# Patient Record
Sex: MALE | Race: WHITE | NOT HISPANIC OR LATINO | Employment: FULL TIME | URBAN - METROPOLITAN AREA
[De-identification: names, ages, dates, MRNs, and addresses within clinical notes are randomized per-mention and may not be internally consistent; named-entity substitution may affect disease eponyms.]

---

## 2023-04-24 ENCOUNTER — DOCUMENTATION (OUTPATIENT)
Dept: PRIMARY CARE | Facility: CLINIC | Age: 33
End: 2023-04-24
Payer: COMMERCIAL

## 2023-04-24 NOTE — PROGRESS NOTES
1st attempt at outreach call unsuccessful. Message left for patient to call back. Will attempt to call patient back later today or tomorrow.     2nd attempt at outreach call unsuccessful. Tasked to PCP office to contact patient for follow up appt.

## 2023-05-04 ENCOUNTER — TELEPHONE (OUTPATIENT)
Dept: PRIMARY CARE | Facility: CLINIC | Age: 33
End: 2023-05-04
Payer: COMMERCIAL

## 2023-05-04 NOTE — TELEPHONE ENCOUNTER
----- Message from Kate Joshi RN sent at 4/24/2023  3:51 PM EDT -----  Regarding: FW: unable to reach patient    ----- Message -----  From: Rob Shelley RN  Sent: 4/24/2023   2:50 PM EDT  To:  [id number]  Subject: unable to reach patient                          Discharge facility: [facility]  Discharge diagnosis: DKA   Date of discharge: 23 Apr 23      Unsuccessful attempts x2 to reach patient for PCP Follow-up  Please have office staff reach out to patient and schedule an appointment within 7-13 days from discharge date.

## 2023-05-16 NOTE — TELEPHONE ENCOUNTER
Still can not reach patient by phone so I will mail him a attempt to call letter asking him to call the office.

## 2023-07-03 ENCOUNTER — PATIENT OUTREACH (OUTPATIENT)
Dept: PRIMARY CARE | Facility: CLINIC | Age: 33
End: 2023-07-03
Payer: COMMERCIAL

## 2023-07-03 NOTE — PROGRESS NOTES
Discharge Facility: [facility]  Discharge Diagnosis: DKA  Admission Date: 30 June 23  Discharge Date: 2 July 23    PCP Appointment Date: Tasked to PCP office for scheduling.   Specialist Appointment Date: Suggested pt follow up with Endocrinologist within 1 wk of discharge.   Hospital Encounter and Summary: Linked    See discharge assessment below for further details     No contact. Tasked to PCP office for scheduling. Will attempted call back in 2 wks.

## 2023-07-11 PROBLEM — R10.9 ABDOMINAL PAIN: Status: ACTIVE | Noted: 2023-04-21

## 2023-07-11 PROBLEM — E11.65 POORLY CONTROLLED DIABETES MELLITUS (MULTI): Status: ACTIVE | Noted: 2023-07-11

## 2023-07-11 PROBLEM — R53.83 FATIGUE: Status: ACTIVE | Noted: 2023-07-11

## 2023-07-11 PROBLEM — E10.65 TYPE 1 DIABETES MELLITUS WITH HYPERGLYCEMIA (MULTI): Status: ACTIVE | Noted: 2019-02-25

## 2023-07-11 PROBLEM — E10.29: Status: ACTIVE | Noted: 2023-07-11

## 2023-07-11 PROBLEM — F41.1 GENERALIZED ANXIETY DISORDER: Status: ACTIVE | Noted: 2023-07-11

## 2023-07-11 PROBLEM — E10.10 DKA, TYPE 1, NOT AT GOAL (MULTI): Status: ACTIVE | Noted: 2023-04-21

## 2023-07-11 PROBLEM — E11.10 DKA (DIABETIC KETOACIDOSIS) (MULTI): Status: ACTIVE | Noted: 2023-07-11

## 2023-07-11 PROBLEM — E53.8 VITAMIN B12 DEFICIENCY: Status: ACTIVE | Noted: 2023-07-11

## 2023-07-11 PROBLEM — U07.1 COVID-19 VIRUS INFECTION: Status: ACTIVE | Noted: 2023-07-11

## 2023-07-11 PROBLEM — E10.9 TYPE 1 DIABETES MELLITUS (MULTI): Status: ACTIVE | Noted: 2023-07-11

## 2023-07-11 PROBLEM — R80.9: Status: ACTIVE | Noted: 2023-07-11

## 2023-07-11 PROBLEM — F41.9 ANXIETY AND DEPRESSION: Status: ACTIVE | Noted: 2021-08-08

## 2023-07-11 PROBLEM — F32.A ANXIETY AND DEPRESSION: Status: ACTIVE | Noted: 2021-08-08

## 2023-07-11 PROBLEM — E55.9 VITAMIN D DEFICIENCY: Status: ACTIVE | Noted: 2023-07-11

## 2023-07-11 RX ORDER — LANCING DEVICE
EACH MISCELLANEOUS
Status: ON HOLD | COMMUNITY
Start: 2022-01-14 | End: 2023-10-09 | Stop reason: SDUPTHER

## 2023-07-11 RX ORDER — PEN NEEDLE, DIABETIC 30 GX3/16"
1 NEEDLE, DISPOSABLE MISCELLANEOUS
Status: ON HOLD | COMMUNITY
Start: 2022-02-17 | End: 2023-10-09 | Stop reason: SDUPTHER

## 2023-07-11 RX ORDER — SERTRALINE HYDROCHLORIDE 25 MG/1
1 TABLET, FILM COATED ORAL DAILY
Status: ON HOLD | COMMUNITY
Start: 2023-02-13 | End: 2023-10-09

## 2023-07-11 RX ORDER — ACETAMINOPHEN 325 MG/1
TABLET ORAL EVERY 4 HOURS PRN
Status: ON HOLD | COMMUNITY
Start: 2022-09-26 | End: 2023-12-08 | Stop reason: WASHOUT

## 2023-07-11 RX ORDER — BLOOD-GLUCOSE SENSOR
EACH MISCELLANEOUS
COMMUNITY
Start: 2023-06-17 | End: 2024-02-23 | Stop reason: WASHOUT

## 2023-07-11 RX ORDER — INSULIN LISPRO 100 [IU]/ML
8 INJECTION, SOLUTION INTRAVENOUS; SUBCUTANEOUS
Status: ON HOLD | COMMUNITY
Start: 2023-04-23 | End: 2023-10-09

## 2023-07-11 RX ORDER — BLOOD-GLUCOSE,RECEIVER,CONT
EACH MISCELLANEOUS
COMMUNITY
Start: 2022-11-11 | End: 2024-02-23 | Stop reason: ALTCHOICE

## 2023-07-11 RX ORDER — HUMAN INSULIN 100 [IU]/ML
INJECTION, SOLUTION SUBCUTANEOUS
Status: ON HOLD | COMMUNITY
Start: 2022-06-02 | End: 2023-10-09

## 2023-07-11 RX ORDER — INSULIN GLARGINE 100 [IU]/ML
INJECTION, SOLUTION SUBCUTANEOUS
Status: ON HOLD | COMMUNITY
Start: 2022-06-08 | End: 2023-10-10 | Stop reason: SDUPTHER

## 2023-07-11 RX ORDER — CALCIUM CITRATE/VITAMIN D3 200MG-6.25
TABLET ORAL
Status: ON HOLD | COMMUNITY
End: 2023-10-09 | Stop reason: SDUPTHER

## 2023-07-11 RX ORDER — PANTOPRAZOLE SODIUM 40 MG/1
40 TABLET, DELAYED RELEASE ORAL
Status: ON HOLD | COMMUNITY
Start: 2021-08-09 | End: 2023-10-09

## 2023-07-11 RX ORDER — PEN NEEDLE, DIABETIC 30 GX3/16"
NEEDLE, DISPOSABLE MISCELLANEOUS
COMMUNITY
Start: 2023-04-23

## 2023-07-11 RX ORDER — INSULIN LISPRO 100 [IU]/ML
INJECTION, SOLUTION INTRAVENOUS; SUBCUTANEOUS
Status: ON HOLD | COMMUNITY
Start: 2023-07-02 | End: 2023-10-10 | Stop reason: SDUPTHER

## 2023-07-11 RX ORDER — DEXTROSE 4 G
1 TABLET,CHEWABLE ORAL
Status: ON HOLD | COMMUNITY
Start: 2022-01-14 | End: 2023-10-09 | Stop reason: SDUPTHER

## 2023-07-11 RX ORDER — PEN NEEDLE, DIABETIC 32GX 5/32"
NEEDLE, DISPOSABLE MISCELLANEOUS
Status: ON HOLD | COMMUNITY
Start: 2023-04-23 | End: 2023-10-09 | Stop reason: SDUPTHER

## 2023-07-11 RX ORDER — BLOOD-GLUCOSE TRANSMITTER
EACH MISCELLANEOUS
COMMUNITY
Start: 2022-11-10 | End: 2024-02-23 | Stop reason: ALTCHOICE

## 2023-07-11 RX ORDER — ESCITALOPRAM OXALATE 10 MG/1
10 TABLET ORAL DAILY
Status: ON HOLD | COMMUNITY
Start: 2020-09-09 | End: 2023-10-09

## 2023-07-11 RX ORDER — INSULIN GLARGINE 100 [IU]/ML
INJECTION, SOLUTION SUBCUTANEOUS
Status: ON HOLD | COMMUNITY
Start: 2022-06-08 | End: 2023-10-09 | Stop reason: SDUPTHER

## 2023-07-12 ENCOUNTER — APPOINTMENT (OUTPATIENT)
Dept: PRIMARY CARE | Facility: CLINIC | Age: 33
End: 2023-07-12
Payer: COMMERCIAL

## 2023-07-14 ENCOUNTER — PATIENT OUTREACH (OUTPATIENT)
Dept: PRIMARY CARE | Facility: CLINIC | Age: 33
End: 2023-07-14
Payer: COMMERCIAL

## 2023-07-19 ENCOUNTER — PATIENT OUTREACH (OUTPATIENT)
Dept: PRIMARY CARE | Facility: CLINIC | Age: 33
End: 2023-07-19
Payer: COMMERCIAL

## 2023-07-19 NOTE — PROGRESS NOTES
Discharge Facility: [facility]  Discharge Diagnosis: DKA  Admission Date: 15 July 23  Discharge Date: 18 July 23    PCP Appointment Date: Tasked to PCP office for scheduling  Specialist Appointment Date: Suggested pt follow up with Endocrinology on discharge.   Hospital Encounter and Summary: Linked    No contact on discharge outreach attempt. Tasked to PCP office for scheduling. Will attempt outreach again in 2 wks.      1 Aug 23 - no contact on 2 wk outreach attempt. Amending this note due to not being allowed to create a new note due to pt being dismissed by practice. Pt disenrolled from TCM program per TCM policy at this time.

## 2023-07-20 PROBLEM — E87.5 HYPERKALEMIA: Status: ACTIVE | Noted: 2023-07-20

## 2023-10-08 ENCOUNTER — HOSPITAL ENCOUNTER (INPATIENT)
Facility: HOSPITAL | Age: 33
LOS: 2 days | Discharge: HOME | DRG: 639 | End: 2023-10-10
Attending: EMERGENCY MEDICINE | Admitting: INTERNAL MEDICINE
Payer: COMMERCIAL

## 2023-10-08 ENCOUNTER — APPOINTMENT (OUTPATIENT)
Dept: RADIOLOGY | Facility: HOSPITAL | Age: 33
DRG: 639 | End: 2023-10-08
Payer: COMMERCIAL

## 2023-10-08 DIAGNOSIS — E10.65 TYPE 1 DIABETES MELLITUS WITH HYPERGLYCEMIA (MULTI): ICD-10-CM

## 2023-10-08 DIAGNOSIS — E87.5 HYPERKALEMIA: ICD-10-CM

## 2023-10-08 DIAGNOSIS — E10.10 DIABETIC KETOACIDOSIS WITHOUT COMA ASSOCIATED WITH TYPE 1 DIABETES MELLITUS (MULTI): Primary | ICD-10-CM

## 2023-10-08 LAB
ALBUMIN SERPL BCP-MCNC: 4.6 G/DL (ref 3.4–5)
ALP SERPL-CCNC: 86 U/L (ref 33–120)
ALT SERPL W P-5'-P-CCNC: 20 U/L (ref 10–52)
ANION GAP BLDV CALCULATED.4IONS-SCNC: 14 MMOL/L (ref 10–25)
ANION GAP BLDV CALCULATED.4IONS-SCNC: 17 MMOL/L (ref 10–25)
ANION GAP BLDV CALCULATED.4IONS-SCNC: 27 MMOL/L (ref 10–25)
ANION GAP SERPL CALC-SCNC: 18 MMOL/L (ref 10–20)
ANION GAP SERPL CALC-SCNC: 31 MMOL/L (ref 10–20)
APPEARANCE UR: CLEAR
AST SERPL W P-5'-P-CCNC: 20 U/L (ref 9–39)
B-OH-BUTYR SERPL-SCNC: NORMAL MMOL/L
BASE EXCESS BLDV CALC-SCNC: -13.6 MMOL/L (ref -2–3)
BASE EXCESS BLDV CALC-SCNC: -23.2 MMOL/L (ref -2–3)
BASE EXCESS BLDV CALC-SCNC: -9.8 MMOL/L (ref -2–3)
BASOPHILS # BLD AUTO: 0.05 X10*3/UL (ref 0–0.1)
BASOPHILS NFR BLD AUTO: 0.7 %
BILIRUB DIRECT SERPL-MCNC: 0 MG/DL (ref 0–0.3)
BILIRUB SERPL-MCNC: 0.4 MG/DL (ref 0–1.2)
BILIRUB UR STRIP.AUTO-MCNC: NEGATIVE MG/DL
BODY TEMPERATURE: 37 DEGREES CELSIUS
BUN SERPL-MCNC: 17 MG/DL (ref 6–23)
BUN SERPL-MCNC: 19 MG/DL (ref 6–23)
CA-I BLDV-SCNC: 1.19 MMOL/L (ref 1.1–1.33)
CA-I BLDV-SCNC: 1.2 MMOL/L (ref 1.1–1.33)
CA-I BLDV-SCNC: 1.24 MMOL/L (ref 1.1–1.33)
CALCIUM SERPL-MCNC: 8.2 MG/DL (ref 8.6–10.3)
CALCIUM SERPL-MCNC: 8.8 MG/DL (ref 8.6–10.3)
CARDIAC TROPONIN I PNL SERPL HS: 4 NG/L (ref 0–20)
CHLORIDE BLDV-SCNC: 102 MMOL/L (ref 98–107)
CHLORIDE BLDV-SCNC: 110 MMOL/L (ref 98–107)
CHLORIDE BLDV-SCNC: 110 MMOL/L (ref 98–107)
CHLORIDE SERPL-SCNC: 101 MMOL/L (ref 98–107)
CHLORIDE SERPL-SCNC: 113 MMOL/L (ref 98–107)
CO2 SERPL-SCNC: 11 MMOL/L (ref 21–32)
CO2 SERPL-SCNC: 6 MMOL/L (ref 21–32)
COLOR UR: ABNORMAL
CREAT SERPL-MCNC: 0.98 MG/DL (ref 0.5–1.3)
CREAT SERPL-MCNC: 1.2 MG/DL (ref 0.5–1.3)
EOSINOPHIL # BLD AUTO: 0.01 X10*3/UL (ref 0–0.7)
EOSINOPHIL NFR BLD AUTO: 0.1 %
ERYTHROCYTE [DISTWIDTH] IN BLOOD BY AUTOMATED COUNT: 12.8 % (ref 11.5–14.5)
FLUAV RNA RESP QL NAA+PROBE: NOT DETECTED
FLUBV RNA RESP QL NAA+PROBE: NOT DETECTED
GFR SERPL CREATININE-BSD FRML MDRD: 81 ML/MIN/1.73M*2
GFR SERPL CREATININE-BSD FRML MDRD: >90 ML/MIN/1.73M*2
GLUCOSE BLD MANUAL STRIP-MCNC: 172 MG/DL (ref 74–99)
GLUCOSE BLD MANUAL STRIP-MCNC: 175 MG/DL (ref 74–99)
GLUCOSE BLD MANUAL STRIP-MCNC: 176 MG/DL (ref 74–99)
GLUCOSE BLD MANUAL STRIP-MCNC: 188 MG/DL (ref 74–99)
GLUCOSE BLD MANUAL STRIP-MCNC: 237 MG/DL (ref 74–99)
GLUCOSE BLDV-MCNC: 193 MG/DL (ref 74–99)
GLUCOSE BLDV-MCNC: 222 MG/DL (ref 74–99)
GLUCOSE BLDV-MCNC: 516 MG/DL (ref 74–99)
GLUCOSE SERPL-MCNC: 175 MG/DL (ref 74–99)
GLUCOSE SERPL-MCNC: 435 MG/DL (ref 74–99)
GLUCOSE UR STRIP.AUTO-MCNC: ABNORMAL MG/DL
HCO3 BLDV-SCNC: 13.3 MMOL/L (ref 22–26)
HCO3 BLDV-SCNC: 16.1 MMOL/L (ref 22–26)
HCO3 BLDV-SCNC: 6.1 MMOL/L (ref 22–26)
HCT VFR BLD AUTO: 53.6 % (ref 41–52)
HCT VFR BLD EST: 49 % (ref 41–52)
HCT VFR BLD EST: 52 % (ref 41–52)
HCT VFR BLD EST: 57 % (ref 41–52)
HGB BLD-MCNC: 18.2 G/DL (ref 13.5–17.5)
HGB BLDV-MCNC: 16.4 G/DL (ref 13.5–17.5)
HGB BLDV-MCNC: 17.2 G/DL (ref 13.5–17.5)
HGB BLDV-MCNC: 18.9 G/DL (ref 13.5–17.5)
IMM GRANULOCYTES # BLD AUTO: 0.07 X10*3/UL (ref 0–0.7)
IMM GRANULOCYTES NFR BLD AUTO: 0.9 % (ref 0–0.9)
INHALED O2 CONCENTRATION: 21 %
INHALED O2 CONCENTRATION: 21 %
INHALED O2 CONCENTRATION: 44 %
KETONES UR STRIP.AUTO-MCNC: ABNORMAL MG/DL
LACTATE BLDV-SCNC: 0.9 MMOL/L (ref 0.4–2)
LACTATE BLDV-SCNC: 1.7 MMOL/L (ref 0.4–2)
LACTATE BLDV-SCNC: 1.7 MMOL/L (ref 0.4–2)
LACTATE SERPL-SCNC: 1 MMOL/L (ref 0.4–2)
LEUKOCYTE ESTERASE UR QL STRIP.AUTO: NEGATIVE
LYMPHOCYTES # BLD AUTO: 1.07 X10*3/UL (ref 1.2–4.8)
LYMPHOCYTES NFR BLD AUTO: 14 %
MAGNESIUM SERPL-MCNC: 1.93 MG/DL (ref 1.6–2.4)
MAGNESIUM SERPL-MCNC: 2.35 MG/DL (ref 1.6–2.4)
MCH RBC QN AUTO: 32.3 PG (ref 26–34)
MCHC RBC AUTO-ENTMCNC: 34 G/DL (ref 32–36)
MCV RBC AUTO: 95 FL (ref 80–100)
MONOCYTES # BLD AUTO: 0.75 X10*3/UL (ref 0.1–1)
MONOCYTES NFR BLD AUTO: 9.8 %
MUCOUS THREADS #/AREA URNS AUTO: NORMAL /LPF
NEUTROPHILS # BLD AUTO: 5.72 X10*3/UL (ref 1.2–7.7)
NEUTROPHILS NFR BLD AUTO: 74.5 %
NITRITE UR QL STRIP.AUTO: NEGATIVE
NRBC BLD-RTO: 0 /100 WBCS (ref 0–0)
OSMOLALITY SERPL: 310 MOSM/KG (ref 280–300)
OXYHGB MFR BLDV: 72.7 % (ref 45–75)
OXYHGB MFR BLDV: 79.3 % (ref 45–75)
OXYHGB MFR BLDV: 93.9 % (ref 45–75)
PCO2 BLDV: 23 MM HG (ref 41–51)
PCO2 BLDV: 34 MM HG (ref 41–51)
PCO2 BLDV: 35 MM HG (ref 41–51)
PH BLDV: 7.03 PH (ref 7.33–7.43)
PH BLDV: 7.2 PH (ref 7.33–7.43)
PH BLDV: 7.27 PH (ref 7.33–7.43)
PH UR STRIP.AUTO: 5 [PH]
PHOSPHATE SERPL-MCNC: 2.5 MG/DL (ref 2.5–4.9)
PHOSPHATE SERPL-MCNC: 4.8 MG/DL (ref 2.5–4.9)
PLATELET # BLD AUTO: 160 X10*3/UL (ref 150–450)
PMV BLD AUTO: 11.3 FL (ref 7.5–11.5)
PO2 BLDV: 47 MM HG (ref 35–45)
PO2 BLDV: 48 MM HG (ref 35–45)
PO2 BLDV: 68 MM HG (ref 35–45)
POTASSIUM BLDV-SCNC: 3.7 MMOL/L (ref 3.5–5.3)
POTASSIUM BLDV-SCNC: 3.8 MMOL/L (ref 3.5–5.3)
POTASSIUM BLDV-SCNC: 6.5 MMOL/L (ref 3.5–5.3)
POTASSIUM SERPL-SCNC: 3.8 MMOL/L (ref 3.5–5.3)
POTASSIUM SERPL-SCNC: 6.2 MMOL/L (ref 3.5–5.3)
PROT SERPL-MCNC: 8.1 G/DL (ref 6.4–8.2)
PROT UR STRIP.AUTO-MCNC: ABNORMAL MG/DL
RBC # BLD AUTO: 5.64 X10*6/UL (ref 4.5–5.9)
RBC # UR STRIP.AUTO: NEGATIVE /UL
RBC #/AREA URNS AUTO: NORMAL /HPF
SAO2 % BLDV: 74 % (ref 45–75)
SAO2 % BLDV: 81 % (ref 45–75)
SAO2 % BLDV: 96 % (ref 45–75)
SARS-COV-2 RNA RESP QL NAA+PROBE: NOT DETECTED
SODIUM BLDV-SCNC: 129 MMOL/L (ref 136–145)
SODIUM BLDV-SCNC: 136 MMOL/L (ref 136–145)
SODIUM BLDV-SCNC: 136 MMOL/L (ref 136–145)
SODIUM SERPL-SCNC: 132 MMOL/L (ref 136–145)
SODIUM SERPL-SCNC: 138 MMOL/L (ref 136–145)
SP GR UR STRIP.AUTO: 1.03
UROBILINOGEN UR STRIP.AUTO-MCNC: <2 MG/DL
WBC # BLD AUTO: 7.7 X10*3/UL (ref 4.4–11.3)
WBC #/AREA URNS AUTO: NORMAL /HPF

## 2023-10-08 PROCEDURE — 87502 INFLUENZA DNA AMP PROBE: CPT | Performed by: EMERGENCY MEDICINE

## 2023-10-08 PROCEDURE — 71045 X-RAY EXAM CHEST 1 VIEW: CPT | Mod: FR

## 2023-10-08 PROCEDURE — 83930 ASSAY OF BLOOD OSMOLALITY: CPT | Mod: CMCLAB,PORLAB | Performed by: EMERGENCY MEDICINE

## 2023-10-08 PROCEDURE — 96361 HYDRATE IV INFUSION ADD-ON: CPT

## 2023-10-08 PROCEDURE — 96365 THER/PROPH/DIAG IV INF INIT: CPT

## 2023-10-08 PROCEDURE — 74177 CT ABD & PELVIS W/CONTRAST: CPT | Mod: FOREIGN READ | Performed by: RADIOLOGY

## 2023-10-08 PROCEDURE — 2500000004 HC RX 250 GENERAL PHARMACY W/ HCPCS (ALT 636 FOR OP/ED): Performed by: EMERGENCY MEDICINE

## 2023-10-08 PROCEDURE — 70450 CT HEAD/BRAIN W/O DYE: CPT

## 2023-10-08 PROCEDURE — 82947 ASSAY GLUCOSE BLOOD QUANT: CPT

## 2023-10-08 PROCEDURE — 85025 COMPLETE CBC W/AUTO DIFF WBC: CPT | Performed by: EMERGENCY MEDICINE

## 2023-10-08 PROCEDURE — 84295 ASSAY OF SERUM SODIUM: CPT | Performed by: PHYSICIAN ASSISTANT

## 2023-10-08 PROCEDURE — 2020000001 HC ICU ROOM DAILY

## 2023-10-08 PROCEDURE — 2550000001 HC RX 255 CONTRASTS: Performed by: EMERGENCY MEDICINE

## 2023-10-08 PROCEDURE — 82805 BLOOD GASES W/O2 SATURATION: CPT | Performed by: PHYSICIAN ASSISTANT

## 2023-10-08 PROCEDURE — 2580000001 HC RX 258 IV SOLUTIONS: Performed by: EMERGENCY MEDICINE

## 2023-10-08 PROCEDURE — 74177 CT ABD & PELVIS W/CONTRAST: CPT | Mod: FR

## 2023-10-08 PROCEDURE — 80048 BASIC METABOLIC PNL TOTAL CA: CPT | Performed by: EMERGENCY MEDICINE

## 2023-10-08 PROCEDURE — 82010 KETONE BODYS QUAN: CPT | Performed by: EMERGENCY MEDICINE

## 2023-10-08 PROCEDURE — 81001 URINALYSIS AUTO W/SCOPE: CPT | Performed by: EMERGENCY MEDICINE

## 2023-10-08 PROCEDURE — 83735 ASSAY OF MAGNESIUM: CPT | Performed by: EMERGENCY MEDICINE

## 2023-10-08 PROCEDURE — 2500000005 HC RX 250 GENERAL PHARMACY W/O HCPCS: Performed by: EMERGENCY MEDICINE

## 2023-10-08 PROCEDURE — 84100 ASSAY OF PHOSPHORUS: CPT | Performed by: PHYSICIAN ASSISTANT

## 2023-10-08 PROCEDURE — 70450 CT HEAD/BRAIN W/O DYE: CPT | Performed by: RADIOLOGY

## 2023-10-08 PROCEDURE — 36415 COLL VENOUS BLD VENIPUNCTURE: CPT | Performed by: EMERGENCY MEDICINE

## 2023-10-08 PROCEDURE — 82435 ASSAY OF BLOOD CHLORIDE: CPT | Performed by: PHYSICIAN ASSISTANT

## 2023-10-08 PROCEDURE — 82248 BILIRUBIN DIRECT: CPT | Performed by: EMERGENCY MEDICINE

## 2023-10-08 PROCEDURE — 99291 CRITICAL CARE FIRST HOUR: CPT | Mod: 25 | Performed by: EMERGENCY MEDICINE

## 2023-10-08 PROCEDURE — 82805 BLOOD GASES W/O2 SATURATION: CPT | Performed by: EMERGENCY MEDICINE

## 2023-10-08 PROCEDURE — 36415 COLL VENOUS BLD VENIPUNCTURE: CPT | Performed by: PHYSICIAN ASSISTANT

## 2023-10-08 PROCEDURE — 71045 X-RAY EXAM CHEST 1 VIEW: CPT | Mod: FOREIGN READ | Performed by: RADIOLOGY

## 2023-10-08 PROCEDURE — 99223 1ST HOSP IP/OBS HIGH 75: CPT | Performed by: PHYSICIAN ASSISTANT

## 2023-10-08 PROCEDURE — 85018 HEMOGLOBIN: CPT | Performed by: EMERGENCY MEDICINE

## 2023-10-08 PROCEDURE — 84100 ASSAY OF PHOSPHORUS: CPT | Performed by: EMERGENCY MEDICINE

## 2023-10-08 PROCEDURE — 96375 TX/PRO/DX INJ NEW DRUG ADDON: CPT

## 2023-10-08 PROCEDURE — 83605 ASSAY OF LACTIC ACID: CPT | Performed by: EMERGENCY MEDICINE

## 2023-10-08 PROCEDURE — 2500000004 HC RX 250 GENERAL PHARMACY W/ HCPCS (ALT 636 FOR OP/ED): Performed by: PHYSICIAN ASSISTANT

## 2023-10-08 PROCEDURE — 84484 ASSAY OF TROPONIN QUANT: CPT | Performed by: EMERGENCY MEDICINE

## 2023-10-08 PROCEDURE — 85018 HEMOGLOBIN: CPT | Performed by: PHYSICIAN ASSISTANT

## 2023-10-08 PROCEDURE — 83735 ASSAY OF MAGNESIUM: CPT | Performed by: PHYSICIAN ASSISTANT

## 2023-10-08 RX ORDER — SERTRALINE HYDROCHLORIDE 50 MG/1
25 TABLET, FILM COATED ORAL DAILY
Status: DISCONTINUED | OUTPATIENT
Start: 2023-10-08 | End: 2023-10-10 | Stop reason: HOSPADM

## 2023-10-08 RX ORDER — DEXTROSE 50 % IN WATER (D50W) INTRAVENOUS SYRINGE
50
Status: DISCONTINUED | OUTPATIENT
Start: 2023-10-08 | End: 2023-10-10 | Stop reason: HOSPADM

## 2023-10-08 RX ORDER — DEXTROSE MONOHYDRATE 100 MG/ML
150 INJECTION, SOLUTION INTRAVENOUS CONTINUOUS
Status: DISCONTINUED | OUTPATIENT
Start: 2023-10-08 | End: 2023-10-08

## 2023-10-08 RX ORDER — DEXTROSE MONOHYDRATE 100 MG/ML
150 INJECTION, SOLUTION INTRAVENOUS CONTINUOUS
Status: DISCONTINUED | OUTPATIENT
Start: 2023-10-08 | End: 2023-10-08 | Stop reason: SDUPTHER

## 2023-10-08 RX ORDER — PANTOPRAZOLE SODIUM 40 MG/1
40 TABLET, DELAYED RELEASE ORAL
Status: DISCONTINUED | OUTPATIENT
Start: 2023-10-09 | End: 2023-10-08 | Stop reason: SDUPTHER

## 2023-10-08 RX ORDER — PANTOPRAZOLE SODIUM 40 MG/1
40 TABLET, DELAYED RELEASE ORAL
Status: DISCONTINUED | OUTPATIENT
Start: 2023-10-09 | End: 2023-10-10 | Stop reason: HOSPADM

## 2023-10-08 RX ORDER — CALCIUM GLUCONATE 20 MG/ML
2 INJECTION, SOLUTION INTRAVENOUS ONCE
Status: COMPLETED | OUTPATIENT
Start: 2023-10-08 | End: 2023-10-08

## 2023-10-08 RX ORDER — ALBUTEROL SULFATE 5 MG/ML
10 SOLUTION RESPIRATORY (INHALATION) ONCE
Status: DISCONTINUED | OUTPATIENT
Start: 2023-10-08 | End: 2023-10-08

## 2023-10-08 RX ORDER — DEXTROSE MONOHYDRATE 100 MG/ML
150 INJECTION, SOLUTION INTRAVENOUS CONTINUOUS
Status: DISCONTINUED | OUTPATIENT
Start: 2023-10-08 | End: 2023-10-09

## 2023-10-08 RX ORDER — SODIUM CHLORIDE 9 MG/ML
150 INJECTION, SOLUTION INTRAVENOUS CONTINUOUS
Status: DISCONTINUED | OUTPATIENT
Start: 2023-10-08 | End: 2023-10-08 | Stop reason: ALTCHOICE

## 2023-10-08 RX ORDER — DEXTROSE 50 % IN WATER (D50W) INTRAVENOUS SYRINGE
50
Status: DISCONTINUED | OUTPATIENT
Start: 2023-10-08 | End: 2023-10-08 | Stop reason: SDUPTHER

## 2023-10-08 RX ORDER — SODIUM CHLORIDE 450 MG/100ML
250 INJECTION, SOLUTION INTRAVENOUS CONTINUOUS
Status: DISCONTINUED | OUTPATIENT
Start: 2023-10-08 | End: 2023-10-08 | Stop reason: ALTCHOICE

## 2023-10-08 RX ORDER — ONDANSETRON HYDROCHLORIDE 2 MG/ML
4 INJECTION, SOLUTION INTRAVENOUS ONCE
Status: COMPLETED | OUTPATIENT
Start: 2023-10-08 | End: 2023-10-08

## 2023-10-08 RX ORDER — SODIUM BICARBONATE 1 MEQ/ML
25 SYRINGE (ML) INTRAVENOUS ONCE
Status: DISCONTINUED | OUTPATIENT
Start: 2023-10-08 | End: 2023-10-08

## 2023-10-08 RX ORDER — SODIUM BICARBONATE 1 MEQ/ML
50 SYRINGE (ML) INTRAVENOUS ONCE
Status: COMPLETED | OUTPATIENT
Start: 2023-10-08 | End: 2023-10-08

## 2023-10-08 RX ORDER — DEXTROSE MONOHYDRATE AND SODIUM CHLORIDE 5; .45 G/100ML; G/100ML
150 INJECTION, SOLUTION INTRAVENOUS CONTINUOUS
Status: DISCONTINUED | OUTPATIENT
Start: 2023-10-08 | End: 2023-10-09

## 2023-10-08 RX ORDER — DEXTROSE MONOHYDRATE AND SODIUM CHLORIDE 5; .9 G/100ML; G/100ML
150 INJECTION, SOLUTION INTRAVENOUS CONTINUOUS
Status: DISCONTINUED | OUTPATIENT
Start: 2023-10-08 | End: 2023-10-08

## 2023-10-08 RX ORDER — DEXTROSE MONOHYDRATE AND SODIUM CHLORIDE 5; .45 G/100ML; G/100ML
150 INJECTION, SOLUTION INTRAVENOUS CONTINUOUS
Status: DISCONTINUED | OUTPATIENT
Start: 2023-10-08 | End: 2023-10-08

## 2023-10-08 RX ORDER — ESCITALOPRAM OXALATE 10 MG/1
10 TABLET ORAL DAILY
Status: DISCONTINUED | OUTPATIENT
Start: 2023-10-08 | End: 2023-10-10 | Stop reason: HOSPADM

## 2023-10-08 RX ADMIN — IOHEXOL 75 ML: 350 INJECTION, SOLUTION INTRAVENOUS at 17:12

## 2023-10-08 RX ADMIN — SODIUM CHLORIDE 1000 ML: 9 INJECTION, SOLUTION INTRAVENOUS at 16:36

## 2023-10-08 RX ADMIN — INSULIN HUMAN 11 UNITS/HR: 1 INJECTION, SOLUTION INTRAVENOUS at 16:37

## 2023-10-08 RX ADMIN — SODIUM CHLORIDE, POTASSIUM CHLORIDE, SODIUM LACTATE AND CALCIUM CHLORIDE 1000 ML: 600; 310; 30; 20 INJECTION, SOLUTION INTRAVENOUS at 15:22

## 2023-10-08 RX ADMIN — ONDANSETRON 4 MG: 2 INJECTION INTRAMUSCULAR; INTRAVENOUS at 15:23

## 2023-10-08 RX ADMIN — DEXTROSE AND SODIUM CHLORIDE 150 ML/HR: 5; 450 INJECTION, SOLUTION INTRAVENOUS at 19:31

## 2023-10-08 RX ADMIN — CALCIUM GLUCONATE 2 G: 20 INJECTION, SOLUTION INTRAVENOUS at 16:37

## 2023-10-08 RX ADMIN — SODIUM BICARBONATE 50 MEQ: 84 INJECTION INTRAVENOUS at 16:36

## 2023-10-08 SDOH — SOCIAL STABILITY: SOCIAL INSECURITY: DOES ANYONE TRY TO KEEP YOU FROM HAVING/CONTACTING OTHER FRIENDS OR DOING THINGS OUTSIDE YOUR HOME?: NO

## 2023-10-08 SDOH — SOCIAL STABILITY: SOCIAL INSECURITY: ARE THERE ANY APPARENT SIGNS OF INJURIES/BEHAVIORS THAT COULD BE RELATED TO ABUSE/NEGLECT?: NO

## 2023-10-08 SDOH — SOCIAL STABILITY: SOCIAL INSECURITY: ARE YOU OR HAVE YOU BEEN THREATENED OR ABUSED PHYSICALLY, EMOTIONALLY, OR SEXUALLY BY ANYONE?: NO

## 2023-10-08 SDOH — SOCIAL STABILITY: SOCIAL INSECURITY: HAVE YOU HAD THOUGHTS OF HARMING ANYONE ELSE?: NO

## 2023-10-08 SDOH — SOCIAL STABILITY: SOCIAL INSECURITY: HAS ANYONE EVER THREATENED TO HURT YOUR FAMILY OR YOUR PETS?: NO

## 2023-10-08 SDOH — SOCIAL STABILITY: SOCIAL INSECURITY: ABUSE: ADULT

## 2023-10-08 SDOH — SOCIAL STABILITY: SOCIAL INSECURITY: DO YOU FEEL UNSAFE GOING BACK TO THE PLACE WHERE YOU ARE LIVING?: NO

## 2023-10-08 SDOH — SOCIAL STABILITY: SOCIAL INSECURITY: WERE YOU ABLE TO COMPLETE ALL THE BEHAVIORAL HEALTH SCREENINGS?: YES

## 2023-10-08 SDOH — SOCIAL STABILITY: SOCIAL INSECURITY: DO YOU FEEL ANYONE HAS EXPLOITED OR TAKEN ADVANTAGE OF YOU FINANCIALLY OR OF YOUR PERSONAL PROPERTY?: NO

## 2023-10-08 ASSESSMENT — PAIN - FUNCTIONAL ASSESSMENT
PAIN_FUNCTIONAL_ASSESSMENT: 0-10
PAIN_FUNCTIONAL_ASSESSMENT: 0-10

## 2023-10-08 ASSESSMENT — COGNITIVE AND FUNCTIONAL STATUS - GENERAL
MOBILITY SCORE: 24
PATIENT BASELINE BEDBOUND: NO
DAILY ACTIVITIY SCORE: 24

## 2023-10-08 ASSESSMENT — LIFESTYLE VARIABLES
PRESCIPTION_ABUSE_PAST_12_MONTHS: NO
HOW OFTEN DO YOU HAVE A DRINK CONTAINING ALCOHOL: NEVER
AUDIT-C TOTAL SCORE: 0
HOW OFTEN DO YOU HAVE 6 OR MORE DRINKS ON ONE OCCASION: NEVER
HOW MANY STANDARD DRINKS CONTAINING ALCOHOL DO YOU HAVE ON A TYPICAL DAY: PATIENT DOES NOT DRINK
AUDIT-C TOTAL SCORE: 0
SKIP TO QUESTIONS 9-10: 1
SUBSTANCE_ABUSE_PAST_12_MONTHS: NO

## 2023-10-08 ASSESSMENT — ACTIVITIES OF DAILY LIVING (ADL)
PATIENT'S MEMORY ADEQUATE TO SAFELY COMPLETE DAILY ACTIVITIES?: YES
HEARING - LEFT EAR: FUNCTIONAL
TOILETING: INDEPENDENT
FEEDING YOURSELF: INDEPENDENT
JUDGMENT_ADEQUATE_SAFELY_COMPLETE_DAILY_ACTIVITIES: YES
GROOMING: INDEPENDENT
WALKS IN HOME: INDEPENDENT
DRESSING YOURSELF: INDEPENDENT
BATHING: INDEPENDENT
HEARING - RIGHT EAR: FUNCTIONAL
ADEQUATE_TO_COMPLETE_ADL: YES

## 2023-10-08 ASSESSMENT — PATIENT HEALTH QUESTIONNAIRE - PHQ9
1. LITTLE INTEREST OR PLEASURE IN DOING THINGS: NOT AT ALL
SUM OF ALL RESPONSES TO PHQ9 QUESTIONS 1 & 2: 0
2. FEELING DOWN, DEPRESSED OR HOPELESS: NOT AT ALL

## 2023-10-08 ASSESSMENT — PAIN SCALES - GENERAL: PAINLEVEL_OUTOF10: 0 - NO PAIN

## 2023-10-08 NOTE — H&P
History Of Present Illness  Ed Sanon is a 32 y.o. male with PMH of IDDM I with poor insulin compliance, anxiety, depression, who presents with concern for DKA.  Patient is well-known to our service for numerous prior admissions for the same in the past.  States that over the past several days has been noticing some fatigue, weakness, back pain.  States this is similar as well with DKA in the past.  Denies any shortness of breath, abdominal pain, nausea, vomiting.  States that he has been checking his sugars at home and has been consistently in the upper 300s or greater.  States he has been taking his long-acting insulin at home but has not been taking any of his short acting insulin.  States that he does not feel comfortable taking his short acting insulin.    ED course: Afebrile, , RR 22, /86, pulse ox 99% on room air.  No leukocytosis, Hgb 18.2.  Chemistries remarkable for glucose of 435, potassium 6.2, bicarb 6, AG 31.  pH 7.03, venous lactic normal.  UA without signs of infection.  CT head shows no acute intracranial process CT abdomen pelvis shows no acute intra-abdominal process.  CXR shows no acute cardiopulmonary process.  Patient was started on insulin gtt. and IVF.  Decision made for admission       ED Course as of 10/08/23 1936   Sun Oct 08, 2023   1546 Patient with AGMA, , potassium elevated at 6.2, concern for DKA, patient started on IVF, continuous insulin infusion [BR]   1809 ECG: Sinus rhythm, rate 105, QRS 90, QTc 512.  Minimally peaked T waves in anterior leads. [BR]   1810 Patient's VBG concerning for diabetic ketoacidosis with an anion gap metabolic acidosis with respiratory compensation, potassium elevated at 6.1 as well, patient received IV sodium bicarb, started on insulin infusion, received IV normal saline bolus and started on half-normal saline continuous infusion for fluid resuscitation (per DKA order set, recommended half-normal saline given corrected sodium  greater than 135) serum awesome's pending.  Urinalysis with no evidence of infection, CT head showing no intracranial abnormalities, troponin showing no evidence of myocardial ischemic injury and ECG showing no evidence of his precipitating ischemia, suspect precipitant was patient's medication noncompliance (not taking his short acting insulin at home).  Given DKA requiring insulin infusion and close monitoring, patient was admitted to the ICU for further evaluation and management. [BR]      ED Course User Index  [BR] Elvin Cuevas MD         Diagnoses as of 10/08/23 1936   Diabetic ketoacidosis without coma associated with type 1 diabetes mellitus (CMS/HCC)   Hyperkalemia        Past Medical History  He has a past medical history of Elevated blood-pressure reading, without diagnosis of hypertension (09/09/2020).    Surgical History  He has no past surgical history on file.     Social History  He has no history on file for tobacco use, alcohol use, and drug use.    Family History  No family history on file.     Allergies  Patient has no known allergies.    Review of Systems  12 point ROS reviewed, negative except for as noted above    Last Recorded Vitals  /77   Pulse (!) 120   Temp 37.1 °C (98.8 °F) (Temporal)   Resp 20   Wt 79.4 kg (175 lb)   SpO2 98%       Physical Exam  Constitutional: Well developed, well nourished, in no acute distress. Laying back in bed comfortably and talkative    Eyes: PERRL, EOMI    Head/Neck: Normocephalic, atraumatic. Neck supple, no thyromegaly, JVD. Trachea midline    Respiratory/Thorax: Normal respiratory effort. Lungs CTAB with no wheezing, rales, or rhonchi noted    Cardiovascular: Tachycardic, no murmurs, rubs, or gallops noted. Peripheral pulses 2+    Gastrointestinal: Bowel sounds normal. Abdomen soft, nontender, nondistended, with no palpable masses    Musculoskeletal: No gross deformities. No gross muscular weakness with no ROM limitation    Extremities: No lower  extremity edema noted bilaterally    Neurological: Alert and oriented x3, CN II - VII grossly intact    Psychological: Appropriate mood and affect    Skin: No rashes or lesions noted.         Relevant Results  Results for orders placed or performed during the hospital encounter of 10/08/23 (from the past 24 hour(s))   CBC and Auto Differential   Result Value Ref Range    WBC 7.7 4.4 - 11.3 x10*3/uL    nRBC 0.0 0.0 - 0.0 /100 WBCs    RBC 5.64 4.50 - 5.90 x10*6/uL    Hemoglobin 18.2 (H) 13.5 - 17.5 g/dL    Hematocrit 53.6 (H) 41.0 - 52.0 %    MCV 95 80 - 100 fL    MCH 32.3 26.0 - 34.0 pg    MCHC 34.0 32.0 - 36.0 g/dL    RDW 12.8 11.5 - 14.5 %    Platelets 160 150 - 450 x10*3/uL    MPV 11.3 7.5 - 11.5 fL    Neutrophils % 74.5 40.0 - 80.0 %    Immature Granulocytes %, Automated 0.9 0.0 - 0.9 %    Lymphocytes % 14.0 13.0 - 44.0 %    Monocytes % 9.8 2.0 - 10.0 %    Eosinophils % 0.1 0.0 - 6.0 %    Basophils % 0.7 0.0 - 2.0 %    Neutrophils Absolute 5.72 1.20 - 7.70 x10*3/uL    Immature Granulocytes Absolute, Automated 0.07 0.00 - 0.70 x10*3/uL    Lymphocytes Absolute 1.07 (L) 1.20 - 4.80 x10*3/uL    Monocytes Absolute 0.75 0.10 - 1.00 x10*3/uL    Eosinophils Absolute 0.01 0.00 - 0.70 x10*3/uL    Basophils Absolute 0.05 0.00 - 0.10 x10*3/uL   Basic metabolic panel   Result Value Ref Range    Glucose 435 (H) 74 - 99 mg/dL    Sodium 132 (L) 136 - 145 mmol/L    Potassium 6.2 (HH) 3.5 - 5.3 mmol/L    Chloride 101 98 - 107 mmol/L    Bicarbonate 6 (LL) 21 - 32 mmol/L    Anion Gap 31 (H) 10 - 20 mmol/L    Urea Nitrogen 19 6 - 23 mg/dL    Creatinine 1.20 0.50 - 1.30 mg/dL    eGFR 81 >60 mL/min/1.73m*2    Calcium 8.8 8.6 - 10.3 mg/dL   Phosphorus   Result Value Ref Range    Phosphorus 4.8 2.5 - 4.9 mg/dL   Magnesium   Result Value Ref Range    Magnesium 2.35 1.60 - 2.40 mg/dL   Hepatic function panel   Result Value Ref Range    Albumin 4.6 3.4 - 5.0 g/dL    Bilirubin, Total 0.4 0.0 - 1.2 mg/dL    Bilirubin, Direct 0.0 0.0 - 0.3  mg/dL    Alkaline Phosphatase 86 33 - 120 U/L    ALT 20 10 - 52 U/L    AST 20 9 - 39 U/L    Total Protein 8.1 6.4 - 8.2 g/dL   Lactate   Result Value Ref Range    Lactate 1.0 0.4 - 2.0 mmol/L   Troponin I, High Sensitivity   Result Value Ref Range    Troponin I, High Sensitivity 4 0 - 20 ng/L   Blood Gas Venous Full Panel   Result Value Ref Range    POCT pH, Venous 7.03 (LL) 7.33 - 7.43 pH    POCT pCO2, Venous 23 (L) 41 - 51 mm Hg    POCT pO2, Venous 48 (H) 35 - 45 mm Hg    POCT SO2, Venous 74 45 - 75 %    POCT Oxy Hemoglobin, Venous 72.7 45.0 - 75.0 %    POCT Hematocrit Calculated, Venous 57.0 (H) 41.0 - 52.0 %    POCT Sodium, Venous 129 (L) 136 - 145 mmol/L    POCT Potassium, Venous 6.5 (HH) 3.5 - 5.3 mmol/L    POCT Chloride, Venous 102 98 - 107 mmol/L    POCT Ionized Calicum, Venous 1.24 1.10 - 1.33 mmol/L    POCT Glucose, Venous 516 (HH) 74 - 99 mg/dL    POCT Lactate, Venous 1.7 0.4 - 2.0 mmol/L    POCT Base Excess, Venous -23.2 (L) -2.0 - 3.0 mmol/L    POCT HCO3 Calculated, Venous 6.1 (L) 22.0 - 26.0 mmol/L    POCT Hemoglobin, Venous 18.9 (H) 13.5 - 17.5 g/dL    POCT Anion Gap, Venous 27.0 (H) 10.0 - 25.0 mmol/L    Patient Temperature 37.0 degrees Celsius    FiO2 21 %   Beta Hydroxybutyrate   Result Value Ref Range    Beta-Hydroxybutyrate     Influenza A, and B PCR   Result Value Ref Range    Flu A Result Not Detected Not Detected    Flu B Result Not Detected Not Detected   SARS-CoV-2 RT PCR   Result Value Ref Range    Coronavirus 2019, PCR Not Detected Not Detected   Urinalysis with Reflex Microscopic   Result Value Ref Range    Color, Urine Straw Straw, Yellow    Appearance, Urine Clear Clear    Specific Gravity, Urine 1.026 1.005 - 1.035    pH, Urine 5.0 5.0, 5.5, 6.0, 6.5, 7.0, 7.5, 8.0    Protein, Urine 30 (1+) (N) NEGATIVE mg/dL    Glucose, Urine >=500 (3+) (A) NEGATIVE mg/dL    Blood, Urine NEGATIVE NEGATIVE    Ketones, Urine 80 (2+) (A) NEGATIVE mg/dL    Bilirubin, Urine NEGATIVE NEGATIVE     Urobilinogen, Urine <2.0 <2.0 mg/dL    Nitrite, Urine NEGATIVE NEGATIVE    Leukocyte Esterase, Urine NEGATIVE NEGATIVE   Urinalysis Microscopic Only   Result Value Ref Range    WBC, Urine 1-5 1-5, NONE /HPF    RBC, Urine NONE NONE, 1-2, 3-5 /HPF    Mucus, Urine 1+ Reference range not established. /LPF   POCT GLUCOSE   Result Value Ref Range    POCT Glucose 237 (H) 74 - 99 mg/dL      CT abdomen pelvis w IV contrast    Result Date: 10/8/2023  STUDY: CT Abdomen and Pelvis with IV Contrast; 10/8/2023 at 5:13 PM. INDICATION: Right lower quadrant pain, vomiting, hyperglycemia. COMPARISON: US abdomen 6/11/2021; CT AP 6/1/2021. ACCESSION NUMBER(S): [id number] ORDERING CLINICIAN: DM MCGOWAN TECHNIQUE: CT of the abdomen and pelvis was performed.  Contiguous axial images were obtained at 3 mm slice thickness through the abdomen and pelvis. Coronal and sagittal reconstructions at 3 mm slice thickness were performed.  Omnipaque 350 75 mL was administered intravenously.  FINDINGS: LOWER CHEST: No cardiomegaly.  No pericardial effusion.  Lung bases are clear.  ABDOMEN:  LIVER: Hepatic steatosis is present.  No suspicious hepatic lesion is demonstrated.  No biliary dilatation is demonstrated.  No hepatomegaly.  Smooth surface contour.  BILE DUCTS: No intrahepatic or extrahepatic biliary ductal dilatation.  GALLBLADDER: The gallbladder appears within normal limits.  STOMACH: No abnormalities identified.  PANCREAS: No masses or ductal dilatation.  SPLEEN: No splenomegaly or focal splenic lesion.  ADRENAL GLANDS: No thickening or nodules.  KIDNEYS AND URETERS: Kidneys are normal in size and location.  Simple appearing cyst RIGHT kidney is present measuring 1 cm maximally.  No renal or ureteral calculi.  PELVIS:  BLADDER: Urinary bladder is distended.  REPRODUCTIVE ORGANS: No abnormalities identified.  BOWEL: Moderate amount of stool throughout the colon is present. Constipation is not excluded.  There is no evidence of acute  appendicitis.   VESSELS: No abnormalities identified.  Abdominal aorta is normal in caliber.  PERITONEUM/RETROPERITONEUM/LYMPH NODES: Prominent phlebolith in the RIGHT hemipelvis noted.  No free fluid. No pneumoperitoneum. No lymphadenopathy.  ABDOMINAL WALL: No abnormalities identified. SOFT TISSUES: No abnormalities identified.  BONES: No acute fracture or aggressive osseous lesion.    1.  There is no evidence of acute appendicitis. 2.  Moderate amount of stool throughout the colon is present. Constipation is not excluded. 3.  Hepatic steatosis is present.  No suspicious hepatic lesion is demonstrated.  No biliary dilatation is demonstrated. Signed by Cuate Membreno II, MD    CT head wo IV contrast    Result Date: 10/8/2023  Interpreted By:  Rojelio Gonzales, STUDY: CT HEAD WO IV CONTRAST; 10/8/2023 5:11 pm   INDICATION: Headache, vomiting.   COMPARISON: None.   ACCESSION NUMBER(S): [id number]   ORDERING CLINICIAN: DM MCGOWAN   TECHNIQUE: Contiguous axial CT images were obtained through the head at 5 mm slice thickness without contrast administration.   FINDINGS: INTRACRANIAL: The ventricles, sulci and basal cisterns are within normal limits for size and configuration. The grey-white differentiation is intact. There is no mass effect or midline shift. There is no extraaxial fluid collection. There is no intracranial hemorrhage.  The calvarium is unremarkable.   EXTRACRANIAL: Visualized paranasal sinuses and mastoids are clear.       No evidence of acute cortical infarct or intracranial hemorrhage.   MACRO: None     Signed by: Rojelio Gonzales 10/8/2023 5:35 PM Dictation workstation:   [id number]    XR chest 1 view    Result Date: 10/8/2023  STUDY: Chest Radiograph; 10-8-2023 at 3:06 PM INDICATION: Shortness of breath. COMPARISON: 11-4-2022 XR CHEST ACCESSION NUMBER(S): [id number] ORDERING CLINICIAN: DM MCGOWAN TECHNIQUE:  Frontal chest was obtained at 15:05 hours. FINDINGS: CARDIOMEDIASTINAL SILHOUETTE:  Cardiomediastinal silhouette is normal in size and configuration.  LUNGS: Lungs are clear.  ABDOMEN: No remarkable upper abdominal findings.  BONES: No acute osseous changes.    No acute pulmonary abnormality. Signed by Ed Welch MD     Scheduled medications:  escitalopram, 10 mg, oral, Daily  [START ON 10/9/2023] pantoprazole, 40 mg, oral, Daily before breakfast  sertraline, 25 mg, oral, Daily      Continuous medications:  dextrose 10 % in water (D10W), 150 mL/hr  dextrose 5%-0.45 % sodium chloride, 150 mL/hr, Last Rate: 150 mL/hr (10/08/23 1931)  insulin regular, 0-50 Units/hr, Last Rate: 11 Units/hr (10/08/23 1637)  sodium chloride, 250 mL/hr  sodium chloride 0.9%, 150 mL/hr      PRN medications:  PRN medications: dextrose, insulin regular     Assessment and Plan  * Diabetic ketoacidosis without coma associated with type 1 diabetes mellitus (CMS/HCC)  Assessment & Plan  Continue on insulin gtt., titrate as needed.  IVF, frequent BMP/mag/Phos checks and replace as needed.  Endocrine consult, appreciate further recs.  Continue to monitor closely in ICU    Hyperkalemia  Assessment & Plan  Presumed 2/2 DKA.  Trend labs with treatment of DKA, monitor on telemetry    Anxiety and depression  Assessment & Plan  Continue homemedications       DVT ppx    Low risk, ambulate as able      Alvin Myers PA-C

## 2023-10-08 NOTE — ED PROVIDER NOTES
"HPI   Chief Complaint   Patient presents with    Hyperglycemia     States his \"meter reads high, but it usually reads high.\" Been feeling off for a day or two       The patient is a 32-year-old male past medical history of type 1 diabetes, chronic medication noncompliance with frequent presentations for diabetic ketoacidosis presenting with headache, nausea, cramping abdominal discomfort that he describes as similar to episodes of DKA he has had in the past.  States that he has not been taking his short acting insulin over the last 2-3 days as he does not like the way it makes him feel, states he has been taking his Lantus as prescribed.  Notes that his sugars been in the 300-400 range over the last 1-2 days.  Denies any cough, fevers, chills, chest pain.  Notes feeling nauseous currently.  Denies any numbness or weakness in any of his extremities.  Denies any other recent illness or injuries, denies any recent medication changes.      History provided by:  Patient and medical records   used: No                        No data recorded                Patient History   Past Medical History:   Diagnosis Date    Elevated blood-pressure reading, without diagnosis of hypertension 09/09/2020    Elevated blood pressure reading     No past surgical history on file.  No family history on file.  Social History     Tobacco Use    Smoking status: Not on file    Smokeless tobacco: Not on file   Substance Use Topics    Alcohol use: Not on file    Drug use: Not on file       Physical Exam   ED Triage Vitals [10/08/23 1423]   Temp Heart Rate Resp BP   36.8 °C (98.2 °F) (!) 115 22 132/86      SpO2 Temp src Heart Rate Source Patient Position   99 % -- -- --      BP Location FiO2 (%)     -- --       Physical Exam  Constitutional:       General: He is not in acute distress.     Appearance: He is ill-appearing and toxic-appearing. He is not diaphoretic.   HENT:      Head: Normocephalic and atraumatic.      " Mouth/Throat:      Mouth: Mucous membranes are dry. No oral lesions.      Pharynx: Oropharynx is clear. Uvula midline.   Eyes:      General: Lids are normal.      Extraocular Movements: Extraocular movements intact.      Conjunctiva/sclera: Conjunctivae normal.   Neck:      Trachea: Trachea and phonation normal.   Cardiovascular:      Rate and Rhythm: Regular rhythm. Tachycardia present. No extrasystoles are present.     Chest Wall: PMI is not displaced. No thrill.      Pulses:           Radial pulses are 2+ on the right side and 2+ on the left side.   Pulmonary:      Effort: Tachypnea present. No accessory muscle usage, prolonged expiration, respiratory distress or retractions.      Breath sounds: No decreased breath sounds, wheezing, rhonchi or rales.   Abdominal:      General: Abdomen is flat. There is no distension or abdominal bruit. There are no signs of injury.      Tenderness: There is generalized abdominal tenderness. There is no right CVA tenderness, left CVA tenderness, guarding or rebound.   Musculoskeletal:      Cervical back: Full passive range of motion without pain, normal range of motion and neck supple.   Skin:     General: Skin is warm.      Capillary Refill: Capillary refill takes less than 2 seconds.   Neurological:      Mental Status: He is easily aroused. He is lethargic.      GCS: GCS eye subscore is 3. GCS verbal subscore is 5. GCS motor subscore is 6.      Cranial Nerves: Cranial nerves 2-12 are intact. No cranial nerve deficit, dysarthria or facial asymmetry.      Sensory: Sensation is intact. No sensory deficit.      Motor: Motor function is intact. No weakness, tremor, abnormal muscle tone or pronator drift.      Coordination: Coordination is intact.      Deep Tendon Reflexes:      Reflex Scores:       Patellar reflexes are 2+ on the right side and 2+ on the left side.        ED Course & MDM       Medical Decision Making  Patient presenting with cramping abdominal discomfort, nausea, is  tachycardic and tachypneic, per EMR review/discussion with the patient this seems consistent with episodes of DKA he is experienced in the past, likely precipitated by medication noncompliance (patient has not been taking his short acting insulin).  On examination patient is somnolent but wakes easily to voice, is demonstrating adequate respiratory mechanics and is saturating well on room air and appears to be protecting his airway well.  Appears markedly dry on examination, patient did receive IV fluid resuscitation at time of my initial assessment.  Patient is afebrile without any obvious source of infection.  Does have moderate abdominal tenderness but no evidence of peritonitis on examination, will assess with CT of the abdomen and pelvis and will also obtain CT imaging of the head to assess for acute intracranial abnormality although overall greater suspicion for precipitating metabolic abnormality.  Will obtain ECG, troponin to assess for precipitating cardiac abnormality as well as VBG, beta hydroxybutyrate, CMP, urinalysis.  Disposition pending labs, imaging results, overall suspect patient will be admitted.        ED Course as of 10/08/23 2156   Sun Oct 08, 2023   1546 Patient with AGMA, , potassium elevated at 6.2, concern for DKA, patient started on IVF, continuous insulin infusion [BR]   1809 ECG: Sinus rhythm, rate 105, QRS 90, QTc 512.  Minimally peaked T waves in anterior leads. [BR]   1810 Patient's VBG concerning for diabetic ketoacidosis with an anion gap metabolic acidosis with respiratory compensation, potassium elevated at 6.1 as well, patient received IV sodium bicarb, started on insulin infusion, received IV normal saline bolus and started on half-normal saline continuous infusion for fluid resuscitation (per DKA order set, recommended half-normal saline given corrected sodium greater than 135) serum awesome's pending.  Urinalysis with no evidence of infection, CT head showing no  intracranial abnormalities, troponin showing no evidence of myocardial ischemic injury and ECG showing no evidence of his precipitating ischemia, suspect precipitant was patient's medication noncompliance (not taking his short acting insulin at home).  Given DKA requiring insulin infusion and close monitoring, patient was admitted to the ICU for further evaluation and management. [BR]      ED Course User Index  [BR] Elvin Cuevas MD         Diagnoses as of 10/08/23 2156   Diabetic ketoacidosis without coma associated with type 1 diabetes mellitus (CMS/[facility])   Hyperkalemia         Procedure  Critical Care    Performed by: Elvin Cuevas MD  Authorized by: Elvin Cuevas MD    Critical care provider statement:     Critical care time (minutes):  60    Critical care time was exclusive of:  Separately billable procedures and treating other patients    Critical care was necessary to treat or prevent imminent or life-threatening deterioration of the following conditions:  CNS failure or compromise, metabolic crisis and renal failure    Critical care was time spent personally by me on the following activities:  Blood draw for specimens, discussions with consultants, discussions with primary provider, evaluation of patient's response to treatment, examination of patient, obtaining history from patient or surrogate, ordering and performing treatments and interventions, ordering and review of laboratory studies, ordering and review of radiographic studies, re-evaluation of patient's condition, pulse oximetry and review of old charts    Care discussed with: admitting provider         Elvin Cuevas MD  10/08/23 2157

## 2023-10-08 NOTE — ASSESSMENT & PLAN NOTE
Continue on insulin gtt., titrate as needed.  IVF, frequent BMP/mag/Phos checks and replace as needed.  Endocrine consult, appreciate further recs.  Continue to monitor closely in ICU

## 2023-10-09 LAB
ANION GAP BLDV CALCULATED.4IONS-SCNC: 10 MMOL/L (ref 10–25)
ANION GAP SERPL CALC-SCNC: 10 MMOL/L (ref 10–20)
ANION GAP SERPL CALC-SCNC: 13 MMOL/L (ref 10–20)
BASE EXCESS BLDV CALC-SCNC: -8.2 MMOL/L (ref -2–3)
BODY TEMPERATURE: 37 DEGREES CELSIUS
BUN SERPL-MCNC: 18 MG/DL (ref 6–23)
BUN SERPL-MCNC: 19 MG/DL (ref 6–23)
CA-I BLDV-SCNC: 1.15 MMOL/L (ref 1.1–1.33)
CALCIUM SERPL-MCNC: 7.6 MG/DL (ref 8.6–10.3)
CALCIUM SERPL-MCNC: 7.8 MG/DL (ref 8.6–10.3)
CHLORIDE BLDV-SCNC: 109 MMOL/L (ref 98–107)
CHLORIDE SERPL-SCNC: 114 MMOL/L (ref 98–107)
CHLORIDE SERPL-SCNC: 114 MMOL/L (ref 98–107)
CO2 SERPL-SCNC: 16 MMOL/L (ref 21–32)
CO2 SERPL-SCNC: 17 MMOL/L (ref 21–32)
CREAT SERPL-MCNC: 0.81 MG/DL (ref 0.5–1.3)
CREAT SERPL-MCNC: 0.89 MG/DL (ref 0.5–1.3)
GFR SERPL CREATININE-BSD FRML MDRD: >90 ML/MIN/1.73M*2
GFR SERPL CREATININE-BSD FRML MDRD: >90 ML/MIN/1.73M*2
GLUCOSE BLD MANUAL STRIP-MCNC: 148 MG/DL (ref 74–99)
GLUCOSE BLD MANUAL STRIP-MCNC: 154 MG/DL (ref 74–99)
GLUCOSE BLD MANUAL STRIP-MCNC: 161 MG/DL (ref 74–99)
GLUCOSE BLD MANUAL STRIP-MCNC: 194 MG/DL (ref 74–99)
GLUCOSE BLD MANUAL STRIP-MCNC: 234 MG/DL (ref 74–99)
GLUCOSE BLD MANUAL STRIP-MCNC: 254 MG/DL (ref 74–99)
GLUCOSE BLD MANUAL STRIP-MCNC: 273 MG/DL (ref 74–99)
GLUCOSE BLD MANUAL STRIP-MCNC: 89 MG/DL (ref 74–99)
GLUCOSE BLDV-MCNC: 159 MG/DL (ref 74–99)
GLUCOSE SERPL-MCNC: 151 MG/DL (ref 74–99)
GLUCOSE SERPL-MCNC: 209 MG/DL (ref 74–99)
HCO3 BLDV-SCNC: 17.9 MMOL/L (ref 22–26)
HCT VFR BLD EST: 49 % (ref 41–52)
HGB BLDV-MCNC: 16.2 G/DL (ref 13.5–17.5)
INHALED O2 CONCENTRATION: 21 %
LACTATE BLDV-SCNC: 0.7 MMOL/L (ref 0.4–2)
MAGNESIUM SERPL-MCNC: 1.75 MG/DL (ref 1.6–2.4)
MAGNESIUM SERPL-MCNC: 1.79 MG/DL (ref 1.6–2.4)
OXYHGB MFR BLDV: 96.2 % (ref 45–75)
PCO2 BLDV: 38 MM HG (ref 41–51)
PH BLDV: 7.28 PH (ref 7.33–7.43)
PHOSPHATE SERPL-MCNC: 2.3 MG/DL (ref 2.5–4.9)
PHOSPHATE SERPL-MCNC: 2.9 MG/DL (ref 2.5–4.9)
PO2 BLDV: 91 MM HG (ref 35–45)
POTASSIUM BLDV-SCNC: 3.6 MMOL/L (ref 3.5–5.3)
POTASSIUM SERPL-SCNC: 3.5 MMOL/L (ref 3.5–5.3)
POTASSIUM SERPL-SCNC: 3.6 MMOL/L (ref 3.5–5.3)
SAO2 % BLDV: 98 % (ref 45–75)
SODIUM BLDV-SCNC: 133 MMOL/L (ref 136–145)
SODIUM SERPL-SCNC: 137 MMOL/L (ref 136–145)
SODIUM SERPL-SCNC: 139 MMOL/L (ref 136–145)

## 2023-10-09 PROCEDURE — 99221 1ST HOSP IP/OBS SF/LOW 40: CPT | Performed by: INTERNAL MEDICINE

## 2023-10-09 PROCEDURE — 2500000002 HC RX 250 W HCPCS SELF ADMINISTERED DRUGS (ALT 637 FOR MEDICARE OP, ALT 636 FOR OP/ED): Performed by: INTERNAL MEDICINE

## 2023-10-09 PROCEDURE — 84100 ASSAY OF PHOSPHORUS: CPT | Performed by: PHYSICIAN ASSISTANT

## 2023-10-09 PROCEDURE — 99222 1ST HOSP IP/OBS MODERATE 55: CPT | Performed by: INTERNAL MEDICINE

## 2023-10-09 PROCEDURE — 99232 SBSQ HOSP IP/OBS MODERATE 35: CPT | Performed by: HOSPITALIST

## 2023-10-09 PROCEDURE — 82947 ASSAY GLUCOSE BLOOD QUANT: CPT | Performed by: PHYSICIAN ASSISTANT

## 2023-10-09 PROCEDURE — 96372 THER/PROPH/DIAG INJ SC/IM: CPT | Performed by: INTERNAL MEDICINE

## 2023-10-09 PROCEDURE — 36415 COLL VENOUS BLD VENIPUNCTURE: CPT | Performed by: INTERNAL MEDICINE

## 2023-10-09 PROCEDURE — 1200000002 HC GENERAL ROOM WITH TELEMETRY DAILY

## 2023-10-09 PROCEDURE — 82805 BLOOD GASES W/O2 SATURATION: CPT | Performed by: INTERNAL MEDICINE

## 2023-10-09 PROCEDURE — 2500000004 HC RX 250 GENERAL PHARMACY W/ HCPCS (ALT 636 FOR OP/ED): Performed by: PHYSICIAN ASSISTANT

## 2023-10-09 PROCEDURE — 82947 ASSAY GLUCOSE BLOOD QUANT: CPT

## 2023-10-09 PROCEDURE — 83735 ASSAY OF MAGNESIUM: CPT | Performed by: PHYSICIAN ASSISTANT

## 2023-10-09 PROCEDURE — 84295 ASSAY OF SERUM SODIUM: CPT | Performed by: PHYSICIAN ASSISTANT

## 2023-10-09 RX ORDER — INSULIN GLARGINE 100 [IU]/ML
20 INJECTION, SOLUTION SUBCUTANEOUS NIGHTLY
Status: DISCONTINUED | OUTPATIENT
Start: 2023-10-09 | End: 2023-10-09

## 2023-10-09 RX ORDER — INSULIN GLARGINE 100 [IU]/ML
25 INJECTION, SOLUTION SUBCUTANEOUS NIGHTLY
Status: DISCONTINUED | OUTPATIENT
Start: 2023-10-09 | End: 2023-10-10

## 2023-10-09 RX ORDER — DEXTROSE 50 % IN WATER (D50W) INTRAVENOUS SYRINGE
25
Status: DISCONTINUED | OUTPATIENT
Start: 2023-10-09 | End: 2023-10-10 | Stop reason: HOSPADM

## 2023-10-09 RX ORDER — INSULIN LISPRO 100 [IU]/ML
0-15 INJECTION, SOLUTION INTRAVENOUS; SUBCUTANEOUS
Status: DISCONTINUED | OUTPATIENT
Start: 2023-10-09 | End: 2023-10-10 | Stop reason: HOSPADM

## 2023-10-09 RX ORDER — ONDANSETRON HYDROCHLORIDE 2 MG/ML
4 INJECTION, SOLUTION INTRAVENOUS EVERY 4 HOURS PRN
Status: DISCONTINUED | OUTPATIENT
Start: 2023-10-09 | End: 2023-10-10 | Stop reason: HOSPADM

## 2023-10-09 RX ORDER — ENOXAPARIN SODIUM 100 MG/ML
40 INJECTION SUBCUTANEOUS DAILY
Status: DISCONTINUED | OUTPATIENT
Start: 2023-10-09 | End: 2023-10-10 | Stop reason: HOSPADM

## 2023-10-09 RX ORDER — LANCETS
1 EACH MISCELLANEOUS
COMMUNITY
Start: 2023-08-17

## 2023-10-09 RX ORDER — INSULIN GLARGINE 100 [IU]/ML
10 INJECTION, SOLUTION SUBCUTANEOUS NIGHTLY
Status: DISCONTINUED | OUTPATIENT
Start: 2023-10-09 | End: 2023-10-09

## 2023-10-09 RX ORDER — ACETAMINOPHEN 325 MG/1
650 TABLET ORAL EVERY 6 HOURS PRN
Status: DISCONTINUED | OUTPATIENT
Start: 2023-10-09 | End: 2023-10-10 | Stop reason: HOSPADM

## 2023-10-09 RX ORDER — DEXTROSE MONOHYDRATE 100 MG/ML
0.3 INJECTION, SOLUTION INTRAVENOUS ONCE AS NEEDED
Status: DISCONTINUED | OUTPATIENT
Start: 2023-10-09 | End: 2023-10-10 | Stop reason: HOSPADM

## 2023-10-09 RX ORDER — INSULIN LISPRO 100 [IU]/ML
6 INJECTION, SOLUTION INTRAVENOUS; SUBCUTANEOUS
Status: DISCONTINUED | OUTPATIENT
Start: 2023-10-09 | End: 2023-10-10 | Stop reason: HOSPADM

## 2023-10-09 RX ADMIN — PANTOPRAZOLE SODIUM 40 MG: 40 TABLET, DELAYED RELEASE ORAL at 09:19

## 2023-10-09 RX ADMIN — INSULIN GLARGINE 25 UNITS: 100 INJECTION, SOLUTION SUBCUTANEOUS at 21:00

## 2023-10-09 RX ADMIN — INSULIN LISPRO 6 UNITS: 100 INJECTION, SOLUTION INTRAVENOUS; SUBCUTANEOUS at 17:26

## 2023-10-09 RX ADMIN — INSULIN LISPRO 9 UNITS: 100 INJECTION, SOLUTION INTRAVENOUS; SUBCUTANEOUS at 12:53

## 2023-10-09 RX ADMIN — DEXTROSE AND SODIUM CHLORIDE 150 ML/HR: 5; 450 INJECTION, SOLUTION INTRAVENOUS at 02:05

## 2023-10-09 RX ADMIN — ACETAMINOPHEN 650 MG: 325 TABLET ORAL at 09:40

## 2023-10-09 RX ADMIN — INSULIN LISPRO 6 UNITS: 100 INJECTION, SOLUTION INTRAVENOUS; SUBCUTANEOUS at 12:52

## 2023-10-09 RX ADMIN — INSULIN LISPRO 9 UNITS: 100 INJECTION, SOLUTION INTRAVENOUS; SUBCUTANEOUS at 17:29

## 2023-10-09 RX ADMIN — INSULIN GLARGINE 10 UNITS: 100 INJECTION, SOLUTION SUBCUTANEOUS at 05:31

## 2023-10-09 SDOH — SOCIAL STABILITY: SOCIAL INSECURITY: DOES ANYONE TRY TO KEEP YOU FROM HAVING/CONTACTING OTHER FRIENDS OR DOING THINGS OUTSIDE YOUR HOME?: NO

## 2023-10-09 SDOH — SOCIAL STABILITY: SOCIAL INSECURITY: DO YOU FEEL ANYONE HAS EXPLOITED OR TAKEN ADVANTAGE OF YOU FINANCIALLY OR OF YOUR PERSONAL PROPERTY?: NO

## 2023-10-09 SDOH — SOCIAL STABILITY: SOCIAL INSECURITY: DO YOU FEEL UNSAFE GOING BACK TO THE PLACE WHERE YOU ARE LIVING?: NO

## 2023-10-09 SDOH — SOCIAL STABILITY: SOCIAL INSECURITY: ABUSE: ADULT

## 2023-10-09 SDOH — SOCIAL STABILITY: SOCIAL INSECURITY: ARE YOU OR HAVE YOU BEEN THREATENED OR ABUSED PHYSICALLY, EMOTIONALLY, OR SEXUALLY BY ANYONE?: NO

## 2023-10-09 SDOH — SOCIAL STABILITY: SOCIAL INSECURITY: ARE THERE ANY APPARENT SIGNS OF INJURIES/BEHAVIORS THAT COULD BE RELATED TO ABUSE/NEGLECT?: NO

## 2023-10-09 SDOH — SOCIAL STABILITY: SOCIAL INSECURITY: HAS ANYONE EVER THREATENED TO HURT YOUR FAMILY OR YOUR PETS?: NO

## 2023-10-09 ASSESSMENT — COGNITIVE AND FUNCTIONAL STATUS - GENERAL
PATIENT BASELINE BEDBOUND: NO
DAILY ACTIVITIY SCORE: 24
MOBILITY SCORE: 24
DAILY ACTIVITIY SCORE: 24
MOBILITY SCORE: 24
MOBILITY SCORE: 24
DAILY ACTIVITIY SCORE: 24

## 2023-10-09 ASSESSMENT — PAIN - FUNCTIONAL ASSESSMENT
PAIN_FUNCTIONAL_ASSESSMENT: 0-10

## 2023-10-09 ASSESSMENT — PAIN SCALES - GENERAL
PAINLEVEL_OUTOF10: 0 - NO PAIN
PAINLEVEL_OUTOF10: 5 - MODERATE PAIN
PAINLEVEL_OUTOF10: 0 - NO PAIN

## 2023-10-09 ASSESSMENT — ENCOUNTER SYMPTOMS
POLYDIPSIA: 0
FATIGUE: 0
NAUSEA: 0
SHORTNESS OF BREATH: 0
POLYPHAGIA: 0
ABDOMINAL PAIN: 0
VOMITING: 0
HEADACHES: 1
UNEXPECTED WEIGHT CHANGE: 0
FACIAL ASYMMETRY: 0

## 2023-10-09 NOTE — CARE PLAN
"Patient well known to Mehreen. He has an \"At High Risk for Readmission Care Plan.\" Patient was discharged from his PCP's office in July 2023 due to \"no shows.\" . With his permission, I discussed the potential of an Internal Medicine physician located in [place] for a PCP. Patient agreeable. Appointment with Dr. Brumfield, November 20. Written information given to patient. I requested he make a post discharge appointment with endocrinology. He seems ambivalent RE: Insulin pump. He voices that the pump will lead to better glucose control. When asked if he has fear, he stated yes. As previously I offered psychological assistance to guide him through his fear. He is not interested. With ongoing permission to talk to his mother, I reached our to Silvia. She is willing to assist Ed in assuring he remembers appointments. Silvia states that she will assist Ed in obtaining the insulin training he needs.    "

## 2023-10-09 NOTE — CONSULTS
Reason For Consult  DKA    History Of Present Illness  Ed Sanon is a 32 y.o. male presenting after he started to experience fatigue, weakness, and back pain.  His glucose values are typically above 300mg/dl via his Dexcom CGM.  He admits to being adherent with basal insulin but not with prandial.   He was found to be hemodynamically stable.  Initial lab data revealed a glucose value 435mg/dL, bicarbonate of 6, anion gap of 31 and pH 7.03.  CT scans of the head, abdomen and pelvis were unrevealing.  CXR was unrevealing.  He was started on an insulin infusion and admitted to the ICU.  He was given 10 units of Lantus this morning and his insulin drip has been discontinued.       His home regimen consists of:  Lantus 35 units SQ daily   Prandial insulin - none     He saw Phoebe Villar NP at [facility].  He has been approved for the Med[employer]onic MiniMed pump but never scheduled insulin pump training.    Currently he feels like he is back to his baseline.  He is eating well.  Denies pain, f/c, n/v, cough, sputum production, sob, cp.       Past Medical History  He has a past medical history of Diabetes mellitus (CMS/[facility]) and Elevated blood-pressure reading, without diagnosis of hypertension (09/09/2020).    Surgical History  He has no past surgical history on file.     Social History  He reports that he has never smoked. He has never used smokeless tobacco. No history on file for alcohol use and drug use.    Family History  No family history on file.     Allergies  Patient has no known allergies.    Review of Systems  ROS review and mentioned in HPI, all other systems negative.     Physical Exam  General Appearance:  Alert, cooperative, no distress, appears stated age  Head:  Normocephalic, without obvious abnormality, atraumatic  Eyes:  PERRL, conjunctiva/corneas clear, EOM's intact  Nose:  Nares normal, septum midline, mucosa normal, no drainage or sinus tenderness  Mouth:  Lips, mucosa, and tongue normal  Neck:   "Supple, symmetrical, trachea midline, no adenopathy; no JVD  Lungs:    Clear to auscultation bilaterally, respirations unlabored, good air movement  Chest wall:  No tenderness or deformity, chest expands symmetrically  Heart:  Regular rate and rhythm, S1 and S2 normal, no murmur, rub or gallop  Abdomen:    Soft, non-tender, non-distended, bs active, no masses, no organomegaly  Extremities:  Extremities normal, atraumatic, no cyanosis or edema  Pulses:  2+ and symmetric all extremities  Skin:  Warm and dry  Lymph nodes:  Cervical, supraclavicular, and axillary nodes normal  Neurologic:  CNII-XII intact. Normal strength, sensation and reflexes      Throughout       Last Recorded Vitals  Blood pressure 123/80, pulse 103, temperature 36.6 °C (97.9 °F), temperature source Temporal, resp. rate 16, height 1.778 m (5' 10\"), weight 72.4 kg (159 lb 9.8 oz), SpO2 (!) 10 %.         Assessment/Plan     DKA from medication non-compliance  -currently being managed by endocrine  -gap has closed and he has been transitioned to subcut insulin  -ok to transfer to general medical floor    Kenroy Louis MD    "

## 2023-10-09 NOTE — PROGRESS NOTES
Ed SUMMERS [place] [id number]   Service: Internal Medicine / Hospitalist Date of service 10/9/2023        .                Subjective   10/9..............Patient seen and examined, lab data and diagnostics reviewed.  No reported: acute symptomatology at the time of evaluation including but not limited to :chest pain, dyspnea, constipation, abdominal pain, dysuria , nausea, vomiting ,headache, new focal weakness, diaphoresis,fever, chills, syncope,presyncope or palpitations.       Review of Systems:   Review of system otherwise negative if not aforementioned above in subjective.    Objective    Physical Exam   Physical Exam  Constitutional:       Appearance: Patient appeared in no acute cardiopulmonary distress.     Comments: Patient alert and oriented to person place time and situation.  HENT:      Head: Normocephalic and atraumatic.Trachea midline      Nose: Nose normal. No congestion or rhinorrhea.     Mouth: Mucous membranes Moist.   Eyes:      Extraocular Movements: Extraocular movements intact.      Pupils: Pupils are equal, round, and reactive to light.      Comments: No scleral icterus or conjunctival injection appreciated.   Cardiovascular:      Rate and Rhythm: Normal rate and regular rhythm. No clicks rubs or gallops, normal S1 and S2.No peripheral stigmata of endocarditis appreciated.     Pulmonary:      Effort: Pulmonary effort is normal.      Breath sounds: Lungs clear to auscultation globally  Abdominal:      General: Abdomen soft, nontender, active bowel sounds, no involuntary guarding or rebound tenderness appreciated.     Comments: None   Musculoskeletal:         General: No Swelling present.Patient appeared to have full active range of motion for upper and lower extremities, no acute apparent joint deformity appreciated on examination.        Lymphadenopathy:      No appreciable palpable lymphadenopathy  Skin:     General: Skin is warm.      Coloration:  No jaundice     Findings: No abnormal appearing  skin rashes or lesions that appeared acute noted on unclothed area of the skin..   Neurological:      General: No focal sensory or motor deficits appreciated, no meningeal signs or dysmetria noted.      Cranial Nerves: Cranial nerves II to XII appearing grossly intact.       Psychiatric:         The patient appears to be displaying normal mood and affect at the time of evaluation.    Labs:     Lab Results   Component Value Date    GLUCOSE 151 (H) 10/09/2023    CALCIUM 7.6 (L) 10/09/2023     10/09/2023    K 3.5 10/09/2023    CO2 17 (L) 10/09/2023     (H) 10/09/2023    BUN 19 10/09/2023    CREATININE 0.81 10/09/2023      Lab Results   Component Value Date    WBC 7.7 10/08/2023    HGB 18.2 (H) 10/08/2023    HCT 53.6 (H) 10/08/2023    MCV 95 10/08/2023     10/08/2023      [unfilled]   [unfilled]   No results found for the last 90 days.     X-rays/ Images    [unfilled]   Radiology Results (last 21 days)    Procedure Component Value Units Date/Time   CT abdomen pelvis w IV contrast [[id number]] Collected: 10/08/23 1722   Order Status: Completed Updated: 10/08/23 1757   Narrative:     STUDY:  CT Abdomen and Pelvis with IV Contrast; 10/8/2023 at 5:13 PM.  INDICATION:  Right lower quadrant pain, vomiting, hyperglycemia.  COMPARISON:  US abdomen 6/11/2021; CT AP 6/1/2021.  ACCESSION NUMBER(S):  [id number]  ORDERING CLINICIAN:  DM MCGOWAN  TECHNIQUE:  CT of the abdomen and pelvis was performed.  Contiguous axial images  were obtained at 3 mm slice thickness through the abdomen and pelvis.  Coronal and sagittal reconstructions at 3 mm slice thickness were  performed.  Omnipaque 350 75 mL was administered intravenously.    FINDINGS:  LOWER CHEST:  No cardiomegaly.  No pericardial effusion.  Lung bases are clear.     ABDOMEN:     LIVER:  Hepatic steatosis is present.  No suspicious hepatic lesion is  demonstrated.  No biliary dilatation is demonstrated.  No  hepatomegaly.  Smooth surface contour.      BILE DUCTS:  No intrahepatic or extrahepatic biliary ductal dilatation.     GALLBLADDER:  The gallbladder appears within normal limits.    STOMACH:  No abnormalities identified.     PANCREAS:  No masses or ductal dilatation.     SPLEEN:  No splenomegaly or focal splenic lesion.     ADRENAL GLANDS:  No thickening or nodules.     KIDNEYS AND URETERS:  Kidneys are normal in size and location.  Simple appearing cyst RIGHT  kidney is present measuring 1 cm maximally.  No renal or ureteral  calculi.     PELVIS:     BLADDER:  Urinary bladder is distended.     REPRODUCTIVE ORGANS:  No abnormalities identified.     BOWEL:  Moderate amount of stool throughout the colon is present.  Constipation is not excluded.  There is no evidence of acute  appendicitis.       VESSELS:  No abnormalities identified.  Abdominal aorta is normal in caliber.     PERITONEUM/RETROPERITONEUM/LYMPH NODES:  Prominent phlebolith in the RIGHT hemipelvis noted.  No free fluid.  No pneumoperitoneum.  No lymphadenopathy.     ABDOMINAL WALL:  No abnormalities identified.  SOFT TISSUES:  No abnormalities identified.     BONES:  No acute fracture or aggressive osseous lesion.   Impression:     1.  There is no evidence of acute appendicitis.  2.  Moderate amount of stool throughout the colon is present.  Constipation is not excluded.  3.  Hepatic steatosis is present.  No suspicious hepatic lesion is  demonstrated.  No biliary dilatation is demonstrated.  Signed by Cuate Membreno II, MD   CT head wo IV contrast [[id number]] Collected: 10/08/23 1736   Order Status: Completed Updated: 10/08/23 1736   Narrative:     Interpreted By:  Rojelio Gonzales,  STUDY:  CT HEAD WO IV CONTRAST; 10/8/2023 5:11 pm      INDICATION:  Headache, vomiting.      COMPARISON:  None.      ACCESSION NUMBER(S):  [id number]      ORDERING CLINICIAN:  DM MCGOWAN      TECHNIQUE:  Contiguous axial CT images were obtained through the head at 5 mm  slice thickness without contrast  administration.      FINDINGS:  INTRACRANIAL:  The ventricles, sulci and basal cisterns are within normal limits for  size and configuration. The grey-white differentiation is intact.  There is no mass effect or midline shift. There is no extraaxial  fluid collection. There is no intracranial hemorrhage.  The calvarium  is unremarkable.      EXTRACRANIAL:  Visualized paranasal sinuses and mastoids are clear.       Impression:     No evidence of acute cortical infarct or intracranial hemorrhage.      MACRO:  None          Signed by: Rojelio Gonzales 10/8/2023 5:35 PM  Dictation workstation:   [id number]   XR chest 1 view [[id number]] Collected: 10/08/23 1511   Order Status: Completed Updated: 10/08/23 1731   Narrative:     STUDY:  Chest Radiograph; 10-8-2023 at 3:06 PM  INDICATION:  Shortness of breath.  COMPARISON:  11-4-2022 XR CHEST  ACCESSION NUMBER(S):  [id number]  ORDERING CLINICIAN:  DM MCGOWAN  TECHNIQUE:  Frontal chest was obtained at 15:05 hours.  FINDINGS:  CARDIOMEDIASTINAL SILHOUETTE:  Cardiomediastinal silhouette is normal in size and configuration.     LUNGS:  Lungs are clear.     ABDOMEN:  No remarkable upper abdominal findings.     BONES:  No acute osseous changes.   Impression:     No acute pulmonary abnormality.  Signed by Ed Welch MD             Medical Problems       Problem List       * (Principal) Diabetic ketoacidosis without coma associated with type 1 diabetes mellitus (CMS/HCC)    Abdominal pain    COVID-19 virus infection    DKA (diabetic ketoacidosis) (CMS/HCC)    Fatigue    Anxiety and depression    Generalized anxiety disorder    Microalbuminuria due to type 1 diabetes mellitus (CMS/HCC)    Poorly controlled diabetes mellitus (CMS/HCC)    DKA, type 1, not at goal (CMS/HCC)    Type 1 diabetes mellitus (CMS/HCC)    Type 1 diabetes mellitus with hyperglycemia (CMS/HCC)    Vitamin B12 deficiency    Vitamin D deficiency    Hyperkalemia               Above medical problems may be  reflective of historical medical problems that will resolve and may not related to acute clinical condition/medical problems.    Clinical impression/plan:     Diabetic ketoacidosis without coma associated with type 1 diabetes mellitus (CMS/[facility])  Assessment & Plan  Continue on insulin gtt., titrate as needed.  IVF, frequent BMP/mag/Phos checks and replace as needed.  Endocrine consult, appreciate further recs.  Continue to monitor closely in ICU.    10/9........Corrective insulin sliding scale and basal insulin as per endocrinology.     Hyperkalemia  Assessment & Plan  Presumed 2/2 DKA.  Trend labs with treatment of DKA, monitor on telemetry  10/9..................Resolved, monitor for hypokalemia.   Anxiety and depression  Assessment & Plan  Continue homemedications        DVT ppx     Low risk, ambulate as able       The patient was informed of differential diagnosis , work up , plan of care and possible sequelae of clinical disposition.Patient in agreement with plan of care. Further recommendations forthcoming in accordance with patient's clinical disposition and response to care.    Discharge planning:Anticipate transfer from ICU today but likely discharge tomorrow compliance, adherence to diabetic management stress.Bicarbonate at 17 today.BMP in a.m.        Dictation performed with assistance of voice recognition device therefore transcription errors are possible.

## 2023-10-09 NOTE — CONSULTS
Inpatient consult to Endocrinology  Consult performed by: Lori Packer MD  Consult ordered by: Elvin Cuevas MD  Reason for consult: DKA      Reason For Consult  DKA     History Of Present Illness  Ed Sanon is a 32 y.o. male presenting with who presented with concerns for DKA.  He started to experience fatigue, weakness, and back pain.  His glucose values are typically above 300mg/dl via his Dexcom CGM.  He admits to being adherent with basal insulin but not with prandial.   He was found to be hemodynamically stable.  Initial lab data revealed a glucose value 435mg/dL, bicarbonate of 6, anion gap of 31 and pH 7.03.  CT scans of the head, abdomen and pelvis were unrevealing.  CXR was unrevealing.  He was started on an insulin infusion and admitted to the ICU.  He was given 10 units of Lantus this morning and his insulin drip has been discontinued.      His home regimen consists of:  Lantus 35 units SQ daily   Prandial insulin - none    He saw Phoebe Villar NP at [facility].  He has been approved for the Medtronic MiniMed pump but never scheduled insulin pump training.     Past Medical History  He has a past medical history of Diabetes mellitus (CMS/[facility]) and Elevated blood-pressure reading, without diagnosis of hypertension (09/09/2020)., recurrent admissions for DKA due to insulin non adherence, anxiety and depression.      Surgical History  He has no past surgical history on file.     Social History  He reports that he has never smoked. He has never used smokeless tobacco. No history on file for alcohol use and drug use.    Family History  No family history on file.     Allergies  Patient has no known allergies.    Review of Systems   Constitutional:  Negative for fatigue and unexpected weight change.   Eyes:  Negative for visual disturbance.   Respiratory:  Negative for shortness of breath.    Cardiovascular:  Negative for chest pain.   Gastrointestinal:  Negative for abdominal pain, nausea and  "vomiting.   Endocrine: Negative for cold intolerance, heat intolerance, polydipsia, polyphagia and polyuria.   Neurological:  Positive for headaches. Negative for facial asymmetry.        Physical Exam  Constitutional:       General: He is not in acute distress.     Appearance: Normal appearance. He is normal weight.   HENT:      Head: Normocephalic and atraumatic.      Nose: Nose normal.      Mouth/Throat:      Mouth: Mucous membranes are moist.   Eyes:      Extraocular Movements: Extraocular movements intact.   Cardiovascular:      Rate and Rhythm: Normal rate and regular rhythm.   Pulmonary:      Effort: Pulmonary effort is normal.      Breath sounds: Normal breath sounds.   Abdominal:      General: Bowel sounds are normal.      Palpations: Abdomen is soft.   Musculoskeletal:         General: Normal range of motion.   Skin:     General: Skin is warm.   Neurological:      Mental Status: He is alert and oriented to person, place, and time.   Psychiatric:         Mood and Affect: Mood normal.          Last Recorded Vitals  Blood pressure 117/77, pulse 99, temperature 36.4 °C (97.5 °F), temperature source Temporal, resp. rate 14, height 1.778 m (5' 10\"), weight 72.4 kg (159 lb 9.8 oz), SpO2 100 %.    Relevant Results  Scheduled medications  enoxaparin, 40 mg, subcutaneous, Daily  [Held by provider] escitalopram, 10 mg, oral, Daily  insulin glargine, 20 Units, subcutaneous, Nightly  insulin lispro, 0-15 Units, subcutaneous, TID with meals  insulin lispro, 6 Units, subcutaneous, TID with meals  pantoprazole, 40 mg, oral, Daily before breakfast  [Held by provider] sertraline, 25 mg, oral, Daily      Continuous medications     PRN medications  PRN medications: acetaminophen, dextrose 10 % in water (D10W), dextrose, dextrose, glucagon, insulin regular, ondansetron     Results for orders placed or performed during the hospital encounter of 10/08/23 (from the past 96 hour(s))   CBC and Auto Differential   Result Value Ref " Range    WBC 7.7 4.4 - 11.3 x10*3/uL    nRBC 0.0 0.0 - 0.0 /100 WBCs    RBC 5.64 4.50 - 5.90 x10*6/uL    Hemoglobin 18.2 (H) 13.5 - 17.5 g/dL    Hematocrit 53.6 (H) 41.0 - 52.0 %    MCV 95 80 - 100 fL    MCH 32.3 26.0 - 34.0 pg    MCHC 34.0 32.0 - 36.0 g/dL    RDW 12.8 11.5 - 14.5 %    Platelets 160 150 - 450 x10*3/uL    MPV 11.3 7.5 - 11.5 fL    Neutrophils % 74.5 40.0 - 80.0 %    Immature Granulocytes %, Automated 0.9 0.0 - 0.9 %    Lymphocytes % 14.0 13.0 - 44.0 %    Monocytes % 9.8 2.0 - 10.0 %    Eosinophils % 0.1 0.0 - 6.0 %    Basophils % 0.7 0.0 - 2.0 %    Neutrophils Absolute 5.72 1.20 - 7.70 x10*3/uL    Immature Granulocytes Absolute, Automated 0.07 0.00 - 0.70 x10*3/uL    Lymphocytes Absolute 1.07 (L) 1.20 - 4.80 x10*3/uL    Monocytes Absolute 0.75 0.10 - 1.00 x10*3/uL    Eosinophils Absolute 0.01 0.00 - 0.70 x10*3/uL    Basophils Absolute 0.05 0.00 - 0.10 x10*3/uL   Basic metabolic panel   Result Value Ref Range    Glucose 435 (H) 74 - 99 mg/dL    Sodium 132 (L) 136 - 145 mmol/L    Potassium 6.2 (HH) 3.5 - 5.3 mmol/L    Chloride 101 98 - 107 mmol/L    Bicarbonate 6 (LL) 21 - 32 mmol/L    Anion Gap 31 (H) 10 - 20 mmol/L    Urea Nitrogen 19 6 - 23 mg/dL    Creatinine 1.20 0.50 - 1.30 mg/dL    eGFR 81 >60 mL/min/1.73m*2    Calcium 8.8 8.6 - 10.3 mg/dL   Phosphorus   Result Value Ref Range    Phosphorus 4.8 2.5 - 4.9 mg/dL   Magnesium   Result Value Ref Range    Magnesium 2.35 1.60 - 2.40 mg/dL   Hepatic function panel   Result Value Ref Range    Albumin 4.6 3.4 - 5.0 g/dL    Bilirubin, Total 0.4 0.0 - 1.2 mg/dL    Bilirubin, Direct 0.0 0.0 - 0.3 mg/dL    Alkaline Phosphatase 86 33 - 120 U/L    ALT 20 10 - 52 U/L    AST 20 9 - 39 U/L    Total Protein 8.1 6.4 - 8.2 g/dL   Lactate   Result Value Ref Range    Lactate 1.0 0.4 - 2.0 mmol/L   Troponin I, High Sensitivity   Result Value Ref Range    Troponin I, High Sensitivity 4 0 - 20 ng/L   Blood Gas Venous Full Panel   Result Value Ref Range    POCT pH, Venous  7.03 (LL) 7.33 - 7.43 pH    POCT pCO2, Venous 23 (L) 41 - 51 mm Hg    POCT pO2, Venous 48 (H) 35 - 45 mm Hg    POCT SO2, Venous 74 45 - 75 %    POCT Oxy Hemoglobin, Venous 72.7 45.0 - 75.0 %    POCT Hematocrit Calculated, Venous 57.0 (H) 41.0 - 52.0 %    POCT Sodium, Venous 129 (L) 136 - 145 mmol/L    POCT Potassium, Venous 6.5 (HH) 3.5 - 5.3 mmol/L    POCT Chloride, Venous 102 98 - 107 mmol/L    POCT Ionized Calicum, Venous 1.24 1.10 - 1.33 mmol/L    POCT Glucose, Venous 516 (HH) 74 - 99 mg/dL    POCT Lactate, Venous 1.7 0.4 - 2.0 mmol/L    POCT Base Excess, Venous -23.2 (L) -2.0 - 3.0 mmol/L    POCT HCO3 Calculated, Venous 6.1 (L) 22.0 - 26.0 mmol/L    POCT Hemoglobin, Venous 18.9 (H) 13.5 - 17.5 g/dL    POCT Anion Gap, Venous 27.0 (H) 10.0 - 25.0 mmol/L    Patient Temperature 37.0 degrees Celsius    FiO2 21 %   Beta Hydroxybutyrate   Result Value Ref Range    Beta-Hydroxybutyrate     Influenza A, and B PCR   Result Value Ref Range    Flu A Result Not Detected Not Detected    Flu B Result Not Detected Not Detected   SARS-CoV-2 RT PCR   Result Value Ref Range    Coronavirus 2019, PCR Not Detected Not Detected   Urinalysis with Reflex Microscopic   Result Value Ref Range    Color, Urine Straw Straw, Yellow    Appearance, Urine Clear Clear    Specific Gravity, Urine 1.026 1.005 - 1.035    pH, Urine 5.0 5.0, 5.5, 6.0, 6.5, 7.0, 7.5, 8.0    Protein, Urine 30 (1+) (N) NEGATIVE mg/dL    Glucose, Urine >=500 (3+) (A) NEGATIVE mg/dL    Blood, Urine NEGATIVE NEGATIVE    Ketones, Urine 80 (2+) (A) NEGATIVE mg/dL    Bilirubin, Urine NEGATIVE NEGATIVE    Urobilinogen, Urine <2.0 <2.0 mg/dL    Nitrite, Urine NEGATIVE NEGATIVE    Leukocyte Esterase, Urine NEGATIVE NEGATIVE   Urinalysis Microscopic Only   Result Value Ref Range    WBC, Urine 1-5 1-5, NONE /HPF    RBC, Urine NONE NONE, 1-2, 3-5 /HPF    Mucus, Urine 1+ Reference range not established. /LPF   Osmolality   Result Value Ref Range    Osmolality, Serum 310 (H) 280 - 300  mOsm/kg   POCT GLUCOSE   Result Value Ref Range    POCT Glucose 237 (H) 74 - 99 mg/dL   POCT GLUCOSE   Result Value Ref Range    POCT Glucose 176 (H) 74 - 99 mg/dL   POCT GLUCOSE   Result Value Ref Range    POCT Glucose 188 (H) 74 - 99 mg/dL   Basic metabolic panel   Result Value Ref Range    Glucose 175 (H) 74 - 99 mg/dL    Sodium 138 136 - 145 mmol/L    Potassium 3.8 3.5 - 5.3 mmol/L    Chloride 113 (H) 98 - 107 mmol/L    Bicarbonate 11 (L) 21 - 32 mmol/L    Anion Gap 18 10 - 20 mmol/L    Urea Nitrogen 17 6 - 23 mg/dL    Creatinine 0.98 0.50 - 1.30 mg/dL    eGFR >90 >60 mL/min/1.73m*2    Calcium 8.2 (L) 8.6 - 10.3 mg/dL   Magnesium   Result Value Ref Range    Magnesium 1.93 1.60 - 2.40 mg/dL   Phosphorus   Result Value Ref Range    Phosphorus 2.5 2.5 - 4.9 mg/dL   BLOOD GAS VENOUS FULL PANEL   Result Value Ref Range    POCT pH, Venous 7.20 (LL) 7.33 - 7.43 pH    POCT pCO2, Venous 34 (L) 41 - 51 mm Hg    POCT pO2, Venous 47 (H) 35 - 45 mm Hg    POCT SO2, Venous 81 (H) 45 - 75 %    POCT Oxy Hemoglobin, Venous 79.3 (H) 45.0 - 75.0 %    POCT Hematocrit Calculated, Venous 52.0 41.0 - 52.0 %    POCT Sodium, Venous 136 136 - 145 mmol/L    POCT Potassium, Venous 3.8 3.5 - 5.3 mmol/L    POCT Chloride, Venous 110 (H) 98 - 107 mmol/L    POCT Ionized Calicum, Venous 1.19 1.10 - 1.33 mmol/L    POCT Glucose, Venous 193 (H) 74 - 99 mg/dL    POCT Lactate, Venous 1.7 0.4 - 2.0 mmol/L    POCT Base Excess, Venous -13.6 (L) -2.0 - 3.0 mmol/L    POCT HCO3 Calculated, Venous 13.3 (L) 22.0 - 26.0 mmol/L    POCT Hemoglobin, Venous 17.2 13.5 - 17.5 g/dL    POCT Anion Gap, Venous 17.0 10.0 - 25.0 mmol/L    Patient Temperature 37.0 degrees Celsius    FiO2 44 %   POCT GLUCOSE   Result Value Ref Range    POCT Glucose 172 (H) 74 - 99 mg/dL   POCT GLUCOSE   Result Value Ref Range    POCT Glucose 175 (H) 74 - 99 mg/dL   BLOOD GAS VENOUS FULL PANEL   Result Value Ref Range    POCT pH, Venous 7.27 (L) 7.33 - 7.43 pH    POCT pCO2, Venous 35 (L) 41  - 51 mm Hg    POCT pO2, Venous 68 (H) 35 - 45 mm Hg    POCT SO2, Venous 96 (H) 45 - 75 %    POCT Oxy Hemoglobin, Venous 93.9 (H) 45.0 - 75.0 %    POCT Hematocrit Calculated, Venous 49.0 41.0 - 52.0 %    POCT Sodium, Venous 136 136 - 145 mmol/L    POCT Potassium, Venous 3.7 3.5 - 5.3 mmol/L    POCT Chloride, Venous 110 (H) 98 - 107 mmol/L    POCT Ionized Calicum, Venous 1.20 1.10 - 1.33 mmol/L    POCT Glucose, Venous 222 (H) 74 - 99 mg/dL    POCT Lactate, Venous 0.9 0.4 - 2.0 mmol/L    POCT Base Excess, Venous -9.8 (L) -2.0 - 3.0 mmol/L    POCT HCO3 Calculated, Venous 16.1 (L) 22.0 - 26.0 mmol/L    POCT Hemoglobin, Venous 16.4 13.5 - 17.5 g/dL    POCT Anion Gap, Venous 14.0 10.0 - 25.0 mmol/L    Patient Temperature 37.0 degrees Celsius    FiO2 21 %   Basic metabolic panel   Result Value Ref Range    Glucose 209 (H) 74 - 99 mg/dL    Sodium 139 136 - 145 mmol/L    Potassium 3.6 3.5 - 5.3 mmol/L    Chloride 114 (H) 98 - 107 mmol/L    Bicarbonate 16 (L) 21 - 32 mmol/L    Anion Gap 13 10 - 20 mmol/L    Urea Nitrogen 18 6 - 23 mg/dL    Creatinine 0.89 0.50 - 1.30 mg/dL    eGFR >90 >60 mL/min/1.73m*2    Calcium 7.8 (L) 8.6 - 10.3 mg/dL   Magnesium   Result Value Ref Range    Magnesium 1.79 1.60 - 2.40 mg/dL   Phosphorus   Result Value Ref Range    Phosphorus 2.3 (L) 2.5 - 4.9 mg/dL   POCT GLUCOSE   Result Value Ref Range    POCT Glucose 161 (H) 74 - 99 mg/dL   POCT GLUCOSE   Result Value Ref Range    POCT Glucose 194 (H) 74 - 99 mg/dL   POCT GLUCOSE   Result Value Ref Range    POCT Glucose 154 (H) 74 - 99 mg/dL   Basic metabolic panel   Result Value Ref Range    Glucose 151 (H) 74 - 99 mg/dL    Sodium 137 136 - 145 mmol/L    Potassium 3.5 3.5 - 5.3 mmol/L    Chloride 114 (H) 98 - 107 mmol/L    Bicarbonate 17 (L) 21 - 32 mmol/L    Anion Gap 10 10 - 20 mmol/L    Urea Nitrogen 19 6 - 23 mg/dL    Creatinine 0.81 0.50 - 1.30 mg/dL    eGFR >90 >60 mL/min/1.73m*2    Calcium 7.6 (L) 8.6 - 10.3 mg/dL   Magnesium   Result Value Ref  Range    Magnesium 1.75 1.60 - 2.40 mg/dL   Phosphorus   Result Value Ref Range    Phosphorus 2.9 2.5 - 4.9 mg/dL   Blood Gas Venous Full Panel   Result Value Ref Range    POCT pH, Venous 7.28 (L) 7.33 - 7.43 pH    POCT pCO2, Venous 38 (L) 41 - 51 mm Hg    POCT pO2, Venous 91 (H) 35 - 45 mm Hg    POCT SO2, Venous 98 (H) 45 - 75 %    POCT Oxy Hemoglobin, Venous 96.2 (H) 45.0 - 75.0 %    POCT Hematocrit Calculated, Venous 49.0 41.0 - 52.0 %    POCT Sodium, Venous 133 (L) 136 - 145 mmol/L    POCT Potassium, Venous 3.6 3.5 - 5.3 mmol/L    POCT Chloride, Venous 109 (H) 98 - 107 mmol/L    POCT Ionized Calicum, Venous 1.15 1.10 - 1.33 mmol/L    POCT Glucose, Venous 159 (H) 74 - 99 mg/dL    POCT Lactate, Venous 0.7 0.4 - 2.0 mmol/L    POCT Base Excess, Venous -8.2 (L) -2.0 - 3.0 mmol/L    POCT HCO3 Calculated, Venous 17.9 (L) 22.0 - 26.0 mmol/L    POCT Hemoglobin, Venous 16.2 13.5 - 17.5 g/dL    POCT Anion Gap, Venous 10.0 10.0 - 25.0 mmol/L    Patient Temperature 37.0 degrees Celsius    FiO2 21 %   POCT GLUCOSE   Result Value Ref Range    POCT Glucose 148 (H) 74 - 99 mg/dL   POCT GLUCOSE   Result Value Ref Range    POCT Glucose 89 74 - 99 mg/dL       CT abdomen pelvis w IV contrast    Result Date: 10/8/2023  STUDY: CT Abdomen and Pelvis with IV Contrast; 10/8/2023 at 5:13 PM. INDICATION: Right lower quadrant pain, vomiting, hyperglycemia. COMPARISON: US abdomen 6/11/2021; CT AP 6/1/2021. ACCESSION NUMBER(S): [id number] ORDERING CLINICIAN: DM MCGOWAN TECHNIQUE: CT of the abdomen and pelvis was performed.  Contiguous axial images were obtained at 3 mm slice thickness through the abdomen and pelvis. Coronal and sagittal reconstructions at 3 mm slice thickness were performed.  Omnipaque 350 75 mL was administered intravenously.  FINDINGS: LOWER CHEST: No cardiomegaly.  No pericardial effusion.  Lung bases are clear.  ABDOMEN:  LIVER: Hepatic steatosis is present.  No suspicious hepatic lesion is demonstrated.  No biliary  dilatation is demonstrated.  No hepatomegaly.  Smooth surface contour.  BILE DUCTS: No intrahepatic or extrahepatic biliary ductal dilatation.  GALLBLADDER: The gallbladder appears within normal limits.  STOMACH: No abnormalities identified.  PANCREAS: No masses or ductal dilatation.  SPLEEN: No splenomegaly or focal splenic lesion.  ADRENAL GLANDS: No thickening or nodules.  KIDNEYS AND URETERS: Kidneys are normal in size and location.  Simple appearing cyst RIGHT kidney is present measuring 1 cm maximally.  No renal or ureteral calculi.  PELVIS:  BLADDER: Urinary bladder is distended.  REPRODUCTIVE ORGANS: No abnormalities identified.  BOWEL: Moderate amount of stool throughout the colon is present. Constipation is not excluded.  There is no evidence of acute appendicitis.   VESSELS: No abnormalities identified.  Abdominal aorta is normal in caliber.  PERITONEUM/RETROPERITONEUM/LYMPH NODES: Prominent phlebolith in the RIGHT hemipelvis noted.  No free fluid. No pneumoperitoneum. No lymphadenopathy.  ABDOMINAL WALL: No abnormalities identified. SOFT TISSUES: No abnormalities identified.  BONES: No acute fracture or aggressive osseous lesion.    1.  There is no evidence of acute appendicitis. 2.  Moderate amount of stool throughout the colon is present. Constipation is not excluded. 3.  Hepatic steatosis is present.  No suspicious hepatic lesion is demonstrated.  No biliary dilatation is demonstrated. Signed by Cuate Membreno II, MD    CT head wo IV contrast    Result Date: 10/8/2023  Interpreted By:  Rojelio Gonzales, STUDY: CT HEAD WO IV CONTRAST; 10/8/2023 5:11 pm   INDICATION: Headache, vomiting.   COMPARISON: None.   ACCESSION NUMBER(S): [id number]   ORDERING CLINICIAN: DM MCGOWAN   TECHNIQUE: Contiguous axial CT images were obtained through the head at 5 mm slice thickness without contrast administration.   FINDINGS: INTRACRANIAL: The ventricles, sulci and basal cisterns are within normal limits for size and  configuration. The grey-white differentiation is intact. There is no mass effect or midline shift. There is no extraaxial fluid collection. There is no intracranial hemorrhage.  The calvarium is unremarkable.   EXTRACRANIAL: Visualized paranasal sinuses and mastoids are clear.       No evidence of acute cortical infarct or intracranial hemorrhage.   MACRO: None     Signed by: Rojelio Gonzales 10/8/2023 5:35 PM Dictation workstation:   [id number]    XR chest 1 view    Result Date: 10/8/2023  STUDY: Chest Radiograph; 10-8-2023 at 3:06 PM INDICATION: Shortness of breath. COMPARISON: 11-4-2022 XR CHEST ACCESSION NUMBER(S): [id number] ORDERING CLINICIAN: DM MCGOWAN TECHNIQUE:  Frontal chest was obtained at 15:05 hours. FINDINGS: CARDIOMEDIASTINAL SILHOUETTE: Cardiomediastinal silhouette is normal in size and configuration.  LUNGS: Lungs are clear.  ABDOMEN: No remarkable upper abdominal findings.  BONES: No acute osseous changes.    No acute pulmonary abnormality. Signed by Ed Welch MD       Assessment/Plan   POORLY CONTROLLED TYPE I DM  Long history of non adherence with prandial insulin  Most recent A1C of 16.3% as of July 2023    Recommendations  For Lantus 25 units subcutaneous bedtime to make a total daily dose of 35 units today   To commence Humalog 6 units TID AC   Continue insulin correction scale TID AC   Diabetic diet as tolerated   Accu-Cheks ACHS    Hypoglycemic protocol   Patient may be transferred out of ICU   Will continue to follow     Thank you for the courtesy of this consult

## 2023-10-09 NOTE — PROGRESS NOTES
10/09/23 1000   Discharge Planning   Living Arrangements Family members  (Aunt and Uncle)   Support Systems Family members;Parent  (Aunt and Uncle)   Assistance Needed Independent   Type of Residence Private residence   Home or Post Acute Services None   Patient expects to be discharged to: Home     Patient plans to return home with no needs upon discharge.

## 2023-10-10 ENCOUNTER — PHARMACY VISIT (OUTPATIENT)
Dept: PHARMACY | Facility: CLINIC | Age: 33
End: 2023-10-10
Payer: COMMERCIAL

## 2023-10-10 VITALS
BODY MASS INDEX: 23.73 KG/M2 | HEIGHT: 70 IN | RESPIRATION RATE: 17 BRPM | OXYGEN SATURATION: 98 % | TEMPERATURE: 97.9 F | DIASTOLIC BLOOD PRESSURE: 98 MMHG | WEIGHT: 165.79 LBS | SYSTOLIC BLOOD PRESSURE: 129 MMHG | HEART RATE: 89 BPM

## 2023-10-10 PROBLEM — E87.5 HYPERKALEMIA: Status: RESOLVED | Noted: 2023-07-20 | Resolved: 2023-10-10

## 2023-10-10 PROBLEM — E10.10 DIABETIC KETOACIDOSIS WITHOUT COMA ASSOCIATED WITH TYPE 1 DIABETES MELLITUS (MULTI): Status: RESOLVED | Noted: 2023-10-08 | Resolved: 2023-10-10

## 2023-10-10 LAB
ANION GAP SERPL CALC-SCNC: 9 MMOL/L (ref 10–20)
BUN SERPL-MCNC: 18 MG/DL (ref 6–23)
CALCIUM SERPL-MCNC: 7.9 MG/DL (ref 8.6–10.3)
CHLORIDE SERPL-SCNC: 111 MMOL/L (ref 98–107)
CO2 SERPL-SCNC: 22 MMOL/L (ref 21–32)
CREAT SERPL-MCNC: 0.69 MG/DL (ref 0.5–1.3)
ERYTHROCYTE [DISTWIDTH] IN BLOOD BY AUTOMATED COUNT: 12.6 % (ref 11.5–14.5)
GFR SERPL CREATININE-BSD FRML MDRD: >90 ML/MIN/1.73M*2
GLUCOSE BLD MANUAL STRIP-MCNC: 187 MG/DL (ref 74–99)
GLUCOSE SERPL-MCNC: 225 MG/DL (ref 74–99)
HCT VFR BLD AUTO: 41 % (ref 41–52)
HGB BLD-MCNC: 14.8 G/DL (ref 13.5–17.5)
MAGNESIUM SERPL-MCNC: 1.8 MG/DL (ref 1.6–2.4)
MCH RBC QN AUTO: 32.3 PG (ref 26–34)
MCHC RBC AUTO-ENTMCNC: 36.1 G/DL (ref 32–36)
MCV RBC AUTO: 90 FL (ref 80–100)
NRBC BLD-RTO: 0 /100 WBCS (ref 0–0)
PLATELET # BLD AUTO: 129 X10*3/UL (ref 150–450)
PMV BLD AUTO: 10 FL (ref 7.5–11.5)
POTASSIUM SERPL-SCNC: 3.4 MMOL/L (ref 3.5–5.3)
RBC # BLD AUTO: 4.58 X10*6/UL (ref 4.5–5.9)
SODIUM SERPL-SCNC: 139 MMOL/L (ref 136–145)
WBC # BLD AUTO: 4.2 X10*3/UL (ref 4.4–11.3)

## 2023-10-10 PROCEDURE — RXMED WILLOW AMBULATORY MEDICATION CHARGE

## 2023-10-10 PROCEDURE — 36415 COLL VENOUS BLD VENIPUNCTURE: CPT | Performed by: HOSPITALIST

## 2023-10-10 PROCEDURE — 96372 THER/PROPH/DIAG INJ SC/IM: CPT | Performed by: INTERNAL MEDICINE

## 2023-10-10 PROCEDURE — 83735 ASSAY OF MAGNESIUM: CPT | Performed by: HOSPITALIST

## 2023-10-10 PROCEDURE — 82947 ASSAY GLUCOSE BLOOD QUANT: CPT

## 2023-10-10 PROCEDURE — 2500000002 HC RX 250 W HCPCS SELF ADMINISTERED DRUGS (ALT 637 FOR MEDICARE OP, ALT 636 FOR OP/ED): Performed by: INTERNAL MEDICINE

## 2023-10-10 PROCEDURE — 99232 SBSQ HOSP IP/OBS MODERATE 35: CPT | Performed by: INTERNAL MEDICINE

## 2023-10-10 PROCEDURE — 2500000004 HC RX 250 GENERAL PHARMACY W/ HCPCS (ALT 636 FOR OP/ED): Performed by: PHYSICIAN ASSISTANT

## 2023-10-10 PROCEDURE — 99239 HOSP IP/OBS DSCHRG MGMT >30: CPT | Performed by: PHYSICIAN ASSISTANT

## 2023-10-10 PROCEDURE — 2500000004 HC RX 250 GENERAL PHARMACY W/ HCPCS (ALT 636 FOR OP/ED): Performed by: INTERNAL MEDICINE

## 2023-10-10 PROCEDURE — 80048 BASIC METABOLIC PNL TOTAL CA: CPT | Performed by: HOSPITALIST

## 2023-10-10 PROCEDURE — 85027 COMPLETE CBC AUTOMATED: CPT | Performed by: HOSPITALIST

## 2023-10-10 RX ORDER — INSULIN GLARGINE 100 [IU]/ML
35 INJECTION, SOLUTION SUBCUTANEOUS NIGHTLY
Status: DISCONTINUED | OUTPATIENT
Start: 2023-10-10 | End: 2023-10-10 | Stop reason: HOSPADM

## 2023-10-10 RX ORDER — INSULIN GLARGINE 100 [IU]/ML
35 INJECTION, SOLUTION SUBCUTANEOUS NIGHTLY
Qty: 9 ML | Refills: 0 | Status: SHIPPED | OUTPATIENT
Start: 2023-10-10 | End: 2023-12-10 | Stop reason: HOSPADM

## 2023-10-10 RX ORDER — INSULIN LISPRO 100 [IU]/ML
INJECTION, SOLUTION INTRAVENOUS; SUBCUTANEOUS
Qty: 9 ML | Refills: 0 | Status: SHIPPED | OUTPATIENT
Start: 2023-10-10 | End: 2024-02-23 | Stop reason: SDUPTHER

## 2023-10-10 RX ORDER — POTASSIUM CHLORIDE 20 MEQ/1
40 TABLET, EXTENDED RELEASE ORAL ONCE
Status: COMPLETED | OUTPATIENT
Start: 2023-10-10 | End: 2023-10-10

## 2023-10-10 RX ADMIN — PANTOPRAZOLE SODIUM 40 MG: 40 TABLET, DELAYED RELEASE ORAL at 06:02

## 2023-10-10 RX ADMIN — POTASSIUM CHLORIDE 40 MEQ: 1500 TABLET, EXTENDED RELEASE ORAL at 09:20

## 2023-10-10 RX ADMIN — ENOXAPARIN SODIUM 40 MG: 40 INJECTION SUBCUTANEOUS at 09:20

## 2023-10-10 RX ADMIN — INSULIN LISPRO 6 UNITS: 100 INJECTION, SOLUTION INTRAVENOUS; SUBCUTANEOUS at 09:55

## 2023-10-10 RX ADMIN — INSULIN LISPRO 3 UNITS: 100 INJECTION, SOLUTION INTRAVENOUS; SUBCUTANEOUS at 09:56

## 2023-10-10 ASSESSMENT — PAIN SCALES - GENERAL
PAINLEVEL_OUTOF10: 0 - NO PAIN

## 2023-10-10 ASSESSMENT — COGNITIVE AND FUNCTIONAL STATUS - GENERAL
DAILY ACTIVITIY SCORE: 24
MOBILITY SCORE: 24

## 2023-10-10 ASSESSMENT — PAIN - FUNCTIONAL ASSESSMENT
PAIN_FUNCTIONAL_ASSESSMENT: 0-10

## 2023-10-10 NOTE — DISCHARGE SUMMARY
Admission Date: 10/8/2023  2:26 PM  Discharge Date: 10/10/2023  Condition at discharge: Fair    Procedures:     Discharge Diagnosis  Diabetic ketoacidosis without coma associated with type 1 diabetes mellitus (CMS/[facility])  Hyperkalemia  Medication noncompliance     Issues Requiring Follow-Up  DM type I    Test Results Pending At Discharge  Pending Labs       No current pending labs.            Hospital Course   Ed Sanon is a 32 y.o. male with PMH of IDDM I with poor insulin compliance, anxiety, depression who presented 10/8/23 with over the past several days has been noticing some fatigue, weakness, back pain which is his usual symptoms of DKA. Suspicion of DKA secondary to medical noncompliance/ non adherence  with prandial insulin, fear of hypoglycemia. Bicarb 6, AG 31, CTH neg, CT A/P neg, CXR neg. Gap closed, bicarb normalized. Patient feels well and asked for dc on day 3. Recommend insulin compliance and close PCP follow up.   Endocrinology on consult and agreed with discharge on lantus 35u HS and lispro 6u TID AC.     Consultations: Critical Care consulted. Treatment options were discussed and plan of care agreed upon.  Endocrinology consulted. Treatment options were discussed and plan of care agreed upon.    Pertinent Physical Exam At Time of Discharge  Constitutional: Well developed, well nourished, in no acute distress. Laying back in bed comfortably and talkative     Eyes: PERRL, EOMI     Head/Neck: Normocephalic, atraumatic. Neck supple, no thyromegaly, JVD. Trachea midline     Respiratory/Thorax: Normal respiratory effort. Lungs CTAB with no wheezing, rales, or rhonchi noted     Cardiovascular: Tachycardic, no murmurs, rubs, or gallops noted. Peripheral pulses 2+     Gastrointestinal: Bowel sounds normal. Abdomen soft, nontender, nondistended, with no palpable masses     Musculoskeletal: No gross deformities. No gross muscular weakness with no ROM limitation     Extremities: No lower extremity edema  "noted bilaterally     Neurological: Alert and oriented x3, CN II - VII grossly intact     Psychological: Appropriate mood and affect     Skin: No rashes or lesions noted.    Code Status  Full Code     Home Medications     Medication List      CHANGE how you take these medications     HumaLOG KwikPen Insulin 100 unit/mL injection; Generic drug: insulin   lispro; follow insulin correction scale beginning with 6 units three times   daily with meals; What changed: See the new instructions.   Lantus Solostar U-100 Insulin 100 unit/mL (3 mL) pen; Generic drug:   insulin glargine; Inject 35 Units under the skin once daily at bedtime.;   What changed: how much to take, when to take this     CONTINUE taking these medications     acetaminophen 325 mg tablet; Commonly known as: Tylenol   Dexcom G6  misc; Generic drug: Dexcom G4 platinum    Dexcom G6 Sensor device; Generic drug: blood-glucose sensor   Dexcom G6 Transmitter device; Generic drug: Dexcom G4 platinum   transmitter   lancets misc   pen needle, diabetic 32 gauge x 5/32\" needle       Outpatient Follow-Up  Future Appointments   Date Time Provider Department Center   11/20/2023  3:45 PM Henri Brumfield MD [id number]         At the time of discharge, patient's pain was controlled with oral analgesia, patient was urinating, having BMs, sleeping, and eating well. Follow up recommendations are in discharge paperwork. Discharge plan was discussed with the patient/family and all of the questions were answered. Medications were ordered to be delivered to bedside prior to discharge.     Discharge planning took greater than 30 minutes    Diagnoses at time of discharge:  Diabetic ketoacidosis without coma associated with type 1 diabetes mellitus (CMS/[facility])    Anticipated discharge destination: home    Gabby Esquivel PA-C   "

## 2023-10-10 NOTE — PROGRESS NOTES
"Ed Sanon is a 32 y.o. male on day 2 of admission presenting with Diabetic ketoacidosis without coma associated with type 1 diabetes mellitus (CMS/HCC).    Subjective   Patient has no complaints.  Desires to go home today.       Objective     Physical Exam  Constitutional:       General: He is not in acute distress.     Appearance: Normal appearance. He is normal weight.   HENT:      Head: Normocephalic and atraumatic.      Nose: Nose normal.      Mouth/Throat:      Mouth: Mucous membranes are moist.   Eyes:      Extraocular Movements: Extraocular movements intact.   Cardiovascular:      Rate and Rhythm: Normal rate and regular rhythm.   Pulmonary:      Effort: Pulmonary effort is normal.      Breath sounds: Normal breath sounds.   Musculoskeletal:         General: Normal range of motion.   Skin:     General: Skin is warm.   Neurological:      Mental Status: He is alert and oriented to person, place, and time.   Psychiatric:         Mood and Affect: Mood normal.         Last Recorded Vitals  Blood pressure (!) 129/98, pulse 89, temperature 36.3 °C (97.3 °F), resp. rate 17, height 1.778 m (5' 10\"), weight 75.2 kg (165 lb 12.6 oz), SpO2 98 %.  Intake/Output last 3 Shifts:  I/O last 3 completed shifts:  In: 3507.5 (46.6 mL/kg) [P.O.:1776; I.V.:1731.5 (23 mL/kg)]  Out: 4225 (56.2 mL/kg) [Urine:4200 (1.6 mL/kg/hr); Emesis/NG output:25]  Weight: 75.2 kg     Relevant Results  Results for orders placed or performed during the hospital encounter of 10/08/23 (from the past 96 hour(s))   CBC and Auto Differential   Result Value Ref Range    WBC 7.7 4.4 - 11.3 x10*3/uL    nRBC 0.0 0.0 - 0.0 /100 WBCs    RBC 5.64 4.50 - 5.90 x10*6/uL    Hemoglobin 18.2 (H) 13.5 - 17.5 g/dL    Hematocrit 53.6 (H) 41.0 - 52.0 %    MCV 95 80 - 100 fL    MCH 32.3 26.0 - 34.0 pg    MCHC 34.0 32.0 - 36.0 g/dL    RDW 12.8 11.5 - 14.5 %    Platelets 160 150 - 450 x10*3/uL    MPV 11.3 7.5 - 11.5 fL    Neutrophils % 74.5 40.0 - 80.0 %    Immature " Granulocytes %, Automated 0.9 0.0 - 0.9 %    Lymphocytes % 14.0 13.0 - 44.0 %    Monocytes % 9.8 2.0 - 10.0 %    Eosinophils % 0.1 0.0 - 6.0 %    Basophils % 0.7 0.0 - 2.0 %    Neutrophils Absolute 5.72 1.20 - 7.70 x10*3/uL    Immature Granulocytes Absolute, Automated 0.07 0.00 - 0.70 x10*3/uL    Lymphocytes Absolute 1.07 (L) 1.20 - 4.80 x10*3/uL    Monocytes Absolute 0.75 0.10 - 1.00 x10*3/uL    Eosinophils Absolute 0.01 0.00 - 0.70 x10*3/uL    Basophils Absolute 0.05 0.00 - 0.10 x10*3/uL   Basic metabolic panel   Result Value Ref Range    Glucose 435 (H) 74 - 99 mg/dL    Sodium 132 (L) 136 - 145 mmol/L    Potassium 6.2 (HH) 3.5 - 5.3 mmol/L    Chloride 101 98 - 107 mmol/L    Bicarbonate 6 (LL) 21 - 32 mmol/L    Anion Gap 31 (H) 10 - 20 mmol/L    Urea Nitrogen 19 6 - 23 mg/dL    Creatinine 1.20 0.50 - 1.30 mg/dL    eGFR 81 >60 mL/min/1.73m*2    Calcium 8.8 8.6 - 10.3 mg/dL   Phosphorus   Result Value Ref Range    Phosphorus 4.8 2.5 - 4.9 mg/dL   Magnesium   Result Value Ref Range    Magnesium 2.35 1.60 - 2.40 mg/dL   Hepatic function panel   Result Value Ref Range    Albumin 4.6 3.4 - 5.0 g/dL    Bilirubin, Total 0.4 0.0 - 1.2 mg/dL    Bilirubin, Direct 0.0 0.0 - 0.3 mg/dL    Alkaline Phosphatase 86 33 - 120 U/L    ALT 20 10 - 52 U/L    AST 20 9 - 39 U/L    Total Protein 8.1 6.4 - 8.2 g/dL   Lactate   Result Value Ref Range    Lactate 1.0 0.4 - 2.0 mmol/L   Troponin I, High Sensitivity   Result Value Ref Range    Troponin I, High Sensitivity 4 0 - 20 ng/L   Blood Gas Venous Full Panel   Result Value Ref Range    POCT pH, Venous 7.03 (LL) 7.33 - 7.43 pH    POCT pCO2, Venous 23 (L) 41 - 51 mm Hg    POCT pO2, Venous 48 (H) 35 - 45 mm Hg    POCT SO2, Venous 74 45 - 75 %    POCT Oxy Hemoglobin, Venous 72.7 45.0 - 75.0 %    POCT Hematocrit Calculated, Venous 57.0 (H) 41.0 - 52.0 %    POCT Sodium, Venous 129 (L) 136 - 145 mmol/L    POCT Potassium, Venous 6.5 (HH) 3.5 - 5.3 mmol/L    POCT Chloride, Venous 102 98 - 107  mmol/L    POCT Ionized Calicum, Venous 1.24 1.10 - 1.33 mmol/L    POCT Glucose, Venous 516 (HH) 74 - 99 mg/dL    POCT Lactate, Venous 1.7 0.4 - 2.0 mmol/L    POCT Base Excess, Venous -23.2 (L) -2.0 - 3.0 mmol/L    POCT HCO3 Calculated, Venous 6.1 (L) 22.0 - 26.0 mmol/L    POCT Hemoglobin, Venous 18.9 (H) 13.5 - 17.5 g/dL    POCT Anion Gap, Venous 27.0 (H) 10.0 - 25.0 mmol/L    Patient Temperature 37.0 degrees Celsius    FiO2 21 %   Beta Hydroxybutyrate   Result Value Ref Range    Beta-Hydroxybutyrate     Influenza A, and B PCR   Result Value Ref Range    Flu A Result Not Detected Not Detected    Flu B Result Not Detected Not Detected   SARS-CoV-2 RT PCR   Result Value Ref Range    Coronavirus 2019, PCR Not Detected Not Detected   Urinalysis with Reflex Microscopic   Result Value Ref Range    Color, Urine Straw Straw, Yellow    Appearance, Urine Clear Clear    Specific Gravity, Urine 1.026 1.005 - 1.035    pH, Urine 5.0 5.0, 5.5, 6.0, 6.5, 7.0, 7.5, 8.0    Protein, Urine 30 (1+) (N) NEGATIVE mg/dL    Glucose, Urine >=500 (3+) (A) NEGATIVE mg/dL    Blood, Urine NEGATIVE NEGATIVE    Ketones, Urine 80 (2+) (A) NEGATIVE mg/dL    Bilirubin, Urine NEGATIVE NEGATIVE    Urobilinogen, Urine <2.0 <2.0 mg/dL    Nitrite, Urine NEGATIVE NEGATIVE    Leukocyte Esterase, Urine NEGATIVE NEGATIVE   Urinalysis Microscopic Only   Result Value Ref Range    WBC, Urine 1-5 1-5, NONE /HPF    RBC, Urine NONE NONE, 1-2, 3-5 /HPF    Mucus, Urine 1+ Reference range not established. /LPF   Osmolality   Result Value Ref Range    Osmolality, Serum 310 (H) 280 - 300 mOsm/kg   POCT GLUCOSE   Result Value Ref Range    POCT Glucose 237 (H) 74 - 99 mg/dL   POCT GLUCOSE   Result Value Ref Range    POCT Glucose 176 (H) 74 - 99 mg/dL   POCT GLUCOSE   Result Value Ref Range    POCT Glucose 188 (H) 74 - 99 mg/dL   Basic metabolic panel   Result Value Ref Range    Glucose 175 (H) 74 - 99 mg/dL    Sodium 138 136 - 145 mmol/L    Potassium 3.8 3.5 - 5.3 mmol/L     Chloride 113 (H) 98 - 107 mmol/L    Bicarbonate 11 (L) 21 - 32 mmol/L    Anion Gap 18 10 - 20 mmol/L    Urea Nitrogen 17 6 - 23 mg/dL    Creatinine 0.98 0.50 - 1.30 mg/dL    eGFR >90 >60 mL/min/1.73m*2    Calcium 8.2 (L) 8.6 - 10.3 mg/dL   Magnesium   Result Value Ref Range    Magnesium 1.93 1.60 - 2.40 mg/dL   Phosphorus   Result Value Ref Range    Phosphorus 2.5 2.5 - 4.9 mg/dL   BLOOD GAS VENOUS FULL PANEL   Result Value Ref Range    POCT pH, Venous 7.20 (LL) 7.33 - 7.43 pH    POCT pCO2, Venous 34 (L) 41 - 51 mm Hg    POCT pO2, Venous 47 (H) 35 - 45 mm Hg    POCT SO2, Venous 81 (H) 45 - 75 %    POCT Oxy Hemoglobin, Venous 79.3 (H) 45.0 - 75.0 %    POCT Hematocrit Calculated, Venous 52.0 41.0 - 52.0 %    POCT Sodium, Venous 136 136 - 145 mmol/L    POCT Potassium, Venous 3.8 3.5 - 5.3 mmol/L    POCT Chloride, Venous 110 (H) 98 - 107 mmol/L    POCT Ionized Calicum, Venous 1.19 1.10 - 1.33 mmol/L    POCT Glucose, Venous 193 (H) 74 - 99 mg/dL    POCT Lactate, Venous 1.7 0.4 - 2.0 mmol/L    POCT Base Excess, Venous -13.6 (L) -2.0 - 3.0 mmol/L    POCT HCO3 Calculated, Venous 13.3 (L) 22.0 - 26.0 mmol/L    POCT Hemoglobin, Venous 17.2 13.5 - 17.5 g/dL    POCT Anion Gap, Venous 17.0 10.0 - 25.0 mmol/L    Patient Temperature 37.0 degrees Celsius    FiO2 44 %   POCT GLUCOSE   Result Value Ref Range    POCT Glucose 172 (H) 74 - 99 mg/dL   POCT GLUCOSE   Result Value Ref Range    POCT Glucose 175 (H) 74 - 99 mg/dL   BLOOD GAS VENOUS FULL PANEL   Result Value Ref Range    POCT pH, Venous 7.27 (L) 7.33 - 7.43 pH    POCT pCO2, Venous 35 (L) 41 - 51 mm Hg    POCT pO2, Venous 68 (H) 35 - 45 mm Hg    POCT SO2, Venous 96 (H) 45 - 75 %    POCT Oxy Hemoglobin, Venous 93.9 (H) 45.0 - 75.0 %    POCT Hematocrit Calculated, Venous 49.0 41.0 - 52.0 %    POCT Sodium, Venous 136 136 - 145 mmol/L    POCT Potassium, Venous 3.7 3.5 - 5.3 mmol/L    POCT Chloride, Venous 110 (H) 98 - 107 mmol/L    POCT Ionized Calicum, Venous 1.20 1.10 - 1.33  mmol/L    POCT Glucose, Venous 222 (H) 74 - 99 mg/dL    POCT Lactate, Venous 0.9 0.4 - 2.0 mmol/L    POCT Base Excess, Venous -9.8 (L) -2.0 - 3.0 mmol/L    POCT HCO3 Calculated, Venous 16.1 (L) 22.0 - 26.0 mmol/L    POCT Hemoglobin, Venous 16.4 13.5 - 17.5 g/dL    POCT Anion Gap, Venous 14.0 10.0 - 25.0 mmol/L    Patient Temperature 37.0 degrees Celsius    FiO2 21 %   Basic metabolic panel   Result Value Ref Range    Glucose 209 (H) 74 - 99 mg/dL    Sodium 139 136 - 145 mmol/L    Potassium 3.6 3.5 - 5.3 mmol/L    Chloride 114 (H) 98 - 107 mmol/L    Bicarbonate 16 (L) 21 - 32 mmol/L    Anion Gap 13 10 - 20 mmol/L    Urea Nitrogen 18 6 - 23 mg/dL    Creatinine 0.89 0.50 - 1.30 mg/dL    eGFR >90 >60 mL/min/1.73m*2    Calcium 7.8 (L) 8.6 - 10.3 mg/dL   Magnesium   Result Value Ref Range    Magnesium 1.79 1.60 - 2.40 mg/dL   Phosphorus   Result Value Ref Range    Phosphorus 2.3 (L) 2.5 - 4.9 mg/dL   POCT GLUCOSE   Result Value Ref Range    POCT Glucose 161 (H) 74 - 99 mg/dL   POCT GLUCOSE   Result Value Ref Range    POCT Glucose 194 (H) 74 - 99 mg/dL   POCT GLUCOSE   Result Value Ref Range    POCT Glucose 154 (H) 74 - 99 mg/dL   Basic metabolic panel   Result Value Ref Range    Glucose 151 (H) 74 - 99 mg/dL    Sodium 137 136 - 145 mmol/L    Potassium 3.5 3.5 - 5.3 mmol/L    Chloride 114 (H) 98 - 107 mmol/L    Bicarbonate 17 (L) 21 - 32 mmol/L    Anion Gap 10 10 - 20 mmol/L    Urea Nitrogen 19 6 - 23 mg/dL    Creatinine 0.81 0.50 - 1.30 mg/dL    eGFR >90 >60 mL/min/1.73m*2    Calcium 7.6 (L) 8.6 - 10.3 mg/dL   Magnesium   Result Value Ref Range    Magnesium 1.75 1.60 - 2.40 mg/dL   Phosphorus   Result Value Ref Range    Phosphorus 2.9 2.5 - 4.9 mg/dL   Blood Gas Venous Full Panel   Result Value Ref Range    POCT pH, Venous 7.28 (L) 7.33 - 7.43 pH    POCT pCO2, Venous 38 (L) 41 - 51 mm Hg    POCT pO2, Venous 91 (H) 35 - 45 mm Hg    POCT SO2, Venous 98 (H) 45 - 75 %    POCT Oxy Hemoglobin, Venous 96.2 (H) 45.0 - 75.0 %     POCT Hematocrit Calculated, Venous 49.0 41.0 - 52.0 %    POCT Sodium, Venous 133 (L) 136 - 145 mmol/L    POCT Potassium, Venous 3.6 3.5 - 5.3 mmol/L    POCT Chloride, Venous 109 (H) 98 - 107 mmol/L    POCT Ionized Calicum, Venous 1.15 1.10 - 1.33 mmol/L    POCT Glucose, Venous 159 (H) 74 - 99 mg/dL    POCT Lactate, Venous 0.7 0.4 - 2.0 mmol/L    POCT Base Excess, Venous -8.2 (L) -2.0 - 3.0 mmol/L    POCT HCO3 Calculated, Venous 17.9 (L) 22.0 - 26.0 mmol/L    POCT Hemoglobin, Venous 16.2 13.5 - 17.5 g/dL    POCT Anion Gap, Venous 10.0 10.0 - 25.0 mmol/L    Patient Temperature 37.0 degrees Celsius    FiO2 21 %   POCT GLUCOSE   Result Value Ref Range    POCT Glucose 148 (H) 74 - 99 mg/dL   POCT GLUCOSE   Result Value Ref Range    POCT Glucose 89 74 - 99 mg/dL   POCT GLUCOSE   Result Value Ref Range    POCT Glucose 273 (H) 74 - 99 mg/dL   POCT GLUCOSE   Result Value Ref Range    POCT Glucose 254 (H) 74 - 99 mg/dL   POCT GLUCOSE   Result Value Ref Range    POCT Glucose 234 (H) 74 - 99 mg/dL   Basic Metabolic Panel   Result Value Ref Range    Glucose 225 (H) 74 - 99 mg/dL    Sodium 139 136 - 145 mmol/L    Potassium 3.4 (L) 3.5 - 5.3 mmol/L    Chloride 111 (H) 98 - 107 mmol/L    Bicarbonate 22 21 - 32 mmol/L    Anion Gap 9 (L) 10 - 20 mmol/L    Urea Nitrogen 18 6 - 23 mg/dL    Creatinine 0.69 0.50 - 1.30 mg/dL    eGFR >90 >60 mL/min/1.73m*2    Calcium 7.9 (L) 8.6 - 10.3 mg/dL   CBC   Result Value Ref Range    WBC 4.2 (L) 4.4 - 11.3 x10*3/uL    nRBC 0.0 0.0 - 0.0 /100 WBCs    RBC 4.58 4.50 - 5.90 x10*6/uL    Hemoglobin 14.8 13.5 - 17.5 g/dL    Hematocrit 41.0 41.0 - 52.0 %    MCV 90 80 - 100 fL    MCH 32.3 26.0 - 34.0 pg    MCHC 36.1 (H) 32.0 - 36.0 g/dL    RDW 12.6 11.5 - 14.5 %    Platelets 129 (L) 150 - 450 x10*3/uL    MPV 10.0 7.5 - 11.5 fL   Magnesium   Result Value Ref Range    Magnesium 1.80 1.60 - 2.40 mg/dL   POCT GLUCOSE   Result Value Ref Range    POCT Glucose 187 (H) 74 - 99 mg/dL         Assessment/Plan    Principal Problem:    Diabetic ketoacidosis without coma associated with type 1 diabetes mellitus (CMS/HCC)  Active Problems:    Anxiety and depression    Hyperkalemia    POORLY CONTROLLED TYPE I DM  Long history of non adherence with prandial insulin  Most recent A1C of 16.3% as of July 2023     Recommendations  To increase Lantus to 35 units subcutaneous bedtime   To continue Humalog 6 units TID AC   To continue insulin correction scale TID AC   Diabetic diet as tolerated   Accu-Cheks ACHS    Hypoglycemic protocol   For outpatient follow up with Phoebe Gonzalez as scheduled in November  Encouraged to call for insulin pump training   Will continue to follow          Lori Packer MD

## 2023-10-10 NOTE — CARE PLAN
Problem: Diabetes  Goal: Achieve decreasing blood glucose levels by end of shift  Outcome: Progressing  Goal: Maintain electrolyte levels within acceptable range throughout shift  Outcome: Progressing  Goal: Maintain glucose levels >70mg/dl to <250mg/dl throughout shift  Outcome: Progressing     Problem: Discharge Planning  Goal: Discharge to home or other facility with appropriate resources  Outcome: Progressing     Problem: Chronic Conditions and Co-morbidities  Goal: Patient's chronic conditions and co-morbidity symptoms are monitored and maintained or improved  Outcome: Progressing     Problem: Diabetes  Goal: No changes in neurological exam by end of shift  Outcome: Met  Goal: Vital signs within normal range for age by end of shift  Outcome: Met

## 2023-10-27 LAB — GLUCOSE BLD MANUAL STRIP-MCNC: 253 MG/DL (ref 74–99)

## 2023-11-15 PROBLEM — R11.10 VOMITING: Status: ACTIVE | Noted: 2023-11-15

## 2023-11-15 PROBLEM — E10.10: Status: ACTIVE | Noted: 2023-11-15

## 2023-11-15 PROBLEM — R73.9 ELEVATED BLOOD SUGAR: Status: ACTIVE | Noted: 2023-11-15

## 2023-11-15 PROBLEM — E11.9 DM2 (DIABETES MELLITUS, TYPE 2) (MULTI): Status: ACTIVE | Noted: 2023-11-15

## 2023-11-20 ENCOUNTER — OFFICE VISIT (OUTPATIENT)
Dept: PRIMARY CARE | Facility: CLINIC | Age: 33
End: 2023-11-20
Payer: COMMERCIAL

## 2023-11-20 VITALS
HEART RATE: 96 BPM | TEMPERATURE: 97.8 F | SYSTOLIC BLOOD PRESSURE: 123 MMHG | DIASTOLIC BLOOD PRESSURE: 82 MMHG | WEIGHT: 168 LBS | RESPIRATION RATE: 18 BRPM | HEIGHT: 70 IN | BODY MASS INDEX: 24.05 KG/M2 | OXYGEN SATURATION: 92 %

## 2023-11-20 DIAGNOSIS — R30.0 DYSURIA: ICD-10-CM

## 2023-11-20 DIAGNOSIS — E10.65 POORLY CONTROLLED TYPE 1 DIABETES MELLITUS (MULTI): ICD-10-CM

## 2023-11-20 DIAGNOSIS — R07.89 ATYPICAL CHEST PAIN: ICD-10-CM

## 2023-11-20 DIAGNOSIS — F41.9 ANXIETY AND DEPRESSION: ICD-10-CM

## 2023-11-20 DIAGNOSIS — E53.8 VITAMIN B12 DEFICIENCY: Primary | ICD-10-CM

## 2023-11-20 DIAGNOSIS — F31.9 BIPOLAR AFFECTIVE DISORDER, REMISSION STATUS UNSPECIFIED (MULTI): ICD-10-CM

## 2023-11-20 DIAGNOSIS — E55.9 VITAMIN D DEFICIENCY: ICD-10-CM

## 2023-11-20 DIAGNOSIS — F32.A ANXIETY AND DEPRESSION: ICD-10-CM

## 2023-11-20 PROCEDURE — 3008F BODY MASS INDEX DOCD: CPT | Performed by: INTERNAL MEDICINE

## 2023-11-20 PROCEDURE — 3074F SYST BP LT 130 MM HG: CPT | Performed by: INTERNAL MEDICINE

## 2023-11-20 PROCEDURE — 3046F HEMOGLOBIN A1C LEVEL >9.0%: CPT | Performed by: INTERNAL MEDICINE

## 2023-11-20 PROCEDURE — 3079F DIAST BP 80-89 MM HG: CPT | Performed by: INTERNAL MEDICINE

## 2023-11-20 PROCEDURE — 99214 OFFICE O/P EST MOD 30 MIN: CPT | Performed by: INTERNAL MEDICINE

## 2023-11-20 PROCEDURE — 1036F TOBACCO NON-USER: CPT | Performed by: INTERNAL MEDICINE

## 2023-11-20 SDOH — ECONOMIC STABILITY: FOOD INSECURITY: WITHIN THE PAST 12 MONTHS, THE FOOD YOU BOUGHT JUST DIDN'T LAST AND YOU DIDN'T HAVE MONEY TO GET MORE.: NEVER TRUE

## 2023-11-20 SDOH — ECONOMIC STABILITY: FOOD INSECURITY: WITHIN THE PAST 12 MONTHS, YOU WORRIED THAT YOUR FOOD WOULD RUN OUT BEFORE YOU GOT MONEY TO BUY MORE.: NEVER TRUE

## 2023-11-20 ASSESSMENT — LIFESTYLE VARIABLES
HOW OFTEN DO YOU HAVE SIX OR MORE DRINKS ON ONE OCCASION: NEVER
HOW OFTEN DO YOU HAVE A DRINK CONTAINING ALCOHOL: NEVER
HOW MANY STANDARD DRINKS CONTAINING ALCOHOL DO YOU HAVE ON A TYPICAL DAY: PATIENT DOES NOT DRINK
SKIP TO QUESTIONS 9-10: 1
AUDIT-C TOTAL SCORE: 0

## 2023-11-20 ASSESSMENT — PAIN SCALES - GENERAL: PAINLEVEL: 0-NO PAIN

## 2023-11-20 NOTE — ASSESSMENT & PLAN NOTE
Check routine labs including microalbumin, HgbA1C, patient is planning to start insulin pump therapy per [facility]

## 2023-11-20 NOTE — ASSESSMENT & PLAN NOTE
Check a stress ECHO, patient gets occasional sharp chest pain that lasts up to an hour, patient has poorly controlled DM, evaluate for cardiac issues

## 2023-11-20 NOTE — PROGRESS NOTES
"Chief Complaint/HPI:  Initial visit with me, previously saw Dr Harris. He apparently missed several appointments and was dismissed from practice    DM type 1: patient had episode of DKA noted in 10/2023, HgbA1C was over 16 at that time. Patient appears to have a CGM monitor, he still uses a basal / bolus insulin regimen. (Lantus and Humalog). He takes no other med therapy. Sees Constance Villar at [facility] endocrinology, patient has difficulty taking bolus insulin, he states that he feels the insulin when he takes it, he has been diabetic for about 13 years. He has some blurred vision at distance. Patient was advised if he is able to control glucoses, his vision  may improve.    Patient takes no meds for BP or lipids. Patient states that gets \"stabbing pain\" in the chest at times. It may \"hang around \" for 1/2 hour to a full hour.       Patient notes burning with urination at times, this occurs more often if he urinates less he states.       ROS otherwise negative aside from what was mentioned above in HPI.      Patient Active Problem List   Diagnosis    Abdominal pain    COVID-19 virus infection    DKA (diabetic ketoacidosis) (CMS/[facility])    Fatigue    Anxiety and depression    Generalized anxiety disorder    Microalbuminuria due to type 1 diabetes mellitus (CMS/[facility])    Poorly controlled type 1 diabetes mellitus (CMS/[facility])    DKA, type 1, not at goal (CMS/[facility])    Type 1 diabetes mellitus (CMS/HCC)    Type 1 diabetes mellitus with hyperglycemia (CMS/[facility])    Vitamin B12 deficiency    Vitamin D deficiency    Vomiting    Diabetic acetonemia (CMS/[facility])    DM2 (diabetes mellitus, type 2) (CMS/[facility])    Elevated blood sugar    Atypical chest pain    Dysuria    Bipolar affective disorder, remission status unspecified (CMS/[facility])         Past Medical History:   Diagnosis Date    Abdominal pain 04/21/2023    Anxiety and depression 08/08/2021    COVID-19 virus infection 07/11/2023    Diabetes mellitus (CMS/[facility])     Diabetic acetonemia " "(CMS/HCC) 11/15/2023    DIABETIC KETO ACIDOSIS    DKA (diabetic ketoacidosis) (CMS/HCC) 07/11/2023    DKA, type 1, not at goal (CMS/HCC) 04/21/2023    DM2 (diabetes mellitus, type 2) (CMS/HCC) 11/15/2023    DIABETES    Elevated blood sugar 11/15/2023    ELEVATED BLOOD SUGAR/ 375 LAST CHECK    Elevated blood-pressure reading, without diagnosis of hypertension 09/09/2020    Elevated blood pressure reading    Fatigue 07/11/2023    Generalized anxiety disorder 07/11/2023    Microalbuminuria due to type 1 diabetes mellitus (CMS/HCC) 07/11/2023    Poorly controlled diabetes mellitus (CMS/HCC) 07/11/2023    Type 1 diabetes mellitus (CMS/HCC) 07/11/2023    Type 1 diabetes mellitus with hyperglycemia (CMS/HCC) 02/25/2019    Vitamin B12 deficiency 07/11/2023    Vitamin D deficiency 07/11/2023    Vomiting 11/15/2023    VOMITING HEADACHE BACK ACHE     History reviewed. No pertinent surgical history.  Social History     Social History Narrative    Not on file         ALLERGIES  Sitagliptin phos-metformin      MEDICATIONS  Current Outpatient Medications on File Prior to Visit   Medication Sig Dispense Refill    acetaminophen (Tylenol) 325 mg tablet Take by mouth every 4 hours if needed.      blood sugar diagnostic strip Use 2-3 times per day      Dexcom G6  misc use as directed      Dexcom G6 Sensor device       Dexcom G6 Transmitter device CHANGE EVERY 90 DAYS      insulin glargine (Lantus) 100 unit/mL (3 mL) pen Inject 35 Units under the skin once daily at bedtime. 9 mL 0    insulin lispro (HumaLOG) 100 unit/mL injection follow insulin correction scale beginning with 6 units three times daily with meals 9 mL 0    lancets misc Inject 1 Units under the skin 2 times a day. Sliding scale      pen needle, diabetic 32 gauge x 5/32\" needle Use as instructed 4 times daily       No current facility-administered medications on file prior to visit.         PHYSICAL EXAM  /82 (BP Location: Left arm, Patient Position: Sitting, " "BP Cuff Size: Large adult)   Pulse 96   Temp 36.6 °C (97.8 °F)   Resp 18   Ht 1.778 m (5' 10\")   Wt 76.2 kg (168 lb)   SpO2 92%   BMI 24.11 kg/m²   Body mass index is 24.11 kg/m².  Gen: Alert, NAD, young male, NAD  HEENT:  EOMI, conjunctiva and sclera normal in appearance, no thyromegaly or neck masses  Respiratory:  Lungs CTAB  Cardiovascular:  Heart RRR. No M/R/G, no carotid bruits noted, no peripheral edema  Neuro:  Gross motor and sensory intact  Skin:  No suspicious lesions present on exposed skin    ASSESSMENT/PLAN  Problem List Items Addressed This Visit       Anxiety and depression    Relevant Orders    Comprehensive metabolic panel    Poorly controlled type 1 diabetes mellitus (CMS/[facility])    Current Assessment & Plan     Check routine labs including microalbumin, HgbA1C, patient is planning to start insulin pump therapy per [facility]         Relevant Orders    Hemoglobin A1c    Comprehensive metabolic panel    Lipid panel    Albumin, urine, random    Vitamin B12 deficiency - Primary    Current Assessment & Plan     Check labs         Relevant Orders    Comprehensive metabolic panel    Vitamin B12    Vitamin D deficiency    Current Assessment & Plan     Check labs         Relevant Orders    Comprehensive metabolic panel    Vitamin D 25-Hydroxy,Total (for eval of Vitamin D levels)    Atypical chest pain    Current Assessment & Plan     Check a stress ECHO, patient gets occasional sharp chest pain that lasts up to an hour, patient has poorly controlled DM, evaluate for cardiac issues         Relevant Orders    Comprehensive metabolic panel    Echocardiogram stress test    Dysuria    Current Assessment & Plan     Check UA, this may be due to chronic glucosuria         Relevant Orders    Comprehensive metabolic panel    Urinalysis with Reflex Microscopic    Bipolar affective disorder, remission status unspecified (CMS/[facility])    Current Assessment & Plan     This was not discussed today at initial " visit, will review med records          Check labs, check a stress ECHO, patient will follow up with endocrinology for insulin pump therapy, patient should  be discussing the pump with [employer] soon, patient refuses flu vaccine today, follow up in 3 months    Henri Brumfield MD

## 2023-12-08 ENCOUNTER — HOSPITAL ENCOUNTER (INPATIENT)
Facility: HOSPITAL | Age: 33
LOS: 2 days | Discharge: HOME | DRG: 639 | End: 2023-12-10
Attending: STUDENT IN AN ORGANIZED HEALTH CARE EDUCATION/TRAINING PROGRAM | Admitting: INTERNAL MEDICINE
Payer: COMMERCIAL

## 2023-12-08 DIAGNOSIS — E10.10 DIABETIC KETOACIDOSIS WITHOUT COMA ASSOCIATED WITH TYPE 1 DIABETES MELLITUS (MULTI): Primary | ICD-10-CM

## 2023-12-08 LAB
ALBUMIN SERPL BCP-MCNC: 4 G/DL (ref 3.4–5)
ALBUMIN SERPL BCP-MCNC: 4.2 G/DL (ref 3.4–5)
ALP SERPL-CCNC: 118 U/L (ref 33–120)
ALT SERPL W P-5'-P-CCNC: 12 U/L (ref 10–52)
ANION GAP BLDV CALCULATED.4IONS-SCNC: 25 MMOL/L (ref 10–25)
ANION GAP SERPL CALC-SCNC: 28 MMOL/L (ref 10–20)
ANION GAP SERPL CALC-SCNC: 30 MMOL/L (ref 10–20)
ANION GAP SERPL CALC-SCNC: 30 MMOL/L (ref 10–20)
APPEARANCE UR: CLEAR
ARTERIAL PATENCY WRIST A: POSITIVE
AST SERPL W P-5'-P-CCNC: 9 U/L (ref 9–39)
B-OH-BUTYR SERPL-SCNC: 9.04 MMOL/L (ref 0.02–0.27)
BASE EXCESS BLDA CALC-SCNC: -24.5 MMOL/L (ref -2–3)
BASE EXCESS BLDV CALC-SCNC: -19.4 MMOL/L (ref -2–3)
BASOPHILS # BLD AUTO: 0.05 X10*3/UL (ref 0–0.1)
BASOPHILS NFR BLD AUTO: 0.9 %
BILIRUB SERPL-MCNC: 0.4 MG/DL (ref 0–1.2)
BILIRUB UR STRIP.AUTO-MCNC: NEGATIVE MG/DL
BODY TEMPERATURE: 37 DEGREES CELSIUS
BODY TEMPERATURE: 37 DEGREES CELSIUS
BUN SERPL-MCNC: 19 MG/DL (ref 6–23)
BUN SERPL-MCNC: 19 MG/DL (ref 6–23)
BUN SERPL-MCNC: 21 MG/DL (ref 6–23)
CA-I BLDV-SCNC: 1.28 MMOL/L (ref 1.1–1.33)
CALCIUM SERPL-MCNC: 7.7 MG/DL (ref 8.6–10.3)
CALCIUM SERPL-MCNC: 7.7 MG/DL (ref 8.6–10.3)
CALCIUM SERPL-MCNC: 8.3 MG/DL (ref 8.6–10.3)
CHLORIDE BLDV-SCNC: 100 MMOL/L (ref 98–107)
CHLORIDE SERPL-SCNC: 101 MMOL/L (ref 98–107)
CHLORIDE SERPL-SCNC: 105 MMOL/L (ref 98–107)
CHLORIDE SERPL-SCNC: 105 MMOL/L (ref 98–107)
CO2 SERPL-SCNC: 3 MMOL/L (ref 21–32)
CO2 SERPL-SCNC: 3 MMOL/L (ref 21–32)
CO2 SERPL-SCNC: 9 MMOL/L (ref 21–32)
COLOR UR: ABNORMAL
CREAT SERPL-MCNC: 1.04 MG/DL (ref 0.5–1.3)
CREAT SERPL-MCNC: 1.04 MG/DL (ref 0.5–1.3)
CREAT SERPL-MCNC: 1.18 MG/DL (ref 0.5–1.3)
EOSINOPHIL # BLD AUTO: 0.02 X10*3/UL (ref 0–0.7)
EOSINOPHIL NFR BLD AUTO: 0.4 %
ERYTHROCYTE [DISTWIDTH] IN BLOOD BY AUTOMATED COUNT: 12.9 % (ref 11.5–14.5)
GFR SERPL CREATININE-BSD FRML MDRD: 83 ML/MIN/1.73M*2
GFR SERPL CREATININE-BSD FRML MDRD: >90 ML/MIN/1.73M*2
GFR SERPL CREATININE-BSD FRML MDRD: >90 ML/MIN/1.73M*2
GLUCOSE BLD MANUAL STRIP-MCNC: 235 MG/DL (ref 74–99)
GLUCOSE BLD MANUAL STRIP-MCNC: 311 MG/DL (ref 74–99)
GLUCOSE BLD MANUAL STRIP-MCNC: 440 MG/DL (ref 74–99)
GLUCOSE BLD MANUAL STRIP-MCNC: 550 MG/DL (ref 74–99)
GLUCOSE BLDV-MCNC: 601 MG/DL (ref 74–99)
GLUCOSE SERPL-MCNC: 430 MG/DL (ref 74–99)
GLUCOSE SERPL-MCNC: 430 MG/DL (ref 74–99)
GLUCOSE SERPL-MCNC: 498 MG/DL (ref 74–99)
GLUCOSE UR STRIP.AUTO-MCNC: ABNORMAL MG/DL
HCO3 BLDA-SCNC: 3 MMOL/L (ref 22–26)
HCO3 BLDV-SCNC: 8.6 MMOL/L (ref 22–26)
HCT VFR BLD AUTO: 46.5 % (ref 41–52)
HCT VFR BLD EST: 50 % (ref 41–52)
HGB BLD-MCNC: 16.5 G/DL (ref 13.5–17.5)
HGB BLDV-MCNC: 16.8 G/DL (ref 13.5–17.5)
HYALINE CASTS #/AREA URNS AUTO: ABNORMAL /LPF
IMM GRANULOCYTES # BLD AUTO: 0.05 X10*3/UL (ref 0–0.7)
IMM GRANULOCYTES NFR BLD AUTO: 0.9 % (ref 0–0.9)
INHALED O2 CONCENTRATION: 21 %
INHALED O2 CONCENTRATION: 21 %
KETONES UR STRIP.AUTO-MCNC: ABNORMAL MG/DL
LACTATE BLDV-SCNC: 0.9 MMOL/L (ref 0.4–2)
LEUKOCYTE ESTERASE UR QL STRIP.AUTO: NEGATIVE
LYMPHOCYTES # BLD AUTO: 1.34 X10*3/UL (ref 1.2–4.8)
LYMPHOCYTES NFR BLD AUTO: 24 %
MAGNESIUM SERPL-MCNC: 2.16 MG/DL (ref 1.6–2.4)
MCH RBC QN AUTO: 32.7 PG (ref 26–34)
MCHC RBC AUTO-ENTMCNC: 35.5 G/DL (ref 32–36)
MCV RBC AUTO: 92 FL (ref 80–100)
MONOCYTES # BLD AUTO: 0.63 X10*3/UL (ref 0.1–1)
MONOCYTES NFR BLD AUTO: 11.3 %
MUCOUS THREADS #/AREA URNS AUTO: ABNORMAL /LPF
NEUTROPHILS # BLD AUTO: 3.5 X10*3/UL (ref 1.2–7.7)
NEUTROPHILS NFR BLD AUTO: 62.5 %
NITRITE UR QL STRIP.AUTO: NEGATIVE
NRBC BLD-RTO: 0 /100 WBCS (ref 0–0)
OXYHGB MFR BLDA: 97.3 % (ref 94–98)
OXYHGB MFR BLDV: 62.6 % (ref 45–75)
PCO2 BLDA: 10 MM HG (ref 38–42)
PCO2 BLDV: 27 MM HG (ref 41–51)
PH BLDA: 7.09 PH (ref 7.38–7.42)
PH BLDV: 7.11 PH (ref 7.33–7.43)
PH UR STRIP.AUTO: 5 [PH]
PHOSPHATE SERPL-MCNC: 3 MG/DL (ref 2.5–4.9)
PHOSPHATE SERPL-MCNC: 3.1 MG/DL (ref 2.5–4.9)
PLATELET # BLD AUTO: 188 X10*3/UL (ref 150–450)
PO2 BLDA: 123 MM HG (ref 85–95)
PO2 BLDV: 37 MM HG (ref 35–45)
POTASSIUM BLDV-SCNC: 4.7 MMOL/L (ref 3.5–5.3)
POTASSIUM SERPL-SCNC: 4.6 MMOL/L (ref 3.5–5.3)
POTASSIUM SERPL-SCNC: 5.6 MMOL/L (ref 3.5–5.3)
POTASSIUM SERPL-SCNC: 5.6 MMOL/L (ref 3.5–5.3)
PROT SERPL-MCNC: 6.9 G/DL (ref 6.4–8.2)
PROT UR STRIP.AUTO-MCNC: ABNORMAL MG/DL
RBC # BLD AUTO: 5.05 X10*6/UL (ref 4.5–5.9)
RBC # UR STRIP.AUTO: NEGATIVE /UL
RBC #/AREA URNS AUTO: ABNORMAL /HPF
SAO2 % BLDA: 99 % (ref 94–100)
SAO2 % BLDV: 63 % (ref 45–75)
SODIUM BLDV-SCNC: 129 MMOL/L (ref 136–145)
SODIUM SERPL-SCNC: 132 MMOL/L (ref 136–145)
SODIUM SERPL-SCNC: 132 MMOL/L (ref 136–145)
SODIUM SERPL-SCNC: 133 MMOL/L (ref 136–145)
SP GR UR STRIP.AUTO: 1.03
UROBILINOGEN UR STRIP.AUTO-MCNC: <2 MG/DL
WBC # BLD AUTO: 5.6 X10*3/UL (ref 4.4–11.3)
WBC #/AREA URNS AUTO: ABNORMAL /HPF

## 2023-12-08 PROCEDURE — 84295 ASSAY OF SERUM SODIUM: CPT | Performed by: STUDENT IN AN ORGANIZED HEALTH CARE EDUCATION/TRAINING PROGRAM

## 2023-12-08 PROCEDURE — 36415 COLL VENOUS BLD VENIPUNCTURE: CPT | Performed by: INTERNAL MEDICINE

## 2023-12-08 PROCEDURE — 81001 URINALYSIS AUTO W/SCOPE: CPT | Performed by: STUDENT IN AN ORGANIZED HEALTH CARE EDUCATION/TRAINING PROGRAM

## 2023-12-08 PROCEDURE — 82010 KETONE BODYS QUAN: CPT | Performed by: STUDENT IN AN ORGANIZED HEALTH CARE EDUCATION/TRAINING PROGRAM

## 2023-12-08 PROCEDURE — 96375 TX/PRO/DX INJ NEW DRUG ADDON: CPT

## 2023-12-08 PROCEDURE — 99223 1ST HOSP IP/OBS HIGH 75: CPT | Performed by: INTERNAL MEDICINE

## 2023-12-08 PROCEDURE — 36415 COLL VENOUS BLD VENIPUNCTURE: CPT | Performed by: STUDENT IN AN ORGANIZED HEALTH CARE EDUCATION/TRAINING PROGRAM

## 2023-12-08 PROCEDURE — 82947 ASSAY GLUCOSE BLOOD QUANT: CPT

## 2023-12-08 PROCEDURE — 84132 ASSAY OF SERUM POTASSIUM: CPT | Performed by: STUDENT IN AN ORGANIZED HEALTH CARE EDUCATION/TRAINING PROGRAM

## 2023-12-08 PROCEDURE — 82947 ASSAY GLUCOSE BLOOD QUANT: CPT | Performed by: INTERNAL MEDICINE

## 2023-12-08 PROCEDURE — 82805 BLOOD GASES W/O2 SATURATION: CPT | Performed by: INTERNAL MEDICINE

## 2023-12-08 PROCEDURE — 83735 ASSAY OF MAGNESIUM: CPT | Performed by: INTERNAL MEDICINE

## 2023-12-08 PROCEDURE — 85018 HEMOGLOBIN: CPT | Performed by: STUDENT IN AN ORGANIZED HEALTH CARE EDUCATION/TRAINING PROGRAM

## 2023-12-08 PROCEDURE — 2500000004 HC RX 250 GENERAL PHARMACY W/ HCPCS (ALT 636 FOR OP/ED): Performed by: INTERNAL MEDICINE

## 2023-12-08 PROCEDURE — 99291 CRITICAL CARE FIRST HOUR: CPT | Mod: 25 | Performed by: STUDENT IN AN ORGANIZED HEALTH CARE EDUCATION/TRAINING PROGRAM

## 2023-12-08 PROCEDURE — 2020000001 HC ICU ROOM DAILY

## 2023-12-08 PROCEDURE — 84132 ASSAY OF SERUM POTASSIUM: CPT | Performed by: INTERNAL MEDICINE

## 2023-12-08 PROCEDURE — 85025 COMPLETE CBC W/AUTO DIFF WBC: CPT | Performed by: STUDENT IN AN ORGANIZED HEALTH CARE EDUCATION/TRAINING PROGRAM

## 2023-12-08 PROCEDURE — 80069 RENAL FUNCTION PANEL: CPT | Mod: CCI | Performed by: STUDENT IN AN ORGANIZED HEALTH CARE EDUCATION/TRAINING PROGRAM

## 2023-12-08 PROCEDURE — 36600 WITHDRAWAL OF ARTERIAL BLOOD: CPT

## 2023-12-08 PROCEDURE — 96361 HYDRATE IV INFUSION ADD-ON: CPT

## 2023-12-08 PROCEDURE — 96374 THER/PROPH/DIAG INJ IV PUSH: CPT

## 2023-12-08 PROCEDURE — 2500000004 HC RX 250 GENERAL PHARMACY W/ HCPCS (ALT 636 FOR OP/ED): Performed by: STUDENT IN AN ORGANIZED HEALTH CARE EDUCATION/TRAINING PROGRAM

## 2023-12-08 PROCEDURE — 84100 ASSAY OF PHOSPHORUS: CPT | Performed by: INTERNAL MEDICINE

## 2023-12-08 PROCEDURE — 83735 ASSAY OF MAGNESIUM: CPT | Performed by: STUDENT IN AN ORGANIZED HEALTH CARE EDUCATION/TRAINING PROGRAM

## 2023-12-08 PROCEDURE — 2500000005 HC RX 250 GENERAL PHARMACY W/O HCPCS: Performed by: INTERNAL MEDICINE

## 2023-12-08 PROCEDURE — 84100 ASSAY OF PHOSPHORUS: CPT | Performed by: STUDENT IN AN ORGANIZED HEALTH CARE EDUCATION/TRAINING PROGRAM

## 2023-12-08 RX ORDER — DEXTROSE MONOHYDRATE 100 MG/ML
150 INJECTION, SOLUTION INTRAVENOUS CONTINUOUS
Status: DISCONTINUED | OUTPATIENT
Start: 2023-12-08 | End: 2023-12-10 | Stop reason: HOSPADM

## 2023-12-08 RX ORDER — DEXTROSE 50 % IN WATER (D50W) INTRAVENOUS SYRINGE
50
Status: DISCONTINUED | OUTPATIENT
Start: 2023-12-08 | End: 2023-12-10 | Stop reason: HOSPADM

## 2023-12-08 RX ORDER — SODIUM CHLORIDE 450 MG/100ML
250 INJECTION, SOLUTION INTRAVENOUS CONTINUOUS
Status: DISCONTINUED | OUTPATIENT
Start: 2023-12-08 | End: 2023-12-09

## 2023-12-08 RX ORDER — DEXTROSE MONOHYDRATE AND SODIUM CHLORIDE 5; .45 G/100ML; G/100ML
150 INJECTION, SOLUTION INTRAVENOUS CONTINUOUS
Status: DISCONTINUED | OUTPATIENT
Start: 2023-12-08 | End: 2023-12-09

## 2023-12-08 RX ORDER — SODIUM CHLORIDE 9 MG/ML
125 INJECTION, SOLUTION INTRAVENOUS CONTINUOUS
Status: DISCONTINUED | OUTPATIENT
Start: 2023-12-08 | End: 2023-12-08

## 2023-12-08 RX ORDER — DEXTROSE MONOHYDRATE 100 MG/ML
150 INJECTION, SOLUTION INTRAVENOUS CONTINUOUS
Status: DISCONTINUED | OUTPATIENT
Start: 2023-12-08 | End: 2023-12-08 | Stop reason: SDUPTHER

## 2023-12-08 RX ORDER — SODIUM BICARBONATE 1 MEQ/ML
50 SYRINGE (ML) INTRAVENOUS ONCE
Status: COMPLETED | OUTPATIENT
Start: 2023-12-08 | End: 2023-12-08

## 2023-12-08 RX ADMIN — SODIUM CHLORIDE, POTASSIUM CHLORIDE, SODIUM LACTATE AND CALCIUM CHLORIDE 1000 ML: 600; 310; 30; 20 INJECTION, SOLUTION INTRAVENOUS at 19:17

## 2023-12-08 RX ADMIN — SODIUM CHLORIDE 250 ML/HR: 4.5 INJECTION, SOLUTION INTRAVENOUS at 20:39

## 2023-12-08 RX ADMIN — SODIUM CHLORIDE, POTASSIUM CHLORIDE, SODIUM LACTATE AND CALCIUM CHLORIDE 1000 ML: 600; 310; 30; 20 INJECTION, SOLUTION INTRAVENOUS at 18:11

## 2023-12-08 RX ADMIN — DEXTROSE AND SODIUM CHLORIDE 150 ML/HR: 5; 450 INJECTION, SOLUTION INTRAVENOUS at 23:37

## 2023-12-08 RX ADMIN — INSULIN HUMAN 11 UNITS/HR: 1 INJECTION, SOLUTION INTRAVENOUS at 20:34

## 2023-12-08 RX ADMIN — SODIUM BICARBONATE 50 MEQ: 84 INJECTION INTRAVENOUS at 22:13

## 2023-12-08 SDOH — SOCIAL STABILITY: SOCIAL INSECURITY: ABUSE: ADULT

## 2023-12-08 SDOH — SOCIAL STABILITY: SOCIAL INSECURITY: HAVE YOU HAD THOUGHTS OF HARMING ANYONE ELSE?: NO

## 2023-12-08 SDOH — SOCIAL STABILITY: SOCIAL INSECURITY: POSSIBLE ABUSE REPORTED TO:: OTHER (COMMENT)

## 2023-12-08 SDOH — SOCIAL STABILITY: SOCIAL INSECURITY: WERE YOU ABLE TO COMPLETE ALL THE BEHAVIORAL HEALTH SCREENINGS?: YES

## 2023-12-08 SDOH — SOCIAL STABILITY: SOCIAL INSECURITY: HAS ANYONE EVER THREATENED TO HURT YOUR FAMILY OR YOUR PETS?: NO

## 2023-12-08 SDOH — SOCIAL STABILITY: SOCIAL INSECURITY: ARE THERE ANY APPARENT SIGNS OF INJURIES/BEHAVIORS THAT COULD BE RELATED TO ABUSE/NEGLECT?: NO

## 2023-12-08 SDOH — SOCIAL STABILITY: SOCIAL INSECURITY: DOES ANYONE TRY TO KEEP YOU FROM HAVING/CONTACTING OTHER FRIENDS OR DOING THINGS OUTSIDE YOUR HOME?: NO

## 2023-12-08 SDOH — SOCIAL STABILITY: SOCIAL INSECURITY: DO YOU FEEL UNSAFE GOING BACK TO THE PLACE WHERE YOU ARE LIVING?: NO

## 2023-12-08 SDOH — SOCIAL STABILITY: SOCIAL INSECURITY: DO YOU FEEL ANYONE HAS EXPLOITED OR TAKEN ADVANTAGE OF YOU FINANCIALLY OR OF YOUR PERSONAL PROPERTY?: NO

## 2023-12-08 SDOH — SOCIAL STABILITY: SOCIAL INSECURITY: ARE YOU OR HAVE YOU BEEN THREATENED OR ABUSED PHYSICALLY, EMOTIONALLY, OR SEXUALLY BY ANYONE?: NO

## 2023-12-08 ASSESSMENT — COGNITIVE AND FUNCTIONAL STATUS - GENERAL
PATIENT BASELINE BEDBOUND: NO
MOBILITY SCORE: 24
DAILY ACTIVITIY SCORE: 24

## 2023-12-08 ASSESSMENT — ENCOUNTER SYMPTOMS
DIAPHORESIS: 1
COUGH: 1
SHORTNESS OF BREATH: 1
APPETITE CHANGE: 1
ACTIVITY CHANGE: 1
POLYPHAGIA: 0
CONSTIPATION: 0
MYALGIAS: 1
ABDOMINAL PAIN: 0
FATIGUE: 1
DIARRHEA: 0
POLYDIPSIA: 0
VOMITING: 0
NAUSEA: 1
WEAKNESS: 1

## 2023-12-08 ASSESSMENT — LIFESTYLE VARIABLES
AUDIT-C TOTAL SCORE: 0
HOW OFTEN DO YOU HAVE 6 OR MORE DRINKS ON ONE OCCASION: NEVER
SKIP TO QUESTIONS 9-10: 1
HOW OFTEN DO YOU HAVE A DRINK CONTAINING ALCOHOL: NEVER
AUDIT-C TOTAL SCORE: 0
PRESCIPTION_ABUSE_PAST_12_MONTHS: NO
HOW MANY STANDARD DRINKS CONTAINING ALCOHOL DO YOU HAVE ON A TYPICAL DAY: PATIENT DOES NOT DRINK
SUBSTANCE_ABUSE_PAST_12_MONTHS: NO

## 2023-12-08 ASSESSMENT — ACTIVITIES OF DAILY LIVING (ADL)
JUDGMENT_ADEQUATE_SAFELY_COMPLETE_DAILY_ACTIVITIES: YES
GROOMING: INDEPENDENT
PATIENT'S MEMORY ADEQUATE TO SAFELY COMPLETE DAILY ACTIVITIES?: YES
DRESSING YOURSELF: INDEPENDENT
LACK_OF_TRANSPORTATION: NO
FEEDING YOURSELF: INDEPENDENT
HEARING - LEFT EAR: FUNCTIONAL
BATHING: INDEPENDENT
WALKS IN HOME: INDEPENDENT
TOILETING: INDEPENDENT
ADEQUATE_TO_COMPLETE_ADL: YES
HEARING - RIGHT EAR: FUNCTIONAL

## 2023-12-08 ASSESSMENT — PAIN - FUNCTIONAL ASSESSMENT
PAIN_FUNCTIONAL_ASSESSMENT: 0-10
PAIN_FUNCTIONAL_ASSESSMENT: 0-10

## 2023-12-08 ASSESSMENT — PATIENT HEALTH QUESTIONNAIRE - PHQ9
2. FEELING DOWN, DEPRESSED OR HOPELESS: NOT AT ALL
1. LITTLE INTEREST OR PLEASURE IN DOING THINGS: NOT AT ALL
SUM OF ALL RESPONSES TO PHQ9 QUESTIONS 1 & 2: 0

## 2023-12-08 ASSESSMENT — PAIN SCALES - GENERAL
PAINLEVEL_OUTOF10: 3
PAINLEVEL_OUTOF10: 0 - NO PAIN

## 2023-12-08 ASSESSMENT — COLUMBIA-SUICIDE SEVERITY RATING SCALE - C-SSRS
1. IN THE PAST MONTH, HAVE YOU WISHED YOU WERE DEAD OR WISHED YOU COULD GO TO SLEEP AND NOT WAKE UP?: NO
2. HAVE YOU ACTUALLY HAD ANY THOUGHTS OF KILLING YOURSELF?: NO
6. HAVE YOU EVER DONE ANYTHING, STARTED TO DO ANYTHING, OR PREPARED TO DO ANYTHING TO END YOUR LIFE?: NO

## 2023-12-08 NOTE — ED PROVIDER NOTES
HPI   Chief Complaint   Patient presents with    Hyperglycemia       HPI: Patient is a 33-year-old male, history of type 1 diabetes recently started on an insulin pump, he is presenting to the emergency department today for hyperglycemia and concern for DKA.  He last gave himself a bolus through his pump at about 130 this afternoon.  Says that over the past day or 2 he has been feeling weak primarily in the legs, he has been having an increased work of breathing, has been a little sick to his stomach, all signs that he has had when he previously was diagnosed with DKA.  He denies any chest pain or palpitations, he denies any abdominal pain, he denies any lightheadedness or dizziness, he endorses a little bit of nausea without any vomiting, he denies any polyuria or polydipsia      ROS: Complete 12 point review of systems performed, otherwise negative except as noted in the history of present illness    PMH: Reviewed, documented below in note. Pertinents in HPI  PSH: Reviewed and documented below in note. Pertinents in HPI  SH: Denies illicit drugs  Fam: Reviewed, noncontributory to patients current complaint  MEDS: Reviewed and documented below in note. Pertinents in HPI  ALLERGIES: Reviewed and documented below in note.        History provided by:  Patient   used: No                          [place] Coma Scale Score: 15                  Patient History   Past Medical History:   Diagnosis Date    Abdominal pain 04/21/2023    Anxiety and depression 08/08/2021    COVID-19 virus infection 07/11/2023    Diabetes mellitus (CMS/[facility])     Diabetic acetonemia (CMS/[facility]) 11/15/2023    DIABETIC KETO ACIDOSIS    DKA (diabetic ketoacidosis) (CMS/[facility]) 07/11/2023    DKA, type 1, not at goal (CMS/[facility]) 04/21/2023    DM2 (diabetes mellitus, type 2) (CMS/[facility]) 11/15/2023    DIABETES    Elevated blood sugar 11/15/2023    ELEVATED BLOOD SUGAR/ 375 LAST CHECK    Elevated blood-pressure reading, without diagnosis of  "hypertension 09/09/2020    Elevated blood pressure reading    Fatigue 07/11/2023    Generalized anxiety disorder 07/11/2023    Microalbuminuria due to type 1 diabetes mellitus (CMS/[facility]) 07/11/2023    Poorly controlled diabetes mellitus (CMS/[facility]) 07/11/2023    Type 1 diabetes mellitus (CMS/[facility]) 07/11/2023    Type 1 diabetes mellitus with hyperglycemia (CMS/[facility]) 02/25/2019    Vitamin B12 deficiency 07/11/2023    Vitamin D deficiency 07/11/2023    Vomiting 11/15/2023    VOMITING HEADACHE BACK ACHE     No past surgical history on file.  Family History   Problem Relation Name Age of Onset    No Known Problems Mother      No Known Problems Father      Hypertension Other      Coronary artery disease Other      Diabetes Other      Heart failure Other       Social History     Tobacco Use    Smoking status: Never    Smokeless tobacco: Never   Vaping Use    Vaping Use: Former   Substance Use Topics    Alcohol use: Never    Drug use: Never       Physical Exam   Visit Vitals  /80   Pulse 103   Temp 37.1 °C (98.7 °F) (Temporal)   Resp 18   Ht 1.778 m (5' 10\")   Wt 78.5 kg (173 lb)   SpO2 98%   BMI 24.82 kg/m²   Smoking Status Never   BSA 1.97 m²      Physical Exam  Vitals and nursing note reviewed.   Constitutional:       Appearance: Normal appearance.   HENT:      Head: Normocephalic and atraumatic.      Mouth/Throat:      Mouth: Mucous membranes are dry.   Neck:      Vascular: No carotid bruit.   Cardiovascular:      Rate and Rhythm: Regular rhythm. Tachycardia present.      Pulses: Normal pulses.      Heart sounds: Normal heart sounds.   Pulmonary:      Effort: Pulmonary effort is normal.      Breath sounds: Normal breath sounds.   Abdominal:      General: There is no distension.      Palpations: Abdomen is soft.      Tenderness: There is no abdominal tenderness. There is no guarding or rebound.   Musculoskeletal:         General: No tenderness, deformity or signs of injury.      Cervical back: Normal range of motion. No " rigidity.   Skin:     General: Skin is warm and dry.      Capillary Refill: Capillary refill takes less than 2 seconds.   Neurological:      General: No focal deficit present.      Mental Status: He is alert and oriented to person, place, and time.      Sensory: No sensory deficit.      Motor: No weakness.   Psychiatric:         Mood and Affect: Mood normal.         Behavior: Behavior normal.         No orders to display       Labs Reviewed   POCT GLUCOSE - Abnormal       Result Value    POCT Glucose 550 (*)    URINALYSIS WITH REFLEX MICROSCOPIC   CBC WITH AUTO DIFFERENTIAL   COMPREHENSIVE METABOLIC PANEL   MAGNESIUM   PHOSPHORUS   BLOOD GAS VENOUS FULL PANEL   BETA HYDROXYBUTYRATE         ED Course & MDM   ED Course as of 12/08/23 1959   Fri Dec 08, 2023   1830 Blood Gas Venous Full Panel(!!)  Acidosis with hyperglycemia, DKA concern. Potassium on gas wnl, can start insulin with fluid resuscitation [NS]   1919 Comprehensive metabolic panel(!!)  Hyperglycemia with low bicarb and elevated AG confirming DKA [NS]   1919 Beta-Hydroxybutyrate(!): 9.04 [NS]      ED Course User Index  [NS] Americo Godinez MD         Diagnoses as of 12/08/23 1959   Diabetic ketoacidosis without coma associated with type 1 diabetes mellitus (CMS/HCC)           Medical Decision Making  All mentioned lab results, ECGs, and imaging were independently reviewed by myself  - Patient evaluated. Patient is presenting to the emergency department today for concern for diabetic ketoacidosis, he is having weakness with increased work of breathing and a little bit of nausea.  Given his history I am concerned about this as well, could also be a viral infection, electrolyte derangements, or potential anemia.  IV was established and was started on IV fluids and basic labs were obtained.  DKA confirmed with an elevated beta hydroxybutyrate, hyperglycemia, acidosis with a decrease in bicarb and elevated anion gap.  Insulin drip was initiated.   "Patient was admitted to the ICU, boarding in the emergency department awaiting a bed.  IMS accepted.    - Monitored for any changes in stability or symptomatology. Patient remained stable.   - Counseled regarding labs, imaging, diagnosis, and plan. Patient was agreeable. All questions were answered. The patient was receptive and agreeable to the plan of care.     *Disclaimer: This note was dictated by speech recognition. Minor errors in transcription may be present. Please call with questions.    Av Godinez MD             Your medication list        CONTINUE taking these medications        Instructions Last Dose Given Next Dose Due   Dexcom G6  misc  Generic drug: Dexcom G4 platinum            Dexcom G6 Sensor device  Generic drug: blood-glucose sensor           Dexcom G6 Transmitter device  Generic drug: Dexcom G4 platinum transmitter           lancets misc           pen needle, diabetic 32 gauge x 5/32\" needle                  ASK your doctor about these medications        Instructions Last Dose Given Next Dose Due   acetaminophen 325 mg tablet  Commonly known as: Tylenol           blood sugar diagnostic strip           HumaLOG KwikPen Insulin 100 unit/mL injection  Generic drug: insulin lispro      follow insulin correction scale beginning with 6 units three times daily with meals       Lantus Solostar U-100 Insulin 100 unit/mL (3 mL) pen  Generic drug: insulin glargine      Inject 35 Units under the skin once daily at bedtime.                Procedure  Critical Care    Performed by: Americo Godinez MD  Authorized by: Americo Godinez MD    Critical care provider statement:     Critical care time (minutes):  32    Critical care time was exclusive of:  Separately billable procedures and treating other patients    Critical care was necessary to treat or prevent imminent or life-threatening deterioration of the following conditions: DKA.    Critical care was time spent " personally by me on the following activities:  Development of treatment plan with patient or surrogate, evaluation of patient's response to treatment, examination of patient, obtaining history from patient or surrogate, ordering and performing treatments and interventions, ordering and review of laboratory studies, re-evaluation of patient's condition and review of old charts       *This report was transcribed using voice recognition software.  Every effort was made to ensure accuracy; however, inadvertent computerized transcription errors may be present.*  Americo Godinez MD  12/08/23         Americo Godinez MD  12/08/23 2000

## 2023-12-09 LAB
ANION GAP SERPL CALC-SCNC: 14 MMOL/L (ref 10–20)
ANION GAP SERPL CALC-SCNC: 15 MMOL/L (ref 10–20)
ANION GAP SERPL CALC-SCNC: 21 MMOL/L (ref 10–20)
BASE EXCESS BLDA CALC-SCNC: -11.8 MMOL/L (ref -2–3)
BASE EXCESS BLDA CALC-SCNC: -17.1 MMOL/L (ref -2–3)
BODY TEMPERATURE: 37 DEGREES CELSIUS
BODY TEMPERATURE: 37 DEGREES CELSIUS
BUN SERPL-MCNC: 19 MG/DL (ref 6–23)
BUN SERPL-MCNC: 19 MG/DL (ref 6–23)
BUN SERPL-MCNC: 20 MG/DL (ref 6–23)
CALCIUM SERPL-MCNC: 7.2 MG/DL (ref 8.6–10.3)
CALCIUM SERPL-MCNC: 7.3 MG/DL (ref 8.6–10.3)
CALCIUM SERPL-MCNC: 7.5 MG/DL (ref 8.6–10.3)
CHLORIDE SERPL-SCNC: 112 MMOL/L (ref 98–107)
CHLORIDE SERPL-SCNC: 114 MMOL/L (ref 98–107)
CHLORIDE SERPL-SCNC: 115 MMOL/L (ref 98–107)
CO2 SERPL-SCNC: 14 MMOL/L (ref 21–32)
CO2 SERPL-SCNC: 16 MMOL/L (ref 21–32)
CO2 SERPL-SCNC: 8 MMOL/L (ref 21–32)
CREAT SERPL-MCNC: 0.96 MG/DL (ref 0.5–1.3)
CREAT SERPL-MCNC: 0.96 MG/DL (ref 0.5–1.3)
CREAT SERPL-MCNC: 1 MG/DL (ref 0.5–1.3)
EST. AVERAGE GLUCOSE BLD GHB EST-MCNC: 398 MG/DL
GFR SERPL CREATININE-BSD FRML MDRD: >90 ML/MIN/1.73M*2
GLUCOSE BLD MANUAL STRIP-MCNC: 143 MG/DL (ref 74–99)
GLUCOSE BLD MANUAL STRIP-MCNC: 144 MG/DL (ref 74–99)
GLUCOSE BLD MANUAL STRIP-MCNC: 145 MG/DL (ref 74–99)
GLUCOSE BLD MANUAL STRIP-MCNC: 145 MG/DL (ref 74–99)
GLUCOSE BLD MANUAL STRIP-MCNC: 151 MG/DL (ref 74–99)
GLUCOSE BLD MANUAL STRIP-MCNC: 160 MG/DL (ref 74–99)
GLUCOSE BLD MANUAL STRIP-MCNC: 180 MG/DL (ref 74–99)
GLUCOSE BLD MANUAL STRIP-MCNC: 185 MG/DL (ref 74–99)
GLUCOSE BLD MANUAL STRIP-MCNC: 196 MG/DL (ref 74–99)
GLUCOSE BLD MANUAL STRIP-MCNC: 204 MG/DL (ref 74–99)
GLUCOSE BLD MANUAL STRIP-MCNC: 211 MG/DL (ref 74–99)
GLUCOSE BLD MANUAL STRIP-MCNC: 352 MG/DL (ref 74–99)
GLUCOSE SERPL-MCNC: 131 MG/DL (ref 74–99)
GLUCOSE SERPL-MCNC: 156 MG/DL (ref 74–99)
GLUCOSE SERPL-MCNC: 211 MG/DL (ref 74–99)
HBA1C MFR BLD: 15.5 %
HCO3 BLDA-SCNC: 13.8 MMOL/L (ref 22–26)
HCO3 BLDA-SCNC: 8.8 MMOL/L (ref 22–26)
INHALED O2 CONCENTRATION: 21 %
INHALED O2 CONCENTRATION: 21 %
MAGNESIUM SERPL-MCNC: 1.76 MG/DL (ref 1.6–2.4)
MAGNESIUM SERPL-MCNC: 1.84 MG/DL (ref 1.6–2.4)
MAGNESIUM SERPL-MCNC: 1.91 MG/DL (ref 1.6–2.4)
OXYHGB MFR BLDA: 96.8 % (ref 94–98)
OXYHGB MFR BLDA: 97.6 % (ref 94–98)
PCO2 BLDA: 22 MM HG (ref 38–42)
PCO2 BLDA: 30 MM HG (ref 38–42)
PH BLDA: 7.21 PH (ref 7.38–7.42)
PH BLDA: 7.27 PH (ref 7.38–7.42)
PHOSPHATE SERPL-MCNC: 1.1 MG/DL (ref 2.5–4.9)
PHOSPHATE SERPL-MCNC: 2 MG/DL (ref 2.5–4.9)
PHOSPHATE SERPL-MCNC: 3.3 MG/DL (ref 2.5–4.9)
PO2 BLDA: 113 MM HG (ref 85–95)
PO2 BLDA: 144 MM HG (ref 85–95)
POTASSIUM SERPL-SCNC: 3 MMOL/L (ref 3.5–5.3)
POTASSIUM SERPL-SCNC: 3.3 MMOL/L (ref 3.5–5.3)
POTASSIUM SERPL-SCNC: 3.7 MMOL/L (ref 3.5–5.3)
SAO2 % BLDA: 99 % (ref 94–100)
SAO2 % BLDA: 99 % (ref 94–100)
SODIUM SERPL-SCNC: 138 MMOL/L (ref 136–145)
SODIUM SERPL-SCNC: 140 MMOL/L (ref 136–145)
SODIUM SERPL-SCNC: 141 MMOL/L (ref 136–145)

## 2023-12-09 PROCEDURE — 2500000004 HC RX 250 GENERAL PHARMACY W/ HCPCS (ALT 636 FOR OP/ED): Performed by: INTERNAL MEDICINE

## 2023-12-09 PROCEDURE — 1200000002 HC GENERAL ROOM WITH TELEMETRY DAILY

## 2023-12-09 PROCEDURE — 82947 ASSAY GLUCOSE BLOOD QUANT: CPT

## 2023-12-09 PROCEDURE — 84100 ASSAY OF PHOSPHORUS: CPT | Performed by: INTERNAL MEDICINE

## 2023-12-09 PROCEDURE — 83735 ASSAY OF MAGNESIUM: CPT | Performed by: INTERNAL MEDICINE

## 2023-12-09 PROCEDURE — 99221 1ST HOSP IP/OBS SF/LOW 40: CPT | Performed by: INTERNAL MEDICINE

## 2023-12-09 PROCEDURE — 82805 BLOOD GASES W/O2 SATURATION: CPT | Performed by: INTERNAL MEDICINE

## 2023-12-09 PROCEDURE — 99233 SBSQ HOSP IP/OBS HIGH 50: CPT | Performed by: FAMILY MEDICINE

## 2023-12-09 PROCEDURE — 36415 COLL VENOUS BLD VENIPUNCTURE: CPT | Performed by: INTERNAL MEDICINE

## 2023-12-09 PROCEDURE — 83036 HEMOGLOBIN GLYCOSYLATED A1C: CPT | Performed by: INTERNAL MEDICINE

## 2023-12-09 PROCEDURE — 2500000005 HC RX 250 GENERAL PHARMACY W/O HCPCS: Performed by: INTERNAL MEDICINE

## 2023-12-09 PROCEDURE — 82374 ASSAY BLOOD CARBON DIOXIDE: CPT | Performed by: INTERNAL MEDICINE

## 2023-12-09 RX ORDER — SODIUM BICARBONATE 1 MEQ/ML
50 SYRINGE (ML) INTRAVENOUS ONCE
Status: COMPLETED | OUTPATIENT
Start: 2023-12-09 | End: 2023-12-09

## 2023-12-09 RX ORDER — POTASSIUM CHLORIDE 14.9 MG/ML
20 INJECTION INTRAVENOUS
Status: COMPLETED | OUTPATIENT
Start: 2023-12-09 | End: 2023-12-09

## 2023-12-09 RX ORDER — DEXTROSE MONOHYDRATE 100 MG/ML
0.3 INJECTION, SOLUTION INTRAVENOUS ONCE AS NEEDED
Status: DISCONTINUED | OUTPATIENT
Start: 2023-12-09 | End: 2023-12-10 | Stop reason: HOSPADM

## 2023-12-09 RX ORDER — INSULIN LISPRO 100 [IU]/ML
3 INJECTION, SOLUTION INTRAVENOUS; SUBCUTANEOUS AS NEEDED
Status: DISCONTINUED | OUTPATIENT
Start: 2023-12-09 | End: 2023-12-10 | Stop reason: HOSPADM

## 2023-12-09 RX ADMIN — INSULIN HUMAN 13.8 UNITS/HR: 1 INJECTION, SOLUTION INTRAVENOUS at 02:47

## 2023-12-09 RX ADMIN — POTASSIUM CHLORIDE 20 MEQ: 14.9 INJECTION, SOLUTION INTRAVENOUS at 06:16

## 2023-12-09 RX ADMIN — POTASSIUM PHOSPHATE, MONOBASIC AND POTASSIUM PHOSPHATE, DIBASIC 30 MMOL: 224; 236 INJECTION, SOLUTION, CONCENTRATE INTRAVENOUS at 01:23

## 2023-12-09 RX ADMIN — POTASSIUM CHLORIDE 20 MEQ: 14.9 INJECTION, SOLUTION INTRAVENOUS at 00:49

## 2023-12-09 RX ADMIN — SODIUM BICARBONATE 50 MEQ: 84 INJECTION INTRAVENOUS at 00:49

## 2023-12-09 RX ADMIN — DEXTROSE AND SODIUM CHLORIDE 150 ML/HR: 5; 450 INJECTION, SOLUTION INTRAVENOUS at 06:16

## 2023-12-09 RX ADMIN — POTASSIUM CHLORIDE 20 MEQ: 14.9 INJECTION, SOLUTION INTRAVENOUS at 02:45

## 2023-12-09 RX ADMIN — SODIUM CHLORIDE, SODIUM LACTATE, POTASSIUM CHLORIDE, AND CALCIUM CHLORIDE 1000 ML: 600; 310; 30; 20 INJECTION, SOLUTION INTRAVENOUS at 10:30

## 2023-12-09 ASSESSMENT — COGNITIVE AND FUNCTIONAL STATUS - GENERAL
MOBILITY SCORE: 24
DAILY ACTIVITIY SCORE: 24
MOBILITY SCORE: 24
DAILY ACTIVITIY SCORE: 24
MOBILITY SCORE: 24
DAILY ACTIVITIY SCORE: 24

## 2023-12-09 ASSESSMENT — ENCOUNTER SYMPTOMS
APPETITE CHANGE: 0
NAUSEA: 0
SHORTNESS OF BREATH: 0
WEAKNESS: 1
POLYPHAGIA: 0
ABDOMINAL PAIN: 0
POLYDIPSIA: 0
CHILLS: 0
FEVER: 0
SPEECH DIFFICULTY: 0
VOMITING: 1

## 2023-12-09 ASSESSMENT — PAIN SCALES - GENERAL
PAINLEVEL_OUTOF10: 0 - NO PAIN

## 2023-12-09 ASSESSMENT — PAIN - FUNCTIONAL ASSESSMENT
PAIN_FUNCTIONAL_ASSESSMENT: 0-10

## 2023-12-09 NOTE — CARE PLAN
Problem: Safety  Goal: Patient will be injury free during hospitalization  Outcome: Progressing  Goal: I will remain free of falls  Outcome: Progressing     Problem: Psychosocial Needs  Goal: Demonstrates ability to cope with hospitalization/illness  Outcome: Progressing  Goal: Collaborate with me, my family, and caregiver to identify my specific goals  Outcome: Progressing  Flowsheets (Taken 12/8/2023 5701)  Cultural Requests During Hospitalization: Nope  Spiritual Requests During Hospitalization: Nope     Problem: Recent hospitalization or complication while living with DM  Goal: Patient will effectively self-manage their DM disease process  Outcome: Progressing     Problem: Lack of Diabetes disease process knowledge  Goal: Increase knowledge/understanding of diabetes and how to reduce risk of complications  Outcome: Progressing     Problem: Lack of glucose monitoring and target numbers for before and after meals knowledge  Goal: Increase knowledge/understanding of monitoring devices and interpret data to assist with lifestyle change  Outcome: Progressing     Problem: Lack of knowledge on diet for diabetes  Goal: Discover best plan for balanced nutrition to manage diabetes  Outcome: Progressing     Problem: Address barriers to lifestyle change  Goal: Find workable solutions to meet health goals  Outcome: Progressing     Problem: Lack of knowledge on benefits of activity for meeting blood glucose targets and health goals  Goal: Discover best plan for being active and address any barriers  Outcome: Progressing     Problem: Lack of knowldge regarding diabetes medications  Goal: Increase knowledge via education  Outcome: Progressing     Problem: Lack of knowledge on where to find additional support for diabetes  Goal: Increase knowledge of where support can be found to best address patient's need  Outcome: Progressing

## 2023-12-09 NOTE — CONSULTS
Reason For Consult  DKA    History Of Present Illness  Ed Sanon is a 33 y.o. male with h/o type 1 DM with numerous admissions for DKA, recently started on insulin pump presented to ED yesterday with high blood sugars on self check, generalized weakness and shortness for breath which had been worsening for the previous 2 days.  He reported some nausea but no vomiting.  Denies CP, f/c, diarrhea, sick contacts.  An endocrinologist at [facility] started him on an insulin pump about a week ago but he reports he does not know how to use it.  In the ED he was given 2L of LR boluses and started on insulin gtt.      Past Medical History  He has a past medical history of Abdominal pain (04/21/2023), Anxiety and depression (08/08/2021), COVID-19 virus infection (07/11/2023), Diabetes mellitus (CMS/[facility]), Diabetic acetonemia (CMS/[facility]) (11/15/2023), DKA (diabetic ketoacidosis) (CMS/[facility]) (07/11/2023), DKA, type 1, not at goal (CMS/[facility]) (04/21/2023), DM2 (diabetes mellitus, type 2) (CMS/[facility]) (11/15/2023), Elevated blood sugar (11/15/2023), Elevated blood-pressure reading, without diagnosis of hypertension (09/09/2020), Fatigue (07/11/2023), Generalized anxiety disorder (07/11/2023), Microalbuminuria due to type 1 diabetes mellitus (CMS/[facility]) (07/11/2023), Poorly controlled diabetes mellitus (CMS/[facility]) (07/11/2023), Type 1 diabetes mellitus (CMS/[facility]) (07/11/2023), Type 1 diabetes mellitus with hyperglycemia (CMS/[facility]) (02/25/2019), Vitamin B12 deficiency (07/11/2023), Vitamin D deficiency (07/11/2023), and Vomiting (11/15/2023).    Surgical History  He has no past surgical history on file.     Social History  He reports that he has never smoked. He has never used smokeless tobacco. He reports that he does not drink alcohol and does not use drugs.    Family History  Family History   Problem Relation Name Age of Onset    No Known Problems Mother      No Known Problems Father      Hypertension Other      Coronary artery disease Other    "   Diabetes Other      Heart failure Other          Allergies  Sitagliptin phos-metformin    Review of Systems  Reviewed in HPI     Physical Exam  General Appearance:  Alert, cooperative, no distress, appears stated age  Head:  Normocephalic, without obvious abnormality, atraumatic  Eyes:  PERRL, conjunctiva/corneas clear, EOM's intact  Nose:  Nares normal, septum midline, mucosa normal, no drainage or sinus tenderness  Mouth:  Lips, mucosa, and tongue normal  Neck:  Supple, symmetrical, trachea midline, no adenopathy; no JVD  Lungs:    Clear to auscultation bilaterally, respirations unlabored, good air movement  Chest wall:  No tenderness or deformity, chest expands symmetrically  Heart:  Regular rate and rhythm, S1 and S2 normal, no murmur, rub or gallop  Abdomen:    Soft, non-tender, non-distended, bs active, no masses, no organomegaly  Extremities:  Extremities normal, atraumatic, no cyanosis or edema  Pulses:  2+ and symmetric all extremities  Skin:  Warm and dry  Lymph nodes:  Cervical, supraclavicular, and axillary nodes normal  Neurologic:  CNII-XII intact. Normal strength, sensation and reflexes      throughout       Last Recorded Vitals  Blood pressure 120/80, pulse 98, temperature 36.9 °C (98.4 °F), temperature source Temporal, resp. rate 17, height 1.778 m (5' 10\"), weight 73.4 kg (161 lb 13.1 oz), SpO2 98 %.    Relevant Results  Reviewed labs and imaging       Assessment/Plan     DKA- poor compliance with insulin pump, needing more education    -endocrine consulted  -AG has closed on insulin gtt which has titrated down to 1u/hr, transitioning back to insulin pump per Dr. Packer' instructions.  -start diabetic carb counting diet  -will give additional 1L LR bolus    -if he tolerates diet and does well with transition to pump then can transfer out of ICU this afternoon      Kenroy Louis MD    "

## 2023-12-09 NOTE — CARE PLAN
"  Problem: Safety  Goal: Patient will be injury free during hospitalization  Outcome: Progressing  Goal: I will remain free of falls  Outcome: Progressing     Problem: Daily Care  Goal: Daily care needs are met  Outcome: Progressing     Problem: Psychosocial Needs  Goal: Demonstrates ability to cope with hospitalization/illness  Outcome: Progressing  Goal: Collaborate with me, my family, and caregiver to identify my specific goals  Outcome: Progressing     Problem: Recent hospitalization or complication while living with DM  Goal: Patient will effectively self-manage their DM disease process  Outcome: Progressing     Problem: Lack of Diabetes disease process knowledge  Goal: Increase knowledge/understanding of diabetes and how to reduce risk of complications  Outcome: Progressing     Problem: Lack of glucose monitoring and target numbers for before and after meals knowledge  Goal: Increase knowledge/understanding of monitoring devices and interpret data to assist with lifestyle change  Outcome: Progressing     Problem: Lack of knowledge on diet for diabetes  Goal: Discover best plan for balanced nutrition to manage diabetes  Outcome: Progressing     Problem: Address barriers to lifestyle change  Goal: Find workable solutions to meet health goals  Outcome: Progressing     Problem: Lack of knowledge on benefits of activity for meeting blood glucose targets and health goals  Goal: Discover best plan for being active and address any barriers  Outcome: Progressing     Problem: Lack of knowldge regarding diabetes medications  Goal: Increase knowledge via education  Outcome: Progressing     Problem: Lack of knowledge on where to find additional support for diabetes  Goal: Increase knowledge of where support can be found to best address patient's need  Outcome: Progressing   The patient's goals for the shift include \" feel better\"    The clinical goals for the shift include pt maintain BG of 120-180 throughout shift    Over " the shift, the patient did not make progress toward the following goals.

## 2023-12-09 NOTE — PROGRESS NOTES
Ed Sanon is a 33 y.o. male on day 1 of admission presenting with Diabetic ketoacidosis without coma associated with type 1 diabetes mellitus (CMS/[facility]).      Subjective   33 y.o.  male with a past medical history significant for IDDM 1 with very poor insulin compliance, anxiety and depression.  He has had numerous admissions for diabetic ketoacidosis in settings of infection and noncompliance.  He presents back to the ED with concerns for diabetic ketoacidosis.  He states that this feels similar to prior episodes of DKA.  He states that he did recently have an insulin pump placed about 1 week ago per the recommendation of his endocrinologist at [facility] but he does not know how to use that he states and his mother who is at the bedside is unclear.  She states that he does not think that he retain much of the information when they were originally educating him about the pump.  The patient's mother stated that he did try to bolus 8 units prior to arrival but was unsure if this occurred or if it would affect his care going forward.  He complains of generalized weakness, progressively worsening hyperglycemia over the past few days he has had progressively worsening generalized weakness, occasional lightheadedness, dizziness, nausea and feeling sick to his stomach which she feels like is the beginning of his usual DKA episodes.  He denies any polyuria or polydipsia during this episode.  He denies any recent sick contacts, chemical/environmental exposures, changes in dietary habits or any recent traumatic events/falls other than noted above.     ED course:  1.  Vital signs on presentation: Temperature of 37.1 °C, heart rate of 103, respirations of 18, blood pressure 128/80, pulse ox of 98% on room air  2.  CBC was unremarkable for any leukocytosis/anemia or other derangements at this time.  3.  Beta hydroxybutyrate of 9.04  4.  Glucose of 498  5.  Sodium of 133  6.  Bicarb of 9 with a repeat of 3  10.  Anion  gap of 28 with a repeat of 30  11.  Calcium of 8.3 with a repeat of 7.7  12.  VBG on presentation: pH is 7.11, pCO2 of 27, pO2 of 37, SO2 of 63, [place] bicarb of 8.6  13.  ABG about 3 hours after arrival: pH is 7.09, pCO2 10, pO2 of 123, SO2 99, catheter bicarb of 3  14.  UA: 1+ hyaline casts, 2+ ketones, 3+ glucose, 1+ protein  15.  Patient was initiated on IV fluids as well as insulin gtt. in the ED and admitted for DKA.    12/9:              Today patient seen in the ICU.  He is sleeping but awakens easily.  Voices no specific complaints.  He has closed his anion gap and being transition back to his insulin pump.  However there serious issues regarding this as he clearly does not understand the use of this at this time.  Endocrinology will see the patient and await recommendations regarding discharge.  Otherwise denies interval new symptoms or complaints including chest pain palpitations pleuritic type pain unusual shortness of breath cough sputum nausea URI symptoms diarrhea dysuria flank pain fever or chills.     Review of Systems   Constitutional:  Positive for activity change, appetite change, diaphoresis and fatigue.   Respiratory: Negative for cough and shortness of breath.    Gastrointestinal: Negative for nausea. Negative for abdominal pain, constipation, diarrhea and vomiting.   Endocrine: Negative for polydipsia, polyphagia and polyuria.   Musculoskeletal:  Positive for myalgias.   Neurological:  Positive for weakness.   All other systems reviewed and are negative.      Objective     Last Recorded Vitals  /76   Pulse 89   Temp 36.5 °C (97.7 °F) (Temporal)   Resp 16   Wt 73.4 kg (161 lb 13.1 oz)   SpO2 96%   Intake/Output last 3 Shifts:    Intake/Output Summary (Last 24 hours) at 12/9/2023 1747  Last data filed at 12/9/2023 1654  Gross per 24 hour   Intake 2867.71 ml   Output 215 ml   Net 2652.71 ml       Admission Weight  Weight: 78.5 kg (173 lb) (12/08/23 1711)    Daily Weight  12/09/23 :  73.4 kg (161 lb 13.1 oz)    Image Results  CT abdomen pelvis w IV contrast  Narrative: STUDY:  CT Abdomen and Pelvis with IV Contrast; 10/8/2023 at 5:13 PM.  INDICATION:  Right lower quadrant pain, vomiting, hyperglycemia.  COMPARISON:  US abdomen 6/11/2021; CT AP 6/1/2021.  ACCESSION NUMBER(S):  [id number]  ORDERING CLINICIAN:  DM MCGOWAN  TECHNIQUE:  CT of the abdomen and pelvis was performed.  Contiguous axial images  were obtained at 3 mm slice thickness through the abdomen and pelvis.   Coronal and sagittal reconstructions at 3 mm slice thickness were  performed.  Omnipaque 350 75 mL was administered intravenously.    FINDINGS:  LOWER CHEST:  No cardiomegaly.  No pericardial effusion.  Lung bases are clear.     ABDOMEN:     LIVER:  Hepatic steatosis is present.  No suspicious hepatic lesion is  demonstrated.  No biliary dilatation is demonstrated.  No  hepatomegaly.  Smooth surface contour.      BILE DUCTS:  No intrahepatic or extrahepatic biliary ductal dilatation.     GALLBLADDER:  The gallbladder appears within normal limits.    STOMACH:  No abnormalities identified.     PANCREAS:  No masses or ductal dilatation.     SPLEEN:  No splenomegaly or focal splenic lesion.     ADRENAL GLANDS:  No thickening or nodules.     KIDNEYS AND URETERS:  Kidneys are normal in size and location.  Simple appearing cyst RIGHT  kidney is present measuring 1 cm maximally.  No renal or ureteral  calculi.     PELVIS:     BLADDER:  Urinary bladder is distended.     REPRODUCTIVE ORGANS:  No abnormalities identified.     BOWEL:  Moderate amount of stool throughout the colon is present.   Constipation is not excluded.  There is no evidence of acute  appendicitis.       VESSELS:  No abnormalities identified.  Abdominal aorta is normal in caliber.      PERITONEUM/RETROPERITONEUM/LYMPH NODES:  Prominent phlebolith in the RIGHT hemipelvis noted.  No free fluid.   No pneumoperitoneum.  No lymphadenopathy.     ABDOMINAL WALL:  No  abnormalities identified.  SOFT TISSUES:   No abnormalities identified.     BONES:  No acute fracture or aggressive osseous lesion.  Impression: 1.  There is no evidence of acute appendicitis.  2.  Moderate amount of stool throughout the colon is present.   Constipation is not excluded.  3.  Hepatic steatosis is present.  No suspicious hepatic lesion is  demonstrated.  No biliary dilatation is demonstrated.  Signed by Cuate Membreno II, MD  CT head wo IV contrast  Narrative: Interpreted By:  Rojelio Gonzales,   STUDY:  CT HEAD WO IV CONTRAST; 10/8/2023 5:11 pm      INDICATION:  Headache, vomiting.      COMPARISON:  None.      ACCESSION NUMBER(S):  [id number]      ORDERING CLINICIAN:  DM MCGOWAN      TECHNIQUE:  Contiguous axial CT images were obtained through the head at 5 mm  slice thickness without contrast administration.      FINDINGS:  INTRACRANIAL:  The ventricles, sulci and basal cisterns are within normal limits for  size and configuration. The grey-white differentiation is intact.  There is no mass effect or midline shift. There is no extraaxial  fluid collection. There is no intracranial hemorrhage.  The calvarium  is unremarkable.      EXTRACRANIAL:  Visualized paranasal sinuses and mastoids are clear.      Impression: No evidence of acute cortical infarct or intracranial hemorrhage.      MACRO:  None          Signed by: Rojelio Gonzales 10/8/2023 5:35 PM  Dictation workstation:   [id number]  XR chest 1 view  Narrative: STUDY:  Chest Radiograph; 10-8-2023 at 3:06 PM  INDICATION:  Shortness of breath.  COMPARISON:  11-4-2022 XR CHEST  ACCESSION NUMBER(S):  [id number]  ORDERING CLINICIAN:  DM MCGOWAN  TECHNIQUE:  Frontal chest was obtained at 15:05 hours.  FINDINGS:  CARDIOMEDIASTINAL SILHOUETTE:  Cardiomediastinal silhouette is normal in size and configuration.     LUNGS:  Lungs are clear.     ABDOMEN:  No remarkable upper abdominal findings.     BONES:  No acute osseous changes.  Impression: No acute  pulmonary abnormality.  Signed by Ed Welch MD      Physical Exam  Constitutional:       General: He is not in acute distress.     Appearance: Normal appearance.  Well-developed well-nourished  male  HENT:      Head: Normocephalic and atraumatic.      Mouth/Throat:      Mouth: Mucous membranes are moist.      Pharynx: Oropharynx is clear.   Eyes:      Extraocular Movements: Extraocular movements intact.      Conjunctiva/sclera: Conjunctivae normal.      Pupils: Pupils are equal, round, and reactive to light.   Neck:      Vascular: No carotid bruit.   Cardiovascular:      Rate and Rhythm: Normal rate and regular rhythm.      Pulses: Normal pulses.      Heart sounds: No murmur heard.  Pulmonary:      Effort: Pulmonary effort is normal. No respiratory distress.      Breath sounds: Normal breath sounds. No wheezing, rhonchi or rales.   Chest:      Chest wall: No tenderness.   Abdominal:      General: Abdomen is flat. Bowel sounds are normal. There is no distension.      Palpations: Abdomen is soft. There is no mass.      Tenderness: There is no abdominal tenderness. There is no guarding or rebound.   Musculoskeletal:         General: No swelling or signs of injury. Normal range of motion.      Cervical back: Normal range of motion and neck supple. No rigidity or tenderness.      Right lower leg: No edema.      Left lower leg: No edema.   Skin:     General: Skin is warm.      Findings: No bruising, erythema or lesion.        Neurological:      General: No focal deficit present.      Mental Status: He is alert and oriented to person, place, and time. Mental status is at baseline.      Cranial Nerves: No cranial nerve deficit.      Sensory: No sensory deficit.      Motor: No weakness.      Gait: Gait normal.   Psychiatric:         Mood and Affect: Mood normal.         Behavior: Behavior normal.         Thought Content: Thought content normal.         Judgment: Judgment normal.         Relevant  Results               Assessment/Plan                  Principal Problem:    Diabetic ketoacidosis without coma associated with type 1 diabetes mellitus (CMS/[facility])    1.  DKA, IDDM  -Insulin GTT per protocol  -Continued on IV fluids per DKA protocol  -Every 4 hours RFP/Mg/ABG  -Endocrinology consult appreciated  -Patient states that he had an insulin pump placed about 1 week ago but does not know how to use it properly.  And he has only been bolusing occasional amounts as he sees fit  -Anion gap on presentation of around 30  -Beta hydroxybutyrate of 9.04 on presentation  12/9: Anion gap closed and transitioning back to insulin pump but he will require significant education and follow-up with endocrinology regarding this.     2.  High anion gap metabolic acidosis  -VBG on presentation: pH is 7.11, pCO2 of 27, pO2 of 37, SO2 of 63, [place] bicarb of 8.6  -ABG about 3 hours after arrival: pH is 7.09, pCO2 10, pO2 of 123, SO2 99, catheter bicarb of 3  -If progressively worsening may require a bicarb drip but will reassess around midnight electrolytes and fluid status.  12/9: Resolved     3.  Rash  -Unclear if this is due to him working outside in the cold & without any sunscreen  -He is quite flushed in his face as well as upper extremities and it is tapering down to his mid abdomen as well as forearms bilaterally  -Will continue to reassess and consider Benadryl/steroids but will need to be careful in setting of DKA, he has no complaints of itchiness or burning sensation.     4.  Anxiety/depression  -Continued on outpatient follow-up              Cuate Wynn MD

## 2023-12-09 NOTE — CONSULTS
Inpatient consult to Endocrinology  Consult performed by: Lori Packer MD  Consult ordered by: Arash Carrillo DO  Reason for consult: DKA          Reason For Consult  DKA    History Of Present Illness  Ed Sanon is a 33 y.o. male presenting with generalized weakness, and progressive hyperglycemia over the past few days.  He also had nausea and vomiting.  He was found to be hemodynamically stable.  Initial lab data revealed beta hydroxybutyrate of 9.04, glucose of 498mg/dL, bicarbonate of 3, anion gap of 30 and a pH of 7.09.  He was admitted to the ICU and started on an insulin infusion.  He is currently on 1 unit/h.    The patient was started on the Medtronic Minimed 770G pump 1.5 weeks ago.   He states that he attended 1 class.  He has not changed his pump site since it was placed 1.5 weeks ago.  His sensor has  and he has not changed that either since commencing its use.    Current Insulin pump Settings:  Basal - 16.8 units/hr (was preivously getting 35 units of Lantus with [facility])  MN 0.7 units/hr    I:C 15 grams    Sensitivity 50mg/dL     Target 100-120mg/dL     AIT - 3 hours     TDD - 15.975 units/day  Basal-92%  Bolus-8%    0% of values is within target range  Only 1% of the week is spent in auto mode    The patient had 1 episodes of emesis this morning.  He does desire to eat.    Past Medical History  He has a past medical history of Abdominal pain (2023), Anxiety and depression (2021), COVID-19 virus infection (2023), Diabetes mellitus (CMS/[facility]), Diabetic acetonemia (CMS/[facility]) (11/15/2023), DKA (diabetic ketoacidosis) (CMS/[facility]) (2023), DKA, type 1, not at goal (CMS/[facility]) (2023), DM2 (diabetes mellitus, type 2) (CMS/[facility]) (11/15/2023), Elevated blood sugar (11/15/2023), Elevated blood-pressure reading, without diagnosis of hypertension (2020), Fatigue (2023), Generalized anxiety disorder (2023), Microalbuminuria due to type 1 diabetes  mellitus (CMS/[facility]) (07/11/2023), Poorly controlled diabetes mellitus (CMS/[facility]) (07/11/2023), Type 1 diabetes mellitus (CMS/[facility]) (07/11/2023), Type 1 diabetes mellitus with hyperglycemia (CMS/[facility]) (02/25/2019), Vitamin B12 deficiency (07/11/2023), Vitamin D deficiency (07/11/2023), and Vomiting (11/15/2023).    Surgical History  He has no past surgical history on file.     Social History  He reports that he has never smoked. He has never used smokeless tobacco. He reports that he does not drink alcohol and does not use drugs.    Family History  Family History   Problem Relation Name Age of Onset    No Known Problems Mother      No Known Problems Father      Hypertension Other      Coronary artery disease Other      Diabetes Other      Heart failure Other          Allergies  Sitagliptin phos-metformin    Review of Systems   Constitutional:  Negative for appetite change, chills and fever.   Respiratory:  Negative for shortness of breath.    Gastrointestinal:  Positive for vomiting. Negative for abdominal pain and nausea.   Endocrine: Negative for polydipsia, polyphagia and polyuria.   Neurological:  Positive for weakness. Negative for speech difficulty.        Physical Exam  Vitals and nursing note reviewed.   Constitutional:       General: He is not in acute distress.     Appearance: Normal appearance. He is normal weight.   HENT:      Head: Normocephalic and atraumatic.      Nose: Nose normal.      Mouth/Throat:      Mouth: Mucous membranes are moist.   Eyes:      Extraocular Movements: Extraocular movements intact.   Cardiovascular:      Rate and Rhythm: Normal rate and regular rhythm.      Comments: Telemetry monitor reviewed   Pulmonary:      Effort: Pulmonary effort is normal.   Musculoskeletal:         General: Normal range of motion.   Skin:     General: Skin is warm.   Neurological:      Mental Status: He is alert and oriented to person, place, and time.   Psychiatric:         Mood and Affect: Mood normal.  "         Last Recorded Vitals  Blood pressure 120/80, pulse 98, temperature 36.9 °C (98.4 °F), temperature source Temporal, resp. rate 17, height 1.778 m (5' 10\"), weight 73.4 kg (161 lb 13.1 oz), SpO2 98 %.    Relevant Results  Scheduled medications  Insulin, 0-100 Units, pump - subcutaneous, Continuous Pump  lactated Ringer's, 1,000 mL, intravenous, Once      Continuous medications  dextrose 10 % in water (D10W), 150 mL/hr  dextrose 5%-0.45 % sodium chloride, 150 mL/hr, Last Rate: 150 mL/hr (12/09/23 0913)  insulin regular, 0-50 Units/hr, Last Rate: 1 Units/hr (12/09/23 0913)  sodium chloride, 250 mL/hr, Last Rate: Stopped (12/08/23 2338)      PRN medications  PRN medications: dextrose 10 % in water (D10W), dextrose, glucagon, insulin lispro, insulin regular    Results for orders placed or performed during the hospital encounter of 12/08/23 (from the past 24 hour(s))   POCT GLUCOSE   Result Value Ref Range    POCT Glucose 550 (H) 74 - 99 mg/dL   CBC and Auto Differential   Result Value Ref Range    WBC 5.6 4.4 - 11.3 x10*3/uL    nRBC 0.0 0.0 - 0.0 /100 WBCs    RBC 5.05 4.50 - 5.90 x10*6/uL    Hemoglobin 16.5 13.5 - 17.5 g/dL    Hematocrit 46.5 41.0 - 52.0 %    MCV 92 80 - 100 fL    MCH 32.7 26.0 - 34.0 pg    MCHC 35.5 32.0 - 36.0 g/dL    RDW 12.9 11.5 - 14.5 %    Platelets 188 150 - 450 x10*3/uL    Neutrophils % 62.5 40.0 - 80.0 %    Immature Granulocytes %, Automated 0.9 0.0 - 0.9 %    Lymphocytes % 24.0 13.0 - 44.0 %    Monocytes % 11.3 2.0 - 10.0 %    Eosinophils % 0.4 0.0 - 6.0 %    Basophils % 0.9 0.0 - 2.0 %    Neutrophils Absolute 3.50 1.20 - 7.70 x10*3/uL    Immature Granulocytes Absolute, Automated 0.05 0.00 - 0.70 x10*3/uL    Lymphocytes Absolute 1.34 1.20 - 4.80 x10*3/uL    Monocytes Absolute 0.63 0.10 - 1.00 x10*3/uL    Eosinophils Absolute 0.02 0.00 - 0.70 x10*3/uL    Basophils Absolute 0.05 0.00 - 0.10 x10*3/uL   Comprehensive metabolic panel   Result Value Ref Range    Glucose 498 (HH) 74 - 99 mg/dL "    Sodium 133 (L) 136 - 145 mmol/L    Potassium 4.6 3.5 - 5.3 mmol/L    Chloride 101 98 - 107 mmol/L    Bicarbonate 9 (LL) 21 - 32 mmol/L    Anion Gap 28 (H) 10 - 20 mmol/L    Urea Nitrogen 21 6 - 23 mg/dL    Creatinine 1.18 0.50 - 1.30 mg/dL    eGFR 83 >60 mL/min/1.73m*2    Calcium 8.3 (L) 8.6 - 10.3 mg/dL    Albumin 4.2 3.4 - 5.0 g/dL    Alkaline Phosphatase 118 33 - 120 U/L    Total Protein 6.9 6.4 - 8.2 g/dL    AST 9 9 - 39 U/L    Bilirubin, Total 0.4 0.0 - 1.2 mg/dL    ALT 12 10 - 52 U/L   Magnesium   Result Value Ref Range    Magnesium 2.16 1.60 - 2.40 mg/dL   Phosphorus   Result Value Ref Range    Phosphorus 3.1 2.5 - 4.9 mg/dL   Blood Gas Venous Full Panel   Result Value Ref Range    POCT pH, Venous 7.11 (LL) 7.33 - 7.43 pH    POCT pCO2, Venous 27 (L) 41 - 51 mm Hg    POCT pO2, Venous 37 35 - 45 mm Hg    POCT SO2, Venous 63 45 - 75 %    POCT Oxy Hemoglobin, Venous 62.6 45.0 - 75.0 %    POCT Hematocrit Calculated, Venous 50.0 41.0 - 52.0 %    POCT Sodium, Venous 129 (L) 136 - 145 mmol/L    POCT Potassium, Venous 4.7 3.5 - 5.3 mmol/L    POCT Chloride, Venous 100 98 - 107 mmol/L    POCT Ionized Calicum, Venous 1.28 1.10 - 1.33 mmol/L    POCT Glucose, Venous 601 (HH) 74 - 99 mg/dL    POCT Lactate, Venous 0.9 0.4 - 2.0 mmol/L    POCT Base Excess, Venous -19.4 (L) -2.0 - 3.0 mmol/L    POCT HCO3 Calculated, Venous 8.6 (L) 22.0 - 26.0 mmol/L    POCT Hemoglobin, Venous 16.8 13.5 - 17.5 g/dL    POCT Anion Gap, Venous 25.0 10.0 - 25.0 mmol/L    Patient Temperature 37.0 degrees Celsius    FiO2 21 %   Beta Hydroxybutyrate   Result Value Ref Range    Beta-Hydroxybutyrate 9.04 (H) 0.02 - 0.27 mmol/L   Urinalysis with Reflex Microscopic   Result Value Ref Range    Color, Urine Straw Straw, Yellow    Appearance, Urine Clear Clear    Specific Gravity, Urine 1.026 1.005 - 1.035    pH, Urine 5.0 5.0, 5.5, 6.0, 6.5, 7.0, 7.5, 8.0    Protein, Urine 30 (1+) (N) NEGATIVE mg/dL    Glucose, Urine >=500 (3+) (A) NEGATIVE mg/dL     Blood, Urine NEGATIVE NEGATIVE    Ketones, Urine 80 (2+) (A) NEGATIVE mg/dL    Bilirubin, Urine NEGATIVE NEGATIVE    Urobilinogen, Urine <2.0 <2.0 mg/dL    Nitrite, Urine NEGATIVE NEGATIVE    Leukocyte Esterase, Urine NEGATIVE NEGATIVE   Microscopic Only, Urine   Result Value Ref Range    WBC, Urine NONE 1-5, NONE /HPF    RBC, Urine NONE NONE, 1-2, 3-5 /HPF    Mucus, Urine 1+ Reference range not established. /LPF    Hyaline Casts, Urine 1+ (A) NONE /LPF   Renal function panel   Result Value Ref Range    Glucose 430 (H) 74 - 99 mg/dL    Sodium 132 (L) 136 - 145 mmol/L    Potassium 5.6 (H) 3.5 - 5.3 mmol/L    Chloride 105 98 - 107 mmol/L    Bicarbonate 3 (LL) 21 - 32 mmol/L    Anion Gap 30 (H) 10 - 20 mmol/L    Urea Nitrogen 19 6 - 23 mg/dL    Creatinine 1.04 0.50 - 1.30 mg/dL    eGFR >90 >60 mL/min/1.73m*2    Calcium 7.7 (L) 8.6 - 10.3 mg/dL    Phosphorus 3.0 2.5 - 4.9 mg/dL    Albumin 4.0 3.4 - 5.0 g/dL   Basic metabolic panel   Result Value Ref Range    Glucose 430 (H) 74 - 99 mg/dL    Sodium 132 (L) 136 - 145 mmol/L    Potassium 5.6 (H) 3.5 - 5.3 mmol/L    Chloride 105 98 - 107 mmol/L    Bicarbonate 3 (LL) 21 - 32 mmol/L    Anion Gap 30 (H) 10 - 20 mmol/L    Urea Nitrogen 19 6 - 23 mg/dL    Creatinine 1.04 0.50 - 1.30 mg/dL    eGFR >90 >60 mL/min/1.73m*2    Calcium 7.7 (L) 8.6 - 10.3 mg/dL   Blood Gas Arterial   Result Value Ref Range    POCT pH, Arterial 7.09 (LL) 7.38 - 7.42 pH    POCT pCO2, Arterial 10 (LL) 38 - 42 mm Hg    POCT pO2, Arterial 123 (H) 85 - 95 mm Hg    POCT SO2, Arterial 99 94 - 100 %    POCT Oxy Hemoglobin, Arterial 97.3 94.0 - 98.0 %    POCT Base Excess, Arterial -24.5 (L) -2.0 - 3.0 mmol/L    POCT HCO3 Calculated, Arterial 3.0 (L) 22.0 - 26.0 mmol/L    Patient Temperature 37.0 degrees Celsius    FiO2 21 %    Johnny's Test Positive    POCT GLUCOSE   Result Value Ref Range    POCT Glucose 440 (H) 74 - 99 mg/dL   POCT GLUCOSE   Result Value Ref Range    POCT Glucose 311 (H) 74 - 99 mg/dL   POCT  GLUCOSE   Result Value Ref Range    POCT Glucose 235 (H) 74 - 99 mg/dL   Magnesium   Result Value Ref Range    Magnesium 1.84 1.60 - 2.40 mg/dL   Phosphorus   Result Value Ref Range    Phosphorus 1.1 (L) 2.5 - 4.9 mg/dL   Basic Metabolic Panel   Result Value Ref Range    Glucose 211 (H) 74 - 99 mg/dL    Sodium 138 136 - 145 mmol/L    Potassium 3.0 (L) 3.5 - 5.3 mmol/L    Chloride 112 (H) 98 - 107 mmol/L    Bicarbonate 8 (LL) 21 - 32 mmol/L    Anion Gap 21 (H) 10 - 20 mmol/L    Urea Nitrogen 19 6 - 23 mg/dL    Creatinine 1.00 0.50 - 1.30 mg/dL    eGFR >90 >60 mL/min/1.73m*2    Calcium 7.3 (L) 8.6 - 10.3 mg/dL   POCT GLUCOSE   Result Value Ref Range    POCT Glucose 211 (H) 74 - 99 mg/dL   BLOOD GAS ARTERIAL   Result Value Ref Range    POCT pH, Arterial 7.21 (LL) 7.38 - 7.42 pH    POCT pCO2, Arterial 22 (L) 38 - 42 mm Hg    POCT pO2, Arterial 113 (H) 85 - 95 mm Hg    POCT SO2, Arterial 99 94 - 100 %    POCT Oxy Hemoglobin, Arterial 96.8 94.0 - 98.0 %    POCT Base Excess, Arterial -17.1 (L) -2.0 - 3.0 mmol/L    POCT HCO3 Calculated, Arterial 8.8 (L) 22.0 - 26.0 mmol/L    Patient Temperature 37.0 degrees Celsius    FiO2 21 %   POCT GLUCOSE   Result Value Ref Range    POCT Glucose 196 (H) 74 - 99 mg/dL   POCT GLUCOSE   Result Value Ref Range    POCT Glucose 185 (H) 74 - 99 mg/dL   POCT GLUCOSE   Result Value Ref Range    POCT Glucose 145 (H) 74 - 99 mg/dL   Magnesium   Result Value Ref Range    Magnesium 1.76 1.60 - 2.40 mg/dL   Phosphorus   Result Value Ref Range    Phosphorus 2.0 (L) 2.5 - 4.9 mg/dL   Blood Gas Arterial   Result Value Ref Range    POCT pH, Arterial 7.27 (L) 7.38 - 7.42 pH    POCT pCO2, Arterial 30 (L) 38 - 42 mm Hg    POCT pO2, Arterial 144 (H) 85 - 95 mm Hg    POCT SO2, Arterial 99 94 - 100 %    POCT Oxy Hemoglobin, Arterial 97.6 94.0 - 98.0 %    POCT Base Excess, Arterial -11.8 (L) -2.0 - 3.0 mmol/L    POCT HCO3 Calculated, Arterial 13.8 (L) 22.0 - 26.0 mmol/L    Patient Temperature 37.0 degrees  Celsius    FiO2 21 %   Basic Metabolic Panel   Result Value Ref Range    Glucose 131 (H) 74 - 99 mg/dL    Sodium 140 136 - 145 mmol/L    Potassium 3.3 (L) 3.5 - 5.3 mmol/L    Chloride 115 (H) 98 - 107 mmol/L    Bicarbonate 14 (L) 21 - 32 mmol/L    Anion Gap 14 10 - 20 mmol/L    Urea Nitrogen 20 6 - 23 mg/dL    Creatinine 0.96 0.50 - 1.30 mg/dL    eGFR >90 >60 mL/min/1.73m*2    Calcium 7.2 (L) 8.6 - 10.3 mg/dL   POCT GLUCOSE   Result Value Ref Range    POCT Glucose 145 (H) 74 - 99 mg/dL   POCT GLUCOSE   Result Value Ref Range    POCT Glucose 143 (H) 74 - 99 mg/dL   POCT GLUCOSE   Result Value Ref Range    POCT Glucose 144 (H) 74 - 99 mg/dL   Basic metabolic panel   Result Value Ref Range    Glucose 156 (H) 74 - 99 mg/dL    Sodium 141 136 - 145 mmol/L    Potassium 3.7 3.5 - 5.3 mmol/L    Chloride 114 (H) 98 - 107 mmol/L    Bicarbonate 16 (L) 21 - 32 mmol/L    Anion Gap 15 10 - 20 mmol/L    Urea Nitrogen 19 6 - 23 mg/dL    Creatinine 0.96 0.50 - 1.30 mg/dL    eGFR >90 >60 mL/min/1.73m*2    Calcium 7.5 (L) 8.6 - 10.3 mg/dL   Magnesium   Result Value Ref Range    Magnesium 1.91 1.60 - 2.40 mg/dL   Phosphorus   Result Value Ref Range    Phosphorus 3.3 2.5 - 4.9 mg/dL   POCT GLUCOSE   Result Value Ref Range    POCT Glucose 160 (H) 74 - 99 mg/dL   POCT GLUCOSE   Result Value Ref Range    POCT Glucose 180 (H) 74 - 99 mg/dL      No results found.      Assessment/Plan   IMPRESSION   POORLY CONTROLLED TYPE I DM  DKA - resolved  Long history of non adherence with prandial insulin  [employer] Minimed 770G pump started 1.5 weeks ago  No pump site change since it was started  No sensor change since it was started  Only 1% of the week spent in auto mode  92% of values via basal   Most recent A1C of 15.5% as of today     Recommendations:  Patient to transition to his insulin pump  To discontinue insulin infusion at 1 unit/hr once his pump has been connected   To connect Guardian connect sensor for the intensive glucose monitoring due  to the dynamic nature of insulin requirements, the narrow therapeutic index of insulin and the potentially fatal consequences of treatment  Diabetic diet  [facility]  Hypoglycemic protocol  It is abundantly clear that the patient lacks pump education  He admits to only taking 1 class and cannot recall how to change his insulin pump site or how to apply his sensor  How to change the patient's insulin pump  Counseled that insulin pump sites must be changed every 3 days  Counseled that in the event of hyperglycemia, he is to change his insulin pump site if recurrent boluses are not improving glycemic control  Counseled that even if the pump sites is changed and he is still hyperglycemic, he is to inject insulin via an insulin pen in order to prevent an episode of DKA  Ensure that the patient knows how to input glucose values into the pump so that the bolus wizard can be utilized for his boluses  Will continue to follow     Thank you for the courtesy of this consult     Lori Packer MD

## 2023-12-09 NOTE — H&P
History Of Present Illness  Ed Sanon is a 33 y.o.  male with a past medical history significant for IDDM 1 with very poor insulin compliance, anxiety and depression.  He has had numerous admissions for diabetic ketoacidosis in settings of infection and noncompliance.  He presents back to the ED with concerns for diabetic ketoacidosis.  He states that this feels similar to prior episodes of DKA.  He states that he did recently have an insulin pump placed about 1 week ago per the recommendation of his endocrinologist at [facility] but he does not know how to use that he states and his mother who is at the bedside is unclear.  She states that he does not think that he retain much of the information when they were originally educating him about the pump.  The patient's mother stated that he did try to bolus 8 units prior to arrival but was unsure if this occurred or if it would affect his care going forward.  He complains of generalized weakness, progressively worsening hyperglycemia over the past few days he has had progressively worsening generalized weakness, occasional lightheadedness, dizziness, nausea and feeling sick to his stomach which she feels like is the beginning of his usual DKA episodes.  He denies any polyuria or polydipsia during this episode.  He denies any recent sick contacts, chemical/environmental exposures, changes in dietary habits or any recent traumatic events/falls other than noted above.  He denies any fevers, chills, night sweats, vision changes, auditory changes, change in taste/smell, loss of bowel/bladder control, loss consciousness, vertigo, syncope, seizure-like activity, chest pain, palpitations, coughing, wheezing, congestion, hemoptysis, hematemesis, abdominal pain, vomiting, diarrhea, constipation, dysuria, hematuria, dyschezia, hematochezia any lateralizing motor/sensory deficits other than noted above.    ED course:  1.  Vital signs on presentation: Temperature of 37.1  °C, heart rate of 103, respirations of 18, blood pressure 128/80, pulse ox of 98% on room air  2.  CBC was unremarkable for any leukocytosis/anemia or other derangements at this time.  3.  Beta hydroxybutyrate of 9.04  4.  Glucose of 498  5.  Sodium of 133  6.  Bicarb of 9 with a repeat of 3  10.  Anion gap of 28 with a repeat of 30  11.  Calcium of 8.3 with a repeat of 7.7  12.  VBG on presentation: pH is 7.11, pCO2 of 27, pO2 of 37, SO2 of 63, [place] bicarb of 8.6  13.  ABG about 3 hours after arrival: pH is 7.09, pCO2 10, pO2 of 123, SO2 99, catheter bicarb of 3  14.  UA: 1+ hyaline casts, 2+ ketones, 3+ glucose, 1+ protein  15.  Patient was initiated on IV fluids as well as insulin gtt. in the ED and admitted for DKA.    A 12 point ROS was performed with the patient denying any complaints at this time aside from those listed in the HPI above.    Past Medical History  He has a past medical history of Abdominal pain (04/21/2023), Anxiety and depression (08/08/2021), COVID-19 virus infection (07/11/2023), Diabetes mellitus (CMS/HCC), Diabetic acetonemia (CMS/[facility]) (11/15/2023), DKA (diabetic ketoacidosis) (CMS/[facility]) (07/11/2023), DKA, type 1, not at goal (CMS/[facility]) (04/21/2023), DM2 (diabetes mellitus, type 2) (CMS/[facility]) (11/15/2023), Elevated blood sugar (11/15/2023), Elevated blood-pressure reading, without diagnosis of hypertension (09/09/2020), Fatigue (07/11/2023), Generalized anxiety disorder (07/11/2023), Microalbuminuria due to type 1 diabetes mellitus (CMS/[facility]) (07/11/2023), Poorly controlled diabetes mellitus (CMS/[facility]) (07/11/2023), Type 1 diabetes mellitus (CMS/[facility]) (07/11/2023), Type 1 diabetes mellitus with hyperglycemia (CMS/HCC) (02/25/2019), Vitamin B12 deficiency (07/11/2023), Vitamin D deficiency (07/11/2023), and Vomiting (11/15/2023).    Surgical History  He has no past surgical history on file.     Social History  He reports that he has never smoked. He has never used smokeless tobacco. He  reports that he does not drink alcohol and does not use drugs.    Family History  Family History   Problem Relation Name Age of Onset    No Known Problems Mother      No Known Problems Father      Hypertension Other      Coronary artery disease Other      Diabetes Other      Heart failure Other          Allergies  Sitagliptin phos-metformin    Review of Systems   Constitutional:  Positive for activity change, appetite change, diaphoresis and fatigue.   Respiratory:  Positive for cough and shortness of breath.    Gastrointestinal:  Positive for nausea. Negative for abdominal pain, constipation, diarrhea and vomiting.   Endocrine: Negative for polydipsia, polyphagia and polyuria.   Musculoskeletal:  Positive for myalgias.   Neurological:  Positive for weakness.   All other systems reviewed and are negative.       Physical Exam  Constitutional:       General: He is not in acute distress.     Appearance: Normal appearance. He is ill-appearing and diaphoretic.   HENT:      Head: Normocephalic and atraumatic.      Mouth/Throat:      Mouth: Mucous membranes are dry.      Pharynx: Oropharynx is clear.   Eyes:      Extraocular Movements: Extraocular movements intact.      Conjunctiva/sclera: Conjunctivae normal.      Pupils: Pupils are equal, round, and reactive to light.   Neck:      Vascular: No carotid bruit.   Cardiovascular:      Rate and Rhythm: Normal rate and regular rhythm.      Pulses: Normal pulses.      Heart sounds: No murmur heard.  Pulmonary:      Effort: Pulmonary effort is normal. No respiratory distress.      Breath sounds: Normal breath sounds. No wheezing, rhonchi or rales.   Chest:      Chest wall: No tenderness.   Abdominal:      General: Abdomen is flat. Bowel sounds are normal. There is no distension.      Palpations: Abdomen is soft. There is no mass.      Tenderness: There is no abdominal tenderness. There is no guarding or rebound.   Musculoskeletal:         General: No swelling or signs of injury.  "Normal range of motion.      Cervical back: Normal range of motion and neck supple. No rigidity or tenderness.      Right lower leg: No edema.      Left lower leg: No edema.   Skin:     General: Skin is warm.      Findings: No bruising, erythema or lesion.      Comments: Some what diaphoretic and erythematous/flushed in the face and then slight tapers across his chest and forearms, nothing noted on the lower extremities    Neurological:      General: No focal deficit present.      Mental Status: He is alert and oriented to person, place, and time. Mental status is at baseline.      Cranial Nerves: No cranial nerve deficit.      Sensory: No sensory deficit.      Motor: No weakness.      Gait: Gait normal.   Psychiatric:         Mood and Affect: Mood normal.         Behavior: Behavior normal.         Thought Content: Thought content normal.         Judgment: Judgment normal.         SCHEDULED MEDICATIONS  sodium bicarbonate, 50 mEq, intravenous, Once           CONTINUOUS MEDICATIONS  dextrose 10 % in water (D10W), 150 mL/hr  dextrose 5%-0.45 % sodium chloride, 150 mL/hr  insulin regular, 0-50 Units/hr, Last Rate: 13.8 Units/hr (12/08/23 2148)  sodium chloride, 250 mL/hr, Last Rate: 250 mL/hr (12/08/23 2039)           PRN MEDICATIONS  PRN medications: dextrose, insulin regular      Last Recorded Vitals  Blood pressure 141/80, pulse (!) 118, temperature 37.3 °C (99.2 °F), resp. rate 18, height 1.778 m (5' 10\"), weight 78.5 kg (173 lb), SpO2 100 %.    Relevant Results    Results for orders placed or performed during the hospital encounter of 12/08/23 (from the past 24 hour(s))   POCT GLUCOSE   Result Value Ref Range    POCT Glucose 550 (H) 74 - 99 mg/dL   CBC and Auto Differential   Result Value Ref Range    WBC 5.6 4.4 - 11.3 x10*3/uL    nRBC 0.0 0.0 - 0.0 /100 WBCs    RBC 5.05 4.50 - 5.90 x10*6/uL    Hemoglobin 16.5 13.5 - 17.5 g/dL    Hematocrit 46.5 41.0 - 52.0 %    MCV 92 80 - 100 fL    MCH 32.7 26.0 - 34.0 pg    " MCHC 35.5 32.0 - 36.0 g/dL    RDW 12.9 11.5 - 14.5 %    Platelets 188 150 - 450 x10*3/uL    Neutrophils % 62.5 40.0 - 80.0 %    Immature Granulocytes %, Automated 0.9 0.0 - 0.9 %    Lymphocytes % 24.0 13.0 - 44.0 %    Monocytes % 11.3 2.0 - 10.0 %    Eosinophils % 0.4 0.0 - 6.0 %    Basophils % 0.9 0.0 - 2.0 %    Neutrophils Absolute 3.50 1.20 - 7.70 x10*3/uL    Immature Granulocytes Absolute, Automated 0.05 0.00 - 0.70 x10*3/uL    Lymphocytes Absolute 1.34 1.20 - 4.80 x10*3/uL    Monocytes Absolute 0.63 0.10 - 1.00 x10*3/uL    Eosinophils Absolute 0.02 0.00 - 0.70 x10*3/uL    Basophils Absolute 0.05 0.00 - 0.10 x10*3/uL   Comprehensive metabolic panel   Result Value Ref Range    Glucose 498 (HH) 74 - 99 mg/dL    Sodium 133 (L) 136 - 145 mmol/L    Potassium 4.6 3.5 - 5.3 mmol/L    Chloride 101 98 - 107 mmol/L    Bicarbonate 9 (LL) 21 - 32 mmol/L    Anion Gap 28 (H) 10 - 20 mmol/L    Urea Nitrogen 21 6 - 23 mg/dL    Creatinine 1.18 0.50 - 1.30 mg/dL    eGFR 83 >60 mL/min/1.73m*2    Calcium 8.3 (L) 8.6 - 10.3 mg/dL    Albumin 4.2 3.4 - 5.0 g/dL    Alkaline Phosphatase 118 33 - 120 U/L    Total Protein 6.9 6.4 - 8.2 g/dL    AST 9 9 - 39 U/L    Bilirubin, Total 0.4 0.0 - 1.2 mg/dL    ALT 12 10 - 52 U/L   Magnesium   Result Value Ref Range    Magnesium 2.16 1.60 - 2.40 mg/dL   Phosphorus   Result Value Ref Range    Phosphorus 3.1 2.5 - 4.9 mg/dL   Blood Gas Venous Full Panel   Result Value Ref Range    POCT pH, Venous 7.11 (LL) 7.33 - 7.43 pH    POCT pCO2, Venous 27 (L) 41 - 51 mm Hg    POCT pO2, Venous 37 35 - 45 mm Hg    POCT SO2, Venous 63 45 - 75 %    POCT Oxy Hemoglobin, Venous 62.6 45.0 - 75.0 %    POCT Hematocrit Calculated, Venous 50.0 41.0 - 52.0 %    POCT Sodium, Venous 129 (L) 136 - 145 mmol/L    POCT Potassium, Venous 4.7 3.5 - 5.3 mmol/L    POCT Chloride, Venous 100 98 - 107 mmol/L    POCT Ionized [place], Venous 1.28 1.10 - 1.33 mmol/L    POCT Glucose, Venous 601 (HH) 74 - 99 mg/dL    POCT Lactate, Venous  0.9 0.4 - 2.0 mmol/L    POCT Base Excess, Venous -19.4 (L) -2.0 - 3.0 mmol/L    POCT HCO3 Calculated, Venous 8.6 (L) 22.0 - 26.0 mmol/L    POCT Hemoglobin, Venous 16.8 13.5 - 17.5 g/dL    POCT Anion Gap, Venous 25.0 10.0 - 25.0 mmol/L    Patient Temperature 37.0 degrees Celsius    FiO2 21 %   Beta Hydroxybutyrate   Result Value Ref Range    Beta-Hydroxybutyrate 9.04 (H) 0.02 - 0.27 mmol/L   Urinalysis with Reflex Microscopic   Result Value Ref Range    Color, Urine Straw Straw, Yellow    Appearance, Urine Clear Clear    Specific Gravity, Urine 1.026 1.005 - 1.035    pH, Urine 5.0 5.0, 5.5, 6.0, 6.5, 7.0, 7.5, 8.0    Protein, Urine 30 (1+) (N) NEGATIVE mg/dL    Glucose, Urine >=500 (3+) (A) NEGATIVE mg/dL    Blood, Urine NEGATIVE NEGATIVE    Ketones, Urine 80 (2+) (A) NEGATIVE mg/dL    Bilirubin, Urine NEGATIVE NEGATIVE    Urobilinogen, Urine <2.0 <2.0 mg/dL    Nitrite, Urine NEGATIVE NEGATIVE    Leukocyte Esterase, Urine NEGATIVE NEGATIVE   Microscopic Only, Urine   Result Value Ref Range    WBC, Urine NONE 1-5, NONE /HPF    RBC, Urine NONE NONE, 1-2, 3-5 /HPF    Mucus, Urine 1+ Reference range not established. /LPF    Hyaline Casts, Urine 1+ (A) NONE /LPF   Renal function panel   Result Value Ref Range    Glucose 430 (H) 74 - 99 mg/dL    Sodium 132 (L) 136 - 145 mmol/L    Potassium 5.6 (H) 3.5 - 5.3 mmol/L    Chloride 105 98 - 107 mmol/L    Bicarbonate 3 (LL) 21 - 32 mmol/L    Anion Gap 30 (H) 10 - 20 mmol/L    Urea Nitrogen 19 6 - 23 mg/dL    Creatinine 1.04 0.50 - 1.30 mg/dL    eGFR >90 >60 mL/min/1.73m*2    Calcium 7.7 (L) 8.6 - 10.3 mg/dL    Phosphorus 3.0 2.5 - 4.9 mg/dL    Albumin 4.0 3.4 - 5.0 g/dL   Basic metabolic panel   Result Value Ref Range    Glucose 430 (H) 74 - 99 mg/dL    Sodium 132 (L) 136 - 145 mmol/L    Potassium 5.6 (H) 3.5 - 5.3 mmol/L    Chloride 105 98 - 107 mmol/L    Bicarbonate 3 (LL) 21 - 32 mmol/L    Anion Gap 30 (H) 10 - 20 mmol/L    Urea Nitrogen 19 6 - 23 mg/dL    Creatinine 1.04 0.50  - 1.30 mg/dL    eGFR >90 >60 mL/min/1.73m*2    Calcium 7.7 (L) 8.6 - 10.3 mg/dL   Blood Gas Arterial   Result Value Ref Range    POCT pH, Arterial 7.09 (LL) 7.38 - 7.42 pH    POCT pCO2, Arterial 10 (LL) 38 - 42 mm Hg    POCT pO2, Arterial 123 (H) 85 - 95 mm Hg    POCT SO2, Arterial 99 94 - 100 %    POCT Oxy Hemoglobin, Arterial 97.3 94.0 - 98.0 %    POCT Base Excess, Arterial -24.5 (L) -2.0 - 3.0 mmol/L    POCT HCO3 Calculated, Arterial 3.0 (L) 22.0 - 26.0 mmol/L    Patient Temperature 37.0 degrees Celsius    FiO2 21 %    Johnny's Test Positive    POCT GLUCOSE   Result Value Ref Range    POCT Glucose 440 (H) 74 - 99 mg/dL          No results found.        Assessment/Plan     1.  DKA, IDDM  -Insulin GTT per protocol  -Continued on IV fluids per DKA protocol  -Every 4 hours RFP/Mg/ABG  -Endocrinology consult appreciated  -Patient states that he had an insulin pump placed about 1 week ago but does not know how to use it properly.  And he has only been bolusing occasional amounts as he sees fit  -Anion gap on presentation of around 30  -Beta hydroxybutyrate of 9.04 on presentation    2.  High anion gap metabolic acidosis  -VBG on presentation: pH is 7.11, pCO2 of 27, pO2 of 37, SO2 of 63, [place] bicarb of 8.6  -ABG about 3 hours after arrival: pH is 7.09, pCO2 10, pO2 of 123, SO2 99, catheter bicarb of 3  -If progressively worsening may require a bicarb drip but will reassess around midnight electrolytes and fluid status.    3.  Rash  -Unclear if this is due to him working outside in the cold & without any sunscreen  -He is quite flushed in his face as well as upper extremities and it is tapering down to his mid abdomen as well as forearms bilaterally  -Will continue to reassess and consider Benadryl/steroids but will need to be careful in setting of DKA, he has no complaints of itchiness or burning sensation.    4.  Anxiety/depression  -Continued on outpatient follow-up      Arash Carrillo,

## 2023-12-10 VITALS
TEMPERATURE: 98.2 F | OXYGEN SATURATION: 96 % | SYSTOLIC BLOOD PRESSURE: 131 MMHG | RESPIRATION RATE: 18 BRPM | HEART RATE: 108 BPM | HEIGHT: 70 IN | BODY MASS INDEX: 23.17 KG/M2 | WEIGHT: 161.82 LBS | DIASTOLIC BLOOD PRESSURE: 96 MMHG

## 2023-12-10 PROBLEM — E10.10 DIABETIC KETOACIDOSIS WITHOUT COMA ASSOCIATED WITH TYPE 1 DIABETES MELLITUS (MULTI): Status: RESOLVED | Noted: 2023-12-08 | Resolved: 2023-12-10

## 2023-12-10 LAB
ALBUMIN SERPL BCP-MCNC: 3.1 G/DL (ref 3.4–5)
ANION GAP SERPL CALC-SCNC: 12 MMOL/L (ref 10–20)
ANION GAP SERPL CALC-SCNC: 12 MMOL/L (ref 10–20)
BUN SERPL-MCNC: 11 MG/DL (ref 6–23)
BUN SERPL-MCNC: 11 MG/DL (ref 6–23)
CALCIUM SERPL-MCNC: 7.7 MG/DL (ref 8.6–10.3)
CALCIUM SERPL-MCNC: 7.7 MG/DL (ref 8.6–10.3)
CHLORIDE SERPL-SCNC: 107 MMOL/L (ref 98–107)
CHLORIDE SERPL-SCNC: 107 MMOL/L (ref 98–107)
CO2 SERPL-SCNC: 19 MMOL/L (ref 21–32)
CO2 SERPL-SCNC: 19 MMOL/L (ref 21–32)
CREAT SERPL-MCNC: 0.83 MG/DL (ref 0.5–1.3)
CREAT SERPL-MCNC: 0.83 MG/DL (ref 0.5–1.3)
ERYTHROCYTE [DISTWIDTH] IN BLOOD BY AUTOMATED COUNT: 13.2 % (ref 11.5–14.5)
GFR SERPL CREATININE-BSD FRML MDRD: >90 ML/MIN/1.73M*2
GFR SERPL CREATININE-BSD FRML MDRD: >90 ML/MIN/1.73M*2
GLUCOSE BLD MANUAL STRIP-MCNC: 329 MG/DL (ref 74–99)
GLUCOSE BLD MANUAL STRIP-MCNC: 331 MG/DL (ref 74–99)
GLUCOSE BLD MANUAL STRIP-MCNC: 335 MG/DL (ref 74–99)
GLUCOSE SERPL-MCNC: 384 MG/DL (ref 74–99)
GLUCOSE SERPL-MCNC: 384 MG/DL (ref 74–99)
HCT VFR BLD AUTO: 40.1 % (ref 41–52)
HGB BLD-MCNC: 14.1 G/DL (ref 13.5–17.5)
MAGNESIUM SERPL-MCNC: 1.91 MG/DL (ref 1.6–2.4)
MCH RBC QN AUTO: 32.5 PG (ref 26–34)
MCHC RBC AUTO-ENTMCNC: 35.2 G/DL (ref 32–36)
MCV RBC AUTO: 92 FL (ref 80–100)
NRBC BLD-RTO: 0 /100 WBCS (ref 0–0)
PHOSPHATE SERPL-MCNC: 2.2 MG/DL (ref 2.5–4.9)
PLATELET # BLD AUTO: 159 X10*3/UL (ref 150–450)
POTASSIUM SERPL-SCNC: 3.4 MMOL/L (ref 3.5–5.3)
POTASSIUM SERPL-SCNC: 3.4 MMOL/L (ref 3.5–5.3)
RBC # BLD AUTO: 4.34 X10*6/UL (ref 4.5–5.9)
SODIUM SERPL-SCNC: 135 MMOL/L (ref 136–145)
SODIUM SERPL-SCNC: 135 MMOL/L (ref 136–145)
WBC # BLD AUTO: 4.1 X10*3/UL (ref 4.4–11.3)

## 2023-12-10 PROCEDURE — 85027 COMPLETE CBC AUTOMATED: CPT | Performed by: INTERNAL MEDICINE

## 2023-12-10 PROCEDURE — 82947 ASSAY GLUCOSE BLOOD QUANT: CPT

## 2023-12-10 PROCEDURE — 80048 BASIC METABOLIC PNL TOTAL CA: CPT | Mod: CCI | Performed by: INTERNAL MEDICINE

## 2023-12-10 PROCEDURE — 2500000005 HC RX 250 GENERAL PHARMACY W/O HCPCS: Performed by: FAMILY MEDICINE

## 2023-12-10 PROCEDURE — 99239 HOSP IP/OBS DSCHRG MGMT >30: CPT | Performed by: FAMILY MEDICINE

## 2023-12-10 PROCEDURE — 83735 ASSAY OF MAGNESIUM: CPT | Performed by: INTERNAL MEDICINE

## 2023-12-10 PROCEDURE — 99233 SBSQ HOSP IP/OBS HIGH 50: CPT | Performed by: INTERNAL MEDICINE

## 2023-12-10 PROCEDURE — 2500000004 HC RX 250 GENERAL PHARMACY W/ HCPCS (ALT 636 FOR OP/ED): Performed by: FAMILY MEDICINE

## 2023-12-10 PROCEDURE — 36415 COLL VENOUS BLD VENIPUNCTURE: CPT | Performed by: INTERNAL MEDICINE

## 2023-12-10 RX ADMIN — POTASSIUM PHOSPHATE, MONOBASIC AND POTASSIUM PHOSPHATE, DIBASIC 15 MMOL: 224; 236 INJECTION, SOLUTION, CONCENTRATE INTRAVENOUS at 12:22

## 2023-12-10 ASSESSMENT — COGNITIVE AND FUNCTIONAL STATUS - GENERAL
DAILY ACTIVITIY SCORE: 24
MOBILITY SCORE: 24

## 2023-12-10 ASSESSMENT — PAIN SCALES - GENERAL: PAINLEVEL_OUTOF10: 0 - NO PAIN

## 2023-12-10 NOTE — CARE PLAN
Problem: Safety  Goal: Patient will be injury free during hospitalization  12/10/2023 0734 by Betzy Valdez RN  Outcome: Progressing  12/9/2023 1845 by Betzy Valdez RN  Outcome: Progressing  Goal: I will remain free of falls  12/10/2023 0734 by Betzy Valdez RN  Outcome: Progressing  12/9/2023 1845 by Betzy Valdez RN  Outcome: Progressing     Problem: Daily Care  Goal: Daily care needs are met  12/10/2023 0734 by Betzy Valdez RN  Outcome: Progressing  12/9/2023 1845 by Betzy Valdez RN  Outcome: Progressing     Problem: Psychosocial Needs  Goal: Demonstrates ability to cope with hospitalization/illness  12/10/2023 0734 by Betzy Valdez RN  Outcome: Progressing  12/9/2023 1845 by Betzy Valdez RN  Outcome: Progressing  Goal: Collaborate with me, my family, and caregiver to identify my specific goals  12/10/2023 0734 by Betzy Valdez RN  Outcome: Progressing  12/9/2023 1845 by Betzy Valdez RN  Outcome: Progressing     Problem: Recent hospitalization or complication while living with DM  Goal: Patient will effectively self-manage their DM disease process  12/10/2023 0734 by Betzy Valdez RN  Outcome: Progressing  12/9/2023 1845 by Betzy Valdez RN  Outcome: Progressing     Problem: Lack of Diabetes disease process knowledge  Goal: Increase knowledge/understanding of diabetes and how to reduce risk of complications  12/10/2023 0734 by Betzy Valdez RN  Outcome: Progressing  12/9/2023 1845 by Betzy Valdez RN  Outcome: Progressing     Problem: Lack of glucose monitoring and target numbers for before and after meals knowledge  Goal: Increase knowledge/understanding of monitoring devices and interpret data to assist with lifestyle change  12/10/2023 0734 by Betzy Valdez RN  Outcome: Progressing  12/9/2023 1845 by Betzy Valdez RN  Outcome: Progressing     Problem: Lack of knowledge on diet for diabetes  Goal: Discover best plan for balanced nutrition to manage diabetes  12/10/2023 0734 by Betzy Valdez RN  Outcome:  "Progressing  12/9/2023 1845 by Betzy Valdez RN  Outcome: Progressing     Problem: Address barriers to lifestyle change  Goal: Find workable solutions to meet health goals  12/10/2023 0734 by Betzy Valdez RN  Outcome: Progressing  12/9/2023 1845 by Betzy Valdez RN  Outcome: Progressing     Problem: Lack of knowledge on benefits of activity for meeting blood glucose targets and health goals  Goal: Discover best plan for being active and address any barriers  12/10/2023 0734 by Betzy Valdez RN  Outcome: Progressing  12/9/2023 1845 by Betzy Valdez RN  Outcome: Progressing     Problem: Lack of knowldge regarding diabetes medications  Goal: Increase knowledge via education  12/10/2023 0734 by Betzy Valdez RN  Outcome: Progressing  12/9/2023 1845 by Betzy Valdez RN  Outcome: Progressing     Problem: Lack of knowledge on where to find additional support for diabetes  Goal: Increase knowledge of where support can be found to best address patient's need  12/10/2023 0734 by Betzy Valdez RN  Outcome: Progressing  12/9/2023 1845 by Betzy Valdez RN  Outcome: Progressing     Problem: Skin  Goal: Decreased wound size/increased tissue granulation at next dressing change  Outcome: Progressing  Goal: Participates in plan/prevention/treatment measures  Outcome: Progressing  Goal: Prevent/manage excess moisture  Outcome: Progressing  Goal: Prevent/minimize sheer/friction injuries  Outcome: Progressing  Goal: Promote/optimize nutrition  Outcome: Progressing  Goal: Promote skin healing  Outcome: Progressing   The patient's goals for the shift include \" feel better\"    The clinical goals for the shift include Patient will maintain blood sugar between 120-180    Over the shift, the patient is progressing toward the following goals.     "

## 2023-12-10 NOTE — PROGRESS NOTES
"Ed Sanon is a 33 y.o. male on day 2 of admission presenting with Diabetic ketoacidosis without coma associated with type 1 diabetes mellitus (CMS/[facility]).    Subjective   Patient has no complaints.  He states that the pump is not giving him enough insulin.  He is not putting carbs into the pump but is only putting a blood sugar for correction.    He is receiving IV potassium for correction.    I have reviewed histories, allergies and medications have been reviewed and there are no changes       Objective   Physical Exam  Vitals and nursing note reviewed.   Constitutional:       General: He is not in acute distress.     Appearance: Normal appearance. He is normal weight.   HENT:      Head: Normocephalic and atraumatic.      Nose: Nose normal.      Mouth/Throat:      Mouth: Mucous membranes are moist.   Eyes:      Extraocular Movements: Extraocular movements intact.   Cardiovascular:      Rate and Rhythm: Normal rate.   Pulmonary:      Effort: Pulmonary effort is normal.   Musculoskeletal:         General: Normal range of motion.   Skin:     General: Skin is warm.   Neurological:      Mental Status: He is alert and oriented to person, place, and time.   Psychiatric:         Mood and Affect: Mood normal.         Last Recorded Vitals  Blood pressure 119/80, pulse 82, temperature 36.6 °C (97.9 °F), resp. rate 18, height 1.778 m (5' 10\"), weight 73.4 kg (161 lb 13.1 oz), SpO2 98 %.  Intake/Output last 3 Shifts:  I/O last 3 completed shifts:  In: 3267.7 (44.5 mL/kg) [P.O.:400; I.V.:2307.7 (31.4 mL/kg); IV Piggyback:560]  Out: 215 (2.9 mL/kg) [Emesis/NG output:215]  Weight: 73.4 kg     Relevant Results  Results from last 7 days   Lab Units 12/10/23  1151 12/10/23  0645 12/10/23  0439 12/09/23  2055 12/09/23  1237 12/09/23  1157 12/09/23  0822 12/09/23  0820 12/09/23  0428 12/09/23  0426 12/09/23  0029 12/08/23  2354 12/08/23  2133 12/08/23  2030   POCT GLUCOSE mg/dL 335* 331*  --  352* 204* 151*   < >  --    < >  --    " < >  --    < >  --    GLUCOSE mg/dL  --   --  384*  384*  --   --   --   --  156*  --  131*  --  211*  --  430*  430*    < > = values in this interval not displayed.     Scheduled medications  Insulin, 0-100 Units, pump - subcutaneous, Continuous Pump  potassium phosphate, 15 mmol, intravenous, Once      Continuous medications  dextrose 10 % in water (D10W), 150 mL/hr      PRN medications  PRN medications: dextrose 10 % in water (D10W), dextrose, glucagon, insulin lispro, insulin regular    Results for orders placed or performed during the hospital encounter of 12/08/23 (from the past 24 hour(s))   POCT GLUCOSE   Result Value Ref Range    POCT Glucose 352 (H) 74 - 99 mg/dL   Magnesium   Result Value Ref Range    Magnesium 1.91 1.60 - 2.40 mg/dL   Basic metabolic panel   Result Value Ref Range    Glucose 384 (H) 74 - 99 mg/dL    Sodium 135 (L) 136 - 145 mmol/L    Potassium 3.4 (L) 3.5 - 5.3 mmol/L    Chloride 107 98 - 107 mmol/L    Bicarbonate 19 (L) 21 - 32 mmol/L    Anion Gap 12 10 - 20 mmol/L    Urea Nitrogen 11 6 - 23 mg/dL    Creatinine 0.83 0.50 - 1.30 mg/dL    eGFR >90 >60 mL/min/1.73m*2    Calcium 7.7 (L) 8.6 - 10.3 mg/dL   CBC   Result Value Ref Range    WBC 4.1 (L) 4.4 - 11.3 x10*3/uL    nRBC 0.0 0.0 - 0.0 /100 WBCs    RBC 4.34 (L) 4.50 - 5.90 x10*6/uL    Hemoglobin 14.1 13.5 - 17.5 g/dL    Hematocrit 40.1 (L) 41.0 - 52.0 %    MCV 92 80 - 100 fL    MCH 32.5 26.0 - 34.0 pg    MCHC 35.2 32.0 - 36.0 g/dL    RDW 13.2 11.5 - 14.5 %    Platelets 159 150 - 450 x10*3/uL   Renal Function Panel   Result Value Ref Range    Glucose 384 (H) 74 - 99 mg/dL    Sodium 135 (L) 136 - 145 mmol/L    Potassium 3.4 (L) 3.5 - 5.3 mmol/L    Chloride 107 98 - 107 mmol/L    Bicarbonate 19 (L) 21 - 32 mmol/L    Anion Gap 12 10 - 20 mmol/L    Urea Nitrogen 11 6 - 23 mg/dL    Creatinine 0.83 0.50 - 1.30 mg/dL    eGFR >90 >60 mL/min/1.73m*2    Calcium 7.7 (L) 8.6 - 10.3 mg/dL    Phosphorus 2.2 (L) 2.5 - 4.9 mg/dL    Albumin 3.1 (L) 3.4  - 5.0 g/dL   POCT GLUCOSE   Result Value Ref Range    POCT Glucose 331 (H) 74 - 99 mg/dL   POCT GLUCOSE   Result Value Ref Range    POCT Glucose 335 (H) 74 - 99 mg/dL           Assessment/Plan   Principal Problem:    Diabetic ketoacidosis without coma associated with type 1 diabetes mellitus (CMS/HCC)     IMPRESSION   POORLY CONTROLLED TYPE I DM  DKA - resolved  Long history of non adherence with prandial insulin  [employer] Minimed 770G pump started 1.5 weeks ago  No pump site change since it was started  No sensor change since it was started  Only 1% of the week spent in auto mode  92% of values via basal   Most recent A1C of 15.5% as of November 2023     Recommendations:  To increase basal rate to 0.85 units/hr  To change max basal to 4 units  Counseled regarding what max basal means  Patient to input carb data in order for the pump to calculate using bolus wizard  Show the difference between what appears to be for current blood sugar of 291 mg/dL without carbs versus low blood sugar of 291 mg/dL with 60 g of carbs (1.3 units vs. 5.3 units)  To continue the use of the Guardian connect sensor for the intensive glucose monitoring due to the dynamic nature of insulin requirements, the narrow therapeutic index of insulin and the potentially fatal consequences of treatment  Diabetic diet  Accuchecks ACHS  Hypoglycemic protocol  It is abundantly clear that the patient lacks pump education  Counseled that insulin pump sites must be changed every 3 days  Counseled that his sensor must be changed every 7 days  Counseled that in the event of hyperglycemia, he is to change his insulin pump site if recurrent boluses are not improving glycemic control  Counseled that even if the pump sites is changed and he is still hyperglycemic, he is to inject insulin via an insulin pen in order to prevent an episode of DKA  For outpatient follow up with his endocrinologist   Patient may be discharged later today as desired   Will continue  to follow        Lori Packer MD

## 2023-12-10 NOTE — DISCHARGE SUMMARY
Discharge Diagnosis  Diabetic ketoacidosis without coma associated with type 1 diabetes mellitus (CMS/[facility])    Issues Requiring Follow-Up  Poorly controlled insulin-dependent diabetes    This discharge took greater than 35 minutes.    Test Results Pending At Discharge  Pending Labs       No current pending labs.            Hospital Course   33 y.o.  male with a past medical history significant for IDDM 1 with very poor insulin compliance, anxiety and depression.  He has had numerous admissions for diabetic ketoacidosis in settings of infection and noncompliance.  He presents back to the ED with concerns for diabetic ketoacidosis.  He states that this feels similar to prior episodes of DKA.  He states that he did recently have an insulin pump placed about 1 week ago per the recommendation of his endocrinologist at [facility] but he does not know how to use that he states and his mother who is at the bedside is unclear.  She states that he does not think that he retain much of the information when they were originally educating him about the pump.  The patient's mother stated that he did try to bolus 8 units prior to arrival but was unsure if this occurred or if it would affect his care going forward.  He complains of generalized weakness, progressively worsening hyperglycemia over the past few days he has had progressively worsening generalized weakness, occasional lightheadedness, dizziness, nausea and feeling sick to his stomach which she feels like is the beginning of his usual DKA episodes.  He denies any polyuria or polydipsia during this episode.  He denies any recent sick contacts, chemical/environmental exposures, changes in dietary habits or any recent traumatic events/falls other than noted above.     ED course:  1.  Vital signs on presentation: Temperature of 37.1 °C, heart rate of 103, respirations of 18, blood pressure 128/80, pulse ox of 98% on room air  2.  CBC was unremarkable for any  leukocytosis/anemia or other derangements at this time.  3.  Beta hydroxybutyrate of 9.04  4.  Glucose of 498  5.  Sodium of 133  6.  Bicarb of 9 with a repeat of 3  10.  Anion gap of 28 with a repeat of 30  11.  Calcium of 8.3 with a repeat of 7.7  12.  VBG on presentation: pH is 7.11, pCO2 of 27, pO2 of 37, SO2 of 63, [place] bicarb of 8.6  13.  ABG about 3 hours after arrival: pH is 7.09, pCO2 10, pO2 of 123, SO2 99, catheter bicarb of 3  14.  UA: 1+ hyaline casts, 2+ ketones, 3+ glucose, 1+ protein  15.  Patient was initiated on IV fluids as well as insulin gtt. in the ED and admitted for DKA.     12/9:              Today patient seen in the ICU.  He is sleeping but awakens easily.  Voices no specific complaints.  He has closed his anion gap and being transition back to his insulin pump.  However there serious issues regarding this as he clearly does not understand the use of this at this time.  Endocrinology will see the patient and await recommendations regarding discharge.  Otherwise denies interval new symptoms or complaints including chest pain palpitations pleuritic type pain unusual shortness of breath cough sputum nausea URI symptoms diarrhea dysuria flank pain fever or chills.    12/10:          Today the patient is seen on medical floor.  He is back on his insulin pump.  Received extensive education from endocrinology today but will require ongoing close follow-up with his primary endocrinologist.  Taking diet well. Otherwise denies interval new symptoms or complaints including chest pain palpitations pleuritic type pain unusual shortness of breath cough sputum nausea URI symptoms diarrhea dysuria flank pain fever or chills.    Review of Systems   Constitutional:  Positive for activity change, appetite change, and fatigue.   Respiratory: Negative for cough and shortness of breath.    Gastrointestinal: Negative for nausea. Negative for abdominal pain, constipation, diarrhea and vomiting.   Endocrine:  Negative for polydipsia, polyphagia and polyuria.   Musculoskeletal:  Positive for myalgias.   Neurological:  Positive for weakness.   All other systems reviewed and are nega    1.  DKA, IDDM  -Insulin GTT per protocol  -Continued on IV fluids per DKA protocol  -Every 4 hours RFP/Mg/ABG  -Endocrinology consult appreciated  -Patient states that he had an insulin pump placed about 1 week ago but does not know how to use it properly.  And he has only been bolusing occasional amounts as he sees fit  -Anion gap on presentation of around 30  -Beta hydroxybutyrate of 9.04 on presentation  12/9: Anion gap closed and transitioning back to insulin pump but he will require significant education and follow-up with endocrinology regarding this.  12/10: Back on his insulin pump.  Has been extensively educated by endocrinology regarding its proper use but will need to follow closely with his endocrinologist.     2.  High anion gap metabolic acidosis  -VBG on presentation: pH is 7.11, pCO2 of 27, pO2 of 37, SO2 of 63, [place] bicarb of 8.6  -ABG about 3 hours after arrival: pH is 7.09, pCO2 10, pO2 of 123, SO2 99, catheter bicarb of 3  -If progressively worsening may require a bicarb drip but will reassess around midnight electrolytes and fluid status.  12/9: Resolved     3.  Rash  -Unclear if this is due to him working outside in the cold & without any sunscreen  -He is quite flushed in his face as well as upper extremities and it is tapering down to his mid abdomen as well as forearms bilaterally  -Will continue to reassess and consider Benadryl/steroids but will need to be careful in setting of DKA, he has no complaints of itchiness or burning sensation.     4.  Anxiety/depression  -Continued on outpatient follow-up    Pertinent Physical Exam At Time of Discharge  Physical Exam  Constitutional:       General: He is not in acute distress.     Appearance: Normal appearance.  Well-developed well-nourished  male  HENT:       Head: Normocephalic and atraumatic.      Mouth/Throat:      Mouth: Mucous membranes are moist.      Pharynx: Oropharynx is clear.   Eyes:      Extraocular Movements: Extraocular movements intact.      Conjunctiva/sclera: Conjunctivae normal.      Pupils: Pupils are equal, round, and reactive to light.   Neck:      Vascular: No carotid bruit.   Cardiovascular:      Rate and Rhythm: Normal rate and regular rhythm.      Pulses: Normal pulses.      Heart sounds: No murmur heard.  Pulmonary:      Effort: Pulmonary effort is normal. No respiratory distress.      Breath sounds: Normal breath sounds. No wheezing, rhonchi or rales.   Chest:      Chest wall: No tenderness.   Abdominal:      General: Abdomen is flat. Bowel sounds are normal. There is no distension.      Palpations: Abdomen is soft. There is no mass.      Tenderness: There is no abdominal tenderness. There is no guarding or rebound.   Musculoskeletal:         General: No swelling or signs of injury. Normal range of motion.      Cervical back: Normal range of motion and neck supple. No rigidity or tenderness.      Right lower leg: No edema.      Left lower leg: No edema.   Skin:     General: Skin is warm.      Findings: No bruising, erythema or lesion.        Neurological:      General: No focal deficit present.      Mental Status: He is alert and oriented to person, place, and time. Mental status is at baseline.      Cranial Nerves: No cranial nerve deficit.      Sensory: No sensory deficit.      Motor: No weakness.      Gait: Gait normal.   Psychiatric:         Mood and Affect: Mood normal.         Behavior: Behavior normal.         Thought Content: Thought content normal.         Judgment: Judgment normal.      Home Medications     Medication List      CONTINUE taking these medications     blood sugar diagnostic strip   Dexcom G6  misc; Generic drug: Dexcom G4 platinum    Dexcom G6 Sensor device; Generic drug: blood-glucose sensor   Dexcom G6  "Transmitter device; Generic drug: Dexcom G4 platinum   transmitter   HumaLOG KwikPen Insulin 100 unit/mL injection; Generic drug: insulin   lispro; follow insulin correction scale beginning with 6 units three times   daily with meals   lancets misc   pen needle, diabetic 32 gauge x 5/32\" needle     STOP taking these medications     Lantus Solostar U-100 Insulin 100 unit/mL (3 mL) pen; Generic drug:   insulin glargine       Outpatient Follow-Up  Endocrinologist and PCP    Cuate Wynn MD  "

## 2024-02-14 ENCOUNTER — TELEPHONE (OUTPATIENT)
Dept: PRIMARY CARE | Facility: CLINIC | Age: 34
End: 2024-02-14
Payer: COMMERCIAL

## 2024-02-14 ENCOUNTER — APPOINTMENT (OUTPATIENT)
Dept: RADIOLOGY | Facility: HOSPITAL | Age: 34
End: 2024-02-14
Payer: COMMERCIAL

## 2024-02-14 ENCOUNTER — APPOINTMENT (OUTPATIENT)
Dept: CARDIOLOGY | Facility: HOSPITAL | Age: 34
End: 2024-02-14
Payer: COMMERCIAL

## 2024-02-14 ENCOUNTER — HOSPITAL ENCOUNTER (EMERGENCY)
Facility: HOSPITAL | Age: 34
Discharge: HOME | End: 2024-02-14
Attending: EMERGENCY MEDICINE
Payer: COMMERCIAL

## 2024-02-14 VITALS
SYSTOLIC BLOOD PRESSURE: 141 MMHG | OXYGEN SATURATION: 98 % | WEIGHT: 170 LBS | HEIGHT: 70 IN | HEART RATE: 114 BPM | RESPIRATION RATE: 16 BRPM | BODY MASS INDEX: 24.34 KG/M2 | TEMPERATURE: 98.4 F | DIASTOLIC BLOOD PRESSURE: 100 MMHG

## 2024-02-14 DIAGNOSIS — R07.89 CHEST WALL PAIN: Primary | ICD-10-CM

## 2024-02-14 LAB
ALBUMIN SERPL BCP-MCNC: 4.2 G/DL (ref 3.4–5)
ALP SERPL-CCNC: 90 U/L (ref 33–120)
ALT SERPL W P-5'-P-CCNC: 14 U/L (ref 10–52)
ANION GAP BLDV CALCULATED.4IONS-SCNC: 15 MMOL/L (ref 10–25)
ANION GAP SERPL CALC-SCNC: 15 MMOL/L (ref 10–20)
AST SERPL W P-5'-P-CCNC: 11 U/L (ref 9–39)
ATRIAL RATE: 105 BPM
B-OH-BUTYR SERPL-SCNC: 3.25 MMOL/L (ref 0.02–0.27)
BASE EXCESS BLDV CALC-SCNC: 0.7 MMOL/L (ref -2–3)
BASOPHILS # BLD AUTO: 0.04 X10*3/UL (ref 0–0.1)
BASOPHILS NFR BLD AUTO: 0.7 %
BILIRUB DIRECT SERPL-MCNC: 0.2 MG/DL (ref 0–0.3)
BILIRUB SERPL-MCNC: 1 MG/DL (ref 0–1.2)
BODY TEMPERATURE: 37 DEGREES CELSIUS
BUN SERPL-MCNC: 20 MG/DL (ref 6–23)
CA-I BLDV-SCNC: 1.22 MMOL/L (ref 1.1–1.33)
CALCIUM SERPL-MCNC: 9 MG/DL (ref 8.6–10.3)
CARDIAC TROPONIN I PNL SERPL HS: <3 NG/L (ref 0–20)
CARDIAC TROPONIN I PNL SERPL HS: <3 NG/L (ref 0–20)
CHLORIDE BLDV-SCNC: 96 MMOL/L (ref 98–107)
CHLORIDE SERPL-SCNC: 99 MMOL/L (ref 98–107)
CO2 SERPL-SCNC: 24 MMOL/L (ref 21–32)
CREAT SERPL-MCNC: 0.76 MG/DL (ref 0.5–1.3)
D DIMER PPP FEU-MCNC: 1929 NG/ML FEU
EGFRCR SERPLBLD CKD-EPI 2021: >90 ML/MIN/1.73M*2
EOSINOPHIL # BLD AUTO: 0.05 X10*3/UL (ref 0–0.7)
EOSINOPHIL NFR BLD AUTO: 0.9 %
ERYTHROCYTE [DISTWIDTH] IN BLOOD BY AUTOMATED COUNT: 11.6 % (ref 11.5–14.5)
GLUCOSE BLDV-MCNC: 354 MG/DL (ref 74–99)
GLUCOSE SERPL-MCNC: 317 MG/DL (ref 74–99)
HCO3 BLDV-SCNC: 27.1 MMOL/L (ref 22–26)
HCT VFR BLD AUTO: 50.3 % (ref 41–52)
HCT VFR BLD EST: 53 % (ref 41–52)
HGB BLD-MCNC: 17.6 G/DL (ref 13.5–17.5)
HGB BLDV-MCNC: 17.8 G/DL (ref 13.5–17.5)
IMM GRANULOCYTES # BLD AUTO: 0.01 X10*3/UL (ref 0–0.7)
IMM GRANULOCYTES NFR BLD AUTO: 0.2 % (ref 0–0.9)
INHALED O2 CONCENTRATION: 40 %
LACTATE BLDV-SCNC: 1.4 MMOL/L (ref 0.4–2)
LYMPHOCYTES # BLD AUTO: 1.94 X10*3/UL (ref 1.2–4.8)
LYMPHOCYTES NFR BLD AUTO: 33 %
MAGNESIUM SERPL-MCNC: 1.79 MG/DL (ref 1.6–2.4)
MCH RBC QN AUTO: 31.5 PG (ref 26–34)
MCHC RBC AUTO-ENTMCNC: 35 G/DL (ref 32–36)
MCV RBC AUTO: 90 FL (ref 80–100)
MONOCYTES # BLD AUTO: 0.42 X10*3/UL (ref 0.1–1)
MONOCYTES NFR BLD AUTO: 7.1 %
NEUTROPHILS # BLD AUTO: 3.42 X10*3/UL (ref 1.2–7.7)
NEUTROPHILS NFR BLD AUTO: 58.1 %
NRBC BLD-RTO: 0 /100 WBCS (ref 0–0)
OXYHGB MFR BLDV: 40.1 % (ref 45–75)
P AXIS: 74 DEGREES
PCO2 BLDV: 48 MM HG (ref 41–51)
PH BLDV: 7.36 PH (ref 7.33–7.43)
PHOSPHATE SERPL-MCNC: 4 MG/DL (ref 2.5–4.9)
PLATELET # BLD AUTO: 177 X10*3/UL (ref 150–450)
PO2 BLDV: 25 MM HG (ref 35–45)
POTASSIUM BLDV-SCNC: 4.6 MMOL/L (ref 3.5–5.3)
POTASSIUM SERPL-SCNC: 4.3 MMOL/L (ref 3.5–5.3)
PR INTERVAL: 145 MS
PROT SERPL-MCNC: 6.8 G/DL (ref 6.4–8.2)
Q ONSET: 252 MS
QRS COUNT: 17 BEATS
QRS DURATION: 87 MS
QT INTERVAL: 339 MS
QTC CALCULATION(BAZETT): 446 MS
QTC FREDERICIA: 407 MS
R AXIS: 51 DEGREES
RBC # BLD AUTO: 5.59 X10*6/UL (ref 4.5–5.9)
SAO2 % BLDV: 41 % (ref 45–75)
SODIUM BLDV-SCNC: 133 MMOL/L (ref 136–145)
SODIUM SERPL-SCNC: 134 MMOL/L (ref 136–145)
T AXIS: 69 DEGREES
T OFFSET: 421 MS
VENTRICULAR RATE: 104 BPM
WBC # BLD AUTO: 5.9 X10*3/UL (ref 4.4–11.3)

## 2024-02-14 PROCEDURE — 36415 COLL VENOUS BLD VENIPUNCTURE: CPT | Performed by: NURSE PRACTITIONER

## 2024-02-14 PROCEDURE — 99284 EMERGENCY DEPT VISIT MOD MDM: CPT | Mod: 25 | Performed by: EMERGENCY MEDICINE

## 2024-02-14 PROCEDURE — 96361 HYDRATE IV INFUSION ADD-ON: CPT

## 2024-02-14 PROCEDURE — 84132 ASSAY OF SERUM POTASSIUM: CPT | Mod: 59 | Performed by: NURSE PRACTITIONER

## 2024-02-14 PROCEDURE — 80048 BASIC METABOLIC PNL TOTAL CA: CPT | Performed by: NURSE PRACTITIONER

## 2024-02-14 PROCEDURE — 96360 HYDRATION IV INFUSION INIT: CPT

## 2024-02-14 PROCEDURE — 84484 ASSAY OF TROPONIN QUANT: CPT | Performed by: NURSE PRACTITIONER

## 2024-02-14 PROCEDURE — 71275 CT ANGIOGRAPHY CHEST: CPT | Mod: FOREIGN READ | Performed by: RADIOLOGY

## 2024-02-14 PROCEDURE — 84100 ASSAY OF PHOSPHORUS: CPT | Performed by: NURSE PRACTITIONER

## 2024-02-14 PROCEDURE — 85025 COMPLETE CBC W/AUTO DIFF WBC: CPT | Performed by: NURSE PRACTITIONER

## 2024-02-14 PROCEDURE — 2500000004 HC RX 250 GENERAL PHARMACY W/ HCPCS (ALT 636 FOR OP/ED): Performed by: NURSE PRACTITIONER

## 2024-02-14 PROCEDURE — 83735 ASSAY OF MAGNESIUM: CPT | Performed by: NURSE PRACTITIONER

## 2024-02-14 PROCEDURE — 93005 ELECTROCARDIOGRAM TRACING: CPT

## 2024-02-14 PROCEDURE — 85379 FIBRIN DEGRADATION QUANT: CPT | Performed by: NURSE PRACTITIONER

## 2024-02-14 PROCEDURE — 82248 BILIRUBIN DIRECT: CPT | Performed by: NURSE PRACTITIONER

## 2024-02-14 PROCEDURE — 71045 X-RAY EXAM CHEST 1 VIEW: CPT | Performed by: RADIOLOGY

## 2024-02-14 PROCEDURE — 82010 KETONE BODYS QUAN: CPT | Performed by: NURSE PRACTITIONER

## 2024-02-14 PROCEDURE — 71045 X-RAY EXAM CHEST 1 VIEW: CPT

## 2024-02-14 PROCEDURE — 2550000001 HC RX 255 CONTRASTS: Performed by: EMERGENCY MEDICINE

## 2024-02-14 PROCEDURE — 99285 EMERGENCY DEPT VISIT HI MDM: CPT | Mod: 25

## 2024-02-14 PROCEDURE — 71275 CT ANGIOGRAPHY CHEST: CPT

## 2024-02-14 RX ADMIN — SODIUM CHLORIDE 1000 ML: 9 INJECTION, SOLUTION INTRAVENOUS at 15:05

## 2024-02-14 RX ADMIN — IOHEXOL 75 ML: 350 INJECTION, SOLUTION INTRAVENOUS at 17:47

## 2024-02-14 ASSESSMENT — PAIN DESCRIPTION - LOCATION: LOCATION: CHEST

## 2024-02-14 ASSESSMENT — PAIN SCALES - GENERAL
PAINLEVEL_OUTOF10: 4
PAINLEVEL_OUTOF10: 4

## 2024-02-14 ASSESSMENT — COLUMBIA-SUICIDE SEVERITY RATING SCALE - C-SSRS
6. HAVE YOU EVER DONE ANYTHING, STARTED TO DO ANYTHING, OR PREPARED TO DO ANYTHING TO END YOUR LIFE?: NO
1. IN THE PAST MONTH, HAVE YOU WISHED YOU WERE DEAD OR WISHED YOU COULD GO TO SLEEP AND NOT WAKE UP?: NO
2. HAVE YOU ACTUALLY HAD ANY THOUGHTS OF KILLING YOURSELF?: NO

## 2024-02-14 ASSESSMENT — LIFESTYLE VARIABLES
HAVE YOU EVER FELT YOU SHOULD CUT DOWN ON YOUR DRINKING: NO
EVER HAD A DRINK FIRST THING IN THE MORNING TO STEADY YOUR NERVES TO GET RID OF A HANGOVER: NO
EVER FELT BAD OR GUILTY ABOUT YOUR DRINKING: NO
HAVE PEOPLE ANNOYED YOU BY CRITICIZING YOUR DRINKING: NO

## 2024-02-14 ASSESSMENT — PAIN DESCRIPTION - ONSET: ONSET: ONGOING

## 2024-02-14 ASSESSMENT — PAIN DESCRIPTION - PAIN TYPE: TYPE: ACUTE PAIN

## 2024-02-14 ASSESSMENT — PAIN DESCRIPTION - DESCRIPTORS: DESCRIPTORS: ACHING

## 2024-02-14 ASSESSMENT — PAIN DESCRIPTION - FREQUENCY: FREQUENCY: SEVERAL DAYS A WEEK

## 2024-02-14 ASSESSMENT — PAIN - FUNCTIONAL ASSESSMENT: PAIN_FUNCTIONAL_ASSESSMENT: 0-10

## 2024-02-14 NOTE — ED PROVIDER NOTES
HPI   Chief Complaint   Patient presents with    Chest Pain     X1.5 weeks- pain with movement       33-year-old male with a past history of type 1 diabetes with frequent hyperglycemia/DKA recently placed on an insulin pump, anxiety presents to the emergency department today for chest pain.  Patient states he has been having left-sided chest pain for the past week and a half.  It has been fairly constant in nature with no exacerbating or relieving factors.  There is no associated nausea, vomiting, diaphoresis or shortness of breath.  Denies any dyspnea on exertion or lower extremity swelling.  Pain does slightly worsen with movement and is reproducible on palpation.  No dizziness, headache or syncope.  States that sugars been running in the 300s recently.  No urinary symptoms, fever or chills. Denies abd pain.                          [place] Coma Scale Score: 15                  Patient History   Past Medical History:   Diagnosis Date    Abdominal pain 04/21/2023    Anxiety and depression 08/08/2021    COVID-19 virus infection 07/11/2023    Diabetes mellitus (CMS/[facility])     Diabetic acetonemia (CMS/[facility]) 11/15/2023    DIABETIC KETO ACIDOSIS    DKA (diabetic ketoacidosis) (CMS/[facility]) 07/11/2023    DKA, type 1, not at goal (CMS/[facility]) 04/21/2023    DM2 (diabetes mellitus, type 2) (CMS/[facility]) 11/15/2023    DIABETES    Elevated blood sugar 11/15/2023    ELEVATED BLOOD SUGAR/ 375 LAST CHECK    Elevated blood-pressure reading, without diagnosis of hypertension 09/09/2020    Elevated blood pressure reading    Fatigue 07/11/2023    Generalized anxiety disorder 07/11/2023    Microalbuminuria due to type 1 diabetes mellitus (CMS/[facility]) 07/11/2023    Poorly controlled diabetes mellitus (CMS/[facility]) 07/11/2023    Type 1 diabetes mellitus (CMS/[facility]) 07/11/2023    Type 1 diabetes mellitus with hyperglycemia (CMS/[facility]) 02/25/2019    Vitamin B12 deficiency 07/11/2023    Vitamin D deficiency 07/11/2023    Vomiting 11/15/2023    VOMITING HEADACHE  BACK ACHE     No past surgical history on file.  Family History   Problem Relation Name Age of Onset    No Known Problems Mother      No Known Problems Father      Hypertension Other      Coronary artery disease Other      Diabetes Other      Heart failure Other       Social History     Tobacco Use    Smoking status: Never    Smokeless tobacco: Never   Vaping Use    Vaping Use: Former   Substance Use Topics    Alcohol use: Never    Drug use: Never       Physical Exam   ED Triage Vitals [02/14/24 1039]   Temperature Heart Rate Respirations BP   36.9 °C (98.4 °F) 95 18 (!) 142/98      Pulse Ox Temp src Heart Rate Source Patient Position   99 % -- -- Sitting      BP Location FiO2 (%)     -- --       Physical Exam  Vitals and nursing note reviewed.   Constitutional:       General: He is not in acute distress.     Appearance: Normal appearance. He is not toxic-appearing.   HENT:      Right Ear: Tympanic membrane normal.      Left Ear: Tympanic membrane normal.      Mouth/Throat:      Mouth: Mucous membranes are moist.      Pharynx: Oropharynx is clear.   Eyes:      Extraocular Movements: Extraocular movements intact.      Pupils: Pupils are equal, round, and reactive to light.   Cardiovascular:      Rate and Rhythm: Normal rate and regular rhythm.      Pulses: Normal pulses.      Heart sounds: Normal heart sounds.   Pulmonary:      Effort: Pulmonary effort is normal.      Breath sounds: Normal breath sounds.   Chest:      Chest wall: Tenderness (left chest wall pain no overlying skin changes) present.   Abdominal:      General: Abdomen is flat. Bowel sounds are normal.      Palpations: Abdomen is soft.      Tenderness: There is no abdominal tenderness.   Musculoskeletal:         General: Normal range of motion.      Cervical back: Normal range of motion and neck supple.      Right lower leg: No edema.      Left lower leg: No edema.   Skin:     General: Skin is warm and dry.      Capillary Refill: Capillary refill takes  less than 2 seconds.   Neurological:      General: No focal deficit present.      Mental Status: He is alert and oriented to person, place, and time.   Psychiatric:         Mood and Affect: Mood normal.         Behavior: Behavior normal.         Judgment: Judgment normal.         Labs Reviewed   CBC WITH AUTO DIFFERENTIAL - Abnormal       Result Value    WBC 5.9      nRBC 0.0      RBC 5.59      Hemoglobin 17.6 (*)     Hematocrit 50.3      MCV 90      MCH 31.5      MCHC 35.0      RDW 11.6      Platelets 177      Neutrophils % 58.1      Immature Granulocytes %, Automated 0.2      Lymphocytes % 33.0      Monocytes % 7.1      Eosinophils % 0.9      Basophils % 0.7      Neutrophils Absolute 3.42      Immature Granulocytes Absolute, Automated 0.01      Lymphocytes Absolute 1.94      Monocytes Absolute 0.42      Eosinophils Absolute 0.05      Basophils Absolute 0.04     BASIC METABOLIC PANEL - Abnormal    Glucose 317 (*)     Sodium 134 (*)     Potassium 4.3      Chloride 99      Bicarbonate 24      Anion Gap 15      Urea Nitrogen 20      Creatinine 0.76      eGFR >90      Calcium 9.0     BLOOD GAS VENOUS FULL PANEL - Abnormal    POCT pH, Venous 7.36      POCT pCO2, Venous 48      POCT pO2, Venous 25 (*)     POCT SO2, Venous 41 (*)     POCT Oxy Hemoglobin, Venous 40.1 (*)     POCT Hematocrit Calculated, Venous 53.0 (*)     POCT Sodium, Venous 133 (*)     POCT Potassium, Venous 4.6      POCT Chloride, Venous 96 (*)     POCT Ionized Calicum, Venous 1.22      POCT Glucose, Venous 354 (*)     POCT Lactate, Venous 1.4      POCT Base Excess, Venous 0.7      POCT HCO3 Calculated, Venous 27.1 (*)     POCT Hemoglobin, Venous 17.8 (*)     POCT Anion Gap, Venous 15.0      Patient Temperature 37.0      FiO2 40     BETA HYDROXYBUTYRATE - Abnormal    Beta-Hydroxybutyrate 3.25 (*)     Narrative:     The beta-hydroxybutyrate test performance characteristics have been validated by  [facility].  This test has not been approved by the FDA; however,such approval is not necessary.     MAGNESIUM - Normal    Magnesium 1.79     PHOSPHORUS - Normal    Phosphorus 4.0     HEPATIC FUNCTION PANEL - Normal    Albumin 4.2      Bilirubin, Total 1.0      Bilirubin, Direct 0.2      Alkaline Phosphatase 90      ALT 14      AST 11      Total Protein 6.8     SERIAL TROPONIN-INITIAL - Normal    Troponin I, High Sensitivity <3      Narrative:     Less than 99th percentile of normal range cutoff-  Female and children under 18 years old <14 ng/L; Male <21 ng/L: Negative  Repeat testing should be performed if clinically indicated.     Female and children under 18 years old 14-50 ng/L; Male 21-50 ng/L:  Consistent with possible cardiac damage and possible increased clinical   risk. Serial measurements may help to assess extent of myocardial damage.     >50 ng/L: Consistent with cardiac damage, increased clinical risk and  myocardial infarction. Serial measurements may help assess extent of   myocardial damage.      NOTE: Children less than 1 year old may have higher baseline troponin   levels and results should be interpreted in conjunction with the overall   clinical context.     NOTE: Troponin I testing is performed using a different   testing methodology at [facility] than at [facility]. Direct result comparisons should only   be made within the same method.   SERIAL TROPONIN, 1 HOUR - Normal    Troponin I, High Sensitivity <3      Narrative:     Less than 99th percentile of normal range cutoff-  Female and children under 18 years old <14 ng/L; Male <21 ng/L: Negative  Repeat testing should be performed if clinically indicated.     Female and children under 18 years old 14-50 ng/L; Male 21-50 ng/L:  Consistent with possible cardiac damage and possible increased clinical   risk. Serial measurements may help to assess extent of myocardial damage.     >50 ng/L: Consistent with cardiac damage, increased  clinical risk and  myocardial infarction. Serial measurements may help assess extent of   myocardial damage.      NOTE: Children less than 1 year old may have higher baseline troponin   levels and results should be interpreted in conjunction with the overall   clinical context.     NOTE: Troponin I testing is performed using a different   testing methodology at [facility] than at other   [facility] hospitals. Direct result comparisons should only   be made within the same method.   TROPONIN SERIES- (INITIAL, 1 HR)    Narrative:     The following orders were created for panel order Troponin Series, (0, 1 HR).  Procedure                               Abnormality         Status                     ---------                               -----------         ------                     Troponin I, High Sensiti...[[id number]]  Normal              Final result               Troponin, High Sensitivi...[[id number]]  Normal              Final result                 Please view results for these tests on the individual orders.   D-DIMER, VTE EXCLUSION     Pain Management Panel  More data may exist         Latest Ref Rng & Units 3/14/2021 12/17/2020   Pain Management Panel   Amphetamine Screen, Urine NEGATIVE PRESUMPTIVE NEGATIVE  PRESUMPTIVE NEGATIVE    Barbiturate Screen, Urine NEGATIVE PRESUMPTIVE NEGATIVE  PRESUMPTIVE NEGATIVE    Fentanyl Screen, Urine NEGATIVE PRESUMPTIVE NEGATIVE  PRESUMPTIVE NEGATIVE    Methadone Screen, Urine NEGATIVE PRESUMPTIVE NEGATIVE  PRESUMPTIVE NEGATIVE      XR chest 1 view   Final Result   1.  No evidence of acute cardiopulmonary process.                  MACRO:   None.        Signed by: Jose Kohler 2/14/2024 11:44 AM   Dictation workstation:   [id number]          ED Course & MDM        Medical Decision Making  On initial evaluation patient well-appearing the emergency department at this time.  Patient does smell of ketosis at this time. Patient's EKG shows sinus tachycardia at a  rate of 74  QTc 446 likely early repull pattern.  Lab work was obtained.  VBG pH is within normal limits troponin x 2 are negative CBC shows no leukocytosis ketones beta hydroxy 3.25 however patient's anion gap and bicarb within normal limits.  He is slightly hyperglycemic at 317.  Was given 1 L of normal saline.  Phosphorus negative.  Patient is tachycardic therefore a dimer was added on and 1 L of normal saline.  That is still pending at this time chest x-ray shows no evidence of acute cardiopulmonary process.  See Dr. Ortiz's note for further workup and disposition.        Procedure  Procedures      Differential Diagnoses include acs, hyperglycemia, DKA, PE  This is not an exhaustive list of all the diagnosis and therapeutics are considered during the patient's evaluation for an emergency medical condition.     Clarita Victor, CHRISTIANO-CNP  02/14/24 2629

## 2024-02-14 NOTE — TELEPHONE ENCOUNTER
Patient called in and stated that he was having chest pain, heart pain,  Tightness, a burning pain and his   Face / arms went numb once. Patient states he also was having r neck and shoulder pain. Advised ER.

## 2024-02-23 ENCOUNTER — OFFICE VISIT (OUTPATIENT)
Dept: PRIMARY CARE | Facility: CLINIC | Age: 34
End: 2024-02-23
Payer: COMMERCIAL

## 2024-02-23 ENCOUNTER — LAB (OUTPATIENT)
Dept: LAB | Facility: LAB | Age: 34
End: 2024-02-23
Payer: COMMERCIAL

## 2024-02-23 VITALS
HEIGHT: 70 IN | HEART RATE: 106 BPM | DIASTOLIC BLOOD PRESSURE: 76 MMHG | WEIGHT: 170 LBS | SYSTOLIC BLOOD PRESSURE: 128 MMHG | BODY MASS INDEX: 24.34 KG/M2 | OXYGEN SATURATION: 95 % | TEMPERATURE: 98 F | RESPIRATION RATE: 18 BRPM

## 2024-02-23 DIAGNOSIS — E10.10 DKA, TYPE 1, NOT AT GOAL (MULTI): ICD-10-CM

## 2024-02-23 DIAGNOSIS — F31.9 BIPOLAR AFFECTIVE DISORDER, REMISSION STATUS UNSPECIFIED (MULTI): ICD-10-CM

## 2024-02-23 DIAGNOSIS — E10.65 POORLY CONTROLLED TYPE 1 DIABETES MELLITUS (MULTI): ICD-10-CM

## 2024-02-23 DIAGNOSIS — E10.29 MICROALBUMINURIA DUE TO TYPE 1 DIABETES MELLITUS (MULTI): ICD-10-CM

## 2024-02-23 DIAGNOSIS — R07.89 ATYPICAL CHEST PAIN: ICD-10-CM

## 2024-02-23 DIAGNOSIS — F41.9 ANXIETY AND DEPRESSION: Primary | ICD-10-CM

## 2024-02-23 DIAGNOSIS — F32.A ANXIETY AND DEPRESSION: Primary | ICD-10-CM

## 2024-02-23 DIAGNOSIS — E55.9 VITAMIN D DEFICIENCY: ICD-10-CM

## 2024-02-23 DIAGNOSIS — E53.8 VITAMIN B12 DEFICIENCY: ICD-10-CM

## 2024-02-23 DIAGNOSIS — R30.0 DYSURIA: ICD-10-CM

## 2024-02-23 DIAGNOSIS — Z96.41 INSULIN PUMP IN PLACE: ICD-10-CM

## 2024-02-23 DIAGNOSIS — F32.A ANXIETY AND DEPRESSION: ICD-10-CM

## 2024-02-23 DIAGNOSIS — R80.9 MICROALBUMINURIA DUE TO TYPE 1 DIABETES MELLITUS (MULTI): ICD-10-CM

## 2024-02-23 DIAGNOSIS — F41.1 GENERALIZED ANXIETY DISORDER: ICD-10-CM

## 2024-02-23 DIAGNOSIS — F41.9 ANXIETY AND DEPRESSION: ICD-10-CM

## 2024-02-23 PROBLEM — Z86.16 PERSONAL HISTORY OF COVID-19: Status: RESOLVED | Noted: 2023-04-02 | Resolved: 2024-02-23

## 2024-02-23 PROBLEM — I49.9 CARDIAC ARRHYTHMIA: Status: ACTIVE | Noted: 2022-09-26

## 2024-02-23 PROBLEM — N10: Status: ACTIVE | Noted: 2024-02-23

## 2024-02-23 PROBLEM — Z20.822 CONTACT WITH AND (SUSPECTED) EXPOSURE TO COVID-19: Status: RESOLVED | Noted: 2023-04-21 | Resolved: 2024-02-23

## 2024-02-23 PROBLEM — D72.829 LEUKOCYTOSIS: Status: ACTIVE | Noted: 2024-02-23

## 2024-02-23 PROBLEM — K85.90 ACUTE PANCREATITIS (HHS-HCC): Status: ACTIVE | Noted: 2024-02-23

## 2024-02-23 PROBLEM — R06.09 DYSPNEA ON EXERTION: Status: ACTIVE | Noted: 2021-08-06

## 2024-02-23 LAB
25(OH)D3 SERPL-MCNC: 21 NG/ML (ref 30–100)
ALBUMIN SERPL BCP-MCNC: 4 G/DL (ref 3.4–5)
ALP SERPL-CCNC: 70 U/L (ref 33–120)
ALT SERPL W P-5'-P-CCNC: 12 U/L (ref 10–52)
ANION GAP SERPL CALC-SCNC: 12 MMOL/L (ref 10–20)
APPEARANCE UR: CLEAR
AST SERPL W P-5'-P-CCNC: 12 U/L (ref 9–39)
BILIRUB SERPL-MCNC: 0.8 MG/DL (ref 0–1.2)
BILIRUB UR STRIP.AUTO-MCNC: NEGATIVE MG/DL
BUN SERPL-MCNC: 17 MG/DL (ref 6–23)
CALCIUM SERPL-MCNC: 8.9 MG/DL (ref 8.6–10.3)
CHLORIDE SERPL-SCNC: 101 MMOL/L (ref 98–107)
CHOLEST SERPL-MCNC: 116 MG/DL (ref 0–199)
CHOLESTEROL/HDL RATIO: 2.5
CO2 SERPL-SCNC: 27 MMOL/L (ref 21–32)
COLOR UR: ABNORMAL
CREAT SERPL-MCNC: 0.78 MG/DL (ref 0.5–1.3)
CREAT UR-MCNC: 40.6 MG/DL (ref 20–370)
EGFRCR SERPLBLD CKD-EPI 2021: >90 ML/MIN/1.73M*2
GLUCOSE SERPL-MCNC: 399 MG/DL (ref 74–99)
GLUCOSE UR STRIP.AUTO-MCNC: ABNORMAL MG/DL
HDLC SERPL-MCNC: 47.1 MG/DL
KETONES UR STRIP.AUTO-MCNC: ABNORMAL MG/DL
LDLC SERPL CALC-MCNC: 49 MG/DL
LEUKOCYTE ESTERASE UR QL STRIP.AUTO: NEGATIVE
MICROALBUMIN UR-MCNC: 10.2 MG/L
MICROALBUMIN/CREAT UR: 25.1 UG/MG CREAT
NITRITE UR QL STRIP.AUTO: NEGATIVE
NON HDL CHOLESTEROL: 69 MG/DL (ref 0–149)
PH UR STRIP.AUTO: 6 [PH]
POTASSIUM SERPL-SCNC: 4.6 MMOL/L (ref 3.5–5.3)
PROT SERPL-MCNC: 6.6 G/DL (ref 6.4–8.2)
PROT UR STRIP.AUTO-MCNC: NEGATIVE MG/DL
RBC # UR STRIP.AUTO: NEGATIVE /UL
SODIUM SERPL-SCNC: 135 MMOL/L (ref 136–145)
SP GR UR STRIP.AUTO: 1.04
TRIGL SERPL-MCNC: 101 MG/DL (ref 0–149)
UROBILINOGEN UR STRIP.AUTO-MCNC: <2 MG/DL
VIT B12 SERPL-MCNC: 477 PG/ML (ref 211–911)
VLDL: 20 MG/DL (ref 0–40)

## 2024-02-23 PROCEDURE — 82306 VITAMIN D 25 HYDROXY: CPT

## 2024-02-23 PROCEDURE — 1036F TOBACCO NON-USER: CPT | Performed by: INTERNAL MEDICINE

## 2024-02-23 PROCEDURE — 82570 ASSAY OF URINE CREATININE: CPT

## 2024-02-23 PROCEDURE — 80053 COMPREHEN METABOLIC PANEL: CPT

## 2024-02-23 PROCEDURE — 3078F DIAST BP <80 MM HG: CPT | Performed by: INTERNAL MEDICINE

## 2024-02-23 PROCEDURE — 3074F SYST BP LT 130 MM HG: CPT | Performed by: INTERNAL MEDICINE

## 2024-02-23 PROCEDURE — 36415 COLL VENOUS BLD VENIPUNCTURE: CPT

## 2024-02-23 PROCEDURE — 81003 URINALYSIS AUTO W/O SCOPE: CPT

## 2024-02-23 PROCEDURE — 83036 HEMOGLOBIN GLYCOSYLATED A1C: CPT

## 2024-02-23 PROCEDURE — 3008F BODY MASS INDEX DOCD: CPT | Performed by: INTERNAL MEDICINE

## 2024-02-23 PROCEDURE — 99215 OFFICE O/P EST HI 40 MIN: CPT | Performed by: INTERNAL MEDICINE

## 2024-02-23 PROCEDURE — 80061 LIPID PANEL: CPT

## 2024-02-23 PROCEDURE — 82607 VITAMIN B-12: CPT

## 2024-02-23 PROCEDURE — 82043 UR ALBUMIN QUANTITATIVE: CPT

## 2024-02-23 RX ORDER — INSULIN LISPRO 100 [IU]/ML
INJECTION, SOLUTION INTRAVENOUS; SUBCUTANEOUS
COMMUNITY
Start: 2024-02-08

## 2024-02-23 RX ORDER — BLOOD-GLUCOSE SENSOR
EACH MISCELLANEOUS
Qty: 13 EACH | Refills: 2 | Status: SHIPPED | OUTPATIENT
Start: 2024-02-23

## 2024-02-23 SDOH — ECONOMIC STABILITY: FOOD INSECURITY: WITHIN THE PAST 12 MONTHS, YOU WORRIED THAT YOUR FOOD WOULD RUN OUT BEFORE YOU GOT MONEY TO BUY MORE.: NEVER TRUE

## 2024-02-23 SDOH — ECONOMIC STABILITY: FOOD INSECURITY: WITHIN THE PAST 12 MONTHS, THE FOOD YOU BOUGHT JUST DIDN'T LAST AND YOU DIDN'T HAVE MONEY TO GET MORE.: NEVER TRUE

## 2024-02-23 ASSESSMENT — LIFESTYLE VARIABLES
HOW MANY STANDARD DRINKS CONTAINING ALCOHOL DO YOU HAVE ON A TYPICAL DAY: PATIENT DOES NOT DRINK
AUDIT-C TOTAL SCORE: 0
HOW OFTEN DO YOU HAVE SIX OR MORE DRINKS ON ONE OCCASION: NEVER
SKIP TO QUESTIONS 9-10: 1
HOW OFTEN DO YOU HAVE A DRINK CONTAINING ALCOHOL: NEVER

## 2024-02-23 ASSESSMENT — PATIENT HEALTH QUESTIONNAIRE - PHQ9
2. FEELING DOWN, DEPRESSED OR HOPELESS: NOT AT ALL
SUM OF ALL RESPONSES TO PHQ9 QUESTIONS 1 & 2: 0
1. LITTLE INTEREST OR PLEASURE IN DOING THINGS: NOT AT ALL

## 2024-02-23 ASSESSMENT — PAIN SCALES - GENERAL: PAINLEVEL: 2

## 2024-02-23 NOTE — ASSESSMENT & PLAN NOTE
Check labs as ordered, they were ordered in 11/2023, get these done now, patient now is doing insulin pump therapy, he has no CGM sensors however, the Guardian 4 sensors are ordered, these will likely be more easily refilled through endocrinology however.

## 2024-02-23 NOTE — PROGRESS NOTES
"Chief Complaint/HPI:    Patient was seen at [facility] ER for chest pain earlier this month, no acute issues were noted. The patient had a stress ECHO ordered at time of last visit here, he did not have the testing completed however, he has had difficulty obtaining CGM equipment since change to Medtronic device       DM type 1: patient had episode of DKA noted in 10/2023, HgbA1C was over 16 at that time. Endocrinology has started insulin pump therapy. He is supposed to use Medtronic Sensors, he has not been able to get sensors from endocrinology he states.  He takes no other med therapy. Sees Constance Villar at [facility] endocrinology, he has been diabetic for about 13 years. Vision \"seems ok\" at the present time.  He takes no other therapies     Patient takes no meds for BP or lipids. Patient states that gets \"stabbing pain\" in the chest at times. It may \"hang around \" for 1/2 hour to a full hour.   He notes some chest tightness also.     Anxiety issues: patient has never had anxiety in the past, he states that he has had some anxiety recently, he was given a med by endocrinologist, he never took the med .     Change in color of the hands: patient notes change in color of hands now, he notes that the hands get red, purple at times, they do not feel cold to the patient.             ROS otherwise negative aside from what was mentioned above in HPI.      Patient Active Problem List   Diagnosis    Abdominal pain    COVID-19 virus infection    DKA (diabetic ketoacidosis) (CMS/[facility])    Fatigue    Anxiety and depression    Generalized anxiety disorder    Microalbuminuria due to type 1 diabetes mellitus (CMS/HCC)    Poorly controlled type 1 diabetes mellitus (CMS/[facility])    DKA, type 1, not at goal (CMS/HCC)    Type 1 diabetes mellitus (CMS/HCC)    Type 1 diabetes mellitus with hyperglycemia (CMS/[facility])    Vitamin B12 deficiency    Vitamin D deficiency    Vomiting    Diabetic acetonemia (CMS/[facility])    DM2 (diabetes mellitus, type 2) " (CMS/[facility])    Elevated blood sugar    Atypical chest pain    Dysuria    Bipolar affective disorder, remission status unspecified (CMS/HCC)    Acute bacterial tubulointerstitial nephritis    Acute pancreatitis    Cardiac arrhythmia    Dyspnea on exertion    Leukocytosis         Past Medical History:   Diagnosis Date    Abdominal pain 04/21/2023    Anxiety and depression 08/08/2021    Contact with and (suspected) exposure to covid-19 04/21/2023    COVID-19 virus infection 07/11/2023    Diabetes mellitus (CMS/HCC)     Diabetic acetonemia (CMS/HCC) 11/15/2023    DIABETIC KETO ACIDOSIS    DKA (diabetic ketoacidosis) (CMS/HCC) 07/11/2023    DKA, type 1, not at goal (CMS/HCC) 04/21/2023    DM2 (diabetes mellitus, type 2) (CMS/HCC) 11/15/2023    DIABETES    Elevated blood sugar 11/15/2023    ELEVATED BLOOD SUGAR/ 375 LAST CHECK    Elevated blood-pressure reading, without diagnosis of hypertension 09/09/2020    Elevated blood pressure reading    Fatigue 07/11/2023    Generalized anxiety disorder 07/11/2023    Microalbuminuria due to type 1 diabetes mellitus (CMS/HCC) 07/11/2023    Personal history of COVID-19 04/02/2023    Poorly controlled diabetes mellitus (CMS/HCC) 07/11/2023    Type 1 diabetes mellitus (CMS/HCC) 07/11/2023    Type 1 diabetes mellitus with hyperglycemia (CMS/HCC) 02/25/2019    Vitamin B12 deficiency 07/11/2023    Vitamin D deficiency 07/11/2023    Vomiting 11/15/2023    VOMITING HEADACHE BACK ACHE     History reviewed. No pertinent surgical history.  Social History     Social History Narrative    Not on file         ALLERGIES  Sitagliptin phos-metformin      MEDICATIONS  Current Outpatient Medications on File Prior to Visit   Medication Sig Dispense Refill    blood sugar diagnostic strip Use 2-3 times per day      insulin lispro (HumaLOG) 100 unit/mL injection USE VIA INSULIN PUMP, USING UP  UNITS PER DAY      lancets misc Inject 1 Units under the skin 2 times a day. Sliding scale      [DISCONTINUED]  "insulin lispro (HumaLOG) 100 unit/mL injection follow insulin correction scale beginning with 6 units three times daily with meals 9 mL 0    pen needle, diabetic 32 gauge x 5/32\" needle Use as instructed 4 times daily      [DISCONTINUED] Dexcom G6  misc use as directed      [DISCONTINUED] Dexcom G6 Sensor device       [DISCONTINUED] Dexcom G6 Transmitter device CHANGE EVERY 90 DAYS       No current facility-administered medications on file prior to visit.         PHYSICAL EXAM  /76 (BP Location: Left arm, Patient Position: Sitting, BP Cuff Size: Large adult)   Pulse 106   Temp 36.7 °C (98 °F)   Resp 18   Ht 1.778 m (5' 10\")   Wt 77.1 kg (170 lb)   SpO2 95%   BMI 24.39 kg/m²   Body mass index is 24.39 kg/m².  Gen: Alert,  young male, NAD, he is quiet, affect is somewhat flat   HEENT:  EOMI, conjunctiva and sclera normal in appearance, no thyromegaly or neck masses  Respiratory:  Lungs CTAB  Cardiovascular:  Heart RRR. No M/R/G, no carotid bruits noted, no peripheral edema  Neuro:  Gross motor and sensory intact  Skin:  No suspicious lesions present on exposed skin, no current discoloration of skin of hands  Psychiatry: affect is flat today     ASSESSMENT/PLAN  Problem List Items Addressed This Visit       Anxiety and depression - Primary    Relevant Orders    Referral to [facility]    Generalized anxiety disorder    Current Assessment & Plan     Patient has a flat affect, he takes no med therapy, refer to [facility] for assessment and diagnosis. Treatment may certainly improve compliance with DM regimen         Relevant Orders    Referral to [facility]    Microalbuminuria due to type 1 diabetes mellitus (CMS/[facility])    Current Assessment & Plan     Check labs as ordered, they were ordered in 11/2023, get these done now         Poorly controlled type 1 diabetes mellitus (CMS/[facility])    Current Assessment & Plan     Check labs as ordered, they " were ordered in 11/2023, get these done now, patient now is doing insulin pump therapy, he has no CGM sensors however, the Guardian 4 sensors are ordered, these will likely be more easily refilled through endocrinology however.          Relevant Medications    Guardian 4 Glucose Sensor device    DKA, type 1, not at goal (CMS/[facility])    Relevant Medications    Guardian 4 Glucose Sensor device    Vitamin B12 deficiency    Current Assessment & Plan     Check labs as ordered, they were ordered in 11/2023, get these done now         Vitamin D deficiency    Current Assessment & Plan     Check labs as ordered, they were ordered in 11/2023, get these done now         Atypical chest pain    Current Assessment & Plan     Get the stress ECHO completed as ordered.          Bipolar affective disorder, remission status unspecified (CMS/[facility])    Relevant Orders    Referral to [facility]     Other Visit Diagnoses       Insulin pump in place        Relevant Medications    Guardian 4 Glucose Sensor device          Check labs and stress ECHO as ordered, referral to Behavioral health ordered    Follow up after testing.  Sensor devices ordered    Follow up in 3 months    Henri Brumfield MD

## 2024-02-23 NOTE — ASSESSMENT & PLAN NOTE
Patient has a flat affect, he takes no med therapy, refer to [facility] for assessment and diagnosis. Treatment may certainly improve compliance with DM regimen

## 2024-02-24 LAB
EST. AVERAGE GLUCOSE BLD GHB EST-MCNC: 309 MG/DL
HBA1C MFR BLD: 12.4 %

## 2024-03-13 ENCOUNTER — APPOINTMENT (OUTPATIENT)
Dept: RADIOLOGY | Facility: HOSPITAL | Age: 34
End: 2024-03-13
Payer: COMMERCIAL

## 2024-03-13 ENCOUNTER — APPOINTMENT (OUTPATIENT)
Dept: CARDIOLOGY | Facility: HOSPITAL | Age: 34
End: 2024-03-13
Payer: COMMERCIAL

## 2024-03-13 ENCOUNTER — HOSPITAL ENCOUNTER (EMERGENCY)
Facility: HOSPITAL | Age: 34
Discharge: HOME | End: 2024-03-13
Attending: EMERGENCY MEDICINE
Payer: COMMERCIAL

## 2024-03-13 VITALS
OXYGEN SATURATION: 99 % | SYSTOLIC BLOOD PRESSURE: 152 MMHG | RESPIRATION RATE: 12 BRPM | BODY MASS INDEX: 24.34 KG/M2 | HEIGHT: 70 IN | TEMPERATURE: 97.9 F | HEART RATE: 113 BPM | WEIGHT: 170 LBS | DIASTOLIC BLOOD PRESSURE: 64 MMHG

## 2024-03-13 DIAGNOSIS — R07.9 CHRONIC CHEST PAIN: Primary | ICD-10-CM

## 2024-03-13 DIAGNOSIS — G89.29 CHRONIC CHEST PAIN: Primary | ICD-10-CM

## 2024-03-13 DIAGNOSIS — R21 SKIN RASH: ICD-10-CM

## 2024-03-13 LAB
ALBUMIN SERPL BCP-MCNC: 3.9 G/DL (ref 3.4–5)
ALP SERPL-CCNC: 66 U/L (ref 33–120)
ALT SERPL W P-5'-P-CCNC: 12 U/L (ref 10–52)
ANION GAP BLDV CALCULATED.4IONS-SCNC: 12 MMOL/L (ref 10–25)
ANION GAP SERPL CALC-SCNC: 15 MMOL/L (ref 10–20)
APPEARANCE UR: CLEAR
AST SERPL W P-5'-P-CCNC: 12 U/L (ref 9–39)
BASE EXCESS BLDV CALC-SCNC: -1.6 MMOL/L (ref -2–3)
BASOPHILS # BLD AUTO: 0.04 X10*3/UL (ref 0–0.1)
BASOPHILS NFR BLD AUTO: 0.6 %
BILIRUB SERPL-MCNC: 0.5 MG/DL (ref 0–1.2)
BILIRUB UR STRIP.AUTO-MCNC: NEGATIVE MG/DL
BNP SERPL-MCNC: 4 PG/ML (ref 0–99)
BODY TEMPERATURE: 37 DEGREES CELSIUS
BUN SERPL-MCNC: 18 MG/DL (ref 6–23)
CA-I BLDV-SCNC: 1.22 MMOL/L (ref 1.1–1.33)
CALCIUM SERPL-MCNC: 8.4 MG/DL (ref 8.6–10.3)
CARDIAC TROPONIN I PNL SERPL HS: 3 NG/L (ref 0–20)
CHLORIDE BLDV-SCNC: 100 MMOL/L (ref 98–107)
CHLORIDE SERPL-SCNC: 101 MMOL/L (ref 98–107)
CO2 SERPL-SCNC: 24 MMOL/L (ref 21–32)
COLOR UR: ABNORMAL
CREAT SERPL-MCNC: 0.77 MG/DL (ref 0.5–1.3)
EGFRCR SERPLBLD CKD-EPI 2021: >90 ML/MIN/1.73M*2
EOSINOPHIL # BLD AUTO: 0.08 X10*3/UL (ref 0–0.7)
EOSINOPHIL NFR BLD AUTO: 1.2 %
ERYTHROCYTE [DISTWIDTH] IN BLOOD BY AUTOMATED COUNT: 11.5 % (ref 11.5–14.5)
GLUCOSE BLDV-MCNC: 401 MG/DL (ref 74–99)
GLUCOSE SERPL-MCNC: 288 MG/DL (ref 74–99)
GLUCOSE UR STRIP.AUTO-MCNC: ABNORMAL MG/DL
HCO3 BLDV-SCNC: 24.3 MMOL/L (ref 22–26)
HCT VFR BLD AUTO: 51.7 % (ref 41–52)
HCT VFR BLD EST: 51 % (ref 41–52)
HGB BLD-MCNC: 18.4 G/DL (ref 13.5–17.5)
HGB BLDV-MCNC: 17.1 G/DL (ref 13.5–17.5)
IMM GRANULOCYTES # BLD AUTO: 0.01 X10*3/UL (ref 0–0.7)
IMM GRANULOCYTES NFR BLD AUTO: 0.1 % (ref 0–0.9)
INHALED O2 CONCENTRATION: 0 %
KETONES UR STRIP.AUTO-MCNC: ABNORMAL MG/DL
LACTATE BLDV-SCNC: 1.2 MMOL/L (ref 0.4–2)
LACTATE SERPL-SCNC: 0.8 MMOL/L (ref 0.4–2)
LEUKOCYTE ESTERASE UR QL STRIP.AUTO: NEGATIVE
LYMPHOCYTES # BLD AUTO: 2.39 X10*3/UL (ref 1.2–4.8)
LYMPHOCYTES NFR BLD AUTO: 35.7 %
MCH RBC QN AUTO: 31.4 PG (ref 26–34)
MCHC RBC AUTO-ENTMCNC: 35.6 G/DL (ref 32–36)
MCV RBC AUTO: 88 FL (ref 80–100)
MONOCYTES # BLD AUTO: 0.69 X10*3/UL (ref 0.1–1)
MONOCYTES NFR BLD AUTO: 10.3 %
NEUTROPHILS # BLD AUTO: 3.49 X10*3/UL (ref 1.2–7.7)
NEUTROPHILS NFR BLD AUTO: 52.1 %
NITRITE UR QL STRIP.AUTO: NEGATIVE
NRBC BLD-RTO: 0 /100 WBCS (ref 0–0)
OXYHGB MFR BLDV: 74.1 % (ref 45–75)
PCO2 BLDV: 44 MM HG (ref 41–51)
PH BLDV: 7.35 PH (ref 7.33–7.43)
PH UR STRIP.AUTO: 5 [PH]
PLATELET # BLD AUTO: 197 X10*3/UL (ref 150–450)
PO2 BLDV: 42 MM HG (ref 35–45)
POTASSIUM BLDV-SCNC: 4.4 MMOL/L (ref 3.5–5.3)
POTASSIUM SERPL-SCNC: 4.3 MMOL/L (ref 3.5–5.3)
PROT SERPL-MCNC: 6.4 G/DL (ref 6.4–8.2)
PROT UR STRIP.AUTO-MCNC: NEGATIVE MG/DL
RBC # BLD AUTO: 5.86 X10*6/UL (ref 4.5–5.9)
RBC # UR STRIP.AUTO: NEGATIVE /UL
SAO2 % BLDV: 76 % (ref 45–75)
SODIUM BLDV-SCNC: 132 MMOL/L (ref 136–145)
SODIUM SERPL-SCNC: 136 MMOL/L (ref 136–145)
SP GR UR STRIP.AUTO: 1.04
UROBILINOGEN UR STRIP.AUTO-MCNC: <2 MG/DL
WBC # BLD AUTO: 6.7 X10*3/UL (ref 4.4–11.3)

## 2024-03-13 PROCEDURE — 71045 X-RAY EXAM CHEST 1 VIEW: CPT

## 2024-03-13 PROCEDURE — 84132 ASSAY OF SERUM POTASSIUM: CPT | Mod: 59

## 2024-03-13 PROCEDURE — 84132 ASSAY OF SERUM POTASSIUM: CPT

## 2024-03-13 PROCEDURE — 36415 COLL VENOUS BLD VENIPUNCTURE: CPT

## 2024-03-13 PROCEDURE — 2500000004 HC RX 250 GENERAL PHARMACY W/ HCPCS (ALT 636 FOR OP/ED)

## 2024-03-13 PROCEDURE — 96360 HYDRATION IV INFUSION INIT: CPT

## 2024-03-13 PROCEDURE — 83880 ASSAY OF NATRIURETIC PEPTIDE: CPT

## 2024-03-13 PROCEDURE — 85025 COMPLETE CBC W/AUTO DIFF WBC: CPT

## 2024-03-13 PROCEDURE — 81003 URINALYSIS AUTO W/O SCOPE: CPT

## 2024-03-13 PROCEDURE — 84484 ASSAY OF TROPONIN QUANT: CPT

## 2024-03-13 PROCEDURE — 93005 ELECTROCARDIOGRAM TRACING: CPT

## 2024-03-13 PROCEDURE — 71045 X-RAY EXAM CHEST 1 VIEW: CPT | Performed by: RADIOLOGY

## 2024-03-13 PROCEDURE — 83605 ASSAY OF LACTIC ACID: CPT

## 2024-03-13 PROCEDURE — 99283 EMERGENCY DEPT VISIT LOW MDM: CPT | Mod: 25

## 2024-03-13 RX ADMIN — SODIUM CHLORIDE 1500 ML: 9 INJECTION, SOLUTION INTRAVENOUS at 17:15

## 2024-03-13 ASSESSMENT — LIFESTYLE VARIABLES
HAVE YOU EVER FELT YOU SHOULD CUT DOWN ON YOUR DRINKING: NO
HAVE PEOPLE ANNOYED YOU BY CRITICIZING YOUR DRINKING: NO
EVER HAD A DRINK FIRST THING IN THE MORNING TO STEADY YOUR NERVES TO GET RID OF A HANGOVER: NO
EVER FELT BAD OR GUILTY ABOUT YOUR DRINKING: NO

## 2024-03-13 ASSESSMENT — PAIN SCALES - GENERAL: PAINLEVEL_OUTOF10: 5 - MODERATE PAIN

## 2024-03-13 ASSESSMENT — PAIN - FUNCTIONAL ASSESSMENT: PAIN_FUNCTIONAL_ASSESSMENT: 0-10

## 2024-03-13 ASSESSMENT — PAIN DESCRIPTION - LOCATION: LOCATION: CHEST

## 2024-03-14 LAB — HOLD SPECIMEN: NORMAL

## 2024-03-14 ASSESSMENT — HEART SCORE
AGE: <45
HISTORY: SLIGHTLY SUSPICIOUS
TROPONIN: LESS THAN OR EQUAL TO NORMAL LIMIT
HEART SCORE: 1
ECG: NORMAL
RISK FACTORS: 1-2 RISK FACTORS

## 2024-03-14 NOTE — DISCHARGE INSTRUCTIONS
As discussed, please call and schedule an appointment with the referred cardiologist, please maintain your appointment for your stress test.

## 2024-03-14 NOTE — ED PROVIDER NOTES
Chief Complaint   Patient presents with    Chest Pain     3 weeks ago.  Seen here at that time.               33-year-old male with a past history of type 1 diabetes with frequent hyperglycemia/DKA recently placed on an insulin pump, anxiety presents to the emergency department today for chest pain.  Patient states he has been having left-sided chest pain since the beginning of February 2024.  The patient was seen here in the emergency department on 2/14, the patient was then seen at [facility] emergency department for the same on 2/16, and states that the pain is the exact same and has not gotten better and has not gotten worse.  It has been fairly constant in nature with no exacerbating or relieving factors.  There is no associated nausea, vomiting, diaphoresis or shortness of breath.  Denies any dyspnea on exertion or lower extremity swelling.  Pain does slightly worsen with movement and is reproducible on palpation.  No dizziness, headache or syncope.  States that sugars been running in the 300s recently.  No urinary symptoms, fever or chills. Denies abd pain.  The patient also has a mild skin rash on his abdomen that is bilateral, it is largely diffuse, however the patient states that it hurts to the touch, the patient feels that it could be him changing soaps.        History provided by:  Patient   used: No         PmHx, PsHx, Allergies, Family Hx, social Hx reviewed as documented    A complete 10 point review of systems was performed and is negative except for as mentioned in the HPI.    Physical Exam:    General: Patient is AAOx3, appears well developed, well nourished, is a good historian, answers questions appropriately    HEENT: head normocephalic, atraumatic, PERRLA, EOMs intact, oropharynx without erythema or exudate, buccal mucosa intact without lesions, TMs unremarkable, nose is patent bilateral    Neck: supple, full ROM, negative for lymphadenopathy, JVD, thyromegaly, tracheal  deviation, nuccal rigidity    Pulmonary: CTAB, no accessory muscle use, able to speak full clear sentences    Cardiac: HRRR, no murmurs, rubs or gallops    GI: soft, non-tender, non-distended, BS + x 4, no masses or organomegaly, no guarding or CVA tenderness noted, negative castro's, mcburney's    Musculoskeletal: Left sided nonradiating chest pain per HPI otherwise full weight bearing, WHITLEY, no joint effusions, clubbing or edema noted    Skin: Diffuse rash to abdomen, mild in nature per HPI, otherwise patient's skin intact, no lesions or rashes noted, turgor is good.    Neuro: patient follow commands, cranial nerves 2-12 grossly intact, motor strengths 5/5 upper and lower extremities, DTR's and sensation are symmetrical. No focal deficits.    Rectal/: No urinary burning, urgency, change in frequency.  Patient has no rectal complaints        Medical Decision Making  This patient was seen in the emergency department with an attending physician available at all times throughout their ED course    The patient is well-known to this emergency department, the patient is not strictly compliant with his medication regimen and is largely an uncontrolled type I diabetic, however the patient has newly received an insulin pump which has his sugars more under control than they have been in the past.  The patient is mildly tachycardic upon arrival, however has a history of tachycardia.  The patient is in no apparent distress, resting what is seemingly comfortable in the bed.    Considerations for this patient are acute coronary syndrome including STEMI & NSTEMI, malignant dysrhythmia, hypertension, pericardial tamponade, pneumothorax, pulmonary embolism, aortic dissection, or pericardial effusion.  Other consideration for this patient given his history would be DKA, the patient breath does smell fruity of ketones.  Patient denies any difficulty or increased frequency with urination    EKG, diagnostic blood work, chest x-ray  will be used to initially evaluate, patient given 1500 mL of normal saline IV.    The patient's EKG was done on 3/13 at 1431, the EKG was interpreted by the attending physician as no STEMI, the EKG as interpreted by me shows a sinus tachycardic rhythm with a rate of 115, a CT interval of 140, and a chronic prolonged QTc of 487, there is no significant acute ST abnormality appreciated.     The patient's diagnostic blood work shows hyperglycemia consistent with the patient's history actually improved for patient's comparative studies.  The patient's blood work also shows mild dehydration.  The patient's chest x-ray is negative for any acute abnormality.    The patient's clinical presentation is improved since previous visits, the patient states that his chest pain is minimal at this point, the patient has a scheduled stress test through his primary care provider, patient given a referral to Dr. Guajardo with cardiology for follow-up of his chronic chest pain.     Patient is amenable to the plan of discharge as outlined above, all patient's questions pertaining to their ED course were answered in their entirety.  Strict return precautions were discussed with the patient and they verbalized understanding.  Further, it was made clear to the patient that from an emergent basis, all effort and testing was done to eliminate any imminent dangerous or potentially dangerous conditions of the patient however if their symptoms get much worse or feel life-threatening, they are to return to the emergency department or call 911 immediately.    Amount and/or Complexity of Data Reviewed  Labs: ordered. Decision-making details documented in ED Course.  Radiology: ordered. Decision-making details documented in ED Course.  ECG/medicine tests: ordered. Decision-making details documented in ED Course.       Diagnoses as of 03/14/24 0032   Chronic chest pain   Skin rash       The patient has had the following imaging during this ER visit: XR  CHEST 1 VIEW     Patient History   Past Medical History:   Diagnosis Date    Abdominal pain 04/21/2023    Anxiety and depression 08/08/2021    Contact with and (suspected) exposure to covid-19 04/21/2023    COVID-19 virus infection 07/11/2023    Diabetes mellitus (CMS/HCC)     Diabetic acetonemia (CMS/HCC) 11/15/2023    DIABETIC KETO ACIDOSIS    DKA (diabetic ketoacidosis) (CMS/HCC) 07/11/2023    DKA, type 1, not at goal (CMS/HCC) 04/21/2023    DM2 (diabetes mellitus, type 2) (CMS/HCC) 11/15/2023    DIABETES    Elevated blood sugar 11/15/2023    ELEVATED BLOOD SUGAR/ 375 LAST CHECK    Elevated blood-pressure reading, without diagnosis of hypertension 09/09/2020    Elevated blood pressure reading    Fatigue 07/11/2023    Generalized anxiety disorder 07/11/2023    Microalbuminuria due to type 1 diabetes mellitus (CMS/HCC) 07/11/2023    Personal history of COVID-19 04/02/2023    Poorly controlled diabetes mellitus (CMS/HCC) 07/11/2023    Type 1 diabetes mellitus (CMS/HCC) 07/11/2023    Type 1 diabetes mellitus with hyperglycemia (CMS/HCC) 02/25/2019    Vitamin B12 deficiency 07/11/2023    Vitamin D deficiency 07/11/2023    Vomiting 11/15/2023    VOMITING HEADACHE BACK ACHE     No past surgical history on file.  Family History   Problem Relation Name Age of Onset    No Known Problems Mother      No Known Problems Father      Hypertension Other      Coronary artery disease Other      Diabetes Other      Heart failure Other       Social History     Tobacco Use    Smoking status: Never    Smokeless tobacco: Never   Vaping Use    Vaping Use: Former   Substance Use Topics    Alcohol use: Never    Drug use: Never       ED Triage Vitals [03/13/24 1426]   Temperature Heart Rate Respirations BP   36.6 °C (97.9 °F) (!) 112 16 (!) 143/97      Pulse Ox Temp src Heart Rate Source Patient Position   98 % -- Monitor Sitting      BP Location FiO2 (%)     Left arm --       Vitals:    03/13/24 1935 03/13/24 1939 03/13/24 2011 03/13/24  2224   BP:  (!) 137/105 (!) 139/105 152/64   BP Location:       Patient Position:       Pulse: (!) 114 (!) 110 (!) 108 (!) 113   Resp: 16 16 18 12   Temp:       SpO2: 98% 98% 99% 99%   Weight:       Height:                   CHRISTIANO Lucas-JAYY  03/14/24 0033

## 2024-03-16 LAB
ATRIAL RATE: 115 BPM
P AXIS: 80 DEGREES
PR INTERVAL: 140 MS
Q ONSET: 253 MS
QRS COUNT: 19 BEATS
QRS DURATION: 84 MS
QT INTERVAL: 352 MS
QTC CALCULATION(BAZETT): 487 MS
QTC FREDERICIA: 437 MS
R AXIS: 67 DEGREES
T AXIS: 42 DEGREES
T OFFSET: 429 MS
VENTRICULAR RATE: 115 BPM

## 2024-03-19 ENCOUNTER — APPOINTMENT (OUTPATIENT)
Dept: CARDIOLOGY | Facility: HOSPITAL | Age: 34
End: 2024-03-19
Payer: COMMERCIAL

## 2024-03-19 ENCOUNTER — HOSPITAL ENCOUNTER (OUTPATIENT)
Dept: CARDIOLOGY | Facility: HOSPITAL | Age: 34
Discharge: HOME | End: 2024-03-19
Payer: COMMERCIAL

## 2024-03-19 ENCOUNTER — HOSPITAL ENCOUNTER (OUTPATIENT)
Dept: RADIOLOGY | Facility: HOSPITAL | Age: 34
Discharge: HOME | End: 2024-03-19
Payer: COMMERCIAL

## 2024-03-19 ENCOUNTER — HOSPITAL ENCOUNTER (OUTPATIENT)
Facility: HOSPITAL | Age: 34
Setting detail: OBSERVATION
Discharge: HOME | End: 2024-03-22
Attending: EMERGENCY MEDICINE | Admitting: INTERNAL MEDICINE
Payer: COMMERCIAL

## 2024-03-19 DIAGNOSIS — E87.1 HYPONATREMIA: Primary | ICD-10-CM

## 2024-03-19 DIAGNOSIS — R07.9 CHEST PAIN, UNSPECIFIED TYPE: ICD-10-CM

## 2024-03-19 DIAGNOSIS — R73.9 HYPERGLYCEMIA: ICD-10-CM

## 2024-03-19 DIAGNOSIS — R07.89 ATYPICAL CHEST PAIN: Primary | ICD-10-CM

## 2024-03-19 DIAGNOSIS — R06.02 SHORTNESS OF BREATH: ICD-10-CM

## 2024-03-19 DIAGNOSIS — R07.89 ATYPICAL CHEST PAIN: ICD-10-CM

## 2024-03-19 DIAGNOSIS — R06.09 DYSPNEA ON EXERTION: ICD-10-CM

## 2024-03-19 DIAGNOSIS — E86.0 DEHYDRATION: ICD-10-CM

## 2024-03-19 DIAGNOSIS — R07.89 OTHER CHEST PAIN: ICD-10-CM

## 2024-03-19 PROBLEM — F41.1 GENERALIZED ANXIETY DISORDER: Chronic | Status: ACTIVE | Noted: 2023-07-11

## 2024-03-19 PROBLEM — F41.9 ANXIETY AND DEPRESSION: Chronic | Status: ACTIVE | Noted: 2021-08-08

## 2024-03-19 PROBLEM — F32.A ANXIETY AND DEPRESSION: Chronic | Status: ACTIVE | Noted: 2021-08-08

## 2024-03-19 PROBLEM — R10.9 ABDOMINAL PAIN: Chronic | Status: ACTIVE | Noted: 2023-04-21

## 2024-03-19 LAB
ALBUMIN SERPL BCP-MCNC: 3.6 G/DL (ref 3.4–5)
ALP SERPL-CCNC: 60 U/L (ref 33–120)
ALT SERPL W P-5'-P-CCNC: 10 U/L (ref 10–52)
ANION GAP SERPL CALC-SCNC: 18 MMOL/L (ref 10–20)
ANION GAP SERPL CALC-SCNC: 19 MMOL/L (ref 10–20)
AST SERPL W P-5'-P-CCNC: 8 U/L (ref 9–39)
ATRIAL RATE: 108 BPM
BASE EXCESS BLDV CALC-SCNC: -5.8 MMOL/L (ref -2–3)
BASOPHILS # BLD AUTO: 0.05 X10*3/UL (ref 0–0.1)
BASOPHILS NFR BLD AUTO: 0.8 %
BILIRUB SERPL-MCNC: 0.7 MG/DL (ref 0–1.2)
BNP SERPL-MCNC: 6 PG/ML (ref 0–99)
BODY TEMPERATURE: 37 DEGREES CELSIUS
BUN SERPL-MCNC: 17 MG/DL (ref 6–23)
BUN SERPL-MCNC: 17 MG/DL (ref 6–23)
CALCIUM SERPL-MCNC: 7.4 MG/DL (ref 8.6–10.3)
CALCIUM SERPL-MCNC: 8.6 MG/DL (ref 8.6–10.3)
CARDIAC TROPONIN I PNL SERPL HS: <3 NG/L (ref 0–20)
CHLORIDE SERPL-SCNC: 88 MMOL/L (ref 98–107)
CHLORIDE SERPL-SCNC: 99 MMOL/L (ref 98–107)
CHLORIDE UR-SCNC: 72 MMOL/L
CHLORIDE/CREATININE (MMOL/G) IN URINE: 168 MMOL/G CREAT (ref 23–275)
CO2 SERPL-SCNC: 17 MMOL/L (ref 21–32)
CO2 SERPL-SCNC: 21 MMOL/L (ref 21–32)
CREAT SERPL-MCNC: 0.72 MG/DL (ref 0.5–1.3)
CREAT SERPL-MCNC: 0.77 MG/DL (ref 0.5–1.3)
CREAT UR-MCNC: 42.8 MG/DL (ref 20–370)
CREAT UR-MCNC: 42.8 MG/DL (ref 20–370)
EGFRCR SERPLBLD CKD-EPI 2021: >90 ML/MIN/1.73M*2
EGFRCR SERPLBLD CKD-EPI 2021: >90 ML/MIN/1.73M*2
EOSINOPHIL # BLD AUTO: 0.03 X10*3/UL (ref 0–0.7)
EOSINOPHIL NFR BLD AUTO: 0.5 %
ERYTHROCYTE [DISTWIDTH] IN BLOOD BY AUTOMATED COUNT: 11.5 % (ref 11.5–14.5)
GLUCOSE BLD MANUAL STRIP-MCNC: 251 MG/DL (ref 74–99)
GLUCOSE BLD MANUAL STRIP-MCNC: 367 MG/DL (ref 74–99)
GLUCOSE BLD MANUAL STRIP-MCNC: 367 MG/DL (ref 74–99)
GLUCOSE BLD MANUAL STRIP-MCNC: 411 MG/DL (ref 74–99)
GLUCOSE BLD MANUAL STRIP-MCNC: 432 MG/DL (ref 74–99)
GLUCOSE SERPL-MCNC: 312 MG/DL (ref 74–99)
GLUCOSE SERPL-MCNC: 416 MG/DL (ref 74–99)
HCO3 BLDV-SCNC: 20.1 MMOL/L (ref 22–26)
HCT VFR BLD AUTO: 51.6 % (ref 41–52)
HGB BLD-MCNC: 17.8 G/DL (ref 13.5–17.5)
IMM GRANULOCYTES # BLD AUTO: 0.02 X10*3/UL (ref 0–0.7)
IMM GRANULOCYTES NFR BLD AUTO: 0.3 % (ref 0–0.9)
INHALED O2 CONCENTRATION: 21 %
LACTATE SERPL-SCNC: 0.9 MMOL/L (ref 0.4–2)
LIPASE SERPL-CCNC: 8 U/L (ref 9–82)
LYMPHOCYTES # BLD AUTO: 1.69 X10*3/UL (ref 1.2–4.8)
LYMPHOCYTES NFR BLD AUTO: 27.9 %
MCH RBC QN AUTO: 30.6 PG (ref 26–34)
MCHC RBC AUTO-ENTMCNC: 34.5 G/DL (ref 32–36)
MCV RBC AUTO: 89 FL (ref 80–100)
MONOCYTES # BLD AUTO: 0.43 X10*3/UL (ref 0.1–1)
MONOCYTES NFR BLD AUTO: 7.1 %
NEUTROPHILS # BLD AUTO: 3.83 X10*3/UL (ref 1.2–7.7)
NEUTROPHILS NFR BLD AUTO: 63.4 %
NRBC BLD-RTO: 0 /100 WBCS (ref 0–0)
OSMOLALITY SERPL: 343 MOSM/KG (ref 280–300)
OXYHGB MFR BLDV: 61 % (ref 45–75)
P AXIS: 77 DEGREES
PCO2 BLDV: 40 MM HG (ref 41–51)
PH BLDV: 7.31 PH (ref 7.33–7.43)
PLATELET # BLD AUTO: 194 X10*3/UL (ref 150–450)
PO2 BLDV: 33 MM HG (ref 35–45)
POTASSIUM SERPL-SCNC: 4.4 MMOL/L (ref 3.5–5.3)
POTASSIUM SERPL-SCNC: 5 MMOL/L (ref 3.5–5.3)
POTASSIUM UR-SCNC: 41 MMOL/L
POTASSIUM/CREAT UR-RTO: 96 MMOL/G CREAT
PR INTERVAL: 136 MS
PROT SERPL-MCNC: 5.7 G/DL (ref 6.4–8.2)
Q ONSET: 252 MS
QRS COUNT: 17 BEATS
QRS DURATION: 93 MS
QT INTERVAL: 342 MS
QTC CALCULATION(BAZETT): 459 MS
QTC FREDERICIA: 416 MS
R AXIS: 18 DEGREES
RBC # BLD AUTO: 5.82 X10*6/UL (ref 4.5–5.9)
SAO2 % BLDV: 62 % (ref 45–75)
SODIUM SERPL-SCNC: 118 MMOL/L (ref 136–145)
SODIUM SERPL-SCNC: 135 MMOL/L (ref 136–145)
SODIUM UR-SCNC: 90 MMOL/L
SODIUM UR-SCNC: 90 MMOL/L
SODIUM/CREAT UR-RTO: 210 MMOL/G CREAT
SODIUM/CREAT UR-RTO: 210 MMOL/G CREAT
T AXIS: 17 DEGREES
T OFFSET: 423 MS
TSH SERPL-ACNC: 1.04 MIU/L (ref 0.44–3.98)
VENTRICULAR RATE: 108 BPM
WBC # BLD AUTO: 6.1 X10*3/UL (ref 4.4–11.3)

## 2024-03-19 PROCEDURE — 84443 ASSAY THYROID STIM HORMONE: CPT | Performed by: EMERGENCY MEDICINE

## 2024-03-19 PROCEDURE — 71275 CT ANGIOGRAPHY CHEST: CPT

## 2024-03-19 PROCEDURE — 96361 HYDRATE IV INFUSION ADD-ON: CPT

## 2024-03-19 PROCEDURE — 93010 ELECTROCARDIOGRAM REPORT: CPT | Performed by: INTERNAL MEDICINE

## 2024-03-19 PROCEDURE — 2500000004 HC RX 250 GENERAL PHARMACY W/ HCPCS (ALT 636 FOR OP/ED): Performed by: STUDENT IN AN ORGANIZED HEALTH CARE EDUCATION/TRAINING PROGRAM

## 2024-03-19 PROCEDURE — 83605 ASSAY OF LACTIC ACID: CPT | Performed by: NURSE PRACTITIONER

## 2024-03-19 PROCEDURE — G0378 HOSPITAL OBSERVATION PER HR: HCPCS

## 2024-03-19 PROCEDURE — 93016 CV STRESS TEST SUPVJ ONLY: CPT | Performed by: INTERNAL MEDICINE

## 2024-03-19 PROCEDURE — 82947 ASSAY GLUCOSE BLOOD QUANT: CPT

## 2024-03-19 PROCEDURE — 93005 ELECTROCARDIOGRAM TRACING: CPT

## 2024-03-19 PROCEDURE — 93350 STRESS TTE ONLY: CPT | Performed by: INTERNAL MEDICINE

## 2024-03-19 PROCEDURE — 36415 COLL VENOUS BLD VENIPUNCTURE: CPT | Performed by: NURSE PRACTITIONER

## 2024-03-19 PROCEDURE — 84300 ASSAY OF URINE SODIUM: CPT | Performed by: EMERGENCY MEDICINE

## 2024-03-19 PROCEDURE — 83690 ASSAY OF LIPASE: CPT | Performed by: NURSE PRACTITIONER

## 2024-03-19 PROCEDURE — 80053 COMPREHEN METABOLIC PANEL: CPT | Performed by: NURSE PRACTITIONER

## 2024-03-19 PROCEDURE — 2550000001 HC RX 255 CONTRASTS: Performed by: NURSE PRACTITIONER

## 2024-03-19 PROCEDURE — 96374 THER/PROPH/DIAG INJ IV PUSH: CPT | Mod: 59

## 2024-03-19 PROCEDURE — 96375 TX/PRO/DX INJ NEW DRUG ADDON: CPT

## 2024-03-19 PROCEDURE — 76937 US GUIDE VASCULAR ACCESS: CPT

## 2024-03-19 PROCEDURE — 99223 1ST HOSP IP/OBS HIGH 75: CPT | Performed by: STUDENT IN AN ORGANIZED HEALTH CARE EDUCATION/TRAINING PROGRAM

## 2024-03-19 PROCEDURE — 83930 ASSAY OF BLOOD OSMOLALITY: CPT | Mod: PORLAB | Performed by: EMERGENCY MEDICINE

## 2024-03-19 PROCEDURE — 85025 COMPLETE CBC W/AUTO DIFF WBC: CPT | Performed by: NURSE PRACTITIONER

## 2024-03-19 PROCEDURE — 2500000004 HC RX 250 GENERAL PHARMACY W/ HCPCS (ALT 636 FOR OP/ED): Performed by: NURSE PRACTITIONER

## 2024-03-19 PROCEDURE — 82310 ASSAY OF CALCIUM: CPT | Performed by: STUDENT IN AN ORGANIZED HEALTH CARE EDUCATION/TRAINING PROGRAM

## 2024-03-19 PROCEDURE — 2500000001 HC RX 250 WO HCPCS SELF ADMINISTERED DRUGS (ALT 637 FOR MEDICARE OP): Performed by: STUDENT IN AN ORGANIZED HEALTH CARE EDUCATION/TRAINING PROGRAM

## 2024-03-19 PROCEDURE — 83935 ASSAY OF URINE OSMOLALITY: CPT | Mod: PORLAB | Performed by: EMERGENCY MEDICINE

## 2024-03-19 PROCEDURE — 93018 CV STRESS TEST I&R ONLY: CPT | Performed by: INTERNAL MEDICINE

## 2024-03-19 PROCEDURE — 99285 EMERGENCY DEPT VISIT HI MDM: CPT | Mod: 25

## 2024-03-19 PROCEDURE — 84484 ASSAY OF TROPONIN QUANT: CPT | Performed by: NURSE PRACTITIONER

## 2024-03-19 PROCEDURE — 82805 BLOOD GASES W/O2 SATURATION: CPT | Performed by: NURSE PRACTITIONER

## 2024-03-19 PROCEDURE — 71275 CT ANGIOGRAPHY CHEST: CPT | Performed by: RADIOLOGY

## 2024-03-19 PROCEDURE — 83880 ASSAY OF NATRIURETIC PEPTIDE: CPT | Performed by: NURSE PRACTITIONER

## 2024-03-19 PROCEDURE — 93350 STRESS TTE ONLY: CPT

## 2024-03-19 RX ORDER — PANTOPRAZOLE SODIUM 40 MG/10ML
40 INJECTION, POWDER, LYOPHILIZED, FOR SOLUTION INTRAVENOUS
Status: DISCONTINUED | OUTPATIENT
Start: 2024-03-20 | End: 2024-03-22 | Stop reason: HOSPADM

## 2024-03-19 RX ORDER — GUAIFENESIN 600 MG/1
600 TABLET, EXTENDED RELEASE ORAL EVERY 12 HOURS PRN
Status: DISCONTINUED | OUTPATIENT
Start: 2024-03-19 | End: 2024-03-22 | Stop reason: HOSPADM

## 2024-03-19 RX ORDER — TALC
3 POWDER (GRAM) TOPICAL NIGHTLY
Status: DISCONTINUED | OUTPATIENT
Start: 2024-03-19 | End: 2024-03-22 | Stop reason: HOSPADM

## 2024-03-19 RX ORDER — INSULIN GLARGINE 100 [IU]/ML
35 INJECTION, SOLUTION SUBCUTANEOUS EVERY 24 HOURS
Status: DISCONTINUED | OUTPATIENT
Start: 2024-03-19 | End: 2024-03-20

## 2024-03-19 RX ORDER — POLYETHYLENE GLYCOL 3350 17 G/17G
17 POWDER, FOR SOLUTION ORAL DAILY
Status: DISCONTINUED | OUTPATIENT
Start: 2024-03-19 | End: 2024-03-22 | Stop reason: HOSPADM

## 2024-03-19 RX ORDER — LABETALOL HYDROCHLORIDE 5 MG/ML
5 INJECTION, SOLUTION INTRAVENOUS ONCE
Status: DISCONTINUED | OUTPATIENT
Start: 2024-03-19 | End: 2024-03-20

## 2024-03-19 RX ORDER — BISACODYL 10 MG/1
10 SUPPOSITORY RECTAL
Status: DISCONTINUED | OUTPATIENT
Start: 2024-03-19 | End: 2024-03-22 | Stop reason: HOSPADM

## 2024-03-19 RX ORDER — BISACODYL 5 MG
10 TABLET, DELAYED RELEASE (ENTERIC COATED) ORAL
Status: DISCONTINUED | OUTPATIENT
Start: 2024-03-19 | End: 2024-03-22 | Stop reason: HOSPADM

## 2024-03-19 RX ORDER — DEXTROSE 50 % IN WATER (D50W) INTRAVENOUS SYRINGE
12.5
Status: DISCONTINUED | OUTPATIENT
Start: 2024-03-19 | End: 2024-03-20

## 2024-03-19 RX ORDER — ONDANSETRON HYDROCHLORIDE 2 MG/ML
4 INJECTION, SOLUTION INTRAVENOUS EVERY 6 HOURS PRN
Status: DISCONTINUED | OUTPATIENT
Start: 2024-03-19 | End: 2024-03-22 | Stop reason: HOSPADM

## 2024-03-19 RX ORDER — LOSARTAN POTASSIUM 25 MG/1
25 TABLET ORAL DAILY
Status: DISCONTINUED | OUTPATIENT
Start: 2024-03-19 | End: 2024-03-22 | Stop reason: HOSPADM

## 2024-03-19 RX ORDER — DEXTROSE 50 % IN WATER (D50W) INTRAVENOUS SYRINGE
25
Status: DISCONTINUED | OUTPATIENT
Start: 2024-03-19 | End: 2024-03-20

## 2024-03-19 RX ORDER — INSULIN LISPRO 100 [IU]/ML
0-15 INJECTION, SOLUTION INTRAVENOUS; SUBCUTANEOUS
Status: DISCONTINUED | OUTPATIENT
Start: 2024-03-19 | End: 2024-03-20

## 2024-03-19 RX ORDER — SODIUM CHLORIDE 450 MG/100ML
75 INJECTION, SOLUTION INTRAVENOUS CONTINUOUS
Status: DISCONTINUED | OUTPATIENT
Start: 2024-03-19 | End: 2024-03-20

## 2024-03-19 RX ORDER — ACETAMINOPHEN 325 MG/1
650 TABLET ORAL EVERY 4 HOURS PRN
Status: DISCONTINUED | OUTPATIENT
Start: 2024-03-19 | End: 2024-03-22 | Stop reason: HOSPADM

## 2024-03-19 RX ORDER — PANTOPRAZOLE SODIUM 40 MG/1
40 TABLET, DELAYED RELEASE ORAL
Status: DISCONTINUED | OUTPATIENT
Start: 2024-03-20 | End: 2024-03-22 | Stop reason: HOSPADM

## 2024-03-19 RX ORDER — ASPIRIN 81 MG/1
81 TABLET ORAL DAILY
Status: DISCONTINUED | OUTPATIENT
Start: 2024-03-19 | End: 2024-03-22 | Stop reason: HOSPADM

## 2024-03-19 RX ADMIN — ASPIRIN 81 MG: 81 TABLET, COATED ORAL at 17:51

## 2024-03-19 RX ADMIN — ONDANSETRON 4 MG: 2 INJECTION INTRAMUSCULAR; INTRAVENOUS at 21:03

## 2024-03-19 RX ADMIN — LOSARTAN POTASSIUM 25 MG: 25 TABLET, FILM COATED ORAL at 18:05

## 2024-03-19 RX ADMIN — SODIUM CHLORIDE 75 ML/HR: 4.5 INJECTION, SOLUTION INTRAVENOUS at 18:45

## 2024-03-19 RX ADMIN — SODIUM CHLORIDE 1000 ML: 9 INJECTION, SOLUTION INTRAVENOUS at 11:27

## 2024-03-19 RX ADMIN — PROMETHAZINE HYDROCHLORIDE 12.5 MG: 25 INJECTION INTRAMUSCULAR; INTRAVENOUS at 23:45

## 2024-03-19 RX ADMIN — IOHEXOL 50 ML: 350 INJECTION, SOLUTION INTRAVENOUS at 09:59

## 2024-03-19 SDOH — SOCIAL STABILITY: SOCIAL INSECURITY: HAVE YOU HAD THOUGHTS OF HARMING ANYONE ELSE?: NO

## 2024-03-19 SDOH — SOCIAL STABILITY: SOCIAL INSECURITY: ABUSE: ADULT

## 2024-03-19 SDOH — SOCIAL STABILITY: SOCIAL INSECURITY: WERE YOU ABLE TO COMPLETE ALL THE BEHAVIORAL HEALTH SCREENINGS?: YES

## 2024-03-19 ASSESSMENT — COGNITIVE AND FUNCTIONAL STATUS - GENERAL
PATIENT BASELINE BEDBOUND: NO
MOBILITY SCORE: 24
DAILY ACTIVITIY SCORE: 24

## 2024-03-19 ASSESSMENT — ACTIVITIES OF DAILY LIVING (ADL)
BATHING: INDEPENDENT
HEARING - LEFT EAR: FUNCTIONAL
LACK_OF_TRANSPORTATION: NO
GROOMING: INDEPENDENT
HEARING - RIGHT EAR: FUNCTIONAL
WALKS IN HOME: INDEPENDENT
DRESSING YOURSELF: INDEPENDENT
PATIENT'S MEMORY ADEQUATE TO SAFELY COMPLETE DAILY ACTIVITIES?: YES
JUDGMENT_ADEQUATE_SAFELY_COMPLETE_DAILY_ACTIVITIES: YES
TOILETING: INDEPENDENT
ADEQUATE_TO_COMPLETE_ADL: YES
FEEDING YOURSELF: INDEPENDENT

## 2024-03-19 ASSESSMENT — ENCOUNTER SYMPTOMS
LIGHT-HEADEDNESS: 0
FATIGUE: 1
WEAKNESS: 0
ABDOMINAL PAIN: 0
HEMATURIA: 0
VOMITING: 0
CHILLS: 0
COUGH: 0
COLOR CHANGE: 0
BLOOD IN STOOL: 0
PHOTOPHOBIA: 0
DIZZINESS: 0
PALPITATIONS: 0
POLYDIPSIA: 0
SHORTNESS OF BREATH: 0
NAUSEA: 1
APPETITE CHANGE: 1
SLEEP DISTURBANCE: 0
CONFUSION: 0
DYSURIA: 0
TREMORS: 0
FEVER: 0

## 2024-03-19 ASSESSMENT — HEART SCORE
ECG: NORMAL
RISK FACTORS: 1-2 RISK FACTORS
HISTORY: SLIGHTLY SUSPICIOUS
HEART SCORE: 1
AGE: <45
TROPONIN: LESS THAN OR EQUAL TO NORMAL LIMIT

## 2024-03-19 ASSESSMENT — PAIN SCALES - GENERAL
PAINLEVEL_OUTOF10: 0 - NO PAIN
PAINLEVEL_OUTOF10: 0 - NO PAIN

## 2024-03-19 ASSESSMENT — LIFESTYLE VARIABLES
SKIP TO QUESTIONS 9-10: 1
AUDIT-C TOTAL SCORE: 0
AUDIT-C TOTAL SCORE: 0
HOW OFTEN DO YOU HAVE 6 OR MORE DRINKS ON ONE OCCASION: NEVER
HOW MANY STANDARD DRINKS CONTAINING ALCOHOL DO YOU HAVE ON A TYPICAL DAY: PATIENT DOES NOT DRINK
HOW OFTEN DO YOU HAVE A DRINK CONTAINING ALCOHOL: NEVER

## 2024-03-19 ASSESSMENT — PAIN - FUNCTIONAL ASSESSMENT: PAIN_FUNCTIONAL_ASSESSMENT: 0-10

## 2024-03-19 NOTE — ED PROVIDER NOTES
Chief Complaint   Patient presents with    Dehydration     Pt was seen for a stress test today, pt was found to be too dehydrated to continue with the testing. Pt was sent here for fluids.        HPI       33 year old male presents to the Emergency Department today complaining of a 1 month history of midsternal chest pain that he describes as tightness in nature, constant, occasionally radiates to his arms and back, and varies in intensity. There is no pleuritic or exertional component to such. Has had nausea with vomiting and an occasional nonproductive cough associated with such. Denies any associated fever, chills, headache, neck pain,  shortness of breath, abdominal pain, diarrhea, constipation, or urinary symptoms. Was scheduled for a stress test today, but was noted to be tachycardic with an elevated blood sugar during the test. Sent the ED for evaluation. Had a CTA of his chest that was negative for an acute pathology.       History provided by:  Patient             Patient History   Past Medical History:   Diagnosis Date    Abdominal pain 04/21/2023    Anxiety and depression 08/08/2021    Contact with and (suspected) exposure to covid-19 04/21/2023    COVID-19 virus infection 07/11/2023    Diabetes mellitus (CMS/[facility])     Diabetic acetonemia (CMS/[facility]) 11/15/2023    DIABETIC KETO ACIDOSIS    DKA (diabetic ketoacidosis) (CMS/[facility]) 07/11/2023    DKA, type 1, not at goal (CMS/[facility]) 04/21/2023    DM2 (diabetes mellitus, type 2) (CMS/[facility]) 11/15/2023    DIABETES    Elevated blood sugar 11/15/2023    ELEVATED BLOOD SUGAR/ 375 LAST CHECK    Elevated blood-pressure reading, without diagnosis of hypertension 09/09/2020    Elevated blood pressure reading    Fatigue 07/11/2023    Generalized anxiety disorder 07/11/2023    Microalbuminuria due to type 1 diabetes mellitus (CMS/[facility]) 07/11/2023    Personal history of COVID-19 04/02/2023    Poorly controlled diabetes mellitus (CMS/[facility]) 07/11/2023    Type 1 diabetes mellitus  (CMS/[facility]) 07/11/2023    Type 1 diabetes mellitus with hyperglycemia (CMS/[facility]) 02/25/2019    Vitamin B12 deficiency 07/11/2023    Vitamin D deficiency 07/11/2023    Vomiting 11/15/2023    VOMITING HEADACHE BACK ACHE     No past surgical history on file.  Family History   Problem Relation Name Age of Onset    No Known Problems Mother      No Known Problems Father      Hypertension Other      Coronary artery disease Other      Diabetes Other      Heart failure Other       Social History     Tobacco Use    Smoking status: Never    Smokeless tobacco: Never   Vaping Use    Vaping Use: Former   Substance Use Topics    Alcohol use: Never    Drug use: Never           Physical Exam  Constitutional:       Appearance: Normal appearance.   HENT:      Head: Normocephalic.      Right Ear: Tympanic membrane, ear canal and external ear normal.      Left Ear: Tympanic membrane, ear canal and external ear normal.      Nose: Nose normal.      Mouth/Throat:      Mouth: Mucous membranes are moist.      Pharynx: Oropharynx is clear. No oropharyngeal exudate or posterior oropharyngeal erythema.   Eyes:      Conjunctiva/sclera: Conjunctivae normal.      Pupils: Pupils are equal, round, and reactive to light.   Cardiovascular:      Rate and Rhythm: Normal rate and regular rhythm.      Pulses:           Radial pulses are 3+ on the right side and 3+ on the left side.        Dorsalis pedis pulses are 3+ on the right side and 3+ on the left side.      Heart sounds: Normal heart sounds. No murmur heard.     No friction rub. No gallop.   Pulmonary:      Effort: Pulmonary effort is normal. No respiratory distress.      Breath sounds: Normal breath sounds. No wheezing, rhonchi or rales.   Abdominal:      General: Abdomen is flat. Bowel sounds are normal.      Palpations: Abdomen is soft.      Tenderness: There is no abdominal tenderness. There is no right CVA tenderness, left CVA tenderness, guarding or rebound. Negative signs include Holder's sign  and McBurney's sign.   Musculoskeletal:         General: No swelling or deformity.      Cervical back: Full passive range of motion without pain.      Right lower leg: No edema.      Left lower leg: No edema.   Lymphadenopathy:      Cervical: No cervical adenopathy.   Skin:     Capillary Refill: Capillary refill takes less than 2 seconds.      Coloration: Skin is not jaundiced.      Findings: No rash.   Neurological:      General: No focal deficit present.      Mental Status: He is alert and oriented to person, place, and time. Mental status is at baseline.      Gait: Gait is intact.   Psychiatric:         Mood and Affect: Mood normal.         Behavior: Behavior is cooperative.         Labs Reviewed   CBC WITH AUTO DIFFERENTIAL - Abnormal       Result Value    WBC 6.1      nRBC 0.0      RBC 5.82      Hemoglobin 17.8 (*)     Hematocrit 51.6      MCV 89      MCH 30.6      MCHC 34.5      RDW 11.5      Platelets 194      Neutrophils % 63.4      Immature Granulocytes %, Automated 0.3      Lymphocytes % 27.9      Monocytes % 7.1      Eosinophils % 0.5      Basophils % 0.8      Neutrophils Absolute 3.83      Immature Granulocytes Absolute, Automated 0.02      Lymphocytes Absolute 1.69      Monocytes Absolute 0.43      Eosinophils Absolute 0.03      Basophils Absolute 0.05     COMPREHENSIVE METABOLIC PANEL - Abnormal    Glucose 416 (*)     Sodium 118 (*)     Potassium 5.0      Chloride 88 (*)     Bicarbonate 17 (*)     Anion Gap 18      Urea Nitrogen 17      Creatinine 0.72      eGFR >90      Calcium 7.4 (*)     Albumin 3.6      Alkaline Phosphatase 60      Total Protein 5.7 (*)     AST 8 (*)     Bilirubin, Total 0.7      ALT 10     LIPASE - Abnormal    Lipase 8 (*)     Narrative:     Venipuncture immediately after or during the administration of Metamizole may lead to falsely low results. Testing should be performed immediately prior to Metamizole dosing.   BLOOD GAS VENOUS - Abnormal    POCT pH, Venous 7.31 (*)     POCT  pCO2, Venous 40 (*)     POCT pO2, Venous 33 (*)     POCT SO2, Venous 62      POCT Oxy Hemoglobin, Venous 61.0      POCT Base Excess, Venous -5.8 (*)     POCT HCO3 Calculated, Venous 20.1 (*)     Patient Temperature 37.0      FiO2 21     LACTATE - Normal    Lactate 0.9      Narrative:     Venipuncture immediately after or during the administration of Metamizole may lead to falsely low results. Testing should be performed immediately  prior to Metamizole dosing.   TROPONIN I, HIGH SENSITIVITY - Normal    Troponin I, High Sensitivity <3      Narrative:     Less than 99th percentile of normal range cutoff-  Female and children under 18 years old <14 ng/L; Male <21 ng/L: Negative  Repeat testing should be performed if clinically indicated.     Female and children under 18 years old 14-50 ng/L; Male 21-50 ng/L:  Consistent with possible cardiac damage and possible increased clinical   risk. Serial measurements may help to assess extent of myocardial damage.     >50 ng/L: Consistent with cardiac damage, increased clinical risk and  myocardial infarction. Serial measurements may help assess extent of   myocardial damage.      NOTE: Children less than 1 year old may have higher baseline troponin   levels and results should be interpreted in conjunction with the overall   clinical context.     NOTE: Troponin I testing is performed using a different   testing methodology at [facility] than at other   [facility]. Direct result comparisons should only   be made within the same method.   B-TYPE NATRIURETIC PEPTIDE - Normal    BNP 6      Narrative:        <100 pg/mL - Heart failure unlikely  100-299 pg/mL - Intermediate probability of acute heart                  failure exacerbation. Correlate with clinical                  context and patient history.    >=300 pg/mL - Heart Failure likely. Correlate with clinical                  context and patient history.    BNP testing is performed using different testing  methodology at [facility] than at other [facility]. Direct result comparisons should only be made within the same method.      OSMOLALITY, URINE   OSMOLALITY   TSH WITH REFLEX TO FREE T4 IF ABNORMAL   ELECTROLYTE PANEL, URINE   SODIUM, URINE RANDOM   BASIC METABOLIC PANEL       No orders to display            ED Course & MDM   Diagnoses as of 03/19/24 1528   Hyponatremia   Hyperglycemia   Chest pain, unspecified type   Dehydration           Medical Decision Making  EKG interpreted by Dr. Lejeune. Indication: medical clearance. Findings: ST with a ventricular rate of 108, normal axis, normal intervals, and no acute ischemic or injury pattern. Impression: No acute pathology.      Patient was seen and evaluated by Dr. Lejeune. Placed on a cardiac monitor which showed normal sinus rhythm to sinus tachycardia without ectopy throughout ED stay. Rhythm strip obtained showed tachycardia. Impression: No acute pathology. Continuous pulse oximetry monitoring showed no signs of hypoxia. Saline lock was established with labs drawn and results as above. Given 1 Liter of normal saline wide open over 1 hour. Noted to have a corrected sodium of 126 and bicarbonate 17. pH was 7.31 with pCO2 of 40 and HCO3 of 20.1. No anion gap. Blood counts, remainder of electrolytes, kidney function, liver function, lipase, and lactate were unremarkable. Heart Score- 1 with normal EKG and troponin. At this time, we feel that the chest pain is atypical for acute coronary syndrome. There was no evidence of an acute infectious process, pneumothorax, pulmonary embolism, or thoracic aortic dissection on the CTA of his chest. Normal cardiac BNP without signs of CHF on CTA of his chest. We are unclear as to the direct etiology of his chest pain. Will need to have his stress test rescheduled. We feel that he will need admission for his hyponatremia. Case was discussed with GARTH Espino PA-C, [facility], who agrees to admit the patient for  "further evaluation and care. Will be transferred to the SDU in stable condition.     Diagnostic Impression:    1. Acute nonspecific chest pain    2. Hyperglycemia with hyponatremia    3. Dehydration    4. IV fluids in ED           Your medication list        CONTINUE taking these medications        Instructions Last Dose Given Next Dose Due   Guardian 4 Glucose Sensor device  Generic drug: blood-glucose sensor      Change insulin sensor every 7 days as directed, linked to Minimed insulin pump, check with endocrinology for updated regimen       lancets misc           pen needle, diabetic 32 gauge x 5/32\" needle                  ASK your doctor about these medications        Instructions Last Dose Given Next Dose Due   blood sugar diagnostic strip           insulin lispro 100 unit/mL injection  Commonly known as: HumaLOG                      Procedure  Procedures     Bennie Velazquez, APRN-CNP  03/19/24 1534    "

## 2024-03-19 NOTE — H&P
"History Obtained From: Patient  Collateral history: Chart review    History Of Present Illness:  Ed Sanon is a 33 y.o. male with PMHx s/f IDDM I with poor insulin compliance (despite recent initiation on insulin pump in December), anxiety, depression, who presents from outpatient cardiac stress testing for evaluation of elevated HR, elevated sugar, and dehydration. Patient reports that he is trying to use his insulin pump; however, he does not have any sensors and has not had any sensor since his last admission in December after he had a short supply of them.  He reports that his sugars have predominantly been in the 300s, occasionally in the 400s.  He admits that he is still using his insulin pump; however, he is only using it \"however it is programmed now\" and cannot really tell me what the basal rate is and/or any bolus rates/amounts. He admits only intermittent compliance with bolusing insulin if he is hyperglycemic, and reports that even if he is remaining hyperglycemic he is not frequently changing his pump site. He also reports ongoing nausea, poor appetite, and some very mild left upper quadrant/left lower quadrant abdominal discomfort which has been present for quite some time. He is not eating and drinking as much as he typically would over the last few weeks. He denies any associated vomiting, diarrhea, changes in bowel/bladder habits, fevers, chills, known sick contacts or exposures.  He reports that he was in the outpatient cardiology testing center today when he was sent to the ED for further evaluation.  He reports he is having constant chest pain/pressure with radiation into his back which has been ongoing since the start of February.  He was evaluated in the emergency department 3 times in February for chest pain, and subsequently was ordered a cardiac stress test by his PCP after a follow-up appointment in the outpatient setting.  He was about to undergo a stress echo when they noted his " heart rate was too high and his blood sugar was in the 400s so he was sent to the ED for further evaluation; however, he did undergo CT PE due to his elevated heart rate which was negative.  Patient reports his chest pain is constant and feels that he is tender to the touch of his chest and has been since it started in February.  He denies syncope or presyncopal events, diaphoresis, headache, vision changes, prior CAD diagnoses.    ED Course (Summary):   Vitals on presentation: T97.0, /94, , RR 18, SpO2 99% RA  Labs: CBC with differential relatively benign.  Note that patient did have an initial glucose of 416 and a corrected sodium of 126, received a liter of normal saline and his sodium up trended quite rapidly.  Initial VBG with pH 7.31, pCO2 40, calculated bicarb 20.1.  Imaging: CT PE was completed in the outpatient setting/ordered by cardiology  Interventions: 1 L normal saline    ED Course:  Diagnoses as of 03/19/24 1751   Hyponatremia   Hyperglycemia   Chest pain, unspecified type   Dehydration     Relevant Results  Results for orders placed or performed during the hospital encounter of 03/19/24 (from the past 24 hour(s))   CBC and Auto Differential   Result Value Ref Range    WBC 6.1 4.4 - 11.3 x10*3/uL    nRBC 0.0 0.0 - 0.0 /100 WBCs    RBC 5.82 4.50 - 5.90 x10*6/uL    Hemoglobin 17.8 (H) 13.5 - 17.5 g/dL    Hematocrit 51.6 41.0 - 52.0 %    MCV 89 80 - 100 fL    MCH 30.6 26.0 - 34.0 pg    MCHC 34.5 32.0 - 36.0 g/dL    RDW 11.5 11.5 - 14.5 %    Platelets 194 150 - 450 x10*3/uL    Neutrophils % 63.4 40.0 - 80.0 %    Immature Granulocytes %, Automated 0.3 0.0 - 0.9 %    Lymphocytes % 27.9 13.0 - 44.0 %    Monocytes % 7.1 2.0 - 10.0 %    Eosinophils % 0.5 0.0 - 6.0 %    Basophils % 0.8 0.0 - 2.0 %    Neutrophils Absolute 3.83 1.20 - 7.70 x10*3/uL    Immature Granulocytes Absolute, Automated 0.02 0.00 - 0.70 x10*3/uL    Lymphocytes Absolute 1.69 1.20 - 4.80 x10*3/uL    Monocytes Absolute 0.43 0.10 -  1.00 x10*3/uL    Eosinophils Absolute 0.03 0.00 - 0.70 x10*3/uL    Basophils Absolute 0.05 0.00 - 0.10 x10*3/uL   Comprehensive metabolic panel   Result Value Ref Range    Glucose 416 (H) 74 - 99 mg/dL    Sodium 118 (LL) 136 - 145 mmol/L    Potassium 5.0 3.5 - 5.3 mmol/L    Chloride 88 (L) 98 - 107 mmol/L    Bicarbonate 17 (L) 21 - 32 mmol/L    Anion Gap 18 10 - 20 mmol/L    Urea Nitrogen 17 6 - 23 mg/dL    Creatinine 0.72 0.50 - 1.30 mg/dL    eGFR >90 >60 mL/min/1.73m*2    Calcium 7.4 (L) 8.6 - 10.3 mg/dL    Albumin 3.6 3.4 - 5.0 g/dL    Alkaline Phosphatase 60 33 - 120 U/L    Total Protein 5.7 (L) 6.4 - 8.2 g/dL    AST 8 (L) 9 - 39 U/L    Bilirubin, Total 0.7 0.0 - 1.2 mg/dL    ALT 10 10 - 52 U/L   Lipase   Result Value Ref Range    Lipase 8 (L) 9 - 82 U/L   Lactate   Result Value Ref Range    Lactate 0.9 0.4 - 2.0 mmol/L   Troponin I, High Sensitivity   Result Value Ref Range    Troponin I, High Sensitivity <3 0 - 20 ng/L   B-Type Natriuretic Peptide   Result Value Ref Range    BNP 6 0 - 99 pg/mL   TSH with reflex to Free T4 if abnormal   Result Value Ref Range    Thyroid Stimulating Hormone 1.04 0.44 - 3.98 mIU/L   ECG 12 lead   Result Value Ref Range    Ventricular Rate 108 BPM    Atrial Rate 108 BPM    CA Interval 136 ms    QRS Duration 93 ms    QT Interval 342 ms    QTC Calculation(Bazett) 459 ms    P Axis 77 degrees    R Axis 18 degrees    T Axis 17 degrees    QRS Count 17 beats    Q Onset 252 ms    T Offset 423 ms    QTC Fredericia 416 ms   Blood Gas Venous   Result Value Ref Range    POCT pH, Venous 7.31 (L) 7.33 - 7.43 pH    POCT pCO2, Venous 40 (L) 41 - 51 mm Hg    POCT pO2, Venous 33 (L) 35 - 45 mm Hg    POCT SO2, Venous 62 45 - 75 %    POCT Oxy Hemoglobin, Venous 61.0 45.0 - 75.0 %    POCT Base Excess, Venous -5.8 (L) -2.0 - 3.0 mmol/L    POCT HCO3 Calculated, Venous 20.1 (L) 22.0 - 26.0 mmol/L    Patient Temperature 37.0 degrees Celsius    FiO2 21 %   Basic metabolic panel   Result Value Ref Range     Glucose 312 (H) 74 - 99 mg/dL    Sodium 135 (L) 136 - 145 mmol/L    Potassium 4.4 3.5 - 5.3 mmol/L    Chloride 99 98 - 107 mmol/L    Bicarbonate 21 21 - 32 mmol/L    Anion Gap 19 10 - 20 mmol/L    Urea Nitrogen 17 6 - 23 mg/dL    Creatinine 0.77 0.50 - 1.30 mg/dL    eGFR >90 >60 mL/min/1.73m*2    Calcium 8.6 8.6 - 10.3 mg/dL   Urine electrolytes   Result Value Ref Range    Sodium, Urine Random 90 mmol/L    Sodium/Creatinine Ratio 210 Not established. mmol/g Creat    Potassium, Urine Random 41 mmol/L    Potassium/Creatinine Ratio 96 Not established mmol/g Creat    Chloride, Urine Random 72 mmol/L    Chloride/Creatinine Ratio 168 23 - 275 mmol/g creat    Creatinine, Urine Random 42.8 20.0 - 370.0 mg/dL   Sodium, urine, random   Result Value Ref Range    Sodium, Urine Random 90 mmol/L    Creatinine, Urine Random 42.8 20.0 - 370.0 mg/dL    Sodium/Creatinine Ratio 210 Not established. mmol/g Creat   POCT GLUCOSE   Result Value Ref Range    POCT Glucose 251 (H) 74 - 99 mg/dL      ECG 12 lead    Result Date: 3/19/2024  Sinus tachycardia Consider left ventricular hypertrophy ST elev, probable normal early repol pattern    CT angio chest for pulmonary embolism    Result Date: 3/19/2024  Interpreted By:  Jay Mcgill, STUDY: CT ANGIO CHEST FOR PULMONARY EMBOLISM;  3/19/2024 10:17 am   INDICATION: Signs/Symptoms:pleuritc chest pain, tachycardia.   COMPARISON: None.   ACCESSION NUMBER(S): [id number]   ORDERING CLINICIAN: VIRGILIO MCGARRY   TECHNIQUE: Helical data acquisition of the chest was obtained after IV injection of  100 ml of  Omnipaque 350. Images were reformatted in axial, coronal, and sagittal planes. 3D reconstructed MIPS volumetric images were also generated at an independent workstation and reviewed.   FINDINGS: POTENTIAL LIMITATIONS OF THE STUDY: None   HEART AND VESSELS: No discrete filling defects within the main pulmonary artery or its branches.   The thoracic aorta is of normal course and caliber without  aneurysm, dissection or significant atherosclerotic calcification.   No coronary artery calcifications are seen. The study is not optimized for evaluation of coronary arteries.   The cardiac chambers are not enlarged.   MEDIASTINUM AND JERROD, LOWER NECK AND AXILLA: The visualized thyroid gland is within normal limits, the esophagus appears unremarkable.   No evidence of thoracic lymphadenopathy by CT criteria.   LUNGS AND AIRWAYS:   A micronodule seen in the right lung on image 207 of series 5, probably a small perivascular node, but follow-up may be warranted: Incidental Finding:  A non-calcified pulmonary nodule/multiple non-calcified pulmonary nodules measuring less than 6 mm, likely benign.  (**-YCF-**)   Instructions:  No further follow-up is required, however, if the patient has high risk factors for primary lung malignancy, follow-up noncontrast CT scan chest in 12 months may be obtained. (Ac Nelson et al., Guidelines for management of incidental pulmonary nodules detected on CT images: From the Fleischner Society 2017, Radiology. 2017 Jul;284 (1):228-243.) [email or web address]   Lungs otherwise are clear. Pleural effusion.   UPPER ABDOMEN AND OTHER FINDINGS: The visualized subdiaphragmatic structures demonstrate no remarkable findings.   CHEST WALL AND OSSEOUS STRUCTURES: There are no suspicious osseous lesions.       1.  No evidence of pulmonary embolus, aneurysm or dissection. 2.  Right lung micronodule. The lungs otherwise are clear.   MACRO: Jay Mcgill discussed the significance and urgency of this critical finding by chat with  VIRGILIO MCGARRY on 3/19/2024 at 10:33 am. (**-RCF-**) Findings:  See findings.   Signed by: Jay Mcgill 3/19/2024 10:33 AM Dictation workstation:   [id number]    ECG 12 lead    Result Date: 3/16/2024  Sinus tachycardia Borderline prolonged QT interval See ED provider note for full interpretation and clinical correlation Confirmed by Destinee Bashir ([id number]) on 3/16/2024  5:33:28 PM    XR chest 1 view    Result Date: 3/13/2024  Interpreted By:  Eric Wynn, STUDY: XR CHEST 1 VIEW   INDICATION: Signs/Symptoms:CP.   COMPARISON: February 14, 2024   ACCESSION NUMBER(S): [id number]   ORDERING CLINICIAN: NASIMA BLOCK   FINDINGS: No consolidation, effusion, edema, or pneumothorax.   Heart size within normal limits.         No evidence of acute intrathoracic abnormality.   Signed by: Eric Wynn 3/13/2024 5:18 PM Dictation workstation:   [id number]     Scheduled medications:  aspirin, 81 mg, oral, Daily  insulin glargine, 35 Units, subcutaneous, q24h  insulin lispro, 0-15 Units, subcutaneous, Before meals & nightly  melatonin, 3 mg, oral, Nightly  [START ON 3/20/2024] pantoprazole, 40 mg, oral, Daily before breakfast   Or  [START ON 3/20/2024] pantoprazole, 40 mg, intravenous, Daily before breakfast  polyethylene glycol, 17 g, oral, Daily      Continuous medications:     PRN medications:  PRN medications: acetaminophen, bisacodyl, bisacodyl, dextrose, dextrose, glucagon, guaiFENesin      Past Medical History  He has a past medical history of Abdominal pain (04/21/2023), Anxiety and depression (08/08/2021), Contact with and (suspected) exposure to covid-19 (04/21/2023), COVID-19 virus infection (07/11/2023), Diabetes mellitus (CMS/[facility]), Diabetic acetonemia (CMS/[facility]) (11/15/2023), DKA (diabetic ketoacidosis) (CMS/[facility]) (07/11/2023), DKA, type 1, not at goal (CMS/[facility]) (04/21/2023), DM2 (diabetes mellitus, type 2) (CMS/[facility]) (11/15/2023), Elevated blood sugar (11/15/2023), Elevated blood-pressure reading, without diagnosis of hypertension (09/09/2020), Fatigue (07/11/2023), Generalized anxiety disorder (07/11/2023), Microalbuminuria due to type 1 diabetes mellitus (CMS/[facility]) (07/11/2023), Personal history of COVID-19 (04/02/2023), Poorly controlled diabetes mellitus (CMS/HCC) (07/11/2023), Type 1 diabetes mellitus (CMS/HCC) (07/11/2023), Type 1 diabetes mellitus with hyperglycemia (CMS/HCC)  (02/25/2019), Vitamin B12 deficiency (07/11/2023), Vitamin D deficiency (07/11/2023), and Vomiting (11/15/2023).    Surgical History  He has no past surgical history on file.     Social History  He reports that he has never smoked. He has never used smokeless tobacco. He reports that he does not drink alcohol and does not use drugs.    Family History  Family History   Problem Relation Name Age of Onset    No Known Problems Mother      No Known Problems Father      Hypertension Other      Coronary artery disease Other      Diabetes Other      Heart failure Other       Allergies  Sitagliptin phos-metformin    Code Status  Full Code     Review of Systems   Constitutional:  Positive for appetite change and fatigue. Negative for chills and fever.   Eyes:  Negative for photophobia and visual disturbance.   Respiratory:  Negative for cough and shortness of breath.    Cardiovascular:  Positive for chest pain. Negative for palpitations and leg swelling.   Gastrointestinal:  Positive for nausea. Negative for abdominal pain, blood in stool and vomiting.   Endocrine: Negative for polydipsia and polyuria.   Genitourinary:  Negative for decreased urine volume, dysuria, hematuria and urgency.   Skin:  Negative for color change and rash.   Neurological:  Negative for dizziness, tremors, syncope, weakness and light-headedness.   Psychiatric/Behavioral:  Negative for confusion and sleep disturbance.    All other systems reviewed and are negative.    Last Recorded Vitals  BP (!) 140/101 (BP Location: Left arm, Patient Position: Sitting)   Pulse (!) 125   Temp 36.8 °C (98.3 °F) (Temporal)   Resp 16   Wt 77.1 kg (170 lb)   SpO2 96%      Physical Exam  Vitals and nursing note reviewed.   Constitutional:       General: He is awake. He is not in acute distress.     Appearance: He is well-developed. He is ill-appearing. He is not toxic-appearing or diaphoretic.   HENT:      Head: Normocephalic and atraumatic.      Right Ear: External  ear normal.      Left Ear: External ear normal.      Nose: Nose normal.      Mouth/Throat:      Mouth: Mucous membranes are dry.   Eyes:      Extraocular Movements: Extraocular movements intact.      Pupils: Pupils are equal, round, and reactive to light.   Cardiovascular:      Rate and Rhythm: Regular rhythm. Tachycardia present.      Pulses: Normal pulses.      Heart sounds: No murmur heard.     No friction rub. No gallop.      Comments: Sinus tachycardia in the low 90s on tele  Pulmonary:      Effort: Pulmonary effort is normal. No respiratory distress.      Breath sounds: No wheezing, rhonchi or rales.   Chest:      Chest wall: Tenderness present.   Abdominal:      General: Bowel sounds are normal. There is no distension.      Palpations: Abdomen is soft. There is no hepatomegaly, splenomegaly or mass.      Tenderness: There is abdominal tenderness (Some tenderness to the LUQ and LLQ which is mild).   Musculoskeletal:         General: No deformity or signs of injury. Normal range of motion.      Cervical back: Normal range of motion and neck supple. No rigidity or tenderness.      Right lower leg: No edema.      Left lower leg: No edema.   Skin:     General: Skin is warm and dry.      Capillary Refill: Capillary refill takes less than 2 seconds.      Coloration: Skin is not pale.      Findings: No erythema, lesion or rash.   Neurological:      General: No focal deficit present.      Mental Status: He is alert and oriented to person, place, and time.      Cranial Nerves: No cranial nerve deficit.   Psychiatric:         Mood and Affect: Mood normal.         Behavior: Behavior is cooperative.       Assessment/Plan   Principal Problem:    Chest pain  Active Problems:    Abdominal pain    Anxiety and depression    Generalized anxiety disorder    Poorly controlled type 1 diabetes mellitus (CMS/HCC)    Atypical chest pain    Hyponatremia    33 y.o. male with PMHx s/f IDDM I with poor insulin compliance (despite recent  initiation on insulin pump in December), anxiety, depression, who presents from outpatient cardiac stress testing for evaluation of elevated HR, elevated sugar, and dehydration.    Atypical chest pain  -Ongoing since February of this year  -CT-PE negative   -Patient reports he had at least a portion of the stress echo completed, but did not get the full thing due to his heart rate.  Will try to obtain records/results from that aspect.  -Troponin negative.  Tenderness to palpation on examination of the chest wall.  -ECG without overt acute ischemic changes  -Semirecently had an updated A1c completed and lipid panel.  -Will start on an aspirin.  Monitor on telemetry.  Pending ability to obtain at least partial stress results may need additional evaluation and cardiology consult; however, will defer for now  -Suspect that a degree of his ongoing chest discomfort may also be related to ongoing elevated blood pressures, especially diastolic.  This has been recorded during prior ED visits.  I will start the patient on losartan daily, but in the interim have ordered one-time dose of IV labetalol with tachycardia present as well.  Unless heart rate comes down would avoid hydralazine.    Hyponatremia, hypovolemic  -Patient appeared to have a degree of hypovolemic hyponatremia but unfortunately received a liter of normal saline in the ED   -Corrected sodium 126 --> 140 after 1L NS  -I reached out to nephrology regarding his sodium levels, and will place on 1/2 NS @ 75cc/hr overnight  -Nephrology consultation appreciated   -Note that hyponatremia labs ordered in the ED were collected after the fluids     IDDM I with poor insulin compliance  -On arrival to the floor, patient self-removed his insulin pump   -I will initiate on lantus 35 units + SSI as per his old regimen in the charting system   -Endocrinology consultation appreciated     Anxiety, depression, bipolar disorder  -Continue home therapies          Michaela Espino,  SARAHY    Dragon dictation software was used to dictate this note and thus there may be minor errors in translation/transcription including garbled speech or misspellings. Please contact for clarification if needed.

## 2024-03-19 NOTE — CARE PLAN
The patient's goals for the shift include      The clinical goals for the shift include no falls      Problem: Pain - Adult  Goal: Verbalizes/displays adequate comfort level or baseline comfort level  Outcome: Progressing     Problem: Safety - Adult  Goal: Free from fall injury  Outcome: Progressing     Problem: Discharge Planning  Goal: Discharge to home or other facility with appropriate resources  Outcome: Progressing     Problem: Chronic Conditions and Co-morbidities  Goal: Patient's chronic conditions and co-morbidity symptoms are monitored and maintained or improved  Outcome: Progressing     Problem: Diabetes  Goal: Achieve decreasing blood glucose levels by end of shift  Outcome: Progressing  Goal: Increase stability of blood glucose readings by end of shift  Outcome: Progressing  Goal: Decrease in ketones present in urine by end of shift  Outcome: Progressing  Goal: Maintain electrolyte levels within acceptable range throughout shift  Outcome: Progressing  Goal: Maintain glucose levels >70mg/dl to <250mg/dl throughout shift  Outcome: Progressing  Goal: No changes in neurological exam by end of shift  Outcome: Progressing  Goal: Learn about and adhere to nutrition recommendations by end of shift  Outcome: Progressing  Goal: Vital signs within normal range for age by end of shift  Outcome: Progressing  Goal: Increase self care and/or family involovement by end of shift  Outcome: Progressing  Goal: Receive DSME education by end of shift  Outcome: Progressing

## 2024-03-19 NOTE — CARE PLAN
"Patient seen and examined     Getting admitted for multiple complaints   Given history of recurrent DKA, elevated glucose, hyponatremia, and ongoing intermittent chest pain --> working on obtaining results from earlier echo  Will need to repeat BMP (ordered) before determining level of care needed    Patient reports he has not had glucose sensors since his last admission, \"I just can't get them\" ; his sugars have been running in the 300s-400s   He reports he is just letting his glucometer go at whatever it's currently programmed and has not recently done any bolus insulin himself and cannot better provide details about the last time he did this was   "

## 2024-03-19 NOTE — ED NOTES
Pt arrives to ED via private vehicle from home.    Code Status:  Full Code    HPI     Chief Complaint   Patient presents with    Dehydration     Pt was seen for a stress test today, pt was found to be too dehydrated to continue with the testing. Pt was sent here for fluids.        BP (!) 148/105   Pulse (!) 113   Temp 36.1 °C (97 °F) (Temporal)   Resp 18   Wt 77.1 kg (170 lb)   SpO2 98%     Alyce Coma Scale Score: 15      LDA:   Peripheral IV 03/19/24 20 G Right Antecubital (Active)   Placement Date/Time: 03/19/24 0954   Hand Hygiene Completed: Yes  Size (Gauge): 20 G  Orientation: Right  Location: Antecubital  Site Prep: Chlorhexidine   Technique: Ultrasound guidance  Placed by: gilles joseph  Insertion attempts: 1  Patient Robin...   Number of days: 0        BACKGROUND  Past Medical History:   Diagnosis Date    Abdominal pain 04/21/2023    Anxiety and depression 08/08/2021    Contact with and (suspected) exposure to covid-19 04/21/2023    COVID-19 virus infection 07/11/2023    Diabetes mellitus (CMS/[facility])     Diabetic acetonemia (CMS/[facility]) 11/15/2023    DIABETIC KETO ACIDOSIS    DKA (diabetic ketoacidosis) (CMS/[facility]) 07/11/2023    DKA, type 1, not at goal (CMS/[facility]) 04/21/2023    DM2 (diabetes mellitus, type 2) (CMS/[facility]) 11/15/2023    DIABETES    Elevated blood sugar 11/15/2023    ELEVATED BLOOD SUGAR/ 375 LAST CHECK    Elevated blood-pressure reading, without diagnosis of hypertension 09/09/2020    Elevated blood pressure reading    Fatigue 07/11/2023    Generalized anxiety disorder 07/11/2023    Microalbuminuria due to type 1 diabetes mellitus (CMS/[facility]) 07/11/2023    Personal history of COVID-19 04/02/2023    Poorly controlled diabetes mellitus (CMS/HCC) 07/11/2023    Type 1 diabetes mellitus (CMS/[facility]) 07/11/2023    Type 1 diabetes mellitus with hyperglycemia (CMS/[facility]) 02/25/2019    Vitamin B12 deficiency 07/11/2023    Vitamin D deficiency 07/11/2023    Vomiting 11/15/2023    VOMITING HEADACHE BACK ACHE  "    No past surgical history on file.  Current Facility-Administered Medications on File Prior to Encounter   Medication Dose Route Frequency Provider Last Rate Last Admin    [COMPLETED] iohexol (OMNIPaque) 350 mg iodine/mL solution 50 mL  50 mL intravenous Once in imaging Georgina Voss, APRN-CNP   50 mL at 03/19/24 0959     Current Outpatient Medications on File Prior to Encounter   Medication Sig Dispense Refill    blood sugar diagnostic strip Use 2-3 times per day      Guardian 4 Glucose Sensor device Change insulin sensor every 7 days as directed, linked to Minimed insulin pump, check with endocrinology for updated regimen 13 each 2    insulin lispro (HumaLOG) 100 unit/mL injection USE VIA INSULIN PUMP, USING UP  UNITS PER DAY      lancets misc Inject 1 Units under the skin 2 times a day. Sliding scale      pen needle, diabetic 32 gauge x 5/32\" needle Use as instructed 4 times daily          ASSESSMENT  Diagnoses as of 03/19/24 1551   Hyponatremia   Hyperglycemia   Chest pain, unspecified type   Dehydration       Medications Currently Running:        Medications Given:  ED Medication Administration from 03/19/2024 1024 to 03/19/2024 1551         Date/Time Order Dose Route Action Action by     03/19/2024 1127 EDT sodium chloride 0.9 % bolus 1,000 mL 1,000 mL intravenous New Bag Pepitodaeduardo, A     03/19/2024 1250 EDT sodium chloride 0.9 % bolus 1,000 mL 0 mL intravenous Stopped CHATA Lees                 RESULTS    Imaging:  No orders to display      }  Labs ::99  Abnormal Labs Reviewed   CBC WITH AUTO DIFFERENTIAL - Abnormal; Notable for the following components:       Result Value    Hemoglobin 17.8 (*)     All other components within normal limits   COMPREHENSIVE METABOLIC PANEL - Abnormal; Notable for the following components:    Glucose 416 (*)     Sodium 118 (*)     Chloride 88 (*)     Bicarbonate 17 (*)     Calcium 7.4 (*)     Total Protein 5.7 (*)     AST 8 (*)     All other components within normal " limits   LIPASE - Abnormal; Notable for the following components:    Lipase 8 (*)     All other components within normal limits    Narrative:     Venipuncture immediately after or during the administration of Metamizole may lead to falsely low results. Testing should be performed immediately prior to Metamizole dosing.   BLOOD GAS VENOUS - Abnormal; Notable for the following components:    POCT pH, Venous 7.31 (*)     POCT pCO2, Venous 40 (*)     POCT pO2, Venous 33 (*)     POCT Base Excess, Venous -5.8 (*)     POCT HCO3 Calculated, Venous 20.1 (*)     All other components within normal limits   BASIC METABOLIC PANEL - Abnormal; Notable for the following components:    Glucose 312 (*)     Sodium 135 (*)     All other components within normal limits                Larry Baker RN  03/19/24 4965

## 2024-03-20 PROBLEM — E11.10 DIABETIC KETOACIDOSIS WITHOUT COMA (MULTI): Status: ACTIVE | Noted: 2023-07-11

## 2024-03-20 PROBLEM — R07.9 CHEST PAIN: Status: RESOLVED | Noted: 2024-03-19 | Resolved: 2024-03-20

## 2024-03-20 LAB
ALBUMIN SERPL BCP-MCNC: 4 G/DL (ref 3.4–5)
ALP SERPL-CCNC: 59 U/L (ref 33–120)
ALT SERPL W P-5'-P-CCNC: 9 U/L (ref 10–52)
ANION GAP SERPL CALC-SCNC: 10 MMOL/L (ref 10–20)
ANION GAP SERPL CALC-SCNC: 15 MMOL/L (ref 10–20)
ANION GAP SERPL CALC-SCNC: 24 MMOL/L (ref 10–20)
ANION GAP SERPL CALC-SCNC: 27 MMOL/L (ref 10–20)
ANION GAP SERPL CALC-SCNC: 27 MMOL/L (ref 10–20)
AST SERPL W P-5'-P-CCNC: 10 U/L (ref 9–39)
ATRIAL RATE: 117 BPM
B-OH-BUTYR SERPL-SCNC: 9.97 MMOL/L (ref 0.02–0.27)
BASOPHILS # BLD AUTO: 0.07 X10*3/UL (ref 0–0.1)
BASOPHILS NFR BLD AUTO: 0.4 %
BILIRUB SERPL-MCNC: 0.6 MG/DL (ref 0–1.2)
BUN SERPL-MCNC: 16 MG/DL (ref 6–23)
BUN SERPL-MCNC: 17 MG/DL (ref 6–23)
BUN SERPL-MCNC: 17 MG/DL (ref 6–23)
BUN SERPL-MCNC: 18 MG/DL (ref 6–23)
BUN SERPL-MCNC: 20 MG/DL (ref 6–23)
CALCIUM SERPL-MCNC: 8 MG/DL (ref 8.6–10.3)
CALCIUM SERPL-MCNC: 8 MG/DL (ref 8.6–10.3)
CALCIUM SERPL-MCNC: 8.1 MG/DL (ref 8.6–10.3)
CALCIUM SERPL-MCNC: 8.5 MG/DL (ref 8.6–10.3)
CALCIUM SERPL-MCNC: 8.9 MG/DL (ref 8.6–10.3)
CHLORIDE SERPL-SCNC: 101 MMOL/L (ref 98–107)
CHLORIDE SERPL-SCNC: 103 MMOL/L (ref 98–107)
CHLORIDE SERPL-SCNC: 105 MMOL/L (ref 98–107)
CHLORIDE SERPL-SCNC: 110 MMOL/L (ref 98–107)
CHLORIDE SERPL-SCNC: 112 MMOL/L (ref 98–107)
CO2 SERPL-SCNC: 11 MMOL/L (ref 21–32)
CO2 SERPL-SCNC: 16 MMOL/L (ref 21–32)
CO2 SERPL-SCNC: 20 MMOL/L (ref 21–32)
CO2 SERPL-SCNC: 9 MMOL/L (ref 21–32)
CO2 SERPL-SCNC: 9 MMOL/L (ref 21–32)
CREAT SERPL-MCNC: 0.84 MG/DL (ref 0.5–1.3)
CREAT SERPL-MCNC: 0.9 MG/DL (ref 0.5–1.3)
CREAT SERPL-MCNC: 0.98 MG/DL (ref 0.5–1.3)
CREAT SERPL-MCNC: 0.99 MG/DL (ref 0.5–1.3)
CREAT SERPL-MCNC: 1.08 MG/DL (ref 0.5–1.3)
EGFRCR SERPLBLD CKD-EPI 2021: >90 ML/MIN/1.73M*2
EOSINOPHIL # BLD AUTO: 0 X10*3/UL (ref 0–0.7)
EOSINOPHIL NFR BLD AUTO: 0 %
ERYTHROCYTE [DISTWIDTH] IN BLOOD BY AUTOMATED COUNT: 11.6 % (ref 11.5–14.5)
GLUCOSE BLD MANUAL STRIP-MCNC: 106 MG/DL (ref 74–99)
GLUCOSE BLD MANUAL STRIP-MCNC: 119 MG/DL (ref 74–99)
GLUCOSE BLD MANUAL STRIP-MCNC: 125 MG/DL (ref 74–99)
GLUCOSE BLD MANUAL STRIP-MCNC: 129 MG/DL (ref 74–99)
GLUCOSE BLD MANUAL STRIP-MCNC: 138 MG/DL (ref 74–99)
GLUCOSE BLD MANUAL STRIP-MCNC: 142 MG/DL (ref 74–99)
GLUCOSE BLD MANUAL STRIP-MCNC: 189 MG/DL (ref 74–99)
GLUCOSE BLD MANUAL STRIP-MCNC: 190 MG/DL (ref 74–99)
GLUCOSE BLD MANUAL STRIP-MCNC: 194 MG/DL (ref 74–99)
GLUCOSE BLD MANUAL STRIP-MCNC: 202 MG/DL (ref 74–99)
GLUCOSE BLD MANUAL STRIP-MCNC: 217 MG/DL (ref 74–99)
GLUCOSE BLD MANUAL STRIP-MCNC: 254 MG/DL (ref 74–99)
GLUCOSE BLD MANUAL STRIP-MCNC: 258 MG/DL (ref 74–99)
GLUCOSE BLD MANUAL STRIP-MCNC: 286 MG/DL (ref 74–99)
GLUCOSE BLD MANUAL STRIP-MCNC: 297 MG/DL (ref 74–99)
GLUCOSE BLD MANUAL STRIP-MCNC: 303 MG/DL (ref 74–99)
GLUCOSE BLD MANUAL STRIP-MCNC: 315 MG/DL (ref 74–99)
GLUCOSE SERPL-MCNC: 113 MG/DL (ref 74–99)
GLUCOSE SERPL-MCNC: 202 MG/DL (ref 74–99)
GLUCOSE SERPL-MCNC: 250 MG/DL (ref 74–99)
GLUCOSE SERPL-MCNC: 261 MG/DL (ref 74–99)
GLUCOSE SERPL-MCNC: 342 MG/DL (ref 74–99)
HCT VFR BLD AUTO: 51.2 % (ref 41–52)
HGB BLD-MCNC: 17.2 G/DL (ref 13.5–17.5)
IMM GRANULOCYTES # BLD AUTO: 0.16 X10*3/UL (ref 0–0.7)
IMM GRANULOCYTES NFR BLD AUTO: 0.8 % (ref 0–0.9)
LACTATE SERPL-SCNC: 1 MMOL/L (ref 0.4–2)
LYMPHOCYTES # BLD AUTO: 1.98 X10*3/UL (ref 1.2–4.8)
LYMPHOCYTES NFR BLD AUTO: 10.3 %
MAGNESIUM SERPL-MCNC: 1.8 MG/DL (ref 1.6–2.4)
MAGNESIUM SERPL-MCNC: 1.91 MG/DL (ref 1.6–2.4)
MCH RBC QN AUTO: 30.9 PG (ref 26–34)
MCHC RBC AUTO-ENTMCNC: 33.6 G/DL (ref 32–36)
MCV RBC AUTO: 92 FL (ref 80–100)
MONOCYTES # BLD AUTO: 1.7 X10*3/UL (ref 0.1–1)
MONOCYTES NFR BLD AUTO: 8.8 %
NEUTROPHILS # BLD AUTO: 15.38 X10*3/UL (ref 1.2–7.7)
NEUTROPHILS NFR BLD AUTO: 79.7 %
NRBC BLD-RTO: 0 /100 WBCS (ref 0–0)
OSMOLALITY UR: 943 MOSM/KG (ref 200–1200)
P AXIS: 66 DEGREES
P OFFSET: 191 MS
P ONSET: 149 MS
PHOSPHATE SERPL-MCNC: 3 MG/DL (ref 2.5–4.9)
PHOSPHATE SERPL-MCNC: 3.7 MG/DL (ref 2.5–4.9)
PLATELET # BLD AUTO: 226 X10*3/UL (ref 150–450)
POTASSIUM SERPL-SCNC: 3.6 MMOL/L (ref 3.5–5.3)
POTASSIUM SERPL-SCNC: 3.8 MMOL/L (ref 3.5–5.3)
POTASSIUM SERPL-SCNC: 4.2 MMOL/L (ref 3.5–5.3)
POTASSIUM SERPL-SCNC: 4.2 MMOL/L (ref 3.5–5.3)
POTASSIUM SERPL-SCNC: 6 MMOL/L (ref 3.5–5.3)
PR INTERVAL: 134 MS
PROT SERPL-MCNC: 6.8 G/DL (ref 6.4–8.2)
Q ONSET: 216 MS
QRS COUNT: 19 BEATS
QRS DURATION: 86 MS
QT INTERVAL: 358 MS
QTC CALCULATION(BAZETT): 499 MS
QTC FREDERICIA: 447 MS
R AXIS: 59 DEGREES
RBC # BLD AUTO: 5.56 X10*6/UL (ref 4.5–5.9)
SODIUM SERPL-SCNC: 133 MMOL/L (ref 136–145)
SODIUM SERPL-SCNC: 134 MMOL/L (ref 136–145)
SODIUM SERPL-SCNC: 135 MMOL/L (ref 136–145)
SODIUM SERPL-SCNC: 137 MMOL/L (ref 136–145)
SODIUM SERPL-SCNC: 138 MMOL/L (ref 136–145)
T AXIS: 51 DEGREES
T OFFSET: 395 MS
VENTRICULAR RATE: 117 BPM
WBC # BLD AUTO: 19.3 X10*3/UL (ref 4.4–11.3)

## 2024-03-20 PROCEDURE — 82947 ASSAY GLUCOSE BLOOD QUANT: CPT

## 2024-03-20 PROCEDURE — G0378 HOSPITAL OBSERVATION PER HR: HCPCS

## 2024-03-20 PROCEDURE — 2500000004 HC RX 250 GENERAL PHARMACY W/ HCPCS (ALT 636 FOR OP/ED): Performed by: PHARMACIST

## 2024-03-20 PROCEDURE — 83735 ASSAY OF MAGNESIUM: CPT

## 2024-03-20 PROCEDURE — 99291 CRITICAL CARE FIRST HOUR: CPT | Performed by: INTERNAL MEDICINE

## 2024-03-20 PROCEDURE — 96372 THER/PROPH/DIAG INJ SC/IM: CPT | Performed by: PHARMACIST

## 2024-03-20 PROCEDURE — 2500000002 HC RX 250 W HCPCS SELF ADMINISTERED DRUGS (ALT 637 FOR MEDICARE OP, ALT 636 FOR OP/ED): Performed by: STUDENT IN AN ORGANIZED HEALTH CARE EDUCATION/TRAINING PROGRAM

## 2024-03-20 PROCEDURE — 2500000002 HC RX 250 W HCPCS SELF ADMINISTERED DRUGS (ALT 637 FOR MEDICARE OP, ALT 636 FOR OP/ED)

## 2024-03-20 PROCEDURE — 84100 ASSAY OF PHOSPHORUS: CPT | Performed by: INTERNAL MEDICINE

## 2024-03-20 PROCEDURE — 36415 COLL VENOUS BLD VENIPUNCTURE: CPT | Performed by: INTERNAL MEDICINE

## 2024-03-20 PROCEDURE — 80048 BASIC METABOLIC PNL TOTAL CA: CPT | Mod: CCI | Performed by: INTERNAL MEDICINE

## 2024-03-20 PROCEDURE — 96366 THER/PROPH/DIAG IV INF ADDON: CPT

## 2024-03-20 PROCEDURE — 82010 KETONE BODYS QUAN: CPT | Performed by: INTERNAL MEDICINE

## 2024-03-20 PROCEDURE — C9113 INJ PANTOPRAZOLE SODIUM, VIA: HCPCS | Performed by: INTERNAL MEDICINE

## 2024-03-20 PROCEDURE — 99223 1ST HOSP IP/OBS HIGH 75: CPT | Performed by: INTERNAL MEDICINE

## 2024-03-20 PROCEDURE — 83605 ASSAY OF LACTIC ACID: CPT | Performed by: INTERNAL MEDICINE

## 2024-03-20 PROCEDURE — 36415 COLL VENOUS BLD VENIPUNCTURE: CPT | Performed by: STUDENT IN AN ORGANIZED HEALTH CARE EDUCATION/TRAINING PROGRAM

## 2024-03-20 PROCEDURE — 96375 TX/PRO/DX INJ NEW DRUG ADDON: CPT

## 2024-03-20 PROCEDURE — 84100 ASSAY OF PHOSPHORUS: CPT

## 2024-03-20 PROCEDURE — 2500000004 HC RX 250 GENERAL PHARMACY W/ HCPCS (ALT 636 FOR OP/ED): Performed by: INTERNAL MEDICINE

## 2024-03-20 PROCEDURE — 96361 HYDRATE IV INFUSION ADD-ON: CPT

## 2024-03-20 PROCEDURE — 85025 COMPLETE CBC W/AUTO DIFF WBC: CPT | Performed by: STUDENT IN AN ORGANIZED HEALTH CARE EDUCATION/TRAINING PROGRAM

## 2024-03-20 PROCEDURE — 96376 TX/PRO/DX INJ SAME DRUG ADON: CPT

## 2024-03-20 PROCEDURE — 99233 SBSQ HOSP IP/OBS HIGH 50: CPT | Performed by: INTERNAL MEDICINE

## 2024-03-20 PROCEDURE — 80320 DRUG SCREEN QUANTALCOHOLS: CPT | Performed by: INTERNAL MEDICINE

## 2024-03-20 PROCEDURE — 76937 US GUIDE VASCULAR ACCESS: CPT

## 2024-03-20 PROCEDURE — 80053 COMPREHEN METABOLIC PANEL: CPT | Performed by: STUDENT IN AN ORGANIZED HEALTH CARE EDUCATION/TRAINING PROGRAM

## 2024-03-20 PROCEDURE — 96365 THER/PROPH/DIAG IV INF INIT: CPT

## 2024-03-20 PROCEDURE — 83735 ASSAY OF MAGNESIUM: CPT | Performed by: STUDENT IN AN ORGANIZED HEALTH CARE EDUCATION/TRAINING PROGRAM

## 2024-03-20 RX ORDER — INSULIN GLARGINE 100 [IU]/ML
20 INJECTION, SOLUTION SUBCUTANEOUS NIGHTLY
Status: DISCONTINUED | OUTPATIENT
Start: 2024-03-20 | End: 2024-03-21

## 2024-03-20 RX ORDER — DEXTROSE MONOHYDRATE 100 MG/ML
150 INJECTION, SOLUTION INTRAVENOUS CONTINUOUS
Status: DISCONTINUED | OUTPATIENT
Start: 2024-03-20 | End: 2024-03-20

## 2024-03-20 RX ORDER — DEXTROSE 50 % IN WATER (D50W) INTRAVENOUS SYRINGE
50
Status: DISCONTINUED | OUTPATIENT
Start: 2024-03-20 | End: 2024-03-22 | Stop reason: HOSPADM

## 2024-03-20 RX ORDER — INSULIN LISPRO 100 [IU]/ML
0-15 INJECTION, SOLUTION INTRAVENOUS; SUBCUTANEOUS
Status: DISCONTINUED | OUTPATIENT
Start: 2024-03-20 | End: 2024-03-20

## 2024-03-20 RX ORDER — INSULIN LISPRO 100 [IU]/ML
15 INJECTION, SOLUTION INTRAVENOUS; SUBCUTANEOUS ONCE
Status: COMPLETED | OUTPATIENT
Start: 2024-03-20 | End: 2024-03-20

## 2024-03-20 RX ORDER — SODIUM CHLORIDE 450 MG/100ML
250 INJECTION, SOLUTION INTRAVENOUS CONTINUOUS
Status: DISCONTINUED | OUTPATIENT
Start: 2024-03-20 | End: 2024-03-20

## 2024-03-20 RX ORDER — DEXTROSE MONOHYDRATE AND SODIUM CHLORIDE 5; .9 G/100ML; G/100ML
150 INJECTION, SOLUTION INTRAVENOUS CONTINUOUS
Status: DISCONTINUED | OUTPATIENT
Start: 2024-03-20 | End: 2024-03-20

## 2024-03-20 RX ORDER — INSULIN LISPRO 100 [IU]/ML
0-15 INJECTION, SOLUTION INTRAVENOUS; SUBCUTANEOUS
Status: DISCONTINUED | OUTPATIENT
Start: 2024-03-21 | End: 2024-03-21

## 2024-03-20 RX ORDER — ENOXAPARIN SODIUM 100 MG/ML
40 INJECTION SUBCUTANEOUS EVERY 24 HOURS
Status: DISCONTINUED | OUTPATIENT
Start: 2024-03-20 | End: 2024-03-22 | Stop reason: HOSPADM

## 2024-03-20 RX ADMIN — ONDANSETRON 4 MG: 2 INJECTION INTRAMUSCULAR; INTRAVENOUS at 09:44

## 2024-03-20 RX ADMIN — INSULIN GLARGINE 20 UNITS: 100 INJECTION, SOLUTION SUBCUTANEOUS at 21:13

## 2024-03-20 RX ADMIN — ACETAMINOPHEN 650 MG: 325 TABLET ORAL at 20:12

## 2024-03-20 RX ADMIN — DEXTROSE AND SODIUM CHLORIDE 150 ML/HR: 5; 900 INJECTION, SOLUTION INTRAVENOUS at 13:31

## 2024-03-20 RX ADMIN — PANTOPRAZOLE SODIUM 40 MG: 40 INJECTION, POWDER, FOR SOLUTION INTRAVENOUS at 09:43

## 2024-03-20 RX ADMIN — INSULIN HUMAN 4.88 UNITS/HR: 1 INJECTION, SOLUTION INTRAVENOUS at 18:16

## 2024-03-20 RX ADMIN — ENOXAPARIN SODIUM 40 MG: 40 INJECTION SUBCUTANEOUS at 20:12

## 2024-03-20 RX ADMIN — DEXTROSE MONOHYDRATE 150 ML/HR: 100 INJECTION, SOLUTION INTRAVENOUS at 18:16

## 2024-03-20 RX ADMIN — INSULIN LISPRO 15 UNITS: 100 INJECTION, SOLUTION INTRAVENOUS; SUBCUTANEOUS at 00:07

## 2024-03-20 RX ADMIN — SODIUM CHLORIDE 250 ML/HR: 4.5 INJECTION, SOLUTION INTRAVENOUS at 11:21

## 2024-03-20 RX ADMIN — SODIUM CHLORIDE 1000 ML: 9 INJECTION, SOLUTION INTRAVENOUS at 08:40

## 2024-03-20 RX ADMIN — INSULIN HUMAN 9.77 UNITS/HR: 1 INJECTION, SOLUTION INTRAVENOUS at 09:47

## 2024-03-20 ASSESSMENT — PAIN SCALES - GENERAL
PAINLEVEL_OUTOF10: 2
PAINLEVEL_OUTOF10: 0 - NO PAIN

## 2024-03-20 ASSESSMENT — COGNITIVE AND FUNCTIONAL STATUS - GENERAL
CLIMB 3 TO 5 STEPS WITH RAILING: A LITTLE
DAILY ACTIVITIY SCORE: 24
DAILY ACTIVITIY SCORE: 24
WALKING IN HOSPITAL ROOM: A LITTLE
MOBILITY SCORE: 22
MOBILITY SCORE: 24

## 2024-03-20 ASSESSMENT — ENCOUNTER SYMPTOMS
WEAKNESS: 0
HEMATURIA: 0
NAUSEA: 1
JOINT SWELLING: 0
DIAPHORESIS: 0
WHEEZING: 0
ENDOCRINE NEGATIVE: 1
CHOKING: 0
MUSCULOSKELETAL NEGATIVE: 1
ABDOMINAL DISTENTION: 0
FEVER: 0
CHILLS: 0
DYSURIA: 0
FATIGUE: 1
AGITATION: 0
NERVOUS/ANXIOUS: 0
SORE THROAT: 0
HALLUCINATIONS: 0
CHEST TIGHTNESS: 1
ABDOMINAL PAIN: 0
CONFUSION: 0
LIGHT-HEADEDNESS: 0
VOMITING: 1
APPETITE CHANGE: 1
PALPITATIONS: 0
SHORTNESS OF BREATH: 0
BACK PAIN: 0
WOUND: 0
DIARRHEA: 0
FACIAL SWELLING: 0
NECK PAIN: 0
COUGH: 0
CONSTIPATION: 0
DIZZINESS: 0
ABDOMINAL PAIN: 1

## 2024-03-20 ASSESSMENT — PAIN DESCRIPTION - DESCRIPTORS: DESCRIPTORS: PRESSURE;SHARP

## 2024-03-20 ASSESSMENT — PAIN - FUNCTIONAL ASSESSMENT
PAIN_FUNCTIONAL_ASSESSMENT: 0-10

## 2024-03-20 ASSESSMENT — ACTIVITIES OF DAILY LIVING (ADL): LACK_OF_TRANSPORTATION: NO

## 2024-03-20 NOTE — ASSESSMENT & PLAN NOTE
-On arrival to the floor, patient self-removed his insulin pump   -I will initiate on lantus 35 units + SSI as per his old regimen in the charting system   -Endocrinology consultation appreciated   3/20: Unfortunately patient is now in DKA.  Insulin pump has been discontinued.  Patient appears to have been noncompliant with insulin recommendations overnight.  Starting insulin infusion.  Gradually improving.  Appreciate input from endocrine and [facility].  Moved to ICU for closer monitoring.  3/21: DKA appears to resolved.  Patient is off insulin infusion.  Currently back on basal bolus insulin.  Appreciate input from endocrinology.  Patient appears to be more compliant with insulin at present.  3/22: Resolved.  Resume home medication.  Resume insulin pump.  Follow-up with endocrinology outpatient.

## 2024-03-20 NOTE — PROGRESS NOTES
"Ed Sanon is a 33 y.o. male admitted for Chest pain. Pharmacy reviewed the patient's [id number] medications and allergies for accuracy.    The list below reflects the PTA list prior to pharmacy medication history. A summary a changes to the PTA medication list has been listed below. Please review each medication in order reconciliation for additional clarification and justification.    Source of information:  PATIENT    Medications added:    Medications modified:    Medications to be removed:    Medications of concern:      Prior to Admission Medications   Prescriptions Last Dose Informant Patient Reported? Taking?   Guardian 4 Glucose Sensor device   No No   Sig: Change insulin sensor every 7 days as directed, linked to Minimed insulin pump, check with endocrinology for updated regimen   blood sugar diagnostic strip   Yes No   Sig: Use 2-3 times per day   insulin lispro (HumaLOG) 100 unit/mL injection   Yes No   Sig: USE VIA INSULIN PUMP, USING UP  UNITS PER DAY   lancets misc   Yes No   Sig: Inject 1 Units under the skin 2 times a day. Sliding scale   pen needle, diabetic 32 gauge x 5/32\" needle   Yes No   Sig: Use as instructed 4 times daily      Facility-Administered Medications: None       Nanette Santoro       "

## 2024-03-20 NOTE — PROGRESS NOTES
"Ed Sanon is a 33 y.o. male on day 0 of admission presenting with Chest pain.    Review of Systems   Constitutional:  Positive for appetite change and fatigue. Negative for chills, diaphoresis and fever.   HENT:  Negative for congestion, facial swelling, sneezing and sore throat.    Respiratory:  Positive for chest tightness. Negative for cough, choking, shortness of breath and wheezing.    Cardiovascular:  Positive for chest pain. Negative for palpitations and leg swelling.   Gastrointestinal:  Positive for nausea and vomiting. Negative for abdominal pain, constipation and diarrhea.   Genitourinary:  Negative for dysuria, hematuria and urgency.   Musculoskeletal:  Negative for back pain, gait problem, joint swelling and neck pain.   Skin:  Negative for rash and wound.   Neurological:  Negative for syncope, weakness and light-headedness.   Psychiatric/Behavioral:  Negative for agitation, confusion and hallucinations. The patient is not nervous/anxious.    All other systems reviewed and are negative.     Subjective   Ed Sanon is a 33 y.o. male with PMHx s/f IDDM I with poor insulin compliance (despite recent initiation on insulin pump in December), anxiety, depression, who presents from outpatient cardiac stress testing for evaluation of elevated HR, elevated sugar, and dehydration. Patient reports that he is trying to use his insulin pump; however, he does not have any sensors and has not had any sensor since his last admission in December after he had a short supply of them.  He reports that his sugars have predominantly been in the 300s, occasionally in the 400s.  He admits that he is still using his insulin pump; however, he is only using it \"however it is programmed now\" and cannot really tell me what the basal rate is and/or any bolus rates/amounts. He admits only intermittent compliance with bolusing insulin if he is hyperglycemic, and reports that even if he is remaining hyperglycemic he is not " frequently changing his pump site. He also reports ongoing nausea, poor appetite, and some very mild left upper quadrant/left lower quadrant abdominal discomfort which has been present for quite some time. He is not eating and drinking as much as he typically would over the last few weeks. He denies any associated vomiting, diarrhea, changes in bowel/bladder habits, fevers, chills, known sick contacts or exposures.  He reports that he was in the outpatient cardiology testing center today when he was sent to the ED for further evaluation.  He reports he is having constant chest pain/pressure with radiation into his back which has been ongoing since the start of February.  He was evaluated in the emergency department 3 times in February for chest pain, and subsequently was ordered a cardiac stress test by his PCP after a follow-up appointment in the outpatient setting.  He was about to undergo a stress echo when they noted his heart rate was too high and his blood sugar was in the 400s so he was sent to the ED for further evaluation; however, he did undergo CT PE due to his elevated heart rate which was negative.  Patient reports his chest pain is constant and feels that he is tender to the touch of his chest and has been since it started in February.  He denies syncope or presyncopal events, diaphoresis, headache, vision changes, prior CAD diagnoses.     ED Course (Summary):   Vitals on presentation: T97.0, /94, , RR 18, SpO2 99% RA  Labs: CBC with differential relatively benign.  Note that patient did have an initial glucose of 416 and a corrected sodium of 126, received a liter of normal saline and his sodium up trended quite rapidly.  Initial VBG with pH 7.31, pCO2 40, calculated bicarb 20.1.  Imaging: CT PE was completed in the outpatient setting/ordered by cardiology  Interventions: 1 L normal saline    3/20: Patient seen.  Labs reviewed this morning and noted that patient had developed DKA.  Patient  had a bicarbonate of 11 with anion gap of 27.  Discussed with patient.  Had not placed his insulin pump back on yet.  Discussed with endocrine.  Discontinue pump for now.  Started back on insulin infusion.  Moved patient to ICU for further evaluation and treatment.  Relatively asymptomatic at present.  Nausea had improved.  Reviewed events prior evening, patient had been noncompliant with insulin recommendations.  Stressed the need for further compliance in the future.  Appreciate input from endocrine, cardiology, CCM.       Objective     Last Recorded Vitals  /76   Pulse (!) 122   Temp 36.5 °C (97.7 °F) (Temporal)   Resp 19   Wt 69.8 kg (153 lb 14.1 oz)   SpO2 98%   Intake/Output last 3 Shifts:    Intake/Output Summary (Last 24 hours) at 3/20/2024 1515  Last data filed at 3/20/2024 1331  Gross per 24 hour   Intake 541.67 ml   Output 100 ml   Net 441.67 ml       Admission Weight  Weight: 77.1 kg (170 lb) (03/19/24 1035)    Daily Weight  03/20/24 : 69.8 kg (153 lb 14.1 oz)      Physical Exam  Constitutional:       Appearance: He is ill-appearing.   HENT:      Head: Normocephalic and atraumatic.      Nose: Nose normal. No congestion or rhinorrhea.      Mouth/Throat:      Mouth: Mucous membranes are dry.      Pharynx: Oropharynx is clear.   Eyes:      General: No scleral icterus.     Extraocular Movements: Extraocular movements intact.      Pupils: Pupils are equal, round, and reactive to light.   Cardiovascular:      Rate and Rhythm: Regular rhythm. Tachycardia present.      Heart sounds: Normal heart sounds. No murmur heard.     No friction rub. No gallop.   Pulmonary:      Effort: Pulmonary effort is normal.      Breath sounds: Normal breath sounds. No wheezing, rhonchi or rales.   Chest:      Chest wall: No tenderness.   Abdominal:      General: There is no distension.      Palpations: Abdomen is soft.      Tenderness: There is abdominal tenderness. There is no guarding or rebound.   Musculoskeletal:          General: No swelling, tenderness or signs of injury. Normal range of motion.      Cervical back: Normal range of motion.   Skin:     General: Skin is warm and dry.      Coloration: Skin is not jaundiced.      Findings: No bruising, erythema or rash.   Neurological:      General: No focal deficit present.      Mental Status: He is oriented to person, place, and time.          Lab Results  Results for orders placed or performed during the hospital encounter of 03/19/24 (from the past 24 hour(s))   Osmolality, urine   Result Value Ref Range    Osmolality, Urine Random 943 200 - 1,200 mOsm/kg   Urine electrolytes   Result Value Ref Range    Sodium, Urine Random 90 mmol/L    Sodium/Creatinine Ratio 210 Not established. mmol/g Creat    Potassium, Urine Random 41 mmol/L    Potassium/Creatinine Ratio 96 Not established mmol/g Creat    Chloride, Urine Random 72 mmol/L    Chloride/Creatinine Ratio 168 23 - 275 mmol/g creat    Creatinine, Urine Random 42.8 20.0 - 370.0 mg/dL   Sodium, urine, random   Result Value Ref Range    Sodium, Urine Random 90 mmol/L    Creatinine, Urine Random 42.8 20.0 - 370.0 mg/dL    Sodium/Creatinine Ratio 210 Not established. mmol/g Creat   POCT GLUCOSE   Result Value Ref Range    POCT Glucose 251 (H) 74 - 99 mg/dL   POCT GLUCOSE   Result Value Ref Range    POCT Glucose 367 (H) 74 - 99 mg/dL   Electrocardiogram, 12-lead PRN ACS symptoms   Result Value Ref Range    Ventricular Rate 117 BPM    Atrial Rate 117 BPM    SC Interval 134 ms    QRS Duration 86 ms    QT Interval 358 ms    QTC Calculation(Bazett) 499 ms    P Axis 66 degrees    R Axis 59 degrees    T Axis 51 degrees    QRS Count 19 beats    Q Onset 216 ms    P Onset 149 ms    P Offset 191 ms    T Offset 395 ms    QTC Fredericia 447 ms   POCT GLUCOSE   Result Value Ref Range    POCT Glucose 411 (H) 74 - 99 mg/dL   POCT GLUCOSE   Result Value Ref Range    POCT Glucose 367 (H) 74 - 99 mg/dL   CBC and Auto Differential   Result Value Ref  Range    WBC 19.3 (H) 4.4 - 11.3 x10*3/uL    nRBC 0.0 0.0 - 0.0 /100 WBCs    RBC 5.56 4.50 - 5.90 x10*6/uL    Hemoglobin 17.2 13.5 - 17.5 g/dL    Hematocrit 51.2 41.0 - 52.0 %    MCV 92 80 - 100 fL    MCH 30.9 26.0 - 34.0 pg    MCHC 33.6 32.0 - 36.0 g/dL    RDW 11.6 11.5 - 14.5 %    Platelets 226 150 - 450 x10*3/uL    Neutrophils % 79.7 40.0 - 80.0 %    Immature Granulocytes %, Automated 0.8 0.0 - 0.9 %    Lymphocytes % 10.3 13.0 - 44.0 %    Monocytes % 8.8 2.0 - 10.0 %    Eosinophils % 0.0 0.0 - 6.0 %    Basophils % 0.4 0.0 - 2.0 %    Neutrophils Absolute 15.38 (H) 1.20 - 7.70 x10*3/uL    Immature Granulocytes Absolute, Automated 0.16 0.00 - 0.70 x10*3/uL    Lymphocytes Absolute 1.98 1.20 - 4.80 x10*3/uL    Monocytes Absolute 1.70 (H) 0.10 - 1.00 x10*3/uL    Eosinophils Absolute 0.00 0.00 - 0.70 x10*3/uL    Basophils Absolute 0.07 0.00 - 0.10 x10*3/uL   Comprehensive Metabolic Panel   Result Value Ref Range    Glucose 261 (H) 74 - 99 mg/dL    Sodium 135 (L) 136 - 145 mmol/L    Potassium 4.2 3.5 - 5.3 mmol/L    Chloride 101 98 - 107 mmol/L    Bicarbonate 11 (L) 21 - 32 mmol/L    Anion Gap 27 (H) 10 - 20 mmol/L    Urea Nitrogen 20 6 - 23 mg/dL    Creatinine 0.98 0.50 - 1.30 mg/dL    eGFR >90 >60 mL/min/1.73m*2    Calcium 8.9 8.6 - 10.3 mg/dL    Albumin 4.0 3.4 - 5.0 g/dL    Alkaline Phosphatase 59 33 - 120 U/L    Total Protein 6.8 6.4 - 8.2 g/dL    AST 10 9 - 39 U/L    Bilirubin, Total 0.6 0.0 - 1.2 mg/dL    ALT 9 (L) 10 - 52 U/L   Magnesium   Result Value Ref Range    Magnesium 1.91 1.60 - 2.40 mg/dL   POCT GLUCOSE   Result Value Ref Range    POCT Glucose 254 (H) 74 - 99 mg/dL   POCT GLUCOSE   Result Value Ref Range    POCT Glucose 303 (H) 74 - 99 mg/dL   POCT GLUCOSE   Result Value Ref Range    POCT Glucose 297 (H) 74 - 99 mg/dL   Basic metabolic panel   Result Value Ref Range    Glucose 342 (H) 74 - 99 mg/dL    Sodium 133 (L) 136 - 145 mmol/L    Potassium 6.0 (H) 3.5 - 5.3 mmol/L    Chloride 103 98 - 107 mmol/L     Bicarbonate 9 (LL) 21 - 32 mmol/L    Anion Gap 27 (H) 10 - 20 mmol/L    Urea Nitrogen 18 6 - 23 mg/dL    Creatinine 1.08 0.50 - 1.30 mg/dL    eGFR >90 >60 mL/min/1.73m*2    Calcium 8.5 (L) 8.6 - 10.3 mg/dL   Phosphorus   Result Value Ref Range    Phosphorus 3.7 2.5 - 4.9 mg/dL   Beta Hydroxybutyrate   Result Value Ref Range    Beta-Hydroxybutyrate 9.97 (H) 0.02 - 0.27 mmol/L   POCT GLUCOSE   Result Value Ref Range    POCT Glucose 315 (H) 74 - 99 mg/dL   POCT GLUCOSE   Result Value Ref Range    POCT Glucose 286 (H) 74 - 99 mg/dL   POCT GLUCOSE   Result Value Ref Range    POCT Glucose 258 (H) 74 - 99 mg/dL   Basic Metabolic Panel   Result Value Ref Range    Glucose 250 (H) 74 - 99 mg/dL    Sodium 134 (L) 136 - 145 mmol/L    Potassium 4.2 3.5 - 5.3 mmol/L    Chloride 105 98 - 107 mmol/L    Bicarbonate 9 (LL) 21 - 32 mmol/L    Anion Gap 24 (H) 10 - 20 mmol/L    Urea Nitrogen 16 6 - 23 mg/dL    Creatinine 0.99 0.50 - 1.30 mg/dL    eGFR >90 >60 mL/min/1.73m*2    Calcium 8.1 (L) 8.6 - 10.3 mg/dL   Lactate   Result Value Ref Range    Lactate 1.0 0.4 - 2.0 mmol/L   POCT GLUCOSE   Result Value Ref Range    POCT Glucose 202 (H) 74 - 99 mg/dL   POCT GLUCOSE   Result Value Ref Range    POCT Glucose 217 (H) 74 - 99 mg/dL   POCT GLUCOSE   Result Value Ref Range    POCT Glucose 190 (H) 74 - 99 mg/dL        Image Results  Electrocardiogram, 12-lead PRN ACS symptoms  Sinus tachycardia  Borderline LVH  Prolonged QTc, may be due to tachycardia  Otherwise normal ECG  When compared with ECG of 19-MAR-2024 11:21,  PREVIOUS ECG IS PRESENT  Confirmed by Mayra Smith ([id number]) on 3/20/2024 1:29:06 PM       Assessment/Plan     Diabetic ketoacidosis without coma (CMS/HCC)  Assessment & Plan  -On arrival to the floor, patient self-removed his insulin pump   -I will initiate on lantus 35 units + SSI as per his old regimen in the charting system   -Endocrinology consultation appreciated   3/20: Unfortunately patient is now in DKA.  Insulin pump  has been discontinued.  Patient appears to have been noncompliant with insulin recommendations overnight.  Starting insulin infusion.  Gradually improving.  Appreciate input from endocrine and CCM.  Moved to ICU for closer monitoring.    Hyponatremia  Assessment & Plan  -Patient appeared to have a degree of hypovolemic hyponatremia but unfortunately received a liter of normal saline in the ED   -Corrected sodium 126 --> 140 after 1L NS  -I reached out to nephrology regarding his sodium levels, and will place on 1/2 NS @ 75cc/hr overnight  -Nephrology consultation appreciated   -Note that hyponatremia labs ordered in the ED were collected after the fluids  3/20: Appreciate input from nephrology.  Likely secondary to dehydration.    Bipolar affective disorder, remission status unspecified (CMS/[facility])  Assessment & Plan  Continue home medications.    Atypical chest pain  Assessment & Plan  -Ongoing since February of this year  -CT-PE negative   -Patient reports he had at least a portion of the stress echo completed, but did not get the full thing due to his heart rate.  Will try to obtain records/results from that aspect.  -Troponin negative.  Tenderness to palpation on examination of the chest wall.  -ECG without overt acute ischemic changes  -Semirecently had an updated A1c completed and lipid panel.  -Will start on an aspirin.  Monitor on telemetry.  Pending ability to obtain at least partial stress results may need additional evaluation and cardiology consult; however, will defer for now  -Suspect that a degree of his ongoing chest discomfort may also be related to ongoing elevated blood pressures, especially diastolic.  This has been recorded during prior ED visits.  I will start the patient on losartan daily, but in the interim have ordered one-time dose of IV labetalol with tachycardia present as well.  Unless heart rate comes down would avoid hydralazine.  3/20: Appreciate input from cardiology.  Will likely  investigate further once acidosis resolved.    Generalized anxiety disorder  Assessment & Plan  Continue home medicaitons.    Anxiety and depression  Assessment & Plan  Continue home medications.    Abdominal pain  Assessment & Plan  Likely related to above problems.  Will continue to monitor.                     Santi Pinto MD

## 2024-03-20 NOTE — PROGRESS NOTES
03/20/24 1126   Discharge Planning   Living Arrangements Family members   Support Systems Family members   Assistance Needed Independent   Type of Residence Private residence   Home or Post Acute Services None   Patient expects to be discharged to: Home   Does the patient need discharge transport arranged? No   Financial Resource Strain   How hard is it for you to pay for the very basics like food, housing, medical care, and heating? Not hard   Housing Stability   In the last 12 months, was there a time when you were not able to pay the mortgage or rent on time? N   In the last 12 months, was there a time when you did not have a steady place to sleep or slept in a shelter (including now)? N   Transportation Needs   In the past 12 months, has lack of transportation kept you from medical appointments or from getting medications? no   In the past 12 months, has lack of transportation kept you from meetings, work, or from getting things needed for daily living? No     PCP is Henri Brumfield MD. Patient is from home with his aunt and uncle. Patient is independent with ambulation, self care, driving, shopping, and meals.  Patient plans to return home with no needs upon discharge. [facility] will continue to follow for needs if they arise.

## 2024-03-20 NOTE — SIGNIFICANT EVENT
Ed Sanon is a 33 year old male that presents for an outpatient stress echo that was ordered by his primary physician Dr. Brumfield for chest pain. He was recently at [facility] ER with chest pain. I was notified that the patient pre stress test HR was in the 130s and he had a baseline chest pain of 3/10. Dr. Dunbar came with me to the bedside to assess the patient. The patient described his chest pain as worse with taking a deep breath. Dr. Dunbar discussed his case with his PCP. He placed the stress test on hold and ordered CTA angio to r/o PE. He is a type 1 DM and his blood sugar check revealed a sugar of 432. Patient explains that because the pre stress test directions said to take half his insulin he had it on hold that morning. I explained that we are concerned he is dehydrated. He agreed to go to ER for hydration and I gave report to NP Yash Velazquez. PE study was negative for PE and a 6 mm nodule was found.  I explained the results to the patient he will need to follow up with PCP regarding nodule. He may need another CT scan to reevaluate. Dr. Dunbar will follow with the patient as an outpatient and may order a lexiscan stress test outpatient to further workup chest pain. His office was notifed to make an appointment.

## 2024-03-20 NOTE — CARE PLAN
Problem: Pain - Adult  Goal: Verbalizes/displays adequate comfort level or baseline comfort level  Outcome: Progressing     Problem: Safety - Adult  Goal: Free from fall injury  Outcome: Progressing     Problem: Discharge Planning  Goal: Discharge to home or other facility with appropriate resources  Outcome: Progressing     Problem: Chronic Conditions and Co-morbidities  Goal: Patient's chronic conditions and co-morbidity symptoms are monitored and maintained or improved  Outcome: Progressing     Problem: Diabetes  Goal: Achieve decreasing blood glucose levels by end of shift  Outcome: Progressing  Goal: Increase stability of blood glucose readings by end of shift  Outcome: Progressing  Goal: Decrease in ketones present in urine by end of shift  Outcome: Progressing  Goal: Maintain electrolyte levels within acceptable range throughout shift  Outcome: Progressing  Goal: Maintain glucose levels >70mg/dl to <250mg/dl throughout shift  Outcome: Progressing  Goal: No changes in neurological exam by end of shift  Outcome: Progressing  Goal: Learn about and adhere to nutrition recommendations by end of shift  Outcome: Progressing  Goal: Vital signs within normal range for age by end of shift  Outcome: Progressing  Goal: Increase self care and/or family involovement by end of shift  Outcome: Progressing  Goal: Receive DSME education by end of shift  Outcome: Progressing

## 2024-03-20 NOTE — ASSESSMENT & PLAN NOTE
-Ongoing since February of this year  -CT-PE negative   -Patient reports he had at least a portion of the stress echo completed, but did not get the full thing due to his heart rate.  Will try to obtain records/results from that aspect.  -Troponin negative.  Tenderness to palpation on examination of the chest wall.  -ECG without overt acute ischemic changes  -Semirecently had an updated A1c completed and lipid panel.  -Will start on an aspirin.  Monitor on telemetry.  Pending ability to obtain at least partial stress results may need additional evaluation and cardiology consult; however, will defer for now  -Suspect that a degree of his ongoing chest discomfort may also be related to ongoing elevated blood pressures, especially diastolic.  This has been recorded during prior ED visits.  I will start the patient on losartan daily, but in the interim have ordered one-time dose of IV labetalol with tachycardia present as well.  Unless heart rate comes down would avoid hydralazine.  3/20: Appreciate input from cardiology.  Will likely investigate further once acidosis resolved.  3/21: Appreciate input from cardiology.  Plan for stress test tomorrow.  3/22: Nuclear stress test was negative.  Okay to discharge cardiology.  Follow-up with PCP.

## 2024-03-20 NOTE — CONSULTS
Critical Care Medicine Consult      Reason For Consult  Diabetic Ketoacidosis    History Of Present Illness  Ed Sanon is a 33 y.o. male with PMHx of insulin dependent diabetes mellitus type I and anxiety presented to the ED yesterday evening after he began experiencing chest pain and abdominal pain prior to completing a scheduled cardiac stress test. The patient uses a CGM and insulin pump. Prior to his scheduled cardiac stress test, he was asked to take half of his normal insulin dosing on that day.  However, he was unable to achieve this with his insulin pump and decided to discontinue the pump for the entirety of yesterday morning. He typically uses a continuous rate of 1.5 units/hr. Initial labs were significant for Serum Na 118, glucose 416, pH 7.31, CO2 40,  HCO3- 11, AG 27 and Sosm 343.  A CT angio with concerns of a pulmonary emboli, however it was unremarkable aside from a RT lung micronodule. Later that evening he has developed nausea, vomiting and worsening fatigue, which has continued throughout this morning as well. Intensive care is following for DKA.      Past Medical History:   Diagnosis Date    Abdominal pain 04/21/2023    Anxiety and depression 08/08/2021    Contact with and (suspected) exposure to covid-19 04/21/2023    COVID-19 virus infection 07/11/2023    Diabetes mellitus (CMS/[facility])     Diabetic acetonemia (CMS/[facility]) 11/15/2023    DIABETIC KETO ACIDOSIS    DKA (diabetic ketoacidosis) (CMS/[facility]ly [facility]) 07/11/2023    DKA, type 1, not at goal (CMS/[facility]) 04/21/2023    DM2 (diabetes mellitus, type 2) (CMS/[facility]) 11/15/2023    DIABETES    Elevated blood sugar 11/15/2023    ELEVATED BLOOD SUGAR/ 375 LAST CHECK    Elevated blood-pressure reading, without diagnosis of hypertension 09/09/2020    Elevated blood pressure reading    Fatigue 07/11/2023    Generalized anxiety disorder 07/11/2023    Microalbuminuria due to type 1 diabetes mellitus (CMS/[facility]) 07/11/2023    Personal history of COVID-19 04/02/2023     "Poorly controlled diabetes mellitus (CMS/HCC) 07/11/2023    Type 1 diabetes mellitus (CMS/HCC) 07/11/2023    Type 1 diabetes mellitus with hyperglycemia (CMS/HCC) 02/25/2019    Vitamin B12 deficiency 07/11/2023    Vitamin D deficiency 07/11/2023    Vomiting 11/15/2023    VOMITING HEADACHE BACK ACHE     No past surgical history on file.  Medications Prior to Admission   Medication Sig Dispense Refill Last Dose    blood sugar diagnostic strip Use 2-3 times per day       Guardian 4 Glucose Sensor device Change insulin sensor every 7 days as directed, linked to Minimed insulin pump, check with endocrinology for updated regimen 13 each 2     insulin lispro (HumaLOG) 100 unit/mL injection USE VIA INSULIN PUMP, USING UP  UNITS PER DAY       lancets misc Inject 1 Units under the skin 2 times a day. Sliding scale       pen needle, diabetic 32 gauge x 5/32\" needle Use as instructed 4 times daily        Sitagliptin phos-metformin  Social History     Tobacco Use    Smoking status: Never    Smokeless tobacco: Never   Vaping Use    Vaping Use: Former   Substance Use Topics    Alcohol use: Never    Drug use: Never     Family History   Problem Relation Name Age of Onset    No Known Problems Mother      No Known Problems Father      Hypertension Other      Coronary artery disease Other      Diabetes Other      Heart failure Other         Scheduled Medications:   aspirin, 81 mg, oral, Daily  labetaloL, 5 mg, intravenous, Once  losartan, 25 mg, oral, Daily  melatonin, 3 mg, oral, Nightly  pantoprazole, 40 mg, oral, Daily before breakfast   Or  pantoprazole, 40 mg, intravenous, Daily before breakfast  polyethylene glycol, 17 g, oral, Daily         Continuous Medications:   D5 % and 0.9 % sodium chloride, 150 mL/hr  dextrose 10 % in water (D10W), 150 mL/hr  dextrose 10 % in water (D10W), 150 mL/hr  insulin regular, 0-50 Units/hr, Last Rate: 9.77 Units/hr (03/20/24 0947)  sodium chloride, 250 mL/hr         PRN Medications:   PRN " medications: acetaminophen, bisacodyl, bisacodyl, dextrose, glucagon, guaiFENesin, insulin regular, ondansetron, promethazine    Review of Systems:  Review of Systems   Constitutional:  Positive for fatigue.   HENT: Negative.     Respiratory:  Positive for chest tightness. Negative for shortness of breath and wheezing.    Cardiovascular:  Positive for chest pain. Negative for palpitations.   Gastrointestinal:  Positive for abdominal pain, nausea and vomiting. Negative for abdominal distention, constipation and diarrhea.   Endocrine: Negative.    Genitourinary: Negative.    Musculoskeletal: Negative.    Skin: Negative.    Neurological:  Negative for dizziness and light-headedness.        Objective   Vitals:  Most Recent:  Vitals:    03/20/24 0732   BP: 121/67   Pulse: (!) 128   Resp: 18   Temp: 36.7 °C (98.1 °F)   SpO2: 98%       24hr Min/Max:  Temp  Min: 36.1 °C (97 °F)  Max: 37.1 °C (98.8 °F)  Pulse  Min: 100  Max: 128  BP  Min: 120/71  Max: 161/99  Resp  Min: 16  Max: 18  SpO2  Min: 96 %  Max: 99 %    LDA:          Vent settings:       Hemodynamic parameters for last 24 hours:         Intake/Output Summary (Last 24 hours) at 3/20/2024 1016  Last data filed at 3/20/2024 1013  Gross per 24 hour   Intake 950 ml   Output 100 ml   Net 850 ml           Physical exam:    Physical Exam  Constitutional:       Appearance: He is ill-appearing.   HENT:      Head: Normocephalic and atraumatic.      Mouth/Throat:      Mouth: Mucous membranes are dry.      Pharynx: Oropharynx is clear.   Eyes:      Extraocular Movements: Extraocular movements intact.      Pupils: Pupils are equal, round, and reactive to light.   Cardiovascular:      Rate and Rhythm: Regular rhythm. Tachycardia present.      Heart sounds: No murmur heard.     No gallop.   Pulmonary:      Effort: Pulmonary effort is normal. No respiratory distress.      Breath sounds: Normal breath sounds. No wheezing, rhonchi or rales.   Abdominal:      General: Abdomen is flat.  There is no distension.      Palpations: Abdomen is soft.   Musculoskeletal:         General: Normal range of motion.      Cervical back: Neck supple.   Skin:     General: Skin is warm and dry.   Neurological:      Mental Status: He is alert and oriented to person, place, and time.          Lab/Radiology/Diagnostic Review:  Results for orders placed or performed during the hospital encounter of 03/19/24 (from the past 24 hour(s))   CBC and Auto Differential   Result Value Ref Range    WBC 6.1 4.4 - 11.3 x10*3/uL    nRBC 0.0 0.0 - 0.0 /100 WBCs    RBC 5.82 4.50 - 5.90 x10*6/uL    Hemoglobin 17.8 (H) 13.5 - 17.5 g/dL    Hematocrit 51.6 41.0 - 52.0 %    MCV 89 80 - 100 fL    MCH 30.6 26.0 - 34.0 pg    MCHC 34.5 32.0 - 36.0 g/dL    RDW 11.5 11.5 - 14.5 %    Platelets 194 150 - 450 x10*3/uL    Neutrophils % 63.4 40.0 - 80.0 %    Immature Granulocytes %, Automated 0.3 0.0 - 0.9 %    Lymphocytes % 27.9 13.0 - 44.0 %    Monocytes % 7.1 2.0 - 10.0 %    Eosinophils % 0.5 0.0 - 6.0 %    Basophils % 0.8 0.0 - 2.0 %    Neutrophils Absolute 3.83 1.20 - 7.70 x10*3/uL    Immature Granulocytes Absolute, Automated 0.02 0.00 - 0.70 x10*3/uL    Lymphocytes Absolute 1.69 1.20 - 4.80 x10*3/uL    Monocytes Absolute 0.43 0.10 - 1.00 x10*3/uL    Eosinophils Absolute 0.03 0.00 - 0.70 x10*3/uL    Basophils Absolute 0.05 0.00 - 0.10 x10*3/uL   Comprehensive metabolic panel   Result Value Ref Range    Glucose 416 (H) 74 - 99 mg/dL    Sodium 118 (LL) 136 - 145 mmol/L    Potassium 5.0 3.5 - 5.3 mmol/L    Chloride 88 (L) 98 - 107 mmol/L    Bicarbonate 17 (L) 21 - 32 mmol/L    Anion Gap 18 10 - 20 mmol/L    Urea Nitrogen 17 6 - 23 mg/dL    Creatinine 0.72 0.50 - 1.30 mg/dL    eGFR >90 >60 mL/min/1.73m*2    Calcium 7.4 (L) 8.6 - 10.3 mg/dL    Albumin 3.6 3.4 - 5.0 g/dL    Alkaline Phosphatase 60 33 - 120 U/L    Total Protein 5.7 (L) 6.4 - 8.2 g/dL    AST 8 (L) 9 - 39 U/L    Bilirubin, Total 0.7 0.0 - 1.2 mg/dL    ALT 10 10 - 52 U/L   Lipase   Result  Value Ref Range    Lipase 8 (L) 9 - 82 U/L   Lactate   Result Value Ref Range    Lactate 0.9 0.4 - 2.0 mmol/L   Troponin I, High Sensitivity   Result Value Ref Range    Troponin I, High Sensitivity <3 0 - 20 ng/L   B-Type Natriuretic Peptide   Result Value Ref Range    BNP 6 0 - 99 pg/mL   Osmolality   Result Value Ref Range    Osmolality, Serum 343 (H) 280 - 300 mOsm/kg   TSH with reflex to Free T4 if abnormal   Result Value Ref Range    Thyroid Stimulating Hormone 1.04 0.44 - 3.98 mIU/L   ECG 12 lead   Result Value Ref Range    Ventricular Rate 108 BPM    Atrial Rate 108 BPM    VA Interval 136 ms    QRS Duration 93 ms    QT Interval 342 ms    QTC Calculation(Bazett) 459 ms    P Axis 77 degrees    R Axis 18 degrees    T Axis 17 degrees    QRS Count 17 beats    Q Onset 252 ms    T Offset 423 ms    QTC Fredericia 416 ms   Blood Gas Venous   Result Value Ref Range    POCT pH, Venous 7.31 (L) 7.33 - 7.43 pH    POCT pCO2, Venous 40 (L) 41 - 51 mm Hg    POCT pO2, Venous 33 (L) 35 - 45 mm Hg    POCT SO2, Venous 62 45 - 75 %    POCT Oxy Hemoglobin, Venous 61.0 45.0 - 75.0 %    POCT Base Excess, Venous -5.8 (L) -2.0 - 3.0 mmol/L    POCT HCO3 Calculated, Venous 20.1 (L) 22.0 - 26.0 mmol/L    Patient Temperature 37.0 degrees Celsius    FiO2 21 %   Basic metabolic panel   Result Value Ref Range    Glucose 312 (H) 74 - 99 mg/dL    Sodium 135 (L) 136 - 145 mmol/L    Potassium 4.4 3.5 - 5.3 mmol/L    Chloride 99 98 - 107 mmol/L    Bicarbonate 21 21 - 32 mmol/L    Anion Gap 19 10 - 20 mmol/L    Urea Nitrogen 17 6 - 23 mg/dL    Creatinine 0.77 0.50 - 1.30 mg/dL    eGFR >90 >60 mL/min/1.73m*2    Calcium 8.6 8.6 - 10.3 mg/dL   Osmolality, urine   Result Value Ref Range    Osmolality, Urine Random 943 200 - 1,200 mOsm/kg   Urine electrolytes   Result Value Ref Range    Sodium, Urine Random 90 mmol/L    Sodium/Creatinine Ratio 210 Not established. mmol/g Creat    Potassium, Urine Random 41 mmol/L    Potassium/Creatinine Ratio 96 Not  established mmol/g Creat    Chloride, Urine Random 72 mmol/L    Chloride/Creatinine Ratio 168 23 - 275 mmol/g creat    Creatinine, Urine Random 42.8 20.0 - 370.0 mg/dL   Sodium, urine, random   Result Value Ref Range    Sodium, Urine Random 90 mmol/L    Creatinine, Urine Random 42.8 20.0 - 370.0 mg/dL    Sodium/Creatinine Ratio 210 Not established. mmol/g Creat   POCT GLUCOSE   Result Value Ref Range    POCT Glucose 251 (H) 74 - 99 mg/dL   POCT GLUCOSE   Result Value Ref Range    POCT Glucose 367 (H) 74 - 99 mg/dL   Electrocardiogram, 12-lead PRN ACS symptoms   Result Value Ref Range    Ventricular Rate 117 BPM    Atrial Rate 117 BPM    [place] Interval 134 ms    QRS Duration 86 ms    QT Interval 358 ms    QTC Calculation(Bazett) 499 ms    P Axis 66 degrees    R Axis 59 degrees    T Axis 51 degrees    QRS Count 19 beats    Q Onset 216 ms    P Onset 149 ms    P Offset 191 ms    T Offset 395 ms    QTC Fredericia 447 ms   POCT GLUCOSE   Result Value Ref Range    POCT Glucose 411 (H) 74 - 99 mg/dL   POCT GLUCOSE   Result Value Ref Range    POCT Glucose 367 (H) 74 - 99 mg/dL   CBC and Auto Differential   Result Value Ref Range    WBC 19.3 (H) 4.4 - 11.3 x10*3/uL    nRBC 0.0 0.0 - 0.0 /100 WBCs    RBC 5.56 4.50 - 5.90 x10*6/uL    Hemoglobin 17.2 13.5 - 17.5 g/dL    Hematocrit 51.2 41.0 - 52.0 %    MCV 92 80 - 100 fL    MCH 30.9 26.0 - 34.0 pg    MCHC 33.6 32.0 - 36.0 g/dL    RDW 11.6 11.5 - 14.5 %    Platelets 226 150 - 450 x10*3/uL    Neutrophils % 79.7 40.0 - 80.0 %    Immature Granulocytes %, Automated 0.8 0.0 - 0.9 %    Lymphocytes % 10.3 13.0 - 44.0 %    Monocytes % 8.8 2.0 - 10.0 %    Eosinophils % 0.0 0.0 - 6.0 %    Basophils % 0.4 0.0 - 2.0 %    Neutrophils Absolute 15.38 (H) 1.20 - 7.70 x10*3/uL    Immature Granulocytes Absolute, Automated 0.16 0.00 - 0.70 x10*3/uL    Lymphocytes Absolute 1.98 1.20 - 4.80 x10*3/uL    Monocytes Absolute 1.70 (H) 0.10 - 1.00 x10*3/uL    Eosinophils Absolute 0.00 0.00 - 0.70 x10*3/uL     Basophils Absolute 0.07 0.00 - 0.10 x10*3/uL   Comprehensive Metabolic Panel   Result Value Ref Range    Glucose 261 (H) 74 - 99 mg/dL    Sodium 135 (L) 136 - 145 mmol/L    Potassium 4.2 3.5 - 5.3 mmol/L    Chloride 101 98 - 107 mmol/L    Bicarbonate 11 (L) 21 - 32 mmol/L    Anion Gap 27 (H) 10 - 20 mmol/L    Urea Nitrogen 20 6 - 23 mg/dL    Creatinine 0.98 0.50 - 1.30 mg/dL    eGFR >90 >60 mL/min/1.73m*2    Calcium 8.9 8.6 - 10.3 mg/dL    Albumin 4.0 3.4 - 5.0 g/dL    Alkaline Phosphatase 59 33 - 120 U/L    Total Protein 6.8 6.4 - 8.2 g/dL    AST 10 9 - 39 U/L    Bilirubin, Total 0.6 0.0 - 1.2 mg/dL    ALT 9 (L) 10 - 52 U/L   Magnesium   Result Value Ref Range    Magnesium 1.91 1.60 - 2.40 mg/dL   POCT GLUCOSE   Result Value Ref Range    POCT Glucose 254 (H) 74 - 99 mg/dL   POCT GLUCOSE   Result Value Ref Range    POCT Glucose 297 (H) 74 - 99 mg/dL   POCT GLUCOSE   Result Value Ref Range    POCT Glucose 315 (H) 74 - 99 mg/dL     Electrocardiogram, 12-lead PRN ACS symptoms    Result Date: 3/20/2024  Sinus tachycardia Otherwise normal ECG When compared with ECG of 19-MAR-2024 11:21, PREVIOUS ECG IS PRESENT    ECG 12 lead    Result Date: 3/19/2024  Sinus tachycardia Consider left ventricular hypertrophy ST elev, probable normal early repol pattern See ED provider note for full interpretation and clinical correlation Confirmed by Nata Hancock ([id number]) on 3/19/2024 3:51:59 PM    CT angio chest for pulmonary embolism    Result Date: 3/19/2024  Interpreted By:  Jay Mcgill, STUDY: CT ANGIO CHEST FOR PULMONARY EMBOLISM;  3/19/2024 10:17 am   INDICATION: Signs/Symptoms:pleuritc chest pain, tachycardia.   COMPARISON: None.   ACCESSION NUMBER(S): [id number]   ORDERING CLINICIAN: VIRGILIO MCGARRY   TECHNIQUE: Helical data acquisition of the chest was obtained after IV injection of  100 ml of  Omnipaque 350. Images were reformatted in axial, coronal, and sagittal planes. 3D reconstructed MIPS volumetric images were also generated  at an independent workstation and reviewed.   FINDINGS: POTENTIAL LIMITATIONS OF THE STUDY: None   HEART AND VESSELS: No discrete filling defects within the main pulmonary artery or its branches.   The thoracic aorta is of normal course and caliber without aneurysm, dissection or significant atherosclerotic calcification.   No coronary artery calcifications are seen. The study is not optimized for evaluation of coronary arteries.   The cardiac chambers are not enlarged.   MEDIASTINUM AND JERROD, LOWER NECK AND AXILLA: The visualized thyroid gland is within normal limits, the esophagus appears unremarkable.   No evidence of thoracic lymphadenopathy by CT criteria.   LUNGS AND AIRWAYS:   A micronodule seen in the right lung on image 207 of series 5, probably a small perivascular node, but follow-up may be warranted: Incidental Finding:  A non-calcified pulmonary nodule/multiple non-calcified pulmonary nodules measuring less than 6 mm, likely benign.  (**-YCF-**)   Instructions:  No further follow-up is required, however, if the patient has high risk factors for primary lung malignancy, follow-up noncontrast CT scan chest in 12 months may be obtained. (Ac Nelson et al., Guidelines for management of incidental pulmonary nodules detected on CT images: From the Fleischner Society 2017, Radiology. 2017 Jul;284 (1):228-243.) [email or web address]   Lungs otherwise are clear. Pleural effusion.   UPPER ABDOMEN AND OTHER FINDINGS: The visualized subdiaphragmatic structures demonstrate no remarkable findings.   CHEST WALL AND OSSEOUS STRUCTURES: There are no suspicious osseous lesions.       1.  No evidence of pulmonary embolus, aneurysm or dissection. 2.  Right lung micronodule. The lungs otherwise are clear.   MACRO: Jay Mcgill discussed the significance and urgency of this critical finding by chat with  VIRGILIO MCGARRY on 3/19/2024 at 10:33 am. (**-RCF-**) Findings:  See findings.   Signed by: Jay Mcgill 3/19/2024 10:33 AM  Dictation workstation:   [id number]    ECG 12 lead    Result Date: 3/16/2024  Sinus tachycardia Borderline prolonged QT interval See ED provider note for full interpretation and clinical correlation Confirmed by Destinee Bashir ([id number]) on 3/16/2024 5:33:28 PM    XR chest 1 view    Result Date: 3/13/2024  Interpreted By:  Eric Wynn, STUDY: XR CHEST 1 VIEW   INDICATION: Signs/Symptoms:CP.   COMPARISON: February 14, 2024   ACCESSION NUMBER(S): [id number]   ORDERING CLINICIAN: NASIMA BLOCK   FINDINGS: No consolidation, effusion, edema, or pneumothorax.   Heart size within normal limits.         No evidence of acute intrathoracic abnormality.   Signed by: Eric Wynn 3/13/2024 5:18 PM Dictation workstation:   [id number]      Assessment/Plan   Active Problems:    Abdominal pain    Anxiety and depression    Generalized anxiety disorder    Poorly controlled type 1 diabetes mellitus (CMS/HCC)    Atypical chest pain    Bipolar affective disorder, remission status unspecified (CMS/HCC)    Hyponatremia    Ed Sanon is a 33 y.o. male with PMHx of insulin dependent diabetes melitis type I and anxiety presented to the ED yesterday evening after he began experiencing chest pain and abdominal pain during a scheduled cardiac stress test. Prior to his scheduled cardiac stress test, he was asked to take half of his normal insulin dosing on that day.  However, he was unable to achieve this with his insulin pump and decided to discontinue the pump for the entirety of yesterday. He typically uses a continuous rate of 1.5 units/hr. Initial labs were significant for Serum Na 118, glucose 416, pH 7.31, CO2 40,  HCO3- 20.1 and Sosm 343.  He received a CT angio with concerns of a pulmonary emboli, however it was unremarkable aside from a RT lung micronodule. Intensive care is following for DKA management.    1. Diabetic ketoacidosis secondary to missed insulin doses   -patient has received 2L of NS this admission  -patient  currently on insulin drip at 9.8 units/hr  -HCO3- is 9; AG is 27 this morning  -point of care glucose every 1 hour and BMP every 4 hours while on insulin drip  -will start patient on 1/2 NS at 250 mL/hr and switched IV fluids to dextrose half NS at 150 mL/hr once blood glucose is under 250  -will switch insulin drip to subcutaneous insulin when anion gap <15 and Bicarb >15    2. Poorly controlled diabetes mellitus secondary to noncompliance with insulin regiment  -patient states that he has been more compliant with his medication regiment  -currently uses a CGM and insulin pump with a basal rate of 1.5 units/hr   -HbA1c was was 12.6 on 2/23/2024  -patient is known for recurrent admissions for DKA, though the frequency has decreased over the last 6 months    3. Hypovolemic Hyponatremia  -in the setting of diabetic ketoacidosis  -Initial serum Na was 118 and Sosm 343; Corrected Sna was 126 with BG of 416  -patient received 1L of NS in the ED  -Corrected Sna after NS bolus increased to 140  -Corrected Sna is 139 today  -Will start the patient on 1/2 NS at 250 mL/hr to avoid hypernatremia with severe hyperglycemia  -will restart on NS infusion when blood glucose is below 250 mg/dL    4. Metabolic acidosis secondary to DKA  -initial VBG significant for pH 7.31, CO2 40,  HCO3- 20.1  -Anion gap remains elevated at 27 this morning    5. Chest pain   -Patient was referred to cardiology by his primary care provider for chest pain evaluation  -he requested a stress echocardiogram, but it was cancelled given worsening chest pain with inspiration  -CT angio was obtained which was negative for PE, aneurysm or dissection      Shawn MANDUJANO I spent 35 minutes of cumulative critical care time with the patient.  Greater than 50% of that time was spent in the direct collaboration and or coordination of care of the patient.     Dragon dictation software was used to dictate this note and thus there may be minor errors in  translation/transcription including garbled speech or misspellings. Please contact for clarification if needed.

## 2024-03-20 NOTE — NURSING NOTE
Pt c/o epigastric pain and nausea. EKG obtained. Pt's blood glucose elevated at 411. Pt refusing insulin. Pt stated he replaced his insulin pump on self. Pt refusing to give him self bolus of insulin, stating he prefers to keep it at set rate. IMS notified. Education provided and pt continues to refuse. Zofran given for nausea at this time.

## 2024-03-20 NOTE — SIGNIFICANT EVENT
During admission process, as documented in HPI, patient was not able to provide good history regarding his insulin pump and cannot reliably name what insulin he was using.  He subsequently removed his insulin pump on arrival to floor, I placed subcutaneous insulin orders.    Patient has refused subcutaneous insulin and self replaced his insulin pump.  He put it back at a bolus rate of 1.5/h.  He refuses to bolus from the pump despite nursing education.  Patient refused to discuss his insulin and education during admission  Patient is well aware of the risks of continuing not to take his insulin, notably how many times he has been in DKA.  Will attempt to reeducate the patient later this evening; however, given my initial discussion with him and his refusal and refusal to accept insulin by dayshift nurse and night shift nurse, I highly suspect that the patient will not respond and make conscious attempts to follow his insulin regimen and/or any of our recommendations.

## 2024-03-20 NOTE — ASSESSMENT & PLAN NOTE
-Patient appeared to have a degree of hypovolemic hyponatremia but unfortunately received a liter of normal saline in the ED   -Corrected sodium 126 --> 140 after 1L NS  -I reached out to nephrology regarding his sodium levels, and will place on 1/2 NS @ 75cc/hr overnight  -Nephrology consultation appreciated   -Note that hyponatremia labs ordered in the ED were collected after the fluids  3/20: Appreciate input from nephrology.  Likely secondary to dehydration.  3/21: Resolved, hemoglobin is 136.

## 2024-03-20 NOTE — CARE PLAN
Patient now with multiple episodes of nausea and vomiting, reports that he is now willing to either bolus insulin from his pump (though does not know amount/rate) and/or continue with meds as previously ordered.  Patient elected to take off his insulin pump and receive subcu insulin for the interim until seen by endocrinology

## 2024-03-20 NOTE — CONSULTS
Nephrology Consult Note                                                                                                                                         Inpatient consult to Nephrology  Consult performed by: Anna Moore  Consult ordered by: Santi Pinto MD                                                                                                               HPI  Patient is a 33 y.o. male with a history of Type I diabetes with history of poor insulin compliance, anxiety, and depression, who is admitted to hospital with complaints of  elevated heart rate, elevated sugar and dehydration. Nephrology consulted in view of hyponatremia.     Patient ws at outpatient stress test yesterday which he was not able to complete due to having elevated HR and glucose, so he was sent to the ED. Patient does have an insulin pump since December 2023 however it is unclear what settings he was using and he reports his sugar has been consistently high in the 300-400's, however he says this is improved as it used to be 500-600's. He also has been having chest pain and abdominal discomfort, as well as dehydration and poor appetite.    In the ED, glucose was 416 and corrected sodium was 126.  He received a 1L bolus of normal saline in which his sodium trended up to 138 quite rapidly.  Nephrology was called and ordered for him to be placed on 1/2 NS overnight.    Patient was initially admitted step down as he was not believed to be in DKA at that time.   However, his morning labs showed a bicarb of 11 and anion gap of 27, therefore he was transferred to the ICU and placed on an insulin drip.      Past Medical History:   Diagnosis Date    Abdominal pain 04/21/2023    Anxiety and depression 08/08/2021    Contact with and (suspected) exposure to covid-19 04/21/2023    COVID-19 virus infection 07/11/2023     Diabetes mellitus (CMS/HCC)     Diabetic acetonemia (CMS/HCC) 11/15/2023    DIABETIC KETO ACIDOSIS    DKA (diabetic ketoacidosis) (CMS/HCC) 07/11/2023    DKA, type 1, not at goal (CMS/HCC) 04/21/2023    DM2 (diabetes mellitus, type 2) (CMS/HCC) 11/15/2023    DIABETES    Elevated blood sugar 11/15/2023    ELEVATED BLOOD SUGAR/ 375 LAST CHECK    Elevated blood-pressure reading, without diagnosis of hypertension 09/09/2020    Elevated blood pressure reading    Fatigue 07/11/2023    Generalized anxiety disorder 07/11/2023    Microalbuminuria due to type 1 diabetes mellitus (CMS/HCC) 07/11/2023    Personal history of COVID-19 04/02/2023    Poorly controlled diabetes mellitus (CMS/HCC) 07/11/2023    Type 1 diabetes mellitus (CMS/HCC) 07/11/2023    Type 1 diabetes mellitus with hyperglycemia (CMS/HCC) 02/25/2019    Vitamin B12 deficiency 07/11/2023    Vitamin D deficiency 07/11/2023    Vomiting 11/15/2023    VOMITING HEADACHE BACK ACHE      Social History     Socioeconomic History    Marital status: Single     Spouse name: Not on file    Number of children: Not on file    Years of education: Not on file    Highest education level: Not on file   Occupational History    Not on file   Tobacco Use    Smoking status: Never    Smokeless tobacco: Never   Vaping Use    Vaping Use: Former   Substance and Sexual Activity    Alcohol use: Never    Drug use: Never    Sexual activity: Not on file   Other Topics Concern    Not on file   Social History Narrative    Not on file     Social Determinants of Health     Financial Resource Strain: Low Risk  (3/20/2024)    Overall Financial Resource Strain (CARDIA)     Difficulty of Paying Living Expenses: Not hard at all   Food Insecurity: No Food Insecurity (2/23/2024)    Hunger Vital Sign     Worried About Running Out of Food in the Last Year: Never true     Ran Out of Food in the Last Year: Never true   Transportation Needs: No Transportation Needs (3/20/2024)    PRAPARE - Transportation  "    Lack of Transportation (Medical): No     Lack of Transportation (Non-Medical): No   Physical Activity: Not on file   Stress: Not on file   Social Connections: Not on file   Intimate Partner Violence: Not on file   Housing Stability: Low Risk  (3/20/2024)    Housing Stability Vital Sign     Unable to Pay for Housing in the Last Year: No     Number of Places Lived in the Last Year: 1     Unstable Housing in the Last Year: No      Family History   Problem Relation Name Age of Onset    No Known Problems Mother      No Known Problems Father      Hypertension Other      Coronary artery disease Other      Diabetes Other      Heart failure Other        No current facility-administered medications on file prior to encounter.     Current Outpatient Medications on File Prior to Encounter   Medication Sig Dispense Refill    blood sugar diagnostic strip Use 2-3 times per day      Guardian 4 Glucose Sensor device Change insulin sensor every 7 days as directed, linked to Minimed insulin pump, check with endocrinology for updated regimen 13 each 2    insulin lispro (HumaLOG) 100 unit/mL injection USE VIA INSULIN PUMP, USING UP  UNITS PER DAY      lancets misc Inject 1 Units under the skin 2 times a day. Sliding scale      pen needle, diabetic 32 gauge x 5/32\" needle Use as instructed 4 times daily        Scheduled medications  aspirin, 81 mg, oral, Daily  labetaloL, 5 mg, intravenous, Once  losartan, 25 mg, oral, Daily  melatonin, 3 mg, oral, Nightly  pantoprazole, 40 mg, oral, Daily before breakfast   Or  pantoprazole, 40 mg, intravenous, Daily before breakfast  polyethylene glycol, 17 g, oral, Daily      Continuous medications  D5 % and 0.9 % sodium chloride, 150 mL/hr  dextrose 10 % in water (D10W), 150 mL/hr  dextrose 10 % in water (D10W), 150 mL/hr  insulin regular, 0-50 Units/hr, Last Rate: 9.77 Units/hr (03/20/24 0947)  sodium chloride, 250 mL/hr, Last Rate: 250 mL/hr (03/20/24 1121)      PRN medications  PRN " "medications: acetaminophen, bisacodyl, bisacodyl, dextrose, glucagon, guaiFENesin, insulin regular, ondansetron, promethazine     Review of systems as per HPI otherwise 10 point review systems negative    /76   Pulse (!) 122   Temp 36.5 °C (97.7 °F) (Temporal)   Resp 19   Ht 1.778 m (5' 10\")   Wt 69.8 kg (153 lb 14.1 oz)   SpO2 98%   BMI 22.08 kg/m²     Input / Output:  24 HR:   Intake/Output Summary (Last 24 hours) at 3/20/2024 1247  Last data filed at 3/20/2024 1013  Gross per 24 hour   Intake 950 ml   Output 100 ml   Net 850 ml       Physical Exam   Alert and oriented x 3, NAD  EOMI  OP clear  Neck: supple, No JVD  CV: RRR without m/r/g  Lungs: CTA bilaterally  Abd: soft NT/ND +BS  Ext: no lower extremity edema   : no person  Neuro: grossly intact  Skin: no rashes    Results from last 7 days   Lab Units 03/20/24  0938 03/20/24  0345 03/19/24  1438   SODIUM mmol/L 133* 135* 135*   POTASSIUM mmol/L 6.0* 4.2 4.4   CHLORIDE mmol/L 103 101 99   CO2 mmol/L 9* 11* 21   BUN mg/dL 18 20 17   CREATININE mg/dL 1.08 0.98 0.77   GLUCOSE mg/dL 342* 261* 312*   CALCIUM mg/dL 8.5* 8.9 8.6        Results from last 7 days   Lab Units 03/20/24  0938 03/20/24  0345   SODIUM mmol/L 133* 135*   POTASSIUM mmol/L 6.0* 4.2   CHLORIDE mmol/L 103 101   CO2 mmol/L 9* 11*   BUN mg/dL 18 20   CREATININE mg/dL 1.08 0.98   CALCIUM mg/dL 8.5* 8.9   PROTEIN TOTAL g/dL  --  6.8   BILIRUBIN TOTAL mg/dL  --  0.6   ALK PHOS U/L  --  59   ALT U/L  --  9*   AST U/L  --  10   GLUCOSE mg/dL 342* 261*      Results from last 7 days   Lab Units 03/20/24  0345   MAGNESIUM mg/dL 1.91      Results from last 7 days   Lab Units 03/20/24  0345 03/19/24  1115 03/13/24  1714   WBC AUTO x10*3/uL 19.3* 6.1 6.7   HEMOGLOBIN g/dL 17.2 17.8* 18.4*   HEMATOCRIT % 51.2 51.6 51.7   PLATELETS AUTO x10*3/uL 226 194 197        No orders to display        Assessment:   Patient is 33 y.o. male who is admitted to hospital for DKA. Nephrology consulted in view of " hyponatremia.    Hyponatremia  - Likely secondary to hyperglycemia  - Serum osmolality 343, urine osmolality 943  - Corrected Na on admission was 126  - Patient was given 1L of NS in the ED with rapid improvement of Na to 138  - Nephrology was called and ordered 1/2 NS overnight    Anion gap metabolic acidosis  - likely secondary to DKA  - Bicarb dropped from 21 -> 11 overnight  - Calculated anion gap 23  - ordered Beta hydroxybutyrate, UA, lactate, and volatile compounds to rule out other causes  - Beta hydroxybutyrate very elevated at 9.97  - Critical care team on board  - On insulin drip and DKA protocol fluids    Hyperkalemia  - K 6.0   - Continue insulin drip per DKA protocol    Recommendations:   - Continue insulin drip and fluids per DKA protocol  - Check lactate, UA, and volatile compounds  - BMP every 4 hours  - Monitor electrolytes    Please message me through [employer] chat with any questions or concerns.     MISHEL Ovalles IV  3/20/2024  12:47 PM         [facility]    [address], [address]   [place] [id number]  Office: [phone]

## 2024-03-20 NOTE — NURSING NOTE
Pt continues to c/o nausea and vomiting. Pt now agreeing to receive insulin ordered. Pt removed insulin pump. IMS notified. Humalog administered at this time.

## 2024-03-20 NOTE — CONSULTS
"Inpatient consult to Cardiology  Consult performed by: Mayra Smith MD  Consult ordered by: Santi Pinto MD  Reason for consult: chest pain        History Of Present Illness:    Ed Sanon is a 33 y.o. male presenting with chest pain and hyperglycemia.  History of DM1 with poor compliance, on insulin pump, anxiety, depression.    The patient presented for outpatient stress test - while there he reported chest pain that has been constant x one month.  Tells me the pain originates in his chest but radiates down to his abdomen and to both of his legs.  The pain is not clearly related to activity, but seems worse if he leans forward, although not always.  No associated SOB.  Does have some nausea and vomiting.  He was sent to the ED from stress lab due to above symptoms and high heart rates, as well as glucose in the 400's.  Reports that he has been trying to use insulin pump, however that he has not had any sensors since December.  His glucose has been in the 300-400's (tells me in the past it was chronically in the 500-600 range).    In the ED labs significant for glucose 416, Na 118, Cr 0.72.  HSTI 3 and less than 3.  CT PE protocol negative for PE.  Rec'd 1L NS.    ROS:  The remainder of the review of systems was obtained, as was negative as pertains to the chief complaint.     Last Recorded Vitals:  Vitals:    03/20/24 0732 03/20/24 1000 03/20/24 1100 03/20/24 1118   BP: 121/67 106/70 113/76    BP Location: Right arm      Patient Position: Lying      Pulse: (!) 128 (!) 124 (!) 122    Resp: 18 20 19    Temp: 36.7 °C (98.1 °F)  36.5 °C (97.7 °F)    TempSrc: Temporal  Temporal    SpO2: 98% 98% 98%    Weight:    69.8 kg (153 lb 14.1 oz)   Height:    1.778 m (5' 10\")       Last Labs:  CBC - 3/20/2024:  3:45 AM  19.3 17.2 226    51.2      CMP - 3/20/2024: 12:24 PM  8.1 6.8 10 --- 0.6   3.7 4.0 9 59      PTT - No results in last year.  _   _ _     Troponin I, High Sensitivity   Date/Time Value Ref Range Status " "  03/19/2024 11:15 AM <3 0 - 20 ng/L Final   03/13/2024 05:14 PM 3 0 - 20 ng/L Final   02/14/2024 12:43 PM <3 0 - 20 ng/L Final     BNP   Date/Time Value Ref Range Status   03/19/2024 11:15 AM 6 0 - 99 pg/mL Final   03/13/2024 05:14 PM 4 0 - 99 pg/mL Final     Hemoglobin A1C   Date/Time Value Ref Range Status   02/23/2024 01:41 PM 12.4 (H) see below % Final   12/09/2023 04:26 AM 15.5 (H) see below % Final     LDL Calculated   Date/Time Value Ref Range Status   02/23/2024 01:41 PM 49 <=99 mg/dL Final     Comment:                                 Near   Borderline      AGE      Desirable  Optimal    High     High     Very High     0-19 Y     0 - 109     ---    110-129   >/= 130     ----    20-24 Y     0 - 119     ---    120-159   >/= 160     ----      >24 Y     0 -  99   100-129  130-159   160-189     >/=190       VLDL   Date/Time Value Ref Range Status   02/23/2024 01:41 PM 20 0 - 40 mg/dL Final   06/02/2021 04:15 AM 63 (H) 0 - 40 mg/dL Final   06/29/2020 04:46 AM 22 0 - 40 mg/dL Final      Last I/O:  I/O last 3 completed shifts:  In: 950 (12.3 mL/kg) [IV Piggyback:950]  Out: - (0 mL/kg)   Weight: 77.1 kg     Past Cardiology Tests (Last 3 Years):  EKG:  Electrocardiogram, 12-lead PRN ACS symptoms 03/19/2024      ECG 12 lead 03/19/2024      ECG 12 lead 03/13/2024      ECG 12 lead 02/14/2024    Echo:  No results found for this or any previous visit from the past 1095 days.    Ejection Fractions:  No results found for: \"EF\"  Cath:  No results found for this or any previous visit from the past 1095 days.    Stress Test:  No results found for this or any previous visit from the past 1095 days.    Cardiac Imaging:  No results found for this or any previous visit from the past 1095 days.      Past Medical History:  He has a past medical history of Abdominal pain (04/21/2023), Anxiety and depression (08/08/2021), Contact with and (suspected) exposure to covid-19 (04/21/2023), COVID-19 virus infection (07/11/2023), Diabetes " mellitus (CMS/HCC), Diabetic acetonemia (CMS/HCC) (11/15/2023), DKA (diabetic ketoacidosis) (CMS/HCC) (07/11/2023), DKA, type 1, not at goal (CMS/HCC) (04/21/2023), DM2 (diabetes mellitus, type 2) (CMS/HCC) (11/15/2023), Elevated blood sugar (11/15/2023), Elevated blood-pressure reading, without diagnosis of hypertension (09/09/2020), Fatigue (07/11/2023), Generalized anxiety disorder (07/11/2023), Microalbuminuria due to type 1 diabetes mellitus (CMS/HCC) (07/11/2023), Personal history of COVID-19 (04/02/2023), Poorly controlled diabetes mellitus (CMS/HCC) (07/11/2023), Type 1 diabetes mellitus (CMS/HCC) (07/11/2023), Type 1 diabetes mellitus with hyperglycemia (CMS/HCC) (02/25/2019), Vitamin B12 deficiency (07/11/2023), Vitamin D deficiency (07/11/2023), and Vomiting (11/15/2023).    Past Surgical History:  He has no past surgical history on file.      Social History:  He reports that he has never smoked. He has never used smokeless tobacco. He reports that he does not drink alcohol and does not use drugs.    Family History:  Family History   Problem Relation Name Age of Onset    No Known Problems Mother      No Known Problems Father      Hypertension Other      Coronary artery disease Other      Diabetes Other      Heart failure Other          Allergies:  Sitagliptin phos-metformin    Inpatient Medications:  Scheduled medications   Medication Dose Route Frequency    aspirin  81 mg oral Daily    labetaloL  5 mg intravenous Once    losartan  25 mg oral Daily    melatonin  3 mg oral Nightly    pantoprazole  40 mg oral Daily before breakfast    Or    pantoprazole  40 mg intravenous Daily before breakfast    polyethylene glycol  17 g oral Daily     PRN medications   Medication    acetaminophen    bisacodyl    bisacodyl    dextrose    glucagon    guaiFENesin    insulin regular    ondansetron    promethazine     Continuous Medications   Medication Dose Last Rate    D5 % and 0.9 % sodium chloride  150 mL/hr 150 mL/hr  "(03/20/24 1331)    dextrose 10 % in water (D10W)  150 mL/hr      dextrose 10 % in water (D10W)  150 mL/hr      insulin regular  0-50 Units/hr 9.77 Units/hr (03/20/24 0947)    sodium chloride  250 mL/hr Stopped (03/20/24 1331)     Outpatient Medications:  Current Outpatient Medications   Medication Instructions    blood sugar diagnostic strip Use 2-3 times per day    Guardian 4 Glucose Sensor device Change insulin sensor every 7 days as directed, linked to Minimed insulin pump, check with endocrinology for updated regimen    insulin lispro (HumaLOG) 100 unit/mL injection USE VIA INSULIN PUMP, USING UP  UNITS PER DAY    lancets misc 1 Units, subcutaneous, 3 times daily after meals and nightly, Sliding scale    pen needle, diabetic 32 gauge x 5/32\" needle Use as instructed 4 times daily       Physical Exam:  Physical Exam  Constitutional:       Appearance: He is ill-appearing.   HENT:      Head: Normocephalic.      Mouth/Throat:      Mouth: Mucous membranes are dry.   Eyes:      Extraocular Movements: Extraocular movements intact.   Cardiovascular:      Rate and Rhythm: Regular rhythm. Tachycardia present.   Pulmonary:      Effort: Pulmonary effort is normal.   Abdominal:      Palpations: Abdomen is soft.   Musculoskeletal:      Cervical back: Neck supple.   Skin:     Comments: diaphoretic   Neurological:      General: No focal deficit present.      Mental Status: He is alert.   Psychiatric:         Mood and Affect: Mood normal.            Assessment/Plan   Chest pain:  atypical chest pain radiating to abdomen and b/l LE.   Troponin negative x2.  I personally reviewed the EKG on presentation, which demonstrated sinus tachycardia , LAE, nonspecific ST abnl.  CT PE protocol neg for PE.  -recommend reattempting stress test once his DKA has resolved  -rpt EKG/troponin for worsening chest pain    HTN:  DBP's in the 's range on presentation  -continue losartan 25mg daily  -serial Cr monitoring    Sinus " tachycardia:  likely appropriate sinus tach in setting of DKA  -tele monitoring  -suspect this will improve with treatment of DKA - if it does not, try low dose BB    DKA:  -IVF and insulin management per endocrine/IMS/ICU    Hyponatremia:    -gradual correction with IVF  -nephro recs    Peripheral IV 03/19/24 20 G Right Antecubital (Active)   Site Assessment Clean;Dry;Intact 03/20/24 0840   Dressing Type Transparent 03/20/24 0840   Line Status Flushed 03/20/24 0840   Dressing Status Clean;Dry;Occlusive 03/20/24 0840   Number of days: 1       Peripheral IV 03/20/24 20 G Left;Proximal;Anterior Forearm (Active)   Site Assessment Clean;Dry;Intact 03/20/24 0940   Dressing Type Transparent 03/20/24 0940   Line Status Flushed;Blood return noted 03/20/24 0940   Dressing Status Clean;Dry;Occlusive 03/20/24 0940   $ Peripheral IV Charge Ultrasound guidance 03/20/24 0940   Number of days: 0       Code Status:  Full Code    I spent 30 minutes of critical care time in the professional and overall care of this patient today.        Mayra Smith MD

## 2024-03-21 PROBLEM — R21 RASH: Status: ACTIVE | Noted: 2024-03-21

## 2024-03-21 LAB
ANION GAP SERPL CALC-SCNC: 14 MMOL/L (ref 10–20)
BUN SERPL-MCNC: 20 MG/DL (ref 6–23)
CALCIUM SERPL-MCNC: 8 MG/DL (ref 8.6–10.3)
CHLORIDE SERPL-SCNC: 109 MMOL/L (ref 98–107)
CO2 SERPL-SCNC: 17 MMOL/L (ref 21–32)
CREAT SERPL-MCNC: 0.74 MG/DL (ref 0.5–1.3)
EGFRCR SERPLBLD CKD-EPI 2021: >90 ML/MIN/1.73M*2
ERYTHROCYTE [DISTWIDTH] IN BLOOD BY AUTOMATED COUNT: 11.7 % (ref 11.5–14.5)
GLUCOSE BLD MANUAL STRIP-MCNC: 244 MG/DL (ref 74–99)
GLUCOSE BLD MANUAL STRIP-MCNC: 275 MG/DL (ref 74–99)
GLUCOSE BLD MANUAL STRIP-MCNC: 277 MG/DL (ref 74–99)
GLUCOSE BLD MANUAL STRIP-MCNC: 312 MG/DL (ref 74–99)
GLUCOSE SERPL-MCNC: 283 MG/DL (ref 74–99)
HCT VFR BLD AUTO: 45.1 % (ref 41–52)
HGB BLD-MCNC: 15.5 G/DL (ref 13.5–17.5)
MAGNESIUM SERPL-MCNC: 1.76 MG/DL (ref 1.6–2.4)
MCH RBC QN AUTO: 30.5 PG (ref 26–34)
MCHC RBC AUTO-ENTMCNC: 34.4 G/DL (ref 32–36)
MCV RBC AUTO: 89 FL (ref 80–100)
NRBC BLD-RTO: 0 /100 WBCS (ref 0–0)
OSMOLALITY SERPL: 312 MOSM/KG (ref 280–300)
PLATELET # BLD AUTO: 208 X10*3/UL (ref 150–450)
POTASSIUM SERPL-SCNC: 4.2 MMOL/L (ref 3.5–5.3)
RBC # BLD AUTO: 5.09 X10*6/UL (ref 4.5–5.9)
SODIUM SERPL-SCNC: 136 MMOL/L (ref 136–145)
WBC # BLD AUTO: 7.4 X10*3/UL (ref 4.4–11.3)

## 2024-03-21 PROCEDURE — G0378 HOSPITAL OBSERVATION PER HR: HCPCS

## 2024-03-21 PROCEDURE — 80048 BASIC METABOLIC PNL TOTAL CA: CPT

## 2024-03-21 PROCEDURE — 2500000002 HC RX 250 W HCPCS SELF ADMINISTERED DRUGS (ALT 637 FOR MEDICARE OP, ALT 636 FOR OP/ED): Performed by: INTERNAL MEDICINE

## 2024-03-21 PROCEDURE — 2500000001 HC RX 250 WO HCPCS SELF ADMINISTERED DRUGS (ALT 637 FOR MEDICARE OP): Performed by: INTERNAL MEDICINE

## 2024-03-21 PROCEDURE — 2500000002 HC RX 250 W HCPCS SELF ADMINISTERED DRUGS (ALT 637 FOR MEDICARE OP, ALT 636 FOR OP/ED)

## 2024-03-21 PROCEDURE — 83735 ASSAY OF MAGNESIUM: CPT

## 2024-03-21 PROCEDURE — 82947 ASSAY GLUCOSE BLOOD QUANT: CPT

## 2024-03-21 PROCEDURE — 2500000004 HC RX 250 GENERAL PHARMACY W/ HCPCS (ALT 636 FOR OP/ED): Performed by: INTERNAL MEDICINE

## 2024-03-21 PROCEDURE — 99233 SBSQ HOSP IP/OBS HIGH 50: CPT | Performed by: INTERNAL MEDICINE

## 2024-03-21 PROCEDURE — 99232 SBSQ HOSP IP/OBS MODERATE 35: CPT | Performed by: CLINICAL NURSE SPECIALIST

## 2024-03-21 PROCEDURE — 96361 HYDRATE IV INFUSION ADD-ON: CPT

## 2024-03-21 PROCEDURE — 83930 ASSAY OF BLOOD OSMOLALITY: CPT | Mod: PORLAB | Performed by: INTERNAL MEDICINE

## 2024-03-21 PROCEDURE — 85027 COMPLETE CBC AUTOMATED: CPT

## 2024-03-21 PROCEDURE — 99291 CRITICAL CARE FIRST HOUR: CPT | Performed by: INTERNAL MEDICINE

## 2024-03-21 RX ORDER — AMINOPHYLLINE 25 MG/ML
125 INJECTION, SOLUTION INTRAVENOUS AS NEEDED
Status: DISCONTINUED | OUTPATIENT
Start: 2024-03-21 | End: 2024-03-22 | Stop reason: HOSPADM

## 2024-03-21 RX ORDER — REGADENOSON 0.08 MG/ML
0.4 INJECTION, SOLUTION INTRAVENOUS
Status: DISCONTINUED | OUTPATIENT
Start: 2024-03-21 | End: 2024-03-22 | Stop reason: HOSPADM

## 2024-03-21 RX ORDER — LISINOPRIL 5 MG/1
5 TABLET ORAL DAILY
COMMUNITY
Start: 2022-03-02

## 2024-03-21 RX ORDER — INSULIN LISPRO 100 [IU]/ML
6 INJECTION, SOLUTION INTRAVENOUS; SUBCUTANEOUS
Status: DISCONTINUED | OUTPATIENT
Start: 2024-03-21 | End: 2024-03-22

## 2024-03-21 RX ORDER — INSULIN LISPRO 100 [IU]/ML
0-5 INJECTION, SOLUTION INTRAVENOUS; SUBCUTANEOUS
Status: DISCONTINUED | OUTPATIENT
Start: 2024-03-21 | End: 2024-03-22

## 2024-03-21 RX ADMIN — SODIUM CHLORIDE 1000 ML: 9 INJECTION, SOLUTION INTRAVENOUS at 08:44

## 2024-03-21 RX ADMIN — INSULIN LISPRO 9 UNITS: 100 INJECTION, SOLUTION INTRAVENOUS; SUBCUTANEOUS at 09:10

## 2024-03-21 RX ADMIN — PANTOPRAZOLE SODIUM 40 MG: 40 TABLET, DELAYED RELEASE ORAL at 07:21

## 2024-03-21 RX ADMIN — INSULIN LISPRO 12 UNITS: 100 INJECTION, SOLUTION INTRAVENOUS; SUBCUTANEOUS at 16:58

## 2024-03-21 RX ADMIN — INSULIN LISPRO 9 UNITS: 100 INJECTION, SOLUTION INTRAVENOUS; SUBCUTANEOUS at 11:49

## 2024-03-21 RX ADMIN — ASPIRIN 81 MG: 81 TABLET, COATED ORAL at 07:21

## 2024-03-21 RX ADMIN — LOSARTAN POTASSIUM 25 MG: 25 TABLET, FILM COATED ORAL at 07:21

## 2024-03-21 RX ADMIN — INSULIN HUMAN 15 UNITS: 100 INJECTION, SUSPENSION SUBCUTANEOUS at 08:44

## 2024-03-21 ASSESSMENT — ENCOUNTER SYMPTOMS
HALLUCINATIONS: 0
LIGHT-HEADEDNESS: 0
CONFUSION: 0
NAUSEA: 1
SORE THROAT: 0
CHEST TIGHTNESS: 1
DYSURIA: 0
WHEEZING: 0
VOMITING: 0
WOUND: 0
DIARRHEA: 0
PALPITATIONS: 0
CONSTIPATION: 0
CHOKING: 0
FEVER: 0
ABDOMINAL PAIN: 0
WEAKNESS: 0
SHORTNESS OF BREATH: 0
FACIAL SWELLING: 0
NAUSEA: 0
COUGH: 0
VOMITING: 1
DIAPHORESIS: 0
CHILLS: 0
HEMATURIA: 0
NERVOUS/ANXIOUS: 0
APPETITE CHANGE: 1
JOINT SWELLING: 0
AGITATION: 0
BACK PAIN: 0
NECK PAIN: 0
FATIGUE: 1

## 2024-03-21 ASSESSMENT — COGNITIVE AND FUNCTIONAL STATUS - GENERAL
MOBILITY SCORE: 24
DAILY ACTIVITIY SCORE: 24

## 2024-03-21 ASSESSMENT — PAIN - FUNCTIONAL ASSESSMENT
PAIN_FUNCTIONAL_ASSESSMENT: 0-10

## 2024-03-21 ASSESSMENT — PAIN SCALES - GENERAL
PAINLEVEL_OUTOF10: 0 - NO PAIN

## 2024-03-21 NOTE — PROGRESS NOTES
"Ed Sanon is a 33 y.o. male on day 0 of admission presenting with Chest pain.    Review of Systems   Constitutional:  Positive for appetite change and fatigue. Negative for chills, diaphoresis and fever.   HENT:  Negative for congestion, facial swelling, sneezing and sore throat.    Respiratory:  Positive for chest tightness. Negative for cough, choking, shortness of breath and wheezing.    Cardiovascular:  Positive for chest pain. Negative for palpitations and leg swelling.   Gastrointestinal:  Positive for nausea and vomiting. Negative for abdominal pain, constipation and diarrhea.   Genitourinary:  Negative for dysuria, hematuria and urgency.   Musculoskeletal:  Negative for back pain, gait problem, joint swelling and neck pain.   Skin:  Negative for rash and wound.   Neurological:  Negative for syncope, weakness and light-headedness.   Psychiatric/Behavioral:  Negative for agitation, confusion and hallucinations. The patient is not nervous/anxious.    All other systems reviewed and are negative.     Subjective   Ed Sanon is a 33 y.o. male with PMHx s/f IDDM I with poor insulin compliance (despite recent initiation on insulin pump in December), anxiety, depression, who presents from outpatient cardiac stress testing for evaluation of elevated HR, elevated sugar, and dehydration. Patient reports that he is trying to use his insulin pump; however, he does not have any sensors and has not had any sensor since his last admission in December after he had a short supply of them.  He reports that his sugars have predominantly been in the 300s, occasionally in the 400s.  He admits that he is still using his insulin pump; however, he is only using it \"however it is programmed now\" and cannot really tell me what the basal rate is and/or any bolus rates/amounts. He admits only intermittent compliance with bolusing insulin if he is hyperglycemic, and reports that even if he is remaining hyperglycemic he is not " frequently changing his pump site. He also reports ongoing nausea, poor appetite, and some very mild left upper quadrant/left lower quadrant abdominal discomfort which has been present for quite some time. He is not eating and drinking as much as he typically would over the last few weeks. He denies any associated vomiting, diarrhea, changes in bowel/bladder habits, fevers, chills, known sick contacts or exposures.  He reports that he was in the outpatient cardiology testing center today when he was sent to the ED for further evaluation.  He reports he is having constant chest pain/pressure with radiation into his back which has been ongoing since the start of February.  He was evaluated in the emergency department 3 times in February for chest pain, and subsequently was ordered a cardiac stress test by his PCP after a follow-up appointment in the outpatient setting.  He was about to undergo a stress echo when they noted his heart rate was too high and his blood sugar was in the 400s so he was sent to the ED for further evaluation; however, he did undergo CT PE due to his elevated heart rate which was negative.  Patient reports his chest pain is constant and feels that he is tender to the touch of his chest and has been since it started in February.  He denies syncope or presyncopal events, diaphoresis, headache, vision changes, prior CAD diagnoses.     ED Course (Summary):   Vitals on presentation: T97.0, /94, , RR 18, SpO2 99% RA  Labs: CBC with differential relatively benign.  Note that patient did have an initial glucose of 416 and a corrected sodium of 126, received a liter of normal saline and his sodium up trended quite rapidly.  Initial VBG with pH 7.31, pCO2 40, calculated bicarb 20.1.  Imaging: CT PE was completed in the outpatient setting/ordered by cardiology  Interventions: 1 L normal saline    3/20: Patient seen.  Labs reviewed this morning and noted that patient had developed DKA.  Patient  had a bicarbonate of 11 with anion gap of 27.  Discussed with patient.  Had not placed his insulin pump back on yet.  Discussed with endocrine.  Discontinue pump for now.  Started back on insulin infusion.  Moved patient to ICU for further evaluation and treatment.  Relatively asymptomatic at present.  Nausea had improved.  Reviewed events prior evening, patient had been noncompliant with insulin recommendations.  Stressed the need for further compliance in the future.  Appreciate input from endocrine, cardiology, CCM.    3/21: Patient seen.  DKA has resolved.  Back on basal bolus insulin.  More agreeable to take insulin at present.  Plan for nuclear stress test tomorrow.  Nausea resolved.  Oral intake is improving.       Objective     Last Recorded Vitals  /90   Pulse 106   Temp 37 °C (98.6 °F)   Resp 19   Wt 69.8 kg (153 lb 14.1 oz)   SpO2 100%   Intake/Output last 3 Shifts:    Intake/Output Summary (Last 24 hours) at 3/21/2024 1536  Last data filed at 3/21/2024 0944  Gross per 24 hour   Intake 2393.11 ml   Output 700 ml   Net 1693.11 ml       Admission Weight  Weight: 77.1 kg (170 lb) (03/19/24 1035)    Daily Weight  03/20/24 : 69.8 kg (153 lb 14.1 oz)      Physical Exam  Constitutional:       Appearance: He is ill-appearing.   HENT:      Head: Normocephalic and atraumatic.      Nose: Nose normal. No congestion or rhinorrhea.      Mouth/Throat:      Mouth: Mucous membranes are dry.      Pharynx: Oropharynx is clear.   Eyes:      General: No scleral icterus.     Extraocular Movements: Extraocular movements intact.      Pupils: Pupils are equal, round, and reactive to light.   Cardiovascular:      Rate and Rhythm: Regular rhythm. Tachycardia present.      Heart sounds: Normal heart sounds. No murmur heard.     No friction rub. No gallop.   Pulmonary:      Effort: Pulmonary effort is normal.      Breath sounds: Normal breath sounds. No wheezing, rhonchi or rales.   Chest:      Chest wall: No tenderness.    Abdominal:      General: There is no distension.      Palpations: Abdomen is soft.      Tenderness: There is abdominal tenderness. There is no guarding or rebound.   Musculoskeletal:         General: No swelling, tenderness or signs of injury. Normal range of motion.      Cervical back: Normal range of motion.   Skin:     General: Skin is warm and dry.      Coloration: Skin is not jaundiced.      Findings: No bruising, erythema or rash.   Neurological:      General: No focal deficit present.      Mental Status: He is oriented to person, place, and time.          Lab Results  Results for orders placed or performed during the hospital encounter of 03/19/24 (from the past 24 hour(s))   POCT GLUCOSE   Result Value Ref Range    POCT Glucose 194 (H) 74 - 99 mg/dL   Basic Metabolic Panel   Result Value Ref Range    Glucose 202 (H) 74 - 99 mg/dL    Sodium 137 136 - 145 mmol/L    Potassium 3.8 3.5 - 5.3 mmol/L    Chloride 110 (H) 98 - 107 mmol/L    Bicarbonate 16 (L) 21 - 32 mmol/L    Anion Gap 15 10 - 20 mmol/L    Urea Nitrogen 17 6 - 23 mg/dL    Creatinine 0.90 0.50 - 1.30 mg/dL    eGFR >90 >60 mL/min/1.73m*2    Calcium 8.0 (L) 8.6 - 10.3 mg/dL   POCT GLUCOSE   Result Value Ref Range    POCT Glucose 189 (H) 74 - 99 mg/dL   POCT GLUCOSE   Result Value Ref Range    POCT Glucose 138 (H) 74 - 99 mg/dL   POCT GLUCOSE   Result Value Ref Range    POCT Glucose 129 (H) 74 - 99 mg/dL   Basic Metabolic Panel   Result Value Ref Range    Glucose 113 (H) 74 - 99 mg/dL    Sodium 138 136 - 145 mmol/L    Potassium 3.6 3.5 - 5.3 mmol/L    Chloride 112 (H) 98 - 107 mmol/L    Bicarbonate 20 (L) 21 - 32 mmol/L    Anion Gap 10 10 - 20 mmol/L    Urea Nitrogen 17 6 - 23 mg/dL    Creatinine 0.84 0.50 - 1.30 mg/dL    eGFR >90 >60 mL/min/1.73m*2    Calcium 8.0 (L) 8.6 - 10.3 mg/dL   Magnesium   Result Value Ref Range    Magnesium 1.80 1.60 - 2.40 mg/dL   Phosphorus   Result Value Ref Range    Phosphorus 3.0 2.5 - 4.9 mg/dL   POCT GLUCOSE   Result  Value Ref Range    POCT Glucose 106 (H) 74 - 99 mg/dL   POCT GLUCOSE   Result Value Ref Range    POCT Glucose 119 (H) 74 - 99 mg/dL   POCT GLUCOSE   Result Value Ref Range    POCT Glucose 125 (H) 74 - 99 mg/dL   POCT GLUCOSE   Result Value Ref Range    POCT Glucose 142 (H) 74 - 99 mg/dL   Osmolality   Result Value Ref Range    Osmolality, Serum 312 (H) 280 - 300 mOsm/kg   CBC   Result Value Ref Range    WBC 7.4 4.4 - 11.3 x10*3/uL    nRBC 0.0 0.0 - 0.0 /100 WBCs    RBC 5.09 4.50 - 5.90 x10*6/uL    Hemoglobin 15.5 13.5 - 17.5 g/dL    Hematocrit 45.1 41.0 - 52.0 %    MCV 89 80 - 100 fL    MCH 30.5 26.0 - 34.0 pg    MCHC 34.4 32.0 - 36.0 g/dL    RDW 11.7 11.5 - 14.5 %    Platelets 208 150 - 450 x10*3/uL   Basic metabolic panel   Result Value Ref Range    Glucose 283 (H) 74 - 99 mg/dL    Sodium 136 136 - 145 mmol/L    Potassium 4.2 3.5 - 5.3 mmol/L    Chloride 109 (H) 98 - 107 mmol/L    Bicarbonate 17 (L) 21 - 32 mmol/L    Anion Gap 14 10 - 20 mmol/L    Urea Nitrogen 20 6 - 23 mg/dL    Creatinine 0.74 0.50 - 1.30 mg/dL    eGFR >90 >60 mL/min/1.73m*2    Calcium 8.0 (L) 8.6 - 10.3 mg/dL   Magnesium   Result Value Ref Range    Magnesium 1.76 1.60 - 2.40 mg/dL   POCT GLUCOSE   Result Value Ref Range    POCT Glucose 277 (H) 74 - 99 mg/dL   POCT GLUCOSE   Result Value Ref Range    POCT Glucose 275 (H) 74 - 99 mg/dL        Image Results  Electrocardiogram, 12-lead PRN ACS symptoms  Sinus tachycardia  Borderline LVH  Prolonged QTc, may be due to tachycardia  Otherwise normal ECG  When compared with ECG of 19-MAR-2024 11:21,  PREVIOUS ECG IS PRESENT  Confirmed by Mayra Smith [id number]) on 3/20/2024 1:29:06 PM       Assessment/Plan     Diabetic ketoacidosis without coma (CMS/HCC)  Assessment & Plan  -On arrival to the floor, patient self-removed his insulin pump   -I will initiate on lantus 35 units + SSI as per his old regimen in the charting system   -Endocrinology consultation appreciated   3/20: Unfortunately patient is now  in DKA.  Insulin pump has been discontinued.  Patient appears to have been noncompliant with insulin recommendations overnight.  Starting insulin infusion.  Gradually improving.  Appreciate input from endocrine and CCM.  Moved to ICU for closer monitoring.  3/21: DKA appears to resolved.  Patient is off insulin infusion.  Currently back on basal bolus insulin.  Appreciate input from endocrinology.  Patient appears to be more compliant with insulin at present.    Hyponatremia  Assessment & Plan  -Patient appeared to have a degree of hypovolemic hyponatremia but unfortunately received a liter of normal saline in the ED   -Corrected sodium 126 --> 140 after 1L NS  -I reached out to nephrology regarding his sodium levels, and will place on 1/2 NS @ 75cc/hr overnight  -Nephrology consultation appreciated   -Note that hyponatremia labs ordered in the ED were collected after the fluids  3/20: Appreciate input from nephrology.  Likely secondary to dehydration.  3/21: Resolved, hemoglobin is 136.    Bipolar affective disorder, remission status unspecified (CMS/[facility])  Assessment & Plan  Continue home medications.    Atypical chest pain  Assessment & Plan  -Ongoing since February of this year  -CT-PE negative   -Patient reports he had at least a portion of the stress echo completed, but did not get the full thing due to his heart rate.  Will try to obtain records/results from that aspect.  -Troponin negative.  Tenderness to palpation on examination of the chest wall.  -ECG without overt acute ischemic changes  -Semirecently had an updated A1c completed and lipid panel.  -Will start on an aspirin.  Monitor on telemetry.  Pending ability to obtain at least partial stress results may need additional evaluation and cardiology consult; however, will defer for now  -Suspect that a degree of his ongoing chest discomfort may also be related to ongoing elevated blood pressures, especially diastolic.  This has been recorded during prior  ED visits.  I will start the patient on losartan daily, but in the interim have ordered one-time dose of IV labetalol with tachycardia present as well.  Unless heart rate comes down would avoid hydralazine.  3/20: Appreciate input from cardiology.  Will likely investigate further once acidosis resolved.  3/21: Appreciate input from cardiology.  Plan for stress test tomorrow.    Generalized anxiety disorder  Assessment & Plan  Continue home medicaitons.    Anxiety and depression  Assessment & Plan  Continue home medications.    Abdominal pain  Assessment & Plan  Likely related to above problems.  Will continue to monitor.                     Santi Pinto MD

## 2024-03-21 NOTE — CARE PLAN
Patient remained safe, utilized call light when in need of  assistance and agreeable to DKA protocol and insulin drip. No c/o pain throughout this shift and no changes in neurological status.

## 2024-03-21 NOTE — PROGRESS NOTES
"      Nephrology Progress Note      Nephrology following for hyponatremia.     Events over night:   Bridged to subcu insulin.  Tolerating po intake this morning.  Feeling better, no nausea or vomiting      /90   Pulse 106   Temp 36.7 °C (98.1 °F)   Resp 19   Ht 1.778 m (5' 10\")   Wt 69.8 kg (153 lb 14.1 oz)   SpO2 96%   BMI 22.08 kg/m²     Input / Output:  24 HR:   Intake/Output Summary (Last 24 hours) at 3/21/2024 0937  Last data filed at 3/20/2024 2213  Gross per 24 hour   Intake 1934.78 ml   Output 800 ml   Net 1134.78 ml       Physical Exam   Alert and oriented x 3 NAD  Neck: no JVD  CV: RRR  Lungs: CTA bilaterally  Abd: soft, NT, ND   Ext: no lower extremity edema    Scheduled medications  aspirin, 81 mg, oral, Daily  enoxaparin, 40 mg, subcutaneous, q24h  insulin lispro, 0-15 Units, subcutaneous, Before meals & nightly  insulin NPH (Isophane), 15 Units, subcutaneous, BID  losartan, 25 mg, oral, Daily  melatonin, 3 mg, oral, Nightly  pantoprazole, 40 mg, oral, Daily before breakfast   Or  pantoprazole, 40 mg, intravenous, Daily before breakfast  polyethylene glycol, 17 g, oral, Daily  sodium chloride, 1,000 mL, intravenous, Once      Continuous medications     PRN medications  PRN medications: acetaminophen, bisacodyl, bisacodyl, dextrose, glucagon, guaiFENesin, insulin regular, ondansetron, promethazine   Results from last 7 days   Lab Units 03/21/24  0358 03/20/24  0938 03/20/24  0345   SODIUM mmol/L 136   < > 135*   POTASSIUM mmol/L 4.2   < > 4.2   CHLORIDE mmol/L 109*   < > 101   CO2 mmol/L 17*   < > 11*   BUN mg/dL 20   < > 20   CREATININE mg/dL 0.74   < > 0.98   CALCIUM mg/dL 8.0*   < > 8.9   PROTEIN TOTAL g/dL  --   --  6.8   BILIRUBIN TOTAL mg/dL  --   --  0.6   ALK PHOS U/L  --   --  59   ALT U/L  --   --  9*   AST U/L  --   --  10   GLUCOSE mg/dL 283*   < > 261*    < > = values in this interval not displayed.      Results from last 7 days   Lab Units 03/21/24  0358 03/20/24  2018 " 03/20/24  0345   MAGNESIUM mg/dL 1.76 1.80 1.91      Results from last 7 days   Lab Units 03/21/24  0358 03/20/24  0345 03/19/24  1115   WBC AUTO x10*3/uL 7.4 19.3* 6.1   HEMOGLOBIN g/dL 15.5 17.2 17.8*   HEMATOCRIT % 45.1 51.2 51.6   PLATELETS AUTO x10*3/uL 208 226 194        Assessment & Plan:     Assessment:   Patient is 33 y.o. male who is admitted to hospital for DKA. Nephrology consulted in view of hyponatremia.     Hyponatremia  - Likely secondary to hyperglycemia  - Serum osmolality 343, urine osmolality 943  - Corrected Na on admission was 126  - Patient was given 1L of NS in the ED with rapid improvement of Na to 138  - Nephrology was called and ordered 1/2 NS overnight  - Improved to 139 corrected      Anion gap metabolic acidosis  - likely secondary to DKA  - Bicarb dropped from 21 -> 11 overnight  - Calculated anion gap 23  - ordered Beta hydroxybutyrate, UA, lactate, and volatile compounds to rule out other causes  - Beta hydroxybutyrate very elevated at 9.97  - Critical care team on board  - Bridged to subcu insulin now     Hyperkalemia  - K 6.0   - resolved     Recommendations:   - Bridged to subcu insulin now  - Sodium improved  - Encourage po intake    Please message me through EPIC chat with any questions or concerns.     MISHEL Ovalles IV  3/21/2024  9:37 AM     [facility]    [address], [address]   [place] [address]  Office: [phone]

## 2024-03-21 NOTE — PROGRESS NOTES
3/21/24 1227   Social work consult placed for discharge planning, link to community resources and reported issues with getting his glucose sensors approved. SW reviewed pt's chart and communicated with [facility] and secure chat with pharmacy. Was advised on a less expensive sensor. Discussed information with pt. He was confused as to who said he needed this information. SW attempted to give the information pharmacy gave, Freestyle Elmer 3, can get 2 sensors for $91 a month. Pt mentioned he has a certain pump, with a newer model. Was not interested in the information given. No SW needs foreseen at this time. SW signing off; available upon request.

## 2024-03-21 NOTE — PROGRESS NOTES
Critical Care Daily Progress        Subjective   Patient is a 33 y.o. male admitted on 3/19/2024 10:45 AM with the following indication(s) for ICU care: Diabetic Ketoacidosis.     Interval History: Ed Sanon is a 33 y.o. male with PMHx of insulin dependent diabetes mellitus type I and anxiety presented to the ED yesterday evening after he began experiencing chest pain and abdominal pain prior to completing a scheduled cardiac stress test. The patient uses a CGM and insulin pump. Prior to his scheduled cardiac stress test, he was asked to take half of his normal insulin dosing on that day.  However, he was unable to achieve this with his insulin pump and decided to discontinue the pump for the entirety of yesterday morning. He typically uses a continuous rate of 1.5 units/hr. Initial labs were significant for Serum Na 118, glucose 416, pH 7.31, CO2 40,  HCO3- 11, AG 27 and Sosm 343.  A CT angio with concerns of a pulmonary emboli, however it was unremarkable aside from a RT lung micronodule. Later that evening he has developed nausea, vomiting and worsening fatigue, which has continued throughout this morning as well. Intensive care is following for DKA.    3/21/2024: Patient seen and assessed. No acute events overnight. Patient was bridged to SubQ insulin late yesterday. Nausea and vomiting has resolved. He would like to wait until breakfast before receiving his insulin this morning.      Complaints: has no complaint of Chest pain, abdominal pain.    Scheduled Medications:   aspirin, 81 mg, oral, Daily  enoxaparin, 40 mg, subcutaneous, q24h  insulin lispro, 0-15 Units, subcutaneous, Before meals & nightly  insulin NPH (Isophane), 15 Units, subcutaneous, BID  losartan, 25 mg, oral, Daily  melatonin, 3 mg, oral, Nightly  pantoprazole, 40 mg, oral, Daily before breakfast   Or  pantoprazole, 40 mg, intravenous, Daily before breakfast  polyethylene glycol, 17 g, oral, Daily         Continuous Medications:          PRN Medications:   PRN medications: acetaminophen, bisacodyl, bisacodyl, dextrose, glucagon, guaiFENesin, insulin regular, ondansetron, promethazine    Objective   Vitals:  Most Recent:  Vitals:    03/21/24 0600   BP:    Pulse: 106   Resp: 19   Temp:    SpO2: 96%       24hr Min/Max:  Temp  Min: 35.8 °C (96.4 °F)  Max: 37.3 °C (99.1 °F)  Pulse  Min: 96  Max: 138  BP  Min: 105/73  Max: 135/82  Resp  Min: 8  Max: 26  SpO2  Min: 96 %  Max: 100 %    LDA:         Vent settings:       Hemodynamic parameters for last 24 hours:       I/O:    Intake/Output Summary (Last 24 hours) at 3/21/2024 0719  Last data filed at 3/20/2024 2213  Gross per 24 hour   Intake 1934.78 ml   Output 800 ml   Net 1134.78 ml       Physical Exam:   Physical Exam  Constitutional:       Appearance: Normal appearance.   HENT:      Head: Normocephalic and atraumatic.      Mouth/Throat:      Mouth: Mucous membranes are dry.      Pharynx: Oropharynx is clear.   Eyes:      Extraocular Movements: Extraocular movements intact.      Pupils: Pupils are equal, round, and reactive to light.   Cardiovascular:      Rate and Rhythm: Regular rhythm. Tachycardia present.      Heart sounds: No murmur heard.     No friction rub. No gallop.   Pulmonary:      Effort: Pulmonary effort is normal.      Breath sounds: Normal breath sounds. No wheezing, rhonchi or rales.   Abdominal:      General: Abdomen is flat. There is no distension.      Palpations: Abdomen is soft.   Musculoskeletal:         General: Normal range of motion.      Cervical back: Normal range of motion and neck supple.   Skin:     General: Skin is warm and dry.   Neurological:      Mental Status: He is alert and oriented to person, place, and time.          Lab/Radiology/Diagnostic Review:  Results for orders placed or performed during the hospital encounter of 03/19/24 (from the past 24 hour(s))   POCT GLUCOSE   Result Value Ref Range    POCT Glucose 303 (H) 74 - 99 mg/dL   POCT GLUCOSE   Result Value  Ref Range    POCT Glucose 297 (H) 74 - 99 mg/dL   Basic metabolic panel   Result Value Ref Range    Glucose 342 (H) 74 - 99 mg/dL    Sodium 133 (L) 136 - 145 mmol/L    Potassium 6.0 (H) 3.5 - 5.3 mmol/L    Chloride 103 98 - 107 mmol/L    Bicarbonate 9 (LL) 21 - 32 mmol/L    Anion Gap 27 (H) 10 - 20 mmol/L    Urea Nitrogen 18 6 - 23 mg/dL    Creatinine 1.08 0.50 - 1.30 mg/dL    eGFR >90 >60 mL/min/1.73m*2    Calcium 8.5 (L) 8.6 - 10.3 mg/dL   Phosphorus   Result Value Ref Range    Phosphorus 3.7 2.5 - 4.9 mg/dL   Beta Hydroxybutyrate   Result Value Ref Range    Beta-Hydroxybutyrate 9.97 (H) 0.02 - 0.27 mmol/L   POCT GLUCOSE   Result Value Ref Range    POCT Glucose 315 (H) 74 - 99 mg/dL   POCT GLUCOSE   Result Value Ref Range    POCT Glucose 286 (H) 74 - 99 mg/dL   POCT GLUCOSE   Result Value Ref Range    POCT Glucose 258 (H) 74 - 99 mg/dL   Basic Metabolic Panel   Result Value Ref Range    Glucose 250 (H) 74 - 99 mg/dL    Sodium 134 (L) 136 - 145 mmol/L    Potassium 4.2 3.5 - 5.3 mmol/L    Chloride 105 98 - 107 mmol/L    Bicarbonate 9 (LL) 21 - 32 mmol/L    Anion Gap 24 (H) 10 - 20 mmol/L    Urea Nitrogen 16 6 - 23 mg/dL    Creatinine 0.99 0.50 - 1.30 mg/dL    eGFR >90 >60 mL/min/1.73m*2    Calcium 8.1 (L) 8.6 - 10.3 mg/dL   Lactate   Result Value Ref Range    Lactate 1.0 0.4 - 2.0 mmol/L   POCT GLUCOSE   Result Value Ref Range    POCT Glucose 202 (H) 74 - 99 mg/dL   POCT GLUCOSE   Result Value Ref Range    POCT Glucose 217 (H) 74 - 99 mg/dL   POCT GLUCOSE   Result Value Ref Range    POCT Glucose 190 (H) 74 - 99 mg/dL   POCT GLUCOSE   Result Value Ref Range    POCT Glucose 194 (H) 74 - 99 mg/dL   Basic Metabolic Panel   Result Value Ref Range    Glucose 202 (H) 74 - 99 mg/dL    Sodium 137 136 - 145 mmol/L    Potassium 3.8 3.5 - 5.3 mmol/L    Chloride 110 (H) 98 - 107 mmol/L    Bicarbonate 16 (L) 21 - 32 mmol/L    Anion Gap 15 10 - 20 mmol/L    Urea Nitrogen 17 6 - 23 mg/dL    Creatinine 0.90 0.50 - 1.30 mg/dL    eGFR  >90 >60 mL/min/1.73m*2    Calcium 8.0 (L) 8.6 - 10.3 mg/dL   POCT GLUCOSE   Result Value Ref Range    POCT Glucose 189 (H) 74 - 99 mg/dL   POCT GLUCOSE   Result Value Ref Range    POCT Glucose 138 (H) 74 - 99 mg/dL   POCT GLUCOSE   Result Value Ref Range    POCT Glucose 129 (H) 74 - 99 mg/dL   Basic Metabolic Panel   Result Value Ref Range    Glucose 113 (H) 74 - 99 mg/dL    Sodium 138 136 - 145 mmol/L    Potassium 3.6 3.5 - 5.3 mmol/L    Chloride 112 (H) 98 - 107 mmol/L    Bicarbonate 20 (L) 21 - 32 mmol/L    Anion Gap 10 10 - 20 mmol/L    Urea Nitrogen 17 6 - 23 mg/dL    Creatinine 0.84 0.50 - 1.30 mg/dL    eGFR >90 >60 mL/min/1.73m*2    Calcium 8.0 (L) 8.6 - 10.3 mg/dL   Magnesium   Result Value Ref Range    Magnesium 1.80 1.60 - 2.40 mg/dL   Phosphorus   Result Value Ref Range    Phosphorus 3.0 2.5 - 4.9 mg/dL   POCT GLUCOSE   Result Value Ref Range    POCT Glucose 106 (H) 74 - 99 mg/dL   POCT GLUCOSE   Result Value Ref Range    POCT Glucose 119 (H) 74 - 99 mg/dL   POCT GLUCOSE   Result Value Ref Range    POCT Glucose 125 (H) 74 - 99 mg/dL   POCT GLUCOSE   Result Value Ref Range    POCT Glucose 142 (H) 74 - 99 mg/dL   CBC   Result Value Ref Range    WBC 7.4 4.4 - 11.3 x10*3/uL    nRBC 0.0 0.0 - 0.0 /100 WBCs    RBC 5.09 4.50 - 5.90 x10*6/uL    Hemoglobin 15.5 13.5 - 17.5 g/dL    Hematocrit 45.1 41.0 - 52.0 %    MCV 89 80 - 100 fL    MCH 30.5 26.0 - 34.0 pg    MCHC 34.4 32.0 - 36.0 g/dL    RDW 11.7 11.5 - 14.5 %    Platelets 208 150 - 450 x10*3/uL   Basic metabolic panel   Result Value Ref Range    Glucose 283 (H) 74 - 99 mg/dL    Sodium 136 136 - 145 mmol/L    Potassium 4.2 3.5 - 5.3 mmol/L    Chloride 109 (H) 98 - 107 mmol/L    Bicarbonate 17 (L) 21 - 32 mmol/L    Anion Gap 14 10 - 20 mmol/L    Urea Nitrogen 20 6 - 23 mg/dL    Creatinine 0.74 0.50 - 1.30 mg/dL    eGFR >90 >60 mL/min/1.73m*2    Calcium 8.0 (L) 8.6 - 10.3 mg/dL   Magnesium   Result Value Ref Range    Magnesium 1.76 1.60 - 2.40 mg/dL      Electrocardiogram, 12-lead PRN ACS symptoms    Result Date: 3/20/2024  Sinus tachycardia Borderline LVH Prolonged QTc, may be due to tachycardia Otherwise normal ECG When compared with ECG of 19-MAR-2024 11:21, PREVIOUS ECG IS PRESENT Confirmed by Mayra Smith ([id number]) on 3/20/2024 1:29:06 PM    ECG 12 lead    Result Date: 3/19/2024  Sinus tachycardia Consider left ventricular hypertrophy ST elev, probable normal early repol pattern See ED provider note for full interpretation and clinical correlation Confirmed by Nata Hancock ([id number]) on 3/19/2024 3:51:59 PM    CT angio chest for pulmonary embolism    Result Date: 3/19/2024  Interpreted By:  Jay Mcgill, STUDY: CT ANGIO CHEST FOR PULMONARY EMBOLISM;  3/19/2024 10:17 am   INDICATION: Signs/Symptoms:pleuritc chest pain, tachycardia.   COMPARISON: None.   ACCESSION NUMBER(S): [id number]   ORDERING CLINICIAN: VIRGILIO MCGARRY   TECHNIQUE: Helical data acquisition of the chest was obtained after IV injection of  100 ml of  Omnipaque 350. Images were reformatted in axial, coronal, and sagittal planes. 3D reconstructed MIPS volumetric images were also generated at an independent workstation and reviewed.   FINDINGS: POTENTIAL LIMITATIONS OF THE STUDY: None   HEART AND VESSELS: No discrete filling defects within the main pulmonary artery or its branches.   The thoracic aorta is of normal course and caliber without aneurysm, dissection or significant atherosclerotic calcification.   No coronary artery calcifications are seen. The study is not optimized for evaluation of coronary arteries.   The cardiac chambers are not enlarged.   MEDIASTINUM AND JERROD, LOWER NECK AND AXILLA: The visualized thyroid gland is within normal limits, the esophagus appears unremarkable.   No evidence of thoracic lymphadenopathy by CT criteria.   LUNGS AND AIRWAYS:   A micronodule seen in the right lung on image 207 of series 5, probably a small perivascular node, but follow-up may be warranted:  Incidental Finding:  A non-calcified pulmonary nodule/multiple non-calcified pulmonary nodules measuring less than 6 mm, likely benign.  (**-YCF-**)   Instructions:  No further follow-up is required, however, if the patient has high risk factors for primary lung malignancy, follow-up noncontrast CT scan chest in 12 months may be obtained. (Ac Nelson et al., Guidelines for management of incidental pulmonary nodules detected on CT images: From the Fleischner Society 2017, Radiology. 2017 Jul;284 (1):228-243.) [email or web address]   Lungs otherwise are clear. Pleural effusion.   UPPER ABDOMEN AND OTHER FINDINGS: The visualized subdiaphragmatic structures demonstrate no remarkable findings.   CHEST WALL AND OSSEOUS STRUCTURES: There are no suspicious osseous lesions.       1.  No evidence of pulmonary embolus, aneurysm or dissection. 2.  Right lung micronodule. The lungs otherwise are clear.   MACRO: Jay Mcgill discussed the significance and urgency of this critical finding by chat with  VIRGILIO DIEGOL on 3/19/2024 at 10:33 am. (**-RCF-**) Findings:  See findings.   Signed by: Jay Mcgill 3/19/2024 10:33 AM Dictation workstation:   [id number]    ECG 12 lead    Result Date: 3/16/2024  Sinus tachycardia Borderline prolonged QT interval See ED provider note for full interpretation and clinical correlation Confirmed by Destinee Bashir ([id number]) on 3/16/2024 5:33:28 PM    XR chest 1 view    Result Date: 3/13/2024  Interpreted By:  Eric Wynn, STUDY: XR CHEST 1 VIEW   INDICATION: Signs/Symptoms:CP.   COMPARISON: February 14, 2024   ACCESSION NUMBER(S): [id number]   ORDERING CLINICIAN: NASIMA BLOCK   FINDINGS: No consolidation, effusion, edema, or pneumothorax.   Heart size within normal limits.         No evidence of acute intrathoracic abnormality.   Signed by: Eric Wynn 3/13/2024 5:18 PM Dictation workstation:   [id number]                     Assessment/Plan   Active Problems:    Abdominal pain    Anxiety  and depression    Generalized anxiety disorder    Diabetic ketoacidosis without coma (CMS/HCC)    Atypical chest pain    Bipolar affective disorder, remission status unspecified (CMS/HCC)    Hyponatremia    Ed Sanon is a 33 y.o. male with PMHx of insulin dependent diabetes melitis type I and anxiety presented to the ED yesterday evening after he began experiencing chest pain and abdominal pain during a scheduled cardiac stress test. Prior to his scheduled cardiac stress test, he was asked to take half of his normal insulin dosing on that day.  However, he was unable to achieve this with his insulin pump and decided to discontinue the pump for the entirety of yesterday. He typically uses a continuous rate of 1.5 units/hr. Initial labs were significant for Serum Na 118, glucose 416, pH 7.31, CO2 40,  HCO3- 20.1 and Sosm 343.  He received a CT angio with concerns of a pulmonary emboli, however it was unremarkable aside from a RT lung micronodule. Intensive care is following for DKA management.     1. Diabetic ketoacidosis secondary to missed insulin doses   -patient has received 2L of NS this admission  -patient currently on insulin drip at 9.8 units/hr  -HCO3- is 9; AG is 27 this morning  -point of care glucose every 1 hour and BMP every 4 hours while on insulin drip  -will start patient on 1/2 NS at 250 mL/hr and switched IV fluids to dextrose half NS at 150 mL/hr once blood glucose is under 250  -will switch insulin drip to subcutaneous insulin when anion gap <15 and Bicarb >15  3/21/2024: Patient bridged to subcutaneous insulin overnight  On NPH insulin 15 units twice daily and insulin sliding scale ACHS     2. Poorly controlled diabetes mellitus secondary to noncompliance with insulin regiment  -patient states that he has been more compliant with his medication regiment  -currently uses a CGM and insulin pump with a basal rate of 1.5 units/hr   -HbA1c was was 12.6 on 2/23/2024  -patient is known for  recurrent admissions for DKA, though the frequency has decreased over the last 6 months     3. Hypovolemic Hyponatremia  -in the setting of diabetic ketoacidosis  -Initial serum Na was 118 and Sosm 343; Corrected Sna was 126 with BG of 416  -patient received 1L of NS in the ED  -Corrected Sna after NS bolus increased to 140  -Corrected Sna is 139 today  -Will start the patient on 1/2 NS at 250 mL/hr to avoid hypernatremia with severe hyperglycemia  -will restart on NS infusion when blood glucose is below 250 mg/dL  3/21/2024: Serum Na is 136 this morning. IVF were discontinued last night.    Will give 1L NS as patient still presents with sinus tachycardia     4. Metabolic acidosis secondary to DKA  -initial VBG significant for pH 7.31, CO2 40,  HCO3- 20.1  -Anion gap remains elevated at 27 this morning  3/21/2024: Anion gap is closed this morning. Bicarbonate still decreased at 17, however it is improving.     5. Chest pain   -Patient was referred to cardiology by his primary care provider for chest pain evaluation  -he requested a stress echocardiogram, but it was cancelled given worsening chest pain with inspiration  -CT angio was obtained which was negative for PE, aneurysm or dissection        Shawn MANDUJANO I spent 30 minutes of cumulative critical care time with the patient.  Greater than 50% of that time was spent in the direct collaboration and or coordination of care of the patient.     Dragon dictation software was used to dictate this note and thus there may be minor errors in translation/transcription including garbled speech or misspellings. Please contact for clarification if needed.

## 2024-03-21 NOTE — PROGRESS NOTES
"Ed Sanon is a 33 y.o. male on day 0 of admission presenting with Chest pain.    Subjective   Patient has no complaints.  He is eating dinner.  He is for a stress test tomorrow.     I have reviewed histories, allergies and medications have been reviewed and there are no changes       Objective   Review of Systems   Cardiovascular:  Negative for palpitations.   Gastrointestinal:  Negative for abdominal pain, nausea and vomiting.     Physical Exam  Vitals and nursing note reviewed.   Constitutional:       General: He is not in acute distress.     Appearance: Normal appearance. He is normal weight.   HENT:      Head: Normocephalic and atraumatic.      Nose: Nose normal.      Mouth/Throat:      Mouth: Mucous membranes are moist.   Eyes:      Extraocular Movements: Extraocular movements intact.   Cardiovascular:      Rate and Rhythm: Regular rhythm. Tachycardia present.   Pulmonary:      Effort: Pulmonary effort is normal.   Musculoskeletal:         General: Normal range of motion.   Neurological:      Mental Status: He is alert and oriented to person, place, and time.   Psychiatric:         Mood and Affect: Mood normal.         Last Recorded Vitals  Blood pressure (!) 141/93, pulse (!) 117, temperature 36.3 °C (97.3 °F), temperature source Temporal, resp. rate (!) 9, height 1.778 m (5' 10\"), weight 69.8 kg (153 lb 14.1 oz), SpO2 97 %.  Intake/Output last 3 Shifts:  I/O last 3 completed shifts:  In: 1934.8 (27.7 mL/kg) [I.V.:1884.8 (27 mL/kg); IV Piggyback:50]  Out: 800 (11.5 mL/kg) [Urine:800 (0.3 mL/kg/hr)]  Weight: 69.8 kg     Relevant Results  Results from last 7 days   Lab Units 03/21/24  1610 03/21/24  1147 03/21/24  0718 03/21/24  0358 03/20/24  2303 03/20/24  2156 03/20/24  2112 03/20/24  2018 03/20/24  1704 03/20/24  1613 03/20/24  1310 03/20/24  1224 03/20/24  0951 03/20/24  0938   POCT GLUCOSE mg/dL 312* 275* 277*  --  142* 125*   < > 106*   < >  --    < >  --    < >  --    GLUCOSE mg/dL  --   --   --  " 283*  --   --   --  113*  --  202*  --  250*  --  342*    < > = values in this interval not displayed.     Scheduled medications  aspirin, 81 mg, oral, Daily  enoxaparin, 40 mg, subcutaneous, q24h  insulin lispro, 0-15 Units, subcutaneous, Before meals & nightly  insulin NPH (Isophane), 15 Units, subcutaneous, BID  losartan, 25 mg, oral, Daily  melatonin, 3 mg, oral, Nightly  pantoprazole, 40 mg, oral, Daily before breakfast   Or  pantoprazole, 40 mg, intravenous, Daily before breakfast  polyethylene glycol, 17 g, oral, Daily  regadenoson, 0.4 mg, intravenous, Once in imaging      Continuous medications     PRN medications  PRN medications: acetaminophen, aminophylline, bisacodyl, bisacodyl, dextrose, glucagon, guaiFENesin, ondansetron, promethazine    Results for orders placed or performed during the hospital encounter of 03/19/24 (from the past 24 hour(s))   POCT GLUCOSE   Result Value Ref Range    POCT Glucose 138 (H) 74 - 99 mg/dL   POCT GLUCOSE   Result Value Ref Range    POCT Glucose 129 (H) 74 - 99 mg/dL   Basic Metabolic Panel   Result Value Ref Range    Glucose 113 (H) 74 - 99 mg/dL    Sodium 138 136 - 145 mmol/L    Potassium 3.6 3.5 - 5.3 mmol/L    Chloride 112 (H) 98 - 107 mmol/L    Bicarbonate 20 (L) 21 - 32 mmol/L    Anion Gap 10 10 - 20 mmol/L    Urea Nitrogen 17 6 - 23 mg/dL    Creatinine 0.84 0.50 - 1.30 mg/dL    eGFR >90 >60 mL/min/1.73m*2    Calcium 8.0 (L) 8.6 - 10.3 mg/dL   Magnesium   Result Value Ref Range    Magnesium 1.80 1.60 - 2.40 mg/dL   Phosphorus   Result Value Ref Range    Phosphorus 3.0 2.5 - 4.9 mg/dL   POCT GLUCOSE   Result Value Ref Range    POCT Glucose 106 (H) 74 - 99 mg/dL   POCT GLUCOSE   Result Value Ref Range    POCT Glucose 119 (H) 74 - 99 mg/dL   POCT GLUCOSE   Result Value Ref Range    POCT Glucose 125 (H) 74 - 99 mg/dL   POCT GLUCOSE   Result Value Ref Range    POCT Glucose 142 (H) 74 - 99 mg/dL   Osmolality   Result Value Ref Range    Osmolality, Serum 312 (H) 280 - 300  mOsm/kg   CBC   Result Value Ref Range    WBC 7.4 4.4 - 11.3 x10*3/uL    nRBC 0.0 0.0 - 0.0 /100 WBCs    RBC 5.09 4.50 - 5.90 x10*6/uL    Hemoglobin 15.5 13.5 - 17.5 g/dL    Hematocrit 45.1 41.0 - 52.0 %    MCV 89 80 - 100 fL    MCH 30.5 26.0 - 34.0 pg    MCHC 34.4 32.0 - 36.0 g/dL    RDW 11.7 11.5 - 14.5 %    Platelets 208 150 - 450 x10*3/uL   Basic metabolic panel   Result Value Ref Range    Glucose 283 (H) 74 - 99 mg/dL    Sodium 136 136 - 145 mmol/L    Potassium 4.2 3.5 - 5.3 mmol/L    Chloride 109 (H) 98 - 107 mmol/L    Bicarbonate 17 (L) 21 - 32 mmol/L    Anion Gap 14 10 - 20 mmol/L    Urea Nitrogen 20 6 - 23 mg/dL    Creatinine 0.74 0.50 - 1.30 mg/dL    eGFR >90 >60 mL/min/1.73m*2    Calcium 8.0 (L) 8.6 - 10.3 mg/dL   Magnesium   Result Value Ref Range    Magnesium 1.76 1.60 - 2.40 mg/dL   POCT GLUCOSE   Result Value Ref Range    POCT Glucose 277 (H) 74 - 99 mg/dL   POCT GLUCOSE   Result Value Ref Range    POCT Glucose 275 (H) 74 - 99 mg/dL   POCT GLUCOSE   Result Value Ref Range    POCT Glucose 312 (H) 74 - 99 mg/dL      Electrocardiogram, 12-lead PRN ACS symptoms    Result Date: 3/20/2024  Sinus tachycardia Borderline LVH Prolonged QTc, may be due to tachycardia Otherwise normal ECG When compared with ECG of 19-MAR-2024 11:21, PREVIOUS ECG IS PRESENT Confirmed by Mayra Smith ([id number]) on 3/20/2024 1:29:06 PM    ECG 12 lead    Result Date: 3/19/2024  Sinus tachycardia Consider left ventricular hypertrophy ST elev, probable normal early repol pattern See ED provider note for full interpretation and clinical correlation Confirmed by Nata Hancock ([id number]) on 3/19/2024 3:51:59 PM    CT angio chest for pulmonary embolism    Result Date: 3/19/2024  Interpreted By:  Jay Mcgill, STUDY: CT ANGIO CHEST FOR PULMONARY EMBOLISM;  3/19/2024 10:17 am   INDICATION: Signs/Symptoms:pleuritc chest pain, tachycardia.   COMPARISON: None.   ACCESSION NUMBER(S): [id number]   ORDERING CLINICIAN: VIRGILIO MCGARRY   TECHNIQUE: Helical  data acquisition of the chest was obtained after IV injection of  100 ml of  Omnipaque 350. Images were reformatted in axial, coronal, and sagittal planes. 3D reconstructed MIPS volumetric images were also generated at an independent workstation and reviewed.   FINDINGS: POTENTIAL LIMITATIONS OF THE STUDY: None   HEART AND VESSELS: No discrete filling defects within the main pulmonary artery or its branches.   The thoracic aorta is of normal course and caliber without aneurysm, dissection or significant atherosclerotic calcification.   No coronary artery calcifications are seen. The study is not optimized for evaluation of coronary arteries.   The cardiac chambers are not enlarged.   MEDIASTINUM AND JERROD, LOWER NECK AND AXILLA: The visualized thyroid gland is within normal limits, the esophagus appears unremarkable.   No evidence of thoracic lymphadenopathy by CT criteria.   LUNGS AND AIRWAYS:   A micronodule seen in the right lung on image 207 of series 5, probably a small perivascular node, but follow-up may be warranted: Incidental Finding:  A non-calcified pulmonary nodule/multiple non-calcified pulmonary nodules measuring less than 6 mm, likely benign.  (**-YCF-**)   Instructions:  No further follow-up is required, however, if the patient has high risk factors for primary lung malignancy, follow-up noncontrast CT scan chest in 12 months may be obtained. (Ac Konghosilvestre et al., Guidelines for management of incidental pulmonary nodules detected on CT images: From the Fleischner Society 2017, Radiology. 2017 Jul;284 (1):228-243.) FLEHAINER.ACR.IF.1   Lungs otherwise are clear. Pleural effusion.   UPPER ABDOMEN AND OTHER FINDINGS: The visualized subdiaphragmatic structures demonstrate no remarkable findings.   CHEST WALL AND OSSEOUS STRUCTURES: There are no suspicious osseous lesions.       1.  No evidence of pulmonary embolus, aneurysm or dissection. 2.  Right lung micronodule. The lungs otherwise are clear.    MACRO: Jay Mcgill discussed the significance and urgency of this critical finding by chat with  VIRGILIO MCGARRY on 3/19/2024 at 10:33 am. (**-RCF-**) Findings:  See findings.   Signed by: Jay Mcgill 3/19/2024 10:33 AM Dictation workstation:   [id number]    ECG 12 lead    Result Date: 3/16/2024  Sinus tachycardia Borderline prolonged QT interval See ED provider note for full interpretation and clinical correlation Confirmed by Destinee Bashir ([id number]) on 3/16/2024 5:33:28 PM    XR chest 1 view    Result Date: 3/13/2024  Interpreted By:  Eric Wynn, STUDY: XR CHEST 1 VIEW   INDICATION: Signs/Symptoms:CP.   COMPARISON: February 14, 2024   ACCESSION NUMBER(S): [id number]   ORDERING CLINICIAN: NASIMA BLOCK   FINDINGS: No consolidation, effusion, edema, or pneumothorax.   Heart size within normal limits.         No evidence of acute intrathoracic abnormality.   Signed by: Eric Wynn 3/13/2024 5:18 PM Dictation workstation:   [id number]         Assessment/Plan   Active Problems:    Abdominal pain    Anxiety and depression    Generalized anxiety disorder    Diabetic ketoacidosis without coma (CMS/HCC)    Atypical chest pain    Bipolar affective disorder, remission status unspecified (CMS/HCC)    Hyponatremia    IMPRESSION   POORLY CONTROLLED TYPE I DM  DKA - resolved    A1C of 12.4% as of three weeks ago     Recommendations:  To discontinue Lantus 20 units subcutaneous bedtime  To commence NPH 15 units subcutaneous twice daily as this would make for easier transition back to the insulin pump; later increased to 18 units subcutaneous twice daily   To commence Humalog 6 units TID AC   To discontinue insulin correction scale TID AC in 3 unit increments and change to 1 unit increments   Diabetic diet as tolerated   Accu-Cheks ACHS    Hypoglycemic protocol   Will continue to follow     Lori Packer MD

## 2024-03-21 NOTE — PROGRESS NOTES
"Subjective Data:  Today, Mr. Sanon is resting in bed, he endorses \"constant chest pain\" that worsens with palpation, and has \"mellowed out\" since admission. Denies shortness of breath, no dizziness or lightheadedness. He reports he is tolerating a diet, no nausea or vomiting, not on insulin infusion. Monitor demonstrates SR with rates in the 90s.     Overnight Events:    Received 1 L of IV fluid this morning.      Objective Data:  Last Recorded Vitals:  Vitals:    03/21/24 0400 03/21/24 0500 03/21/24 0600 03/21/24 0736   BP:       Pulse: 104 96 106    Resp: 13 23 19    Temp:    36.7 °C (98.1 °F)   TempSrc:       SpO2: 99% 100% 96%    Weight:       Height:           Last Labs:  CBC - 3/21/2024:  3:58 AM  7.4 15.5 208    45.1      CMP - 3/21/2024:  3:58 AM  8.0 6.8 10 --- 0.6   3.0 4.0 9 59      PTT - No results in last year.  _   _ _     TROPHS   Date/Time Value Ref Range Status   03/19/2024 11:15 AM <3 0 - 20 ng/L Final   03/13/2024 05:14 PM 3 0 - 20 ng/L Final   02/14/2024 12:43 PM <3 0 - 20 ng/L Final     BNP   Date/Time Value Ref Range Status   03/19/2024 11:15 AM 6 0 - 99 pg/mL Final   03/13/2024 05:14 PM 4 0 - 99 pg/mL Final     HGBA1C   Date/Time Value Ref Range Status   02/23/2024 01:41 PM 12.4 see below % Final   12/09/2023 04:26 AM 15.5 see below % Final     LDLCALC   Date/Time Value Ref Range Status   02/23/2024 01:41 PM 49 <=99 mg/dL Final     Comment:                                 Near   Borderline      AGE      Desirable  Optimal    High     High     Very High     0-19 Y     0 - 109     ---    110-129   >/= 130     ----    20-24 Y     0 - 119     ---    120-159   >/= 160     ----      >24 Y     0 -  99   100-129  130-159   160-189     >/=190       VLDL   Date/Time Value Ref Range Status   02/23/2024 01:41 PM 20 0 - 40 mg/dL Final   06/02/2021 04:15 AM 63 0 - 40 mg/dL Final   06/29/2020 04:46 AM 22 0 - 40 mg/dL Final      Last I/O:  I/O last 3 completed shifts:  In: 1934.8 (27.7 mL/kg) [I.V.:1884.8 " "(27 mL/kg); IV Piggyback:50]  Out: 800 (11.5 mL/kg) [Urine:800 (0.3 mL/kg/hr)]  Weight: 69.8 kg     Past Cardiology Tests (Last 3 Years):  EKG:  Electrocardiogram, 12-lead PRN ACS symptoms 03/19/2024      ECG 12 lead 03/19/2024      ECG 12 lead 03/13/2024      ECG 12 lead 02/14/2024    Echo:  No results found for this or any previous visit from the past 1095 days.    Ejection Fractions:  No results found for: \"EF\"  Cath:  No results found for this or any previous visit from the past 1095 days.    Stress Test:  No results found for this or any previous visit from the past 1095 days.    Cardiac Imaging:  No results found for this or any previous visit from the past 1095 days.      Inpatient Medications:  Scheduled medications   Medication Dose Route Frequency    aspirin  81 mg oral Daily    enoxaparin  40 mg subcutaneous q24h    insulin lispro  0-15 Units subcutaneous Before meals & nightly    insulin NPH (Isophane)  15 Units subcutaneous BID    losartan  25 mg oral Daily    melatonin  3 mg oral Nightly    pantoprazole  40 mg oral Daily before breakfast    Or    pantoprazole  40 mg intravenous Daily before breakfast    polyethylene glycol  17 g oral Daily     PRN medications   Medication    acetaminophen    bisacodyl    bisacodyl    dextrose    glucagon    guaiFENesin    insulin regular    ondansetron    promethazine     Continuous Medications   Medication Dose Last Rate       Physical Exam:  Physical Exam  Vitals reviewed.   Constitutional:       Appearance: Normal appearance.   HENT:      Head: Normocephalic.      Mouth/Throat:      Mouth: Mucous membranes are moist.   Cardiovascular:      Rate and Rhythm: Normal rate and regular rhythm.      Pulses: Normal pulses.      Heart sounds: Normal heart sounds.   Pulmonary:      Effort: Pulmonary effort is normal.      Breath sounds: Normal breath sounds. No wheezing, rhonchi or rales.   Abdominal:      General: Bowel sounds are normal. There is no distension.      " "Palpations: Abdomen is soft.      Tenderness: There is no abdominal tenderness.   Musculoskeletal:      Cervical back: Normal range of motion.      Right lower leg: No edema.      Left lower leg: No edema.   Skin:     General: Skin is warm and dry.   Neurological:      General: No focal deficit present.      Mental Status: He is alert and oriented to person, place, and time.   Psychiatric:         Mood and Affect: Mood normal.         Behavior: Behavior normal.           Assessment/Plan       Chest pain:  atypical chest pain radiating to abdomen and b/l LE.   Troponin negative x2.  I personally reviewed the EKG on presentation, which demonstrated sinus tachycardia , LAE, nonspecific ST abnl.  CT PE protocol neg for PE.  -recommend reattempting stress test once his DKA has resolved  -rpt EKG/troponin for worsening chest pain  3/21/24: Patient endorses \"constant chest pain\" that worsens with palpation, and has \"mellowed out\" since admission. Denies shortness of breath. Patient tolerating diet, not on insulin infusion  - Re attempt stress test tomorrow for further evaluation.        HTN:  DBP's in the 's range on presentation  -continue losartan 25mg daily  -serial Cr monitoring  3/21/24: No blood pressures documented since 0200. Prior Bps noted to be controlled, stable last 24 hours.      Sinus tachycardia:  likely appropriate sinus tach in setting of DKA  -tele monitoring  -suspect this will improve with treatment of DKA - if it does not, try low dose BB  3/21/24: SR with rates in the 90s this morning. Hold off on initiation of BB.      DKA:  -IVF and insulin management per endocrine/IMS/ICU   3/21/24: Tolerating diet, on subcutaneous insulin. Endocrinology managing.     Hyponatremia:    -gradual correction with IVF  -nephro recs  3/21/23: Na 136, resolved/stable.     Code Status:  Full Code      Yasemin Alexis, APRN-CNP  "

## 2024-03-21 NOTE — CARE PLAN
The patient's goals for the shift include  get off insulin drip    The clinical goals for the shift include bridge pt to subcu insulin during shift    Over the shift, the patient did not make progress toward the following goals. Barriers to progression include n/a. Recommendations to address these barriers include n/a.

## 2024-03-22 ENCOUNTER — APPOINTMENT (OUTPATIENT)
Dept: RADIOLOGY | Facility: HOSPITAL | Age: 34
End: 2024-03-22
Payer: COMMERCIAL

## 2024-03-22 ENCOUNTER — APPOINTMENT (OUTPATIENT)
Dept: CARDIOLOGY | Facility: HOSPITAL | Age: 34
End: 2024-03-22
Payer: COMMERCIAL

## 2024-03-22 VITALS
HEART RATE: 115 BPM | BODY MASS INDEX: 22.98 KG/M2 | HEIGHT: 70 IN | SYSTOLIC BLOOD PRESSURE: 152 MMHG | OXYGEN SATURATION: 96 % | DIASTOLIC BLOOD PRESSURE: 98 MMHG | WEIGHT: 160.5 LBS | TEMPERATURE: 98.5 F | RESPIRATION RATE: 18 BRPM

## 2024-03-22 LAB
ANION GAP SERPL CALC-SCNC: 12 MMOL/L (ref 10–20)
BUN SERPL-MCNC: 21 MG/DL (ref 6–23)
CALCIUM SERPL-MCNC: 8.2 MG/DL (ref 8.6–10.3)
CHLORIDE SERPL-SCNC: 107 MMOL/L (ref 98–107)
CO2 SERPL-SCNC: 24 MMOL/L (ref 21–32)
CREAT SERPL-MCNC: 0.69 MG/DL (ref 0.5–1.3)
EGFRCR SERPLBLD CKD-EPI 2021: >90 ML/MIN/1.73M*2
GLUCOSE BLD MANUAL STRIP-MCNC: 252 MG/DL (ref 74–99)
GLUCOSE BLD MANUAL STRIP-MCNC: 329 MG/DL (ref 74–99)
GLUCOSE SERPL-MCNC: 292 MG/DL (ref 74–99)
POTASSIUM SERPL-SCNC: 4.1 MMOL/L (ref 3.5–5.3)
SODIUM SERPL-SCNC: 139 MMOL/L (ref 136–145)

## 2024-03-22 PROCEDURE — 80048 BASIC METABOLIC PNL TOTAL CA: CPT | Performed by: INTERNAL MEDICINE

## 2024-03-22 PROCEDURE — 3430000001 HC RX 343 DIAGNOSTIC RADIOPHARMACEUTICALS: Performed by: INTERNAL MEDICINE

## 2024-03-22 PROCEDURE — 99232 SBSQ HOSP IP/OBS MODERATE 35: CPT | Performed by: CLINICAL NURSE SPECIALIST

## 2024-03-22 PROCEDURE — 2500000001 HC RX 250 WO HCPCS SELF ADMINISTERED DRUGS (ALT 637 FOR MEDICARE OP): Performed by: INTERNAL MEDICINE

## 2024-03-22 PROCEDURE — 78452 HT MUSCLE IMAGE SPECT MULT: CPT | Performed by: STUDENT IN AN ORGANIZED HEALTH CARE EDUCATION/TRAINING PROGRAM

## 2024-03-22 PROCEDURE — A9502 TC99M TETROFOSMIN: HCPCS | Performed by: INTERNAL MEDICINE

## 2024-03-22 PROCEDURE — 78452 HT MUSCLE IMAGE SPECT MULT: CPT

## 2024-03-22 PROCEDURE — 99239 HOSP IP/OBS DSCHRG MGMT >30: CPT | Performed by: INTERNAL MEDICINE

## 2024-03-22 PROCEDURE — G0378 HOSPITAL OBSERVATION PER HR: HCPCS

## 2024-03-22 PROCEDURE — 93017 CV STRESS TEST TRACING ONLY: CPT

## 2024-03-22 PROCEDURE — 82947 ASSAY GLUCOSE BLOOD QUANT: CPT

## 2024-03-22 PROCEDURE — 99233 SBSQ HOSP IP/OBS HIGH 50: CPT | Performed by: INTERNAL MEDICINE

## 2024-03-22 PROCEDURE — 36415 COLL VENOUS BLD VENIPUNCTURE: CPT | Performed by: INTERNAL MEDICINE

## 2024-03-22 RX ORDER — INSULIN LISPRO 100 [IU]/ML
3 INJECTION, SOLUTION INTRAVENOUS; SUBCUTANEOUS AS NEEDED
Status: DISCONTINUED | OUTPATIENT
Start: 2024-03-22 | End: 2024-03-22 | Stop reason: HOSPADM

## 2024-03-22 RX ORDER — DEXTROSE 50 % IN WATER (D50W) INTRAVENOUS SYRINGE
25
Status: DISCONTINUED | OUTPATIENT
Start: 2024-03-22 | End: 2024-03-22 | Stop reason: HOSPADM

## 2024-03-22 RX ORDER — METOPROLOL TARTRATE 25 MG/1
12.5 TABLET, FILM COATED ORAL 2 TIMES DAILY
Status: DISCONTINUED | OUTPATIENT
Start: 2024-03-22 | End: 2024-03-22 | Stop reason: HOSPADM

## 2024-03-22 RX ADMIN — PANTOPRAZOLE SODIUM 40 MG: 40 TABLET, DELAYED RELEASE ORAL at 08:45

## 2024-03-22 RX ADMIN — LOSARTAN POTASSIUM 25 MG: 25 TABLET, FILM COATED ORAL at 08:45

## 2024-03-22 RX ADMIN — ASPIRIN 81 MG: 81 TABLET, COATED ORAL at 08:45

## 2024-03-22 RX ADMIN — TETROFOSMIN 10 MILLICURIE: 0.23 INJECTION, POWDER, LYOPHILIZED, FOR SOLUTION INTRAVENOUS at 11:50

## 2024-03-22 RX ADMIN — TETROFOSMIN 30 MILLICURIE: 0.23 INJECTION, POWDER, LYOPHILIZED, FOR SOLUTION INTRAVENOUS at 01:10

## 2024-03-22 ASSESSMENT — COGNITIVE AND FUNCTIONAL STATUS - GENERAL
MOBILITY SCORE: 24
DAILY ACTIVITIY SCORE: 24

## 2024-03-22 ASSESSMENT — PAIN SCALES - WONG BAKER: WONGBAKER_NUMERICALRESPONSE: NO HURT

## 2024-03-22 ASSESSMENT — PAIN - FUNCTIONAL ASSESSMENT: PAIN_FUNCTIONAL_ASSESSMENT: 0-10

## 2024-03-22 ASSESSMENT — PAIN SCALES - GENERAL
PAINLEVEL_OUTOF10: 0 - NO PAIN
PAINLEVEL_OUTOF10: 0 - NO PAIN

## 2024-03-22 NOTE — PROGRESS NOTES
"Ed Sanon is a 33 y.o. male on day 0 of admission presenting with Chest pain.    Subjective   Patient is for a stress test today.    He restarted the use of his insulin pump at 6:30AM.  He did not change sites but changed tubing.  He does not have Guardian connect 4 sensor to sync with the pump for auto mode basal and boluses.  He states that he has tried call his endo office severeal times without response in order to get one.     I have reviewed histories, allergies and medications have been reviewed and there are no changes       Objective     Physical Exam  Vitals and nursing note reviewed.   Constitutional:       General: He is not in acute distress.     Appearance: Normal appearance. He is normal weight.   HENT:      Head: Normocephalic and atraumatic.      Nose: Nose normal.      Mouth/Throat:      Mouth: Mucous membranes are moist.   Eyes:      Extraocular Movements: Extraocular movements intact.   Pulmonary:      Effort: Pulmonary effort is normal.   Musculoskeletal:         General: Normal range of motion.   Neurological:      Mental Status: He is alert and oriented to person, place, and time.   Psychiatric:         Mood and Affect: Mood normal.         Last Recorded Vitals  Blood pressure (!) 148/94, pulse 108, temperature 36.2 °C (97.2 °F), temperature source Temporal, resp. rate 18, height 1.778 m (5' 10\"), weight 72.8 kg (160 lb 7.9 oz), SpO2 97 %.  Intake/Output last 3 Shifts:  I/O last 3 completed shifts:  In: 2379.1 (32.7 mL/kg) [I.V.:1329.1 (18.3 mL/kg); IV Piggyback:1050]  Out: - (0 mL/kg)   Weight: 72.8 kg     Relevant Results  Results from last 7 days   Lab Units 03/21/24 2002 03/21/24  1610 03/21/24  1147 03/21/24  0718 03/21/24  0358 03/20/24  2303 03/20/24  2112 03/20/24 2018 03/20/24  1704 03/20/24  1613 03/20/24  1310 03/20/24  1224 03/20/24  0951 03/20/24  0938   POCT GLUCOSE mg/dL 244* 312* 275* 277*  --  142*   < > 106*   < >  --    < >  --    < >  --    GLUCOSE mg/dL  --   --   " --   --  283*  --   --  113*  --  202*  --  250*  --  342*    < > = values in this interval not displayed.     Scheduled medications  aspirin, 81 mg, oral, Daily  enoxaparin, 40 mg, subcutaneous, q24h  [MAR Hold] insulin lispro, 0-5 Units, subcutaneous, TID with meals  [MAR Hold] insulin lispro, 6 Units, subcutaneous, TID with meals  [MAR Hold] insulin NPH (Isophane), 18 Units, subcutaneous, BID  losartan, 25 mg, oral, Daily  melatonin, 3 mg, oral, Nightly  pantoprazole, 40 mg, oral, Daily before breakfast   Or  pantoprazole, 40 mg, intravenous, Daily before breakfast  polyethylene glycol, 17 g, oral, Daily  [MAR Hold] regadenoson, 0.4 mg, intravenous, Once in imaging      Continuous medications     PRN medications  PRN medications: acetaminophen, [MAR Hold] aminophylline, bisacodyl, bisacodyl, dextrose, glucagon, guaiFENesin, ondansetron, promethazine    Results for orders placed or performed during the hospital encounter of 03/19/24 (from the past 24 hour(s))   POCT GLUCOSE   Result Value Ref Range    POCT Glucose 275 (H) 74 - 99 mg/dL   POCT GLUCOSE   Result Value Ref Range    POCT Glucose 312 (H) 74 - 99 mg/dL   POCT GLUCOSE   Result Value Ref Range    POCT Glucose 244 (H) 74 - 99 mg/dL      Electrocardiogram, 12-lead PRN ACS symptoms    Result Date: 3/20/2024  Sinus tachycardia Borderline LVH Prolonged QTc, may be due to tachycardia Otherwise normal ECG When compared with ECG of 19-MAR-2024 11:21, PREVIOUS ECG IS PRESENT Confirmed by Mayra Smith ([id number]) on 3/20/2024 1:29:06 PM    ECG 12 lead    Result Date: 3/19/2024  Sinus tachycardia Consider left ventricular hypertrophy ST elev, probable normal early repol pattern See ED provider note for full interpretation and clinical correlation Confirmed by Nata Hancock ([id number]) on 3/19/2024 3:51:59 PM    CT angio chest for pulmonary embolism    Result Date: 3/19/2024  Interpreted By:  Jay Mcgill, STUDY: CT ANGIO CHEST FOR PULMONARY EMBOLISM;  3/19/2024 10:17 am    INDICATION: Signs/Symptoms:pleuritc chest pain, tachycardia.   COMPARISON: None.   ACCESSION NUMBER(S): [id number]   ORDERING CLINICIAN: VIRGILIO MCGARRY   TECHNIQUE: Helical data acquisition of the chest was obtained after IV injection of  100 ml of  Omnipaque 350. Images were reformatted in axial, coronal, and sagittal planes. 3D reconstructed MIPS volumetric images were also generated at an independent workstation and reviewed.   FINDINGS: POTENTIAL LIMITATIONS OF THE STUDY: None   HEART AND VESSELS: No discrete filling defects within the main pulmonary artery or its branches.   The thoracic aorta is of normal course and caliber without aneurysm, dissection or significant atherosclerotic calcification.   No coronary artery calcifications are seen. The study is not optimized for evaluation of coronary arteries.   The cardiac chambers are not enlarged.   MEDIASTINUM AND JERROD, LOWER NECK AND AXILLA: The visualized thyroid gland is within normal limits, the esophagus appears unremarkable.   No evidence of thoracic lymphadenopathy by CT criteria.   LUNGS AND AIRWAYS:   A micronodule seen in the right lung on image 207 of series 5, probably a small perivascular node, but follow-up may be warranted: Incidental Finding:  A non-calcified pulmonary nodule/multiple non-calcified pulmonary nodules measuring less than 6 mm, likely benign.  (**-YCF-**)   Instructions:  No further follow-up is required, however, if the patient has high risk factors for primary lung malignancy, follow-up noncontrast CT scan chest in 12 months may be obtained. (Ac Nelson et al., Guidelines for management of incidental pulmonary nodules detected on CT images: From the Fleischner Society 2017, Radiology. 2017 Jul;284 (1):228-243.) FLEISCHNER.ACR.IF.1   Lungs otherwise are clear. Pleural effusion.   UPPER ABDOMEN AND OTHER FINDINGS: The visualized subdiaphragmatic structures demonstrate no remarkable findings.   CHEST WALL AND OSSEOUS STRUCTURES:  There are no suspicious osseous lesions.       1.  No evidence of pulmonary embolus, aneurysm or dissection. 2.  Right lung micronodule. The lungs otherwise are clear.   MACRO: Jay Mcgill discussed the significance and urgency of this critical finding by chat with  VIRGILIO MCGARRY on 3/19/2024 at 10:33 am. (**-RCF-**) Findings:  See findings.   Signed by: Jay Mcgill 3/19/2024 10:33 AM Dictation workstation:   [id number]    ECG 12 lead    Result Date: 3/16/2024  Sinus tachycardia Borderline prolonged QT interval See ED provider note for full interpretation and clinical correlation Confirmed by Destinee Bashir ([id number]) on 3/16/2024 5:33:28 PM    XR chest 1 view    Result Date: 3/13/2024  Interpreted By:  Eric Wynn, STUDY: XR CHEST 1 VIEW   INDICATION: Signs/Symptoms:CP.   COMPARISON: February 14, 2024   ACCESSION NUMBER(S): [id number]   ORDERING CLINICIAN: NASIMA BLOCK   FINDINGS: No consolidation, effusion, edema, or pneumothorax.   Heart size within normal limits.         No evidence of acute intrathoracic abnormality.   Signed by: Eric Wynn 3/13/2024 5:18 PM Dictation workstation:   [id number]      Assessment/Plan   Active Problems:    Abdominal pain    Anxiety and depression    Generalized anxiety disorder    Diabetic ketoacidosis without coma (CMS/HCC)    Atypical chest pain    Bipolar affective disorder, remission status unspecified (CMS/HCC)    Hyponatremia  IMPRESSION   POORLY CONTROLLED TYPE I DM  DKA - resolved    A1C of 12.4% as of three weeks ago     Recommendations:  Patient resumed his insulin pump on his own accord at 6:30am  He had no intentions of boluses for breakfast which just arrived   Counseled to put the carbs in the pump for boluses  Diabetic diet as tolerated   Accu-Cheks ACHS    Hypoglycemic protocol   PATIENT NEEDS TO HAVE AN OUTPATIENT ENDOCRINOLOGY FOLLOW UP AT [place] SCHEDULED GIVEN LACK OF GUARDIAN CONNECT SENSOR SINCE DECEMBER AND PUMP KNOWLEDGE DEFICIT   Patient asks  if Humalog is harmful to his body   Counseled that poorly controlled type I diabetes mellitus and greatly elevated A1C values are most harmful to his body; long term use of insulin is not detrimental in any way   Will continue to follow    Lori Packer MD

## 2024-03-22 NOTE — CARE PLAN
The patient's goals for the shift include  to have no signs of hypoglycemia during shift.    The clinical goals for the shift include Patient will be agreeable to taking subcu insulin and monitoring blood sugar with staff.    Over the shift, the patient did not make progress toward the following goals. Barriers to progression include continued need for education regarding insulin and blood glucose. Recommendations to address these barriers include continue to reinforce previous education.

## 2024-03-22 NOTE — DISCHARGE SUMMARY
"Discharge Diagnosis  Chest pain    Issues Requiring Follow-Up  Needs to follow-up with his endocrinology team ASAP for [employer] instruction of his insulin pump as well as obtaining another Guardian sensor.  Follow-up with his PCP in 1 to 2 weeks.    Discharge Meds     Your medication list        CONTINUE taking these medications        Instructions Last Dose Given Next Dose Due   blood sugar diagnostic strip           Guardian 4 Glucose Sensor device  Generic drug: blood-glucose sensor      Change insulin sensor every 7 days as directed, linked to Minimed insulin pump, check with endocrinology for updated regimen       insulin lispro 100 unit/mL injection  Commonly known as: HumaLOG           lancets misc           lisinopril 5 mg tablet           pen needle, diabetic 32 gauge x 5/32\" needle                    Test Results Pending At Discharge  Pending Labs       Order Current Status    Volatile compounds In process            Hospital Course   Ed Sanon is a 33 y.o. male with PMHx s/f IDDM I with poor insulin compliance (despite recent initiation on insulin pump in December), anxiety, depression, who presents from outpatient cardiac stress testing for evaluation of elevated HR, elevated sugar, and dehydration. Patient reports that he is trying to use his insulin pump; however, he does not have any sensors and has not had any sensor since his last admission in December after he had a short supply of them.  He reports that his sugars have predominantly been in the 300s, occasionally in the 400s.  He admits that he is still using his insulin pump; however, he is only using it \"however it is programmed now\" and cannot really tell me what the basal rate is and/or any bolus rates/amounts. He admits only intermittent compliance with bolusing insulin if he is hyperglycemic, and reports that even if he is remaining hyperglycemic he is not frequently changing his pump site. He also reports ongoing nausea, poor appetite, " and some very mild left upper quadrant/left lower quadrant abdominal discomfort which has been present for quite some time. He is not eating and drinking as much as he typically would over the last few weeks. He denies any associated vomiting, diarrhea, changes in bowel/bladder habits, fevers, chills, known sick contacts or exposures.  He reports that he was in the outpatient cardiology testing center today when he was sent to the ED for further evaluation.  He reports he is having constant chest pain/pressure with radiation into his back which has been ongoing since the start of February.  He was evaluated in the emergency department 3 times in February for chest pain, and subsequently was ordered a cardiac stress test by his PCP after a follow-up appointment in the outpatient setting.  He was about to undergo a stress echo when they noted his heart rate was too high and his blood sugar was in the 400s so he was sent to the ED for further evaluation; however, he did undergo CT PE due to his elevated heart rate which was negative.  Patient reports his chest pain is constant and feels that he is tender to the touch of his chest and has been since it started in February.  He denies syncope or presyncopal events, diaphoresis, headache, vision changes, prior CAD diagnoses.     ED Course (Summary):   Vitals on presentation: T97.0, /94, , RR 18, SpO2 99% RA  Labs: CBC with differential relatively benign.  Note that patient did have an initial glucose of 416 and a corrected sodium of 126, received a liter of normal saline and his sodium up trended quite rapidly.  Initial VBG with pH 7.31, pCO2 40, calculated bicarb 20.1.  Imaging: CT PE was completed in the outpatient setting/ordered by cardiology  Interventions: 1 L normal saline     3/20: Patient seen.  Labs reviewed this morning and noted that patient had developed DKA.  Patient had a bicarbonate of 11 with anion gap of 27.  Discussed with patient.  Had not  placed his insulin pump back on yet.  Discussed with endocrine.  Discontinue pump for now.  Started back on insulin infusion.  Moved patient to ICU for further evaluation and treatment.  Relatively asymptomatic at present.  Nausea had improved.  Reviewed events prior evening, patient had been noncompliant with insulin recommendations.  Stressed the need for further compliance in the future.  Appreciate input from endocrine, cardiology, CCM.     3/21: Patient seen.  DKA has resolved.  Back on basal bolus insulin.  More agreeable to take insulin at present.  Plan for nuclear stress test tomorrow.  Nausea resolved.  Oral intake is improving.  3/22: Stress test was negative for ischemia or scar.  Blood sugars are better controlled.  Okay to resume home regimen on discharge.  Will need to follow-up with his own endocrinology team for additional instructions in the pump as well as obtaining more Guardian sensors.  Chest pain is likely musculoskeletal or GI in origin.  Recommend follow-up with PCP.    This discharge took greater than 35 minutes to arrange.    Diabetic ketoacidosis without coma (CMS/HCC)  Assessment & Plan  -On arrival to the floor, patient self-removed his insulin pump   -I will initiate on lantus 35 units + SSI as per his old regimen in the charting system   -Endocrinology consultation appreciated   3/20: Unfortunately patient is now in DKA.  Insulin pump has been discontinued.  Patient appears to have been noncompliant with insulin recommendations overnight.  Starting insulin infusion.  Gradually improving.  Appreciate input from endocrine and CCM.  Moved to ICU for closer monitoring.  3/21: DKA appears to resolved.  Patient is off insulin infusion.  Currently back on basal bolus insulin.  Appreciate input from endocrinology.  Patient appears to be more compliant with insulin at present.  3/22: Resolved.  Resume home medication.  Resume insulin pump.  Follow-up with endocrinology  outpatient.    Hyponatremia  Assessment & Plan  -Patient appeared to have a degree of hypovolemic hyponatremia but unfortunately received a liter of normal saline in the ED   -Corrected sodium 126 --> 140 after 1L NS  -I reached out to nephrology regarding his sodium levels, and will place on 1/2 NS @ 75cc/hr overnight  -Nephrology consultation appreciated   -Note that hyponatremia labs ordered in the ED were collected after the fluids  3/20: Appreciate input from nephrology.  Likely secondary to dehydration.  3/21: Resolved, hemoglobin is 136.    Bipolar affective disorder, remission status unspecified (CMS/[facility])  Assessment & Plan  Continue home medications.    Atypical chest pain  Assessment & Plan  -Ongoing since February of this year  -CT-PE negative   -Patient reports he had at least a portion of the stress echo completed, but did not get the full thing due to his heart rate.  Will try to obtain records/results from that aspect.  -Troponin negative.  Tenderness to palpation on examination of the chest wall.  -ECG without overt acute ischemic changes  -Semirecently had an updated A1c completed and lipid panel.  -Will start on an aspirin.  Monitor on telemetry.  Pending ability to obtain at least partial stress results may need additional evaluation and cardiology consult; however, will defer for now  -Suspect that a degree of his ongoing chest discomfort may also be related to ongoing elevated blood pressures, especially diastolic.  This has been recorded during prior ED visits.  I will start the patient on losartan daily, but in the interim have ordered one-time dose of IV labetalol with tachycardia present as well.  Unless heart rate comes down would avoid hydralazine.  3/20: Appreciate input from cardiology.  Will likely investigate further once acidosis resolved.  3/21: Appreciate input from cardiology.  Plan for stress test tomorrow.  3/22: Nuclear stress test was negative.  Okay to discharge cardiology.   Follow-up with PCP.    Generalized anxiety disorder  Assessment & Plan  Continue home medicaitons.    Anxiety and depression  Assessment & Plan  Continue home medications.    Abdominal pain  Assessment & Plan  Likely related to above problems.  Will continue to monitor.          Pertinent Physical Exam At Time of Discharge  Physical Exam  Constitutional:       Appearance: He is ill-appearing.   HENT:      Head: Normocephalic and atraumatic.      Nose: Nose normal. No congestion or rhinorrhea.      Mouth/Throat:      Mouth: Mucous membranes are dry.      Pharynx: Oropharynx is clear.   Eyes:      General: No scleral icterus.     Extraocular Movements: Extraocular movements intact.      Pupils: Pupils are equal, round, and reactive to light.   Cardiovascular:      Rate and Rhythm: Regular rhythm. Tachycardia present.      Heart sounds: Normal heart sounds. No murmur heard.     No friction rub. No gallop.   Pulmonary:      Effort: Pulmonary effort is normal.      Breath sounds: Normal breath sounds. No wheezing, rhonchi or rales.   Chest:      Chest wall: No tenderness.   Abdominal:      General: There is no distension.      Palpations: Abdomen is soft.      Tenderness: There is abdominal tenderness. There is no guarding or rebound.   Musculoskeletal:         General: No swelling, tenderness or signs of injury. Normal range of motion.      Cervical back: Normal range of motion.   Skin:     General: Skin is warm and dry.      Coloration: Skin is not jaundiced.      Findings: No bruising, erythema or rash.   Neurological:      General: No focal deficit present.      Mental Status: He is oriented to person, place, and time.         Outpatient Follow-Up  Future Appointments   Date Time Provider Department [place]   4/1/2024 10:30 AM Zay Dunbar MD [id number] [facility]   5/28/2024 11:45 AM Henri Brumfield MD [id number] [facility]         Santi Pinto MD

## 2024-03-22 NOTE — NURSING NOTE
Discharge summary reviewed with patient.  IV and heart monitor removed.  Patient education included but not limited to medications, prescriptions, follow up appointments and home instructions.  All questions answered, belongings returned and transportation verified.

## 2024-03-22 NOTE — PROGRESS NOTES
"Subjective Data:  Today, Mr. Sanon is resting in bed, he endorses \"constant chest pain\" that worsens with palpation, and has \"mellowed out\" since admission. Denies shortness of breath, no dizziness or lightheadedness. He reports he is tolerating a diet, no nausea or vomiting, not on insulin infusion. Monitor demonstrates SR with rates in the 90s.     3/22/24: This morning, Mr. Sanon is resting in bed eating breakfast, no acute distress. He endorses chest pain in left chest only with palpation this morning, no shortness of breath, no dizziness or lightheadedness. He states he ambulated around his room yesterday without difficulty. Monitor demonstrates SR/ST with rates  bpm.    Overnight Events:    None     Objective Data:  Last Recorded Vitals:  Vitals:    03/21/24 1617 03/21/24 2224 03/22/24 0317 03/22/24 0818   BP:  126/89 (!) 148/94 (!) 146/99   BP Location:  Left arm Right arm Right arm   Patient Position:  Sitting Lying    Pulse:  107 108 102   Resp:  18 18 18   Temp: 36.3 °C (97.3 °F) 36.7 °C (98.1 °F) 36.2 °C (97.2 °F) 36.8 °C (98.2 °F)   TempSrc: Temporal Temporal Temporal Temporal   SpO2:  98% 97%    Weight:   72.8 kg (160 lb 7.9 oz)    Height:           Last Labs:  CBC - 3/21/2024:  3:58 AM  7.4 15.5 208    45.1      CMP - 3/21/2024:  3:58 AM  8.0 6.8 10 --- 0.6   3.0 4.0 9 59      PTT - No results in last year.  _   _ _     TROPHS   Date/Time Value Ref Range Status   03/19/2024 11:15 AM <3 0 - 20 ng/L Final   03/13/2024 05:14 PM 3 0 - 20 ng/L Final   02/14/2024 12:43 PM <3 0 - 20 ng/L Final     BNP   Date/Time Value Ref Range Status   03/19/2024 11:15 AM 6 0 - 99 pg/mL Final   03/13/2024 05:14 PM 4 0 - 99 pg/mL Final     HGBA1C   Date/Time Value Ref Range Status   02/23/2024 01:41 PM 12.4 see below % Final   12/09/2023 04:26 AM 15.5 see below % Final     LDLCALC   Date/Time Value Ref Range Status   02/23/2024 01:41 PM 49 <=99 mg/dL Final     Comment:                                 Near   " "Borderline      AGE      Desirable  Optimal    High     High     Very High     0-19 Y     0 - 109     ---    110-129   >/= 130     ----    20-24 Y     0 - 119     ---    120-159   >/= 160     ----      >24 Y     0 -  99   100-129  130-159   160-189     >/=190       VLDL   Date/Time Value Ref Range Status   02/23/2024 01:41 PM 20 0 - 40 mg/dL Final   06/02/2021 04:15 AM 63 0 - 40 mg/dL Final   06/29/2020 04:46 AM 22 0 - 40 mg/dL Final      Last I/O:  I/O last 3 completed shifts:  In: 2379.1 (32.7 mL/kg) [I.V.:1329.1 (18.3 mL/kg); IV Piggyback:1050]  Out: - (0 mL/kg)   Weight: 72.8 kg     Past Cardiology Tests (Last 3 Years):  EKG:  Electrocardiogram, 12-lead PRN ACS symptoms 03/19/2024      ECG 12 lead 03/19/2024      ECG 12 lead 03/13/2024      ECG 12 lead 02/14/2024    Echo:  No results found for this or any previous visit from the past 1095 days.    Ejection Fractions:  No results found for: \"EF\"  Cath:  No results found for this or any previous visit from the past 1095 days.    Stress Test:  No results found for this or any previous visit from the past 1095 days.    Cardiac Imaging:  No results found for this or any previous visit from the past 1095 days.      Inpatient Medications:  Scheduled medications   Medication Dose Route Frequency    aspirin  81 mg oral Daily    enoxaparin  40 mg subcutaneous q24h    [MAR Hold] insulin NPH (Isophane)  18 Units subcutaneous BID    Insulin   pump - subcutaneous Continuous Pump    losartan  25 mg oral Daily    melatonin  3 mg oral Nightly    metoprolol tartrate  12.5 mg oral BID    pantoprazole  40 mg oral Daily before breakfast    Or    pantoprazole  40 mg intravenous Daily before breakfast    polyethylene glycol  17 g oral Daily    [MAR Hold] regadenoson  0.4 mg intravenous Once in imaging     PRN medications   Medication    acetaminophen    [MAR Hold] aminophylline    bisacodyl    bisacodyl    dextrose    dextrose    glucagon    glucagon    guaiFENesin    insulin lispro " "   ondansetron    promethazine     Continuous Medications   Medication Dose Last Rate       Physical Exam:  Physical Exam  Vitals reviewed.   Constitutional:       Appearance: Normal appearance.   HENT:      Head: Normocephalic.      Mouth/Throat:      Mouth: Mucous membranes are moist.   Cardiovascular:      Rate and Rhythm: Regular rhythm. Tachycardia present.      Pulses: Normal pulses.      Heart sounds: Normal heart sounds.   Pulmonary:      Effort: Pulmonary effort is normal.      Breath sounds: Normal breath sounds. No wheezing, rhonchi or rales.   Abdominal:      General: Bowel sounds are normal. There is no distension.      Palpations: Abdomen is soft.      Tenderness: There is no abdominal tenderness.   Musculoskeletal:      Cervical back: Normal range of motion.      Right lower leg: No edema.      Left lower leg: No edema.   Skin:     General: Skin is warm and dry.   Neurological:      General: No focal deficit present.      Mental Status: He is alert and oriented to person, place, and time.   Psychiatric:         Mood and Affect: Mood normal.         Behavior: Behavior normal.           Assessment/Plan       Chest pain:  atypical chest pain radiating to abdomen and b/l LE.   Troponin negative x2.  I personally reviewed the EKG on presentation, which demonstrated sinus tachycardia , LAE, nonspecific ST abnl.  CT PE protocol neg for PE.  -recommend reattempting stress test once his DKA has resolved  -rpt EKG/troponin for worsening chest pain  3/21/24: Patient endorses \"constant chest pain\" that worsens with palpation, and has \"mellowed out\" since admission. Denies shortness of breath. Patient tolerating diet, not on insulin infusion  - Re attempt stress test tomorrow for further evaluation.     3/22/24: Stress test scheduled for today. Chest pain only with light palpation to left lateral chest this morning, denies shortness of breath. If stress test negative for ischemia, patient may be discharged " home from cardiology perspective and outpatient follow up will be arranged.      HTN:  DBP's in the 's range on presentation  -continue losartan 25mg daily  -serial Cr monitoring  3/21/24: No blood pressures documented since 0200. Prior Bps noted to be controlled, stable last 24 hours.   3/22/24:  SBP elevated to the 140s this morning, mostly controlled last 12 hours. Continue losartan at current dose and will add low dose metoprolol today.      Sinus tachycardia:  likely appropriate sinus tach in setting of DKA  -tele monitoring  -suspect this will improve with treatment of DKA - if it does not, try low dose BB  3/21/24: SR with rates in the 90s this morning. Hold off on initiation of BB.   3/22/24: Heart rates  bpm this morning. Will trial low dose metoprolol.      DKA:  -IVF and insulin management per endocrine/IMS/ICU   3/21/24: Tolerating diet, on subcutaneous insulin. Endocrinology managing.     Hyponatremia:    -gradual correction with IVF  -nephro recs  3/21/23: Na 136, resolved/stable.     Recommendations:   - Stress test today, if stress negative for ischemia,  cardiology will sign off and outpatient follow up will be arranged.   - Start Metoprolol tartrate 12.5 mg BID   - Continue losartan 25 mg daily    Code Status:  Full Code      CHRISTIANO Perera-CNP

## 2024-03-22 NOTE — DISCHARGE INSTRUCTIONS
PATIENT NEEDS TO HAVE AN OUTPATIENT ENDOCRINOLOGY FOLLOW UP AT [facility] SCHEDULED GIVEN LACK OF GUARDIAN CONNECT SENSOR SINCE DECEMBER AND PUMP KNOWLEDGE DEFICIT     Follow up with PCP in 1-2 weeks. Call to schedule. Bring all your discharge paperwork and medications when you go to your follow up appointment. Return to ED if your symptoms come back.    Needs follow-up with his own endocrinology team ASAP.

## 2024-03-22 NOTE — PROGRESS NOTES
"      Nephrology Progress Note      Nephrology following for hyponatremia.     Events over night:   Patient off the floor this morning getting his stress test.      BP (!) 146/99 (BP Location: Right arm)   Pulse 102   Temp 36.8 °C (98.2 °F) (Temporal)   Resp 18   Ht 1.778 m (5' 10\")   Wt 72.8 kg (160 lb 7.9 oz)   SpO2 97%   BMI 23.03 kg/m²     Input / Output:  24 HR: No intake or output data in the 24 hours ending 03/22/24 1018      Physical Exam   Alert and oriented x 3 NAD  Neck: no JVD  CV: RRR  Lungs: CTA bilaterally  Abd: soft, NT, ND   Ext: no lower extremity edema    Scheduled medications  aspirin, 81 mg, oral, Daily  enoxaparin, 40 mg, subcutaneous, q24h  insulin NPH (Isophane), 18 Units, subcutaneous, BID  Insulin, , pump - subcutaneous, Continuous Pump  losartan, 25 mg, oral, Daily  melatonin, 3 mg, oral, Nightly  metoprolol tartrate, 12.5 mg, oral, BID  pantoprazole, 40 mg, oral, Daily before breakfast   Or  pantoprazole, 40 mg, intravenous, Daily before breakfast  polyethylene glycol, 17 g, oral, Daily  regadenoson, 0.4 mg, intravenous, Once in imaging  Tc-99m tetrofosmin, 10 millicurie, intravenous, Once in imaging      Continuous medications     PRN medications  PRN medications: acetaminophen, aminophylline, bisacodyl, bisacodyl, dextrose, dextrose, glucagon, guaiFENesin, insulin lispro, ondansetron, promethazine   Results from last 7 days   Lab Units 03/22/24  0900 03/20/24  0938 03/20/24  0345   SODIUM mmol/L 139   < > 135*   POTASSIUM mmol/L 4.1   < > 4.2   CHLORIDE mmol/L 107   < > 101   CO2 mmol/L 24   < > 11*   BUN mg/dL 21   < > 20   CREATININE mg/dL 0.69   < > 0.98   CALCIUM mg/dL 8.2*   < > 8.9   PROTEIN TOTAL g/dL  --   --  6.8   BILIRUBIN TOTAL mg/dL  --   --  0.6   ALK PHOS U/L  --   --  59   ALT U/L  --   --  9*   AST U/L  --   --  10   GLUCOSE mg/dL 292*   < > 261*    < > = values in this interval not displayed.        Results from last 7 days   Lab Units 03/21/24  0358 " 03/20/24 2018 03/20/24  0345   MAGNESIUM mg/dL 1.76 1.80 1.91        Results from last 7 days   Lab Units 03/21/24  0358 03/20/24  0345 03/19/24  1115   WBC AUTO x10*3/uL 7.4 19.3* 6.1   HEMOGLOBIN g/dL 15.5 17.2 17.8*   HEMATOCRIT % 45.1 51.2 51.6   PLATELETS AUTO x10*3/uL 208 226 194          Assessment & Plan:     Assessment:   Patient is 33 y.o. male who is admitted to hospital for DKA. Nephrology consulted in view of hyponatremia.     Hyponatremia  - Likely secondary to hyperglycemia  - Serum osmolality 343, urine osmolality 943  - Corrected Na on admission was 126  - Patient was given 1L of NS in the ED with rapid improvement of Na to 138  - Nephrology was called and ordered 1/2 NS overnight  - Improved to 139 corrected      Anion gap metabolic acidosis  - likely secondary to DKA  - Bicarb dropped from 21 -> 11 overnight  - Calculated anion gap 23  - ordered Beta hydroxybutyrate, UA, lactate, and volatile compounds to rule out other causes  - Beta hydroxybutyrate very elevated at 9.97  - Critical care team on board  - Bridged to subcu insulin now     Hyperkalemia  - K 6.0   - resolved     Recommendations:   - Sodium improved to normal  - Acidosis resolved  - Encourage po intake  - No need for further IV fluids  - Bridged to subcutaneous insulin, management per endocrinology    Please message me through EPIC chat with any questions or concerns.     MISHEL Ovalles IV  3/22/2024  10:18 AM     [facility]    [address], [address]   [place] [id number]  Office: [phone]

## 2024-03-22 NOTE — PROGRESS NOTES
Discussed discharge planning during interdisciplinary rounds with Dr. Pinto. Awaiting stress test. Discharge plan is to return home when medically ready.

## 2024-03-23 LAB
ACETONE SERPL-MCNC: 41 MG/DL
ETHANOL SERPL-MCNC: <5 MG/DL
ISOPROPANOL SERPL-MCNC: <5 MG/DL
METHANOL SERPL-MCNC: <5 MG/DL
SCAN RESULT: ABNORMAL

## 2024-03-25 ENCOUNTER — PATIENT OUTREACH (OUTPATIENT)
Dept: CARE COORDINATION | Facility: CLINIC | Age: 34
End: 2024-03-25
Payer: COMMERCIAL

## 2024-03-25 NOTE — PROGRESS NOTES
Discharge Facility:[facility]  Discharge Diagnosis:chest pain  Admission Date:03/19/24  Discharge Date: 03/22/24    PCP Appointment Date:none made sent to pcp office  Specialist Appointment Date:   Hospital Encounter and Summary: Linked   See discharge assessment below for further details  2 attempts

## 2024-03-28 NOTE — PROGRESS NOTES
Counseling:  The patient was counseled regarding diagnostic results, instructions for management, risk factor reductions, prognosis, patient and family education, impressions, risks and benefits of treatment options and importance of compliance with treatment.      Chief Complaint:  The patient presents today for cardiovascular evaluation of chest pain.     History Of Present Illness:    Ed Sanon is a 33 y.o. male patient whose PMH is significant for DM type 1 with DKA, bipolar affective disorder and anxiety. He presents today to establish cardiovascular care for the evaluation and management of chest pain. The patient was admitted to hospital from 03/19/2024 to 03/22/2024 after presenting to the ED with a chief complaint of chest pain. The patient was directed to the ED from the stress lab as he was scheduled for stress testing but was noted to be tachycardic with an elevated blood sugar, and therefore testing was terminated. EKG performed upon presentation showed sinus tachycardia at a rate of 128 bpm with nonspecific ST abnormalities. As the patient was noted to have DKA, stress testing was deferred initially. On 03/19/2024, stress testing was negative for ischemia. Given negative stress testing and improvement in symptoms, the patient was discharged from a cardiac standpoint with outpatient followup. Today, the patient states that he is feeling improved since being discharged from hospital. Blood sugars continue to be elevated, and he denies following up with his endocrinologist since discharge. The patient reports persistent, although only mild and occasional chest discomfort.    Past Surgical History:  He has no past surgical history on file.      Social History:  He reports that he has never smoked. He has never used smokeless tobacco. He reports that he does not drink alcohol and does not use drugs.    Family History:  Family History   Problem Relation Name Age of Onset    No Known Problems Mother    "   No Known Problems Father      Hypertension Other      Coronary artery disease Other      Diabetes Other      Heart failure Other          Allergies:  Patient has no known allergies.    Outpatient Medications:  Current Outpatient Medications   Medication Instructions    blood sugar diagnostic strip Use 2-3 times per day    Guardian 4 Glucose Sensor device Change insulin sensor every 7 days as directed, linked to Minimed insulin pump, check with endocrinology for updated regimen    insulin lispro (HumaLOG) 100 unit/mL injection USE VIA INSULIN PUMP, USING UP  UNITS PER DAY    lancets misc 1 Units, subcutaneous, 3 times daily after meals and nightly, Sliding scale    lisinopril 5 mg, oral, Daily    pen needle, diabetic 32 gauge x 5/32\" needle Use as instructed 4 times daily        Last Recorded Vitals:  Vitals:    04/01/24 1050   BP: 122/82   Pulse: (!) 125   Weight: 68.9 kg (152 lb)       Review of Systems   Cardiovascular:         Chest discomfort - mild, occasional   All other systems reviewed and are negative.     Physical Exam:  Constitutional:       Appearance: Healthy appearance. Not in distress.   Neck:      Vascular: No JVR. JVD normal.   Pulmonary:      Effort: Pulmonary effort is normal.      Breath sounds: Normal breath sounds. No wheezing. No rhonchi. No rales.   Chest:      Chest wall: Not tender to palpatation.   Cardiovascular:      PMI at left midclavicular line. Normal rate. Regular rhythm. Normal S1. Normal S2.       Murmurs: There is no murmur.      No gallop.  No click. No rub.   Pulses:     Intact distal pulses.   Edema:     Peripheral edema absent.   Abdominal:      General: Bowel sounds are normal.      Palpations: Abdomen is soft.      Tenderness: There is no abdominal tenderness.   Musculoskeletal: Normal range of motion.         General: No tenderness. Skin:     General: Skin is warm and dry.   Neurological:      General: No focal deficit present.      Mental Status: Alert and " oriented to person, place and time.            Last Labs:  CBC -  Lab Results   Component Value Date    WBC 7.4 03/21/2024    HGB 15.5 03/21/2024    HCT 45.1 03/21/2024    MCV 89 03/21/2024     03/21/2024       CMP -  Lab Results   Component Value Date    CALCIUM 8.2 (L) 03/22/2024    PHOS 3.0 03/20/2024    PROT 6.8 03/20/2024    ALBUMIN 4.0 03/20/2024    AST 10 03/20/2024    ALT 9 (L) 03/20/2024    ALKPHOS 59 03/20/2024    BILITOT 0.6 03/20/2024       LIPID PANEL -   Lab Results   Component Value Date    CHOL 116 02/23/2024    TRIG 101 02/23/2024    HDL 47.1 02/23/2024    CHHDL 2.5 02/23/2024    LDLF 63 06/02/2021    VLDL 20 02/23/2024    NHDL 69 02/23/2024       RENAL FUNCTION PANEL -   Lab Results   Component Value Date    GLUCOSE 292 (H) 03/22/2024     03/22/2024    K 4.1 03/22/2024     03/22/2024    CO2 24 03/22/2024    ANIONGAP 12 03/22/2024    BUN 21 03/22/2024    CREATININE 0.69 03/22/2024    GFRMALE >90 08/29/2023    CALCIUM 8.2 (L) 03/22/2024    PHOS 3.0 03/20/2024    ALBUMIN 4.0 03/20/2024        Lab Results   Component Value Date    BNP 6 03/19/2024    HGBA1C 12.4 (H) 02/23/2024       Last Cardiology Tests:  03/22/2024 - Stress Test  1. Adequate level of stress achieved. No ischemia by ECG at max workload. No clinical or electrocardiographic evidence for ischemia at maximal workload.  2. SPECT myocardial perfusion images in response to exercise stress demonstrates no evidence of reversible ischemia or infarction. Well-maintained left ventricular ejection fraction of 73%.    03/19/2024 - CTA Chest for PE  1.  No evidence of pulmonary embolus, aneurysm or dissection.  2.  Right lung micronodule. The lungs otherwise are clear.       Lab review: I have personally reviewed the laboratory result(s).  Diagnostic review: I have personally reviewed the result(s) of the Stress Test.    Assessment/Plan   1) Chest Pain  Scheduled for stress testing 03/19/2024 - testing terminated as patient was  tachycardic with elevated blood sugar, directed to ED for further evaluation  Hospital admission 03/19/2024 to 03/22/2024 - EKG with sinus tachycardia at a rate of 128 bpm with nonspecific ST abnormalities  Stress testing initially deferred s/t DKA  Stress 03/22/2024 negative for ischemia  Chest pain has improved - currently only mild and occasional  Followup with Nata Mclaughlin NP, in 6 months    2) Tachycardia   Hospital admission 03/19/2024 to 03/22/2024  Likely appropriate sinus tach in setting of DKA  HR persistently elevated; therefore, started on low dose metoprolol   Continues to be tachycardic with elevated blood sugars  Advised to followup with endocrinologist   Followup with Nata Mclaughlin NP, in 6 months    3) HTN  Hospital admission 03/19/2024 to 03/22/2024 with CP and tachycardia  Diastolic BP in the  range upon presentation  Found to have elevated systolic BP in the 140s  Losartan continued at 25 mg and low dose metoprolol added  Stable   Followup with Nata Mclaughlin NP, in 6 months    Scribe Attestation  By signing my name below, I, Vania Pascual, [place]rbrett   attest that this documentation has been prepared under the direction and in the presence of Zay Dunbar MD.

## 2024-03-30 PROBLEM — E11.65 POORLY CONTROLLED DIABETES MELLITUS (MULTI): Status: ACTIVE | Noted: 2023-07-11

## 2024-04-01 ENCOUNTER — OFFICE VISIT (OUTPATIENT)
Dept: CARDIOLOGY | Facility: CLINIC | Age: 34
End: 2024-04-01
Payer: COMMERCIAL

## 2024-04-01 VITALS
HEART RATE: 125 BPM | SYSTOLIC BLOOD PRESSURE: 122 MMHG | BODY MASS INDEX: 21.81 KG/M2 | WEIGHT: 152 LBS | DIASTOLIC BLOOD PRESSURE: 82 MMHG

## 2024-04-01 DIAGNOSIS — E87.1 HYPONATREMIA: Primary | ICD-10-CM

## 2024-04-01 DIAGNOSIS — E11.65 POORLY CONTROLLED DIABETES MELLITUS (MULTI): ICD-10-CM

## 2024-04-01 PROCEDURE — 1036F TOBACCO NON-USER: CPT | Performed by: INTERNAL MEDICINE

## 2024-04-01 PROCEDURE — 3008F BODY MASS INDEX DOCD: CPT | Performed by: INTERNAL MEDICINE

## 2024-04-01 PROCEDURE — 3074F SYST BP LT 130 MM HG: CPT | Performed by: INTERNAL MEDICINE

## 2024-04-01 PROCEDURE — 99213 OFFICE O/P EST LOW 20 MIN: CPT | Performed by: INTERNAL MEDICINE

## 2024-04-01 PROCEDURE — 3048F LDL-C <100 MG/DL: CPT | Performed by: INTERNAL MEDICINE

## 2024-04-01 PROCEDURE — 3046F HEMOGLOBIN A1C LEVEL >9.0%: CPT | Performed by: INTERNAL MEDICINE

## 2024-04-01 PROCEDURE — 3079F DIAST BP 80-89 MM HG: CPT | Performed by: INTERNAL MEDICINE

## 2024-04-01 PROCEDURE — 4010F ACE/ARB THERAPY RXD/TAKEN: CPT | Performed by: INTERNAL MEDICINE

## 2024-04-01 PROCEDURE — 93000 ELECTROCARDIOGRAM COMPLETE: CPT | Performed by: INTERNAL MEDICINE

## 2024-04-01 PROCEDURE — 3060F POS MICROALBUMINURIA REV: CPT | Performed by: INTERNAL MEDICINE

## 2024-04-01 NOTE — PATIENT INSTRUCTIONS
Continue all current medications as prescribed.  Dr. Dunbar has recommended that you make a followup appointment with your endocrinologist regarding your blood sugars.  Followup with Nata Mclaughlin NP, in 6 months.      If you have any questions or cardiac concerns, please call our office at [phone].

## 2024-04-01 NOTE — LETTER
April 1, 2024     Henri Brumfield MD  [address]  [address]  [facility] [address]    Patient: Ed Sanon   YOB: 1990   Date of Visit: 4/1/2024       Dear Dr. Henri Brumfield MD:    Thank you for referring Ed Sanon to me for evaluation. Below are my notes for this consultation.  If you have questions, please do not hesitate to call me. I look forward to following your patient along with you.       Sincerely,     Zay Dunbar MD      CC: No Recipients  ______________________________________________________________________________________    Counseling:  The patient was counseled regarding diagnostic results, instructions for management, risk factor reductions, prognosis, patient and family education, impressions, risks and benefits of treatment options and importance of compliance with treatment.      Chief Complaint:  The patient presents today for cardiovascular evaluation of chest pain.     History Of Present Illness:    Ed Sanon is a 33 y.o. male patient whose PMH is significant for DM type 1 with DKA, bipolar affective disorder and anxiety. He presents today to establish cardiovascular care for the evaluation and management of chest pain. The patient was admitted to hospital from 03/19/2024 to 03/22/2024 after presenting to the ED with a chief complaint of chest pain. The patient was directed to the ED from the stress lab as he was scheduled for stress testing but was noted to be tachycardic with an elevated blood sugar, and therefore testing was terminated. EKG performed upon presentation showed sinus tachycardia at a rate of 128 bpm with nonspecific ST abnormalities. As the patient was noted to have DKA, stress testing was deferred initially. On 03/19/2024, stress testing was negative for ischemia. Given negative stress testing and improvement in symptoms, the patient was discharged from a cardiac standpoint with outpatient followup. Today, the patient states that he is  "feeling improved since being discharged from hospital. Blood sugars continue to be elevated, and he denies following up with his endocrinologist since discharge. The patient reports persistent, although only mild and occasional chest discomfort.    Past Surgical History:  He has no past surgical history on file.      Social History:  He reports that he has never smoked. He has never used smokeless tobacco. He reports that he does not drink alcohol and does not use drugs.    Family History:  Family History   Problem Relation Name Age of Onset   • No Known Problems Mother     • No Known Problems Father     • Hypertension Other     • Coronary artery disease Other     • Diabetes Other     • Heart failure Other          Allergies:  Patient has no known allergies.    Outpatient Medications:  Current Outpatient Medications   Medication Instructions   • blood sugar diagnostic strip Use 2-3 times per day   • Guardian 4 Glucose Sensor device Change insulin sensor every 7 days as directed, linked to Minimed insulin pump, check with endocrinology for updated regimen   • insulin lispro (HumaLOG) 100 unit/mL injection USE VIA INSULIN PUMP, USING UP  UNITS PER DAY   • lancets misc 1 Units, subcutaneous, 3 times daily after meals and nightly, Sliding scale   • lisinopril 5 mg, oral, Daily   • pen needle, diabetic 32 gauge x 5/32\" needle Use as instructed 4 times daily        Last Recorded Vitals:  Vitals:    04/01/24 1050   BP: 122/82   Pulse: (!) 125   Weight: 68.9 kg (152 lb)       Review of Systems   Cardiovascular:         Chest discomfort - mild, occasional   All other systems reviewed and are negative.     Physical Exam:  Constitutional:       Appearance: Healthy appearance. Not in distress.   Neck:      Vascular: No JVR. JVD normal.   Pulmonary:      Effort: Pulmonary effort is normal.      Breath sounds: Normal breath sounds. No wheezing. No rhonchi. No rales.   Chest:      Chest wall: Not tender to palpatation. "   Cardiovascular:      PMI at left midclavicular line. Normal rate. Regular rhythm. Normal S1. Normal S2.       Murmurs: There is no murmur.      No gallop.  No click. No rub.   Pulses:     Intact distal pulses.   Edema:     Peripheral edema absent.   Abdominal:      General: Bowel sounds are normal.      Palpations: Abdomen is soft.      Tenderness: There is no abdominal tenderness.   Musculoskeletal: Normal range of motion.         General: No tenderness. Skin:     General: Skin is warm and dry.   Neurological:      General: No focal deficit present.      Mental Status: Alert and oriented to person, place and time.            Last Labs:  CBC -  Lab Results   Component Value Date    WBC 7.4 03/21/2024    HGB 15.5 03/21/2024    HCT 45.1 03/21/2024    MCV 89 03/21/2024     03/21/2024       CMP -  Lab Results   Component Value Date    CALCIUM 8.2 (L) 03/22/2024    PHOS 3.0 03/20/2024    PROT 6.8 03/20/2024    ALBUMIN 4.0 03/20/2024    AST 10 03/20/2024    ALT 9 (L) 03/20/2024    ALKPHOS 59 03/20/2024    BILITOT 0.6 03/20/2024       LIPID PANEL -   Lab Results   Component Value Date    CHOL 116 02/23/2024    TRIG 101 02/23/2024    HDL 47.1 02/23/2024    CHHDL 2.5 02/23/2024    LDLF 63 06/02/2021    VLDL 20 02/23/2024    NHDL 69 02/23/2024       RENAL FUNCTION PANEL -   Lab Results   Component Value Date    GLUCOSE 292 (H) 03/22/2024     03/22/2024    K 4.1 03/22/2024     03/22/2024    CO2 24 03/22/2024    ANIONGAP 12 03/22/2024    BUN 21 03/22/2024    CREATININE 0.69 03/22/2024    GFRMALE >90 08/29/2023    CALCIUM 8.2 (L) 03/22/2024    PHOS 3.0 03/20/2024    ALBUMIN 4.0 03/20/2024        Lab Results   Component Value Date    BNP 6 03/19/2024    HGBA1C 12.4 (H) 02/23/2024       Last Cardiology Tests:  03/22/2024 - Stress Test  1. Adequate level of stress achieved. No ischemia by ECG at max workload. No clinical or electrocardiographic evidence for ischemia at maximal workload.  2. SPECT myocardial  perfusion images in response to exercise stress demonstrates no evidence of reversible ischemia or infarction. Well-maintained left ventricular ejection fraction of 73%.    03/19/2024 - CTA Chest for PE  1.  No evidence of pulmonary embolus, aneurysm or dissection.  2.  Right lung micronodule. The lungs otherwise are clear.       Lab review: I have personally reviewed the laboratory result(s).  Diagnostic review: I have personally reviewed the result(s) of the Stress Test.    Assessment/Plan  1) Chest Pain  Scheduled for stress testing 03/19/2024 - testing terminated as patient was tachycardic with elevated blood sugar, directed to ED for further evaluation  Hospital admission 03/19/2024 to 03/22/2024 - EKG with sinus tachycardia at a rate of 128 bpm with nonspecific ST abnormalities  Stress testing initially deferred s/t DKA  Stress 03/22/2024 negative for ischemia  Chest pain has improved - currently only mild and occasional  Followup with Nata Mclaughlin NP, in 6 months    2) Tachycardia   Hospital admission 03/19/2024 to 03/22/2024  Likely appropriate sinus tach in setting of DKA  HR persistently elevated; therefore, started on low dose metoprolol   Continues to be tachycardic with elevated blood sugars  Advised to followup with endocrinologist   Followup with Nata Mclaughlin NP, in 6 months    3) HTN  Hospital admission 03/19/2024 to 03/22/2024 with CP and tachycardia  Diastolic BP in the  range upon presentation  Found to have elevated systolic BP in the 140s  Losartan continued at 25 mg and low dose metoprolol added  Stable   Followup with Nata Mclaughlin NP, in 6 months    Scribe Attestation  By signing my name below, I, Shyann Galan   attest that this documentation has been prepared under the direction and in the presence of Zay Dunbar MD.

## 2024-04-04 ENCOUNTER — PATIENT OUTREACH (OUTPATIENT)
Dept: CARE COORDINATION | Facility: CLINIC | Age: 34
End: 2024-04-04
Payer: COMMERCIAL

## 2024-04-04 NOTE — PROGRESS NOTES
Unable to reach patient for call back after patient's follow up appointment with PCP.   BERTHAM with call back number for patient to call if needed   If no voicemail available call attempts x 2 were made to contact the patient to assist with any questions or concerns patient may have.

## 2024-04-18 ENCOUNTER — PATIENT OUTREACH (OUTPATIENT)
Dept: CARE COORDINATION | Facility: CLINIC | Age: 34
End: 2024-04-18
Payer: COMMERCIAL

## 2024-07-05 ENCOUNTER — HOSPITAL ENCOUNTER (INPATIENT)
Facility: HOSPITAL | Age: 34
LOS: 2 days | Discharge: HOME | DRG: 639 | End: 2024-07-07
Attending: STUDENT IN AN ORGANIZED HEALTH CARE EDUCATION/TRAINING PROGRAM | Admitting: INTERNAL MEDICINE
Payer: COMMERCIAL

## 2024-07-05 DIAGNOSIS — E10.10 DKA, TYPE 1, NOT AT GOAL (MULTI): ICD-10-CM

## 2024-07-05 DIAGNOSIS — E10.10 DIABETIC KETOACIDOSIS WITHOUT COMA ASSOCIATED WITH TYPE 1 DIABETES MELLITUS (MULTI): Primary | ICD-10-CM

## 2024-07-05 PROBLEM — E11.10 METABOLIC ACIDOSIS DUE TO DIABETES MELLITUS (MULTI): Status: ACTIVE | Noted: 2024-07-05

## 2024-07-05 LAB
ALBUMIN SERPL BCP-MCNC: 3 G/DL (ref 3.4–5)
ALBUMIN SERPL BCP-MCNC: 4.4 G/DL (ref 3.4–5)
ALP SERPL-CCNC: 101 U/L (ref 33–120)
ALT SERPL W P-5'-P-CCNC: 15 U/L (ref 10–52)
ANION GAP SERPL CALC-SCNC: 14 MMOL/L (ref 10–20)
ANION GAP SERPL CALC-SCNC: 22 MMOL/L (ref 10–20)
AST SERPL W P-5'-P-CCNC: 11 U/L (ref 9–39)
B-OH-BUTYR SERPL-SCNC: 7.9 MMOL/L (ref 0.02–0.27)
BASE EXCESS BLDV CALC-SCNC: -12.6 MMOL/L (ref -2–3)
BASE EXCESS BLDV CALC-SCNC: -12.7 MMOL/L (ref -2–3)
BASOPHILS # BLD AUTO: 0.06 X10*3/UL (ref 0–0.1)
BASOPHILS NFR BLD AUTO: 1.1 %
BILIRUB SERPL-MCNC: 0.5 MG/DL (ref 0–1.2)
BODY TEMPERATURE: 37 DEGREES CELSIUS
BODY TEMPERATURE: 37 DEGREES CELSIUS
BUN SERPL-MCNC: 12 MG/DL (ref 6–23)
BUN SERPL-MCNC: 13 MG/DL (ref 6–23)
CALCIUM SERPL-MCNC: 7 MG/DL (ref 8.6–10.3)
CALCIUM SERPL-MCNC: 8.7 MG/DL (ref 8.6–10.3)
CHLORIDE SERPL-SCNC: 104 MMOL/L (ref 98–107)
CHLORIDE SERPL-SCNC: 113 MMOL/L (ref 98–107)
CO2 SERPL-SCNC: 13 MMOL/L (ref 21–32)
CO2 SERPL-SCNC: 16 MMOL/L (ref 21–32)
CREAT SERPL-MCNC: 0.84 MG/DL (ref 0.5–1.3)
CREAT SERPL-MCNC: 1.09 MG/DL (ref 0.5–1.3)
EGFRCR SERPLBLD CKD-EPI 2021: >90 ML/MIN/1.73M*2
EGFRCR SERPLBLD CKD-EPI 2021: >90 ML/MIN/1.73M*2
EOSINOPHIL # BLD AUTO: 0.02 X10*3/UL (ref 0–0.7)
EOSINOPHIL NFR BLD AUTO: 0.4 %
ERYTHROCYTE [DISTWIDTH] IN BLOOD BY AUTOMATED COUNT: 13.2 % (ref 11.5–14.5)
GLUCOSE BLD MANUAL STRIP-MCNC: 149 MG/DL (ref 74–99)
GLUCOSE BLD MANUAL STRIP-MCNC: 163 MG/DL (ref 74–99)
GLUCOSE BLD MANUAL STRIP-MCNC: 172 MG/DL (ref 74–99)
GLUCOSE BLD MANUAL STRIP-MCNC: 180 MG/DL (ref 74–99)
GLUCOSE BLD MANUAL STRIP-MCNC: 199 MG/DL (ref 74–99)
GLUCOSE BLD MANUAL STRIP-MCNC: 283 MG/DL (ref 74–99)
GLUCOSE BLD MANUAL STRIP-MCNC: 333 MG/DL (ref 74–99)
GLUCOSE SERPL-MCNC: 160 MG/DL (ref 74–99)
GLUCOSE SERPL-MCNC: 344 MG/DL (ref 74–99)
HCO3 BLDV-SCNC: 13.8 MMOL/L (ref 22–26)
HCO3 BLDV-SCNC: 14.5 MMOL/L (ref 22–26)
HCT VFR BLD AUTO: 51.7 % (ref 41–52)
HGB BLD-MCNC: 18.3 G/DL (ref 13.5–17.5)
IMM GRANULOCYTES # BLD AUTO: 0.03 X10*3/UL (ref 0–0.7)
IMM GRANULOCYTES NFR BLD AUTO: 0.5 % (ref 0–0.9)
INHALED O2 CONCENTRATION: 21 %
INHALED O2 CONCENTRATION: 21 %
LYMPHOCYTES # BLD AUTO: 1.49 X10*3/UL (ref 1.2–4.8)
LYMPHOCYTES NFR BLD AUTO: 26.4 %
MAGNESIUM SERPL-MCNC: 1.59 MG/DL (ref 1.6–2.4)
MCH RBC QN AUTO: 31.6 PG (ref 26–34)
MCHC RBC AUTO-ENTMCNC: 35.4 G/DL (ref 32–36)
MCV RBC AUTO: 89 FL (ref 80–100)
MONOCYTES # BLD AUTO: 0.7 X10*3/UL (ref 0.1–1)
MONOCYTES NFR BLD AUTO: 12.4 %
NEUTROPHILS # BLD AUTO: 3.35 X10*3/UL (ref 1.2–7.7)
NEUTROPHILS NFR BLD AUTO: 59.2 %
NRBC BLD-RTO: 0 /100 WBCS (ref 0–0)
OXYHGB MFR BLDV: 46.5 % (ref 45–75)
OXYHGB MFR BLDV: 56.6 % (ref 45–75)
PCO2 BLDV: 33 MM HG (ref 41–51)
PCO2 BLDV: 37 MM HG (ref 41–51)
PH BLDV: 7.2 PH (ref 7.33–7.43)
PH BLDV: 7.23 PH (ref 7.33–7.43)
PHOSPHATE SERPL-MCNC: 1.5 MG/DL (ref 2.5–4.9)
PLATELET # BLD AUTO: 169 X10*3/UL (ref 150–450)
PO2 BLDV: 28 MM HG (ref 35–45)
PO2 BLDV: 30 MM HG (ref 35–45)
POTASSIUM SERPL-SCNC: 3.4 MMOL/L (ref 3.5–5.3)
POTASSIUM SERPL-SCNC: 4.3 MMOL/L (ref 3.5–5.3)
PROT SERPL-MCNC: 7.2 G/DL (ref 6.4–8.2)
RBC # BLD AUTO: 5.79 X10*6/UL (ref 4.5–5.9)
SAO2 % BLDV: 47 % (ref 45–75)
SAO2 % BLDV: 58 % (ref 45–75)
SODIUM SERPL-SCNC: 135 MMOL/L (ref 136–145)
SODIUM SERPL-SCNC: 140 MMOL/L (ref 136–145)
WBC # BLD AUTO: 5.7 X10*3/UL (ref 4.4–11.3)

## 2024-07-05 PROCEDURE — 2020000001 HC ICU ROOM DAILY

## 2024-07-05 PROCEDURE — 83930 ASSAY OF BLOOD OSMOLALITY: CPT | Mod: PORLAB | Performed by: STUDENT IN AN ORGANIZED HEALTH CARE EDUCATION/TRAINING PROGRAM

## 2024-07-05 PROCEDURE — 96360 HYDRATION IV INFUSION INIT: CPT

## 2024-07-05 PROCEDURE — 36415 COLL VENOUS BLD VENIPUNCTURE: CPT | Performed by: NURSE PRACTITIONER

## 2024-07-05 PROCEDURE — 2500000004 HC RX 250 GENERAL PHARMACY W/ HCPCS (ALT 636 FOR OP/ED)

## 2024-07-05 PROCEDURE — 83735 ASSAY OF MAGNESIUM: CPT

## 2024-07-05 PROCEDURE — 2500000005 HC RX 250 GENERAL PHARMACY W/O HCPCS

## 2024-07-05 PROCEDURE — 82805 BLOOD GASES W/O2 SATURATION: CPT | Performed by: NURSE PRACTITIONER

## 2024-07-05 PROCEDURE — 85025 COMPLETE CBC W/AUTO DIFF WBC: CPT | Performed by: NURSE PRACTITIONER

## 2024-07-05 PROCEDURE — 80053 COMPREHEN METABOLIC PANEL: CPT | Performed by: NURSE PRACTITIONER

## 2024-07-05 PROCEDURE — 80069 RENAL FUNCTION PANEL: CPT | Mod: CCI

## 2024-07-05 PROCEDURE — 82010 KETONE BODYS QUAN: CPT | Performed by: NURSE PRACTITIONER

## 2024-07-05 PROCEDURE — 99291 CRITICAL CARE FIRST HOUR: CPT | Mod: 25 | Performed by: NURSE PRACTITIONER

## 2024-07-05 PROCEDURE — 2500000004 HC RX 250 GENERAL PHARMACY W/ HCPCS (ALT 636 FOR OP/ED): Performed by: STUDENT IN AN ORGANIZED HEALTH CARE EDUCATION/TRAINING PROGRAM

## 2024-07-05 PROCEDURE — 82810 BLOOD GASES O2 SAT ONLY: CPT | Performed by: NURSE PRACTITIONER

## 2024-07-05 PROCEDURE — 99223 1ST HOSP IP/OBS HIGH 75: CPT

## 2024-07-05 PROCEDURE — 2500000004 HC RX 250 GENERAL PHARMACY W/ HCPCS (ALT 636 FOR OP/ED): Performed by: NURSE PRACTITIONER

## 2024-07-05 PROCEDURE — 82805 BLOOD GASES W/O2 SATURATION: CPT

## 2024-07-05 PROCEDURE — 82947 ASSAY GLUCOSE BLOOD QUANT: CPT

## 2024-07-05 PROCEDURE — 96374 THER/PROPH/DIAG INJ IV PUSH: CPT | Mod: 59

## 2024-07-05 PROCEDURE — 36415 COLL VENOUS BLD VENIPUNCTURE: CPT

## 2024-07-05 PROCEDURE — 99291 CRITICAL CARE FIRST HOUR: CPT

## 2024-07-05 RX ORDER — SODIUM CHLORIDE 450 MG/100ML
250 INJECTION, SOLUTION INTRAVENOUS CONTINUOUS
Status: DISCONTINUED | OUTPATIENT
Start: 2024-07-05 | End: 2024-07-06

## 2024-07-05 RX ORDER — ACETAMINOPHEN 325 MG/1
650 TABLET ORAL EVERY 4 HOURS PRN
Status: DISCONTINUED | OUTPATIENT
Start: 2024-07-05 | End: 2024-07-07 | Stop reason: HOSPADM

## 2024-07-05 RX ORDER — DEXTROSE MONOHYDRATE 100 MG/ML
150 INJECTION, SOLUTION INTRAVENOUS CONTINUOUS
Status: DISCONTINUED | OUTPATIENT
Start: 2024-07-05 | End: 2024-07-05

## 2024-07-05 RX ORDER — DEXTROSE 50 % IN WATER (D50W) INTRAVENOUS SYRINGE
50
Status: DISCONTINUED | OUTPATIENT
Start: 2024-07-05 | End: 2024-07-06

## 2024-07-05 RX ORDER — DEXTROSE MONOHYDRATE 100 MG/ML
150 INJECTION, SOLUTION INTRAVENOUS CONTINUOUS
Status: DISCONTINUED | OUTPATIENT
Start: 2024-07-05 | End: 2024-07-06

## 2024-07-05 RX ORDER — POTASSIUM CHLORIDE 14.9 MG/ML
20 INJECTION INTRAVENOUS ONCE
Status: COMPLETED | OUTPATIENT
Start: 2024-07-05 | End: 2024-07-05

## 2024-07-05 RX ORDER — ONDANSETRON HYDROCHLORIDE 2 MG/ML
4 INJECTION, SOLUTION INTRAVENOUS EVERY 6 HOURS PRN
Status: DISCONTINUED | OUTPATIENT
Start: 2024-07-05 | End: 2024-07-07 | Stop reason: HOSPADM

## 2024-07-05 RX ORDER — DEXTROSE MONOHYDRATE AND SODIUM CHLORIDE 5; .45 G/100ML; G/100ML
150 INJECTION, SOLUTION INTRAVENOUS CONTINUOUS
Status: DISCONTINUED | OUTPATIENT
Start: 2024-07-05 | End: 2024-07-06

## 2024-07-05 RX ORDER — DEXTROSE MONOHYDRATE AND SODIUM CHLORIDE 5; .9 G/100ML; G/100ML
150 INJECTION, SOLUTION INTRAVENOUS CONTINUOUS
Status: DISCONTINUED | OUTPATIENT
Start: 2024-07-05 | End: 2024-07-05

## 2024-07-05 RX ORDER — MAGNESIUM SULFATE HEPTAHYDRATE 40 MG/ML
2 INJECTION, SOLUTION INTRAVENOUS ONCE
Status: COMPLETED | OUTPATIENT
Start: 2024-07-05 | End: 2024-07-05

## 2024-07-05 RX ORDER — DEXTROSE MONOHYDRATE AND SODIUM CHLORIDE 5; .45 G/100ML; G/100ML
150 INJECTION, SOLUTION INTRAVENOUS CONTINUOUS
Status: DISCONTINUED | OUTPATIENT
Start: 2024-07-05 | End: 2024-07-05

## 2024-07-05 RX ORDER — LISINOPRIL 5 MG/1
5 TABLET ORAL DAILY
Status: DISCONTINUED | OUTPATIENT
Start: 2024-07-05 | End: 2024-07-05

## 2024-07-05 SDOH — SOCIAL STABILITY: SOCIAL INSECURITY: WERE YOU ABLE TO COMPLETE ALL THE BEHAVIORAL HEALTH SCREENINGS?: YES

## 2024-07-05 SDOH — SOCIAL STABILITY: SOCIAL INSECURITY: DO YOU FEEL ANYONE HAS EXPLOITED OR TAKEN ADVANTAGE OF YOU FINANCIALLY OR OF YOUR PERSONAL PROPERTY?: NO

## 2024-07-05 SDOH — SOCIAL STABILITY: SOCIAL INSECURITY: ARE THERE ANY APPARENT SIGNS OF INJURIES/BEHAVIORS THAT COULD BE RELATED TO ABUSE/NEGLECT?: NO

## 2024-07-05 SDOH — SOCIAL STABILITY: SOCIAL INSECURITY: DO YOU FEEL UNSAFE GOING BACK TO THE PLACE WHERE YOU ARE LIVING?: NO

## 2024-07-05 SDOH — SOCIAL STABILITY: SOCIAL INSECURITY: ABUSE: ADULT

## 2024-07-05 SDOH — SOCIAL STABILITY: SOCIAL INSECURITY: HAS ANYONE EVER THREATENED TO HURT YOUR FAMILY OR YOUR PETS?: NO

## 2024-07-05 SDOH — SOCIAL STABILITY: SOCIAL INSECURITY: ARE YOU OR HAVE YOU BEEN THREATENED OR ABUSED PHYSICALLY, EMOTIONALLY, OR SEXUALLY BY ANYONE?: NO

## 2024-07-05 SDOH — SOCIAL STABILITY: SOCIAL INSECURITY: DOES ANYONE TRY TO KEEP YOU FROM HAVING/CONTACTING OTHER FRIENDS OR DOING THINGS OUTSIDE YOUR HOME?: NO

## 2024-07-05 SDOH — SOCIAL STABILITY: SOCIAL INSECURITY: HAVE YOU HAD THOUGHTS OF HARMING ANYONE ELSE?: NO

## 2024-07-05 ASSESSMENT — COGNITIVE AND FUNCTIONAL STATUS - GENERAL
DAILY ACTIVITIY SCORE: 24
MOBILITY SCORE: 24
PATIENT BASELINE BEDBOUND: NO
DAILY ACTIVITIY SCORE: 24
MOBILITY SCORE: 24

## 2024-07-05 ASSESSMENT — ENCOUNTER SYMPTOMS
WOUND: 0
TREMORS: 0
VOMITING: 0
FATIGUE: 1
BACK PAIN: 0
FEVER: 0
DIARRHEA: 0
NAUSEA: 1
HEADACHES: 0
CONSTIPATION: 0
PALPITATIONS: 0
COUGH: 0
HEMATURIA: 0
DIZZINESS: 1
WEAKNESS: 1
WHEEZING: 0
CHILLS: 0
ABDOMINAL PAIN: 1
SHORTNESS OF BREATH: 0
CHEST TIGHTNESS: 0
FLANK PAIN: 0
JOINT SWELLING: 0

## 2024-07-05 ASSESSMENT — ACTIVITIES OF DAILY LIVING (ADL)
FEEDING YOURSELF: INDEPENDENT
PATIENT'S MEMORY ADEQUATE TO SAFELY COMPLETE DAILY ACTIVITIES?: YES
DRESSING YOURSELF: INDEPENDENT
LACK_OF_TRANSPORTATION: NO
HEARING - RIGHT EAR: FUNCTIONAL
WALKS IN HOME: INDEPENDENT
JUDGMENT_ADEQUATE_SAFELY_COMPLETE_DAILY_ACTIVITIES: YES
BATHING: INDEPENDENT
TOILETING: INDEPENDENT
GROOMING: INDEPENDENT
HEARING - LEFT EAR: FUNCTIONAL
ADEQUATE_TO_COMPLETE_ADL: YES

## 2024-07-05 ASSESSMENT — LIFESTYLE VARIABLES
HOW OFTEN DO YOU HAVE 6 OR MORE DRINKS ON ONE OCCASION: NEVER
HAVE YOU EVER FELT YOU SHOULD CUT DOWN ON YOUR DRINKING: NO
AUDIT-C TOTAL SCORE: 0
AUDIT-C TOTAL SCORE: 0
HOW MANY STANDARD DRINKS CONTAINING ALCOHOL DO YOU HAVE ON A TYPICAL DAY: PATIENT DOES NOT DRINK
EVER FELT BAD OR GUILTY ABOUT YOUR DRINKING: NO
HAVE PEOPLE ANNOYED YOU BY CRITICIZING YOUR DRINKING: NO
TOTAL SCORE: 0
EVER HAD A DRINK FIRST THING IN THE MORNING TO STEADY YOUR NERVES TO GET RID OF A HANGOVER: NO
SKIP TO QUESTIONS 9-10: 1
HOW OFTEN DO YOU HAVE A DRINK CONTAINING ALCOHOL: NEVER

## 2024-07-05 ASSESSMENT — PAIN - FUNCTIONAL ASSESSMENT
PAIN_FUNCTIONAL_ASSESSMENT: 0-10

## 2024-07-05 ASSESSMENT — PAIN DESCRIPTION - LOCATION: LOCATION: BACK

## 2024-07-05 ASSESSMENT — COLUMBIA-SUICIDE SEVERITY RATING SCALE - C-SSRS
2. HAVE YOU ACTUALLY HAD ANY THOUGHTS OF KILLING YOURSELF?: NO
6. HAVE YOU EVER DONE ANYTHING, STARTED TO DO ANYTHING, OR PREPARED TO DO ANYTHING TO END YOUR LIFE?: NO
1. IN THE PAST MONTH, HAVE YOU WISHED YOU WERE DEAD OR WISHED YOU COULD GO TO SLEEP AND NOT WAKE UP?: NO

## 2024-07-05 ASSESSMENT — PAIN SCALES - GENERAL
PAINLEVEL_OUTOF10: 0 - NO PAIN
PAINLEVEL_OUTOF10: 4

## 2024-07-05 ASSESSMENT — PAIN DESCRIPTION - PAIN TYPE: TYPE: ACUTE PAIN

## 2024-07-05 ASSESSMENT — PAIN DESCRIPTION - ORIENTATION: ORIENTATION: LOWER

## 2024-07-05 NOTE — H&P
[facility] - GENERAL MEDICINE HISTORY AND PHYSICAL    History Obtained From: Patient    History Of Present Illness:  Ed Sanon is a 33 y.o. male with PMHx s/f IDDM I with poor insulin compliance (initiated on insulin pump in December), anxiety, depression presenting with fatigue, generalized weakness for a week. He notes of nausea and dizziness, states the blood sugar has been measuring from 350-450s generally and 250s in the morning for a month. He is unsure of how to use his insulin pump, does not have sensors and uses the pump preset setting. He reports of basal rate of 1.33 in AM, and 1.00 at night, and is unsure of bolus rates/amounts. He has abdominal cramping and discomfort in his RLQ and reports that it's baseline for him with his DKA episodes. He denies chest pain, shortness of breath, fever or chills, syncope, leg swelling. He has had multiple hospitalization for DKA; last one was on 3/19/24.    ED Course (Summary):   Vitals on presentation: 98.1 F, 66 bpm, 16 RR, 124/78, 100% on RA  Labs: Chemistries-glucose 344, sodium 135, K4.3, bicarb 13, anion gap 22  Beta hydroxybutyrate 7.90  CBC within normal limits  VBG-pH 7.20, pCO2 37, pO2 30, HCO3 14.5  Imaging: None  Interventions: NS 1000 mL, LR 1000 mL, D10W infusion, D5 percent and 0.9% sodium chloride infusion, admission to ICU for further management    ED Course (From Provider):  Diagnoses as of 07/05/24 1921   Diabetic ketoacidosis without coma associated with type 1 diabetes mellitus (Multi)     Relevant Results  Results for orders placed or performed during the hospital encounter of 07/05/24 (from the past 24 hour(s))   POCT GLUCOSE   Result Value Ref Range    POCT Glucose 333 (H) 74 - 99 mg/dL   CBC and Auto Differential   Result Value Ref Range    WBC 5.7 4.4 - 11.3 x10*3/uL    nRBC 0.0 0.0 - 0.0 /100 WBCs    RBC 5.79 4.50 - 5.90 x10*6/uL    Hemoglobin 18.3 (H) 13.5 - 17.5 g/dL    Hematocrit 51.7 41.0 - 52.0 %    MCV 89 80 - 100  fL    MCH 31.6 26.0 - 34.0 pg    MCHC 35.4 32.0 - 36.0 g/dL    RDW 13.2 11.5 - 14.5 %    Platelets 169 150 - 450 x10*3/uL    Neutrophils % 59.2 40.0 - 80.0 %    Immature Granulocytes %, Automated 0.5 0.0 - 0.9 %    Lymphocytes % 26.4 13.0 - 44.0 %    Monocytes % 12.4 2.0 - 10.0 %    Eosinophils % 0.4 0.0 - 6.0 %    Basophils % 1.1 0.0 - 2.0 %    Neutrophils Absolute 3.35 1.20 - 7.70 x10*3/uL    Immature Granulocytes Absolute, Automated 0.03 0.00 - 0.70 x10*3/uL    Lymphocytes Absolute 1.49 1.20 - 4.80 x10*3/uL    Monocytes Absolute 0.70 0.10 - 1.00 x10*3/uL    Eosinophils Absolute 0.02 0.00 - 0.70 x10*3/uL    Basophils Absolute 0.06 0.00 - 0.10 x10*3/uL   Comprehensive metabolic panel   Result Value Ref Range    Glucose 344 (H) 74 - 99 mg/dL    Sodium 135 (L) 136 - 145 mmol/L    Potassium 4.3 3.5 - 5.3 mmol/L    Chloride 104 98 - 107 mmol/L    Bicarbonate 13 (L) 21 - 32 mmol/L    Anion Gap 22 (H) 10 - 20 mmol/L    Urea Nitrogen 13 6 - 23 mg/dL    Creatinine 1.09 0.50 - 1.30 mg/dL    eGFR >90 >60 mL/min/1.73m*2    Calcium 8.7 8.6 - 10.3 mg/dL    Albumin 4.4 3.4 - 5.0 g/dL    Alkaline Phosphatase 101 33 - 120 U/L    Total Protein 7.2 6.4 - 8.2 g/dL    AST 11 9 - 39 U/L    Bilirubin, Total 0.5 0.0 - 1.2 mg/dL    ALT 15 10 - 52 U/L   Blood Gas Venous   Result Value Ref Range    POCT pH, Venous 7.20 (LL) 7.33 - 7.43 pH    POCT pCO2, Venous 37 (L) 41 - 51 mm Hg    POCT pO2, Venous 30 (L) 35 - 45 mm Hg    POCT SO2, Venous 47 45 - 75 %    POCT Oxy Hemoglobin, Venous 46.5 45.0 - 75.0 %    POCT Base Excess, Venous -12.7 (L) -2.0 - 3.0 mmol/L    POCT HCO3 Calculated, Venous 14.5 (L) 22.0 - 26.0 mmol/L    Patient Temperature 37.0 degrees Celsius    FiO2 21 %   Beta Hydroxybutyrate   Result Value Ref Range    Beta-Hydroxybutyrate 7.90 (H) 0.02 - 0.27 mmol/L   POCT GLUCOSE   Result Value Ref Range    POCT Glucose 283 (H) 74 - 99 mg/dL      No results found.   Scheduled medications:  sodium chloride, 1,000 mL, intravenous,  Once      Continuous medications:  D5 % and 0.9 % sodium chloride, 150 mL/hr  dextrose 10 % in water (D10W), 150 mL/hr  dextrose 10 % in water (D10W), 150 mL/hr  insulin regular, 0-50 Units/hr, Last Rate: 9.8 Units/hr (07/05/24 1804)  sodium chloride, 250 mL/hr      PRN medications:  PRN medications: acetaminophen, dextrose, insulin regular, ondansetron     Past Medical History  He has a past medical history of Abdominal pain (04/21/2023), Anxiety and depression (08/08/2021), Contact with and (suspected) exposure to covid-19 (04/21/2023), COVID-19 virus infection (07/11/2023), Diabetes mellitus (Multi), Diabetic acetonemia (Multi) (11/15/2023), DKA (diabetic ketoacidosis) (Multi) (07/11/2023), DKA, type 1, not at goal (Multi) (04/21/2023), DM2 (diabetes mellitus, type 2) (Multi) (11/15/2023), Elevated blood sugar (11/15/2023), Elevated blood-pressure reading, without diagnosis of hypertension (09/09/2020), Fatigue (07/11/2023), Generalized anxiety disorder (07/11/2023), Microalbuminuria due to type 1 diabetes mellitus (Multi) (07/11/2023), Personal history of COVID-19 (04/02/2023), Poorly controlled diabetes mellitus (Multi) (07/11/2023), Type 1 diabetes mellitus (Multi) (07/11/2023), Type 1 diabetes mellitus with hyperglycemia (Multi) (02/25/2019), Vitamin B12 deficiency (07/11/2023), Vitamin D deficiency (07/11/2023), and Vomiting (11/15/2023).    Surgical History  He has no past surgical history on file.     Social History  He reports that he has never smoked. He has never used smokeless tobacco. He reports that he does not drink alcohol and does not use drugs.    Family History  Family History   Problem Relation Name Age of Onset    No Known Problems Mother      No Known Problems Father      Hypertension Other      Coronary artery disease Other      Diabetes Other      Heart failure Other         Allergies  Patient has no known allergies.    Code Status  Full Code     Review of Systems   Constitutional:   Positive for fatigue. Negative for chills and fever.   HENT:  Negative for congestion.    Respiratory:  Negative for cough, chest tightness, shortness of breath and wheezing.    Cardiovascular:  Negative for chest pain, palpitations and leg swelling.   Gastrointestinal:  Positive for abdominal pain and nausea. Negative for constipation, diarrhea and vomiting.   Genitourinary:  Negative for flank pain and hematuria.   Musculoskeletal:  Negative for back pain and joint swelling.   Skin:  Negative for rash and wound.   Neurological:  Positive for dizziness and weakness. Negative for tremors, syncope and headaches.       Last Recorded Vitals  /89 (BP Location: Right arm, Patient Position: Lying)   Pulse 104   Temp 36.9 °C (98.5 °F) (Temporal)   Resp 18   Wt 61.4 kg (135 lb 5.8 oz)   SpO2 100%      Physical Exam:  Vital signs and nursing notes reviewed.   Constitutional: Pleasant and cooperative. Laying in bed in no acute distress. Conversant.   Skin: Warm and dry; no obvious lesions, rashes, pallor, or jaundice. Good turgor.   Eyes: EOMI. Anicteric sclera.   ENT: Mucous membranes moist; no obvious injury or deformity appreciated.   Head and Neck: Normocephalic, atraumatic. ROM preserved. Trachea midline. No appreciable JVD.   Respiratory: Nonlabored on RA. Lungs clear to auscultation bilaterally without obvious adventitious sounds. Chest rise is equal.  Cardiovascular: RRR. No gross murmur, gallop, or rub. Extremities are warm and well-perfused with good capillary refill (< 3 seconds). No chest wall tenderness.   GI: Abdomen soft, tender to deep palpation in RLQ, nondistended. No obvious organomegaly appreciated. Bowel sounds are present and normoactive.  : No CVA tenderness.   MSK: No gross abnormalities appreciated. No limitations to AROM/PROM appreciated.   Extremities: No cyanosis, edema, or clubbing evident. Neurovascularly intact.   Neuro: A&Ox3. CN 2-12 grossly intact. Able to respond to questions  appropriately and clearly. No acute focal neurologic deficits appreciated.  Psych: Appropriate mood and behavior.    Assessment/Plan   Principal Problem:    Diabetic ketoacidosis without coma associated with type 1 diabetes mellitus (Multi)  Active Problems:    Metabolic acidosis due to diabetes mellitus (Multi)    DKA without coma associated with type 1 diabetes mellitus  Metabolic acidosis  -Evidenced by the following lab values: glucose 344, bicarb 13, anion gap 22  -Continue with insulin gtt and Q1H glucose checks while on gtt.   -Maintain NPO status while on gtt  -Frequent VBG/BMP/Mag/Phos   -Continue aggressive fluid resuscitation as per DKA protocol   -Last A1c: 12.4 (2/23/24) --> update in AM as appropriate   -Critical care and endocrinology consultations appreciated     History of HTN  -no longer on meds  -monitor and adjust as necessary    Diet: N.p.o.  DVT Prophylaxis: Not needed due to low risk score  Code Status: Full code     Art Diego PA-C    Dragon dictation software was used to dictate this note and thus there may be minor errors in translation/transcription including garbled speech or misspellings. Please contact for clarification if needed.

## 2024-07-05 NOTE — ED TRIAGE NOTES
Pt presents with BGL at home of 349 and concern for DKA. Has been in DKA in the past. C/o lower back pain. Pt would also like his ears checked out as his daughter put sand in them while he was sleeping. Alert and oriented x's 4.

## 2024-07-05 NOTE — ED NOTES
Pt arrives to ED via private vehicle from home    Code Status:  Full Code    HPI     Chief Complaint   Patient presents with    Hyperglycemia     349 at home. Concern for DKA       BP (!) 137/92   Pulse 66   Temp 36.7 °C (98.1 °F)   Resp 16   Wt 70.3 kg (155 lb)   SpO2 100%     [place] Coma Scale Score: 15      LDA:   Peripheral IV 07/05/24 20 G Proximal;Right Forearm (Active)   Placement Date/Time: 07/05/24 1630   Placed by External Staff?: Clinic  Hand Hygiene Completed: Yes  Size (Gauge): 20 G  Orientation: Proximal;Right  Location: Forearm   Number of days: 0       Peripheral IV 07/05/24 20 G Left;Proximal Forearm (Active)   Placement Date/Time: 07/05/24 1804   Placed by External Staff?: Clinic  Hand Hygiene Completed: Yes  Size (Gauge): 20 G  Orientation: Left;Proximal  Location: Forearm   Number of days: 0        BACKGROUND  Past Medical History:   Diagnosis Date    Abdominal pain 04/21/2023    Anxiety and depression 08/08/2021    Contact with and (suspected) exposure to covid-19 04/21/2023    COVID-19 virus infection 07/11/2023    Diabetes mellitus (Multi)     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    DIABETIC KETO ACIDOSIS    DKA (diabetic ketoacidosis) (Multi) 07/11/2023    DKA, type 1, not at goal (Multi) 04/21/2023    DM2 (diabetes mellitus, type 2) (Multi) 11/15/2023    DIABETES    Elevated blood sugar 11/15/2023    ELEVATED BLOOD SUGAR/ 375 LAST CHECK    Elevated blood-pressure reading, without diagnosis of hypertension 09/09/2020    Elevated blood pressure reading    Fatigue 07/11/2023    Generalized anxiety disorder 07/11/2023    Microalbuminuria due to type 1 diabetes mellitus (Multi) 07/11/2023    Personal history of COVID-19 04/02/2023    Poorly controlled diabetes mellitus (Multi) 07/11/2023    Type 1 diabetes mellitus (Multi) 07/11/2023    Type 1 diabetes mellitus with hyperglycemia (Multi) 02/25/2019    Vitamin B12 deficiency 07/11/2023    Vitamin D deficiency 07/11/2023    Vomiting  "11/15/2023    VOMITING HEADACHE BACK ACHE     No past surgical history on file.  No current facility-administered medications on file prior to encounter.     Current Outpatient Medications on File Prior to Encounter   Medication Sig Dispense Refill    blood sugar diagnostic strip Use 2-3 times per day      Guardian 4 Glucose Sensor device Change insulin sensor every 7 days as directed, linked to Minimed insulin pump, check with endocrinology for updated regimen 13 each 2    insulin lispro (HumaLOG) 100 unit/mL injection USE VIA INSULIN PUMP, USING UP  UNITS PER DAY      lancets misc Inject 1 Units under the skin 2 times a day. Sliding scale      lisinopril 5 mg tablet Take 1 tablet (5 mg) by mouth once daily.      pen needle, diabetic 32 gauge x 5/32\" needle Use as instructed 4 times daily          ASSESSMENT  Diagnoses as of 07/05/24 1818   Diabetic ketoacidosis without coma associated with type 1 diabetes mellitus (Multi)       Medications Currently Running:  D5 % and 0.9 % sodium chloride, 150 mL/hr  dextrose 10 % in water (D10W), 150 mL/hr  dextrose 10 % in water (D10W), 150 mL/hr  insulin regular, 0-50 Units/hr, Last Rate: 9.8 Units/hr (07/05/24 1804)  sodium chloride, 250 mL/hr         Medications Given:  ED Medication Administration from 07/05/2024 1606 to 07/05/2024 1818         Date/Time Order Dose Route Action Action by     07/05/2024 1639 EDT sodium chloride 0.9 % bolus 1,000 mL 1,000 mL intravenous New Bag Brendon, J     07/05/2024 1739 EDT sodium chloride 0.9 % bolus 1,000 mL 0 mL intravenous Stopped Brendon, J     07/05/2024 1755 EDT lactated Ringer's bolus 1,000 mL 1,000 mL intravenous New Bag Brendon, J     07/05/2024 1804 EDT insulin regular 100 unit/100 mL (1 unit/mL) in 0.9 % NaCl infusion 9.8 Units/hr intravenous New Bag Brendon, J                 RESULTS    Imaging:  No orders to display      }  Labs ::99  Abnormal Labs Reviewed   CBC WITH AUTO DIFFERENTIAL - Abnormal; Notable for the following " components:       Result Value    Hemoglobin 18.3 (*)     All other components within normal limits   COMPREHENSIVE METABOLIC PANEL - Abnormal; Notable for the following components:    Glucose 344 (*)     Sodium 135 (*)     Bicarbonate 13 (*)     Anion Gap 22 (*)     All other components within normal limits   BLOOD GAS VENOUS - Abnormal; Notable for the following components:    POCT pH, Venous 7.20 (*)     POCT pCO2, Venous 37 (*)     POCT pO2, Venous 30 (*)     POCT Base Excess, Venous -12.7 (*)     POCT HCO3 Calculated, Venous 14.5 (*)     All other components within normal limits   BETA HYDROXYBUTYRATE - Abnormal; Notable for the following components:    Beta-Hydroxybutyrate 7.90 (*)     All other components within normal limits    Narrative:     The beta-hydroxybutyrate test performance characteristics have been validated by  [facility]. This test has not been approved by the FDA; however,such approval is not necessary.     POCT GLUCOSE - Abnormal; Notable for the following components:    POCT Glucose 333 (*)     All other components within normal limits   POCT GLUCOSE - Abnormal; Notable for the following components:    POCT Glucose 283 (*)     All other components within normal limits                 Anita Olivas RN  07/05/24 7831

## 2024-07-05 NOTE — CONSULTS
Critical Care Medicine Consult      Reason For Consult  DKA    History Of Present Illness  Ed Saonn is a 33 y.o. male with past medical history of IDDM 1 with poor insulin compliance (initiated on insulin pump in December 2023), anxiety, and depression presented to the ED with fatigue, generalized weakness, and elevated blood glucose levels x 1 week.  Upon ED workup, temperature 36.7 °C, heart rate 66, respiratory rate 16, blood pressure 124/78, and SpO2 100% on room air.  ED labs revealed blood glucose level 344, sodium 135, bicarbonate 13, anion gap 22, beta hydroxybutyrate 7.90, hemoglobin 18.3, hematocrit 51.7, venous pH 7.20, pCO2 37, and HCO3 14.5.  ED interventions included 2 L of IV fluid boluses and initiation of insulin infusion per DKA protocol.    Patient seen and examined in ICU bed 13.  Patient alert and orient x 4 and in no apparent distress.  He reports coming to the ER due to elevated blood glucose levels in the 350s-450s despite insulin pump, weakness, and fatigue x 1 week.  Patient stated he is unsure how to use his insulin pump, does not have sensors, and uses the pumps preset settings.  He reports having nausea, right lower quadrant abdominal cramping and discomfort which she reports as baseline for him when he is in DKA.  He denies any chest pain, shortness of breath, fever, chills, rigors, dizziness, vision changes, changes in bowel or bladder habits, and leg swelling.  He denies any sick contacts.  He reports he has been eating and drinking and his appetite has been the same as his normal baseline.      Past Medical History:   Diagnosis Date    Abdominal pain 04/21/2023    Anxiety and depression 08/08/2021    Contact with and (suspected) exposure to covid-19 04/21/2023    COVID-19 virus infection 07/11/2023    Diabetes mellitus (Multi)     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    DIABETIC KETO ACIDOSIS    DKA (diabetic ketoacidosis) (Multi) 07/11/2023    DKA, type 1, not at goal  "(Multi) 04/21/2023    DM2 (diabetes mellitus, type 2) (Multi) 11/15/2023    DIABETES    Elevated blood sugar 11/15/2023    ELEVATED BLOOD SUGAR/ 375 LAST CHECK    Elevated blood-pressure reading, without diagnosis of hypertension 09/09/2020    Elevated blood pressure reading    Fatigue 07/11/2023    Generalized anxiety disorder 07/11/2023    Microalbuminuria due to type 1 diabetes mellitus (Multi) 07/11/2023    Personal history of COVID-19 04/02/2023    Poorly controlled diabetes mellitus (Multi) 07/11/2023    Type 1 diabetes mellitus (Multi) 07/11/2023    Type 1 diabetes mellitus with hyperglycemia (Multi) 02/25/2019    Vitamin B12 deficiency 07/11/2023    Vitamin D deficiency 07/11/2023    Vomiting 11/15/2023    VOMITING HEADACHE BACK ACHE     No past surgical history on file.  Medications Prior to Admission   Medication Sig Dispense Refill Last Dose    blood sugar diagnostic strip Use 2-3 times per day   7/5/2024    Guardian 4 Glucose Sensor device Change insulin sensor every 7 days as directed, linked to Minimed insulin pump, check with endocrinology for updated regimen 13 each 2 7/5/2024    insulin lispro (HumaLOG) 100 unit/mL injection USE VIA INSULIN PUMP, USING UP  UNITS PER DAY   7/5/2024    lancets misc Inject 1 Units under the skin 2 times a day. Sliding scale   7/5/2024    lisinopril 5 mg tablet Take 1 tablet (5 mg) by mouth once daily.   7/5/2024    pen needle, diabetic 32 gauge x 5/32\" needle Use as instructed 4 times daily   7/5/2024     Patient has no known allergies.  Social History     Tobacco Use    Smoking status: Never    Smokeless tobacco: Never   Vaping Use    Vaping status: Former   Substance Use Topics    Alcohol use: Never    Drug use: Never     Family History   Problem Relation Name Age of Onset    No Known Problems Mother      No Known Problems Father      Hypertension Other      Coronary artery disease Other      Diabetes Other      Heart failure Other         Scheduled " Medications:   sodium chloride, 1,000 mL, intravenous, Once         Continuous Medications:   D5 % and 0.9 % sodium chloride, 150 mL/hr  dextrose 10 % in water (D10W), 150 mL/hr  dextrose 10 % in water (D10W), 150 mL/hr  insulin regular, 0-50 Units/hr, Last Rate: 9.8 Units/hr (07/05/24 1804)  sodium chloride, 250 mL/hr         PRN Medications:   PRN medications: acetaminophen, dextrose, insulin regular, ondansetron    Review of Systems:  Review of Systems   Constitutional:  Positive for fatigue.   Neurological:  Positive for weakness.        Objective   Vitals:  Most Recent:  Vitals:    07/05/24 1848   BP: 137/89   Pulse: 104   Resp: 18   Temp: 36.9 °C (98.5 °F)   SpO2:        24hr Min/Max:  Temp  Min: 36.7 °C (98.1 °F)  Max: 36.9 °C (98.5 °F)  Pulse  Min: 66  Max: 104  BP  Min: 124/78  Max: 137/89  Resp  Min: 16  Max: 18  SpO2  Min: 98 %  Max: 100 %    LDA:          Vent settings:       Hemodynamic parameters for last 24 hours:       No intake or output data in the 24 hours ending 07/05/24 1942        Physical exam:    Physical Exam  Constitutional:       General: He is not in acute distress.     Appearance: Normal appearance.   HENT:      Head: Normocephalic.      Comments: Acetone breath     Nose: Nose normal.      Mouth/Throat:      Mouth: Mucous membranes are moist.   Eyes:      Extraocular Movements: Extraocular movements intact.      Pupils: Pupils are equal, round, and reactive to light.   Cardiovascular:      Rate and Rhythm: Regular rhythm. Tachycardia present.      Pulses: Normal pulses.      Heart sounds: Normal heart sounds. No murmur heard.  Pulmonary:      Effort: Pulmonary effort is normal. No respiratory distress.      Breath sounds: Normal breath sounds.   Abdominal:      General: Bowel sounds are normal. There is no distension.      Palpations: Abdomen is soft.      Tenderness: There is abdominal tenderness in the right lower quadrant.   Musculoskeletal:         General: Normal range of motion.       Cervical back: Normal range of motion.   Skin:     General: Skin is warm and dry.      Capillary Refill: Capillary refill takes less than 2 seconds.   Neurological:      General: No focal deficit present.      Mental Status: He is alert and oriented to person, place, and time. Mental status is at baseline.      Cranial Nerves: Cranial nerves 2-12 are intact.      Sensory: Sensation is intact.      Motor: Motor function is intact.   Psychiatric:         Mood and Affect: Mood normal.         Behavior: Behavior normal.         Thought Content: Thought content normal.         Judgment: Judgment normal.          Lab/Radiology/Diagnostic Review:  Results for orders placed or performed during the hospital encounter of 07/05/24 (from the past 24 hour(s))   POCT GLUCOSE   Result Value Ref Range    POCT Glucose 333 (H) 74 - 99 mg/dL   CBC and Auto Differential   Result Value Ref Range    WBC 5.7 4.4 - 11.3 x10*3/uL    nRBC 0.0 0.0 - 0.0 /100 WBCs    RBC 5.79 4.50 - 5.90 x10*6/uL    Hemoglobin 18.3 (H) 13.5 - 17.5 g/dL    Hematocrit 51.7 41.0 - 52.0 %    MCV 89 80 - 100 fL    MCH 31.6 26.0 - 34.0 pg    MCHC 35.4 32.0 - 36.0 g/dL    RDW 13.2 11.5 - 14.5 %    Platelets 169 150 - 450 x10*3/uL    Neutrophils % 59.2 40.0 - 80.0 %    Immature Granulocytes %, Automated 0.5 0.0 - 0.9 %    Lymphocytes % 26.4 13.0 - 44.0 %    Monocytes % 12.4 2.0 - 10.0 %    Eosinophils % 0.4 0.0 - 6.0 %    Basophils % 1.1 0.0 - 2.0 %    Neutrophils Absolute 3.35 1.20 - 7.70 x10*3/uL    Immature Granulocytes Absolute, Automated 0.03 0.00 - 0.70 x10*3/uL    Lymphocytes Absolute 1.49 1.20 - 4.80 x10*3/uL    Monocytes Absolute 0.70 0.10 - 1.00 x10*3/uL    Eosinophils Absolute 0.02 0.00 - 0.70 x10*3/uL    Basophils Absolute 0.06 0.00 - 0.10 x10*3/uL   Comprehensive metabolic panel   Result Value Ref Range    Glucose 344 (H) 74 - 99 mg/dL    Sodium 135 (L) 136 - 145 mmol/L    Potassium 4.3 3.5 - 5.3 mmol/L    Chloride 104 98 - 107 mmol/L    Bicarbonate 13  (L) 21 - 32 mmol/L    Anion Gap 22 (H) 10 - 20 mmol/L    Urea Nitrogen 13 6 - 23 mg/dL    Creatinine 1.09 0.50 - 1.30 mg/dL    eGFR >90 >60 mL/min/1.73m*2    Calcium 8.7 8.6 - 10.3 mg/dL    Albumin 4.4 3.4 - 5.0 g/dL    Alkaline Phosphatase 101 33 - 120 U/L    Total Protein 7.2 6.4 - 8.2 g/dL    AST 11 9 - 39 U/L    Bilirubin, Total 0.5 0.0 - 1.2 mg/dL    ALT 15 10 - 52 U/L   Blood Gas Venous   Result Value Ref Range    POCT pH, Venous 7.20 (LL) 7.33 - 7.43 pH    POCT pCO2, Venous 37 (L) 41 - 51 mm Hg    POCT pO2, Venous 30 (L) 35 - 45 mm Hg    POCT SO2, Venous 47 45 - 75 %    POCT Oxy Hemoglobin, Venous 46.5 45.0 - 75.0 %    POCT Base Excess, Venous -12.7 (L) -2.0 - 3.0 mmol/L    POCT HCO3 Calculated, Venous 14.5 (L) 22.0 - 26.0 mmol/L    Patient Temperature 37.0 degrees Celsius    FiO2 21 %   Beta Hydroxybutyrate   Result Value Ref Range    Beta-Hydroxybutyrate 7.90 (H) 0.02 - 0.27 mmol/L   POCT GLUCOSE   Result Value Ref Range    POCT Glucose 283 (H) 74 - 99 mg/dL     No results found.    Assessment/Plan   Principal Problem:    Diabetic ketoacidosis without coma associated with type 1 diabetes mellitus (Multi)  Active Problems:    Metabolic acidosis due to diabetes mellitus (Multi)    DKA with type 1 diabetes mellitus  -Presented with complaints of generalized weakness, fatigue, and blood sugars in the 350s to 450s despite insulin pump  -Blood glucose in   -Beta hydroxybutyrate 7.90  -Venous blood gas on presentation with pH is 7.20, pCO2 37, HCO3 14.5, and base excess -12.7  -Bicarbonate 13  -Anion gap 22  -Received 2 L of IV fluid boluses in ER  -1 L IV fluid bolus of normal saline x 1  -N.p.o. while on insulin infusion  -Insulin infusion per DKA protocol  -IV fluids per DKA protocol  -POCT blood glucose checks every hour on insulin drip  -Renal function panel, VBG, and magnesium levels every 4 hours while on insulin infusion  -Replete electrolytes as needed  -Check hemoglobin A1c in morning  -Endocrine  consult, input appreciated  -Urinalysis with reflex microscopic pending    High anion gap metabolic acidosis  -Venous blood gas on presentation pH 7.20, pCO2 37, HCO3 14.5, base excess -12.7  -Bicarbonate 13  -Anion gap 22  -Received 2 L of IV fluid boluses in ER  -1 L IV fluid bolus of normal saline x 1  -IV fluids per DKA protocol  -Insulin infusion per DKA protocol  -VBG every 4 hours  -Renal function panel every 4 hours  -Replete electrolytes as needed      I spent 40 minutes of cumulative critical care time with the patient.  Greater than 50% of that time was spent in the direct collaboration and or coordination of care of the patient.     Dragon dictation software was used to dictate this note and thus there may be minor errors in translation/transcription including garbled speech or misspellings. Please contact for clarification if needed.

## 2024-07-05 NOTE — ED PROVIDER NOTES
Chief Complaint   Patient presents with    Hyperglycemia     349 at home. Concern for DKA       HPI       33 year old male presents to the Emergency Department today complaining of a 1 month history of an elevated blood sugar. Notes that his sugars have been poorly controlled even with the use of his insulin pump. Reports to having nausea associated with the above. Denies any associated fever, chills, headache, neck pain, chest pain, shortness of breath, abdominal pain, vomiting, diarrhea, constipation, or urinary symptoms.       History provided by:  Patient             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    DIABETIC KETO ACIDOSIS    DKA (diabetic ketoacidosis) (Multi) 07/11/2023    DKA, type 1, not at goal (Multi) 04/21/2023    DM2 (diabetes mellitus, type 2) (Multi) 11/15/2023    DIABETES    Elevated blood sugar 11/15/2023    ELEVATED BLOOD SUGAR/ 375 LAST CHECK    Elevated blood-pressure reading, without diagnosis of hypertension 09/09/2020    Elevated blood pressure reading    Fatigue 07/11/2023    Generalized anxiety disorder 07/11/2023    Microalbuminuria due to type 1 diabetes mellitus (Multi) 07/11/2023    Personal history of COVID-19 04/02/2023    Poorly controlled diabetes mellitus (Multi) 07/11/2023    Type 1 diabetes mellitus (Multi) 07/11/2023    Type 1 diabetes mellitus with hyperglycemia (Multi) 02/25/2019    Vitamin B12 deficiency 07/11/2023    Vitamin D deficiency 07/11/2023    Vomiting 11/15/2023    VOMITING HEADACHE BACK ACHE     No past surgical history on file.  Family History   Problem Relation Name Age of Onset    No Known Problems Mother      No Known Problems Father      Hypertension Other      Coronary artery disease Other       Diabetes Other      Heart failure Other       Social History     Tobacco Use    Smoking status: Never    Smokeless tobacco: Never   Vaping Use    Vaping status: Former   Substance Use Topics    Alcohol use: Never    Drug use: Never           Physical Exam  Constitutional:       Appearance: Normal appearance.   HENT:      Head: Normocephalic.      Right Ear: External ear normal.      Left Ear: External ear normal.      Nose: Nose normal.      Mouth/Throat:      Mouth: Mucous membranes are moist.      Pharynx: Oropharynx is clear. No oropharyngeal exudate or posterior oropharyngeal erythema.   Eyes:      Conjunctiva/sclera: Conjunctivae normal.      Pupils: Pupils are equal, round, and reactive to light.   Cardiovascular:      Rate and Rhythm: Normal rate and regular rhythm.      Pulses:           Radial pulses are 3+ on the right side and 3+ on the left side.        Dorsalis pedis pulses are 3+ on the right side and 3+ on the left side.      Heart sounds: Normal heart sounds. No murmur heard.     No friction rub. No gallop.   Pulmonary:      Effort: Pulmonary effort is normal. No respiratory distress.      Breath sounds: Normal breath sounds. No wheezing, rhonchi or rales.   Abdominal:      General: Abdomen is flat. Bowel sounds are normal.      Palpations: Abdomen is soft.      Tenderness: There is no abdominal tenderness. There is no right CVA tenderness, left CVA tenderness, guarding or rebound. Negative signs include Holder's sign and McBurney's sign.   Musculoskeletal:         General: No swelling or deformity.      Cervical back: Full passive range of motion without pain.      Right lower leg: No edema.      Left lower leg: No edema.   Lymphadenopathy:      Cervical: No cervical adenopathy.   Skin:     Capillary Refill: Capillary refill takes less than 2 seconds.      Coloration: Skin is not jaundiced.      Findings: No rash.   Neurological:      General: No focal deficit present.      Mental Status: He is  alert and oriented to person, place, and time. Mental status is at baseline.      Gait: Gait is intact.   Psychiatric:         Mood and Affect: Mood normal.         Behavior: Behavior is cooperative.         Labs Reviewed   CBC WITH AUTO DIFFERENTIAL - Abnormal       Result Value    WBC 5.7      nRBC 0.0      RBC 5.79      Hemoglobin 18.3 (*)     Hematocrit 51.7      MCV 89      MCH 31.6      MCHC 35.4      RDW 13.2      Platelets 169      Neutrophils % 59.2      Immature Granulocytes %, Automated 0.5      Lymphocytes % 26.4      Monocytes % 12.4      Eosinophils % 0.4      Basophils % 1.1      Neutrophils Absolute 3.35      Immature Granulocytes Absolute, Automated 0.03      Lymphocytes Absolute 1.49      Monocytes Absolute 0.70      Eosinophils Absolute 0.02      Basophils Absolute 0.06     COMPREHENSIVE METABOLIC PANEL - Abnormal    Glucose 344 (*)     Sodium 135 (*)     Potassium 4.3      Chloride 104      Bicarbonate 13 (*)     Anion Gap 22 (*)     Urea Nitrogen 13      Creatinine 1.09      eGFR >90      Calcium 8.7      Albumin 4.4      Alkaline Phosphatase 101      Total Protein 7.2      AST 11      Bilirubin, Total 0.5      ALT 15     BLOOD GAS VENOUS - Abnormal    POCT pH, Venous 7.20 (*)     POCT pCO2, Venous 37 (*)     POCT pO2, Venous 30 (*)     POCT SO2, Venous 47      POCT Oxy Hemoglobin, Venous 46.5      POCT Base Excess, Venous -12.7 (*)     POCT HCO3 Calculated, Venous 14.5 (*)     Patient Temperature 37.0      FiO2 21     BETA HYDROXYBUTYRATE - Abnormal    Beta-Hydroxybutyrate 7.90 (*)     Narrative:     The beta-hydroxybutyrate test performance characteristics have been validated by  [facility]. This test has not been approved by the FDA; however,such approval is not necessary.     URINALYSIS WITH REFLEX MICROSCOPIC   OSMOLALITY   POCT GLUCOSE METER   POCT GLUCOSE METER   POCT GLUCOSE METER   POCT GLUCOSE METER   POCT GLUCOSE METER   POCT GLUCOSE METER  "  POCT GLUCOSE METER       No orders to display            ED Course & MDM   Diagnoses as of 07/05/24 1802   Diabetic ketoacidosis without coma associated with type 1 diabetes mellitus (Multi)           Medical Decision Making  Patient was seen and evaluated by Dr. Godinez. Placed on a cardiac monitor which showed normal sinus rhythm without ectopy throughout ED stay. Rhythm strip obtained showed normal sinus rhythm. Impression: No acute pathology. Continuous pulse oximetry monitoring showed no signs of hypoxia. Saline lock was established with labs drawn and results as above. Blood counts, liver function, and kidney function were unremarkable. Bicarbonate is 13 with anion gap of 22, blood sugar of 344, and bet hydroxybutyrate of 7.90. Venous pH is 7.20 with CO2 of 37 and HCO3 of 14.5. Given 1000mL of NS followed by 1000mL of Lactated Ringers over 2 hours. Findings are consistent with DKA. Placed on an insulin drip and had him turn off his insulin pump. Remained hemodynamically stable throughout his ED course. Case was discussed with SHANNON Hoffmann PA-C, who agrees to admit the patient for further evaluation and care. Will be transferred to the ICU in stable condition.     Diagnostic Impression:     1. Acute DKA    2. IV meds and fluids in ED     3. Critical care time 45 minutes.           Your medication list        CONTINUE taking these medications        Instructions Last Dose Given Next Dose Due   Guardian 4 Glucose Sensor device  Generic drug: blood-glucose sensor      Change insulin sensor every 7 days as directed, linked to Minimed insulin pump, check with endocrinology for updated regimen       lancets misc           pen needle, diabetic 32 gauge x 5/32\" needle                  ASK your doctor about these medications        Instructions Last Dose Given Next Dose Due   blood sugar diagnostic strip           insulin lispro 100 unit/mL injection  Commonly known as: HumaLOG           lisinopril 5 mg tablet   "                    Procedure  Critical Care    Performed by: HEATHER Juarez  Authorized by: Americo Godinez MD    Critical care provider statement:     Critical care time (minutes):  45    Critical care time was exclusive of:  Separately billable procedures and treating other patients    Critical care was necessary to treat or prevent imminent or life-threatening deterioration of the following conditions:  Endocrine crisis    Critical care was time spent personally by me on the following activities:  Blood draw for specimens, development of treatment plan with patient or surrogate, discussions with consultants, evaluation of patient's response to treatment, examination of patient, obtaining history from patient or surrogate, review of old charts, re-evaluation of patient's condition, pulse oximetry, ordering and review of laboratory studies and ordering and performing treatments and interventions       HEATHER Juarez  07/05/24 [id number]

## 2024-07-06 LAB
ALBUMIN SERPL BCP-MCNC: 2.2 G/DL (ref 3.4–5)
ALBUMIN SERPL BCP-MCNC: 2.8 G/DL (ref 3.4–5)
ALBUMIN SERPL BCP-MCNC: 2.8 G/DL (ref 3.4–5)
ALBUMIN SERPL BCP-MCNC: 3 G/DL (ref 3.4–5)
ALP SERPL-CCNC: 55 U/L (ref 33–120)
ALT SERPL W P-5'-P-CCNC: 10 U/L (ref 10–52)
ANION GAP SERPL CALC-SCNC: 10 MMOL/L
ANION GAP SERPL CALC-SCNC: 10 MMOL/L (ref 10–20)
ANION GAP SERPL CALC-SCNC: 15 MMOL/L (ref 10–20)
ANION GAP SERPL CALC-SCNC: 15 MMOL/L (ref 10–20)
APPEARANCE UR: CLEAR
AST SERPL W P-5'-P-CCNC: 8 U/L (ref 9–39)
BASE EXCESS BLDV CALC-SCNC: -10 MMOL/L (ref -2–3)
BASE EXCESS BLDV CALC-SCNC: -10.3 MMOL/L (ref -2–3)
BASOPHILS # BLD AUTO: 0.04 X10*3/UL (ref 0–0.1)
BASOPHILS NFR BLD AUTO: 0.6 %
BILIRUB SERPL-MCNC: 0.4 MG/DL (ref 0–1.2)
BILIRUB UR STRIP.AUTO-MCNC: NEGATIVE MG/DL
BODY TEMPERATURE: 37 DEGREES CELSIUS
BODY TEMPERATURE: 37 DEGREES CELSIUS
BUN SERPL-MCNC: 10 MG/DL (ref 6–23)
BUN SERPL-MCNC: 12 MG/DL (ref 6–23)
CA-I BLD-SCNC: 1.12 MMOL/L (ref 1.1–1.33)
CALCIUM SERPL-MCNC: 5.2 MG/DL (ref 8.6–10.3)
CALCIUM SERPL-MCNC: 7.1 MG/DL (ref 8.6–10.3)
CALCIUM SERPL-MCNC: 7.1 MG/DL (ref 8.6–10.3)
CALCIUM SERPL-MCNC: 7.2 MG/DL (ref 8.6–10.3)
CHLORIDE SERPL-SCNC: 109 MMOL/L (ref 98–107)
CHLORIDE SERPL-SCNC: 109 MMOL/L (ref 98–107)
CHLORIDE SERPL-SCNC: 112 MMOL/L (ref 98–107)
CHLORIDE SERPL-SCNC: 120 MMOL/L (ref 98–107)
CO2 SERPL-SCNC: 14 MMOL/L (ref 21–32)
CO2 SERPL-SCNC: 16 MMOL/L (ref 21–32)
CO2 SERPL-SCNC: 16 MMOL/L (ref 21–32)
CO2 SERPL-SCNC: 19 MMOL/L (ref 21–32)
COLOR UR: ABNORMAL
CREAT SERPL-MCNC: 0.48 MG/DL (ref 0.5–1.3)
CREAT SERPL-MCNC: 0.73 MG/DL (ref 0.5–1.3)
CREAT SERPL-MCNC: 0.77 MG/DL (ref 0.5–1.3)
CREAT SERPL-MCNC: 0.77 MG/DL (ref 0.5–1.3)
EGFRCR SERPLBLD CKD-EPI 2021: >90 ML/MIN/1.73M*2
EOSINOPHIL # BLD AUTO: 0.06 X10*3/UL (ref 0–0.7)
EOSINOPHIL NFR BLD AUTO: 0.9 %
ERYTHROCYTE [DISTWIDTH] IN BLOOD BY AUTOMATED COUNT: 13.1 % (ref 11.5–14.5)
EST. AVERAGE GLUCOSE BLD GHB EST-MCNC: 335 MG/DL
GLUCOSE BLD MANUAL STRIP-MCNC: 171 MG/DL (ref 74–99)
GLUCOSE BLD MANUAL STRIP-MCNC: 175 MG/DL (ref 74–99)
GLUCOSE BLD MANUAL STRIP-MCNC: 231 MG/DL (ref 74–99)
GLUCOSE BLD MANUAL STRIP-MCNC: 282 MG/DL (ref 74–99)
GLUCOSE BLD MANUAL STRIP-MCNC: 317 MG/DL (ref 74–99)
GLUCOSE BLD MANUAL STRIP-MCNC: 323 MG/DL (ref 74–99)
GLUCOSE BLD MANUAL STRIP-MCNC: 330 MG/DL (ref 74–99)
GLUCOSE BLD MANUAL STRIP-MCNC: 374 MG/DL (ref 74–99)
GLUCOSE BLD MANUAL STRIP-MCNC: 389 MG/DL (ref 74–99)
GLUCOSE SERPL-MCNC: 146 MG/DL (ref 74–99)
GLUCOSE SERPL-MCNC: 176 MG/DL (ref 74–99)
GLUCOSE SERPL-MCNC: 291 MG/DL (ref 74–99)
GLUCOSE SERPL-MCNC: 291 MG/DL (ref 74–99)
GLUCOSE UR STRIP.AUTO-MCNC: ABNORMAL MG/DL
HBA1C MFR BLD: 13.3 %
HCO3 BLDV-SCNC: 15.4 MMOL/L (ref 22–26)
HCO3 BLDV-SCNC: 15.6 MMOL/L (ref 22–26)
HCT VFR BLD AUTO: 38.8 % (ref 41–52)
HGB BLD-MCNC: 13.9 G/DL (ref 13.5–17.5)
IMM GRANULOCYTES # BLD AUTO: 0.01 X10*3/UL (ref 0–0.7)
IMM GRANULOCYTES NFR BLD AUTO: 0.2 % (ref 0–0.9)
INHALED O2 CONCENTRATION: 21 %
INHALED O2 CONCENTRATION: 21 %
KETONES UR STRIP.AUTO-MCNC: ABNORMAL MG/DL
LEUKOCYTE ESTERASE UR QL STRIP.AUTO: NEGATIVE
LYMPHOCYTES # BLD AUTO: 1.76 X10*3/UL (ref 1.2–4.8)
LYMPHOCYTES NFR BLD AUTO: 26.8 %
MAGNESIUM SERPL-MCNC: 1.46 MG/DL (ref 1.6–2.4)
MAGNESIUM SERPL-MCNC: 1.81 MG/DL (ref 1.6–2.4)
MAGNESIUM SERPL-MCNC: 1.94 MG/DL (ref 1.6–2.4)
MCH RBC QN AUTO: 31.3 PG (ref 26–34)
MCHC RBC AUTO-ENTMCNC: 35.8 G/DL (ref 32–36)
MCV RBC AUTO: 87 FL (ref 80–100)
MONOCYTES # BLD AUTO: 0.82 X10*3/UL (ref 0.1–1)
MONOCYTES NFR BLD AUTO: 12.5 %
MUCOUS THREADS #/AREA URNS AUTO: NORMAL /LPF
NEUTROPHILS # BLD AUTO: 3.87 X10*3/UL (ref 1.2–7.7)
NEUTROPHILS NFR BLD AUTO: 59 %
NITRITE UR QL STRIP.AUTO: NEGATIVE
NRBC BLD-RTO: 0 /100 WBCS (ref 0–0)
OSMOLALITY SERPL: 315 MOSM/KG (ref 280–300)
OXYHGB MFR BLDV: 79.6 % (ref 45–75)
OXYHGB MFR BLDV: 91.5 % (ref 45–75)
PCO2 BLDV: 32 MM HG (ref 41–51)
PCO2 BLDV: 34 MM HG (ref 41–51)
PH BLDV: 7.27 PH (ref 7.33–7.43)
PH BLDV: 7.29 PH (ref 7.33–7.43)
PH UR STRIP.AUTO: 6 [PH]
PHOSPHATE SERPL-MCNC: 1.4 MG/DL (ref 2.5–4.9)
PHOSPHATE SERPL-MCNC: 2 MG/DL (ref 2.5–4.9)
PHOSPHATE SERPL-MCNC: 2.9 MG/DL (ref 2.5–4.9)
PLATELET # BLD AUTO: 145 X10*3/UL (ref 150–450)
PO2 BLDV: 47 MM HG (ref 35–45)
PO2 BLDV: 58 MM HG (ref 35–45)
POTASSIUM SERPL-SCNC: 2.6 MMOL/L (ref 3.5–5.3)
POTASSIUM SERPL-SCNC: 3.2 MMOL/L (ref 3.5–5.3)
POTASSIUM SERPL-SCNC: 3.7 MMOL/L (ref 3.5–5.3)
POTASSIUM SERPL-SCNC: 3.7 MMOL/L (ref 3.5–5.3)
PROT SERPL-MCNC: 4.6 G/DL (ref 6.4–8.2)
PROT UR STRIP.AUTO-MCNC: ABNORMAL MG/DL
RBC # BLD AUTO: 4.44 X10*6/UL (ref 4.5–5.9)
RBC # UR STRIP.AUTO: NEGATIVE /UL
RBC #/AREA URNS AUTO: NORMAL /HPF
SAO2 % BLDV: 81 % (ref 45–75)
SAO2 % BLDV: 94 % (ref 45–75)
SODIUM SERPL-SCNC: 136 MMOL/L (ref 136–145)
SODIUM SERPL-SCNC: 136 MMOL/L (ref 136–145)
SODIUM SERPL-SCNC: 138 MMOL/L (ref 136–145)
SODIUM SERPL-SCNC: 141 MMOL/L (ref 136–145)
SP GR UR STRIP.AUTO: 1.03
UROBILINOGEN UR STRIP.AUTO-MCNC: NORMAL MG/DL
WBC # BLD AUTO: 6.6 X10*3/UL (ref 4.4–11.3)
WBC #/AREA URNS AUTO: NORMAL /HPF

## 2024-07-06 PROCEDURE — 84075 ASSAY ALKALINE PHOSPHATASE: CPT

## 2024-07-06 PROCEDURE — 99222 1ST HOSP IP/OBS MODERATE 55: CPT | Performed by: INTERNAL MEDICINE

## 2024-07-06 PROCEDURE — 82805 BLOOD GASES W/O2 SATURATION: CPT

## 2024-07-06 PROCEDURE — 80069 RENAL FUNCTION PANEL: CPT | Mod: CCI

## 2024-07-06 PROCEDURE — 1210000001 HC SEMI-PRIVATE ROOM DAILY

## 2024-07-06 PROCEDURE — 81001 URINALYSIS AUTO W/SCOPE: CPT | Performed by: STUDENT IN AN ORGANIZED HEALTH CARE EDUCATION/TRAINING PROGRAM

## 2024-07-06 PROCEDURE — 99233 SBSQ HOSP IP/OBS HIGH 50: CPT | Performed by: INTERNAL MEDICINE

## 2024-07-06 PROCEDURE — 36415 COLL VENOUS BLD VENIPUNCTURE: CPT

## 2024-07-06 PROCEDURE — 82947 ASSAY GLUCOSE BLOOD QUANT: CPT

## 2024-07-06 PROCEDURE — 85025 COMPLETE CBC W/AUTO DIFF WBC: CPT

## 2024-07-06 PROCEDURE — 2500000002 HC RX 250 W HCPCS SELF ADMINISTERED DRUGS (ALT 637 FOR MEDICARE OP, ALT 636 FOR OP/ED): Performed by: INTERNAL MEDICINE

## 2024-07-06 PROCEDURE — 2500000004 HC RX 250 GENERAL PHARMACY W/ HCPCS (ALT 636 FOR OP/ED)

## 2024-07-06 PROCEDURE — 80053 COMPREHEN METABOLIC PANEL: CPT

## 2024-07-06 PROCEDURE — 80069 RENAL FUNCTION PANEL: CPT

## 2024-07-06 PROCEDURE — 2500000002 HC RX 250 W HCPCS SELF ADMINISTERED DRUGS (ALT 637 FOR MEDICARE OP, ALT 636 FOR OP/ED)

## 2024-07-06 PROCEDURE — 83735 ASSAY OF MAGNESIUM: CPT

## 2024-07-06 PROCEDURE — 84100 ASSAY OF PHOSPHORUS: CPT

## 2024-07-06 PROCEDURE — 82330 ASSAY OF CALCIUM: CPT

## 2024-07-06 PROCEDURE — 83036 HEMOGLOBIN GLYCOSYLATED A1C: CPT

## 2024-07-06 PROCEDURE — 99291 CRITICAL CARE FIRST HOUR: CPT

## 2024-07-06 RX ORDER — DEXTROSE 50 % IN WATER (D50W) INTRAVENOUS SYRINGE
12.5
Status: DISCONTINUED | OUTPATIENT
Start: 2024-07-06 | End: 2024-07-07 | Stop reason: HOSPADM

## 2024-07-06 RX ORDER — POTASSIUM CHLORIDE 14.9 MG/ML
20 INJECTION INTRAVENOUS
Status: DISPENSED | OUTPATIENT
Start: 2024-07-06 | End: 2024-07-06

## 2024-07-06 RX ORDER — SODIUM CHLORIDE 450 MG/100ML
150 INJECTION, SOLUTION INTRAVENOUS CONTINUOUS
Status: DISCONTINUED | OUTPATIENT
Start: 2024-07-06 | End: 2024-07-06

## 2024-07-06 RX ORDER — INSULIN LISPRO 100 [IU]/ML
3 INJECTION, SOLUTION INTRAVENOUS; SUBCUTANEOUS AS NEEDED
Status: DISCONTINUED | OUTPATIENT
Start: 2024-07-06 | End: 2024-07-07 | Stop reason: HOSPADM

## 2024-07-06 RX ORDER — DEXTROSE 50 % IN WATER (D50W) INTRAVENOUS SYRINGE
25
Status: DISCONTINUED | OUTPATIENT
Start: 2024-07-06 | End: 2024-07-07 | Stop reason: HOSPADM

## 2024-07-06 RX ORDER — INSULIN LISPRO 100 [IU]/ML
0-10 INJECTION, SOLUTION INTRAVENOUS; SUBCUTANEOUS
Status: DISCONTINUED | OUTPATIENT
Start: 2024-07-06 | End: 2024-07-07 | Stop reason: HOSPADM

## 2024-07-06 RX ORDER — INSULIN LISPRO 100 [IU]/ML
0-10 INJECTION, SOLUTION INTRAVENOUS; SUBCUTANEOUS
Status: DISCONTINUED | OUTPATIENT
Start: 2024-07-06 | End: 2024-07-06

## 2024-07-06 RX ORDER — DEXTROSE 50 % IN WATER (D50W) INTRAVENOUS SYRINGE
12.5
Status: DISCONTINUED | OUTPATIENT
Start: 2024-07-06 | End: 2024-07-06

## 2024-07-06 RX ORDER — DEXTROSE 50 % IN WATER (D50W) INTRAVENOUS SYRINGE
25
Status: DISCONTINUED | OUTPATIENT
Start: 2024-07-06 | End: 2024-07-06

## 2024-07-06 ASSESSMENT — ENCOUNTER SYMPTOMS
FATIGUE: 1
WEAKNESS: 1
ABDOMINAL PAIN: 1
VOMITING: 0
ENDOCRINE COMMENTS: AS ABOVE

## 2024-07-06 ASSESSMENT — PAIN SCALES - GENERAL
PAINLEVEL_OUTOF10: 0 - NO PAIN

## 2024-07-06 ASSESSMENT — PAIN - FUNCTIONAL ASSESSMENT
PAIN_FUNCTIONAL_ASSESSMENT: 0-10

## 2024-07-06 ASSESSMENT — ACTIVITIES OF DAILY LIVING (ADL): LACK_OF_TRANSPORTATION: NO

## 2024-07-06 NOTE — CARE PLAN
The patient's goals for the shift include        Problem: Pain - Adult  Goal: Verbalizes/displays adequate comfort level or baseline comfort level  7/6/2024 1002 by Liliana Bermudez RN  Outcome: Progressing  7/6/2024 1002 by Liliana Bermudez RN  Outcome: Progressing     Problem: Safety - Adult  Goal: Free from fall injury  7/6/2024 1002 by Liliana Bermudez RN  Outcome: Progressing  7/6/2024 1002 by Liliana Bermudez RN  Outcome: Progressing     Problem: Discharge Planning  Goal: Discharge to home or other facility with appropriate resources  7/6/2024 1002 by Liliana Bermudez RN  Outcome: Progressing  7/6/2024 1002 by Liliana Bermudez RN  Outcome: Progressing     Problem: Chronic Conditions and Co-morbidities  Goal: Patient's chronic conditions and co-morbidity symptoms are monitored and maintained or improved  7/6/2024 1002 by Liliana Bermudez RN  Outcome: Progressing  7/6/2024 1002 by Liliana Bermudez RN  Outcome: Progressing     Problem: Diabetes  Goal: Achieve decreasing blood glucose levels by end of shift  7/6/2024 1002 by Liliana Bermudez RN  Outcome: Progressing  7/6/2024 1002 by Liliana Bermudez RN  Outcome: Progressing  Goal: Increase stability of blood glucose readings by end of shift  7/6/2024 1002 by Liliana Bermudez RN  Outcome: Progressing  7/6/2024 1002 by Liliana Bermudez RN  Outcome: Progressing  Problem: Pain  Goal: Takes deep breaths with improved pain control throughout the shift  7/6/2024 1002 by Liliana Bermudez RN  Outcome: Progressing  7/6/2024 1002 by Liliana Bermudez RN  Outcome: Progressing  Goal: Turns in bed with improved pain control throughout the shift  7/6/2024 1002 by Liliana Bermudez RN  Outcome: Progressing  7/6/2024 1002 by Liliana Bermudez RN  Outcome: Progressing  Goal: Walks with improved pain control throughout the shift  7/6/2024 1002 by Liliana Bermudez RN  Outcome:  Progressing  7/6/2024 1002 by Liliana Bermudez RN  Outcome: Progressing  Goal: Performs ADL's with improved pain control throughout shift  7/6/2024 1002 by Liliana Bermudez RN  Outcome: Progressing  7/6/2024 1002 by Liliana Bermudez RN  Outcome: Progressing  Goal: Participates in PT with improved pain control throughout the shift  7/6/2024 1002 by Liliana Bermudez RN  Outcome: Progressing  7/6/2024 1002 by Liliana Bermudez RN  Outcome: Progressing  Goal: Free from opioid side effects throughout the shift  7/6/2024 1002 by Liliana Bermudez RN  Outcome: Progressing  7/6/2024 1002 by Liliana Bermudez RN  Outcome: Progressing  Goal: Free from acute confusion related to pain meds throughout the shift  7/6/2024 1002 by Liliana Bermudez RN  Outcome: Progressing  7/6/2024 1002 by Liliana Bermudez RN  Outcome: Progressing     Problem: Nutrition  Goal: Less than 5 days NPO/clear liquids  7/6/2024 1002 by Liliana Bermudez RN  Outcome: Progressing  7/6/2024 1002 by Liliana Bermudez RN  Outcome: Progressing  Goal: Oral intake greater than 50%  7/6/2024 1002 by Liliana Bermudez RN  Outcome: Progressing  7/6/2024 1002 by Liliana Bermudez RN  Outcome: Progressing  Goal: Oral intake greater 75%  7/6/2024 1002 by Liliana Bermudez RN  Outcome: Progressing  7/6/2024 1002 by Liliana Bermudez RN  Outcome: Progressing  Goal: Consume prescribed supplement  7/6/2024 1002 by Liliana Bermudez RN  Outcome: Progressing  7/6/2024 1002 by Liliana Bermudez RN  Outcome: Progressing  Goal: Adequate PO fluid intake  7/6/2024 1002 by Liliana Bermudez RN  Outcome: Progressing  7/6/2024 1002 by Liliana Bermudez RN  Outcome: Progressing  Goal: Nutrition support goals are met within 48 hrs  7/6/2024 1002 by Liliana Bermudez RN  Outcome: Progressing  7/6/2024 1002 by Liliana Bermudez, RN  Outcome: Progressing  Goal: Nutrition support  is meeting 75% of nutrient needs  7/6/2024 1002 by Liliana Bermudez RN  Outcome: Progressing  7/6/2024 1002 by Liliana Bermudez RN  Outcome: Progressing  Goal: Tube feed tolerance  7/6/2024 1002 by Liliana Bermudez RN  Outcome: Progressing  7/6/2024 1002 by Liliana Bermudez RN  Outcome: Progressing  Goal: BG  mg/dL  7/6/2024 1002 by Liliana Bermudez, KIMBERLEY  Outcome: Progressing  7/6/2024 1002 by Liliana Bermudez RN  Outcome: Progressing  Goal: Lab values WNL  7/6/2024 1002 by Liliana Bermudez RN  Outcome: Progressing  7/6/2024 1002 by Liliana Bermudez RN  Outcome: Progressing  Goal: Electrolytes WNL  7/6/2024 1002 by Liliana Bermudez, RN  Outcome: Progressing  7/6/2024 1002 by Liliana Bermudez RN  Outcome: Progressing  Goal: Promote healing  7/6/2024 1002 by Liliana Bermudez RN  Outcome: Progressing  7/6/2024 1002 by Liliana Bermudez RN  Outcome: Progressing  Goal: Maintain stable weight  7/6/2024 1002 by Liliana Bermudez, KIMBERLEY  Outcome: Progressing  7/6/2024 1002 by Liliana Bermudez RN  Outcome: Progressing  Goal: Reduce weight from edema/fluid  7/6/2024 1002 by Liliana Bermudez, KIMBERLEY  Outcome: Progressing  7/6/2024 1002 by Liliana Bermudez RN  Outcome: Progressing  Goal: Gradual weight gain  7/6/2024 1002 by Liliana Bermudez, KIMBERLEY  Outcome: Progressing  7/6/2024 1002 by Liliana Bermudez RN  Outcome: Progressing  Goal: Improve ostomy output  7/6/2024 1002 by Liliana Bermudez RN  Outcome: Progressing  7/6/2024 1002 by Liliana Bermudez RN  Outcome: Progressing     Problem: Fall/Injury  Goal: Not fall by end of shift  7/6/2024 1002 by Liliana Bermudez RN  Outcome: Progressing  7/6/2024 1002 by Liliana Bermudez RN  Outcome: Progressing  Goal: Be free from injury by end of the shift  7/6/2024 1002 by Liliana Bermduez RN  Outcome: Progressing  7/6/2024 1002 by Liliana Bermudez,  RN  Outcome: Progressing  Goal: Verbalize understanding of personal risk factors for fall in the hospital  7/6/2024 1002 by Liliana Bermudez RN  Outcome: Progressing  7/6/2024 1002 by Liliana Bermudez RN  Outcome: Progressing  Goal: Verbalize understanding of risk factor reduction measures to prevent injury from fall in the home  7/6/2024 1002 by Liliana Bermudez RN  Outcome: Progressing  7/6/2024 1002 by Liliana Bermudez RN  Outcome: Progressing  Goal: Use assistive devices by end of the shift  7/6/2024 1002 by Liliana Bermudez RN  Outcome: Progressing  7/6/2024 1002 by Liliana Bermudez RN  Outcome: Progressing  Goal: Pace activities to prevent fatigue by end of the shift  7/6/2024 1002 by Liliana Bermudez RN  Outcome: Progressing  7/6/2024 1002 by Liliana Bermudez RN  Outcome: Progressing     Goal: Maintain electrolyte levels within acceptable range throughout shift  7/6/2024 1002 by Liliana Bermudez RN  Outcome: Progressing  7/6/2024 1002 by Liliana Bermudez RN  Outcome: Progressing  Goal: Maintain glucose levels >70mg/dl to <250mg/dl throughout shift  7/6/2024 1002 by Liliana Bermudez RN  Outcome: Progressing  7/6/2024 1002 by Liliana Bermudez RN  Outcome: Progressing  Goal: No changes in neurological exam by end of shift  7/6/2024 1002 by Liliana Bermudez RN  Outcome: Progressing  7/6/2024 1002 by Liliana Bermudez RN  Outcome: Progressing  Goal: Learn about and adhere to nutrition recommendations by end of shift  7/6/2024 1002 by Liliana Bermudez RN  Outcome: Progressing  7/6/2024 1002 by Liliana Bermudez RN  Outcome: Progressing  Goal: Vital signs within normal range for age by end of shift  7/6/2024 1002 by Liliana Bermudez RN  Outcome: Progressing  7/6/2024 1002 by Liliana Bermudez RN  Outcome: Progressing  Goal: Increase self care and/or family involovement by end of shift  7/6/2024 1002 by Liliana  Nikki Bermudez RN  Outcome: Progressing  7/6/2024 1002 by Liliana Bermudez RN  Outcome: Progressing

## 2024-07-06 NOTE — PROGRESS NOTES
07/06/24 1315   Discharge Planning   Living Arrangements Family members   Support Systems Family members   Assistance Needed Independent   Type of Residence Private residence   Home or Post Acute Services None   Patient expects to be discharged to: Home   Does the patient need discharge transport arranged? No   Financial Resource Strain   How hard is it for you to pay for the very basics like food, housing, medical care, and heating? Not hard   Housing Stability   In the last 12 months, was there a time when you were not able to pay the mortgage or rent on time? N   In the last 12 months, was there a time when you did not have a steady place to sleep or slept in a shelter (including now)? N   Transportation Needs   In the past 12 months, has lack of transportation kept you from medical appointments or from getting medications? no   In the past 12 months, has lack of transportation kept you from meetings, work, or from getting things needed for daily living? No     PCP is Henri Brumfield MD. Patient is from home with his aunt and uncle. Patient is independent with ambulation, self care, driving, shopping, and meals. Patient plans to return home with no needs upon discharge. [facility] will continue to follow for needs if they arise.

## 2024-07-06 NOTE — PROGRESS NOTES
Critical Care Daily Progress        Subjective   Patient is a 33 y.o. male admitted on 7/5/2024  4:17 PM with the following indication(s) for ICU care: DKA.     Interval History: Ed Sanon is a 33 y.o. male with past medical history of IDDM 1 with poor insulin compliance (initiated on insulin pump in December 2023), anxiety, and depression presented to the ED with fatigue, generalized weakness, and elevated blood glucose levels x 1 week.  Patient was found to be in DKA and was started on insulin infusion and IV fluid resuscitation per DKA protocol.    7/6/2024: Anion gap closed overnight and patient was bridged off of insulin infusion with 15 units of NPH.  Patient resting in bed with eyes closed.  He has no complaints at this time and states he feels better.  Critical care medicine will sign off and transfer patient out of ICU to general medicine floor.      Scheduled Medications:   insulin lispro, 0-10 Units, subcutaneous, TID  insulin NPH (Isophane), 15 Units, subcutaneous, BID AC         Continuous Medications:   sodium chloride, 150 mL/hr, Last Rate: 150 mL/hr (07/06/24 0531)         PRN Medications:   PRN medications: acetaminophen, dextrose, dextrose, glucagon, glucagon, ondansetron    Objective   Vitals:  Most Recent:  Vitals:    07/06/24 0600   BP: 125/79   Pulse: 96   Resp: 16   Temp:    SpO2: 97%       24hr Min/Max:  Temp  Min: 36.5 °C (97.7 °F)  Max: 36.9 °C (98.5 °F)  Pulse  Min: 66  Max: 106  BP  Min: 116/76  Max: 143/85  Resp  Min: 16  Max: 18  SpO2  Min: 96 %  Max: 100 %    I/O:    Intake/Output Summary (Last 24 hours) at 7/6/2024 0644  Last data filed at 7/6/2024 0531  Gross per 24 hour   Intake 1337.5 ml   Output --   Net 1337.5 ml       Physical Exam:   Physical Exam  Constitutional:       Appearance: Normal appearance.   HENT:      Head: Normocephalic.      Nose: Nose normal.      Mouth/Throat:      Mouth: Mucous membranes are moist.   Eyes:      Extraocular Movements: Extraocular  movements intact.      Pupils: Pupils are equal, round, and reactive to light.   Cardiovascular:      Rate and Rhythm: Normal rate and regular rhythm.      Pulses: Normal pulses.      Heart sounds: Normal heart sounds. No murmur heard.  Pulmonary:      Effort: Pulmonary effort is normal. No respiratory distress.      Breath sounds: Normal breath sounds.   Abdominal:      General: Bowel sounds are normal. There is no distension.      Palpations: Abdomen is soft.      Tenderness: There is no abdominal tenderness.   Musculoskeletal:         General: Normal range of motion.      Cervical back: Normal range of motion.   Skin:     General: Skin is warm and dry.      Capillary Refill: Capillary refill takes less than 2 seconds.   Neurological:      General: No focal deficit present.      Mental Status: He is alert and oriented to person, place, and time. Mental status is at baseline.   Psychiatric:         Mood and Affect: Mood normal.         Thought Content: Thought content normal.         Judgment: Judgment normal.          Lab/Radiology/Diagnostic Review:  Results for orders placed or performed during the hospital encounter of 07/05/24 (from the past 24 hour(s))   POCT GLUCOSE   Result Value Ref Range    POCT Glucose 333 (H) 74 - 99 mg/dL   CBC and Auto Differential   Result Value Ref Range    WBC 5.7 4.4 - 11.3 x10*3/uL    nRBC 0.0 0.0 - 0.0 /100 WBCs    RBC 5.79 4.50 - 5.90 x10*6/uL    Hemoglobin 18.3 (H) 13.5 - 17.5 g/dL    Hematocrit 51.7 41.0 - 52.0 %    MCV 89 80 - 100 fL    MCH 31.6 26.0 - 34.0 pg    MCHC 35.4 32.0 - 36.0 g/dL    RDW 13.2 11.5 - 14.5 %    Platelets 169 150 - 450 x10*3/uL    Neutrophils % 59.2 40.0 - 80.0 %    Immature Granulocytes %, Automated 0.5 0.0 - 0.9 %    Lymphocytes % 26.4 13.0 - 44.0 %    Monocytes % 12.4 2.0 - 10.0 %    Eosinophils % 0.4 0.0 - 6.0 %    Basophils % 1.1 0.0 - 2.0 %    Neutrophils Absolute 3.35 1.20 - 7.70 x10*3/uL    Immature Granulocytes Absolute, Automated 0.03 0.00 -  0.70 x10*3/uL    Lymphocytes Absolute 1.49 1.20 - 4.80 x10*3/uL    Monocytes Absolute 0.70 0.10 - 1.00 x10*3/uL    Eosinophils Absolute 0.02 0.00 - 0.70 x10*3/uL    Basophils Absolute 0.06 0.00 - 0.10 x10*3/uL   Comprehensive metabolic panel   Result Value Ref Range    Glucose 344 (H) 74 - 99 mg/dL    Sodium 135 (L) 136 - 145 mmol/L    Potassium 4.3 3.5 - 5.3 mmol/L    Chloride 104 98 - 107 mmol/L    Bicarbonate 13 (L) 21 - 32 mmol/L    Anion Gap 22 (H) 10 - 20 mmol/L    Urea Nitrogen 13 6 - 23 mg/dL    Creatinine 1.09 0.50 - 1.30 mg/dL    eGFR >90 >60 mL/min/1.73m*2    Calcium 8.7 8.6 - 10.3 mg/dL    Albumin 4.4 3.4 - 5.0 g/dL    Alkaline Phosphatase 101 33 - 120 U/L    Total Protein 7.2 6.4 - 8.2 g/dL    AST 11 9 - 39 U/L    Bilirubin, Total 0.5 0.0 - 1.2 mg/dL    ALT 15 10 - 52 U/L   Blood Gas Venous   Result Value Ref Range    POCT pH, Venous 7.20 (LL) 7.33 - 7.43 pH    POCT pCO2, Venous 37 (L) 41 - 51 mm Hg    POCT pO2, Venous 30 (L) 35 - 45 mm Hg    POCT SO2, Venous 47 45 - 75 %    POCT Oxy Hemoglobin, Venous 46.5 45.0 - 75.0 %    POCT Base Excess, Venous -12.7 (L) -2.0 - 3.0 mmol/L    POCT HCO3 Calculated, Venous 14.5 (L) 22.0 - 26.0 mmol/L    Patient Temperature 37.0 degrees Celsius    FiO2 21 %   Beta Hydroxybutyrate   Result Value Ref Range    Beta-Hydroxybutyrate 7.90 (H) 0.02 - 0.27 mmol/L   Osmolality   Result Value Ref Range    Osmolality, Serum 315 (H) 280 - 300 mOsm/kg   POCT GLUCOSE   Result Value Ref Range    POCT Glucose 283 (H) 74 - 99 mg/dL   POCT GLUCOSE   Result Value Ref Range    POCT Glucose 199 (H) 74 - 99 mg/dL   Blood Gas Venous   Result Value Ref Range    POCT pH, Venous 7.23 (LL) 7.33 - 7.43 pH    POCT pCO2, Venous 33 (L) 41 - 51 mm Hg    POCT pO2, Venous 28 (L) 35 - 45 mm Hg    POCT SO2, Venous 58 45 - 75 %    POCT Oxy Hemoglobin, Venous 56.6 45.0 - 75.0 %    POCT Base Excess, Venous -12.6 (L) -2.0 - 3.0 mmol/L    POCT HCO3 Calculated, Venous 13.8 (L) 22.0 - 26.0 mmol/L    Patient  Temperature 37.0 degrees Celsius    FiO2 21 %   POCT GLUCOSE   Result Value Ref Range    POCT Glucose 149 (H) 74 - 99 mg/dL   Renal Function Panel   Result Value Ref Range    Glucose 160 (H) 74 - 99 mg/dL    Sodium 140 136 - 145 mmol/L    Potassium 3.4 (L) 3.5 - 5.3 mmol/L    Chloride 113 (H) 98 - 107 mmol/L    Bicarbonate 16 (L) 21 - 32 mmol/L    Anion Gap 14 10 - 20 mmol/L    Urea Nitrogen 12 6 - 23 mg/dL    Creatinine 0.84 0.50 - 1.30 mg/dL    eGFR >90 >60 mL/min/1.73m*2    Calcium 7.0 (L) 8.6 - 10.3 mg/dL    Phosphorus 1.5 (L) 2.5 - 4.9 mg/dL    Albumin 3.0 (L) 3.4 - 5.0 g/dL   Magnesium   Result Value Ref Range    Magnesium 1.59 (L) 1.60 - 2.40 mg/dL   POCT GLUCOSE   Result Value Ref Range    POCT Glucose 172 (H) 74 - 99 mg/dL   POCT GLUCOSE   Result Value Ref Range    POCT Glucose 163 (H) 74 - 99 mg/dL   POCT GLUCOSE   Result Value Ref Range    POCT Glucose 180 (H) 74 - 99 mg/dL   Blood Gas Venous   Result Value Ref Range    POCT pH, Venous 7.27 (L) 7.33 - 7.43 pH    POCT pCO2, Venous 34 (L) 41 - 51 mm Hg    POCT pO2, Venous 47 (H) 35 - 45 mm Hg    POCT SO2, Venous 81 (H) 45 - 75 %    POCT Oxy Hemoglobin, Venous 79.6 (H) 45.0 - 75.0 %    POCT Base Excess, Venous -10.3 (L) -2.0 - 3.0 mmol/L    POCT HCO3 Calculated, Venous 15.6 (L) 22.0 - 26.0 mmol/L    Patient Temperature 37.0 degrees Celsius    FiO2 21 %   Renal Function Panel   Result Value Ref Range    Glucose 146 (H) 74 - 99 mg/dL    Sodium 141 136 - 145 mmol/L    Potassium 2.6 (LL) 3.5 - 5.3 mmol/L    Chloride 120 (H) 98 - 107 mmol/L    Bicarbonate 14 (L) 21 - 32 mmol/L    Anion Gap 10 mmol/L    Urea Nitrogen 10 6 - 23 mg/dL    Creatinine 0.48 (L) 0.50 - 1.30 mg/dL    eGFR >90 >60 mL/min/1.73m*2    Calcium 5.2 (LL) 8.6 - 10.3 mg/dL    Phosphorus 1.4 (L) 2.5 - 4.9 mg/dL    Albumin 2.2 (L) 3.4 - 5.0 g/dL   Magnesium   Result Value Ref Range    Magnesium 1.46 (L) 1.60 - 2.40 mg/dL   POCT GLUCOSE   Result Value Ref Range    POCT Glucose 175 (H) 74 - 99 mg/dL    Magnesium   Result Value Ref Range    Magnesium 1.94 1.60 - 2.40 mg/dL   Renal Function Panel   Result Value Ref Range    Glucose 176 (H) 74 - 99 mg/dL    Sodium 138 136 - 145 mmol/L    Potassium 3.2 (L) 3.5 - 5.3 mmol/L    Chloride 112 (H) 98 - 107 mmol/L    Bicarbonate 19 (L) 21 - 32 mmol/L    Anion Gap 10 10 - 20 mmol/L    Urea Nitrogen 12 6 - 23 mg/dL    Creatinine 0.73 0.50 - 1.30 mg/dL    eGFR >90 >60 mL/min/1.73m*2    Calcium 7.2 (L) 8.6 - 10.3 mg/dL    Phosphorus 2.0 (L) 2.5 - 4.9 mg/dL    Albumin 3.0 (L) 3.4 - 5.0 g/dL   POCT GLUCOSE   Result Value Ref Range    POCT Glucose 171 (H) 74 - 99 mg/dL   POCT GLUCOSE   Result Value Ref Range    POCT Glucose 231 (H) 74 - 99 mg/dL   POCT GLUCOSE   Result Value Ref Range    POCT Glucose 282 (H) 74 - 99 mg/dL   Blood Gas Venous   Result Value Ref Range    POCT pH, Venous 7.29 (L) 7.33 - 7.43 pH    POCT pCO2, Venous 32 (L) 41 - 51 mm Hg    POCT pO2, Venous 58 (H) 35 - 45 mm Hg    POCT SO2, Venous 94 (H) 45 - 75 %    POCT Oxy Hemoglobin, Venous 91.5 (H) 45.0 - 75.0 %    POCT Base Excess, Venous -10.0 (L) -2.0 - 3.0 mmol/L    POCT HCO3 Calculated, Venous 15.4 (L) 22.0 - 26.0 mmol/L    Patient Temperature 37.0 degrees Celsius    FiO2 21 %   Calcium, ionized   Result Value Ref Range    POCT Calcium, Ionized 1.12 1.1 - 1.33 mmol/L   CBC and Auto Differential   Result Value Ref Range    WBC 6.6 4.4 - 11.3 x10*3/uL    nRBC 0.0 0.0 - 0.0 /100 WBCs    RBC 4.44 (L) 4.50 - 5.90 x10*6/uL    Hemoglobin 13.9 13.5 - 17.5 g/dL    Hematocrit 38.8 (L) 41.0 - 52.0 %    MCV 87 80 - 100 fL    MCH 31.3 26.0 - 34.0 pg    MCHC 35.8 32.0 - 36.0 g/dL    RDW 13.1 11.5 - 14.5 %    Platelets 145 (L) 150 - 450 x10*3/uL    Neutrophils % 59.0 40.0 - 80.0 %    Immature Granulocytes %, Automated 0.2 0.0 - 0.9 %    Lymphocytes % 26.8 13.0 - 44.0 %    Monocytes % 12.5 2.0 - 10.0 %    Eosinophils % 0.9 0.0 - 6.0 %    Basophils % 0.6 0.0 - 2.0 %    Neutrophils Absolute 3.87 1.20 - 7.70 x10*3/uL     Immature Granulocytes Absolute, Automated 0.01 0.00 - 0.70 x10*3/uL    Lymphocytes Absolute 1.76 1.20 - 4.80 x10*3/uL    Monocytes Absolute 0.82 0.10 - 1.00 x10*3/uL    Eosinophils Absolute 0.06 0.00 - 0.70 x10*3/uL    Basophils Absolute 0.04 0.00 - 0.10 x10*3/uL   Comprehensive Metabolic Panel   Result Value Ref Range    Glucose 291 (H) 74 - 99 mg/dL    Sodium 136 136 - 145 mmol/L    Potassium 3.7 3.5 - 5.3 mmol/L    Chloride 109 (H) 98 - 107 mmol/L    Bicarbonate 16 (L) 21 - 32 mmol/L    Anion Gap 15 10 - 20 mmol/L    Urea Nitrogen 12 6 - 23 mg/dL    Creatinine 0.77 0.50 - 1.30 mg/dL    eGFR >90 >60 mL/min/1.73m*2    Calcium 7.1 (L) 8.6 - 10.3 mg/dL    Albumin 2.8 (L) 3.4 - 5.0 g/dL    Alkaline Phosphatase 55 33 - 120 U/L    Total Protein 4.6 (L) 6.4 - 8.2 g/dL    AST 8 (L) 9 - 39 U/L    Bilirubin, Total 0.4 0.0 - 1.2 mg/dL    ALT 10 10 - 52 U/L   Renal Function Panel   Result Value Ref Range    Glucose 291 (H) 74 - 99 mg/dL    Sodium 136 136 - 145 mmol/L    Potassium 3.7 3.5 - 5.3 mmol/L    Chloride 109 (H) 98 - 107 mmol/L    Bicarbonate 16 (L) 21 - 32 mmol/L    Anion Gap 15 10 - 20 mmol/L    Urea Nitrogen 12 6 - 23 mg/dL    Creatinine 0.77 0.50 - 1.30 mg/dL    eGFR >90 >60 mL/min/1.73m*2    Calcium 7.1 (L) 8.6 - 10.3 mg/dL    Phosphorus 2.9 2.5 - 4.9 mg/dL    Albumin 2.8 (L) 3.4 - 5.0 g/dL   Magnesium   Result Value Ref Range    Magnesium 1.81 1.60 - 2.40 mg/dL       Assessment/Plan   Principal Problem:    Diabetic ketoacidosis without coma associated with type 1 diabetes mellitus (Multi)  Active Problems:    Metabolic acidosis due to diabetes mellitus (Multi)    DKA with type 1 diabetes mellitus-resolved  -Presented with complaints of generalized weakness, fatigue, and blood sugars in the 350s to 450s despite insulin pump  -Blood glucose in   -Beta hydroxybutyrate 7.90  -Venous blood gas on presentation with pH is 7.20, pCO2 37, HCO3 14.5, and base excess -12.7  -Bicarbonate 13  -Anion gap 22  -Received 2  L of IV fluid boluses in ER  -1 L IV fluid bolus of normal saline x 1  -N.p.o. while on insulin infusion  -Insulin infusion per DKA protocol  -IV fluids per DKA protocol  -POCT blood glucose checks every hour on insulin drip  -Renal function panel, VBG, and magnesium levels every 4 hours while on insulin infusion  -Replete electrolytes as needed  -Check hemoglobin A1c in morning  -Endocrine consult, input appreciated  -Urinalysis with reflex microscopic pending  7/6/2024: Bridged off of insulin infusion overnight with NPH 15 units subcutaneously due to anion gap closed and hypokalemia  -Continue IV fluid resuscitation with half-normal saline at 150 mL/h  -NPH 15 units subcutaneously twice a day with meals  -Humalog corrective scale #2 before meals  -POCT glucose checks before meals and at bedtime  -Carb controlled diet  -Defer further diabetes management to endocrinology and general medicine team     High anion gap metabolic acidosis-improved  -Venous blood gas on presentation pH 7.20, pCO2 37, HCO3 14.5, base excess -12.7  -Bicarbonate 13  -Anion gap 22  -Received 2 L of IV fluid boluses in ER  -1 L IV fluid bolus of normal saline x 1  -IV fluids per DKA protocol  -Insulin infusion per DKA protocol  -VBG every 4 hours  -Renal function panel every 4 hours  -Replete electrolytes as needed  7/6/24: Anion gap 15  -Bicarbonate 16  -Venous pH 7.29  -Continue IV fluid resuscitation with half-normal saline at 150 mL/h    Hypokalemia secondary to insulin infusion-resolved  -Potassium 3.2>> 3.7  -Repleted potassium  -Follow BMP daily    Hypophosphatemia secondary to insulin infusion-resolved  -Phosphorus level 1.5>> 2.0>> 2.9  -Repleted with potassium phosphate 21 mmol IV x 1    I spent 30 minutes of cumulative critical care time with the patient.  Greater than 50% of that time was spent in the direct collaboration and or coordination of care of the patient.     Dragon dictation software was used to dictate this note and  thus there may be minor errors in translation/transcription including garbled speech or misspellings. Please contact for clarification if needed.

## 2024-07-06 NOTE — NURSING NOTE
Pt family brought supplies for insulin pump. Pt put device on LLQ of abdomen. Device turned on and is preprogrammed per patient. Rate at this time is 1.35 units/hr. Verified rate with CS RN at bedside. Pump infusing. Blood glucose at this time is 323. Dinner being ordered and sliding scale insulin to be given when food arrives.

## 2024-07-06 NOTE — CONSULTS
"Inpatient consult to Endocrinology  Consult performed by: Shay Seals MD  Consult ordered by: Art Diego PA-C  Reason for consult: Diabetes        Reason For Consult  Diabetes    History Of Present Illness  Ed Sanon is a 33 y.o. male     Duration of type 1 diabetes mellitus:  12 years  Complications:  Frequent DKA    NPH 15 units given at 02:10      Medtronic MinMed 780Gninsulin pump  Humalog    Patient has only worn Guardian 4 sensor briefly and has not used it in 3 months    Basal total 29.6 units/day  MN 1  06  1.35  22 1 unit/h    He does not take insulin boluses, states they make him \"feel bad.\"  He had the same complaint about prandial insulin injection.  He feels low with glucose in the low 200s    Bolus Wizard program  1 unit per 15 gm carb  Correction factor 50  Target glucose 100-120 mg/dl        Medications    Current Facility-Administered Medications:     [Transfer Hold] acetaminophen (Tylenol) tablet 650 mg, 650 mg, oral, q4h PRN, Art Diego PA-C    [Transfer Hold] dextrose 50 % injection 12.5 g, 12.5 g, intravenous, q15 min PRN, Livia M Polverine, APRN-CNP    [Transfer Hold] dextrose 50 % injection 25 g, 25 g, intravenous, q15 min PRN, Livia M Polverine, APRN-CNP    [Transfer Hold] glucagon (Glucagen) injection 1 mg, 1 mg, intramuscular, q15 min PRN, Livia M Polverine, APRN-CNP    [Transfer Hold] glucagon (Glucagen) injection 1 mg, 1 mg, intramuscular, q15 min PRN, Livia M Polverine, APRN-CNP    [Transfer Hold] insulin lispro (HumaLOG) injection 0-10 Units, 0-10 Units, subcutaneous, TID, Livia M Polverine, APRN-CNP, 10 Units at 07/06/24 0818    [Transfer Hold] insulin NPH (Isophane) (HumuLIN N,NovoLIN N) injection 15 Units, 15 Units, subcutaneous, BID AC, Livia M Polverine, APRN-CNP    [Transfer Hold] ondansetron (Zofran) injection 4 mg, 4 mg, intravenous, q6h PRN, Art Diego PA-C     Past Medical History  He has a past medical history of Abdominal pain (04/21/2023), Anxiety and " "depression (08/08/2021), Contact with and (suspected) exposure to covid-19 (04/21/2023), COVID-19 virus infection (07/11/2023), Diabetes mellitus (Multi), Diabetic acetonemia (Multi) (11/15/2023), DKA (diabetic ketoacidosis) (Multi) (07/11/2023), DKA, type 1, not at goal (Multi) (04/21/2023), DM2 (diabetes mellitus, type 2) (Multi) (11/15/2023), Elevated blood sugar (11/15/2023), Elevated blood-pressure reading, without diagnosis of hypertension (09/09/2020), Fatigue (07/11/2023), Generalized anxiety disorder (07/11/2023), Microalbuminuria due to type 1 diabetes mellitus (Multi) (07/11/2023), Personal history of COVID-19 (04/02/2023), Poorly controlled diabetes mellitus (Multi) (07/11/2023), Type 1 diabetes mellitus (Multi) (07/11/2023), Type 1 diabetes mellitus with hyperglycemia (Multi) (02/25/2019), Vitamin B12 deficiency (07/11/2023), Vitamin D deficiency (07/11/2023), and Vomiting (11/15/2023).    Surgical History  He has no past surgical history on file.     Social History  He reports that he has never smoked. He has never used smokeless tobacco. He reports that he does not drink alcohol and does not use drugs.    Family History  Family History   Problem Relation Name Age of Onset    No Known Problems Mother      No Known Problems Father      Hypertension Other      Coronary artery disease Other      Diabetes Other      Heart failure Other         Allergies  Patient has no known allergies.    Review of Systems   Constitutional:  Positive for fatigue.   Gastrointestinal:  Positive for abdominal pain. Negative for vomiting.   Endocrine:        As above   Neurological:  Positive for weakness.         Last Recorded Vitals  Blood pressure 119/75, pulse 103, temperature 36.6 °C (97.9 °F), temperature source Temporal, resp. rate 18, height 1.778 m (5' 10\"), weight 71 kg (156 lb 9.6 oz), SpO2 97%.    Physical Exam  Constitutional:       General: He is not in acute distress.  HENT:      Head: Normocephalic. "   Cardiovascular:      Pulses:           Radial pulses are 2+ on the right side and 2+ on the left side.   Abdominal:      Palpations: Abdomen is soft. There is no mass.      Tenderness: There is no abdominal tenderness.   Musculoskeletal:      Right lower leg: No edema.      Left lower leg: No edema.   Neurological:      Mental Status: He is alert.      Motor: No tremor.   Psychiatric:         Mood and Affect: Affect is flat.          Relevant Results  Lab Results   Component Value Date    POCGLU 389 (H) 07/06/2024    POCGLU 282 (H) 07/06/2024    POCGLU 231 (H) 07/06/2024    POCGLU 171 (H) 07/06/2024    POCGLU 175 (H) 07/06/2024    GLUCOSE 291 (H) 07/06/2024    GLUCOSE 291 (H) 07/06/2024    GLUCOSE 176 (H) 07/06/2024    GLUCOSE 146 (H) 07/05/2024    GLUCOSE 160 (H) 07/05/2024      Latest Reference Range & Units 07/05/24 16:39 07/05/24 20:43 07/05/24 23:57 07/06/24 01:04 07/06/24 04:50   GLUCOSE 74 - 99 mg/dL  74 - 99 mg/dL 344 (H) 160 (H) 146 (H) 176 (H) 291 (H)  291 (H)   SODIUM 136 - 145 mmol/L  136 - 145 mmol/L 135 (L) 140 141 138 136  136   POTASSIUM 3.5 - 5.3 mmol/L  3.5 - 5.3 mmol/L 4.3 3.4 (L) 2.6 (LL) 3.2 (L) 3.7  3.7   CHLORIDE 98 - 107 mmol/L  98 - 107 mmol/L 104 113 (H) 120 (H) 112 (H) 109 (H)  109 (H)   Bicarbonate 21 - 32 mmol/L  21 - 32 mmol/L 13 (L) 16 (L) 14 (L) 19 (L) 16 (L)  16 (L)   Anion Gap 10 - 20 mmol/L  10 - 20 mmol/L 22 (H) 14 10 10 15  15   Blood Urea Nitrogen 6 - 23 mg/dL  6 - 23 mg/dL 13 12 10 12 12  12   Creatinine 0.50 - 1.30 mg/dL  0.50 - 1.30 mg/dL 1.09 0.84 0.48 (L) 0.73 0.77  0.77   EGFR >60 mL/min/1.73m*2  >60 mL/min/1.73m*2 >90 >90 >90 >90 >90  >90   Calcium 8.6 - 10.3 mg/dL  8.6 - 10.3 mg/dL 8.7 7.0 (L) 5.2 (LL) 7.2 (L) 7.1 (L)  7.1 (L)   PHOSPHORUS 2.5 - 4.9 mg/dL  1.5 (L) 1.4 (L) 2.0 (L) 2.9   Albumin 3.4 - 5.0 g/dL  3.4 - 5.0 g/dL 4.4 3.0 (L) 2.2 (L) 3.0 (L) 2.8 (L)  2.8 (L)   Alkaline Phosphatase 33 - 120 U/L 101    55   ALT 10 - 52 U/L 15    10   AST 9 - 39 U/L 11    8 (L)    Bilirubin Total 0.0 - 1.2 mg/dL 0.5    0.4      Latest Reference Range & Units 07/06/24 04:02   Hemoglobin A1C see below % 13.3 (H)       IMPRESSION  DIABETIC KETOACIDOSIS, RESOLVED  TYPE 1 DIABETES MELLITUS WITH HYPERGLYCEMIA  History of frequent DKA.  Frequency actually decreased with only limited use of insulin pump.   He does not use the automated delivery or any bolus features of pump.    Extreme avoidance of prandial insulin, whether pump or injection  He does not have insulin pump supplies available      RECOMMENDATIONS  NPH 8 more units subcutaneous now  If he can get supplies brought to him, resume insulin pump tonight. Otherwise will schedule basal insulin  Lispro corrective scale by injection whether he is or is not on the pump.  He will not take bolus dosing.    Endocrine follow up with Dr. Villar.       Shay Seals MD

## 2024-07-06 NOTE — PROGRESS NOTES
Ed Sanon is a 33 y.o. male on day 1 of admission presenting with Diabetic ketoacidosis without coma associated with type 1 diabetes mellitus (Multi).      Subjective      Ed Sanon is a 33 y.o. male with PMHx s/f IDDM I with poor insulin compliance (initiated on insulin pump in December), anxiety, depression presenting with fatigue, generalized weakness for a week. He notes of nausea and dizziness, states the blood sugar has been measuring from 350-450s generally and 250s in the morning for a month. He is unsure of how to use his insulin pump, does not have sensors and uses the pump preset setting. He reports of basal rate of 1.33 in AM, and 1.00 at night, and is unsure of bolus rates/amounts. He has abdominal cramping and discomfort in his RLQ and reports that it's baseline for him with his DKA episodes. He denies chest pain, shortness of breath, fever or chills, syncope, leg swelling. He has had multiple hospitalization for DKA; last one was on 3/19/24.     ED Course (Summary):   Vitals on presentation: 98.1 F, 66 bpm, 16 RR, 124/78, 100% on RA  Labs: Chemistries-glucose 344, sodium 135, K4.3, bicarb 13, anion gap 22  Beta hydroxybutyrate 7.90  CBC within normal limits  VBG-pH 7.20, pCO2 37, pO2 30, HCO3 14.5  Imaging: None  Interventions: NS 1000 mL, LR 1000 mL, D10W infusion, D5 percent and 0.9% sodium chloride infusion, admission to ICU for further management     7/6: No acute events overnight.  History and chart reviewed.  Patient seen and examined.  Patient denies any abdominal pain, nausea, diarrhea.  Endocrinology recommendation much appreciated .  Will anticipate discharge home tomorrow.  Patient will need close follow-up with PCP and endocrinology.    Objective     Last Recorded Vitals  /79   Pulse 96   Temp 37.2 °C (99 °F) (Temporal)   Resp 16   Wt 71 kg (156 lb 9.6 oz)   SpO2 97%   Intake/Output last 3 Shifts:    Intake/Output Summary (Last 24 hours) at 7/6/2024 0902  Last data  filed at 7/6/2024 0531  Gross per 24 hour   Intake 1337.5 ml   Output --   Net 1337.5 ml       Admission Weight  Weight: 70.3 kg (155 lb) (07/05/24 1613)    Daily Weight  07/06/24 : 71 kg (156 lb 9.6 oz)    Image Results  ECG 12 lead (Clinic Performed)  See scanned image       Physical Exam  CONSTITUTIONAL - alert, in no acute distress, not ill-appearing  SKIN - normal skin color ,warm  HEAD - no trauma, normocephalic  EYES - pupils are equal and reactive to light  CHEST - clear to auscultation, no wheezing, no crackles and no rales, good effort  CARDIAC - regular rate and regular rhythm, no murmur  ABDOMEN - no organomegaly, soft, nontender, nondistended, normal bowel sounds, no guarding/rebound/rigidity  EXTREMITIES - no edema, no deformities  NEUROLOGICAL - alert, oriented x3 and no acute focal signs  PSYCHIATRIC - alert, pleasant and cordial, age-appropriate    Relevant Results               Assessment/Plan      DKA without coma associated with type 1 diabetes mellitus  Metabolic acidosis  -Evidenced by the following lab values: glucose 344, bicarb 13, anion gap 22  -Continue with insulin gtt and Q1H glucose checks while on gtt.   -Maintain NPO status while on gtt  -Frequent VBG/BMP/Mag/Phos   -Continue aggressive fluid resuscitation as per DKA protocol   -Last A1c: 12.4 (2/23/24) --> update in AM as appropriate   -Critical care and endocrinology consultations appreciated  7/6: Patient doing much better.  Sugars are much better.  Endocrinology recommendation much appreciated.  Anticipate discharge tomorrow.     History of HTN  -no longer on meds  -monitor and adjust as necessary     Diet: N.p.o.  DVT Prophylaxis: Not needed due to low risk score  Code Status: Full code         Nikko Pal MD

## 2024-07-07 VITALS
WEIGHT: 143.8 LBS | BODY MASS INDEX: 20.59 KG/M2 | HEART RATE: 93 BPM | TEMPERATURE: 98.6 F | DIASTOLIC BLOOD PRESSURE: 92 MMHG | HEIGHT: 70 IN | SYSTOLIC BLOOD PRESSURE: 144 MMHG | RESPIRATION RATE: 16 BRPM | OXYGEN SATURATION: 98 %

## 2024-07-07 LAB
ANION GAP SERPL CALC-SCNC: 10 MMOL/L (ref 10–20)
BASOPHILS # BLD AUTO: 0.05 X10*3/UL (ref 0–0.1)
BASOPHILS NFR BLD AUTO: 1.2 %
BUN SERPL-MCNC: 11 MG/DL (ref 6–23)
CALCIUM SERPL-MCNC: 7.9 MG/DL (ref 8.6–10.3)
CHLORIDE SERPL-SCNC: 108 MMOL/L (ref 98–107)
CO2 SERPL-SCNC: 23 MMOL/L (ref 21–32)
CREAT SERPL-MCNC: 0.65 MG/DL (ref 0.5–1.3)
EGFRCR SERPLBLD CKD-EPI 2021: >90 ML/MIN/1.73M*2
EOSINOPHIL # BLD AUTO: 0.08 X10*3/UL (ref 0–0.7)
EOSINOPHIL NFR BLD AUTO: 2 %
ERYTHROCYTE [DISTWIDTH] IN BLOOD BY AUTOMATED COUNT: 12.7 % (ref 11.5–14.5)
GLUCOSE BLD MANUAL STRIP-MCNC: 244 MG/DL (ref 74–99)
GLUCOSE BLD MANUAL STRIP-MCNC: 267 MG/DL (ref 74–99)
GLUCOSE BLD MANUAL STRIP-MCNC: 358 MG/DL (ref 74–99)
GLUCOSE SERPL-MCNC: 334 MG/DL (ref 74–99)
HCT VFR BLD AUTO: 41.8 % (ref 41–52)
HGB BLD-MCNC: 15.2 G/DL (ref 13.5–17.5)
IMM GRANULOCYTES # BLD AUTO: 0.01 X10*3/UL (ref 0–0.7)
IMM GRANULOCYTES NFR BLD AUTO: 0.2 % (ref 0–0.9)
LYMPHOCYTES # BLD AUTO: 1.73 X10*3/UL (ref 1.2–4.8)
LYMPHOCYTES NFR BLD AUTO: 43.1 %
MCH RBC QN AUTO: 31.5 PG (ref 26–34)
MCHC RBC AUTO-ENTMCNC: 36.4 G/DL (ref 32–36)
MCV RBC AUTO: 87 FL (ref 80–100)
MONOCYTES # BLD AUTO: 0.51 X10*3/UL (ref 0.1–1)
MONOCYTES NFR BLD AUTO: 12.7 %
NEUTROPHILS # BLD AUTO: 1.63 X10*3/UL (ref 1.2–7.7)
NEUTROPHILS NFR BLD AUTO: 40.8 %
NRBC BLD-RTO: 0 /100 WBCS (ref 0–0)
PLATELET # BLD AUTO: 156 X10*3/UL (ref 150–450)
POTASSIUM SERPL-SCNC: 3.4 MMOL/L (ref 3.5–5.3)
RBC # BLD AUTO: 4.83 X10*6/UL (ref 4.5–5.9)
SODIUM SERPL-SCNC: 138 MMOL/L (ref 136–145)
WBC # BLD AUTO: 4 X10*3/UL (ref 4.4–11.3)

## 2024-07-07 PROCEDURE — 99239 HOSP IP/OBS DSCHRG MGMT >30: CPT | Performed by: INTERNAL MEDICINE

## 2024-07-07 PROCEDURE — 85025 COMPLETE CBC W/AUTO DIFF WBC: CPT | Performed by: INTERNAL MEDICINE

## 2024-07-07 PROCEDURE — 82947 ASSAY GLUCOSE BLOOD QUANT: CPT

## 2024-07-07 PROCEDURE — 36415 COLL VENOUS BLD VENIPUNCTURE: CPT | Performed by: INTERNAL MEDICINE

## 2024-07-07 PROCEDURE — 99231 SBSQ HOSP IP/OBS SF/LOW 25: CPT | Performed by: INTERNAL MEDICINE

## 2024-07-07 PROCEDURE — 80048 BASIC METABOLIC PNL TOTAL CA: CPT | Performed by: INTERNAL MEDICINE

## 2024-07-07 RX ORDER — INSULIN GLARGINE 100 [IU]/ML
30 INJECTION, SOLUTION SUBCUTANEOUS NIGHTLY
Qty: 10 ML | Refills: 0 | Status: SHIPPED | OUTPATIENT
Start: 2024-07-07

## 2024-07-07 ASSESSMENT — COGNITIVE AND FUNCTIONAL STATUS - GENERAL
MOBILITY SCORE: 24
DAILY ACTIVITIY SCORE: 24

## 2024-07-07 ASSESSMENT — PAIN - FUNCTIONAL ASSESSMENT: PAIN_FUNCTIONAL_ASSESSMENT: 0-10

## 2024-07-07 ASSESSMENT — PAIN SCALES - GENERAL: PAINLEVEL_OUTOF10: 0 - NO PAIN

## 2024-07-07 NOTE — CARE PLAN
The patient's goals for the shift include        Problem: Pain - Adult  Goal: Verbalizes/displays adequate comfort level or baseline comfort level  Outcome: Progressing     Problem: Safety - Adult  Goal: Free from fall injury  Outcome: Progressing     Problem: Discharge Planning  Goal: Discharge to home or other facility with appropriate resources  Outcome: Progressing     Problem: Chronic Conditions and Co-morbidities  Goal: Patient's chronic conditions and co-morbidity symptoms are monitored and maintained or improved  Outcome: Progressing     Problem: Diabetes  Goal: Achieve decreasing blood glucose levels by end of shift  Outcome: Progressing  Goal: Increase stability of blood glucose readings by end of shift  Outcome: Progressing  Problem: Pain  Goal: Takes deep breaths with improved pain control throughout the shift  Outcome: Progressing  Goal: Turns in bed with improved pain control throughout the shift  Outcome: Progressing  Goal: Walks with improved pain control throughout the shift  Outcome: Progressing  Goal: Performs ADL's with improved pain control throughout shift  Outcome: Progressing  Goal: Participates in PT with improved pain control throughout the shift  Outcome: Progressing  Goal: Free from opioid side effects throughout the shift  Outcome: Progressing  Goal: Free from acute confusion related to pain meds throughout the shift  Outcome: Progressing     Problem: Nutrition  Goal: Less than 5 days NPO/clear liquids  Outcome: Progressing  Goal: Oral intake greater than 50%  Outcome: Progressing  Goal: Oral intake greater 75%  Outcome: Progressing  Goal: Consume prescribed supplement  Outcome: Progressing  Goal: Adequate PO fluid intake  Outcome: Progressing  Goal: Nutrition support goals are met within 48 hrs  Outcome: Progressing  Goal: Nutrition support is meeting 75% of nutrient needs  Outcome: Progressing  Goal: Tube feed tolerance  Outcome: Progressing  Goal: BG  mg/dL  Outcome:  Progressing  Goal: Lab values WNL  Outcome: Progressing  Goal: Electrolytes WNL  Outcome: Progressing  Goal: Promote healing  Outcome: Progressing  Goal: Maintain stable weight  Outcome: Progressing  Goal: Reduce weight from edema/fluid  Outcome: Progressing  Goal: Gradual weight gain  Outcome: Progressing  Goal: Improve ostomy output  Outcome: Progressing     Problem: Fall/Injury  Goal: Not fall by end of shift  Outcome: Progressing  Goal: Be free from injury by end of the shift  Outcome: Progressing  Goal: Verbalize understanding of personal risk factors for fall in the hospital  Outcome: Progressing  Goal: Verbalize understanding of risk factor reduction measures to prevent injury from fall in the home  Outcome: Progressing  Goal: Use assistive devices by end of the shift  Outcome: Progressing  Goal: Pace activities to prevent fatigue by end of the shift  Outcome: Progressing     Goal: Maintain electrolyte levels within acceptable range throughout shift  Outcome: Progressing  Goal: Maintain glucose levels >70mg/dl to <250mg/dl throughout shift  Outcome: Progressing  Goal: No changes in neurological exam by end of shift  Outcome: Progressing  Goal: Learn about and adhere to nutrition recommendations by end of shift  Outcome: Progressing  Goal: Vital signs within normal range for age by end of shift  Outcome: Progressing  Goal: Increase self care and/or family involovement by end of shift  Outcome: Progressing

## 2024-07-07 NOTE — DISCHARGE INSTRUCTIONS
1.  Please follow with your family doctor in 1 week's time.  2.  Please arrange to be seen by your endocrinologist in 1 week's time.

## 2024-07-07 NOTE — PROGRESS NOTES
"Ed Sanon is a 33 y.o. male on day 2 of admission presenting with Diabetic ketoacidosis without coma associated with type 1 diabetes mellitus (Multi).    Subjective   No new complaint     Scheduled medications  insulin lispro, 0-10 Units, subcutaneous, TID  Insulin, , pump - subcutaneous, Continuous Pump    Objective   Physical Exam  Constitutional:       General: He is not in acute distress.  Neurological:      Mental Status: He is alert.         Last Recorded Vitals  Blood pressure 127/84, pulse 95, temperature 36.6 °C (97.9 °F), temperature source Temporal, resp. rate 16, height 1.778 m (5' 10\"), weight 65.2 kg (143 lb 12.8 oz), SpO2 98%.  Intake/Output last 3 Shifts:  I/O last 3 completed shifts:  In: 3128.2 (48 mL/kg) [P.O.:1170; I.V.:1601.2 (24.5 mL/kg); IV Piggyback:357]  Out: 2400 (36.8 mL/kg) [Urine:2400 (1 mL/kg/hr)]  Weight: 65.2 kg     Relevant Results  Results from last 7 days   Lab Units 07/07/24  0648 07/07/24  0430 07/06/24  2101 07/06/24  1643 07/06/24  1609 07/06/24  1109 07/06/24  0730 07/06/24  0450 07/06/24  0129 07/06/24  0104 07/06/24  0027 07/05/24  2357 07/05/24  2141 07/05/24  2043   POCT GLUCOSE mg/dL 244*  --  374* 330* 323* 317*   < >  --    < >  --    < >  --    < >  --    GLUCOSE mg/dL  --  334*  --   --   --   --   --  291*  291*  --  176*  --  146*  --  160*    < > = values in this interval not displayed.      Latest Reference Range & Units 07/07/24 04:30   GLUCOSE 74 - 99 mg/dL 334 (H)   SODIUM 136 - 145 mmol/L 138   POTASSIUM 3.5 - 5.3 mmol/L 3.4 (L)   CHLORIDE 98 - 107 mmol/L 108 (H)   Bicarbonate 21 - 32 mmol/L 23   Anion Gap 10 - 20 mmol/L 10   Blood Urea Nitrogen 6 - 23 mg/dL 11   Creatinine 0.50 - 1.30 mg/dL 0.65   EGFR >60 mL/min/1.73m*2 >90   Calcium 8.6 - 10.3 mg/dL 7.9 (L)         Assessment/Plan   Principal Problem:    Diabetic ketoacidosis without coma associated with type 1 diabetes mellitus (Multi)  Active Problems:    Metabolic acidosis due to diabetes " mellitus (Multi)    IMPRESSION  DIABETIC KETOACIDOSIS, RESOLVED  TYPE 1 DIABETES MELLITUS WITH HYPERGLYCEMIA  History of frequent DKA.  Frequency actually decreased with only limited use of insulin pump.   He does not use the automated delivery or any bolus features of pump.    Extreme avoidance of prandial insulin, whether pump or injection  He does not have insulin pump supplies available        RECOMMENDATIONS  Increased insulin pump basal program to 31.2 units/day  MN  1.2 unit/h  06  1.35 unit/h  22  1.2 unit/h    Changed Bolus Wizard settings to be less aggressive, and recommend he use it.  I showed him the Bolus screen    Bolus 1 unit per 20 gm carb  Insulin sensitivity 100  Target glucose 120-140 mg/dl  Active insulin time 3h (since he is not using the SmartGuard)    Recommend he keep a vial of Lantus 30 units if insulin pump is off for the day.      Endocrine follow up with Dr. Villar.        Shay Seals MD

## 2024-07-07 NOTE — NURSING NOTE
"Pt refusing sliding scale insulin before breakfast. Sugar this morning was 244, pt just checked using own supplies and was 277. Pt states he \"did not feel well\" with the insulin before meals yesterday and is declining it now.   "

## 2024-07-07 NOTE — NURSING NOTE
Pt being discharged. Discharge instructions reviewed with the patient reguarding new/continued medications and follow up appointments. Medications sent to pt preferred pharmacy on [address].  NO home medications to return. All questions answered at this time. All belongings to go with the patient. Ivs removed per policy with tip of catheter intact.

## 2024-07-08 ENCOUNTER — PATIENT OUTREACH (OUTPATIENT)
Dept: CARE COORDINATION | Facility: CLINIC | Age: 34
End: 2024-07-08
Payer: COMMERCIAL

## 2024-07-08 NOTE — PROGRESS NOTES
Discharge Facility: [facility]  Discharge Diagnosis: Diabetic ketoacidosis without coma associated with type 1 diabetes mellitus  Admission Date: 7/5/24  Discharge Date: 7/7/24    PCP Appointment Date: no appointment, message to office  Specialist Appointment Date: endocrinology 8/6/24  Hospital Encounter and Summary Linked: Yes    Two attempts were made to reach patient within two business days after discharge. Voicemail left with contact information for patient to call back with any non-emergent questions or concerns.

## 2024-07-24 ENCOUNTER — PATIENT OUTREACH (OUTPATIENT)
Dept: CARE COORDINATION | Facility: CLINIC | Age: 34
End: 2024-07-24

## 2024-07-29 NOTE — DISCHARGE SUMMARY
"Discharge Diagnosis  Diabetic ketoacidosis without coma associated with type 1 diabetes mellitus (Multi)  Metabolic acidosis secondary to #1  Hypertension    Issues Requiring Follow-Up  1.  Follow with PCP in 1 week's time.  2.  Follow in the endocrinology clinic in 2 weeks time.    Discharge Meds     Your medication list        START taking these medications        Instructions Last Dose Given Next Dose Due   Lantus U-100 Insulin 100 unit/mL injection  Generic drug: insulin glargine      Inject 30 Units under the skin once daily at bedtime. IF insulin pump is off.              CONTINUE taking these medications        Instructions Last Dose Given Next Dose Due   blood sugar diagnostic strip           Guardian 4 Glucose Sensor device  Generic drug: blood-glucose sensor      Change insulin sensor every 7 days as directed, linked to Minimed insulin pump, check with endocrinology for updated regimen       insulin lispro 100 unit/mL injection  Commonly known as: HumaLOG           lancets misc           lisinopril 5 mg tablet           pen needle, diabetic 32 gauge x 5/32\" needle                     Where to Get Your Medications        These medications were sent to [facility] [address] [place] [id number]      Phone: [phone]   Lantus U-100 Insulin 100 unit/mL injection         Test Results Pending At Discharge  Pending Labs       No current pending labs.            Hospital Course  Ed Sanon is a 33 y.o. male with PMHx s/f IDDM I with poor insulin compliance (initiated on insulin pump in December), anxiety, depression presenting with fatigue, generalized weakness for a week. He notes of nausea and dizziness, states the blood sugar has been measuring from 350-450s generally and 250s in the morning for a month. He is unsure of how to use his insulin pump, does not have sensors and uses the pump preset setting. He reports of basal rate of 1.33 in AM, and 1.00 at " night, and is unsure of bolus rates/amounts. He has abdominal cramping and discomfort in his RLQ and reports that it's baseline for him with his DKA episodes. He denies chest pain, shortness of breath, fever or chills, syncope, leg swelling. He has had multiple hospitalization for DKA; last one was on 3/19/24.     ED Course (Summary):   Vitals on presentation: 98.1 F, 66 bpm, 16 RR, 124/78, 100% on RA  Labs: Chemistries-glucose 344, sodium 135, K4.3, bicarb 13, anion gap 22  Beta hydroxybutyrate 7.90  CBC within normal limits  VBG-pH 7.20, pCO2 37, pO2 30, HCO3 14.5  Imaging: None  Interventions: NS 1000 mL, LR 1000 mL, D10W infusion, D5 percent and 0.9% sodium chloride infusion, admission to ICU for further management     7/6: No acute events overnight.  History and chart reviewed.  Patient seen and examined.  Patient denies any abdominal pain, nausea, diarrhea.  Endocrinology recommendation much appreciated .  Will anticipate discharge home tomorrow.  Patient will need close follow-up with PCP and endocrinology.  7/7: No acute events overnight.  Sugars better controlled.  Patient denies any new symptoms.  Endocrinology recommendations appreciated.  See full discharge orders.    Pertinent Physical Exam At Time of Discharge  Physical Exam  CONSTITUTIONAL - alert, in no acute distress, not ill-appearing  CHEST - clear to auscultation, no wheezing, no crackles and no rales, good effort  CARDIAC - regular rate and regular rhythm, no murmur  ABDOMEN - no organomegaly, soft, nontender, nondistended, normal bowel sounds, no guarding/rebound/rigidity  EXTREMITIES - no edema, no deformities  NEUROLOGICAL - alert, oriented x3 and no acute focal signs  PSYCHIATRIC - alert, pleasant and cordial, age-appropriate    Outpatient Follow-Up  Future Appointments   Date Time Provider Department Center   10/2/2024  9:00 AM CHRISTIANO Montenegro-CNP [id number] [facility]     Discharge planning took more than 30 minutes    Nikko Pal,  MD

## 2024-08-11 ENCOUNTER — HOSPITAL ENCOUNTER (INPATIENT)
Facility: HOSPITAL | Age: 34
End: 2024-08-11
Attending: EMERGENCY MEDICINE | Admitting: INTERNAL MEDICINE

## 2024-08-11 DIAGNOSIS — E11.10 DIABETIC KETOACIDOSIS WITHOUT COMA ASSOCIATED WITH TYPE 2 DIABETES MELLITUS (MULTI): Primary | ICD-10-CM

## 2024-08-11 DIAGNOSIS — E10.10 DIABETIC KETOACIDOSIS WITHOUT COMA ASSOCIATED WITH TYPE 1 DIABETES MELLITUS (MULTI): ICD-10-CM

## 2024-08-11 LAB
ALBUMIN SERPL BCP-MCNC: 5.1 G/DL (ref 3.4–5)
ALP SERPL-CCNC: 96 U/L (ref 33–120)
ALT SERPL W P-5'-P-CCNC: 20 U/L (ref 10–52)
ANION GAP SERPL CALC-SCNC: 13 MMOL/L (ref 10–20)
ANION GAP SERPL CALC-SCNC: 28 MMOL/L (ref 10–20)
ANION GAP SERPL CALC-SCNC: 35 MMOL/L (ref 10–20)
ANION GAP SERPL CALC-SCNC: 8 MMOL/L (ref 10–20)
APPEARANCE UR: CLEAR
AST SERPL W P-5'-P-CCNC: 15 U/L (ref 9–39)
B-OH-BUTYR SERPL-SCNC: 11.26 MMOL/L (ref 0.02–0.27)
BASE EXCESS BLDV CALC-SCNC: -25.1 MMOL/L (ref -2–3)
BASOPHILS # BLD AUTO: 0.09 X10*3/UL (ref 0–0.1)
BASOPHILS NFR BLD AUTO: 0.6 %
BILIRUB SERPL-MCNC: 0.5 MG/DL (ref 0–1.2)
BILIRUB UR STRIP.AUTO-MCNC: NEGATIVE MG/DL
BODY TEMPERATURE: 37 DEGREES CELSIUS
BUN SERPL-MCNC: 14 MG/DL (ref 6–23)
BUN SERPL-MCNC: 16 MG/DL (ref 6–23)
CALCIUM SERPL-MCNC: 7.7 MG/DL (ref 8.6–10.3)
CALCIUM SERPL-MCNC: 7.7 MG/DL (ref 8.6–10.3)
CALCIUM SERPL-MCNC: 7.8 MG/DL (ref 8.6–10.3)
CALCIUM SERPL-MCNC: 9.8 MG/DL (ref 8.6–10.3)
CHLORIDE SERPL-SCNC: 111 MMOL/L (ref 98–107)
CHLORIDE SERPL-SCNC: 111 MMOL/L (ref 98–107)
CHLORIDE SERPL-SCNC: 114 MMOL/L (ref 98–107)
CHLORIDE SERPL-SCNC: 98 MMOL/L (ref 98–107)
CO2 SERPL-SCNC: 13 MMOL/L (ref 21–32)
CO2 SERPL-SCNC: 18 MMOL/L (ref 21–32)
CO2 SERPL-SCNC: 5 MMOL/L (ref 21–32)
CO2 SERPL-SCNC: 6 MMOL/L (ref 21–32)
COLOR UR: COLORLESS
CREAT SERPL-MCNC: 0.9 MG/DL (ref 0.5–1.3)
CREAT SERPL-MCNC: 0.91 MG/DL (ref 0.5–1.3)
CREAT SERPL-MCNC: 1.1 MG/DL (ref 0.5–1.3)
CREAT SERPL-MCNC: 1.45 MG/DL (ref 0.5–1.3)
EGFRCR SERPLBLD CKD-EPI 2021: 64 ML/MIN/1.73M*2
EGFRCR SERPLBLD CKD-EPI 2021: 90 ML/MIN/1.73M*2
EGFRCR SERPLBLD CKD-EPI 2021: >90 ML/MIN/1.73M*2
EGFRCR SERPLBLD CKD-EPI 2021: >90 ML/MIN/1.73M*2
EOSINOPHIL # BLD AUTO: 0 X10*3/UL (ref 0–0.7)
EOSINOPHIL NFR BLD AUTO: 0 %
ERYTHROCYTE [DISTWIDTH] IN BLOOD BY AUTOMATED COUNT: 12.8 % (ref 11.5–14.5)
GLUCOSE BLD MANUAL STRIP-MCNC: 131 MG/DL (ref 74–99)
GLUCOSE BLD MANUAL STRIP-MCNC: 146 MG/DL (ref 74–99)
GLUCOSE BLD MANUAL STRIP-MCNC: 162 MG/DL (ref 74–99)
GLUCOSE BLD MANUAL STRIP-MCNC: 162 MG/DL (ref 74–99)
GLUCOSE BLD MANUAL STRIP-MCNC: 169 MG/DL (ref 74–99)
GLUCOSE BLD MANUAL STRIP-MCNC: 170 MG/DL (ref 74–99)
GLUCOSE BLD MANUAL STRIP-MCNC: 171 MG/DL (ref 74–99)
GLUCOSE BLD MANUAL STRIP-MCNC: 195 MG/DL (ref 74–99)
GLUCOSE BLD MANUAL STRIP-MCNC: 202 MG/DL (ref 74–99)
GLUCOSE BLD MANUAL STRIP-MCNC: 210 MG/DL (ref 74–99)
GLUCOSE BLD MANUAL STRIP-MCNC: 256 MG/DL (ref 74–99)
GLUCOSE BLD MANUAL STRIP-MCNC: 395 MG/DL (ref 74–99)
GLUCOSE BLD MANUAL STRIP-MCNC: 406 MG/DL (ref 74–99)
GLUCOSE BLD MANUAL STRIP-MCNC: 472 MG/DL (ref 74–99)
GLUCOSE SERPL-MCNC: 164 MG/DL (ref 74–99)
GLUCOSE SERPL-MCNC: 166 MG/DL (ref 74–99)
GLUCOSE SERPL-MCNC: 264 MG/DL (ref 74–99)
GLUCOSE SERPL-MCNC: 454 MG/DL (ref 74–99)
GLUCOSE UR STRIP.AUTO-MCNC: ABNORMAL MG/DL
HCO3 BLDV-SCNC: 5.8 MMOL/L (ref 22–26)
HCT VFR BLD AUTO: 55.7 % (ref 41–52)
HGB BLD-MCNC: 18.7 G/DL (ref 13.5–17.5)
HYALINE CASTS #/AREA URNS AUTO: ABNORMAL /LPF
IMM GRANULOCYTES # BLD AUTO: 0.22 X10*3/UL (ref 0–0.7)
IMM GRANULOCYTES NFR BLD AUTO: 1.5 % (ref 0–0.9)
INHALED O2 CONCENTRATION: 21 %
KETONES UR STRIP.AUTO-MCNC: ABNORMAL MG/DL
LEUKOCYTE ESTERASE UR QL STRIP.AUTO: NEGATIVE
LYMPHOCYTES # BLD AUTO: 1.34 X10*3/UL (ref 1.2–4.8)
LYMPHOCYTES NFR BLD AUTO: 9.1 %
MAGNESIUM SERPL-MCNC: 2.21 MG/DL (ref 1.6–2.4)
MCH RBC QN AUTO: 31.1 PG (ref 26–34)
MCHC RBC AUTO-ENTMCNC: 33.6 G/DL (ref 32–36)
MCV RBC AUTO: 93 FL (ref 80–100)
MONOCYTES # BLD AUTO: 0.79 X10*3/UL (ref 0.1–1)
MONOCYTES NFR BLD AUTO: 5.3 %
MUCOUS THREADS #/AREA URNS AUTO: ABNORMAL /LPF
NEUTROPHILS # BLD AUTO: 12.33 X10*3/UL (ref 1.2–7.7)
NEUTROPHILS NFR BLD AUTO: 83.5 %
NITRITE UR QL STRIP.AUTO: NEGATIVE
NRBC BLD-RTO: 0 /100 WBCS (ref 0–0)
OXYHGB MFR BLDV: 77.5 % (ref 45–75)
PCO2 BLDV: 26 MM HG (ref 41–51)
PH BLDV: 6.96 PH (ref 7.33–7.43)
PH UR STRIP.AUTO: 5 [PH]
PHOSPHATE SERPL-MCNC: 1.1 MG/DL (ref 2.5–4.9)
PHOSPHATE SERPL-MCNC: 1.8 MG/DL (ref 2.5–4.9)
PHOSPHATE SERPL-MCNC: 4.8 MG/DL (ref 2.5–4.9)
PLATELET # BLD AUTO: 230 X10*3/UL (ref 150–450)
PO2 BLDV: 51 MM HG (ref 35–45)
POTASSIUM SERPL-SCNC: 3.7 MMOL/L (ref 3.5–5.3)
POTASSIUM SERPL-SCNC: 3.7 MMOL/L (ref 3.5–5.3)
POTASSIUM SERPL-SCNC: 4.8 MMOL/L (ref 3.5–5.3)
POTASSIUM SERPL-SCNC: 5.3 MMOL/L (ref 3.5–5.3)
PROT SERPL-MCNC: 8.5 G/DL (ref 6.4–8.2)
PROT UR STRIP.AUTO-MCNC: ABNORMAL MG/DL
RBC # BLD AUTO: 6.02 X10*6/UL (ref 4.5–5.9)
RBC # UR STRIP.AUTO: NEGATIVE /UL
RBC #/AREA URNS AUTO: ABNORMAL /HPF
SAO2 % BLDV: 79 % (ref 45–75)
SODIUM SERPL-SCNC: 132 MMOL/L (ref 136–145)
SODIUM SERPL-SCNC: 134 MMOL/L (ref 136–145)
SODIUM SERPL-SCNC: 136 MMOL/L (ref 136–145)
SODIUM SERPL-SCNC: 140 MMOL/L (ref 136–145)
SP GR UR STRIP.AUTO: 1.03
UROBILINOGEN UR STRIP.AUTO-MCNC: NORMAL MG/DL
WBC # BLD AUTO: 14.8 X10*3/UL (ref 4.4–11.3)
WBC #/AREA URNS AUTO: ABNORMAL /HPF

## 2024-08-11 PROCEDURE — 82010 KETONE BODYS QUAN: CPT | Performed by: EMERGENCY MEDICINE

## 2024-08-11 PROCEDURE — 2500000004 HC RX 250 GENERAL PHARMACY W/ HCPCS (ALT 636 FOR OP/ED): Performed by: INTERNAL MEDICINE

## 2024-08-11 PROCEDURE — 2500000002 HC RX 250 W HCPCS SELF ADMINISTERED DRUGS (ALT 637 FOR MEDICARE OP, ALT 636 FOR OP/ED)

## 2024-08-11 PROCEDURE — 85025 COMPLETE CBC W/AUTO DIFF WBC: CPT | Performed by: EMERGENCY MEDICINE

## 2024-08-11 PROCEDURE — 96360 HYDRATION IV INFUSION INIT: CPT

## 2024-08-11 PROCEDURE — 80048 BASIC METABOLIC PNL TOTAL CA: CPT | Mod: CCI | Performed by: NURSE PRACTITIONER

## 2024-08-11 PROCEDURE — 96374 THER/PROPH/DIAG INJ IV PUSH: CPT

## 2024-08-11 PROCEDURE — 82947 ASSAY GLUCOSE BLOOD QUANT: CPT

## 2024-08-11 PROCEDURE — 81001 URINALYSIS AUTO W/SCOPE: CPT | Performed by: EMERGENCY MEDICINE

## 2024-08-11 PROCEDURE — 36415 COLL VENOUS BLD VENIPUNCTURE: CPT | Performed by: NURSE PRACTITIONER

## 2024-08-11 PROCEDURE — 80053 COMPREHEN METABOLIC PANEL: CPT | Performed by: EMERGENCY MEDICINE

## 2024-08-11 PROCEDURE — 84100 ASSAY OF PHOSPHORUS: CPT | Performed by: EMERGENCY MEDICINE

## 2024-08-11 PROCEDURE — 80048 BASIC METABOLIC PNL TOTAL CA: CPT | Mod: CCI | Performed by: INTERNAL MEDICINE

## 2024-08-11 PROCEDURE — 2500000005 HC RX 250 GENERAL PHARMACY W/O HCPCS: Performed by: EMERGENCY MEDICINE

## 2024-08-11 PROCEDURE — 82805 BLOOD GASES W/O2 SATURATION: CPT | Performed by: EMERGENCY MEDICINE

## 2024-08-11 PROCEDURE — 2020000001 HC ICU ROOM DAILY

## 2024-08-11 PROCEDURE — 2500000004 HC RX 250 GENERAL PHARMACY W/ HCPCS (ALT 636 FOR OP/ED): Performed by: EMERGENCY MEDICINE

## 2024-08-11 PROCEDURE — 82810 BLOOD GASES O2 SAT ONLY: CPT | Performed by: EMERGENCY MEDICINE

## 2024-08-11 PROCEDURE — 84100 ASSAY OF PHOSPHORUS: CPT | Performed by: INTERNAL MEDICINE

## 2024-08-11 PROCEDURE — 99291 CRITICAL CARE FIRST HOUR: CPT | Performed by: EMERGENCY MEDICINE

## 2024-08-11 PROCEDURE — 99291 CRITICAL CARE FIRST HOUR: CPT | Performed by: INTERNAL MEDICINE

## 2024-08-11 PROCEDURE — 36415 COLL VENOUS BLD VENIPUNCTURE: CPT | Performed by: EMERGENCY MEDICINE

## 2024-08-11 PROCEDURE — 81003 URINALYSIS AUTO W/O SCOPE: CPT | Performed by: EMERGENCY MEDICINE

## 2024-08-11 PROCEDURE — 99223 1ST HOSP IP/OBS HIGH 75: CPT | Performed by: NURSE PRACTITIONER

## 2024-08-11 PROCEDURE — 2500000001 HC RX 250 WO HCPCS SELF ADMINISTERED DRUGS (ALT 637 FOR MEDICARE OP): Performed by: INTERNAL MEDICINE

## 2024-08-11 PROCEDURE — 2500000004 HC RX 250 GENERAL PHARMACY W/ HCPCS (ALT 636 FOR OP/ED): Performed by: NURSE PRACTITIONER

## 2024-08-11 PROCEDURE — 96375 TX/PRO/DX INJ NEW DRUG ADDON: CPT

## 2024-08-11 PROCEDURE — 83735 ASSAY OF MAGNESIUM: CPT | Performed by: EMERGENCY MEDICINE

## 2024-08-11 PROCEDURE — 2500000001 HC RX 250 WO HCPCS SELF ADMINISTERED DRUGS (ALT 637 FOR MEDICARE OP)

## 2024-08-11 RX ORDER — SODIUM,POTASSIUM PHOSPHATES 280-250MG
1 POWDER IN PACKET (EA) ORAL ONCE
Status: COMPLETED | OUTPATIENT
Start: 2024-08-11 | End: 2024-08-11

## 2024-08-11 RX ORDER — DEXTROSE MONOHYDRATE 100 MG/ML
150 INJECTION, SOLUTION INTRAVENOUS CONTINUOUS
Status: DISCONTINUED | OUTPATIENT
Start: 2024-08-11 | End: 2024-08-12

## 2024-08-11 RX ORDER — ACETAMINOPHEN 160 MG/5ML
650 SOLUTION ORAL EVERY 4 HOURS PRN
Status: ACTIVE | OUTPATIENT
Start: 2024-08-11

## 2024-08-11 RX ORDER — ONDANSETRON HYDROCHLORIDE 2 MG/ML
4 INJECTION, SOLUTION INTRAVENOUS EVERY 8 HOURS PRN
Status: ACTIVE | OUTPATIENT
Start: 2024-08-11

## 2024-08-11 RX ORDER — POTASSIUM CHLORIDE 1.5 G/1.58G
20 POWDER, FOR SOLUTION ORAL ONCE
Status: COMPLETED | OUTPATIENT
Start: 2024-08-11 | End: 2024-08-11

## 2024-08-11 RX ORDER — ACETAMINOPHEN 650 MG/1
650 SUPPOSITORY RECTAL EVERY 4 HOURS PRN
Status: ACTIVE | OUTPATIENT
Start: 2024-08-11

## 2024-08-11 RX ORDER — DEXTROSE 50 % IN WATER (D50W) INTRAVENOUS SYRINGE
25
Status: ACTIVE | OUTPATIENT
Start: 2024-08-11

## 2024-08-11 RX ORDER — INSULIN LISPRO 100 [IU]/ML
0-10 INJECTION, SOLUTION INTRAVENOUS; SUBCUTANEOUS
Status: DISPENSED | OUTPATIENT
Start: 2024-08-11

## 2024-08-11 RX ORDER — DEXTROSE MONOHYDRATE 100 MG/ML
150 INJECTION, SOLUTION INTRAVENOUS CONTINUOUS
Status: ACTIVE | OUTPATIENT
Start: 2024-08-11

## 2024-08-11 RX ORDER — ONDANSETRON 4 MG/1
4 TABLET, ORALLY DISINTEGRATING ORAL EVERY 8 HOURS PRN
Status: ACTIVE | OUTPATIENT
Start: 2024-08-11

## 2024-08-11 RX ORDER — DEXTROSE 50 % IN WATER (D50W) INTRAVENOUS SYRINGE
50
Status: ACTIVE | OUTPATIENT
Start: 2024-08-11

## 2024-08-11 RX ORDER — DEXTROSE MONOHYDRATE AND SODIUM CHLORIDE 5; .45 G/100ML; G/100ML
150 INJECTION, SOLUTION INTRAVENOUS CONTINUOUS
Status: DISCONTINUED | OUTPATIENT
Start: 2024-08-11 | End: 2024-08-12

## 2024-08-11 RX ORDER — SODIUM BICARBONATE 1 MEQ/ML
50 SYRINGE (ML) INTRAVENOUS ONCE
Status: COMPLETED | OUTPATIENT
Start: 2024-08-11 | End: 2024-08-11

## 2024-08-11 RX ORDER — POTASSIUM CHLORIDE 14.9 MG/ML
20 INJECTION INTRAVENOUS
Status: COMPLETED | OUTPATIENT
Start: 2024-08-11 | End: 2024-08-11

## 2024-08-11 RX ORDER — ACETAMINOPHEN 325 MG/1
650 TABLET ORAL EVERY 4 HOURS PRN
Status: ACTIVE | OUTPATIENT
Start: 2024-08-11

## 2024-08-11 RX ORDER — DEXTROSE 50 % IN WATER (D50W) INTRAVENOUS SYRINGE
12.5
Status: ACTIVE | OUTPATIENT
Start: 2024-08-11

## 2024-08-11 RX ADMIN — SODIUM BICARBONATE 50 MEQ: 84 INJECTION INTRAVENOUS at 10:53

## 2024-08-11 RX ADMIN — INSULIN HUMAN 10 UNITS/HR: 1 INJECTION, SOLUTION INTRAVENOUS at 10:56

## 2024-08-11 RX ADMIN — POTASSIUM CHLORIDE 20 MEQ: 14.9 INJECTION, SOLUTION INTRAVENOUS at 16:29

## 2024-08-11 RX ADMIN — SODIUM CHLORIDE 250 ML: 9 INJECTION, SOLUTION INTRAVENOUS at 12:46

## 2024-08-11 RX ADMIN — POTASSIUM CHLORIDE 20 MEQ: 14.9 INJECTION, SOLUTION INTRAVENOUS at 12:47

## 2024-08-11 RX ADMIN — DEXTROSE AND SODIUM CHLORIDE 150 ML/HR: 5; 450 INJECTION, SOLUTION INTRAVENOUS at 21:16

## 2024-08-11 RX ADMIN — INSULIN HUMAN 20 UNITS: 100 INJECTION, SUSPENSION SUBCUTANEOUS at 21:56

## 2024-08-11 RX ADMIN — DEXTROSE AND SODIUM CHLORIDE 150 ML/HR: 5; 450 INJECTION, SOLUTION INTRAVENOUS at 12:48

## 2024-08-11 RX ADMIN — SODIUM CHLORIDE 1000 ML: 9 INJECTION, SOLUTION INTRAVENOUS at 09:12

## 2024-08-11 RX ADMIN — POTASSIUM & SODIUM PHOSPHATES POWDER PACK 280-160-250 MG 1 PACKET: 280-160-250 PACK at 19:25

## 2024-08-11 RX ADMIN — SODIUM CHLORIDE 1000 ML: 9 INJECTION, SOLUTION INTRAVENOUS at 10:49

## 2024-08-11 RX ADMIN — DEXTROSE MONOHYDRATE 150 ML/HR: 100 INJECTION, SOLUTION INTRAVENOUS at 17:08

## 2024-08-11 RX ADMIN — POTASSIUM CHLORIDE 20 MEQ: 1.5 POWDER, FOR SOLUTION ORAL at 12:47

## 2024-08-11 RX ADMIN — INSULIN HUMAN 5 UNITS/HR: 1 INJECTION, SOLUTION INTRAVENOUS at 18:00

## 2024-08-11 SDOH — SOCIAL STABILITY: SOCIAL INSECURITY: ABUSE: ADULT

## 2024-08-11 SDOH — SOCIAL STABILITY: SOCIAL INSECURITY: ARE THERE ANY APPARENT SIGNS OF INJURIES/BEHAVIORS THAT COULD BE RELATED TO ABUSE/NEGLECT?: NO

## 2024-08-11 SDOH — SOCIAL STABILITY: SOCIAL INSECURITY: HAS ANYONE EVER THREATENED TO HURT YOUR FAMILY OR YOUR PETS?: NO

## 2024-08-11 SDOH — SOCIAL STABILITY: SOCIAL INSECURITY: WERE YOU ABLE TO COMPLETE ALL THE BEHAVIORAL HEALTH SCREENINGS?: NO

## 2024-08-11 SDOH — SOCIAL STABILITY: SOCIAL INSECURITY: DO YOU FEEL ANYONE HAS EXPLOITED OR TAKEN ADVANTAGE OF YOU FINANCIALLY OR OF YOUR PERSONAL PROPERTY?: NO

## 2024-08-11 SDOH — SOCIAL STABILITY: SOCIAL INSECURITY: DOES ANYONE TRY TO KEEP YOU FROM HAVING/CONTACTING OTHER FRIENDS OR DOING THINGS OUTSIDE YOUR HOME?: NO

## 2024-08-11 SDOH — SOCIAL STABILITY: SOCIAL INSECURITY: ARE YOU OR HAVE YOU BEEN THREATENED OR ABUSED PHYSICALLY, EMOTIONALLY, OR SEXUALLY BY ANYONE?: NO

## 2024-08-11 SDOH — SOCIAL STABILITY: SOCIAL INSECURITY: DO YOU FEEL UNSAFE GOING BACK TO THE PLACE WHERE YOU ARE LIVING?: NO

## 2024-08-11 SDOH — SOCIAL STABILITY: SOCIAL INSECURITY: HAVE YOU HAD THOUGHTS OF HARMING ANYONE ELSE?: NO

## 2024-08-11 ASSESSMENT — ENCOUNTER SYMPTOMS
EYE PAIN: 0
LIGHT-HEADEDNESS: 0
NECK STIFFNESS: 0
VOICE CHANGE: 0
PALPITATIONS: 0
TROUBLE SWALLOWING: 0
NECK PAIN: 0
SORE THROAT: 0
VOMITING: 1
WEAKNESS: 0
POLYDIPSIA: 0
APPETITE CHANGE: 0
ABDOMINAL DISTENTION: 0
HEADACHES: 0
BACK PAIN: 0
COUGH: 0
HALLUCINATIONS: 0
WOUND: 0
BRUISES/BLEEDS EASILY: 0
NAUSEA: 1
FEVER: 0
DYSURIA: 0
CHOKING: 0
CONFUSION: 0
DIFFICULTY URINATING: 0
DIZZINESS: 0
DIARRHEA: 0
BLOOD IN STOOL: 0
POLYPHAGIA: 0
CONSTIPATION: 0
ADENOPATHY: 0
UNEXPECTED WEIGHT CHANGE: 0
SINUS PAIN: 0
CHEST TIGHTNESS: 0
FLANK PAIN: 0
EYE ITCHING: 0
EYE DISCHARGE: 0
FATIGUE: 0
PHOTOPHOBIA: 0
APNEA: 0
SLEEP DISTURBANCE: 0
SPEECH DIFFICULTY: 0
NUMBNESS: 0
ABDOMINAL PAIN: 0
DYSPHORIC MOOD: 0
FREQUENCY: 0
MYALGIAS: 0
SEIZURES: 0
COLOR CHANGE: 0
SHORTNESS OF BREATH: 0
HEMATURIA: 0
CHILLS: 0
ARTHRALGIAS: 0
TREMORS: 0
NERVOUS/ANXIOUS: 0
WHEEZING: 0

## 2024-08-11 ASSESSMENT — COGNITIVE AND FUNCTIONAL STATUS - GENERAL
DAILY ACTIVITIY SCORE: 24
DAILY ACTIVITIY SCORE: 24
MOBILITY SCORE: 24
MOBILITY SCORE: 24
PATIENT BASELINE BEDBOUND: NO

## 2024-08-11 ASSESSMENT — ACTIVITIES OF DAILY LIVING (ADL)
HEARING - RIGHT EAR: FUNCTIONAL
HEARING - LEFT EAR: FUNCTIONAL
ADEQUATE_TO_COMPLETE_ADL: YES
WALKS IN HOME: INDEPENDENT
LACK_OF_TRANSPORTATION: NO
FEEDING YOURSELF: INDEPENDENT
PATIENT'S MEMORY ADEQUATE TO SAFELY COMPLETE DAILY ACTIVITIES?: YES
TOILETING: INDEPENDENT
GROOMING: INDEPENDENT
DRESSING YOURSELF: INDEPENDENT
BATHING: INDEPENDENT
JUDGMENT_ADEQUATE_SAFELY_COMPLETE_DAILY_ACTIVITIES: YES

## 2024-08-11 ASSESSMENT — PAIN - FUNCTIONAL ASSESSMENT
PAIN_FUNCTIONAL_ASSESSMENT: 0-10
PAIN_FUNCTIONAL_ASSESSMENT: 0-10

## 2024-08-11 ASSESSMENT — PAIN SCALES - GENERAL
PAINLEVEL_OUTOF10: 0 - NO PAIN
PAINLEVEL_OUTOF10: 4

## 2024-08-11 ASSESSMENT — LIFESTYLE VARIABLES
AUDIT-C TOTAL SCORE: 0
HOW OFTEN DO YOU HAVE A DRINK CONTAINING ALCOHOL: NEVER
HOW OFTEN DO YOU HAVE 6 OR MORE DRINKS ON ONE OCCASION: NEVER
HOW MANY STANDARD DRINKS CONTAINING ALCOHOL DO YOU HAVE ON A TYPICAL DAY: PATIENT DOES NOT DRINK
AUDIT-C TOTAL SCORE: 0
SKIP TO QUESTIONS 9-10: 1

## 2024-08-11 ASSESSMENT — PATIENT HEALTH QUESTIONNAIRE - PHQ9
1. LITTLE INTEREST OR PLEASURE IN DOING THINGS: NOT AT ALL
2. FEELING DOWN, DEPRESSED OR HOPELESS: NOT AT ALL
SUM OF ALL RESPONSES TO PHQ9 QUESTIONS 1 & 2: 0

## 2024-08-11 ASSESSMENT — PAIN DESCRIPTION - LOCATION: LOCATION: GENERALIZED

## 2024-08-11 NOTE — ED PROVIDER NOTES
"HPI   Chief Complaint   Patient presents with    Hyperglycemia     Pt states \"I am in DKA..... everything hurts... sugar 500 at home...\" pt states pump \"acting normal... but I havent changed it in a while. Also c/o N/V       33-year-old male with a history of diabetes and frequent DKA presents with vomiting for 24 hours and elevated blood glucose. He states \"I am in DKA\". He states that he hasn't changed his pump for an extended period of time. He has been in DKA multiple times and is certain that he is indicated now. Denies any new or concerning features.              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    DIABETIC KETO ACIDOSIS    DKA (diabetic ketoacidosis) (Multi) 07/11/2023    DKA, type 1, not at goal (Multi) 04/21/2023    DM2 (diabetes mellitus, type 2) (Multi) 11/15/2023    DIABETES    Elevated blood sugar 11/15/2023    ELEVATED BLOOD SUGAR/ 375 LAST CHECK    Elevated blood-pressure reading, without diagnosis of hypertension 09/09/2020    Elevated blood pressure reading    Fatigue 07/11/2023    Generalized anxiety disorder 07/11/2023    Microalbuminuria due to type 1 diabetes mellitus (Multi) 07/11/2023    Personal history of COVID-19 04/02/2023    Poorly controlled diabetes mellitus (Multi) 07/11/2023    Type 1 diabetes mellitus (Multi) 07/11/2023    Type 1 diabetes mellitus with hyperglycemia (Multi) 02/25/2019    Vitamin B12 deficiency 07/11/2023    Vitamin D deficiency 07/11/2023    Vomiting 11/15/2023    VOMITING HEADACHE BACK ACHE     History reviewed. No pertinent surgical history.  Family History   Problem Relation Name Age of Onset    No Known Problems Mother      No Known Problems Father      Hypertension Other      Coronary artery disease " Other      Diabetes Other      Heart failure Other       Social History     Tobacco Use    Smoking status: Never    Smokeless tobacco: Never   Vaping Use    Vaping status: Former   Substance Use Topics    Alcohol use: Never    Drug use: Never       Physical Exam   ED Triage Vitals [08/11/24 0824]   Temperature Heart Rate Respirations BP   37.2 °C (98.9 °F) (!) 129 (!) 22 131/78      Pulse Ox Temp Source Heart Rate Source Patient Position   100 % Tympanic Monitor Sitting      BP Location FiO2 (%)     Left arm --       Physical Exam  Vitals and nursing note reviewed.   Constitutional:       General: He is in acute distress.      Appearance: He is well-developed.   HENT:      Head: Normocephalic and atraumatic.      Mouth/Throat:      Mouth: Mucous membranes are dry.   Eyes:      Conjunctiva/sclera: Conjunctivae normal.   Cardiovascular:      Rate and Rhythm: Normal rate and regular rhythm.      Heart sounds: No murmur heard.  Pulmonary:      Effort: Pulmonary effort is normal. No respiratory distress.      Breath sounds: Normal breath sounds.   Abdominal:      Palpations: Abdomen is soft.      Tenderness: There is no abdominal tenderness.   Musculoskeletal:         General: No swelling.      Cervical back: Neck supple.   Skin:     General: Skin is warm and dry.      Capillary Refill: Capillary refill takes less than 2 seconds.   Neurological:      Mental Status: He is alert.   Psychiatric:         Mood and Affect: Mood normal.           ED Course & MDM   ED Course as of 08/13/24 1431   Sun Aug 11, 2024   1022 Angel 454. Bicarb is six. Creatinine 1.45. Potassium 5.3. PH 6.961 of EBG. 11.26 beta hydroxy butyrate. Magnesium normal. Phosphorus pending. Was given 2 L of IV fluid. 2 A of bicarb. Started on insulin drip. Will be admitted to the ICU. [CD]      ED Course User Index  [CD] Eron Drew MD         Diagnoses as of 08/13/24 1431   Diabetic ketoacidosis without coma associated with type 2 diabetes mellitus  (Multi)   Diabetic ketoacidosis without coma associated with type 1 diabetes mellitus (Multi)                 No data recorded     Coleman Coma Scale Score: 15 (08/12/24 2000 : Ellie Lopez RN)                           Medical Decision Making      Procedure  Critical Care    Performed by: Eron Drew MD  Authorized by: Eron Drew MD    Critical care provider statement:     Critical care time (minutes):  35    Critical care was necessary to treat or prevent imminent or life-threatening deterioration of the following conditions:  Endocrine crisis    Critical care was time spent personally by me on the following activities:  Review of old charts, vascular access procedures, ordering and review of radiographic studies, ordering and review of laboratory studies, ordering and performing treatments and interventions, discussions with consultants and examination of patient    Care discussed with: admitting provider         Eron Drew MD  08/11/24 1024       Eron rDew MD  08/13/24 3010

## 2024-08-11 NOTE — PROGRESS NOTES
"Ed Sanon is a 33 y.o. male admitted for No Principal Problem: There is no principal problem currently on the Problem List. Please update the Problem List and refresh.. Pharmacy reviewed the patient's [id number] medications and allergies for accuracy.    The list below reflects the PTA list prior to pharmacy medication history. A summary a changes to the PTA medication list has been listed below. Please review each medication in order reconciliation for additional clarification and justification.    Source of information:  PATIENT    Medications added:    Medications modified:    Medications to be removed:  LANTUS  LISINOPRIL    Medications of concern:      Prior to Admission Medications   Prescriptions Last Dose Informant Patient Reported? Taking?   Guardian 4 Glucose Sensor device   No No   Sig: Change insulin sensor every 7 days as directed, linked to Minimed insulin pump, check with endocrinology for updated regimen   blood sugar diagnostic strip   Yes No   Sig: Use 2-3 times per day   insulin glargine (Lantus U-100 Insulin) 100 unit/mL injection   No No   Sig: Inject 30 Units under the skin once daily at bedtime. IF insulin pump is off.   insulin lispro (HumaLOG) 100 unit/mL injection   Yes No   Sig: USE VIA INSULIN PUMP, USING UP  UNITS PER DAY   lancets misc   Yes No   Sig: Inject 1 Units under the skin 2 times a day. Sliding scale   lisinopril 5 mg tablet   Yes No   Sig: Take 1 tablet (5 mg) by mouth once daily.   pen needle, diabetic 32 gauge x 5/32\" needle   Yes No   Sig: Use as instructed 4 times daily      Facility-Administered Medications: None       Nanette Santoro       "

## 2024-08-11 NOTE — H&P
History Obtained From: Patient    History Of Present Illness:  Ed Sanon is a 33 y.o. male with PMHx s/f Type 1 diabetes, hx recurrent admissions for DKA,  anxiety and depression presenting with elevated blood glucose nausea and vomiting .  The patient presented to emergency department with chief complaint of I am in DKA.  He had had some issues with nausea vomiting.  He had noted that his blood glucose is difficult to control.  He has been hospitalized many times for DKA.  Patient is supposed to be on insulin pump.  He states that he just cannot get it right.  Patient had told the ER provider that he had not changed the pump in quite some time it is unclear if that meant that his cartridge was empty and he had not been taking any insulin at all.  The patient does not very forthcoming with full information describing situation.  He appears to be upset about having recently lost his employment.  He denies any recent fevers or chills any cough or purulent sputum any urinary symptoms of dysuria urgency or frequency.  Patient is not experiencing any new sores or rashes.  No abdominal pain or diarrhea.  In the emergency department patient's temperature was 98.9 heart rate 129 respiratory rate 22 blood pressure 131/78.  Initial chemistry panel showed glucose 454 sodium 134 potassium 5.3 chloride 98 BUN 16 bicarb 6 anion gap 35 creatinine 1.45 liver enzymes within normal limits magnesium 2.21 beta high Droxia butyrate 11.26 CBC with white blood cell count 14.8 hemoglobin 18.7 hematocrit 55.7 platelets 230.  A venous blood gas was done the pH is 6.96 pCO2 26 S02 79 urine analysis was obtained is negative for leukocyte esterase or nitrites.  4+ ketones were present.  Patient was given IV fluid bolus with normal saline started on insulin drip the ED provider spoke to the ICU patient will be admitted to intensive care unit to continue the insulin drip and normalized patient's electrolytes.      ED Course:  ED Course as  of 08/11/24 1232   Sun Aug 11, 2024   1022 Angel 454. Bicarb is six. Creatinine 1.45. Potassium 5.3. PH 6.961 of EBG. 11.26 beta hydroxy butyrate. Magnesium normal. Phosphorus pending. Was given 2 L of IV fluid. 2 A of bicarb. Started on insulin drip. Will be admitted to the ICU. [CD]      ED Course User Index  [CD] Eron Drew MD         Diagnoses as of 08/11/24 1232   Diabetic ketoacidosis without coma associated with type 2 diabetes mellitus (Multi)     Relevant Results  Results for orders placed or performed during the hospital encounter of 08/11/24 (from the past 24 hour(s))   POCT GLUCOSE   Result Value Ref Range    POCT Glucose 472 (H) 74 - 99 mg/dL   POCT GLUCOSE   Result Value Ref Range    POCT Glucose 406 (H) 74 - 99 mg/dL   Beta Hydroxybutyrate   Result Value Ref Range    Beta-Hydroxybutyrate 11.26 (H) 0.02 - 0.27 mmol/L   Magnesium   Result Value Ref Range    Magnesium 2.21 1.60 - 2.40 mg/dL   Blood Gas Venous   Result Value Ref Range    POCT pH, Venous 6.96 (LL) 7.33 - 7.43 pH    POCT pCO2, Venous 26 (L) 41 - 51 mm Hg    POCT pO2, Venous 51 (H) 35 - 45 mm Hg    POCT SO2, Venous 79 (H) 45 - 75 %    POCT Oxy Hemoglobin, Venous 77.5 (H) 45.0 - 75.0 %    POCT Base Excess, Venous -25.1 (L) -2.0 - 3.0 mmol/L    POCT HCO3 Calculated, Venous 5.8 (L) 22.0 - 26.0 mmol/L    Patient Temperature 37.0 degrees Celsius    FiO2 21 %   Comprehensive Metabolic Panel   Result Value Ref Range    Glucose 454 (HH) 74 - 99 mg/dL    Sodium 134 (L) 136 - 145 mmol/L    Potassium 5.3 3.5 - 5.3 mmol/L    Chloride 98 98 - 107 mmol/L    Bicarbonate 6 (LL) 21 - 32 mmol/L    Anion Gap 35 (H) 10 - 20 mmol/L    Urea Nitrogen 16 6 - 23 mg/dL    Creatinine 1.45 (H) 0.50 - 1.30 mg/dL    eGFR 64 >60 mL/min/1.73m*2    Calcium 9.8 8.6 - 10.3 mg/dL    Albumin 5.1 (H) 3.4 - 5.0 g/dL    Alkaline Phosphatase 96 33 - 120 U/L    Total Protein 8.5 (H) 6.4 - 8.2 g/dL    AST 15 9 - 39 U/L    Bilirubin, Total 0.5 0.0 - 1.2 mg/dL    ALT 20 10 -  52 U/L   CBC and Auto Differential   Result Value Ref Range    WBC 14.8 (H) 4.4 - 11.3 x10*3/uL    nRBC 0.0 0.0 - 0.0 /100 WBCs    RBC 6.02 (H) 4.50 - 5.90 x10*6/uL    Hemoglobin 18.7 (H) 13.5 - 17.5 g/dL    Hematocrit 55.7 (H) 41.0 - 52.0 %    MCV 93 80 - 100 fL    MCH 31.1 26.0 - 34.0 pg    MCHC 33.6 32.0 - 36.0 g/dL    RDW 12.8 11.5 - 14.5 %    Platelets 230 150 - 450 x10*3/uL    Neutrophils % 83.5 40.0 - 80.0 %    Immature Granulocytes %, Automated 1.5 (H) 0.0 - 0.9 %    Lymphocytes % 9.1 13.0 - 44.0 %    Monocytes % 5.3 2.0 - 10.0 %    Eosinophils % 0.0 0.0 - 6.0 %    Basophils % 0.6 0.0 - 2.0 %    Neutrophils Absolute 12.33 (H) 1.20 - 7.70 x10*3/uL    Immature Granulocytes Absolute, Automated 0.22 0.00 - 0.70 x10*3/uL    Lymphocytes Absolute 1.34 1.20 - 4.80 x10*3/uL    Monocytes Absolute 0.79 0.10 - 1.00 x10*3/uL    Eosinophils Absolute 0.00 0.00 - 0.70 x10*3/uL    Basophils Absolute 0.09 0.00 - 0.10 x10*3/uL   Phosphorus   Result Value Ref Range    Phosphorus 4.8 2.5 - 4.9 mg/dL   Urinalysis with Reflex Microscopic   Result Value Ref Range    Color, Urine Colorless (N) Light-Yellow, Yellow, Dark-Yellow    Appearance, Urine Clear Clear    Specific Gravity, Urine 1.030 1.005 - 1.035    pH, Urine 5.0 5.0, 5.5, 6.0, 6.5, 7.0, 7.5, 8.0    Protein, Urine 30 (1+) (A) NEGATIVE, 10 (TRACE), 20 (TRACE) mg/dL    Glucose, Urine OVER (4+) (A) Normal mg/dL    Blood, Urine NEGATIVE NEGATIVE    Ketones, Urine OVER (4+) (A) NEGATIVE mg/dL    Bilirubin, Urine NEGATIVE NEGATIVE    Urobilinogen, Urine Normal Normal mg/dL    Nitrite, Urine NEGATIVE NEGATIVE    Leukocyte Esterase, Urine NEGATIVE NEGATIVE   Microscopic Only, Urine   Result Value Ref Range    WBC, Urine NONE 1-5, NONE /HPF    RBC, Urine NONE NONE, 1-2, 3-5 /HPF    Mucus, Urine FEW Reference range not established. /LPF    Hyaline Casts, Urine 1+ (A) NONE /LPF   POCT GLUCOSE   Result Value Ref Range    POCT Glucose 395 (H) 74 - 99 mg/dL   POCT GLUCOSE   Result Value  Ref Range    POCT Glucose 256 (H) 74 - 99 mg/dL   Basic metabolic panel   Result Value Ref Range    Glucose 264 (H) 74 - 99 mg/dL    Sodium 140 136 - 145 mmol/L    Potassium 3.7 3.5 - 5.3 mmol/L    Chloride 111 (H) 98 - 107 mmol/L    Bicarbonate 5 (LL) 21 - 32 mmol/L    Anion Gap 28 (H) 10 - 20 mmol/L    Urea Nitrogen 14 6 - 23 mg/dL    Creatinine 1.10 0.50 - 1.30 mg/dL    eGFR 90 >60 mL/min/1.73m*2    Calcium 7.8 (L) 8.6 - 10.3 mg/dL      No results found.   Scheduled medications:  sodium chloride, 250 mL, intravenous, Once      Continuous medications:  dextrose 10 % in water (D10W), 150 mL/hr  dextrose 10 % in water (D10W), 150 mL/hr  dextrose 5%-0.45 % sodium chloride, 150 mL/hr  insulin regular, 0-50 Units/hr, Last Rate: 10 Units/hr (08/11/24 1056)      PRN medications:  PRN medications: acetaminophen **OR** acetaminophen **OR** acetaminophen, dextrose, insulin regular, ondansetron ODT **OR** ondansetron      Past Medical History  He has a past medical history of Abdominal pain (04/21/2023), Anxiety and depression (08/08/2021), Contact with and (suspected) exposure to covid-19 (04/21/2023), COVID-19 virus infection (07/11/2023), Diabetes mellitus (Multi), Diabetic acetonemia (Multi) (11/15/2023), DKA (diabetic ketoacidosis) (Multi) (07/11/2023), DKA, type 1, not at goal (Multi) (04/21/2023), DM2 (diabetes mellitus, type 2) (Multi) (11/15/2023), Elevated blood sugar (11/15/2023), Elevated blood-pressure reading, without diagnosis of hypertension (09/09/2020), Fatigue (07/11/2023), Generalized anxiety disorder (07/11/2023), Microalbuminuria due to type 1 diabetes mellitus (Multi) (07/11/2023), Personal history of COVID-19 (04/02/2023), Poorly controlled diabetes mellitus (Multi) (07/11/2023), Type 1 diabetes mellitus (Multi) (07/11/2023), Type 1 diabetes mellitus with hyperglycemia (Multi) (02/25/2019), Vitamin B12 deficiency (07/11/2023), Vitamin D deficiency (07/11/2023), and Vomiting (11/15/2023).    Surgical  History  He has no past surgical history on file.     Social History  He reports that he has never smoked. He has never used smokeless tobacco. He reports that he does not drink alcohol and does not use drugs.    Family History  Family History   Problem Relation Name Age of Onset    No Known Problems Mother      No Known Problems Father      Hypertension Other      Coronary artery disease Other      Diabetes Other      Heart failure Other          Allergies  Patient has no known allergies.    Code Status  Full Code     Review of Systems   Constitutional:  Negative for appetite change, chills, fatigue, fever and unexpected weight change.   HENT:  Negative for congestion, ear discharge, ear pain, mouth sores, nosebleeds, sinus pain, sore throat, trouble swallowing and voice change.    Eyes:  Negative for photophobia, pain, discharge, itching and visual disturbance.   Respiratory:  Negative for apnea, cough, choking, chest tightness, shortness of breath and wheezing.    Cardiovascular:  Negative for chest pain, palpitations and leg swelling.   Gastrointestinal:  Positive for nausea and vomiting. Negative for abdominal distention, abdominal pain, blood in stool, constipation and diarrhea.   Endocrine: Negative for cold intolerance, heat intolerance, polydipsia, polyphagia and polyuria.   Genitourinary:  Negative for decreased urine volume, difficulty urinating, dysuria, flank pain, frequency, hematuria and urgency.   Musculoskeletal:  Negative for arthralgias, back pain, gait problem, myalgias, neck pain and neck stiffness.   Skin:  Negative for color change, pallor and wound.   Allergic/Immunologic: Negative for food allergies and immunocompromised state.   Neurological:  Negative for dizziness, tremors, seizures, syncope, speech difficulty, weakness, light-headedness, numbness and headaches.   Hematological:  Negative for adenopathy. Does not bruise/bleed easily.   Psychiatric/Behavioral:  Negative for confusion,  dysphoric mood, hallucinations, sleep disturbance and suicidal ideas. The patient is not nervous/anxious.        Last Recorded Vitals  /87   Pulse (!) 129   Temp 36.8 °C (98.2 °F)   Resp 19   Wt 72.6 kg (160 lb)   SpO2 100%      Physical Exam  Vitals reviewed.   Constitutional:       General: He is not in acute distress.     Appearance: He is ill-appearing.   HENT:      Head: Normocephalic and atraumatic.      Right Ear: External ear normal.      Left Ear: External ear normal.      Nose: Nose normal.      Mouth/Throat:      Mouth: Mucous membranes are moist.      Pharynx: Oropharynx is clear.   Eyes:      General: No scleral icterus.     Extraocular Movements: Extraocular movements intact.      Conjunctiva/sclera: Conjunctivae normal.      Pupils: Pupils are equal, round, and reactive to light.   Neck:      Vascular: No carotid bruit.   Cardiovascular:      Rate and Rhythm: Regular rhythm. Tachycardia present.      Pulses: Normal pulses.      Heart sounds: Normal heart sounds. No murmur heard.  Pulmonary:      Effort: Pulmonary effort is normal. No respiratory distress.      Breath sounds: Normal breath sounds. No wheezing, rhonchi or rales.   Chest:      Chest wall: No tenderness.   Abdominal:      General: Bowel sounds are normal. There is no distension.      Palpations: Abdomen is soft. There is no mass.      Tenderness: There is no abdominal tenderness. There is no rebound.   Musculoskeletal:         General: No swelling or deformity. Normal range of motion.      Cervical back: Normal range of motion.      Right lower leg: No edema.      Left lower leg: No edema.   Skin:     General: Skin is warm and dry.      Capillary Refill: Capillary refill takes less than 2 seconds.   Neurological:      General: No focal deficit present.      Mental Status: He is alert and oriented to person, place, and time.      Cranial Nerves: No cranial nerve deficit.   Psychiatric:         Mood and Affect: Mood normal.          Behavior: Behavior normal.         Assessment/Plan   Principal Problem:    Diabetic ketoacidosis without coma associated with type 2 diabetes mellitus (Multi)    Diabetic ketoacidosis  Secondary to noncompliance with medical therapy  Continue aggressive IV hydration and insulin per DKA protocol  Patient admitted to ICU, will did request an additional BN peptide from emergency department  Request consultation from intensivist    Anxiety depression  Will review and reconcile patient's home medications      No new Assessment & Plan notes have been filed under this hospital service since the last note was generated.  Service: Internal Medicine          Hayden Rice, APRN-CNP    Dragon dictation software was used to dictate this note and thus there may be minor errors in translation/transcription including garbled speech or misspellings. Please contact for clarification if needed.

## 2024-08-11 NOTE — CONSULTS
Critical Care Medicine Consult      Reason For Consult  DKA    History Of Present Illness  Ed Sanon is a 33 y.o. male very well-known to the critical care medicine service at this hospital for multiple, multiple hospitalizations and presentations with diabetic ketoacidosis since he does not comply with a proper outpatient management regimen.  Multiple times I have warned this patient that he is going to die prematurely, and/or become blind, develop renal failure, and/or lose his feet or otherwise have poor wound healing because of his poor attention and compliance with his diabetes mellitus treatment.  I also suggested to him that he have a permanent access such as a port implanted since his care plan consists of returning to the hospital every few weeks for an insulin infusion, and his veins are becoming very scarce and difficult access.    Past Medical History:   Diagnosis Date    Abdominal pain 04/21/2023    Anxiety and depression 08/08/2021    Contact with and (suspected) exposure to covid-19 04/21/2023    COVID-19 virus infection 07/11/2023    Diabetes mellitus (Multi)     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    DIABETIC KETO ACIDOSIS    DKA (diabetic ketoacidosis) (Multi) 07/11/2023    DKA, type 1, not at goal (Multi) 04/21/2023    DM2 (diabetes mellitus, type 2) (Multi) 11/15/2023    DIABETES    Elevated blood sugar 11/15/2023    ELEVATED BLOOD SUGAR/ 375 LAST CHECK    Elevated blood-pressure reading, without diagnosis of hypertension 09/09/2020    Elevated blood pressure reading    Fatigue 07/11/2023    Generalized anxiety disorder 07/11/2023    Microalbuminuria due to type 1 diabetes mellitus (Multi) 07/11/2023    Personal history of COVID-19 04/02/2023    Poorly controlled diabetes mellitus (Multi) 07/11/2023    Type 1 diabetes mellitus (Multi) 07/11/2023    Type 1 diabetes mellitus with hyperglycemia (Multi) 02/25/2019    Vitamin B12 deficiency 07/11/2023    Vitamin D deficiency 07/11/2023     "Vomiting 11/15/2023    VOMITING HEADACHE BACK ACHE     History reviewed. No pertinent surgical history.  Medications Prior to Admission   Medication Sig Dispense Refill Last Dose    blood sugar diagnostic strip Use 2-3 times per day       Guardian 4 Glucose Sensor device Change insulin sensor every 7 days as directed, linked to Minimed insulin pump, check with endocrinology for updated regimen 13 each 2     insulin glargine (Lantus U-100 Insulin) 100 unit/mL injection Inject 30 Units under the skin once daily at bedtime. IF insulin pump is off. 10 mL 0     insulin lispro (HumaLOG) 100 unit/mL injection USE VIA INSULIN PUMP, USING UP  UNITS PER DAY       lancets misc Inject 1 Units under the skin 2 times a day. Sliding scale       pen needle, diabetic 32 gauge x 5/32\" needle Use as instructed 4 times daily        Allergies:  Patient has no known allergies.  Social History     Tobacco Use    Smoking status: Never    Smokeless tobacco: Never   Vaping Use    Vaping status: Former   Substance Use Topics    Alcohol use: Never    Drug use: Never     Family History   Problem Relation Name Age of Onset    No Known Problems Mother      No Known Problems Father      Hypertension Other      Coronary artery disease Other      Diabetes Other      Heart failure Other         Scheduled Medications:   sodium chloride, 250 mL, intravenous, Once         Continuous Medications:   dextrose 10 % in water (D10W), 150 mL/hr  dextrose 10 % in water (D10W), 150 mL/hr  dextrose 5%-0.45 % sodium chloride, 150 mL/hr  insulin regular, 0-50 Units/hr, Last Rate: 10 Units/hr (08/11/24 1056)         PRN Medications:   PRN medications: acetaminophen **OR** acetaminophen **OR** acetaminophen, dextrose, insulin regular, ondansetron ODT **OR** ondansetron    Review of Systems:  Review of Systems   Constitutional:  Negative for fever.   HENT:  Negative for nosebleeds.    Eyes:  Positive for visual disturbance.   Respiratory:  Negative for shortness " of breath.    Cardiovascular:  Negative for chest pain.   Gastrointestinal:  Positive for nausea.   Genitourinary:  Negative for hematuria.   All other systems reviewed and are negative.       Objective   Vitals:  Most Recent:  Vitals:    08/11/24 1200   BP:    Pulse:    Resp:    Temp: 36.8 °C (98.2 °F)   SpO2:        24hr Min/Max:  Temp  Min: 36.8 °C (98.2 °F)  Max: 37.2 °C (98.9 °F)  Pulse  Min: 125  Max: 133  BP  Min: 131/78  Max: 153/92  Resp  Min: 16  Max: 25  SpO2  Min: 91 %  Max: 100 %          Intake/Output Summary (Last 24 hours) at 8/11/2024 1220  Last data filed at 8/11/2024 1024  Gross per 24 hour   Intake 1000 ml   Output --   Net 1000 ml           Physical exam:    Physical Exam  Constitutional:       General: He is not in acute distress.     Appearance: He is ill-appearing. He is not toxic-appearing or diaphoretic.   HENT:      Head: Normocephalic and atraumatic.      Mouth/Throat:      Mouth: Mucous membranes are dry.   Eyes:      General: No scleral icterus.  Cardiovascular:      Rate and Rhythm: Regular rhythm. Tachycardia present.      Heart sounds: No murmur heard.  Pulmonary:      Effort: No respiratory distress.      Breath sounds: Normal breath sounds.   Abdominal:      Palpations: Abdomen is soft.      Tenderness: There is no guarding or rebound.      Comments: Scaphoid abdomen   Musculoskeletal:      Cervical back: Normal range of motion and neck supple. No rigidity.      Right lower leg: No edema.      Left lower leg: No edema.   Skin:     General: Skin is dry.      Findings: No rash.   Neurological:      Mental Status: He is alert. Mental status is at baseline.          Lab/Radiology/Diagnostic Review:  Results for orders placed or performed during the hospital encounter of 08/11/24 (from the past 24 hour(s))   POCT GLUCOSE   Result Value Ref Range    POCT Glucose 472 (H) 74 - 99 mg/dL   POCT GLUCOSE   Result Value Ref Range    POCT Glucose 406 (H) 74 - 99 mg/dL   Beta Hydroxybutyrate    Result Value Ref Range    Beta-Hydroxybutyrate 11.26 (H) 0.02 - 0.27 mmol/L   Magnesium   Result Value Ref Range    Magnesium 2.21 1.60 - 2.40 mg/dL   Blood Gas Venous   Result Value Ref Range    POCT pH, Venous 6.96 (LL) 7.33 - 7.43 pH    POCT pCO2, Venous 26 (L) 41 - 51 mm Hg    POCT pO2, Venous 51 (H) 35 - 45 mm Hg    POCT SO2, Venous 79 (H) 45 - 75 %    POCT Oxy Hemoglobin, Venous 77.5 (H) 45.0 - 75.0 %    POCT Base Excess, Venous -25.1 (L) -2.0 - 3.0 mmol/L    POCT HCO3 Calculated, Venous 5.8 (L) 22.0 - 26.0 mmol/L    Patient Temperature 37.0 degrees Celsius    FiO2 21 %   Comprehensive Metabolic Panel   Result Value Ref Range    Glucose 454 (HH) 74 - 99 mg/dL    Sodium 134 (L) 136 - 145 mmol/L    Potassium 5.3 3.5 - 5.3 mmol/L    Chloride 98 98 - 107 mmol/L    Bicarbonate 6 (LL) 21 - 32 mmol/L    Anion Gap 35 (H) 10 - 20 mmol/L    Urea Nitrogen 16 6 - 23 mg/dL    Creatinine 1.45 (H) 0.50 - 1.30 mg/dL    eGFR 64 >60 mL/min/1.73m*2    Calcium 9.8 8.6 - 10.3 mg/dL    Albumin 5.1 (H) 3.4 - 5.0 g/dL    Alkaline Phosphatase 96 33 - 120 U/L    Total Protein 8.5 (H) 6.4 - 8.2 g/dL    AST 15 9 - 39 U/L    Bilirubin, Total 0.5 0.0 - 1.2 mg/dL    ALT 20 10 - 52 U/L   CBC and Auto Differential   Result Value Ref Range    WBC 14.8 (H) 4.4 - 11.3 x10*3/uL    nRBC 0.0 0.0 - 0.0 /100 WBCs    RBC 6.02 (H) 4.50 - 5.90 x10*6/uL    Hemoglobin 18.7 (H) 13.5 - 17.5 g/dL    Hematocrit 55.7 (H) 41.0 - 52.0 %    MCV 93 80 - 100 fL    MCH 31.1 26.0 - 34.0 pg    MCHC 33.6 32.0 - 36.0 g/dL    RDW 12.8 11.5 - 14.5 %    Platelets 230 150 - 450 x10*3/uL    Neutrophils % 83.5 40.0 - 80.0 %    Immature Granulocytes %, Automated 1.5 (H) 0.0 - 0.9 %    Lymphocytes % 9.1 13.0 - 44.0 %    Monocytes % 5.3 2.0 - 10.0 %    Eosinophils % 0.0 0.0 - 6.0 %    Basophils % 0.6 0.0 - 2.0 %    Neutrophils Absolute 12.33 (H) 1.20 - 7.70 x10*3/uL    Immature Granulocytes Absolute, Automated 0.22 0.00 - 0.70 x10*3/uL    Lymphocytes Absolute 1.34 1.20 - 4.80  x10*3/uL    Monocytes Absolute 0.79 0.10 - 1.00 x10*3/uL    Eosinophils Absolute 0.00 0.00 - 0.70 x10*3/uL    Basophils Absolute 0.09 0.00 - 0.10 x10*3/uL   Phosphorus   Result Value Ref Range    Phosphorus 4.8 2.5 - 4.9 mg/dL   Urinalysis with Reflex Microscopic   Result Value Ref Range    Color, Urine Colorless (N) Light-Yellow, Yellow, Dark-Yellow    Appearance, Urine Clear Clear    Specific Gravity, Urine 1.030 1.005 - 1.035    pH, Urine 5.0 5.0, 5.5, 6.0, 6.5, 7.0, 7.5, 8.0    Protein, Urine 30 (1+) (A) NEGATIVE, 10 (TRACE), 20 (TRACE) mg/dL    Glucose, Urine OVER (4+) (A) Normal mg/dL    Blood, Urine NEGATIVE NEGATIVE    Ketones, Urine OVER (4+) (A) NEGATIVE mg/dL    Bilirubin, Urine NEGATIVE NEGATIVE    Urobilinogen, Urine Normal Normal mg/dL    Nitrite, Urine NEGATIVE NEGATIVE    Leukocyte Esterase, Urine NEGATIVE NEGATIVE   Microscopic Only, Urine   Result Value Ref Range    WBC, Urine NONE 1-5, NONE /HPF    RBC, Urine NONE NONE, 1-2, 3-5 /HPF    Mucus, Urine FEW Reference range not established. /LPF    Hyaline Casts, Urine 1+ (A) NONE /LPF   POCT GLUCOSE   Result Value Ref Range    POCT Glucose 395 (H) 74 - 99 mg/dL   POCT GLUCOSE   Result Value Ref Range    POCT Glucose 256 (H) 74 - 99 mg/dL     No results found.    Assessment/Plan   Principal Problem:    Diabetic ketoacidosis without coma associated with type 2 diabetes mellitus (Multi)    Diabetic ketoacidosis due to nonadherence with proper regimen.  Longstanding problem.  No satisfactory long-term solution seems evident.    Insulin infusion until gap closes.  IV fluids--patient has been under resuscitated, only received 2 L of fluid in the emergency department.  Frequent measurement of blood chemistries and supplementation of minerals as needed.  N.p.o. for now.  Endocrinology consultation.  Track labs especially with attention to creatinine.    I spent 42 minutes of cumulative critical care time with the patient.  Greater than 50% of that time was  spent in the direct collaboration and or coordination of care of the patient.     Dragon dictation software was used to dictate this note and thus there may be minor errors in translation/transcription including garbled speech or misspellings. Please contact for clarification if needed. Inpatient consult to Intensivist  Consult performed by: Bari Newby MD  Consult ordered by: Hayden Rice APRN-CNP      Bari Newby MD

## 2024-08-12 VITALS
OXYGEN SATURATION: 98 % | SYSTOLIC BLOOD PRESSURE: 119 MMHG | RESPIRATION RATE: 19 BRPM | WEIGHT: 160 LBS | TEMPERATURE: 98.2 F | DIASTOLIC BLOOD PRESSURE: 79 MMHG | HEIGHT: 70 IN | HEART RATE: 98 BPM | BODY MASS INDEX: 22.9 KG/M2

## 2024-08-12 LAB
ALBUMIN SERPL BCP-MCNC: 3.3 G/DL (ref 3.4–5)
ALP SERPL-CCNC: 53 U/L (ref 33–120)
ALT SERPL W P-5'-P-CCNC: 12 U/L (ref 10–52)
ANION GAP SERPL CALC-SCNC: 16 MMOL/L (ref 10–20)
AST SERPL W P-5'-P-CCNC: 12 U/L (ref 9–39)
BASOPHILS # BLD AUTO: 0.05 X10*3/UL (ref 0–0.1)
BASOPHILS NFR BLD AUTO: 0.6 %
BILIRUB SERPL-MCNC: 0.6 MG/DL (ref 0–1.2)
BUN SERPL-MCNC: 17 MG/DL (ref 6–23)
CALCIUM SERPL-MCNC: 7.8 MG/DL (ref 8.6–10.3)
CHLORIDE SERPL-SCNC: 109 MMOL/L (ref 98–107)
CO2 SERPL-SCNC: 14 MMOL/L (ref 21–32)
CREAT SERPL-MCNC: 0.82 MG/DL (ref 0.5–1.3)
EGFRCR SERPLBLD CKD-EPI 2021: >90 ML/MIN/1.73M*2
EOSINOPHIL # BLD AUTO: 0.08 X10*3/UL (ref 0–0.7)
EOSINOPHIL NFR BLD AUTO: 1 %
ERYTHROCYTE [DISTWIDTH] IN BLOOD BY AUTOMATED COUNT: 12.5 % (ref 11.5–14.5)
GLUCOSE BLD MANUAL STRIP-MCNC: 126 MG/DL (ref 74–99)
GLUCOSE BLD MANUAL STRIP-MCNC: 141 MG/DL (ref 74–99)
GLUCOSE BLD MANUAL STRIP-MCNC: 264 MG/DL (ref 74–99)
GLUCOSE BLD MANUAL STRIP-MCNC: 283 MG/DL (ref 74–99)
GLUCOSE BLD MANUAL STRIP-MCNC: 342 MG/DL (ref 74–99)
GLUCOSE BLD MANUAL STRIP-MCNC: 379 MG/DL (ref 74–99)
GLUCOSE SERPL-MCNC: 261 MG/DL (ref 74–99)
HCT VFR BLD AUTO: 42.2 % (ref 41–52)
HGB BLD-MCNC: 14.7 G/DL (ref 13.5–17.5)
IMM GRANULOCYTES # BLD AUTO: 0.03 X10*3/UL (ref 0–0.7)
IMM GRANULOCYTES NFR BLD AUTO: 0.4 % (ref 0–0.9)
LYMPHOCYTES # BLD AUTO: 1.9 X10*3/UL (ref 1.2–4.8)
LYMPHOCYTES NFR BLD AUTO: 24.1 %
MCH RBC QN AUTO: 30.9 PG (ref 26–34)
MCHC RBC AUTO-ENTMCNC: 34.8 G/DL (ref 32–36)
MCV RBC AUTO: 89 FL (ref 80–100)
MONOCYTES # BLD AUTO: 0.71 X10*3/UL (ref 0.1–1)
MONOCYTES NFR BLD AUTO: 9 %
NEUTROPHILS # BLD AUTO: 5.1 X10*3/UL (ref 1.2–7.7)
NEUTROPHILS NFR BLD AUTO: 64.9 %
NRBC BLD-RTO: 0 /100 WBCS (ref 0–0)
PLATELET # BLD AUTO: 170 X10*3/UL (ref 150–450)
POTASSIUM SERPL-SCNC: 4.1 MMOL/L (ref 3.5–5.3)
PROT SERPL-MCNC: 5.2 G/DL (ref 6.4–8.2)
RBC # BLD AUTO: 4.75 X10*6/UL (ref 4.5–5.9)
SODIUM SERPL-SCNC: 135 MMOL/L (ref 136–145)
WBC # BLD AUTO: 7.9 X10*3/UL (ref 4.4–11.3)

## 2024-08-12 PROCEDURE — 82947 ASSAY GLUCOSE BLOOD QUANT: CPT

## 2024-08-12 PROCEDURE — 99233 SBSQ HOSP IP/OBS HIGH 50: CPT | Performed by: INTERNAL MEDICINE

## 2024-08-12 PROCEDURE — 2500000005 HC RX 250 GENERAL PHARMACY W/O HCPCS

## 2024-08-12 PROCEDURE — 80053 COMPREHEN METABOLIC PANEL: CPT

## 2024-08-12 PROCEDURE — 84075 ASSAY ALKALINE PHOSPHATASE: CPT

## 2024-08-12 PROCEDURE — 2060000001 HC INTERMEDIATE ICU ROOM DAILY

## 2024-08-12 PROCEDURE — 36415 COLL VENOUS BLD VENIPUNCTURE: CPT

## 2024-08-12 PROCEDURE — 2500000004 HC RX 250 GENERAL PHARMACY W/ HCPCS (ALT 636 FOR OP/ED): Performed by: INTERNAL MEDICINE

## 2024-08-12 PROCEDURE — 85025 COMPLETE CBC W/AUTO DIFF WBC: CPT

## 2024-08-12 PROCEDURE — 2500000002 HC RX 250 W HCPCS SELF ADMINISTERED DRUGS (ALT 637 FOR MEDICARE OP, ALT 636 FOR OP/ED)

## 2024-08-12 RX ADMIN — SODIUM CHLORIDE 1000 ML: 9 INJECTION, SOLUTION INTRAVENOUS at 12:32

## 2024-08-12 RX ADMIN — INSULIN HUMAN 20 UNITS: 100 INJECTION, SUSPENSION SUBCUTANEOUS at 20:26

## 2024-08-12 RX ADMIN — INSULIN HUMAN 20 UNITS: 100 INJECTION, SUSPENSION SUBCUTANEOUS at 08:31

## 2024-08-12 RX ADMIN — ONDANSETRON 4 MG: 4 TABLET, ORALLY DISINTEGRATING ORAL at 08:41

## 2024-08-12 RX ADMIN — SODIUM CHLORIDE 1000 ML: 9 INJECTION, SOLUTION INTRAVENOUS at 20:22

## 2024-08-12 RX ADMIN — INSULIN LISPRO 6 UNITS: 100 INJECTION, SOLUTION INTRAVENOUS; SUBCUTANEOUS at 17:51

## 2024-08-12 RX ADMIN — INSULIN LISPRO 10 UNITS: 100 INJECTION, SOLUTION INTRAVENOUS; SUBCUTANEOUS at 08:31

## 2024-08-12 RX ADMIN — INSULIN LISPRO 8 UNITS: 100 INJECTION, SOLUTION INTRAVENOUS; SUBCUTANEOUS at 12:04

## 2024-08-12 ASSESSMENT — COGNITIVE AND FUNCTIONAL STATUS - GENERAL
MOBILITY SCORE: 24
MOBILITY SCORE: 24
DAILY ACTIVITIY SCORE: 24
DAILY ACTIVITIY SCORE: 24

## 2024-08-12 ASSESSMENT — PAIN SCALES - GENERAL
PAINLEVEL_OUTOF10: 0 - NO PAIN

## 2024-08-12 ASSESSMENT — PAIN - FUNCTIONAL ASSESSMENT
PAIN_FUNCTIONAL_ASSESSMENT: 0-10

## 2024-08-12 NOTE — PROGRESS NOTES
Ed Sanon is a 33 y.o. male on day 1 of admission presenting with Diabetic ketoacidosis without coma associated with type 2 diabetes mellitus (Multi).      Subjective   Ed Sanon is a 33 y.o. male with PMHx s/f Type 1 diabetes, hx recurrent admissions for DKA,  anxiety and depression presenting with elevated blood glucose nausea and vomiting .  The patient presented to emergency department with chief complaint of I am in DKA.  He had had some issues with nausea vomiting.  He had noted that his blood glucose is difficult to control.  He has been hospitalized many times for DKA.  Patient is supposed to be on insulin pump.  He states that he just cannot get it right.  Patient had told the ER provider that he had not changed the pump in quite some time it is unclear if that meant that his cartridge was empty and he had not been taking any insulin at all.  The patient does not very forthcoming with full information describing situation.  He appears to be upset about having recently lost his employment.  He denies any recent fevers or chills any cough or purulent sputum any urinary symptoms of dysuria urgency or frequency.  Patient is not experiencing any new sores or rashes.  No abdominal pain or diarrhea.  In the emergency department patient's temperature was 98.9 heart rate 129 respiratory rate 22 blood pressure 131/78.  Initial chemistry panel showed glucose 454 sodium 134 potassium 5.3 chloride 98 BUN 16 bicarb 6 anion gap 35 creatinine 1.45 liver enzymes within normal limits magnesium 2.21 beta high Droxia butyrate 11.26 CBC with white blood cell count 14.8 hemoglobin 18.7 hematocrit 55.7 platelets 230.  A venous blood gas was done the pH is 6.96 pCO2 26 S02 79 urine analysis was obtained is negative for leukocyte esterase or nitrites.  4+ ketones were present.  Patient was given IV fluid bolus with normal saline started on insulin drip the ED provider spoke to the ICU patient will be admitted to  intensive care unit to continue the insulin drip and normalized patient's electrolytes.     8/12: No acute events overnight. Patient denies fevers, chills, nausea, vomiting, diarrhea, constipation, shortness of breath, chest pains, and abdominal pains overnight. Was Admitted from 7/5/24-7/7/24 for a similar DKA occurrence. Unclear whether patient is nonadherent to his insulin pump or if he doesn't know how to use it. Hemoglobin A1C of 13.3% on 7/6/24. Bicarb of 14. Glucose of 261. Endocrinology consult appreciated. Anticipate possible discharge within the next 24-48 hours.       Objective     Last Recorded Vitals  /77 (BP Location: Right arm, Patient Position: Lying)   Pulse 99   Temp 36.6 °C (97.8 °F) (Temporal)   Resp 17   Wt 70.1 kg (154 lb 8.7 oz)   SpO2 97%   Intake/Output last 3 Shifts:    Intake/Output Summary (Last 24 hours) at 8/12/2024 0803  Last data filed at 8/12/2024 0000  Gross per 24 hour   Intake 4000.62 ml   Output 900 ml   Net 3100.62 ml       Admission Weight  Weight: 72.6 kg (160 lb) (08/11/24 0824)    Daily Weight  08/12/24 : 70.1 kg (154 lb 8.7 oz)    Image Results  ECG 12 lead (Clinic Performed)  See scanned image       Physical Exam    Physical Exam:  General: A&Ox3, fatigued  Cardiovascular: Normal S1-S2, sinus rhythm, no murmurs  Respiratory: Good air entry bilaterally, no obvious wheeze or crackles, lungs clear to auscultation bilaterally   Abdomen: Abdomen is soft, not distended with no tenderness  Extremities: No edema or deformities   Neurologic: A&Ox3, no acute focal deficits   Psychiatric: Behavior and mood normal      Relevant Results  Scheduled medications  insulin lispro, 0-10 Units, subcutaneous, Before meals & nightly  insulin NPH (Isophane), 20 Units, subcutaneous, q12h SHEILA      Continuous medications  dextrose 10 % in water (D10W), 150 mL/hr, Last Rate: 150 mL/hr (08/11/24 1708)      PRN medications  PRN medications: acetaminophen **OR** acetaminophen **OR**  acetaminophen, dextrose, dextrose, glucagon, glucagon, ondansetron ODT **OR** ondansetron  Results for orders placed or performed during the hospital encounter of 08/11/24 (from the past 24 hour(s))   POCT GLUCOSE   Result Value Ref Range    POCT Glucose 472 (H) 74 - 99 mg/dL   POCT GLUCOSE   Result Value Ref Range    POCT Glucose 406 (H) 74 - 99 mg/dL   Beta Hydroxybutyrate   Result Value Ref Range    Beta-Hydroxybutyrate 11.26 (H) 0.02 - 0.27 mmol/L   Magnesium   Result Value Ref Range    Magnesium 2.21 1.60 - 2.40 mg/dL   Blood Gas Venous   Result Value Ref Range    POCT pH, Venous 6.96 (LL) 7.33 - 7.43 pH    POCT pCO2, Venous 26 (L) 41 - 51 mm Hg    POCT pO2, Venous 51 (H) 35 - 45 mm Hg    POCT SO2, Venous 79 (H) 45 - 75 %    POCT Oxy Hemoglobin, Venous 77.5 (H) 45.0 - 75.0 %    POCT Base Excess, Venous -25.1 (L) -2.0 - 3.0 mmol/L    POCT HCO3 Calculated, Venous 5.8 (L) 22.0 - 26.0 mmol/L    Patient Temperature 37.0 degrees Celsius    FiO2 21 %   Comprehensive Metabolic Panel   Result Value Ref Range    Glucose 454 (HH) 74 - 99 mg/dL    Sodium 134 (L) 136 - 145 mmol/L    Potassium 5.3 3.5 - 5.3 mmol/L    Chloride 98 98 - 107 mmol/L    Bicarbonate 6 (LL) 21 - 32 mmol/L    Anion Gap 35 (H) 10 - 20 mmol/L    Urea Nitrogen 16 6 - 23 mg/dL    Creatinine 1.45 (H) 0.50 - 1.30 mg/dL    eGFR 64 >60 mL/min/1.73m*2    Calcium 9.8 8.6 - 10.3 mg/dL    Albumin 5.1 (H) 3.4 - 5.0 g/dL    Alkaline Phosphatase 96 33 - 120 U/L    Total Protein 8.5 (H) 6.4 - 8.2 g/dL    AST 15 9 - 39 U/L    Bilirubin, Total 0.5 0.0 - 1.2 mg/dL    ALT 20 10 - 52 U/L   CBC and Auto Differential   Result Value Ref Range    WBC 14.8 (H) 4.4 - 11.3 x10*3/uL    nRBC 0.0 0.0 - 0.0 /100 WBCs    RBC 6.02 (H) 4.50 - 5.90 x10*6/uL    Hemoglobin 18.7 (H) 13.5 - 17.5 g/dL    Hematocrit 55.7 (H) 41.0 - 52.0 %    MCV 93 80 - 100 fL    MCH 31.1 26.0 - 34.0 pg    MCHC 33.6 32.0 - 36.0 g/dL    RDW 12.8 11.5 - 14.5 %    Platelets 230 150 - 450 x10*3/uL    Neutrophils  % 83.5 40.0 - 80.0 %    Immature Granulocytes %, Automated 1.5 (H) 0.0 - 0.9 %    Lymphocytes % 9.1 13.0 - 44.0 %    Monocytes % 5.3 2.0 - 10.0 %    Eosinophils % 0.0 0.0 - 6.0 %    Basophils % 0.6 0.0 - 2.0 %    Neutrophils Absolute 12.33 (H) 1.20 - 7.70 x10*3/uL    Immature Granulocytes Absolute, Automated 0.22 0.00 - 0.70 x10*3/uL    Lymphocytes Absolute 1.34 1.20 - 4.80 x10*3/uL    Monocytes Absolute 0.79 0.10 - 1.00 x10*3/uL    Eosinophils Absolute 0.00 0.00 - 0.70 x10*3/uL    Basophils Absolute 0.09 0.00 - 0.10 x10*3/uL   Phosphorus   Result Value Ref Range    Phosphorus 4.8 2.5 - 4.9 mg/dL   Urinalysis with Reflex Microscopic   Result Value Ref Range    Color, Urine Colorless (N) Light-Yellow, Yellow, Dark-Yellow    Appearance, Urine Clear Clear    Specific Gravity, Urine 1.030 1.005 - 1.035    pH, Urine 5.0 5.0, 5.5, 6.0, 6.5, 7.0, 7.5, 8.0    Protein, Urine 30 (1+) (A) NEGATIVE, 10 (TRACE), 20 (TRACE) mg/dL    Glucose, Urine OVER (4+) (A) Normal mg/dL    Blood, Urine NEGATIVE NEGATIVE    Ketones, Urine OVER (4+) (A) NEGATIVE mg/dL    Bilirubin, Urine NEGATIVE NEGATIVE    Urobilinogen, Urine Normal Normal mg/dL    Nitrite, Urine NEGATIVE NEGATIVE    Leukocyte Esterase, Urine NEGATIVE NEGATIVE   Microscopic Only, Urine   Result Value Ref Range    WBC, Urine NONE 1-5, NONE /HPF    RBC, Urine NONE NONE, 1-2, 3-5 /HPF    Mucus, Urine FEW Reference range not established. /LPF    Hyaline Casts, Urine 1+ (A) NONE /LPF   POCT GLUCOSE   Result Value Ref Range    POCT Glucose 395 (H) 74 - 99 mg/dL   POCT GLUCOSE   Result Value Ref Range    POCT Glucose 256 (H) 74 - 99 mg/dL   Basic metabolic panel   Result Value Ref Range    Glucose 264 (H) 74 - 99 mg/dL    Sodium 140 136 - 145 mmol/L    Potassium 3.7 3.5 - 5.3 mmol/L    Chloride 111 (H) 98 - 107 mmol/L    Bicarbonate 5 (LL) 21 - 32 mmol/L    Anion Gap 28 (H) 10 - 20 mmol/L    Urea Nitrogen 14 6 - 23 mg/dL    Creatinine 1.10 0.50 - 1.30 mg/dL    eGFR 90 >60  mL/min/1.73m*2    Calcium 7.8 (L) 8.6 - 10.3 mg/dL   POCT GLUCOSE   Result Value Ref Range    POCT Glucose 210 (H) 74 - 99 mg/dL   POCT GLUCOSE   Result Value Ref Range    POCT Glucose 195 (H) 74 - 99 mg/dL   POCT GLUCOSE   Result Value Ref Range    POCT Glucose 202 (H) 74 - 99 mg/dL   Basic metabolic panel   Result Value Ref Range    Glucose 164 (H) 74 - 99 mg/dL    Sodium 136 136 - 145 mmol/L    Potassium 3.7 3.5 - 5.3 mmol/L    Chloride 114 (H) 98 - 107 mmol/L    Bicarbonate 13 (L) 21 - 32 mmol/L    Anion Gap 13 10 - 20 mmol/L    Urea Nitrogen 14 6 - 23 mg/dL    Creatinine 0.91 0.50 - 1.30 mg/dL    eGFR >90 >60 mL/min/1.73m*2    Calcium 7.7 (L) 8.6 - 10.3 mg/dL   Phosphorus   Result Value Ref Range    Phosphorus 1.1 (L) 2.5 - 4.9 mg/dL   POCT GLUCOSE   Result Value Ref Range    POCT Glucose 171 (H) 74 - 99 mg/dL   POCT GLUCOSE   Result Value Ref Range    POCT Glucose 146 (H) 74 - 99 mg/dL   POCT GLUCOSE   Result Value Ref Range    POCT Glucose 162 (H) 74 - 99 mg/dL   POCT GLUCOSE   Result Value Ref Range    POCT Glucose 170 (H) 74 - 99 mg/dL   Basic metabolic panel   Result Value Ref Range    Glucose 166 (H) 74 - 99 mg/dL    Sodium 132 (L) 136 - 145 mmol/L    Potassium 4.8 3.5 - 5.3 mmol/L    Chloride 111 (H) 98 - 107 mmol/L    Bicarbonate 18 (L) 21 - 32 mmol/L    Anion Gap 8 (L) 10 - 20 mmol/L    Urea Nitrogen 14 6 - 23 mg/dL    Creatinine 0.90 0.50 - 1.30 mg/dL    eGFR >90 >60 mL/min/1.73m*2    Calcium 7.7 (L) 8.6 - 10.3 mg/dL   Phosphorus   Result Value Ref Range    Phosphorus 1.8 (L) 2.5 - 4.9 mg/dL   POCT GLUCOSE   Result Value Ref Range    POCT Glucose 162 (H) 74 - 99 mg/dL   POCT GLUCOSE   Result Value Ref Range    POCT Glucose 169 (H) 74 - 99 mg/dL   POCT GLUCOSE   Result Value Ref Range    POCT Glucose 131 (H) 74 - 99 mg/dL   POCT GLUCOSE   Result Value Ref Range    POCT Glucose 126 (H) 74 - 99 mg/dL   POCT GLUCOSE   Result Value Ref Range    POCT Glucose 141 (H) 74 - 99 mg/dL   Comprehensive Metabolic  Panel   Result Value Ref Range    Glucose 261 (H) 74 - 99 mg/dL    Sodium 135 (L) 136 - 145 mmol/L    Potassium 4.1 3.5 - 5.3 mmol/L    Chloride 109 (H) 98 - 107 mmol/L    Bicarbonate 14 (L) 21 - 32 mmol/L    Anion Gap 16 10 - 20 mmol/L    Urea Nitrogen 17 6 - 23 mg/dL    Creatinine 0.82 0.50 - 1.30 mg/dL    eGFR >90 >60 mL/min/1.73m*2    Calcium 7.8 (L) 8.6 - 10.3 mg/dL    Albumin 3.3 (L) 3.4 - 5.0 g/dL    Alkaline Phosphatase 53 33 - 120 U/L    Total Protein 5.2 (L) 6.4 - 8.2 g/dL    AST 12 9 - 39 U/L    Bilirubin, Total 0.6 0.0 - 1.2 mg/dL    ALT 12 10 - 52 U/L   CBC and Auto Differential   Result Value Ref Range    WBC 7.9 4.4 - 11.3 x10*3/uL    nRBC 0.0 0.0 - 0.0 /100 WBCs    RBC 4.75 4.50 - 5.90 x10*6/uL    Hemoglobin 14.7 13.5 - 17.5 g/dL    Hematocrit 42.2 41.0 - 52.0 %    MCV 89 80 - 100 fL    MCH 30.9 26.0 - 34.0 pg    MCHC 34.8 32.0 - 36.0 g/dL    RDW 12.5 11.5 - 14.5 %    Platelets 170 150 - 450 x10*3/uL    Neutrophils % 64.9 40.0 - 80.0 %    Immature Granulocytes %, Automated 0.4 0.0 - 0.9 %    Lymphocytes % 24.1 13.0 - 44.0 %    Monocytes % 9.0 2.0 - 10.0 %    Eosinophils % 1.0 0.0 - 6.0 %    Basophils % 0.6 0.0 - 2.0 %    Neutrophils Absolute 5.10 1.20 - 7.70 x10*3/uL    Immature Granulocytes Absolute, Automated 0.03 0.00 - 0.70 x10*3/uL    Lymphocytes Absolute 1.90 1.20 - 4.80 x10*3/uL    Monocytes Absolute 0.71 0.10 - 1.00 x10*3/uL    Eosinophils Absolute 0.08 0.00 - 0.70 x10*3/uL    Basophils Absolute 0.05 0.00 - 0.10 x10*3/uL     No results found.             Assessment/Plan      #Diabetic ketoacidosis  #Diabetes mellitus with hyperglycemia  Secondary to noncompliance with medical therapy  Continue aggressive IV hydration and insulin per DKA protocol  8/12: Continue HumaLOG, instruct patient on importance of insulin adherence and instruct patient on proper use of the insulin pump. Continue to monitor labs. Endocrinology consult much appreciated.      #Anxiety  #Depression  Will review and  reconcile patient's home medications  8/12: Encourage patient to follow up with PCP.            JOE LEDBETTER    Pt seen and examined  with my Medical student . I have modified the note to reflect my documentation of HPI and assessment and plan.    Still with a metabolic acidosis.  Will give 1 L normal saline bolus.  Continue sliding scale.  Will discuss with case management discharge planning tomorrow.    Nikko Pal MD MRCP

## 2024-08-12 NOTE — CARE PLAN
The patient's goals for the shift include      The clinical goals for the shift include pt will remain hemodynamically stable

## 2024-08-12 NOTE — CARE PLAN
Problem: Diabetes  Goal: Achieve decreasing blood glucose levels by end of shift  8/11/2024 2024 by Kandi Gupta RN  Outcome: Progressing    Goal: Increase stability of blood glucose readings by end of shift  8/11/2024 2024 by Kandi Gupta RN  Outcome: Progressing    Goal: Decrease in ketones present in urine by end of shift  8/11/2024 2024 by Kandi Gupta RN  Outcome: Progressing    Goal: Maintain electrolyte levels within acceptable range throughout shift  8/11/2024 2024 by Kandi Gupta RN  Outcome: Progressing    Goal: Maintain glucose levels >70mg/dl to <250mg/dl throughout shift  8/11/2024 2024 by Kandi Gupta RN  Outcome: Progressing    Goal: No changes in neurological exam by end of shift  8/11/2024 2024 by Kandi Gupta RN  Outcome: Progressing    Goal: Learn about and adhere to nutrition recommendations by end of shift  8/11/2024 2024 by Kandi Gupta RN  Outcome: Progressing    Goal: Vital signs within normal range for age by end of shift  8/11/2024 2024 by Kandi Gupta RN  Outcome: Progressing    Goal: Increase self care and/or family involovement by end of shift  8/11/2024 2024 by Kandi Gupta RN  Outcome: Progressing    Goal: Receive DSME education by end of shift  8/11/2024 2024 by Kandi Gupta RN  Outcome: Progressing    Problem: Pain - Adult  Goal: Verbalizes/displays adequate comfort level or baseline comfort level  8/11/2024 2024 by Kandi Gupta RN  Outcome: Progressing       Problem: Safety - Adult  Goal: Free from fall injury  8/11/2024 2024 by Kandi Gupta RN  Outcome: Progressing       Problem: Discharge Planning  Goal: Discharge to home or other facility with appropriate resources  8/11/2024 2024 by Kandi Gupta RN  Outcome: Progressing       Problem: Chronic Conditions and Co-morbidities  Goal: Patient's chronic conditions and co-morbidity symptoms are monitored and maintained or improved  8/11/2024 2024 by Kandi Gupta RN  Outcome: Progressing

## 2024-08-12 NOTE — PROGRESS NOTES
SW notified by [facility], pt listed as self pay. SW via email requested [facility] to do medicaid screening. SW to follow as needed.    NATHAN Washington ([id number])   Care Transitions    1301  SW received email from [facility] with attached documents needing pt signature. SW provided documents to pt to sign. Scanned to email to forward to [facility] and returned original copies to pt. SW continue to follow.

## 2024-08-12 NOTE — SIGNIFICANT EVENT
Seen and examined. Resting in bed. Awake and alert with no complaints and states is feeling well. Denies chest pain, shortness of breath, abdominal pain, nausea or vomiting  History s/f IDDM type 1 with poor insulin compliance, recurrent admissions for DKA, anxiety, depression presenting with complaints of elevated blood glucose.  Found to be in DKA. Treated with IV fluids and insulin per DKA protocol  Most recent BMP noting resolution of acidosis with glucose 166, anion gap 8, bicarb 18  Electrolytes with potassium 3.7, phos 1.1; replacements ordered  Transitioned off insulin infusion, IV fluids discontinued  Initiated on 20 units NPH BID, with lispro corrective scale coverage #2  Carb control diet, patient tolerating PO intake  Continue POCT glucose checks 4 times daily before meals and at bedtime  Check BMP in AM  Patient with no further need for critical care medicine services. Critical care medicine to sign off. Patient can transfer out of ICU.

## 2024-08-13 VITALS
OXYGEN SATURATION: 97 % | TEMPERATURE: 97.9 F | WEIGHT: 166.67 LBS | SYSTOLIC BLOOD PRESSURE: 134 MMHG | RESPIRATION RATE: 18 BRPM | DIASTOLIC BLOOD PRESSURE: 89 MMHG | BODY MASS INDEX: 23.86 KG/M2 | HEIGHT: 70 IN | HEART RATE: 98 BPM

## 2024-08-13 LAB
ANION GAP SERPL CALC-SCNC: 8 MMOL/L (ref 10–20)
BUN SERPL-MCNC: 17 MG/DL (ref 6–23)
CALCIUM SERPL-MCNC: 7.4 MG/DL (ref 8.6–10.3)
CHLORIDE SERPL-SCNC: 111 MMOL/L (ref 98–107)
CO2 SERPL-SCNC: 24 MMOL/L (ref 21–32)
CREAT SERPL-MCNC: 0.81 MG/DL (ref 0.5–1.3)
EGFRCR SERPLBLD CKD-EPI 2021: >90 ML/MIN/1.73M*2
GLUCOSE BLD MANUAL STRIP-MCNC: 251 MG/DL (ref 74–99)
GLUCOSE BLD MANUAL STRIP-MCNC: 269 MG/DL (ref 74–99)
GLUCOSE SERPL-MCNC: 243 MG/DL (ref 74–99)
MAGNESIUM SERPL-MCNC: 1.86 MG/DL (ref 1.6–2.4)
POTASSIUM SERPL-SCNC: 3.5 MMOL/L (ref 3.5–5.3)
SODIUM SERPL-SCNC: 139 MMOL/L (ref 136–145)

## 2024-08-13 PROCEDURE — 99239 HOSP IP/OBS DSCHRG MGMT >30: CPT | Performed by: INTERNAL MEDICINE

## 2024-08-13 PROCEDURE — 36415 COLL VENOUS BLD VENIPUNCTURE: CPT | Performed by: INTERNAL MEDICINE

## 2024-08-13 PROCEDURE — 82947 ASSAY GLUCOSE BLOOD QUANT: CPT

## 2024-08-13 PROCEDURE — 2500000002 HC RX 250 W HCPCS SELF ADMINISTERED DRUGS (ALT 637 FOR MEDICARE OP, ALT 636 FOR OP/ED)

## 2024-08-13 PROCEDURE — 80048 BASIC METABOLIC PNL TOTAL CA: CPT | Performed by: INTERNAL MEDICINE

## 2024-08-13 PROCEDURE — 99222 1ST HOSP IP/OBS MODERATE 55: CPT | Performed by: INTERNAL MEDICINE

## 2024-08-13 PROCEDURE — 83735 ASSAY OF MAGNESIUM: CPT | Performed by: INTERNAL MEDICINE

## 2024-08-13 RX ADMIN — INSULIN LISPRO 6 UNITS: 100 INJECTION, SOLUTION INTRAVENOUS; SUBCUTANEOUS at 09:18

## 2024-08-13 RX ADMIN — INSULIN HUMAN 20 UNITS: 100 INJECTION, SUSPENSION SUBCUTANEOUS at 09:20

## 2024-08-13 ASSESSMENT — PAIN - FUNCTIONAL ASSESSMENT: PAIN_FUNCTIONAL_ASSESSMENT: 0-10

## 2024-08-13 ASSESSMENT — COGNITIVE AND FUNCTIONAL STATUS - GENERAL
MOBILITY SCORE: 24
DAILY ACTIVITIY SCORE: 24

## 2024-08-13 ASSESSMENT — PAIN SCALES - GENERAL: PAINLEVEL_OUTOF10: 5 - MODERATE PAIN

## 2024-08-13 ASSESSMENT — ENCOUNTER SYMPTOMS
NAUSEA: 0
VOMITING: 0
ABDOMINAL PAIN: 0

## 2024-08-13 ASSESSMENT — PAIN DESCRIPTION - DESCRIPTORS: DESCRIPTORS: CRAMPING

## 2024-08-13 NOTE — DISCHARGE INSTRUCTIONS
# This is a modified version of our Endocrinology's team recommendations.  To continue NPH 20 units subcutaneous twice daily   Counseled regarding the role of NPH with plans to the insulin pump upon discharge   To continue insulin correction scale   Diabetic diet as tolerated   Counseled that treating a sugar value of 269mg/dL will lead to unnecessary hyperglycemia as this value is already high   Patient may be discharged today  For outpatient follow up with his endocrinologist  Counseled that pump sites are to be changed every three days and not two weeks   Counseled that a CGM simply monitors blood sugars  # Please follow with your family doctor in 1 week.

## 2024-08-13 NOTE — CARE PLAN
Problem: Diabetes  Goal: Achieve decreasing blood glucose levels by end of shift  Outcome: Progressing  Goal: Increase stability of blood glucose readings by end of shift  Outcome: Progressing  Goal: Decrease in ketones present in urine by end of shift  Outcome: Progressing  Goal: Maintain electrolyte levels within acceptable range throughout shift  Outcome: Progressing   The patient's goals for the shift include      The clinical goals for the shift include Stable blood sugar

## 2024-08-13 NOTE — CARE PLAN
Problem: Diabetes  Goal: Achieve decreasing blood glucose levels by end of shift  Outcome: Progressing  Goal: Increase stability of blood glucose readings by end of shift  Outcome: Progressing  Goal: Decrease in ketones present in urine by end of shift  Outcome: Progressing  Goal: Maintain electrolyte levels within acceptable range throughout shift  Outcome: Progressing  Goal: Maintain glucose levels >70mg/dl to <250mg/dl throughout shift  Outcome: Progressing  Goal: No changes in neurological exam by end of shift  Outcome: Progressing  Goal: Learn about and adhere to nutrition recommendations by end of shift  Outcome: Progressing  Goal: Vital signs within normal range for age by end of shift  Outcome: Progressing  Goal: Increase self care and/or family involovement by end of shift  Outcome: Progressing  Goal: Receive DSME education by end of shift  Outcome: Progressing   The patient's goals for the shift include      The clinical goals for the shift include Patient will remain hemodynamically stable

## 2024-08-13 NOTE — CONSULTS
"Inpatient consult to Endocrinology  Consult performed by: Lori Packer MD  Consult ordered by: Alessia Clayton, APRN-CNP  Reason for consult: DKA          Reason For Consult  DKA    History Of Present Illness  Ed Sanon is a 33 y.o. male presenting with   Hyperglycemia, nausea and vomiting .He was found to be hemodynamically stable.  Initial lab data revealed a glucose value of 454mg/dL, bicarbonate of 6, an anion gap of 35 and betahydroxybutarate of 11.26   He was given IV fluids and started on an insulin infusion with admittance to the ICU.     The patient was started on the [employer]tronic Minimed 770G pump back in March 2024.   He has not changed his pump site in two weeks as he looked at the site and it was good.  HE does not wear the sensor as it was \"too rapid\" for him.  He has been performing finger sticks where his glucose values range from high 300mg/dL to low 400mg/dL range.    He states that he recently missed his endocrinology outpatient appointment; it has not been rescheduled.      He has not eaten yet for the morning.  He states that his glucose value was in the 200mg/dL this morning and he felt shaky.  He thus treated this value this morning as though it were low by eating marge crackers but he has not yet ordered breakfast.          Past Medical History  He has a past medical history of Abdominal pain (04/21/2023), Anxiety and depression (08/08/2021), Contact with and (suspected) exposure to covid-19 (04/21/2023), COVID-19 virus infection (07/11/2023), Diabetes mellitus (Multi), Diabetic acetonemia (Multi) (11/15/2023), DKA (diabetic ketoacidosis) (Multi) (07/11/2023), DKA, type 1, not at goal (Multi) (04/21/2023), DM2 (diabetes mellitus, type 2) (Multi) (11/15/2023), Elevated blood sugar (11/15/2023), Elevated blood-pressure reading, without diagnosis of hypertension (09/09/2020), Fatigue (07/11/2023), Generalized anxiety disorder (07/11/2023), Microalbuminuria due to type 1 " "diabetes mellitus (Multi) (07/11/2023), Personal history of COVID-19 (04/02/2023), Poorly controlled diabetes mellitus (Multi) (07/11/2023), Type 1 diabetes mellitus (Multi) (07/11/2023), Type 1 diabetes mellitus with hyperglycemia (Multi) (02/25/2019), Vitamin B12 deficiency (07/11/2023), Vitamin D deficiency (07/11/2023), and Vomiting (11/15/2023).    Surgical History  He has no past surgical history on file.     Social History  He reports that he has never smoked. He has never used smokeless tobacco. He reports that he does not drink alcohol and does not use drugs.    Family History  Family History   Problem Relation Name Age of Onset    No Known Problems Mother      No Known Problems Father      Hypertension Other      Coronary artery disease Other      Diabetes Other      Heart failure Other          Allergies  Patient has no known allergies.    Review of Systems   Gastrointestinal:  Negative for abdominal pain, nausea and vomiting.   All other systems reviewed and are negative.       Physical Exam  Vitals and nursing note reviewed.   Constitutional:       General: He is not in acute distress.     Appearance: Normal appearance. He is normal weight.   HENT:      Head: Normocephalic and atraumatic.      Nose: Nose normal.      Mouth/Throat:      Mouth: Mucous membranes are moist.   Eyes:      Extraocular Movements: Extraocular movements intact.   Pulmonary:      Effort: Pulmonary effort is normal.   Musculoskeletal:         General: Normal range of motion.   Neurological:      Mental Status: He is alert and oriented to person, place, and time.   Psychiatric:         Mood and Affect: Mood normal.          Last Recorded Vitals  Blood pressure 129/83, pulse 90, temperature 36.3 °C (97.4 °F), temperature source Temporal, resp. rate 18, height 1.778 m (5' 10\"), weight 75.6 kg (166 lb 10.7 oz), SpO2 97%.    Relevant Results  Scheduled medications  insulin lispro, 0-10 Units, subcutaneous, Before meals & " nightly  insulin NPH (Isophane), 20 Units, subcutaneous, q12h SHEILA      Continuous medications  dextrose 10 % in water (D10W), 150 mL/hr, Last Rate: 150 mL/hr (08/11/24 1708)      PRN medications  PRN medications: acetaminophen **OR** acetaminophen **OR** acetaminophen, dextrose, dextrose, glucagon, glucagon, ondansetron ODT **OR** ondansetron    Results for orders placed or performed during the hospital encounter of 08/11/24 (from the past 96 hour(s))   POCT GLUCOSE   Result Value Ref Range    POCT Glucose 472 (H) 74 - 99 mg/dL   POCT GLUCOSE   Result Value Ref Range    POCT Glucose 406 (H) 74 - 99 mg/dL   Beta Hydroxybutyrate   Result Value Ref Range    Beta-Hydroxybutyrate 11.26 (H) 0.02 - 0.27 mmol/L   Magnesium   Result Value Ref Range    Magnesium 2.21 1.60 - 2.40 mg/dL   Blood Gas Venous   Result Value Ref Range    POCT pH, Venous 6.96 (LL) 7.33 - 7.43 pH    POCT pCO2, Venous 26 (L) 41 - 51 mm Hg    POCT pO2, Venous 51 (H) 35 - 45 mm Hg    POCT SO2, Venous 79 (H) 45 - 75 %    POCT Oxy Hemoglobin, Venous 77.5 (H) 45.0 - 75.0 %    POCT Base Excess, Venous -25.1 (L) -2.0 - 3.0 mmol/L    POCT HCO3 Calculated, Venous 5.8 (L) 22.0 - 26.0 mmol/L    Patient Temperature 37.0 degrees Celsius    FiO2 21 %   Comprehensive Metabolic Panel   Result Value Ref Range    Glucose 454 (HH) 74 - 99 mg/dL    Sodium 134 (L) 136 - 145 mmol/L    Potassium 5.3 3.5 - 5.3 mmol/L    Chloride 98 98 - 107 mmol/L    Bicarbonate 6 (LL) 21 - 32 mmol/L    Anion Gap 35 (H) 10 - 20 mmol/L    Urea Nitrogen 16 6 - 23 mg/dL    Creatinine 1.45 (H) 0.50 - 1.30 mg/dL    eGFR 64 >60 mL/min/1.73m*2    Calcium 9.8 8.6 - 10.3 mg/dL    Albumin 5.1 (H) 3.4 - 5.0 g/dL    Alkaline Phosphatase 96 33 - 120 U/L    Total Protein 8.5 (H) 6.4 - 8.2 g/dL    AST 15 9 - 39 U/L    Bilirubin, Total 0.5 0.0 - 1.2 mg/dL    ALT 20 10 - 52 U/L   CBC and Auto Differential   Result Value Ref Range    WBC 14.8 (H) 4.4 - 11.3 x10*3/uL    nRBC 0.0 0.0 - 0.0 /100 WBCs    RBC 6.02  (H) 4.50 - 5.90 x10*6/uL    Hemoglobin 18.7 (H) 13.5 - 17.5 g/dL    Hematocrit 55.7 (H) 41.0 - 52.0 %    MCV 93 80 - 100 fL    MCH 31.1 26.0 - 34.0 pg    MCHC 33.6 32.0 - 36.0 g/dL    RDW 12.8 11.5 - 14.5 %    Platelets 230 150 - 450 x10*3/uL    Neutrophils % 83.5 40.0 - 80.0 %    Immature Granulocytes %, Automated 1.5 (H) 0.0 - 0.9 %    Lymphocytes % 9.1 13.0 - 44.0 %    Monocytes % 5.3 2.0 - 10.0 %    Eosinophils % 0.0 0.0 - 6.0 %    Basophils % 0.6 0.0 - 2.0 %    Neutrophils Absolute 12.33 (H) 1.20 - 7.70 x10*3/uL    Immature Granulocytes Absolute, Automated 0.22 0.00 - 0.70 x10*3/uL    Lymphocytes Absolute 1.34 1.20 - 4.80 x10*3/uL    Monocytes Absolute 0.79 0.10 - 1.00 x10*3/uL    Eosinophils Absolute 0.00 0.00 - 0.70 x10*3/uL    Basophils Absolute 0.09 0.00 - 0.10 x10*3/uL   Phosphorus   Result Value Ref Range    Phosphorus 4.8 2.5 - 4.9 mg/dL   Urinalysis with Reflex Microscopic   Result Value Ref Range    Color, Urine Colorless (N) Light-Yellow, Yellow, Dark-Yellow    Appearance, Urine Clear Clear    Specific Gravity, Urine 1.030 1.005 - 1.035    pH, Urine 5.0 5.0, 5.5, 6.0, 6.5, 7.0, 7.5, 8.0    Protein, Urine 30 (1+) (A) NEGATIVE, 10 (TRACE), 20 (TRACE) mg/dL    Glucose, Urine OVER (4+) (A) Normal mg/dL    Blood, Urine NEGATIVE NEGATIVE    Ketones, Urine OVER (4+) (A) NEGATIVE mg/dL    Bilirubin, Urine NEGATIVE NEGATIVE    Urobilinogen, Urine Normal Normal mg/dL    Nitrite, Urine NEGATIVE NEGATIVE    Leukocyte Esterase, Urine NEGATIVE NEGATIVE   Microscopic Only, Urine   Result Value Ref Range    WBC, Urine NONE 1-5, NONE /HPF    RBC, Urine NONE NONE, 1-2, 3-5 /HPF    Mucus, Urine FEW Reference range not established. /LPF    Hyaline Casts, Urine 1+ (A) NONE /LPF   POCT GLUCOSE   Result Value Ref Range    POCT Glucose 395 (H) 74 - 99 mg/dL   POCT GLUCOSE   Result Value Ref Range    POCT Glucose 256 (H) 74 - 99 mg/dL   Basic metabolic panel   Result Value Ref Range    Glucose 264 (H) 74 - 99 mg/dL    Sodium  140 136 - 145 mmol/L    Potassium 3.7 3.5 - 5.3 mmol/L    Chloride 111 (H) 98 - 107 mmol/L    Bicarbonate 5 (LL) 21 - 32 mmol/L    Anion Gap 28 (H) 10 - 20 mmol/L    Urea Nitrogen 14 6 - 23 mg/dL    Creatinine 1.10 0.50 - 1.30 mg/dL    eGFR 90 >60 mL/min/1.73m*2    Calcium 7.8 (L) 8.6 - 10.3 mg/dL   POCT GLUCOSE   Result Value Ref Range    POCT Glucose 210 (H) 74 - 99 mg/dL   POCT GLUCOSE   Result Value Ref Range    POCT Glucose 195 (H) 74 - 99 mg/dL   POCT GLUCOSE   Result Value Ref Range    POCT Glucose 202 (H) 74 - 99 mg/dL   Basic metabolic panel   Result Value Ref Range    Glucose 164 (H) 74 - 99 mg/dL    Sodium 136 136 - 145 mmol/L    Potassium 3.7 3.5 - 5.3 mmol/L    Chloride 114 (H) 98 - 107 mmol/L    Bicarbonate 13 (L) 21 - 32 mmol/L    Anion Gap 13 10 - 20 mmol/L    Urea Nitrogen 14 6 - 23 mg/dL    Creatinine 0.91 0.50 - 1.30 mg/dL    eGFR >90 >60 mL/min/1.73m*2    Calcium 7.7 (L) 8.6 - 10.3 mg/dL   Phosphorus   Result Value Ref Range    Phosphorus 1.1 (L) 2.5 - 4.9 mg/dL   POCT GLUCOSE   Result Value Ref Range    POCT Glucose 171 (H) 74 - 99 mg/dL   POCT GLUCOSE   Result Value Ref Range    POCT Glucose 146 (H) 74 - 99 mg/dL   POCT GLUCOSE   Result Value Ref Range    POCT Glucose 162 (H) 74 - 99 mg/dL   POCT GLUCOSE   Result Value Ref Range    POCT Glucose 170 (H) 74 - 99 mg/dL   Basic metabolic panel   Result Value Ref Range    Glucose 166 (H) 74 - 99 mg/dL    Sodium 132 (L) 136 - 145 mmol/L    Potassium 4.8 3.5 - 5.3 mmol/L    Chloride 111 (H) 98 - 107 mmol/L    Bicarbonate 18 (L) 21 - 32 mmol/L    Anion Gap 8 (L) 10 - 20 mmol/L    Urea Nitrogen 14 6 - 23 mg/dL    Creatinine 0.90 0.50 - 1.30 mg/dL    eGFR >90 >60 mL/min/1.73m*2    Calcium 7.7 (L) 8.6 - 10.3 mg/dL   Phosphorus   Result Value Ref Range    Phosphorus 1.8 (L) 2.5 - 4.9 mg/dL   POCT GLUCOSE   Result Value Ref Range    POCT Glucose 162 (H) 74 - 99 mg/dL   POCT GLUCOSE   Result Value Ref Range    POCT Glucose 169 (H) 74 - 99 mg/dL   POCT  GLUCOSE   Result Value Ref Range    POCT Glucose 131 (H) 74 - 99 mg/dL   POCT GLUCOSE   Result Value Ref Range    POCT Glucose 126 (H) 74 - 99 mg/dL   POCT GLUCOSE   Result Value Ref Range    POCT Glucose 141 (H) 74 - 99 mg/dL   Comprehensive Metabolic Panel   Result Value Ref Range    Glucose 261 (H) 74 - 99 mg/dL    Sodium 135 (L) 136 - 145 mmol/L    Potassium 4.1 3.5 - 5.3 mmol/L    Chloride 109 (H) 98 - 107 mmol/L    Bicarbonate 14 (L) 21 - 32 mmol/L    Anion Gap 16 10 - 20 mmol/L    Urea Nitrogen 17 6 - 23 mg/dL    Creatinine 0.82 0.50 - 1.30 mg/dL    eGFR >90 >60 mL/min/1.73m*2    Calcium 7.8 (L) 8.6 - 10.3 mg/dL    Albumin 3.3 (L) 3.4 - 5.0 g/dL    Alkaline Phosphatase 53 33 - 120 U/L    Total Protein 5.2 (L) 6.4 - 8.2 g/dL    AST 12 9 - 39 U/L    Bilirubin, Total 0.6 0.0 - 1.2 mg/dL    ALT 12 10 - 52 U/L   CBC and Auto Differential   Result Value Ref Range    WBC 7.9 4.4 - 11.3 x10*3/uL    nRBC 0.0 0.0 - 0.0 /100 WBCs    RBC 4.75 4.50 - 5.90 x10*6/uL    Hemoglobin 14.7 13.5 - 17.5 g/dL    Hematocrit 42.2 41.0 - 52.0 %    MCV 89 80 - 100 fL    MCH 30.9 26.0 - 34.0 pg    MCHC 34.8 32.0 - 36.0 g/dL    RDW 12.5 11.5 - 14.5 %    Platelets 170 150 - 450 x10*3/uL    Neutrophils % 64.9 40.0 - 80.0 %    Immature Granulocytes %, Automated 0.4 0.0 - 0.9 %    Lymphocytes % 24.1 13.0 - 44.0 %    Monocytes % 9.0 2.0 - 10.0 %    Eosinophils % 1.0 0.0 - 6.0 %    Basophils % 0.6 0.0 - 2.0 %    Neutrophils Absolute 5.10 1.20 - 7.70 x10*3/uL    Immature Granulocytes Absolute, Automated 0.03 0.00 - 0.70 x10*3/uL    Lymphocytes Absolute 1.90 1.20 - 4.80 x10*3/uL    Monocytes Absolute 0.71 0.10 - 1.00 x10*3/uL    Eosinophils Absolute 0.08 0.00 - 0.70 x10*3/uL    Basophils Absolute 0.05 0.00 - 0.10 x10*3/uL   POCT GLUCOSE   Result Value Ref Range    POCT Glucose 379 (H) 74 - 99 mg/dL   POCT GLUCOSE   Result Value Ref Range    POCT Glucose 342 (H) 74 - 99 mg/dL   POCT GLUCOSE   Result Value Ref Range    POCT Glucose 264 (H) 74 - 99  "mg/dL   POCT GLUCOSE   Result Value Ref Range    POCT Glucose 283 (H) 74 - 99 mg/dL   Basic Metabolic Panel   Result Value Ref Range    Glucose 243 (H) 74 - 99 mg/dL    Sodium 139 136 - 145 mmol/L    Potassium 3.5 3.5 - 5.3 mmol/L    Chloride 111 (H) 98 - 107 mmol/L    Bicarbonate 24 21 - 32 mmol/L    Anion Gap 8 (L) 10 - 20 mmol/L    Urea Nitrogen 17 6 - 23 mg/dL    Creatinine 0.81 0.50 - 1.30 mg/dL    eGFR >90 >60 mL/min/1.73m*2    Calcium 7.4 (L) 8.6 - 10.3 mg/dL   Magnesium   Result Value Ref Range    Magnesium 1.86 1.60 - 2.40 mg/dL        Assessment/Plan   POORLY CONTROLLED TYPE I DM  DKA - resolved    A1C of 13.3% as of one month ago      Recommendations:  To continue NPH 20 units subcutaneous twice daily   Counseled regarding the role of NPH with plans to the insulin pump upon discharge   To continue insulin correction scale   Diabetic diet as tolerated   [facility]    Hypoglycemic protocol   Counseled that treating a sugar value of 269mg/dL will lead to unnecessary hyperglycemia as this value is already high   Patient may be discharged today  For outpatient follow up with his endocrinologist  Counseled that pump sites are to be changed every three days and not two weeks   Counseled that a CGM simply monitors blood sugars; it has no role in \"bringing sugars down rapidly\"  Will continue to follow    Thank you for the courtesy of this consult        Lori Packer MD            "

## 2024-08-14 ENCOUNTER — PATIENT OUTREACH (OUTPATIENT)
Dept: CARE COORDINATION | Facility: CLINIC | Age: 34
End: 2024-08-14

## 2024-08-14 NOTE — PROGRESS NOTES
Discharge Facility: [facility]   Discharge Diagnosis:  Diabetic ketoacidosis without coma associated with type 2 diabetes mellitus.  Admission Date: 8/11/24  Discharge Date: 8/13/24    PCP Appointment Date: no appt, message to office  Specialist Appointment Date: needs endocrinology  Hospital Encounter and Summary Linked: Yes    Two attempts were made to reach patient within two business days after discharge. Voicemail left with contact information for patient to call back with any non-emergent questions or concerns.

## 2024-09-05 ENCOUNTER — PATIENT OUTREACH (OUTPATIENT)
Dept: PRIMARY CARE | Facility: CLINIC | Age: 34
End: 2024-09-05

## 2024-09-13 NOTE — DISCHARGE SUMMARY
"Discharge Diagnosis  Diabetic ketoacidosis without coma associated with type 1 diabetes mellitus (Multi)  Anxiety and depression    Issues Requiring Follow-Up  Follow-up with PCP in 1 week's time.  Follow-up with primary endocrinologist in 1 week's    Discharge Meds     Medication List      START taking these medications     insulin NPH (Isophane) 100 unit/mL injection; Commonly known as: HumuLIN   N,NovoLIN N; Inject 20 Units under the skin every 12 hours. Take as   directed per insulin instructions.     CONTINUE taking these medications     Guardian 4 Glucose Sensor device; Generic drug: blood-glucose sensor;   Change insulin sensor every 7 days as directed, linked to Minimed insulin   pump, check with endocrinology for updated regimen   insulin lispro 100 unit/mL injection; Commonly known as: HumaLOG   lancets misc   pen needle, diabetic 32 gauge x 5/32\" needle     STOP taking these medications     Lantus U-100 Insulin 100 unit/mL injection; Generic drug: insulin   glargine     ASK your doctor about these medications     blood sugar diagnostic strip; Ask about: Should I take this medication?       Test Results Pending At Discharge  Pending Labs       No current pending labs.            Hospital Course   Ed Sanon is a 33 y.o. male with PMHx s/f Type 1 diabetes, hx recurrent admissions for DKA,  anxiety and depression presenting with elevated blood glucose nausea and vomiting .  The patient presented to emergency department with chief complaint of I am in DKA.  He had had some issues with nausea vomiting.  He had noted that his blood glucose is difficult to control.  He has been hospitalized many times for DKA.  Patient is supposed to be on insulin pump.  He states that he just cannot get it right.  Patient had told the ER provider that he had not changed the pump in quite some time it is unclear if that meant that his cartridge was empty and he had not been taking any insulin at all.  The patient does not " very forthcoming with full information describing situation.  He appears to be upset about having recently lost his employment.  He denies any recent fevers or chills any cough or purulent sputum any urinary symptoms of dysuria urgency or frequency.  Patient is not experiencing any new sores or rashes.  No abdominal pain or diarrhea.  In the emergency department patient's temperature was 98.9 heart rate 129 respiratory rate 22 blood pressure 131/78.  Initial chemistry panel showed glucose 454 sodium 134 potassium 5.3 chloride 98 BUN 16 bicarb 6 anion gap 35 creatinine 1.45 liver enzymes within normal limits magnesium 2.21 beta high Droxia butyrate 11.26 CBC with white blood cell count 14.8 hemoglobin 18.7 hematocrit 55.7 platelets 230.  A venous blood gas was done the pH is 6.96 pCO2 26 S02 79 urine analysis was obtained is negative for leukocyte esterase or nitrites.  4+ ketones were present.  Patient was given IV fluid bolus with normal saline started on insulin drip the ED provider spoke to the ICU patient will be admitted to intensive care unit to continue the insulin drip and normalized patient's electrolytes.      8/12: No acute events overnight. Patient denies fevers, chills, nausea, vomiting, diarrhea, constipation, shortness of breath, chest pains, and abdominal pains overnight. Was Admitted from 7/5/24-7/7/24 for a similar DKA occurrence. Unclear whether patient is nonadherent to his insulin pump or if he doesn't know how to use it. Hemoglobin A1C of 13.3% on 7/6/24. Bicarb of 14. Glucose of 261. Endocrinology consult appreciated. Anticipate possible discharge within the next 24-48 hours.  8/13: No acute events overnight.  Patient denies any new symptoms.  Plan discharge home today.  Follow with PCP in 1 week's time.  Follow with primary endocrinologist in 1 week's time.    Pertinent Physical Exam At Time of Discharge  Physical Exam  CONSTITUTIONAL - alert, in no acute distress, not ill-appearing  SKIN -  normal skin color ,warm  HEAD - no trauma, normocephalic  EYES - pupils are equal and reactive to light  CHEST - clear to auscultation, no wheezing, no crackles and no rales, good effort  CARDIAC - regular rate and regular rhythm, no murmur  ABDOMEN - no organomegaly, soft, nontender, nondistended, normal bowel sounds, no guarding/rebound/rigidity  EXTREMITIES - no edema, no deformities  NEUROLOGICAL - alert, oriented x3 and no acute focal signs  PSYCHIATRIC - alert, pleasant and cordial, age-appropriate    Outpatient Follow-Up  Future Appointments   Date Time Provider Department Center   10/2/2024  9:00 AM Nata Mclaughlin, CHRISTIANO-CNP [id number]     Discharge planning took more than 30 minutes.    Nikko Pal MD

## 2024-09-18 ENCOUNTER — APPOINTMENT (OUTPATIENT)
Dept: RADIOLOGY | Facility: HOSPITAL | Age: 34
End: 2024-09-18

## 2024-09-18 ENCOUNTER — APPOINTMENT (OUTPATIENT)
Dept: CARDIOLOGY | Facility: HOSPITAL | Age: 34
End: 2024-09-18

## 2024-09-18 ENCOUNTER — HOSPITAL ENCOUNTER (INPATIENT)
Facility: HOSPITAL | Age: 34
LOS: 3 days | Discharge: HOME | End: 2024-09-21
Attending: STUDENT IN AN ORGANIZED HEALTH CARE EDUCATION/TRAINING PROGRAM | Admitting: STUDENT IN AN ORGANIZED HEALTH CARE EDUCATION/TRAINING PROGRAM

## 2024-09-18 DIAGNOSIS — E10.10 DIABETIC KETOACIDOSIS WITHOUT COMA ASSOCIATED WITH TYPE 1 DIABETES MELLITUS (MULTI): Primary | ICD-10-CM

## 2024-09-18 DIAGNOSIS — E11.10 DIABETIC KETOACIDOSIS WITHOUT COMA ASSOCIATED WITH TYPE 2 DIABETES MELLITUS (MULTI): ICD-10-CM

## 2024-09-18 DIAGNOSIS — Z87.19 H/O DENTAL ABSCESS: ICD-10-CM

## 2024-09-18 LAB
ALBUMIN SERPL BCP-MCNC: 3.8 G/DL (ref 3.4–5)
ALP SERPL-CCNC: 75 U/L (ref 33–120)
ALT SERPL W P-5'-P-CCNC: 20 U/L (ref 10–52)
ANION GAP BLDV CALCULATED.4IONS-SCNC: 25 MMOL/L (ref 10–25)
ANION GAP SERPL CALC-SCNC: 34 MMOL/L (ref 10–20)
APPEARANCE UR: CLEAR
AST SERPL W P-5'-P-CCNC: 11 U/L (ref 9–39)
B-OH-BUTYR SERPL-SCNC: 8.65 MMOL/L (ref 0.02–0.27)
BASE EXCESS BLDV CALC-SCNC: -24.1 MMOL/L (ref -2–3)
BASOPHILS # BLD AUTO: 0.07 X10*3/UL (ref 0–0.1)
BASOPHILS NFR BLD AUTO: 0.3 %
BILIRUB SERPL-MCNC: 0.2 MG/DL (ref 0–1.2)
BILIRUB UR STRIP.AUTO-MCNC: NEGATIVE MG/DL
BODY TEMPERATURE: 37 DEGREES CELSIUS
BUN SERPL-MCNC: 17 MG/DL (ref 6–23)
CA-I BLDV-SCNC: 1.15 MMOL/L (ref 1.1–1.33)
CALCIUM SERPL-MCNC: 8.1 MG/DL (ref 8.6–10.3)
CHLORIDE BLDV-SCNC: 101 MMOL/L (ref 98–107)
CHLORIDE SERPL-SCNC: 101 MMOL/L (ref 98–107)
CO2 SERPL-SCNC: 5 MMOL/L (ref 21–32)
COLOR UR: ABNORMAL
CREAT SERPL-MCNC: 0.93 MG/DL (ref 0.5–1.3)
EGFRCR SERPLBLD CKD-EPI 2021: >90 ML/MIN/1.73M*2
EOSINOPHIL # BLD AUTO: 0.01 X10*3/UL (ref 0–0.7)
EOSINOPHIL NFR BLD AUTO: 0 %
ERYTHROCYTE [DISTWIDTH] IN BLOOD BY AUTOMATED COUNT: 13.4 % (ref 11.5–14.5)
GLUCOSE BLD MANUAL STRIP-MCNC: 175 MG/DL (ref 74–99)
GLUCOSE BLD MANUAL STRIP-MCNC: 258 MG/DL (ref 74–99)
GLUCOSE BLD MANUAL STRIP-MCNC: 331 MG/DL (ref 74–99)
GLUCOSE BLD MANUAL STRIP-MCNC: 350 MG/DL (ref 74–99)
GLUCOSE BLD MANUAL STRIP-MCNC: 375 MG/DL (ref 74–99)
GLUCOSE BLD MANUAL STRIP-MCNC: 382 MG/DL (ref 74–99)
GLUCOSE BLDV-MCNC: 401 MG/DL (ref 74–99)
GLUCOSE SERPL-MCNC: 340 MG/DL (ref 74–99)
GLUCOSE UR STRIP.AUTO-MCNC: ABNORMAL MG/DL
HCO3 BLDV-SCNC: 5.6 MMOL/L (ref 22–26)
HCT VFR BLD AUTO: 54.2 % (ref 41–52)
HCT VFR BLD EST: 56 % (ref 41–52)
HGB BLD-MCNC: 18.3 G/DL (ref 13.5–17.5)
HGB BLDV-MCNC: 18.6 G/DL (ref 13.5–17.5)
HYALINE CASTS #/AREA URNS AUTO: ABNORMAL /LPF
IMM GRANULOCYTES # BLD AUTO: 0.36 X10*3/UL (ref 0–0.7)
IMM GRANULOCYTES NFR BLD AUTO: 1.7 % (ref 0–0.9)
INHALED O2 CONCENTRATION: 21 %
KETONES UR STRIP.AUTO-MCNC: ABNORMAL MG/DL
LACTATE BLDV-SCNC: 3.6 MMOL/L (ref 0.4–2)
LACTATE BLDV-SCNC: 8.6 MMOL/L (ref 0.4–2)
LEUKOCYTE ESTERASE UR QL STRIP.AUTO: NEGATIVE
LYMPHOCYTES # BLD AUTO: 0.97 X10*3/UL (ref 1.2–4.8)
LYMPHOCYTES NFR BLD AUTO: 4.5 %
MAGNESIUM SERPL-MCNC: 1.61 MG/DL (ref 1.6–2.4)
MCH RBC QN AUTO: 31.4 PG (ref 26–34)
MCHC RBC AUTO-ENTMCNC: 33.8 G/DL (ref 32–36)
MCV RBC AUTO: 93 FL (ref 80–100)
MONOCYTES # BLD AUTO: 0.99 X10*3/UL (ref 0.1–1)
MONOCYTES NFR BLD AUTO: 4.6 %
MUCOUS THREADS #/AREA URNS AUTO: ABNORMAL /LPF
NEUTROPHILS # BLD AUTO: 19.09 X10*3/UL (ref 1.2–7.7)
NEUTROPHILS NFR BLD AUTO: 88.9 %
NITRITE UR QL STRIP.AUTO: NEGATIVE
NRBC BLD-RTO: 0 /100 WBCS (ref 0–0)
OXYHGB MFR BLDV: 83.9 % (ref 45–75)
PCO2 BLDV: 22 MM HG (ref 41–51)
PH BLDV: 7.01 PH (ref 7.33–7.43)
PH UR STRIP.AUTO: 5.5 [PH]
PHOSPHATE SERPL-MCNC: 4.2 MG/DL (ref 2.5–4.9)
PLATELET # BLD AUTO: 237 X10*3/UL (ref 150–450)
PO2 BLDV: 55 MM HG (ref 35–45)
POTASSIUM BLDV-SCNC: 4.8 MMOL/L (ref 3.5–5.3)
POTASSIUM SERPL-SCNC: 4.7 MMOL/L (ref 3.5–5.3)
PROT SERPL-MCNC: 6.3 G/DL (ref 6.4–8.2)
PROT UR STRIP.AUTO-MCNC: ABNORMAL MG/DL
RBC # BLD AUTO: 5.83 X10*6/UL (ref 4.5–5.9)
RBC # UR STRIP.AUTO: ABNORMAL /UL
RBC #/AREA URNS AUTO: ABNORMAL /HPF
SAO2 % BLDV: 86 % (ref 45–75)
SODIUM BLDV-SCNC: 127 MMOL/L (ref 136–145)
SODIUM SERPL-SCNC: 135 MMOL/L (ref 136–145)
SP GR UR STRIP.AUTO: >1.03
UROBILINOGEN UR STRIP.AUTO-MCNC: NORMAL MG/DL
WBC # BLD AUTO: 21.5 X10*3/UL (ref 4.4–11.3)
WBC #/AREA URNS AUTO: ABNORMAL /HPF

## 2024-09-18 PROCEDURE — 84132 ASSAY OF SERUM POTASSIUM: CPT | Performed by: STUDENT IN AN ORGANIZED HEALTH CARE EDUCATION/TRAINING PROGRAM

## 2024-09-18 PROCEDURE — 96374 THER/PROPH/DIAG INJ IV PUSH: CPT

## 2024-09-18 PROCEDURE — 82947 ASSAY GLUCOSE BLOOD QUANT: CPT

## 2024-09-18 PROCEDURE — 81001 URINALYSIS AUTO W/SCOPE: CPT | Performed by: STUDENT IN AN ORGANIZED HEALTH CARE EDUCATION/TRAINING PROGRAM

## 2024-09-18 PROCEDURE — 85025 COMPLETE CBC W/AUTO DIFF WBC: CPT | Performed by: STUDENT IN AN ORGANIZED HEALTH CARE EDUCATION/TRAINING PROGRAM

## 2024-09-18 PROCEDURE — 2500000005 HC RX 250 GENERAL PHARMACY W/O HCPCS: Performed by: STUDENT IN AN ORGANIZED HEALTH CARE EDUCATION/TRAINING PROGRAM

## 2024-09-18 PROCEDURE — 71045 X-RAY EXAM CHEST 1 VIEW: CPT

## 2024-09-18 PROCEDURE — 82805 BLOOD GASES W/O2 SATURATION: CPT | Performed by: STUDENT IN AN ORGANIZED HEALTH CARE EDUCATION/TRAINING PROGRAM

## 2024-09-18 PROCEDURE — 36415 COLL VENOUS BLD VENIPUNCTURE: CPT | Performed by: STUDENT IN AN ORGANIZED HEALTH CARE EDUCATION/TRAINING PROGRAM

## 2024-09-18 PROCEDURE — 2020000001 HC ICU ROOM DAILY

## 2024-09-18 PROCEDURE — 96365 THER/PROPH/DIAG IV INF INIT: CPT

## 2024-09-18 PROCEDURE — 2500000004 HC RX 250 GENERAL PHARMACY W/ HCPCS (ALT 636 FOR OP/ED): Performed by: STUDENT IN AN ORGANIZED HEALTH CARE EDUCATION/TRAINING PROGRAM

## 2024-09-18 PROCEDURE — 99291 CRITICAL CARE FIRST HOUR: CPT

## 2024-09-18 PROCEDURE — 82010 KETONE BODYS QUAN: CPT | Performed by: STUDENT IN AN ORGANIZED HEALTH CARE EDUCATION/TRAINING PROGRAM

## 2024-09-18 PROCEDURE — 96361 HYDRATE IV INFUSION ADD-ON: CPT

## 2024-09-18 PROCEDURE — 93005 ELECTROCARDIOGRAM TRACING: CPT

## 2024-09-18 PROCEDURE — 87040 BLOOD CULTURE FOR BACTERIA: CPT | Mod: PORLAB | Performed by: STUDENT IN AN ORGANIZED HEALTH CARE EDUCATION/TRAINING PROGRAM

## 2024-09-18 PROCEDURE — 83605 ASSAY OF LACTIC ACID: CPT | Performed by: STUDENT IN AN ORGANIZED HEALTH CARE EDUCATION/TRAINING PROGRAM

## 2024-09-18 PROCEDURE — 83735 ASSAY OF MAGNESIUM: CPT | Performed by: STUDENT IN AN ORGANIZED HEALTH CARE EDUCATION/TRAINING PROGRAM

## 2024-09-18 PROCEDURE — 87077 CULTURE AEROBIC IDENTIFY: CPT | Mod: PORLAB | Performed by: STUDENT IN AN ORGANIZED HEALTH CARE EDUCATION/TRAINING PROGRAM

## 2024-09-18 PROCEDURE — 96360 HYDRATION IV INFUSION INIT: CPT

## 2024-09-18 PROCEDURE — 99223 1ST HOSP IP/OBS HIGH 75: CPT | Performed by: STUDENT IN AN ORGANIZED HEALTH CARE EDUCATION/TRAINING PROGRAM

## 2024-09-18 PROCEDURE — 99291 CRITICAL CARE FIRST HOUR: CPT | Performed by: STUDENT IN AN ORGANIZED HEALTH CARE EDUCATION/TRAINING PROGRAM

## 2024-09-18 PROCEDURE — 84100 ASSAY OF PHOSPHORUS: CPT | Performed by: STUDENT IN AN ORGANIZED HEALTH CARE EDUCATION/TRAINING PROGRAM

## 2024-09-18 PROCEDURE — 71045 X-RAY EXAM CHEST 1 VIEW: CPT | Mod: FOREIGN READ | Performed by: RADIOLOGY

## 2024-09-18 RX ORDER — PROCHLORPERAZINE EDISYLATE 5 MG/ML
10 INJECTION INTRAMUSCULAR; INTRAVENOUS ONCE
Status: COMPLETED | OUTPATIENT
Start: 2024-09-18 | End: 2024-09-18

## 2024-09-18 RX ORDER — DEXTROSE MONOHYDRATE 100 MG/ML
150 INJECTION, SOLUTION INTRAVENOUS CONTINUOUS
Status: DISCONTINUED | OUTPATIENT
Start: 2024-09-18 | End: 2024-09-19

## 2024-09-18 RX ORDER — SODIUM CHLORIDE 450 MG/100ML
250 INJECTION, SOLUTION INTRAVENOUS CONTINUOUS
Status: DISCONTINUED | OUTPATIENT
Start: 2024-09-18 | End: 2024-09-19

## 2024-09-18 RX ORDER — PANTOPRAZOLE SODIUM 40 MG/10ML
40 INJECTION, POWDER, LYOPHILIZED, FOR SOLUTION INTRAVENOUS
Status: DISCONTINUED | OUTPATIENT
Start: 2024-09-19 | End: 2024-09-19

## 2024-09-18 RX ORDER — ONDANSETRON HYDROCHLORIDE 2 MG/ML
4 INJECTION, SOLUTION INTRAVENOUS EVERY 6 HOURS PRN
Status: DISCONTINUED | OUTPATIENT
Start: 2024-09-18 | End: 2024-09-21 | Stop reason: HOSPADM

## 2024-09-18 RX ORDER — VANCOMYCIN HYDROCHLORIDE 1 G/200ML
1000 INJECTION, SOLUTION INTRAVENOUS ONCE
Status: DISCONTINUED | OUTPATIENT
Start: 2024-09-18 | End: 2024-09-18 | Stop reason: HOSPADM

## 2024-09-18 RX ORDER — PANTOPRAZOLE SODIUM 40 MG/1
40 TABLET, DELAYED RELEASE ORAL
Status: DISCONTINUED | OUTPATIENT
Start: 2024-09-19 | End: 2024-09-19

## 2024-09-18 RX ORDER — ENOXAPARIN SODIUM 100 MG/ML
40 INJECTION SUBCUTANEOUS EVERY 24 HOURS
Status: DISCONTINUED | OUTPATIENT
Start: 2024-09-18 | End: 2024-09-21 | Stop reason: HOSPADM

## 2024-09-18 RX ORDER — ESOMEPRAZOLE MAGNESIUM 40 MG/1
40 GRANULE, DELAYED RELEASE ORAL
Status: DISCONTINUED | OUTPATIENT
Start: 2024-09-19 | End: 2024-09-19

## 2024-09-18 RX ORDER — ACETAMINOPHEN 325 MG/1
650 TABLET ORAL EVERY 4 HOURS PRN
Status: DISCONTINUED | OUTPATIENT
Start: 2024-09-18 | End: 2024-09-21 | Stop reason: HOSPADM

## 2024-09-18 RX ORDER — DEXTROSE 50 % IN WATER (D50W) INTRAVENOUS SYRINGE
50
Status: DISCONTINUED | OUTPATIENT
Start: 2024-09-18 | End: 2024-09-19

## 2024-09-18 RX ORDER — DEXTROSE MONOHYDRATE AND SODIUM CHLORIDE 5; .45 G/100ML; G/100ML
150 INJECTION, SOLUTION INTRAVENOUS CONTINUOUS
Status: DISCONTINUED | OUTPATIENT
Start: 2024-09-18 | End: 2024-09-19

## 2024-09-18 SDOH — SOCIAL STABILITY: SOCIAL INSECURITY: HAVE YOU HAD THOUGHTS OF HARMING ANYONE ELSE?: NO

## 2024-09-18 SDOH — SOCIAL STABILITY: SOCIAL INSECURITY: WERE YOU ABLE TO COMPLETE ALL THE BEHAVIORAL HEALTH SCREENINGS?: YES

## 2024-09-18 ASSESSMENT — COGNITIVE AND FUNCTIONAL STATUS - GENERAL
MOBILITY SCORE: 24
DAILY ACTIVITIY SCORE: 24
MOBILITY SCORE: 24
PATIENT BASELINE BEDBOUND: NO
DAILY ACTIVITIY SCORE: 24

## 2024-09-18 ASSESSMENT — ENCOUNTER SYMPTOMS
VOMITING: 1
ALLERGIC/IMMUNOLOGIC NEGATIVE: 1
WEAKNESS: 0
EYES NEGATIVE: 1
NEUROLOGICAL NEGATIVE: 1
COUGH: 0
HEMATOLOGIC/LYMPHATIC NEGATIVE: 1
CHILLS: 0
HEMATURIA: 0
PALPITATIONS: 0
TREMORS: 0
LIGHT-HEADEDNESS: 0
SHORTNESS OF BREATH: 0
BLOOD IN STOOL: 0
FEVER: 0
ABDOMINAL PAIN: 1
PSYCHIATRIC NEGATIVE: 1
PHOTOPHOBIA: 0
CONFUSION: 0
COLOR CHANGE: 0
DIZZINESS: 0
APPETITE CHANGE: 1
POLYDIPSIA: 1
MUSCULOSKELETAL NEGATIVE: 1
FATIGUE: 1
SLEEP DISTURBANCE: 0
DYSURIA: 0
NAUSEA: 1
ENDOCRINE NEGATIVE: 1

## 2024-09-18 ASSESSMENT — LIFESTYLE VARIABLES
SKIP TO QUESTIONS 9-10: 1
HAVE YOU EVER FELT YOU SHOULD CUT DOWN ON YOUR DRINKING: NO
TOTAL SCORE: 0
EVER HAD A DRINK FIRST THING IN THE MORNING TO STEADY YOUR NERVES TO GET RID OF A HANGOVER: NO
EVER FELT BAD OR GUILTY ABOUT YOUR DRINKING: NO
HOW MANY STANDARD DRINKS CONTAINING ALCOHOL DO YOU HAVE ON A TYPICAL DAY: PATIENT DOES NOT DRINK
HOW OFTEN DO YOU HAVE A DRINK CONTAINING ALCOHOL: NEVER
HAVE PEOPLE ANNOYED YOU BY CRITICIZING YOUR DRINKING: NO
SUBSTANCE_ABUSE_PAST_12_MONTHS: NO
AUDIT-C TOTAL SCORE: 0
AUDIT-C TOTAL SCORE: 0
HOW OFTEN DO YOU HAVE 6 OR MORE DRINKS ON ONE OCCASION: NEVER
PRESCIPTION_ABUSE_PAST_12_MONTHS: NO

## 2024-09-18 ASSESSMENT — ACTIVITIES OF DAILY LIVING (ADL)
DRESSING YOURSELF: INDEPENDENT
LACK_OF_TRANSPORTATION: NO
ADEQUATE_TO_COMPLETE_ADL: YES
FEEDING YOURSELF: INDEPENDENT
TOILETING: INDEPENDENT
HEARING - LEFT EAR: FUNCTIONAL
WALKS IN HOME: INDEPENDENT
PATIENT'S MEMORY ADEQUATE TO SAFELY COMPLETE DAILY ACTIVITIES?: YES
GROOMING: INDEPENDENT
HEARING - RIGHT EAR: FUNCTIONAL
JUDGMENT_ADEQUATE_SAFELY_COMPLETE_DAILY_ACTIVITIES: YES
BATHING: INDEPENDENT

## 2024-09-18 ASSESSMENT — PAIN - FUNCTIONAL ASSESSMENT: PAIN_FUNCTIONAL_ASSESSMENT: 0-10

## 2024-09-18 ASSESSMENT — COLUMBIA-SUICIDE SEVERITY RATING SCALE - C-SSRS
6. HAVE YOU EVER DONE ANYTHING, STARTED TO DO ANYTHING, OR PREPARED TO DO ANYTHING TO END YOUR LIFE?: NO
2. HAVE YOU ACTUALLY HAD ANY THOUGHTS OF KILLING YOURSELF?: NO
1. IN THE PAST MONTH, HAVE YOU WISHED YOU WERE DEAD OR WISHED YOU COULD GO TO SLEEP AND NOT WAKE UP?: NO

## 2024-09-18 ASSESSMENT — PAIN DESCRIPTION - LOCATION: LOCATION: BACK

## 2024-09-18 ASSESSMENT — PAIN SCALES - GENERAL: PAINLEVEL_OUTOF10: 5 - MODERATE PAIN

## 2024-09-18 ASSESSMENT — PAIN DESCRIPTION - PAIN TYPE: TYPE: ACUTE PAIN

## 2024-09-18 NOTE — H&P
[facility] - GENERAL MEDICINE HISTORY AND PHYSICAL    History Obtained From: Patient  Collateral History: Chart review, d/w ED physician    History Of Present Illness:  Ed Sanon is a 33 y.o. male with PMHx s/f IDDM I with poor insulin compliance (initiated on insulin pump in December) and multiple episodes of DKA, anxiety, depression, and recent dental infection for which he is on amoxicillin, presenting with c/f recurrent DKA. Pt is well-known to myself and our IM/PCCM/Endo services in general. He unfortunately presents with much the same symptoms as are typical for his episodes of DKA - nausea, vomiting, decreased PO intake, and episodic RLQ abd discomfort since yesterday evening. His sugars have been running high once again, and he is not very forthcoming about how he is utilizing his pump / bolusing / etc - admits his mother is currently helping him get insulin products and coverage because he is not currently employed. He notes that he has been on abx for a dental infection since Tuesday of last week. He is thirsty. Denies cp/pressure, sob, palpitations, diaphoresis, dizziness/lightheadedness, vision changes, f/c/d.    ED Course (Summary):   Vitals on presentation: T98.2, /75, HR 51 (up to 140 on repeat), RR 20, SpO2 97% RA  Labs:   CBC: WBC 21.5 left shift, Hgb 18.3, platelets 237  CMP: Glucose 340, sodium 135 (corrects 141), potassium 4.7, bicarb 5, AG 34, BUN 17, serum creatinine 0.93.  Mag 1.61.  Beta hydroxybutyrate 8.65  VBG: pH 7.01, pCO2 22, calculated bicarb 5.6; lactate 8.6-3.6-repeat pending  UA: 1+ protein, over 4+ glucose, over 4+ ketones  Imaging: CXR negative  Interventions: 2 L LR, Compazine 10 mg x 1, vancomycin/Zosyn, initiated on heparin drip after bolus for DKA    ED Course (From ED Provider):  ED Course as of 09/18/24 1933   Wed Sep 18, 2024   1821 EKG is interpreted by myself demonstrates sinus tachycardia with a rate of 130, normal axis, normal intervals, no  evidence of an acute STEMI. [NS]      ED Course User Index  [NS] Americo Godinez MD         Diagnoses as of 09/18/24 1933   Diabetic ketoacidosis without coma associated with type 1 diabetes mellitus (Multi)     Relevant Results  Results for orders placed or performed during the hospital encounter of 09/18/24 (from the past 24 hour(s))   POCT GLUCOSE   Result Value Ref Range    POCT Glucose 375 (H) 74 - 99 mg/dL   CBC and Auto Differential   Result Value Ref Range    WBC 21.5 (H) 4.4 - 11.3 x10*3/uL    nRBC 0.0 0.0 - 0.0 /100 WBCs    RBC 5.83 4.50 - 5.90 x10*6/uL    Hemoglobin 18.3 (H) 13.5 - 17.5 g/dL    Hematocrit 54.2 (H) 41.0 - 52.0 %    MCV 93 80 - 100 fL    MCH 31.4 26.0 - 34.0 pg    MCHC 33.8 32.0 - 36.0 g/dL    RDW 13.4 11.5 - 14.5 %    Platelets 237 150 - 450 x10*3/uL    Neutrophils % 88.9 40.0 - 80.0 %    Immature Granulocytes %, Automated 1.7 (H) 0.0 - 0.9 %    Lymphocytes % 4.5 13.0 - 44.0 %    Monocytes % 4.6 2.0 - 10.0 %    Eosinophils % 0.0 0.0 - 6.0 %    Basophils % 0.3 0.0 - 2.0 %    Neutrophils Absolute 19.09 (H) 1.20 - 7.70 x10*3/uL    Immature Granulocytes Absolute, Automated 0.36 0.00 - 0.70 x10*3/uL    Lymphocytes Absolute 0.97 (L) 1.20 - 4.80 x10*3/uL    Monocytes Absolute 0.99 0.10 - 1.00 x10*3/uL    Eosinophils Absolute 0.01 0.00 - 0.70 x10*3/uL    Basophils Absolute 0.07 0.00 - 0.10 x10*3/uL   Magnesium   Result Value Ref Range    Magnesium 1.61 1.60 - 2.40 mg/dL   Comprehensive metabolic panel   Result Value Ref Range    Glucose 340 (H) 74 - 99 mg/dL    Sodium 135 (L) 136 - 145 mmol/L    Potassium 4.7 3.5 - 5.3 mmol/L    Chloride 101 98 - 107 mmol/L    Bicarbonate 5 (LL) 21 - 32 mmol/L    Anion Gap 34 (H) 10 - 20 mmol/L    Urea Nitrogen 17 6 - 23 mg/dL    Creatinine 0.93 0.50 - 1.30 mg/dL    eGFR >90 >60 mL/min/1.73m*2    Calcium 8.1 (L) 8.6 - 10.3 mg/dL    Albumin 3.8 3.4 - 5.0 g/dL    Alkaline Phosphatase 75 33 - 120 U/L    Total Protein 6.3 (L) 6.4 - 8.2 g/dL    AST 11 9 - 39  U/L    Bilirubin, Total 0.2 0.0 - 1.2 mg/dL    ALT 20 10 - 52 U/L   BLOOD GAS VENOUS FULL PANEL   Result Value Ref Range    POCT pH, Venous 7.01 (LL) 7.33 - 7.43 pH    POCT pCO2, Venous 22 (L) 41 - 51 mm Hg    POCT pO2, Venous 55 (H) 35 - 45 mm Hg    POCT SO2, Venous 86 (H) 45 - 75 %    POCT Oxy Hemoglobin, Venous 83.9 (H) 45.0 - 75.0 %    POCT Hematocrit Calculated, Venous 56.0 (H) 41.0 - 52.0 %    POCT Sodium, Venous 127 (L) 136 - 145 mmol/L    POCT Potassium, Venous 4.8 3.5 - 5.3 mmol/L    POCT Chloride, Venous 101 98 - 107 mmol/L    POCT Ionized Calicum, Venous 1.15 1.10 - 1.33 mmol/L    POCT Glucose, Venous 401 (H) 74 - 99 mg/dL    POCT Lactate, Venous 8.6 (HH) 0.4 - 2.0 mmol/L    POCT Base Excess, Venous -24.1 (L) -2.0 - 3.0 mmol/L    POCT HCO3 Calculated, Venous 5.6 (L) 22.0 - 26.0 mmol/L    POCT Hemoglobin, Venous 18.6 (H) 13.5 - 17.5 g/dL    POCT Anion Gap, Venous 25.0 10.0 - 25.0 mmol/L    Patient Temperature 37.0 degrees Celsius    FiO2 21 %   Beta Hydroxybutyrate   Result Value Ref Range    Beta-Hydroxybutyrate        No results found.   Scheduled medications:  enoxaparin, 40 mg, subcutaneous, q24h  [START ON 9/19/2024] pantoprazole, 40 mg, oral, Daily before breakfast   Or  [START ON 9/19/2024] esomeprazole, 40 mg, nasoduodenal tube, Daily before breakfast   Or  [START ON 9/19/2024] pantoprazole, 40 mg, intravenous, Daily before breakfast  piperacillin-tazobactam, 3.375 g, intravenous, Once  piperacillin-tazobactam, 4.5 g, intravenous, q6h  vancomycin, 1,000 mg, intravenous, Once      Continuous medications:  dextrose 10 % in water (D10W), 150 mL/hr  dextrose 10 % in water (D10W), 150 mL/hr  dextrose 5%-0.45 % sodium chloride, 150 mL/hr  insulin regular, 0-50 Units/hr  sodium chloride, 250 mL/hr      PRN medications:  PRN medications: acetaminophen, dextrose, ondansetron, promethazine     Past Medical History  He has a past medical history of Abdominal pain (04/21/2023), Anxiety and depression  (08/08/2021), Contact with and (suspected) exposure to covid-19 (04/21/2023), COVID-19 virus infection (07/11/2023), Diabetes mellitus (Multi), Diabetic acetonemia (Multi) (11/15/2023), DKA (diabetic ketoacidosis) (Multi) (07/11/2023), DKA, type 1, not at goal (Multi) (04/21/2023), DM2 (diabetes mellitus, type 2) (Multi) (11/15/2023), Elevated blood sugar (11/15/2023), Elevated blood-pressure reading, without diagnosis of hypertension (09/09/2020), Fatigue (07/11/2023), Generalized anxiety disorder (07/11/2023), Microalbuminuria due to type 1 diabetes mellitus (Multi) (07/11/2023), Personal history of COVID-19 (04/02/2023), Poorly controlled diabetes mellitus (Multi) (07/11/2023), Type 1 diabetes mellitus (Multi) (07/11/2023), Type 1 diabetes mellitus with hyperglycemia (Multi) (02/25/2019), Vitamin B12 deficiency (07/11/2023), Vitamin D deficiency (07/11/2023), and Vomiting (11/15/2023).    Surgical History  He has no past surgical history on file.     Social History  He reports that he has never smoked. He has never used smokeless tobacco. He reports that he does not drink alcohol and does not use drugs.    Family History  Family History   Problem Relation Name Age of Onset    No Known Problems Mother      No Known Problems Father      Hypertension Other      Coronary artery disease Other      Diabetes Other      Heart failure Other         Allergies  Patient has no known allergies.    Code Status  Full Code     Review of Systems   Constitutional:  Positive for appetite change and fatigue. Negative for chills and fever.   HENT:  Positive for dental problem.    Eyes:  Negative for photophobia and visual disturbance.   Respiratory:  Negative for cough and shortness of breath.    Cardiovascular:  Negative for chest pain, palpitations and leg swelling.   Gastrointestinal:  Positive for abdominal pain, nausea and vomiting. Negative for blood in stool.   Endocrine: Positive for polydipsia. Negative for polyuria.    Genitourinary:  Negative for decreased urine volume, dysuria, hematuria and urgency.   Skin:  Negative for color change and rash.   Neurological:  Negative for dizziness, tremors, syncope, weakness and light-headedness.   Psychiatric/Behavioral:  Negative for confusion and sleep disturbance.    All other systems reviewed and are negative.    Last Recorded Vitals  BP (!) 135/94 (BP Location: Left arm, Patient Position: Sitting)   Pulse (!) 146   Temp 36.8 °C (98.2 °F) (Tympanic)   Resp 20   Wt 70.3 kg (155 lb)   SpO2 98%      Physical Exam:  Vital signs and nursing notes reviewed.   Constitutional: Cooperative. Laying in bed in no acute distress.   Skin: Warm and dry; no obvious lesions, rashes, pallor, or jaundice.   Eyes: EOMI. Anicteric sclera.   ENT: Mucous membranes dry; poor dentition and dental caries   Head and Neck: Normocephalic, atraumatic. ROM preserved. Trachea midline. No appreciable JVD.   Respiratory: Nonlabored on RA. Lungs clear to auscultation bilaterally without obvious adventitious sounds. Chest rise is equal.  Cardiovascular: Sinus tachycardia. No gross murmur, gallop, or rub. Extremities are warm and well-perfused with good capillary refill (< 3 seconds). No chest wall tenderness.   GI: Abdomen soft, slightly tender to palpation RLQ, nondistended. No obvious organomegaly appreciated. Bowel sounds are present and normoactive.  : No CVA tenderness.   MSK: No gross abnormalities appreciated. No limitations to AROM/PROM appreciated.   Extremities: No cyanosis, edema, or clubbing evident. Neurovascularly intact.   Neuro: A&Ox3. CN 2-12 grossly intact. Able to respond to questions appropriately and clearly. No acute focal neurologic deficits appreciated.  Psych: Withdrawn    Assessment/Plan     33 y.o. male with PMHx s/f IDDM I with poor insulin compliance (initiated on insulin pump in December) and multiple episodes of DKA, anxiety, depression, and recent dental infection for which he is on  amoxicillin, presenting with c/f recurrent DKA.     DKA without coma in T1DM   History of poorly-controlled T1DM  Pseudohyponatremia 2/2 hyperglycemia   -Possibly having an inciting event associated with dental infection but most likely due in part to underlying noncompliance   -Evidenced by the following lab values:   -Na corrects to 141 when accounting for hyperglycemia   -Continue with insulin gtt and Q1H glucose checks while on gtt.   -Maintain NPO status while on gtt  -Frequent VBG/BMP/Mag/Phos   -Continue aggressive fluid resuscitation as per DKA protocol   -Critical care and endocrinology consultations appreciated      Metabolic acidosis   -2/2 above   -Continued fluid resuscitation     Sepsis without shock, likely 2/2 dental infection  -SIRS criteria (3/4): WBCs, HR, RR   -Source: dental dz vs alternative  -End-organ dysfunction:   -Lactate 8.6 > repeat pending  -BP is normotensive, getting aggressive fluids   -Blood cultures x2. UA. CXR pending.  -Follow fever curve, WBCs  -Continue antibiotic coverage with  vanc/zosyn overnight > may transition to Unasyn or alternative pending additional results     Anxiety and depression   -Confirm and resume home therapies     H/o HTN   -Denies current meds   -Slightly elevated BP at times in ED; continue close monitoring and make adjustments as needed    Diet: NPO while on gtt   DVT Prophylaxis: SCDs, subcutaneous Lovenox    Code Status: Full Code      SARAHY Britoon dictation software was used to dictate this note and thus there may be minor errors in translation/transcription including garbled speech or misspellings. Please contact for clarification if needed.

## 2024-09-18 NOTE — ED PROVIDER NOTES
HPI   Chief Complaint   Patient presents with    Vomiting     Patient states he started vomiting around 11 pm last night. Patient states his mother called EMS today for him to get checked out. He states his BGL has been high as of late.  in triage.     Hyperglycemia       HPI: Patient is a 33-year-old male, he has a history of type 1 diabetes with recurrent admissions for DKA, currently on an insulin pump in his left lower quadrant of his abdomen, history of anxiety and depression, he is presenting to the emergency department for DKA concern.  He reports he has been having nausea and multiple episodes of nonbloody nonbilious emesis and since 11 PM last night.  He checked his sugar today which was reading high.  He is presenting to the ER for further evaluation.  He has not been able to tolerate any oral intake.  No blood in his vomit.  No blood in the stool with mild diarrhea.  No chest pain or palpitations, no shortness of breath, does report some lightheadedness without dizziness.      ROS: Complete 12 point review of systems performed, otherwise negative except as noted in the history of present illness    PMH: Reviewed, documented below in note. Pertinents in HPI  PSH: Reviewed and documented below in note. Pertinents in HPI  SH: Denies illicits, not homeless  Fam: Reviewed, noncontributory to patients current complaint  MEDS: Reviewed and documented below in note. Pertinents in HPI  ALLERGIES: Reviewed and documented below in note.                                    Allen Coma Scale Score: 15                  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    DIABETIC KETO ACIDOSIS    DKA (diabetic ketoacidosis) (Multi) 07/11/2023    DKA, type 1, not at  "goal (Multi) 04/21/2023    DM2 (diabetes mellitus, type 2) (Multi) 11/15/2023    DIABETES    Elevated blood sugar 11/15/2023    ELEVATED BLOOD SUGAR/ 375 LAST CHECK    Elevated blood-pressure reading, without diagnosis of hypertension 09/09/2020    Elevated blood pressure reading    Fatigue 07/11/2023    Generalized anxiety disorder 07/11/2023    Microalbuminuria due to type 1 diabetes mellitus (Multi) 07/11/2023    Personal history of COVID-19 04/02/2023    Poorly controlled diabetes mellitus (Multi) 07/11/2023    Type 1 diabetes mellitus (Multi) 07/11/2023    Type 1 diabetes mellitus with hyperglycemia (Multi) 02/25/2019    Vitamin B12 deficiency 07/11/2023    Vitamin D deficiency 07/11/2023    Vomiting 11/15/2023    VOMITING HEADACHE BACK ACHE     History reviewed. No pertinent surgical history.  Family History   Problem Relation Name Age of Onset    No Known Problems Mother      No Known Problems Father      Hypertension Other      Coronary artery disease Other      Diabetes Other      Heart failure Other       Social History     Tobacco Use    Smoking status: Never    Smokeless tobacco: Never   Vaping Use    Vaping status: Former   Substance Use Topics    Alcohol use: Never    Drug use: Never       Physical Exam   Visit Vitals  /84   Pulse 105   Temp 36.2 °C (97.2 °F) (Temporal)   Resp 19   Ht 1.778 m (5' 10\")   Wt 76.2 kg (167 lb 14.4 oz)   SpO2 99%   BMI 24.09 kg/m²   Smoking Status Never   BSA 1.94 m²      Physical Exam  Vitals and nursing note reviewed.   Constitutional:       General: He is in acute distress.      Appearance: Normal appearance. He is ill-appearing.   HENT:      Head: Normocephalic and atraumatic.      Mouth/Throat:      Mouth: Mucous membranes are dry.      Pharynx: Oropharynx is clear.   Eyes:      Extraocular Movements: Extraocular movements intact.      Pupils: Pupils are equal, round, and reactive to light.   Neck:      Vascular: No carotid bruit.   Cardiovascular:      Rate and " Rhythm: Regular rhythm. Tachycardia present.      Pulses: Normal pulses.      Heart sounds: Normal heart sounds.   Pulmonary:      Effort: Pulmonary effort is normal.      Breath sounds: Normal breath sounds.   Abdominal:      General: There is no distension.      Palpations: Abdomen is soft.      Tenderness: There is no abdominal tenderness. There is no guarding or rebound.   Musculoskeletal:         General: No tenderness, deformity or signs of injury.      Cervical back: Normal range of motion. No rigidity.   Skin:     General: Skin is warm and dry.      Capillary Refill: Capillary refill takes 2 to 3 seconds.   Neurological:      General: No focal deficit present.      Mental Status: He is alert and oriented to person, place, and time.      Sensory: No sensory deficit.      Motor: No weakness.   Psychiatric:         Mood and Affect: Mood normal.         Behavior: Behavior normal.         XR chest 1 view   Final Result   No acute cardiopulmonary disease.  No significant interval change from   the prior exam..   Signed by Edgard Serrato MD          Labs Reviewed   URINALYSIS WITH REFLEX MICROSCOPIC - Abnormal       Result Value    Color, Urine Light-Yellow      Appearance, Urine Clear      Specific Gravity, Urine >1.030      pH, Urine 5.5      Protein, Urine 50 (1+) (*)     Glucose, Urine OVER (4+) (*)     Blood, Urine 0.03 (TRACE) (*)     Ketones, Urine OVER (4+) (*)     Bilirubin, Urine NEGATIVE      Urobilinogen, Urine Normal      Nitrite, Urine NEGATIVE      Leukocyte Esterase, Urine NEGATIVE      Narrative:     OVER is reported when the result is greater than the clinically reportable range.   CBC WITH AUTO DIFFERENTIAL - Abnormal    WBC 21.5 (*)     nRBC 0.0      RBC 5.83      Hemoglobin 18.3 (*)     Hematocrit 54.2 (*)     MCV 93      MCH 31.4      MCHC 33.8      RDW 13.4      Platelets 237      Neutrophils % 88.9      Immature Granulocytes %, Automated 1.7 (*)     Lymphocytes % 4.5      Monocytes % 4.6       Eosinophils % 0.0      Basophils % 0.3      Neutrophils Absolute 19.09 (*)     Immature Granulocytes Absolute, Automated 0.36      Lymphocytes Absolute 0.97 (*)     Monocytes Absolute 0.99      Eosinophils Absolute 0.01      Basophils Absolute 0.07     COMPREHENSIVE METABOLIC PANEL - Abnormal    Glucose 340 (*)     Sodium 135 (*)     Potassium 4.7      Chloride 101      Bicarbonate 5 (*)     Anion Gap 34 (*)     Urea Nitrogen 17      Creatinine 0.93      eGFR >90      Calcium 8.1 (*)     Albumin 3.8      Alkaline Phosphatase 75      Total Protein 6.3 (*)     AST 11      Bilirubin, Total 0.2      ALT 20     BLOOD GAS VENOUS FULL PANEL - Abnormal    POCT pH, Venous 7.01 (*)     POCT pCO2, Venous 22 (*)     POCT pO2, Venous 55 (*)     POCT SO2, Venous 86 (*)     POCT Oxy Hemoglobin, Venous 83.9 (*)     POCT Hematocrit Calculated, Venous 56.0 (*)     POCT Sodium, Venous 127 (*)     POCT Potassium, Venous 4.8      POCT Chloride, Venous 101      POCT Ionized Calicum, Venous 1.15      POCT Glucose, Venous 401 (*)     POCT Lactate, Venous 8.6 (*)     POCT Base Excess, Venous -24.1 (*)     POCT HCO3 Calculated, Venous 5.6 (*)     POCT Hemoglobin, Venous 18.6 (*)     POCT Anion Gap, Venous 25.0      Patient Temperature 37.0      FiO2 21     BETA HYDROXYBUTYRATE - Abnormal    Beta-Hydroxybutyrate 8.65 (*)     Narrative:     The beta-hydroxybutyrate test performance characteristics have been validated by  [facility]. This test has not been approved by the FDA; however,such approval is not necessary.     BLOOD GAS LACTIC ACID, VENOUS - Abnormal    POCT Lactate, Venous 3.6 (*)    CBC WITH AUTO DIFFERENTIAL - Abnormal    WBC 14.0 (*)     nRBC 0.0      RBC 5.01      Hemoglobin 15.8      Hematocrit 44.1      MCV 88      MCH 31.5      MCHC 35.8      RDW 13.1      Platelets 187      Neutrophils % 73.2      Immature Granulocytes %, Automated 0.6      Lymphocytes % 13.1      Monocytes % 12.9       Eosinophils % 0.1      Basophils % 0.1      Neutrophils Absolute 10.27 (*)     Immature Granulocytes Absolute, Automated 0.08      Lymphocytes Absolute 1.83      Monocytes Absolute 1.80 (*)     Eosinophils Absolute 0.01      Basophils Absolute 0.01     COMPREHENSIVE METABOLIC PANEL - Abnormal    Glucose 160 (*)     Sodium 135 (*)     Potassium 3.3 (*)     Chloride 111 (*)     Bicarbonate 17 (*)     Anion Gap 10      Urea Nitrogen 14      Creatinine 0.87      eGFR >90      Calcium 7.3 (*)     Albumin 3.4      Alkaline Phosphatase 57      Total Protein 5.8 (*)     AST 9      Bilirubin, Total 0.5      ALT 16     BLOOD GAS VENOUS - Abnormal    POCT pH, Venous 7.24 (*)     POCT pCO2, Venous 26 (*)     POCT pO2, Venous 57 (*)     POCT SO2, Venous 91 (*)     POCT Oxy Hemoglobin, Venous 89.2 (*)     POCT Base Excess, Venous -14.6 (*)     POCT HCO3 Calculated, Venous 11.1 (*)     Patient Temperature 37.0      FiO2 21     BLOOD GAS VENOUS - Abnormal    POCT pH, Venous 7.29 (*)     POCT pCO2, Venous 33 (*)     POCT pO2, Venous 68 (*)     POCT SO2, Venous 95 (*)     POCT Oxy Hemoglobin, Venous 93.5 (*)     POCT Base Excess, Venous -9.6 (*)     POCT HCO3 Calculated, Venous 15.9 (*)     Patient Temperature 37.0      FiO2 21     RENAL FUNCTION PANEL - Abnormal    Glucose 222 (*)     Sodium 136      Potassium 3.4 (*)     Chloride 110 (*)     Bicarbonate 11 (*)     Anion Gap 18      Urea Nitrogen 18      Creatinine 0.99      eGFR >90      Calcium 7.9 (*)     Phosphorus 1.4 (*)     Albumin 3.8     RENAL FUNCTION PANEL - Abnormal    Glucose 160 (*)     Sodium 135 (*)     Potassium 3.3 (*)     Chloride 111 (*)     Bicarbonate 17 (*)     Anion Gap 10      Urea Nitrogen 14      Creatinine 0.87      eGFR >90      Calcium 7.3 (*)     Phosphorus 1.5 (*)     Albumin 3.4     MICROSCOPIC ONLY, URINE - Abnormal    WBC, Urine NONE      RBC, Urine NONE      Mucus, Urine FEW      Hyaline Casts, Urine 3+ (*)    POCT GLUCOSE - Abnormal    POCT  Glucose 375 (*)    POCT GLUCOSE - Abnormal    POCT Glucose 350 (*)    POCT GLUCOSE - Abnormal    POCT Glucose 331 (*)    POCT GLUCOSE - Abnormal    POCT Glucose 382 (*)    POCT GLUCOSE - Abnormal    POCT Glucose 258 (*)    POCT GLUCOSE - Abnormal    POCT Glucose 175 (*)    POCT GLUCOSE - Abnormal    POCT Glucose 208 (*)    POCT GLUCOSE - Abnormal    POCT Glucose 233 (*)    POCT GLUCOSE - Abnormal    POCT Glucose 203 (*)    POCT GLUCOSE - Abnormal    POCT Glucose 163 (*)    POCT GLUCOSE - Abnormal    POCT Glucose 141 (*)    POCT GLUCOSE - Abnormal    POCT Glucose 136 (*)    POCT GLUCOSE - Abnormal    POCT Glucose 185 (*)    POCT GLUCOSE - Abnormal    POCT Glucose 175 (*)    POCT GLUCOSE - Abnormal    POCT Glucose 248 (*)    BLOOD CULTURE - Normal    Blood Culture Loaded on Instrument - Culture in progress     BLOOD CULTURE - Normal    Blood Culture Loaded on Instrument - Culture in progress     PHOSPHORUS - Normal    Phosphorus 4.2     MAGNESIUM - Normal    Magnesium 1.61     BLOOD GAS VENOUS   RENAL FUNCTION PANEL   LACTATE   URINALYSIS WITH REFLEX CULTURE AND MICROSCOPIC    Narrative:     The following orders were created for panel order Urinalysis with Reflex Culture and Microscopic.  Procedure                               Abnormality         Status                     ---------                               -----------         ------                     Urinalysis with Reflex C...[[id number]]                                                 Extra Urine Gray Tube[[id number]]                                                         Please view results for these tests on the individual orders.   URINALYSIS WITH REFLEX CULTURE AND MICROSCOPIC   EXTRA URINE GRAY TUBE   POCT GLUCOSE METER   POCT GLUCOSE METER   POCT GLUCOSE METER   POCT GLUCOSE METER   POCT GLUCOSE METER   POCT GLUCOSE METER   POCT GLUCOSE METER   POCT GLUCOSE METER   POCT GLUCOSE METER   POCT GLUCOSE METER   POCT GLUCOSE METER   POCT GLUCOSE METER   POCT  GLUCOSE METER   POCT GLUCOSE METER   POCT GLUCOSE METER   POCT GLUCOSE METER   POCT GLUCOSE METER   POCT GLUCOSE METER   POCT GLUCOSE METER   POCT GLUCOSE METER   POCT GLUCOSE METER   POCT GLUCOSE METER   POCT GLUCOSE METER   POCT GLUCOSE METER   POCT GLUCOSE METER   POCT GLUCOSE METER   POCT GLUCOSE METER   POCT GLUCOSE METER   POCT GLUCOSE METER   POCT GLUCOSE METER   POCT GLUCOSE METER   POCT GLUCOSE METER   POCT GLUCOSE METER   POCT GLUCOSE METER   POCT GLUCOSE METER   POCT GLUCOSE METER   POCT GLUCOSE METER   POCT GLUCOSE METER   POCT GLUCOSE METER   POCT GLUCOSE METER   POCT GLUCOSE METER   POCT GLUCOSE METER   POCT GLUCOSE METER   POCT GLUCOSE METER   POCT GLUCOSE METER   POCT GLUCOSE METER   POCT GLUCOSE METER   POCT GLUCOSE METER   POCT GLUCOSE METER   POCT GLUCOSE METER   POCT GLUCOSE METER   POCT GLUCOSE METER   POCT GLUCOSE METER   POCT GLUCOSE METER   POCT GLUCOSE METER   POCT GLUCOSE METER   POCT GLUCOSE METER   POCT GLUCOSE METER   POCT GLUCOSE METER   POCT GLUCOSE METER   POCT GLUCOSE METER   POCT GLUCOSE METER         ED Course & MDM   ED Course as of 09/19/24 0919   Wed Sep 18, 2024   1821 EKG is interpreted by myself demonstrates sinus tachycardia with a rate of 130, normal axis, normal intervals, no evidence of an acute STEMI. [NS]      ED Course User Index  [NS] Americo Godinez MD         Diagnoses as of 09/19/24 0919   Diabetic ketoacidosis without coma associated with type 1 diabetes mellitus (Multi)           Medical Decision Making  All mentioned lab results, ECGs, and imaging were independently reviewed by myself  - Patient evaluated. Patient is presenting to the emergency department for nausea vomiting and elevated blood sugar concerning for DKA.  The patient appears clinically ill and in acute distress on my examination.  He is tachycardic, he is tachypneic, and appears very dehydrated.  2 forms of access were obtained on the patient, large-bore IVs, the patient was started on IV  "fluid resuscitation.  He had basic labs that were obtained which do demonstrate evidence of DKA, pH of 7.01, lactate of 8.6, glucose on the venous blood gas of 401, beta hydroxybutyrate of 8.65, bicarb of 5 with an anion gap of 34.  He also has a leukocytosis of 21.5 with a bandemia of 1.7%.  We do not have a source for the patient's infection and this could be reactive to his DKA however given his critical nature we did start the patient on broad-spectrum antibiotics after blood cultures were obtained.  After 2 L of IV fluids were initiated and given the patient was then started on maintenance IV fluid.  His insulin pump was removed and the patient was started on an insulin drip at 0.1 unit/kg.  The patient was ultimately admitted to the intensive care unit for management of his diabetic ketoacidosis and dehydration.    - Monitored for any changes in stability or symptomatology. Patient remained stable.   - Counseled regarding labs, imaging, diagnosis, and plan. Patient was agreeable. All questions were answered. The patient was receptive and agreeable to the plan of care.       *Disclaimer: This note was dictated by speech recognition. Minor errors in transcription may be present. Please call with questions.    Av Godinez MD             Your medication list        CONTINUE taking these medications        Instructions Last Dose Given Next Dose Due   Guardian 4 Glucose Sensor device  Generic drug: blood-glucose sensor      Change insulin sensor every 7 days as directed, linked to Minimed insulin pump, check with endocrinology for updated regimen       lancets misc           pen needle, diabetic 32 gauge x 5/32\" needle                  ASK your doctor about these medications        Instructions Last Dose Given Next Dose Due   insulin lispro 100 unit/mL injection  Commonly known as: HumaLOG           insulin NPH (Isophane) 100 unit/mL injection  Commonly known as: HumuLIN N,NovoLIN N      Inject 20 Units under " the skin every 12 hours. Take as directed per insulin instructions.                Procedure  Critical Care    Performed by: Americo Godinez MD  Authorized by: Americo Godinez MD    Critical care provider statement:     Critical care time (minutes):  35    Critical care time was exclusive of:  Separately billable procedures and treating other patients    Critical care was necessary to treat or prevent imminent or life-threatening deterioration of the following conditions:  Metabolic crisis (DKA)    Critical care was time spent personally by me on the following activities:  Development of treatment plan with patient or surrogate, discussions with consultants, evaluation of patient's response to treatment, examination of patient, obtaining history from patient or surrogate, ordering and performing treatments and interventions, ordering and review of laboratory studies, ordering and review of radiographic studies and re-evaluation of patient's condition    Care discussed with: admitting provider         *This report was transcribed using voice recognition software.  Every effort was made to ensure accuracy; however, inadvertent computerized transcription errors may be present.*  Americo Godinez MD  09/19/24         Americo Godinez MD  09/19/24 0919

## 2024-09-19 LAB
ALBUMIN SERPL BCP-MCNC: 3.4 G/DL (ref 3.4–5)
ALBUMIN SERPL BCP-MCNC: 3.4 G/DL (ref 3.4–5)
ALBUMIN SERPL BCP-MCNC: 3.8 G/DL (ref 3.4–5)
ALP SERPL-CCNC: 57 U/L (ref 33–120)
ALT SERPL W P-5'-P-CCNC: 16 U/L (ref 10–52)
ANION GAP SERPL CALC-SCNC: 10 MMOL/L (ref 10–20)
ANION GAP SERPL CALC-SCNC: 10 MMOL/L (ref 10–20)
ANION GAP SERPL CALC-SCNC: 11 MMOL/L (ref 10–20)
ANION GAP SERPL CALC-SCNC: 18 MMOL/L (ref 10–20)
AST SERPL W P-5'-P-CCNC: 9 U/L (ref 9–39)
ATRIAL RATE: 130 BPM
BASE EXCESS BLDV CALC-SCNC: -14.6 MMOL/L (ref -2–3)
BASE EXCESS BLDV CALC-SCNC: -9.6 MMOL/L (ref -2–3)
BASOPHILS # BLD AUTO: 0.01 X10*3/UL (ref 0–0.1)
BASOPHILS NFR BLD AUTO: 0.1 %
BILIRUB SERPL-MCNC: 0.5 MG/DL (ref 0–1.2)
BODY TEMPERATURE: 37 DEGREES CELSIUS
BODY TEMPERATURE: 37 DEGREES CELSIUS
BUN SERPL-MCNC: 13 MG/DL (ref 6–23)
BUN SERPL-MCNC: 14 MG/DL (ref 6–23)
BUN SERPL-MCNC: 14 MG/DL (ref 6–23)
BUN SERPL-MCNC: 18 MG/DL (ref 6–23)
CALCIUM SERPL-MCNC: 7.3 MG/DL (ref 8.6–10.3)
CALCIUM SERPL-MCNC: 7.3 MG/DL (ref 8.6–10.3)
CALCIUM SERPL-MCNC: 7.8 MG/DL (ref 8.6–10.3)
CALCIUM SERPL-MCNC: 7.9 MG/DL (ref 8.6–10.3)
CHLORIDE SERPL-SCNC: 104 MMOL/L (ref 98–107)
CHLORIDE SERPL-SCNC: 110 MMOL/L (ref 98–107)
CHLORIDE SERPL-SCNC: 111 MMOL/L (ref 98–107)
CHLORIDE SERPL-SCNC: 111 MMOL/L (ref 98–107)
CO2 SERPL-SCNC: 11 MMOL/L (ref 21–32)
CO2 SERPL-SCNC: 17 MMOL/L (ref 21–32)
CO2 SERPL-SCNC: 17 MMOL/L (ref 21–32)
CO2 SERPL-SCNC: 20 MMOL/L (ref 21–32)
CREAT SERPL-MCNC: 0.87 MG/DL (ref 0.5–1.3)
CREAT SERPL-MCNC: 0.87 MG/DL (ref 0.5–1.3)
CREAT SERPL-MCNC: 0.99 MG/DL (ref 0.5–1.3)
CREAT SERPL-MCNC: 1.05 MG/DL (ref 0.5–1.3)
EGFRCR SERPLBLD CKD-EPI 2021: >90 ML/MIN/1.73M*2
EOSINOPHIL # BLD AUTO: 0.01 X10*3/UL (ref 0–0.7)
EOSINOPHIL NFR BLD AUTO: 0.1 %
ERYTHROCYTE [DISTWIDTH] IN BLOOD BY AUTOMATED COUNT: 13.1 % (ref 11.5–14.5)
GLUCOSE BLD MANUAL STRIP-MCNC: 136 MG/DL (ref 74–99)
GLUCOSE BLD MANUAL STRIP-MCNC: 141 MG/DL (ref 74–99)
GLUCOSE BLD MANUAL STRIP-MCNC: 163 MG/DL (ref 74–99)
GLUCOSE BLD MANUAL STRIP-MCNC: 175 MG/DL (ref 74–99)
GLUCOSE BLD MANUAL STRIP-MCNC: 185 MG/DL (ref 74–99)
GLUCOSE BLD MANUAL STRIP-MCNC: 203 MG/DL (ref 74–99)
GLUCOSE BLD MANUAL STRIP-MCNC: 208 MG/DL (ref 74–99)
GLUCOSE BLD MANUAL STRIP-MCNC: 233 MG/DL (ref 74–99)
GLUCOSE BLD MANUAL STRIP-MCNC: 241 MG/DL (ref 74–99)
GLUCOSE BLD MANUAL STRIP-MCNC: 248 MG/DL (ref 74–99)
GLUCOSE BLD MANUAL STRIP-MCNC: 282 MG/DL (ref 74–99)
GLUCOSE BLD MANUAL STRIP-MCNC: 339 MG/DL (ref 74–99)
GLUCOSE BLD MANUAL STRIP-MCNC: 353 MG/DL (ref 74–99)
GLUCOSE SERPL-MCNC: 160 MG/DL (ref 74–99)
GLUCOSE SERPL-MCNC: 160 MG/DL (ref 74–99)
GLUCOSE SERPL-MCNC: 222 MG/DL (ref 74–99)
GLUCOSE SERPL-MCNC: 393 MG/DL (ref 74–99)
HCO3 BLDV-SCNC: 11.1 MMOL/L (ref 22–26)
HCO3 BLDV-SCNC: 15.9 MMOL/L (ref 22–26)
HCT VFR BLD AUTO: 44.1 % (ref 41–52)
HGB BLD-MCNC: 15.8 G/DL (ref 13.5–17.5)
IMM GRANULOCYTES # BLD AUTO: 0.08 X10*3/UL (ref 0–0.7)
IMM GRANULOCYTES NFR BLD AUTO: 0.6 % (ref 0–0.9)
INHALED O2 CONCENTRATION: 21 %
INHALED O2 CONCENTRATION: 21 %
LYMPHOCYTES # BLD AUTO: 1.83 X10*3/UL (ref 1.2–4.8)
LYMPHOCYTES NFR BLD AUTO: 13.1 %
MCH RBC QN AUTO: 31.5 PG (ref 26–34)
MCHC RBC AUTO-ENTMCNC: 35.8 G/DL (ref 32–36)
MCV RBC AUTO: 88 FL (ref 80–100)
MONOCYTES # BLD AUTO: 1.8 X10*3/UL (ref 0.1–1)
MONOCYTES NFR BLD AUTO: 12.9 %
NEUTROPHILS # BLD AUTO: 10.27 X10*3/UL (ref 1.2–7.7)
NEUTROPHILS NFR BLD AUTO: 73.2 %
NRBC BLD-RTO: 0 /100 WBCS (ref 0–0)
OXYHGB MFR BLDV: 89.2 % (ref 45–75)
OXYHGB MFR BLDV: 93.5 % (ref 45–75)
P AXIS: -12 DEGREES
PCO2 BLDV: 26 MM HG (ref 41–51)
PCO2 BLDV: 33 MM HG (ref 41–51)
PH BLDV: 7.24 PH (ref 7.33–7.43)
PH BLDV: 7.29 PH (ref 7.33–7.43)
PHOSPHATE SERPL-MCNC: 1.4 MG/DL (ref 2.5–4.9)
PHOSPHATE SERPL-MCNC: 1.5 MG/DL (ref 2.5–4.9)
PLATELET # BLD AUTO: 187 X10*3/UL (ref 150–450)
PO2 BLDV: 57 MM HG (ref 35–45)
PO2 BLDV: 68 MM HG (ref 35–45)
POTASSIUM SERPL-SCNC: 3.3 MMOL/L (ref 3.5–5.3)
POTASSIUM SERPL-SCNC: 3.3 MMOL/L (ref 3.5–5.3)
POTASSIUM SERPL-SCNC: 3.4 MMOL/L (ref 3.5–5.3)
POTASSIUM SERPL-SCNC: 4 MMOL/L (ref 3.5–5.3)
PR INTERVAL: 133 MS
PROT SERPL-MCNC: 5.8 G/DL (ref 6.4–8.2)
Q ONSET: 251 MS
QRS COUNT: 21 BEATS
QRS DURATION: 106 MS
QT INTERVAL: 324 MS
QTC CALCULATION(BAZETT): 475 MS
QTC FREDERICIA: 418 MS
R AXIS: 72 DEGREES
RBC # BLD AUTO: 5.01 X10*6/UL (ref 4.5–5.9)
SAO2 % BLDV: 91 % (ref 45–75)
SAO2 % BLDV: 95 % (ref 45–75)
SODIUM SERPL-SCNC: 131 MMOL/L (ref 136–145)
SODIUM SERPL-SCNC: 135 MMOL/L (ref 136–145)
SODIUM SERPL-SCNC: 135 MMOL/L (ref 136–145)
SODIUM SERPL-SCNC: 136 MMOL/L (ref 136–145)
T AXIS: -24 DEGREES
T OFFSET: 413 MS
VENTRICULAR RATE: 129 BPM
WBC # BLD AUTO: 14 X10*3/UL (ref 4.4–11.3)

## 2024-09-19 PROCEDURE — 36415 COLL VENOUS BLD VENIPUNCTURE: CPT | Performed by: STUDENT IN AN ORGANIZED HEALTH CARE EDUCATION/TRAINING PROGRAM

## 2024-09-19 PROCEDURE — 2500000005 HC RX 250 GENERAL PHARMACY W/O HCPCS

## 2024-09-19 PROCEDURE — 85025 COMPLETE CBC W/AUTO DIFF WBC: CPT | Performed by: STUDENT IN AN ORGANIZED HEALTH CARE EDUCATION/TRAINING PROGRAM

## 2024-09-19 PROCEDURE — 2500000002 HC RX 250 W HCPCS SELF ADMINISTERED DRUGS (ALT 637 FOR MEDICARE OP, ALT 636 FOR OP/ED): Performed by: INTERNAL MEDICINE

## 2024-09-19 PROCEDURE — 99223 1ST HOSP IP/OBS HIGH 75: CPT | Performed by: INTERNAL MEDICINE

## 2024-09-19 PROCEDURE — 99291 CRITICAL CARE FIRST HOUR: CPT | Performed by: INTERNAL MEDICINE

## 2024-09-19 PROCEDURE — 80053 COMPREHEN METABOLIC PANEL: CPT | Performed by: STUDENT IN AN ORGANIZED HEALTH CARE EDUCATION/TRAINING PROGRAM

## 2024-09-19 PROCEDURE — 1100000001 HC PRIVATE ROOM DAILY

## 2024-09-19 PROCEDURE — 2500000004 HC RX 250 GENERAL PHARMACY W/ HCPCS (ALT 636 FOR OP/ED): Performed by: STUDENT IN AN ORGANIZED HEALTH CARE EDUCATION/TRAINING PROGRAM

## 2024-09-19 PROCEDURE — 82947 ASSAY GLUCOSE BLOOD QUANT: CPT

## 2024-09-19 PROCEDURE — 82805 BLOOD GASES W/O2 SATURATION: CPT | Performed by: STUDENT IN AN ORGANIZED HEALTH CARE EDUCATION/TRAINING PROGRAM

## 2024-09-19 PROCEDURE — 2500000004 HC RX 250 GENERAL PHARMACY W/ HCPCS (ALT 636 FOR OP/ED)

## 2024-09-19 PROCEDURE — 80069 RENAL FUNCTION PANEL: CPT | Mod: CCI | Performed by: STUDENT IN AN ORGANIZED HEALTH CARE EDUCATION/TRAINING PROGRAM

## 2024-09-19 PROCEDURE — 36415 COLL VENOUS BLD VENIPUNCTURE: CPT | Performed by: INTERNAL MEDICINE

## 2024-09-19 PROCEDURE — 99233 SBSQ HOSP IP/OBS HIGH 50: CPT | Performed by: INTERNAL MEDICINE

## 2024-09-19 PROCEDURE — 82374 ASSAY BLOOD CARBON DIOXIDE: CPT | Performed by: INTERNAL MEDICINE

## 2024-09-19 RX ORDER — POTASSIUM CHLORIDE 14.9 MG/ML
20 INJECTION INTRAVENOUS ONCE
Status: COMPLETED | OUTPATIENT
Start: 2024-09-19 | End: 2024-09-19

## 2024-09-19 RX ORDER — DEXTROSE 50 % IN WATER (D50W) INTRAVENOUS SYRINGE
12.5
Status: DISCONTINUED | OUTPATIENT
Start: 2024-09-19 | End: 2024-09-21 | Stop reason: HOSPADM

## 2024-09-19 RX ORDER — INSULIN LISPRO 100 [IU]/ML
0-10 INJECTION, SOLUTION INTRAVENOUS; SUBCUTANEOUS
Status: DISCONTINUED | OUTPATIENT
Start: 2024-09-19 | End: 2024-09-19

## 2024-09-19 RX ORDER — DEXTROSE 50 % IN WATER (D50W) INTRAVENOUS SYRINGE
25
Status: DISCONTINUED | OUTPATIENT
Start: 2024-09-19 | End: 2024-09-21 | Stop reason: HOSPADM

## 2024-09-19 RX ORDER — INSULIN LISPRO 100 [IU]/ML
0-10 INJECTION, SOLUTION INTRAVENOUS; SUBCUTANEOUS
Status: DISCONTINUED | OUTPATIENT
Start: 2024-09-19 | End: 2024-09-21 | Stop reason: HOSPADM

## 2024-09-19 ASSESSMENT — COGNITIVE AND FUNCTIONAL STATUS - GENERAL
MOBILITY SCORE: 24
MOBILITY SCORE: 24
DAILY ACTIVITIY SCORE: 24
MOBILITY SCORE: 24
DAILY ACTIVITIY SCORE: 24
MOBILITY SCORE: 24

## 2024-09-19 ASSESSMENT — PAIN - FUNCTIONAL ASSESSMENT
PAIN_FUNCTIONAL_ASSESSMENT: 0-10

## 2024-09-19 ASSESSMENT — PAIN SCALES - GENERAL
PAINLEVEL_OUTOF10: 0 - NO PAIN

## 2024-09-19 ASSESSMENT — ACTIVITIES OF DAILY LIVING (ADL): LACK_OF_TRANSPORTATION: NO

## 2024-09-19 NOTE — CONSULTS
"Inpatient consult to Endocrinology  Consult performed by: Lori Packer MD  Consult ordered by: Michaela Espino PA-C  Reason for consult: DKA        Reason For Consult  DKA    History Of Present Illness  Ed Sanon is a 33 y.o. male presenting with nausea, vomiting, and abdominal pain.   He has been on antibiotics for a dental infection for the last week.   He also states that he thinks he must of eaten something bad - pork, sauerkraut and onions.  He was found to be hemodynamically stable.  Initial lab data revealed a glucose value of 340mg/dL, bicarbonate of 5, anion gap of 34, beta hydroxybutyrate of 8.65.  He was started on an insulin infusion and admitted to the ICU.    Duration of type 1 diabetes mellitus:  12 years  Complications:  Frequent DKA, last being in July 2024     Med[employer] MinMed 780G insulin pump     Basal total 31.2 units/day  MN 1.2 units/hr  6AM  1.35 units/hr  10PM 1.2 units/hr    I:C 20 grams    AIT 3 hours    Sensitivity 100mg/dL     Target 120-140mg/dL      He does not take insulin boluses, states they make him \"feel bad.\"  He had the same complaint about prandial insulin injection.  He feels low with glucose in the low 200s    He current he has no insurance as he does not have a job.  He states that his mother has been helping him get insulin.    He has not been wearing a continuous glucose monitor for automated control.  He is not routinely checking his blood sugars but they would be in the 300 mg/dL range.  He cannot recall when he last changed his pump site as he states that he was trying to stretch out his supplies.  He missed his appointment in September with his endocrinology practice.    He is currently on an insulin infusion at 2.5 units/h.  He is on IVF D10 at 150mLs/hr.      He will try to eat some breakfast.         Past Medical History  He has a past medical history of Abdominal pain (04/21/2023), Anxiety and depression (08/08/2021), Contact with and " (suspected) exposure to covid-19 (04/21/2023), COVID-19 virus infection (07/11/2023), Diabetes mellitus (Multi), Diabetic acetonemia (Multi) (11/15/2023), DKA (diabetic ketoacidosis) (Multi) (07/11/2023), DKA, type 1, not at goal (Multi) (04/21/2023), DM2 (diabetes mellitus, type 2) (Multi) (11/15/2023), Elevated blood sugar (11/15/2023), Elevated blood-pressure reading, without diagnosis of hypertension (09/09/2020), Fatigue (07/11/2023), Generalized anxiety disorder (07/11/2023), Microalbuminuria due to type 1 diabetes mellitus (Multi) (07/11/2023), Personal history of COVID-19 (04/02/2023), Poorly controlled diabetes mellitus (Multi) (07/11/2023), Type 1 diabetes mellitus (Multi) (07/11/2023), Type 1 diabetes mellitus with hyperglycemia (Multi) (02/25/2019), Vitamin B12 deficiency (07/11/2023), Vitamin D deficiency (07/11/2023), and Vomiting (11/15/2023).    Surgical History  He has no past surgical history on file.     Social History  He reports that he has never smoked. He has never used smokeless tobacco. He reports that he does not drink alcohol and does not use drugs.    Family History  Family History   Problem Relation Name Age of Onset    No Known Problems Mother      No Known Problems Father      Hypertension Other      Coronary artery disease Other      Diabetes Other      Heart failure Other          Allergies  Patient has no known allergies.    Review of Systems   All other systems reviewed and are negative.       Physical Exam  Vitals and nursing note reviewed.   Constitutional:       General: He is not in acute distress.     Appearance: Normal appearance. He is normal weight.   HENT:      Head: Normocephalic and atraumatic.      Nose: Nose normal.      Mouth/Throat:      Mouth: Mucous membranes are moist.   Eyes:      Extraocular Movements: Extraocular movements intact.   Cardiovascular:      Rate and Rhythm: Normal rate.   Pulmonary:      Effort: Pulmonary effort is normal.   Musculoskeletal:         " General: Normal range of motion.   Skin:     General: Skin is warm.   Neurological:      Mental Status: He is alert and oriented to person, place, and time.   Psychiatric:      Comments: Flat affect          Last Recorded Vitals  Blood pressure 121/84, pulse 105, temperature 37.1 °C (98.8 °F), resp. rate 19, height 1.778 m (5' 10\"), weight 76.2 kg (167 lb 14.4 oz), SpO2 99%.    Relevant Results  Scheduled medications  enoxaparin, 40 mg, subcutaneous, q24h  pantoprazole, 40 mg, oral, Daily before breakfast   Or  esomeprazole, 40 mg, nasoduodenal tube, Daily before breakfast   Or  pantoprazole, 40 mg, intravenous, Daily before breakfast  piperacillin-tazobactam, 4.5 g, intravenous, q6h  potassium chloride, 20 mEq, intravenous, Once  potassium phosphate, 21 mmol, intravenous, Once  vancomycin, 1,500 mg, intravenous, q12h      Continuous medications  dextrose 10 % in water (D10W), 150 mL/hr, Last Rate: 150 mL/hr (09/19/24 0619)  dextrose 10 % in water (D10W), 150 mL/hr  dextrose 5%-0.45 % sodium chloride, 150 mL/hr, Last Rate: Stopped (09/19/24 0410)  insulin regular, 0-50 Units/hr, Last Rate: 2.45 Units/hr (09/19/24 0619)  sodium chloride, 250 mL/hr, Last Rate: Stopped (09/18/24 2303)      PRN medications  PRN medications: acetaminophen, dextrose, ondansetron, promethazine    Results for orders placed or performed during the hospital encounter of 09/18/24 (from the past 96 hour(s))   POCT GLUCOSE   Result Value Ref Range    POCT Glucose 375 (H) 74 - 99 mg/dL   CBC and Auto Differential   Result Value Ref Range    WBC 21.5 (H) 4.4 - 11.3 x10*3/uL    nRBC 0.0 0.0 - 0.0 /100 WBCs    RBC 5.83 4.50 - 5.90 x10*6/uL    Hemoglobin 18.3 (H) 13.5 - 17.5 g/dL    Hematocrit 54.2 (H) 41.0 - 52.0 %    MCV 93 80 - 100 fL    MCH 31.4 26.0 - 34.0 pg    MCHC 33.8 32.0 - 36.0 g/dL    RDW 13.4 11.5 - 14.5 %    Platelets 237 150 - 450 x10*3/uL    Neutrophils % 88.9 40.0 - 80.0 %    Immature Granulocytes %, Automated 1.7 (H) 0.0 - 0.9 %    " Lymphocytes % 4.5 13.0 - 44.0 %    Monocytes % 4.6 2.0 - 10.0 %    Eosinophils % 0.0 0.0 - 6.0 %    Basophils % 0.3 0.0 - 2.0 %    Neutrophils Absolute 19.09 (H) 1.20 - 7.70 x10*3/uL    Immature Granulocytes Absolute, Automated 0.36 0.00 - 0.70 x10*3/uL    Lymphocytes Absolute 0.97 (L) 1.20 - 4.80 x10*3/uL    Monocytes Absolute 0.99 0.10 - 1.00 x10*3/uL    Eosinophils Absolute 0.01 0.00 - 0.70 x10*3/uL    Basophils Absolute 0.07 0.00 - 0.10 x10*3/uL   Phosphorus   Result Value Ref Range    Phosphorus 4.2 2.5 - 4.9 mg/dL   Magnesium   Result Value Ref Range    Magnesium 1.61 1.60 - 2.40 mg/dL   Comprehensive metabolic panel   Result Value Ref Range    Glucose 340 (H) 74 - 99 mg/dL    Sodium 135 (L) 136 - 145 mmol/L    Potassium 4.7 3.5 - 5.3 mmol/L    Chloride 101 98 - 107 mmol/L    Bicarbonate 5 (LL) 21 - 32 mmol/L    Anion Gap 34 (H) 10 - 20 mmol/L    Urea Nitrogen 17 6 - 23 mg/dL    Creatinine 0.93 0.50 - 1.30 mg/dL    eGFR >90 >60 mL/min/1.73m*2    Calcium 8.1 (L) 8.6 - 10.3 mg/dL    Albumin 3.8 3.4 - 5.0 g/dL    Alkaline Phosphatase 75 33 - 120 U/L    Total Protein 6.3 (L) 6.4 - 8.2 g/dL    AST 11 9 - 39 U/L    Bilirubin, Total 0.2 0.0 - 1.2 mg/dL    ALT 20 10 - 52 U/L   BLOOD GAS VENOUS FULL PANEL   Result Value Ref Range    POCT pH, Venous 7.01 (LL) 7.33 - 7.43 pH    POCT pCO2, Venous 22 (L) 41 - 51 mm Hg    POCT pO2, Venous 55 (H) 35 - 45 mm Hg    POCT SO2, Venous 86 (H) 45 - 75 %    POCT Oxy Hemoglobin, Venous 83.9 (H) 45.0 - 75.0 %    POCT Hematocrit Calculated, Venous 56.0 (H) 41.0 - 52.0 %    POCT Sodium, Venous 127 (L) 136 - 145 mmol/L    POCT Potassium, Venous 4.8 3.5 - 5.3 mmol/L    POCT Chloride, Venous 101 98 - 107 mmol/L    POCT Ionized Calicum, Venous 1.15 1.10 - 1.33 mmol/L    POCT Glucose, Venous 401 (H) 74 - 99 mg/dL    POCT Lactate, Venous 8.6 (HH) 0.4 - 2.0 mmol/L    POCT Base Excess, Venous -24.1 (L) -2.0 - 3.0 mmol/L    POCT HCO3 Calculated, Venous 5.6 (L) 22.0 - 26.0 mmol/L    POCT  Hemoglobin, Venous 18.6 (H) 13.5 - 17.5 g/dL    POCT Anion Gap, Venous 25.0 10.0 - 25.0 mmol/L    Patient Temperature 37.0 degrees Celsius    FiO2 21 %   Beta Hydroxybutyrate   Result Value Ref Range    Beta-Hydroxybutyrate 8.65 (H) 0.02 - 0.27 mmol/L   Urinalysis with Reflex Microscopic   Result Value Ref Range    Color, Urine Light-Yellow Light-Yellow, Yellow, Dark-Yellow    Appearance, Urine Clear Clear    Specific Gravity, Urine >1.030 1.005 - 1.035    pH, Urine 5.5 5.0, 5.5, 6.0, 6.5, 7.0, 7.5, 8.0    Protein, Urine 50 (1+) (A) NEGATIVE, 10 (TRACE), 20 (TRACE) mg/dL    Glucose, Urine OVER (4+) (A) Normal mg/dL    Blood, Urine 0.03 (TRACE) (A) NEGATIVE    Ketones, Urine OVER (4+) (A) NEGATIVE mg/dL    Bilirubin, Urine NEGATIVE NEGATIVE    Urobilinogen, Urine Normal Normal mg/dL    Nitrite, Urine NEGATIVE NEGATIVE    Leukocyte Esterase, Urine NEGATIVE NEGATIVE   Microscopic Only, Urine   Result Value Ref Range    WBC, Urine NONE 1-5, NONE /HPF    RBC, Urine NONE NONE, 1-2, 3-5 /HPF    Mucus, Urine FEW Reference range not established. /LPF    Hyaline Casts, Urine 3+ (A) NONE /LPF   POCT GLUCOSE   Result Value Ref Range    POCT Glucose 350 (H) 74 - 99 mg/dL   Blood Gas Lactic Acid, Venous   Result Value Ref Range    POCT Lactate, Venous 3.6 (H) 0.4 - 2.0 mmol/L   Blood Culture    Specimen: Peripheral Venipuncture; Blood culture   Result Value Ref Range    Blood Culture Loaded on Instrument - Culture in progress    Blood Culture    Specimen: Peripheral Venipuncture; Blood culture   Result Value Ref Range    Blood Culture Loaded on Instrument - Culture in progress    POCT GLUCOSE   Result Value Ref Range    POCT Glucose 331 (H) 74 - 99 mg/dL   POCT GLUCOSE   Result Value Ref Range    POCT Glucose 382 (H) 74 - 99 mg/dL   POCT GLUCOSE   Result Value Ref Range    POCT Glucose 258 (H) 74 - 99 mg/dL   POCT GLUCOSE   Result Value Ref Range    POCT Glucose 175 (H) 74 - 99 mg/dL   POCT GLUCOSE   Result Value Ref Range     POCT Glucose 208 (H) 74 - 99 mg/dL   Blood Gas Venous   Result Value Ref Range    POCT pH, Venous 7.24 (LL) 7.33 - 7.43 pH    POCT pCO2, Venous 26 (L) 41 - 51 mm Hg    POCT pO2, Venous 57 (H) 35 - 45 mm Hg    POCT SO2, Venous 91 (H) 45 - 75 %    POCT Oxy Hemoglobin, Venous 89.2 (H) 45.0 - 75.0 %    POCT Base Excess, Venous -14.6 (L) -2.0 - 3.0 mmol/L    POCT HCO3 Calculated, Venous 11.1 (L) 22.0 - 26.0 mmol/L    Patient Temperature 37.0 degrees Celsius    FiO2 21 %   Renal Function Panel   Result Value Ref Range    Glucose 222 (H) 74 - 99 mg/dL    Sodium 136 136 - 145 mmol/L    Potassium 3.4 (L) 3.5 - 5.3 mmol/L    Chloride 110 (H) 98 - 107 mmol/L    Bicarbonate 11 (L) 21 - 32 mmol/L    Anion Gap 18 10 - 20 mmol/L    Urea Nitrogen 18 6 - 23 mg/dL    Creatinine 0.99 0.50 - 1.30 mg/dL    eGFR >90 >60 mL/min/1.73m*2    Calcium 7.9 (L) 8.6 - 10.3 mg/dL    Phosphorus 1.4 (L) 2.5 - 4.9 mg/dL    Albumin 3.8 3.4 - 5.0 g/dL   POCT GLUCOSE   Result Value Ref Range    POCT Glucose 233 (H) 74 - 99 mg/dL   POCT GLUCOSE   Result Value Ref Range    POCT Glucose 203 (H) 74 - 99 mg/dL   POCT GLUCOSE   Result Value Ref Range    POCT Glucose 163 (H) 74 - 99 mg/dL   CBC and Auto Differential   Result Value Ref Range    WBC 14.0 (H) 4.4 - 11.3 x10*3/uL    nRBC 0.0 0.0 - 0.0 /100 WBCs    RBC 5.01 4.50 - 5.90 x10*6/uL    Hemoglobin 15.8 13.5 - 17.5 g/dL    Hematocrit 44.1 41.0 - 52.0 %    MCV 88 80 - 100 fL    MCH 31.5 26.0 - 34.0 pg    MCHC 35.8 32.0 - 36.0 g/dL    RDW 13.1 11.5 - 14.5 %    Platelets 187 150 - 450 x10*3/uL    Neutrophils % 73.2 40.0 - 80.0 %    Immature Granulocytes %, Automated 0.6 0.0 - 0.9 %    Lymphocytes % 13.1 13.0 - 44.0 %    Monocytes % 12.9 2.0 - 10.0 %    Eosinophils % 0.1 0.0 - 6.0 %    Basophils % 0.1 0.0 - 2.0 %    Neutrophils Absolute 10.27 (H) 1.20 - 7.70 x10*3/uL    Immature Granulocytes Absolute, Automated 0.08 0.00 - 0.70 x10*3/uL    Lymphocytes Absolute 1.83 1.20 - 4.80 x10*3/uL    Monocytes Absolute  1.80 (H) 0.10 - 1.00 x10*3/uL    Eosinophils Absolute 0.01 0.00 - 0.70 x10*3/uL    Basophils Absolute 0.01 0.00 - 0.10 x10*3/uL   Comprehensive Metabolic Panel   Result Value Ref Range    Glucose 160 (H) 74 - 99 mg/dL    Sodium 135 (L) 136 - 145 mmol/L    Potassium 3.3 (L) 3.5 - 5.3 mmol/L    Chloride 111 (H) 98 - 107 mmol/L    Bicarbonate 17 (L) 21 - 32 mmol/L    Anion Gap 10 10 - 20 mmol/L    Urea Nitrogen 14 6 - 23 mg/dL    Creatinine 0.87 0.50 - 1.30 mg/dL    eGFR >90 >60 mL/min/1.73m*2    Calcium 7.3 (L) 8.6 - 10.3 mg/dL    Albumin 3.4 3.4 - 5.0 g/dL    Alkaline Phosphatase 57 33 - 120 U/L    Total Protein 5.8 (L) 6.4 - 8.2 g/dL    AST 9 9 - 39 U/L    Bilirubin, Total 0.5 0.0 - 1.2 mg/dL    ALT 16 10 - 52 U/L   Blood Gas Venous   Result Value Ref Range    POCT pH, Venous 7.29 (L) 7.33 - 7.43 pH    POCT pCO2, Venous 33 (L) 41 - 51 mm Hg    POCT pO2, Venous 68 (H) 35 - 45 mm Hg    POCT SO2, Venous 95 (H) 45 - 75 %    POCT Oxy Hemoglobin, Venous 93.5 (H) 45.0 - 75.0 %    POCT Base Excess, Venous -9.6 (L) -2.0 - 3.0 mmol/L    POCT HCO3 Calculated, Venous 15.9 (L) 22.0 - 26.0 mmol/L    Patient Temperature 37.0 degrees Celsius    FiO2 21 %   Renal Function Panel   Result Value Ref Range    Glucose 160 (H) 74 - 99 mg/dL    Sodium 135 (L) 136 - 145 mmol/L    Potassium 3.3 (L) 3.5 - 5.3 mmol/L    Chloride 111 (H) 98 - 107 mmol/L    Bicarbonate 17 (L) 21 - 32 mmol/L    Anion Gap 10 10 - 20 mmol/L    Urea Nitrogen 14 6 - 23 mg/dL    Creatinine 0.87 0.50 - 1.30 mg/dL    eGFR >90 >60 mL/min/1.73m*2    Calcium 7.3 (L) 8.6 - 10.3 mg/dL    Phosphorus 1.5 (L) 2.5 - 4.9 mg/dL    Albumin 3.4 3.4 - 5.0 g/dL   POCT GLUCOSE   Result Value Ref Range    POCT Glucose 141 (H) 74 - 99 mg/dL   POCT GLUCOSE   Result Value Ref Range    POCT Glucose 136 (H) 74 - 99 mg/dL   POCT GLUCOSE   Result Value Ref Range    POCT Glucose 185 (H) 74 - 99 mg/dL       XR chest 1 view    Result Date: 9/18/2024  STUDY: Chest Radiograph;  9/18/2024 7:50PM  INDICATION: Sepsis. COMPARISON: 2/14/2024 XR Chest, 10/8/2023 XR Chest. ACCESSION NUMBER(S): [id number] ORDERING CLINICIAN: HEATHER BACK TECHNIQUE:  Frontal chest was obtained at 19:49 hours. FINDINGS: CARDIOMEDIASTINAL SILHOUETTE: Cardiomediastinal silhouette is normal in size and configuration.  LUNGS: Lungs are clear.  ABDOMEN: No remarkable upper abdominal findings.  BONES: No acute osseous changes.    No acute cardiopulmonary disease.  No significant interval change from the prior exam.. Signed by Edgard Serrato MD      Assessment/Plan   IMPRESSION    DKA  POORLY CONTROLLED TYPE I DIABETES MELLITUS  [employer] 780G insulin pump - suboptimal use given lack of CGM synchronization, routine changes of pump sites every 3 days and lack of boluses  A1C of 13.3%  No improvement in glycemic control given insulin pump initiation in December 2023; A1C values have been 12.5% to 15.5%    RECOMMENDATIONS:  To continue insulin infusion at 2.5 units/hr and titrate as per protocol  To commence NPH 18 units subcutaneous twice daily   To commence an insulin correction scale TID AC   To discontinue insulin infusion two hours following NPH administration  Diabetic diet as tolerated   Accu-Cheks ACHS once the insulin drip has been turned off; continue every 1 hour while on the insulin drip   Hypoglycemic protocol   Counseled that he should likely go back to insulin shots given lack of insurance and cost of care with insulin pump therapy as well as suboptimal use of the insulin pump since December 2023  Will continue to follow    Thank you for the courtesy of this consult     Lori Packer MD

## 2024-09-19 NOTE — CARE PLAN
The clinical goals for the shift include Pt will have decreading blood glucose levels this shift      Problem: Pain - Adult  Goal: Verbalizes/displays adequate comfort level or baseline comfort level  Outcome: Progressing  Flowsheets (Taken 9/19/2024 1704)  Verbalizes/displays adequate comfort level or baseline comfort level: Encourage patient to monitor pain and request assistance     Problem: Chronic Conditions and Co-morbidities  Goal: Patient's chronic conditions and co-morbidity symptoms are monitored and maintained or improved  Outcome: Progressing     Problem: Diabetes  Goal: Vital signs within normal range for age by end of shift  Outcome: Progressing

## 2024-09-19 NOTE — PROGRESS NOTES
Critical Care Daily Progress Notes        Subjective   Patient is a 33 y.o. male admitted on 9/18/2024  5:50 PM with the following indication(s) for ICU care: Diabetic ketoacidosis.     Interval History:   Ed Sanon is a 33 y.o. male with a past medical history significant for IDDM type 1 with poor insulin compliance, recurrent admissions for DKA, anxiety, depression admitted to the ICU for DKA     9/19/2024: Anion gap is closed with a bicarb of 17 and anion gap of 10.  Transitioning from a IV insulin drip to NPH twice daily and lispro sliding scale    Scheduled Medications:   enoxaparin, 40 mg, subcutaneous, q24h  pantoprazole, 40 mg, oral, Daily before breakfast   Or  esomeprazole, 40 mg, nasoduodenal tube, Daily before breakfast   Or  pantoprazole, 40 mg, intravenous, Daily before breakfast  insulin lispro, 0-10 Units, subcutaneous, TID  insulin NPH (Isophane), 8 Units, subcutaneous, q12h SHEILA  piperacillin-tazobactam, 4.5 g, intravenous, q6h  potassium phosphate, 21 mmol, intravenous, Once  vancomycin, 1,500 mg, intravenous, q12h         Continuous Medications:         PRN Medications:   PRN medications: acetaminophen, dextrose, dextrose, glucagon, glucagon, ondansetron, promethazine    Objective   Vitals:  Most Recent:  Vitals:    09/19/24 0935   BP:    Pulse: (!) 114   Resp: 13   Temp:    SpO2: 96%       24hr Min/Max:  Temp  Min: 36.2 °C (97.2 °F)  Max: 37.1 °C (98.8 °F)  Pulse  Min: 51  Max: 149  BP  Min: 109/69  Max: 141/93  Resp  Min: 9  Max: 28  SpO2  Min: 96 %  Max: 100 %    LDA:         Vent settings:       Hemodynamic parameters for last 24 hours:       I/O:    Intake/Output Summary (Last 24 hours) at 9/19/2024 1027  Last data filed at 9/19/2024 0900  Gross per 24 hour   Intake 4186.82 ml   Output 1425 ml   Net 2761.82 ml       Physical Exam:   Physical Exam   Constitutional:       General: He is not in acute distress.  HENT:      Mouth/Throat:      Mouth: Mucous membranes are moist.   Eyes:       Pupils: Pupils are equal, round, and reactive to light.   Cardiovascular:      Rate and Rhythm: Regular rhythm.      Pulses: Normal pulses.      Heart sounds: Normal heart sounds. No murmur heard.  Pulmonary:      Effort: Pulmonary effort is normal. No respiratory distress.      Breath sounds: Normal breath sounds. No wheezing, rhonchi or rales.   Abdominal:      General: Abdomen is flat. Bowel sounds are normal.      Palpations: Abdomen is soft.   Musculoskeletal:      Right lower leg: No edema.      Left lower leg: No edema.   Skin:     General: Skin is warm.      Capillary Refill: Capillary refill takes less than 2 seconds.   Neurological:      Mental Status: He is alert and oriented to person, place, and time.   Psychiatric:         Mood and Affect: Mood normal.         Behavior: Behavior normal.    Lab/Radiology/Diagnostic Review:  Results for orders placed or performed during the hospital encounter of 09/18/24 (from the past 24 hour(s))   POCT GLUCOSE   Result Value Ref Range    POCT Glucose 375 (H) 74 - 99 mg/dL   ECG 12 lead   Result Value Ref Range    Ventricular Rate 129 BPM    Atrial Rate 130 BPM    VA Interval 133 ms    QRS Duration 106 ms    QT Interval 324 ms    QTC Calculation(Bazett) 475 ms    P Axis -12 degrees    R Axis 72 degrees    T Axis -24 degrees    QRS Count 21 beats    Q Onset 251 ms    T Offset 413 ms    QTC Fredericia 418 ms   CBC and Auto Differential   Result Value Ref Range    WBC 21.5 (H) 4.4 - 11.3 x10*3/uL    nRBC 0.0 0.0 - 0.0 /100 WBCs    RBC 5.83 4.50 - 5.90 x10*6/uL    Hemoglobin 18.3 (H) 13.5 - 17.5 g/dL    Hematocrit 54.2 (H) 41.0 - 52.0 %    MCV 93 80 - 100 fL    MCH 31.4 26.0 - 34.0 pg    MCHC 33.8 32.0 - 36.0 g/dL    RDW 13.4 11.5 - 14.5 %    Platelets 237 150 - 450 x10*3/uL    Neutrophils % 88.9 40.0 - 80.0 %    Immature Granulocytes %, Automated 1.7 (H) 0.0 - 0.9 %    Lymphocytes % 4.5 13.0 - 44.0 %    Monocytes % 4.6 2.0 - 10.0 %    Eosinophils % 0.0 0.0 - 6.0 %     Basophils % 0.3 0.0 - 2.0 %    Neutrophils Absolute 19.09 (H) 1.20 - 7.70 x10*3/uL    Immature Granulocytes Absolute, Automated 0.36 0.00 - 0.70 x10*3/uL    Lymphocytes Absolute 0.97 (L) 1.20 - 4.80 x10*3/uL    Monocytes Absolute 0.99 0.10 - 1.00 x10*3/uL    Eosinophils Absolute 0.01 0.00 - 0.70 x10*3/uL    Basophils Absolute 0.07 0.00 - 0.10 x10*3/uL   Phosphorus   Result Value Ref Range    Phosphorus 4.2 2.5 - 4.9 mg/dL   Magnesium   Result Value Ref Range    Magnesium 1.61 1.60 - 2.40 mg/dL   Comprehensive metabolic panel   Result Value Ref Range    Glucose 340 (H) 74 - 99 mg/dL    Sodium 135 (L) 136 - 145 mmol/L    Potassium 4.7 3.5 - 5.3 mmol/L    Chloride 101 98 - 107 mmol/L    Bicarbonate 5 (LL) 21 - 32 mmol/L    Anion Gap 34 (H) 10 - 20 mmol/L    Urea Nitrogen 17 6 - 23 mg/dL    Creatinine 0.93 0.50 - 1.30 mg/dL    eGFR >90 >60 mL/min/1.73m*2    Calcium 8.1 (L) 8.6 - 10.3 mg/dL    Albumin 3.8 3.4 - 5.0 g/dL    Alkaline Phosphatase 75 33 - 120 U/L    Total Protein 6.3 (L) 6.4 - 8.2 g/dL    AST 11 9 - 39 U/L    Bilirubin, Total 0.2 0.0 - 1.2 mg/dL    ALT 20 10 - 52 U/L   BLOOD GAS VENOUS FULL PANEL   Result Value Ref Range    POCT pH, Venous 7.01 (LL) 7.33 - 7.43 pH    POCT pCO2, Venous 22 (L) 41 - 51 mm Hg    POCT pO2, Venous 55 (H) 35 - 45 mm Hg    POCT SO2, Venous 86 (H) 45 - 75 %    POCT Oxy Hemoglobin, Venous 83.9 (H) 45.0 - 75.0 %    POCT Hematocrit Calculated, Venous 56.0 (H) 41.0 - 52.0 %    POCT Sodium, Venous 127 (L) 136 - 145 mmol/L    POCT Potassium, Venous 4.8 3.5 - 5.3 mmol/L    POCT Chloride, Venous 101 98 - 107 mmol/L    POCT Ionized Calicum, Venous 1.15 1.10 - 1.33 mmol/L    POCT Glucose, Venous 401 (H) 74 - 99 mg/dL    POCT Lactate, Venous 8.6 (HH) 0.4 - 2.0 mmol/L    POCT Base Excess, Venous -24.1 (L) -2.0 - 3.0 mmol/L    POCT HCO3 Calculated, Venous 5.6 (L) 22.0 - 26.0 mmol/L    POCT Hemoglobin, Venous 18.6 (H) 13.5 - 17.5 g/dL    POCT Anion Gap, Venous 25.0 10.0 - 25.0 mmol/L    Patient  Temperature 37.0 degrees Celsius    FiO2 21 %   Beta Hydroxybutyrate   Result Value Ref Range    Beta-Hydroxybutyrate 8.65 (H) 0.02 - 0.27 mmol/L   Urinalysis with Reflex Microscopic   Result Value Ref Range    Color, Urine Light-Yellow Light-Yellow, Yellow, Dark-Yellow    Appearance, Urine Clear Clear    Specific Gravity, Urine >1.030 1.005 - 1.035    pH, Urine 5.5 5.0, 5.5, 6.0, 6.5, 7.0, 7.5, 8.0    Protein, Urine 50 (1+) (A) NEGATIVE, 10 (TRACE), 20 (TRACE) mg/dL    Glucose, Urine OVER (4+) (A) Normal mg/dL    Blood, Urine 0.03 (TRACE) (A) NEGATIVE    Ketones, Urine OVER (4+) (A) NEGATIVE mg/dL    Bilirubin, Urine NEGATIVE NEGATIVE    Urobilinogen, Urine Normal Normal mg/dL    Nitrite, Urine NEGATIVE NEGATIVE    Leukocyte Esterase, Urine NEGATIVE NEGATIVE   Microscopic Only, Urine   Result Value Ref Range    WBC, Urine NONE 1-5, NONE /HPF    RBC, Urine NONE NONE, 1-2, 3-5 /HPF    Mucus, Urine FEW Reference range not established. /LPF    Hyaline Casts, Urine 3+ (A) NONE /LPF   POCT GLUCOSE   Result Value Ref Range    POCT Glucose 350 (H) 74 - 99 mg/dL   Blood Gas Lactic Acid, Venous   Result Value Ref Range    POCT Lactate, Venous 3.6 (H) 0.4 - 2.0 mmol/L   Blood Culture    Specimen: Peripheral Venipuncture; Blood culture   Result Value Ref Range    Blood Culture Loaded on Instrument - Culture in progress    Blood Culture    Specimen: Peripheral Venipuncture; Blood culture   Result Value Ref Range    Blood Culture Loaded on Instrument - Culture in progress    POCT GLUCOSE   Result Value Ref Range    POCT Glucose 331 (H) 74 - 99 mg/dL   POCT GLUCOSE   Result Value Ref Range    POCT Glucose 382 (H) 74 - 99 mg/dL   POCT GLUCOSE   Result Value Ref Range    POCT Glucose 258 (H) 74 - 99 mg/dL   POCT GLUCOSE   Result Value Ref Range    POCT Glucose 175 (H) 74 - 99 mg/dL   POCT GLUCOSE   Result Value Ref Range    POCT Glucose 208 (H) 74 - 99 mg/dL   Blood Gas Venous   Result Value Ref Range    POCT pH, Venous 7.24 (LL)  7.33 - 7.43 pH    POCT pCO2, Venous 26 (L) 41 - 51 mm Hg    POCT pO2, Venous 57 (H) 35 - 45 mm Hg    POCT SO2, Venous 91 (H) 45 - 75 %    POCT Oxy Hemoglobin, Venous 89.2 (H) 45.0 - 75.0 %    POCT Base Excess, Venous -14.6 (L) -2.0 - 3.0 mmol/L    POCT HCO3 Calculated, Venous 11.1 (L) 22.0 - 26.0 mmol/L    Patient Temperature 37.0 degrees Celsius    FiO2 21 %   Renal Function Panel   Result Value Ref Range    Glucose 222 (H) 74 - 99 mg/dL    Sodium 136 136 - 145 mmol/L    Potassium 3.4 (L) 3.5 - 5.3 mmol/L    Chloride 110 (H) 98 - 107 mmol/L    Bicarbonate 11 (L) 21 - 32 mmol/L    Anion Gap 18 10 - 20 mmol/L    Urea Nitrogen 18 6 - 23 mg/dL    Creatinine 0.99 0.50 - 1.30 mg/dL    eGFR >90 >60 mL/min/1.73m*2    Calcium 7.9 (L) 8.6 - 10.3 mg/dL    Phosphorus 1.4 (L) 2.5 - 4.9 mg/dL    Albumin 3.8 3.4 - 5.0 g/dL   POCT GLUCOSE   Result Value Ref Range    POCT Glucose 233 (H) 74 - 99 mg/dL   POCT GLUCOSE   Result Value Ref Range    POCT Glucose 203 (H) 74 - 99 mg/dL   POCT GLUCOSE   Result Value Ref Range    POCT Glucose 163 (H) 74 - 99 mg/dL   CBC and Auto Differential   Result Value Ref Range    WBC 14.0 (H) 4.4 - 11.3 x10*3/uL    nRBC 0.0 0.0 - 0.0 /100 WBCs    RBC 5.01 4.50 - 5.90 x10*6/uL    Hemoglobin 15.8 13.5 - 17.5 g/dL    Hematocrit 44.1 41.0 - 52.0 %    MCV 88 80 - 100 fL    MCH 31.5 26.0 - 34.0 pg    MCHC 35.8 32.0 - 36.0 g/dL    RDW 13.1 11.5 - 14.5 %    Platelets 187 150 - 450 x10*3/uL    Neutrophils % 73.2 40.0 - 80.0 %    Immature Granulocytes %, Automated 0.6 0.0 - 0.9 %    Lymphocytes % 13.1 13.0 - 44.0 %    Monocytes % 12.9 2.0 - 10.0 %    Eosinophils % 0.1 0.0 - 6.0 %    Basophils % 0.1 0.0 - 2.0 %    Neutrophils Absolute 10.27 (H) 1.20 - 7.70 x10*3/uL    Immature Granulocytes Absolute, Automated 0.08 0.00 - 0.70 x10*3/uL    Lymphocytes Absolute 1.83 1.20 - 4.80 x10*3/uL    Monocytes Absolute 1.80 (H) 0.10 - 1.00 x10*3/uL    Eosinophils Absolute 0.01 0.00 - 0.70 x10*3/uL    Basophils Absolute 0.01  0.00 - 0.10 x10*3/uL   Comprehensive Metabolic Panel   Result Value Ref Range    Glucose 160 (H) 74 - 99 mg/dL    Sodium 135 (L) 136 - 145 mmol/L    Potassium 3.3 (L) 3.5 - 5.3 mmol/L    Chloride 111 (H) 98 - 107 mmol/L    Bicarbonate 17 (L) 21 - 32 mmol/L    Anion Gap 10 10 - 20 mmol/L    Urea Nitrogen 14 6 - 23 mg/dL    Creatinine 0.87 0.50 - 1.30 mg/dL    eGFR >90 >60 mL/min/1.73m*2    Calcium 7.3 (L) 8.6 - 10.3 mg/dL    Albumin 3.4 3.4 - 5.0 g/dL    Alkaline Phosphatase 57 33 - 120 U/L    Total Protein 5.8 (L) 6.4 - 8.2 g/dL    AST 9 9 - 39 U/L    Bilirubin, Total 0.5 0.0 - 1.2 mg/dL    ALT 16 10 - 52 U/L   Blood Gas Venous   Result Value Ref Range    POCT pH, Venous 7.29 (L) 7.33 - 7.43 pH    POCT pCO2, Venous 33 (L) 41 - 51 mm Hg    POCT pO2, Venous 68 (H) 35 - 45 mm Hg    POCT SO2, Venous 95 (H) 45 - 75 %    POCT Oxy Hemoglobin, Venous 93.5 (H) 45.0 - 75.0 %    POCT Base Excess, Venous -9.6 (L) -2.0 - 3.0 mmol/L    POCT HCO3 Calculated, Venous 15.9 (L) 22.0 - 26.0 mmol/L    Patient Temperature 37.0 degrees Celsius    FiO2 21 %   Renal Function Panel   Result Value Ref Range    Glucose 160 (H) 74 - 99 mg/dL    Sodium 135 (L) 136 - 145 mmol/L    Potassium 3.3 (L) 3.5 - 5.3 mmol/L    Chloride 111 (H) 98 - 107 mmol/L    Bicarbonate 17 (L) 21 - 32 mmol/L    Anion Gap 10 10 - 20 mmol/L    Urea Nitrogen 14 6 - 23 mg/dL    Creatinine 0.87 0.50 - 1.30 mg/dL    eGFR >90 >60 mL/min/1.73m*2    Calcium 7.3 (L) 8.6 - 10.3 mg/dL    Phosphorus 1.5 (L) 2.5 - 4.9 mg/dL    Albumin 3.4 3.4 - 5.0 g/dL   POCT GLUCOSE   Result Value Ref Range    POCT Glucose 141 (H) 74 - 99 mg/dL   POCT GLUCOSE   Result Value Ref Range    POCT Glucose 136 (H) 74 - 99 mg/dL   POCT GLUCOSE   Result Value Ref Range    POCT Glucose 185 (H) 74 - 99 mg/dL   POCT GLUCOSE   Result Value Ref Range    POCT Glucose 175 (H) 74 - 99 mg/dL   POCT GLUCOSE   Result Value Ref Range    POCT Glucose 248 (H) 74 - 99 mg/dL     ECG 12 lead    Result Date:  9/19/2024  Sinus tachycardia Probable left ventricular hypertrophy Anterior ST elevation, probably due to LVH Borderline prolonged QT interval    XR chest 1 view    Result Date: 9/18/2024  STUDY: Chest Radiograph;  9/18/2024 7:50PM INDICATION: Sepsis. COMPARISON: 2/14/2024 XR Chest, 10/8/2023 XR Chest. ACCESSION NUMBER(S): [id number] ORDERING CLINICIAN: HEATHER BACK TECHNIQUE:  Frontal chest was obtained at 19:49 hours. FINDINGS: CARDIOMEDIASTINAL SILHOUETTE: Cardiomediastinal silhouette is normal in size and configuration.  LUNGS: Lungs are clear.  ABDOMEN: No remarkable upper abdominal findings.  BONES: No acute osseous changes.    No acute cardiopulmonary disease.  No significant interval change from the prior exam.. Signed by Edgard Serrato MD                     Assessment/Plan   Principal Problem:    Diabetic ketoacidosis without coma associated with type 1 diabetes mellitus (Multi)    Ed Sanon is a 33 y.o. male with a past medical history significant for IDDM type 1 with poor insulin compliance, recurrent admissions for DKA, anxiety, depression admitted to the ICU for DKA     1. Diabetic ketoacidosis secondary to noncompliance with the insulin pump and supplies  -Suspect primarily driven by poor medication compliance, possible exacerbation by underlying infection as patient with recent dental infection on amoxicillin  -Presented with nausea, vomiting, decreased oral intake  -Reports ongoing elevated blood sugars at home  -Initial blood glucose 375 on arrival  -Beta-Hydroxybutyrate 8.65, HCO3 5, Anion gap 34  -WBC 21.5, lactate 8.6  -UA negative for UTI  -EKG with sinus tachycardia, rate 130, no acute STEMI  -Received 2 liters IV fluid bolus in ED  -Initiated on insulin infusion  -Continue POCT glucose checks every 1 hour and check BMP, Mag every 4 hours while on insulin infusion  -Sodium normal when corrected for glucose  -Continue IV fluids with 0.45% NS @ 250ml/hr  -Switch to dextrose  containing fluids once blood glucose <250  -Transition to subcutaneous insulin when anion gap <15 and bicarb >15  -NPO  -Electrolyte replacements as indicated  -Follow CBC, BMP, Mag  9/19/2024: Anion gap is closed with a bicarb of 17 and anion gap of 10.    Transitioning from a IV insulin drip to NPH twice daily and lispro sliding scale  Started on diabetic diet  Discontinue IV fluids once IV insulin drip is off 2 hours after NPH administration  Extensively counseled the patient regarding the compliance with the insulin and notified he may not be a good candidate for insulin pump as per endocrinology     2. Metabolic acidosis with lactic acidosis secondary to DKA  -VBG ph 7.01, pCO2 22, bicarb (calculated) 5.6  -Venous lactate 8.6  -Continue IV fluid hydration  -Follow VBG  -Trend lactate     3. Poorly controlled type 1 diabetes  -A1C 13.3 (7/6/24)  -Patient with multiple prior admissions for DKA, most recently was admitted 8/11/24-8/13/24  -Started on insulin pump in March 2024  -Counseled on importance of glycemic control  -Endocrinology consulted, appreciate recommendations     4. Leukocytosis  -Suspect secondary to DKA although patient reports recent dental infection for which he is on amoxicillin  -Does meet SIRS criteria with leukocytosis, elevated heart and respiratory rate  -Afebrile, does not appear septic  -Venous lactate 8.6  -Received 2 liters IV fluid bolus in ED  -Blood cultures x 2 drawn; pending  -UA negative for acute infection  -CXR with no acute cardiopulmonary process  -Started empirically on vancomycin, Zosyn, continue for now and likely able to deescalate pending clinical course  -Continue IV fluids per DKA protocol  -Follow CBC    5.  Hypokalemia  Ordered potassium replacement    I spent 30 minutes of cumulative critical care time with the patient.  Greater than 50% of that time was spent in the direct collaboration and or coordination of care of the patient.   Will transfer the patient to  [facility] with telemetry    Dragon dictation software was used to dictate this note and thus there may be minor errors in translation/transcription including garbled speech or misspellings. Please contact for clarification if needed.

## 2024-09-19 NOTE — PROGRESS NOTES
09/19/24 0946   Discharge Planning   Living Arrangements Parent   Support Systems Parent   Assistance Needed Independent   Type of Residence Private residence   Home or Post Acute Services None   Financial Resource Strain   How hard is it for you to pay for the very basics like food, housing, medical care, and heating? Not hard   Housing Stability   In the last 12 months, was there a time when you were not able to pay the mortgage or rent on time? N   At any time in the past 12 months, were you homeless or living in a shelter (including now)? N   Transportation Needs   In the past 12 months, has lack of transportation kept you from medical appointments or from getting medications? no   In the past 12 months, has lack of transportation kept you from meetings, work, or from getting things needed for daily living? No     PCP is Henri Brumfield MD. Patient is from home with his mother. Patient is well known to care transitions related to frequent admissions for DKA. Patient is independent with ambulation, self care, driving, shopping, and meals.  Patient prefers to return home with no needs upon discharge. TCC will continue to follow for needs if they arise.

## 2024-09-19 NOTE — CARE PLAN
Problem: Pain - Adult  Goal: Verbalizes/displays adequate comfort level or baseline comfort level  Outcome: Progressing     Problem: Discharge Planning  Goal: Discharge to home or other facility with appropriate resources  Outcome: Progressing     Problem: Chronic Conditions and Co-morbidities  Goal: Patient's chronic conditions and co-morbidity symptoms are monitored and maintained or improved  Outcome: Progressing     Problem: Diabetes  Goal: Maintain glucose levels >70mg/dl to <250mg/dl throughout shift  Outcome: Progressing     Problem: Safety - Adult  Goal: Free from fall injury  Outcome: Met

## 2024-09-19 NOTE — CONSULTS
Nutrition Initial Assessment:   Nutrition Assessment    Reason for Assessment: Admission nursing screening    Medical history per chart:   IDDM type 1 with poor insulin compliance, recurrent admissions for DKA, anxiety, depression     HPI:  Patient presents with N/V, DKA  Patient with insulin pump PTA    9/19:  Patient seen in ICU, awake and alert at time of visit.  Currently, N/V has resolved and patient is tolerating oral intake, appetite improving.  History of recurrent DKA, history of diet education given with no further need at this time.  Diet compliance encouraged, will monitor and follow per protocol.          Current Diet: Adult diet Regular, Consistent Carb; CCD 60 gm/meal  Supplement(s): No  Average meal Intake during admission:  new admission, patient eating lunch at time of visit        Nutrition Related Findings:   Oral Symptoms: none Teeth: Other (Comment)   GI symptoms: nausea, emesis, and improving .   BM:    Food allergies: NKFA. has No Known Allergies.  Meds/Labs reviewed.  enoxaparin, 40 mg, subcutaneous, q24h  pantoprazole, 40 mg, oral, Daily before breakfast   Or  esomeprazole, 40 mg, nasoduodenal tube, Daily before breakfast   Or  pantoprazole, 40 mg, intravenous, Daily before breakfast  insulin lispro, 0-10 Units, subcutaneous, Before meals & nightly  insulin NPH (Isophane), 8 Units, subcutaneous, q12h SHEILA  vancomycin, 1,500 mg, intravenous, q12h             Nutrition Significant Labs:    Results from last 7 days   Lab Units 09/19/24  0359 09/18/24  2359 09/18/24  1849   GLUCOSE mg/dL 160*  160* 222* 340*   SODIUM mmol/L 135*  135* 136 135*   POTASSIUM mmol/L 3.3*  3.3* 3.4* 4.7   CHLORIDE mmol/L 111*  111* 110* 101   CO2 mmol/L 17*  17* 11* 5*   BUN mg/dL 14  14 18 17   CREATININE mg/dL 0.87  0.87 0.99 0.93   EGFR mL/min/1.73m*2 >90  >90 >90 >90   CALCIUM mg/dL 7.3*  7.3* 7.9* 8.1*   PHOSPHORUS mg/dL 1.5* 1.4* 4.2   MAGNESIUM mg/dL  --   --  1.61     Lab Results   Component Value  "Date    HGBA1C 13.3 (H) 07/06/2024    HGBA1C 12.4 (H) 02/23/2024     Results from last 7 days   Lab Units 09/19/24  1232 09/19/24  0924 09/19/24  0840 09/19/24  0703 09/19/24  0600 09/19/24  0458 09/19/24  0359 09/19/24  0303   POCT GLUCOSE mg/dL 339* 241* 248* 175* 185* 136* 141* 163*       Anthropometrics:  Height: 177.8 cm (5' 10\")   Weight: 76.2 kg (167 lb 14.4 oz)   BMI (Calculated): 24.09  IBW/kg (Dietitian Calculated): 75.4 kg            Weight History:   Wt Readings from Last 10 Encounters:   09/19/24 76.2 kg (167 lb 14.4 oz)   08/13/24 75.6 kg (166 lb 10.7 oz)   07/07/24 65.2 kg (143 lb 12.8 oz)   04/01/24 68.9 kg (152 lb)   03/22/24 72.8 kg (160 lb 7.9 oz)   03/13/24 77.1 kg (170 lb)   02/23/24 77.1 kg (170 lb)   02/14/24 77.1 kg (170 lb)   12/09/23 73.4 kg (161 lb 13.1 oz)   11/20/23 76.2 kg (168 lb)        Weight Change %:  Weight History / % Weight Change: Variable weights per available history, no loss indicated.  Patient reports UBW is ~155 lbs.  Significant Weight Loss: No          Nutrition Focused Physical Exam Findings:  defer: patient eating, not warranted at this time     Physical Findings:  Skin: Negative    Estimated Needs:   Total Energy Estimated Needs (kCal):  (7295-5606)  Method for Estimating Needs: 28-30, CBW  Total Protein Estimated Needs (g):  (75-80)  Method for Estimating Needs: 1, CBW     Method for Estimating Needs: 1 mL, kcal or per physician        Nutrition Diagnosis   Nutrition Diagnosis:  Malnutrition Diagnosis  Patient has Malnutrition Diagnosis: No    Nutrition Diagnosis  Patient has Nutrition Diagnosis: No       Nutrition Interventions/Recommendations   Nutrition Interventions and Recommendations:        Nutrition Prescription:  Individualized Nutrition Prescription Provided for : Carbohydrate consistent diet        Nutrition Interventions:   Food and/or Nutrient Delivery Interventions  Interventions: Meals and snacks  Meals and Snacks: Carbohydrate-modified diet  Goal: " >75% intake of meals    Coordination of Nutrition Care by a Nutrition Professional  Collaboration and Referral of Nutrition Care: Collaboration by nutrition professional with other providers  Goal: IDT rounds    Nutrition Education:   Education Documentation  No documentation found.      Nutrition Counseling  Counseling Theoretical Approach: Other (Comment)  Goal: Reviewed carbohydrate consistent diet, history of diet education given previously.       Nutrition Monitoring and Evaluation   Monitoring/Evaluation:   Food/Nutrient Related History Monitoring  Monitoring and Evaluation Plan: Energy intake  Energy Intake: Estimated energy intake  Criteria: meet >75% of estimated needs    Body Composition/Growth/Weight History  Monitoring and Evaluation Plan: Weight  Weight: Weight change  Criteria: Maintain stable weight    Biochemical Data, Medical Tests and Procedures  Monitoring and Evaluation Plan: Glucose/endocrine profile, Electrolyte/renal panel  Electrolyte and Renal Panel: Sodium, Potassium, Phosphorus, Magnesium  Criteria: WNL  Glucose/Endocrine Profile: Glucose, casual, Hemoglobin A1c (HgbA1c)  Criteria: Improve, WNL    Nutrition Focused Physical Findings  Monitoring and Evaluation Plan: Skin  Criteria: Maintain skin integrity            Time Spent/Follow-up Reminder:   Follow Up  Time Spent (min): 45 minutes  Last Date of Nutrition Visit: 09/19/24  Nutrition Follow-Up Needed?: Dietitian to reassess per policy  Follow up Comment: 9/24-9/26

## 2024-09-19 NOTE — PROGRESS NOTES
CLIFF placed referral to HRS via e-mail requesting updates on pt's Medicaid status. Will follow as needed.    Bandar Tavera MSW, LSW ([id number])   Care Transitions

## 2024-09-19 NOTE — CARE PLAN
The clinical goals for the shift include pt will have Blood Glucose within normal range by end of shift

## 2024-09-19 NOTE — CONSULTS
Critical Care Medicine Consult      Reason For Consult  DKA    History Of Present Illness  Ed Sanon is a 33 y.o. male with a past medical history significant for IDDM type 1 with poor insulin compliance, recurrent admissions for DKA, anxiety, depression admitted to the ICU for DKA. Seen and examined. He is awake and alert and in no distress. He is conversant but does not appear to be very forthcoming with the management of his diabetes at home. Patient says his sugars have been running high at home and that they are always high and difficult to control. He says he has been hospitalized many times for DKA in the past. He states he does not have a job right now and his mom has been helping him obtain his insulin supplies. He states he was put on antibiotics last Tuesday for a dental infection. He reports having nausea and vomiting over the past day and has not been able to eat or drink much. He currently denies any chest pain, shortness of breath, abdominal pain, nausea or vomiting. He is asking for something to drink.     Patient initially presented to the hospital with complaints of nausea, vomiting, abdominal pain and elevated blood glucose. Upon ED workup, vitals with temp 98.2, , /75, oxygen saturations 97% on room air. Labs obtained consistent with DKA with glucose 375, Beta-Hydroxybutyrate 8.65, HCO3 5, Anion gap 34. CBC with WBC 21.5, Venous lactate 8.6. VBG with ph 7.01, pCO2 22, bicarb (calculated) 5.6. Patient received 2 liters IV fluid bolus in ED and started on insulin infusion per DKA protocol. Admitted to the ICU for further management.       Past Medical History:   Diagnosis Date    Abdominal pain 04/21/2023    Anxiety and depression 08/08/2021    Contact with and (suspected) exposure to covid-19 04/21/2023    COVID-19 virus infection 07/11/2023    Diabetes mellitus (Multi)     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    DIABETIC KETO ACIDOSIS    DKA (diabetic ketoacidosis) (Multi)  07/11/2023    DKA, type 1, not at goal (Multi) 04/21/2023    DM2 (diabetes mellitus, type 2) (Multi) 11/15/2023    DIABETES    Elevated blood sugar 11/15/2023    ELEVATED BLOOD SUGAR/ 375 LAST CHECK    Elevated blood-pressure reading, without diagnosis of hypertension 09/09/2020    Elevated blood pressure reading    Fatigue 07/11/2023    Generalized anxiety disorder 07/11/2023    Microalbuminuria due to type 1 diabetes mellitus (Multi) 07/11/2023    Personal history of COVID-19 04/02/2023    Poorly controlled diabetes mellitus (Multi) 07/11/2023    Type 1 diabetes mellitus (Multi) 07/11/2023    Type 1 diabetes mellitus with hyperglycemia (Multi) 02/25/2019    Vitamin B12 deficiency 07/11/2023    Vitamin D deficiency 07/11/2023    Vomiting 11/15/2023    VOMITING HEADACHE BACK ACHE     History reviewed. No pertinent surgical history.  (Not in a hospital admission)    Patient has no known allergies.  Social History     Tobacco Use    Smoking status: Never    Smokeless tobacco: Never   Vaping Use    Vaping status: Former   Substance Use Topics    Alcohol use: Never    Drug use: Never     Family History   Problem Relation Name Age of Onset    No Known Problems Mother      No Known Problems Father      Hypertension Other      Coronary artery disease Other      Diabetes Other      Heart failure Other         Scheduled Medications:   enoxaparin, 40 mg, subcutaneous, q24h  [START ON 9/19/2024] pantoprazole, 40 mg, oral, Daily before breakfast   Or  [START ON 9/19/2024] esomeprazole, 40 mg, nasoduodenal tube, Daily before breakfast   Or  [START ON 9/19/2024] pantoprazole, 40 mg, intravenous, Daily before breakfast  piperacillin-tazobactam, 3.375 g, intravenous, Once  [START ON 9/19/2024] piperacillin-tazobactam, 4.5 g, intravenous, q6h  vancomycin, 1,000 mg, intravenous, Once         Continuous Medications:   dextrose 10 % in water (D10W), 150 mL/hr  dextrose 10 % in water (D10W), 150 mL/hr  dextrose 5%-0.45 % sodium  chloride, 150 mL/hr  insulin regular, 0-50 Units/hr  sodium chloride, 250 mL/hr         PRN Medications:   PRN medications: acetaminophen, dextrose, ondansetron, promethazine    Review of Systems:  Review of Systems   Constitutional:  Positive for appetite change and fatigue.   HENT: Negative.     Eyes: Negative.    Respiratory:  Negative for shortness of breath.    Cardiovascular:  Negative for chest pain.   Gastrointestinal:  Positive for abdominal pain, nausea and vomiting.   Endocrine: Negative.    Genitourinary: Negative.    Musculoskeletal: Negative.    Skin: Negative.    Allergic/Immunologic: Negative.    Neurological: Negative.    Hematological: Negative.    Psychiatric/Behavioral: Negative.          Objective   Vitals:  Most Recent:  Vitals:    09/18/24 1900   BP: (!) 135/94   Pulse: (!) 146   Resp: 20   Temp:    SpO2: 98%       24hr Min/Max:  Temp  Min: 36.8 °C (98.2 °F)  Max: 36.8 °C (98.2 °F)  Pulse  Min: 51  Max: 149  BP  Min: 109/71  Max: 141/93  Resp  Min: 20  Max: 22  SpO2  Min: 97 %  Max: 98 %    LDA:          Vent settings:       Hemodynamic parameters for last 24 hours:       No intake or output data in the 24 hours ending 09/18/24 2011        Physical exam:    Physical Exam  Constitutional:       General: He is not in acute distress.  HENT:      Mouth/Throat:      Mouth: Mucous membranes are moist.   Eyes:      Pupils: Pupils are equal, round, and reactive to light.   Cardiovascular:      Rate and Rhythm: Regular rhythm. Tachycardia present.      Pulses: Normal pulses.      Heart sounds: Normal heart sounds. No murmur heard.  Pulmonary:      Effort: Pulmonary effort is normal. No respiratory distress.      Breath sounds: Normal breath sounds. No wheezing, rhonchi or rales.   Abdominal:      General: Abdomen is flat. Bowel sounds are normal.      Palpations: Abdomen is soft.   Musculoskeletal:      Right lower leg: No edema.      Left lower leg: No edema.   Skin:     General: Skin is warm.       Capillary Refill: Capillary refill takes less than 2 seconds.   Neurological:      Mental Status: He is alert and oriented to person, place, and time.   Psychiatric:         Mood and Affect: Mood normal.         Behavior: Behavior normal.          Lab/Radiology/Diagnostic Review:  Results for orders placed or performed during the hospital encounter of 09/18/24 (from the past 24 hour(s))   POCT GLUCOSE   Result Value Ref Range    POCT Glucose 375 (H) 74 - 99 mg/dL   CBC and Auto Differential   Result Value Ref Range    WBC 21.5 (H) 4.4 - 11.3 x10*3/uL    nRBC 0.0 0.0 - 0.0 /100 WBCs    RBC 5.83 4.50 - 5.90 x10*6/uL    Hemoglobin 18.3 (H) 13.5 - 17.5 g/dL    Hematocrit 54.2 (H) 41.0 - 52.0 %    MCV 93 80 - 100 fL    MCH 31.4 26.0 - 34.0 pg    MCHC 33.8 32.0 - 36.0 g/dL    RDW 13.4 11.5 - 14.5 %    Platelets 237 150 - 450 x10*3/uL    Neutrophils % 88.9 40.0 - 80.0 %    Immature Granulocytes %, Automated 1.7 (H) 0.0 - 0.9 %    Lymphocytes % 4.5 13.0 - 44.0 %    Monocytes % 4.6 2.0 - 10.0 %    Eosinophils % 0.0 0.0 - 6.0 %    Basophils % 0.3 0.0 - 2.0 %    Neutrophils Absolute 19.09 (H) 1.20 - 7.70 x10*3/uL    Immature Granulocytes Absolute, Automated 0.36 0.00 - 0.70 x10*3/uL    Lymphocytes Absolute 0.97 (L) 1.20 - 4.80 x10*3/uL    Monocytes Absolute 0.99 0.10 - 1.00 x10*3/uL    Eosinophils Absolute 0.01 0.00 - 0.70 x10*3/uL    Basophils Absolute 0.07 0.00 - 0.10 x10*3/uL   Phosphorus   Result Value Ref Range    Phosphorus 4.2 2.5 - 4.9 mg/dL   Magnesium   Result Value Ref Range    Magnesium 1.61 1.60 - 2.40 mg/dL   Comprehensive metabolic panel   Result Value Ref Range    Glucose 340 (H) 74 - 99 mg/dL    Sodium 135 (L) 136 - 145 mmol/L    Potassium 4.7 3.5 - 5.3 mmol/L    Chloride 101 98 - 107 mmol/L    Bicarbonate 5 (LL) 21 - 32 mmol/L    Anion Gap 34 (H) 10 - 20 mmol/L    Urea Nitrogen 17 6 - 23 mg/dL    Creatinine 0.93 0.50 - 1.30 mg/dL    eGFR >90 >60 mL/min/1.73m*2    Calcium 8.1 (L) 8.6 - 10.3 mg/dL    Albumin  3.8 3.4 - 5.0 g/dL    Alkaline Phosphatase 75 33 - 120 U/L    Total Protein 6.3 (L) 6.4 - 8.2 g/dL    AST 11 9 - 39 U/L    Bilirubin, Total 0.2 0.0 - 1.2 mg/dL    ALT 20 10 - 52 U/L   BLOOD GAS VENOUS FULL PANEL   Result Value Ref Range    POCT pH, Venous 7.01 (LL) 7.33 - 7.43 pH    POCT pCO2, Venous 22 (L) 41 - 51 mm Hg    POCT pO2, Venous 55 (H) 35 - 45 mm Hg    POCT SO2, Venous 86 (H) 45 - 75 %    POCT Oxy Hemoglobin, Venous 83.9 (H) 45.0 - 75.0 %    POCT Hematocrit Calculated, Venous 56.0 (H) 41.0 - 52.0 %    POCT Sodium, Venous 127 (L) 136 - 145 mmol/L    POCT Potassium, Venous 4.8 3.5 - 5.3 mmol/L    POCT Chloride, Venous 101 98 - 107 mmol/L    POCT Ionized Calicum, Venous 1.15 1.10 - 1.33 mmol/L    POCT Glucose, Venous 401 (H) 74 - 99 mg/dL    POCT Lactate, Venous 8.6 (HH) 0.4 - 2.0 mmol/L    POCT Base Excess, Venous -24.1 (L) -2.0 - 3.0 mmol/L    POCT HCO3 Calculated, Venous 5.6 (L) 22.0 - 26.0 mmol/L    POCT Hemoglobin, Venous 18.6 (H) 13.5 - 17.5 g/dL    POCT Anion Gap, Venous 25.0 10.0 - 25.0 mmol/L    Patient Temperature 37.0 degrees Celsius    FiO2 21 %   Beta Hydroxybutyrate   Result Value Ref Range    Beta-Hydroxybutyrate     Urinalysis with Reflex Microscopic   Result Value Ref Range    Color, Urine Light-Yellow Light-Yellow, Yellow, Dark-Yellow    Appearance, Urine Clear Clear    Specific Gravity, Urine >1.030 1.005 - 1.035    pH, Urine 5.5 5.0, 5.5, 6.0, 6.5, 7.0, 7.5, 8.0    Protein, Urine 50 (1+) (A) NEGATIVE, 10 (TRACE), 20 (TRACE) mg/dL    Glucose, Urine OVER (4+) (A) Normal mg/dL    Blood, Urine 0.03 (TRACE) (A) NEGATIVE    Ketones, Urine OVER (4+) (A) NEGATIVE mg/dL    Bilirubin, Urine NEGATIVE NEGATIVE    Urobilinogen, Urine Normal Normal mg/dL    Nitrite, Urine NEGATIVE NEGATIVE    Leukocyte Esterase, Urine NEGATIVE NEGATIVE   Microscopic Only, Urine   Result Value Ref Range    WBC, Urine NONE 1-5, NONE /HPF    RBC, Urine NONE NONE, 1-2, 3-5 /HPF    Mucus, Urine FEW Reference range not  established. /LPF    Hyaline Casts, Urine 3+ (A) NONE /LPF   POCT GLUCOSE   Result Value Ref Range    POCT Glucose 350 (H) 74 - 99 mg/dL     No results found.    Assessment/Plan   Principal Problem:    Diabetic ketoacidosis without coma associated with type 1 diabetes mellitus (Multi)    1. Diabetic ketoacidosis  -Suspect primarily driven by poor medication compliance, possible exacerbation by underlying infection as patient with recent dental infection on amoxicillin  -Presented with nausea, vomiting, decreased oral intake  -Reports ongoing elevated blood sugars at home  -Initial blood glucose 375 on arrival  -Beta-Hydroxybutyrate 8.65, HCO3 5, Anion gap 34  -WBC 21.5, lactate 8.6  -UA negative for UTI  -EKG with sinus tachycardia, rate 130, no acute STEMI  -Received 2 liters IV fluid bolus in ED  -Initiated on insulin infusion  -Continue POCT glucose checks every 1 hour and check BMP, Mag every 4 hours while on insulin infusion  -Sodium normal when corrected for glucose  -Continue IV fluids with 0.45% NS @ 250ml/hr  -Switch to dextrose containing fluids once blood glucose <250  -Transition to subcutaneous insulin when anion gap <15 and bicarb >15  -NPO  -Electrolyte replacements as indicated  -Follow CBC, BMP, Mag    2. Metabolic acidosis with lactic acidosis secondary to DKA  -VBG ph 7.01, pCO2 22, bicarb (calculated) 5.6  -Venous lactate 8.6  -Continue IV fluid hydration  -Follow VBG  -Trend lactate    3. Poorly controlled type 1 diabetes  -A1C 13.3 (7/6/24)  -Patient with multiple prior admissions for DKA, most recently was admitted 8/11/24-8/13/24  -Started on insulin pump in March 2024  -Counseled on importance of glycemic control  -Endocrinology consulted, appreciate recommendations    4. Leukocytosis  -Suspect secondary to DKA although patient reports recent dental infection for which he is on amoxicillin  -Does meet SIRS criteria with leukocytosis, elevated heart and respiratory rate  -Afebrile, does not  appear septic  -Venous lactate 8.6  -Received 2 liters IV fluid bolus in ED  -Blood cultures x 2 drawn; pending  -UA negative for acute infection  -CXR with no acute cardiopulmonary process  -Started empirically on vancomycin, Zosyn, continue for now and likely able to deescalate pending clinical course  -Continue IV fluids per DKA protocol  -Follow CBC      I spent 40 minutes of cumulative critical care time with the patient.  Greater than 50% of that time was spent in the direct collaboration and or coordination of care of the patient.     Dragon dictation software was used to dictate this note and thus there may be minor errors in translation/transcription including garbled speech or misspellings. Please contact for clarification if needed.

## 2024-09-19 NOTE — PROGRESS NOTES
Ed Sanon is a 33 y.o. male on day 1 of admission presenting with Diabetic ketoacidosis without coma associated with type 1 diabetes mellitus (Multi).      Subjective   Ed Sanon is a 33 y.o. male with PMHx s/f IDDM I with poor insulin compliance (initiated on insulin pump in December) and multiple episodes of DKA, anxiety, depression, and recent dental infection for which he is on amoxicillin, presenting with c/f recurrent DKA. Pt is well-known to myself and our IM/PCCM/Endo services in general. He unfortunately presents with much the same symptoms as are typical for his episodes of DKA - nausea, vomiting, decreased PO intake, and episodic RLQ abd discomfort since yesterday evening. His sugars have been running high once again, and he is not very forthcoming about how he is utilizing his pump / bolusing / etc - admits his mother is currently helping him get insulin products and coverage because he is not currently employed. He notes that he has been on abx for a dental infection since Tuesday of last week. He is thirsty. Denies cp/pressure, sob, palpitations, diaphoresis, dizziness/lightheadedness, vision changes, f/c/d.     ED Course (Summary):   Vitals on presentation: T98.2, /75, HR 51 (up to 140 on repeat), RR 20, SpO2 97% RA  Labs:   CBC: WBC 21.5 left shift, Hgb 18.3, platelets 237  CMP: Glucose 340, sodium 135 (corrects 141), potassium 4.7, bicarb 5, AG 34, BUN 17, serum creatinine 0.93.  Mag 1.61.  Beta hydroxybutyrate 8.65  VBG: pH 7.01, pCO2 22, calculated bicarb 5.6; lactate 8.6-3.6-repeat pending  UA: 1+ protein, over 4+ glucose, over 4+ ketones  Imaging: CXR negative  Interventions: 2 L LR, Compazine 10 mg x 1, vancomycin/Zosyn, initiated on heparin drip after bolus for DKA     9/19: Patient seen this morning. Appreciate endocrinology's input.  It appears patient's not been able to get all of his supplies his missed appointments excetra with his endocrinologist.  He states he has  been running in the 400s despite basal insulin infusion.  Something does not appear to be as stated at this time perhaps not checking sugars or not using the pump at all difficult to say at this point.  It appears however patient is having difficulty getting supplies.  Patient has been started on NPH with sliding scale at this time waiting on further recommendations per endocrinology regarding insulin dosing.  Clinically doing well enough to go to medical floor.    Objective     Last Recorded Vitals  /84   Pulse (!) 114   Temp 36.2 °C (97.2 °F) (Temporal)   Resp 13   Wt 76.2 kg (167 lb 14.4 oz)   SpO2 96%   Intake/Output last 3 Shifts:    Intake/Output Summary (Last 24 hours) at 9/19/2024 1036  Last data filed at 9/19/2024 0900  Gross per 24 hour   Intake 4186.82 ml   Output 1425 ml   Net 2761.82 ml       Admission Weight  Weight: 70.3 kg (155 lb) (09/18/24 1536)    Daily Weight  09/19/24 : 76.2 kg (167 lb 14.4 oz)    Image Results  ECG 12 lead  Sinus tachycardia  Probable left ventricular hypertrophy  Anterior ST elevation, probably due to LVH  Borderline prolonged QT interval      Physical Exam    Relevant Results  Constitutional: Patient is resting comfortably. NAD and non-toxic.     Head and Face: Face is symmetric. Head is grossly normal with no step-offs    Neck: Supple. Trachea midline. No LAD. No JVD.    Lungs: Clear to auscultation bilaterally. Effort normal    Cardiovascular: Normal heart rate and hugo. No murmurs, gallops, or rubs.     Abdominal: Tender in the right lower quadrant. Bowel sounds present in all four quadrants    Extremities: Radial and Tibialis posterior pulses 2+ bilaterally. No swelling or erythema.    Neurological: Patient is A&Ox3. Speech is normal. No focal neural deficits.     Dermatologic: Normal sun and age related skin changes. Skin is warm and dry. Skin tone appropriate for ethnicity. No erythema or lesions noted.     Psychiatric/behavioral: Appropriate mood somewhat  flat affect.          Assessment/Plan      Scheduled medications  enoxaparin, 40 mg, subcutaneous, q24h  pantoprazole, 40 mg, oral, Daily before breakfast   Or  esomeprazole, 40 mg, nasoduodenal tube, Daily before breakfast   Or  pantoprazole, 40 mg, intravenous, Daily before breakfast  insulin lispro, 0-10 Units, subcutaneous, Before meals & nightly  insulin NPH (Isophane), 8 Units, subcutaneous, q12h SHEILA  potassium phosphate, 21 mmol, intravenous, Once  vancomycin, 1,500 mg, intravenous, q12h      Continuous medications     PRN medications  PRN medications: acetaminophen, dextrose, dextrose, glucagon, glucagon, ondansetron, promethazine        DKA without coma in T1DM   History of poorly-controlled T1DM  Pseudohyponatremia 2/2 hyperglycemia  Financial compliance issues  Metabolic acidosis   Anxiety and depression   H/o HTN       18 units of NPH  NPH 8 units every 12  Sliding scale 3 times daily AC and at bedtime  Further recommendations per endocrinology pending  Correct electrolytes as needed  IV hydration  Check labs in a.m. see orders for complete plan           MARIA ESTHER KIM    Note shared visit with student  Patient seen and examined  Note amended and addended  See my orders for complete plan

## 2024-09-19 NOTE — PROGRESS NOTES
"Ed Sanon is a 33 y.o. male admitted for Diabetic ketoacidosis without coma associated with type 1 diabetes mellitus (Multi). Pharmacy reviewed the patient's [id number] medications and allergies for accuracy.    The list below reflects the PTA list prior to pharmacy medication history. A summary a changes to the PTA medication list has been listed below. Please review each medication in order reconciliation for additional clarification and justification.    Source of information: T2P    Medications added:    Medications modified:    Medications to be removed:  NOVOLIN N     Medications of concern:      Prior to Admission Medications   Prescriptions Last Dose Informant Patient Reported? Taking?   Guardian 4 Glucose Sensor device   No No   Sig: Change insulin sensor every 7 days as directed, linked to Minimed insulin pump, check with endocrinology for updated regimen   insulin NPH, Isophane, (HumuLIN N,NovoLIN N) 100 unit/mL injection   No No   Sig: Inject 20 Units under the skin every 12 hours. Take as directed per insulin instructions.   insulin lispro (HumaLOG) 100 unit/mL injection   Yes No   Sig: USE VIA INSULIN PUMP, USING UP  UNITS PER DAY   lancets misc   Yes No   Sig: Inject 1 Units under the skin 2 times a day. Sliding scale   pen needle, diabetic 32 gauge x 5/32\" needle   Yes No   Sig: Use as instructed 4 times daily      Facility-Administered Medications: None       DARCY UNGER        "

## 2024-09-19 NOTE — PROGRESS NOTES
Vancomycin Dosing by Pharmacy- INITIAL    Ed Sanon is a 33 y.o. year old male who Pharmacy has been consulted for vancomycin dosing for other sepsis . Based on the patient's indication and renal status this patient will be dosed based on a goal AUC of 400-600.     Renal function is currently stable.    Visit Vitals  /86   Pulse (!) 140   Temp 36.7 °C (98.1 °F)   Resp 26        Lab Results   Component Value Date    CREATININE 0.93 2024    CREATININE 0.81 2024    CREATININE 0.82 2024    CREATININE 0.90 2024        Patient weight is as follows:   Vitals:    24 1536   Weight: 70.3 kg (155 lb)       Cultures:  No results found for the encounter in last 14 days.        No intake/output data recorded.  I/O during current shift:  No intake/output data recorded.    Temp (24hrs), Av.8 °C (98.2 °F), Min:36.7 °C (98.1 °F), Max:36.8 °C (98.2 °F)         Assessment/Plan     Patient has already been given a loading dose of 1000 mg.  Will initiate vancomycin maintenance,  1500 mg every 12 hours.    This dosing regimen is predicted by [employer] to result in the following pharmacokinetic parameters:    Exposure target: AUC24 (range)400-600 mg/L.hr   [id number]: 512 mg/L.hr  AUC24,ss: 582 mg/L.hr  Probability of AUC24 > 400: 89 %  Ctrough,ss: 13.9 mg/L  Probability of Ctrough,ss > 20: 21 %    Follow-up level will be ordered on  at morning labs unless clinically indicated sooner.  Will continue to monitor renal function daily while on vancomycin and order serum creatinine at least every 48 hours if not already ordered.  Follow for continued vancomycin needs, clinical response, and signs/symptoms of toxicity.       Jayne Cardona, ArchieD

## 2024-09-19 NOTE — PROGRESS NOTES
Vancomycin Dosing by Pharmacy- Cessation of Therapy    Consult to pharmacy for vancomycin dosing has been discontinued by the prescriber, pharmacy will sign off at this time.    Please call pharmacy if there are further questions or re-enter a consult if vancomycin is resumed.     Sujata Vail, PharmD

## 2024-09-20 LAB
ANION GAP SERPL CALC-SCNC: 15 MMOL/L (ref 10–20)
BUN SERPL-MCNC: 16 MG/DL (ref 6–23)
CALCIUM SERPL-MCNC: 7.8 MG/DL (ref 8.6–10.3)
CHLORIDE SERPL-SCNC: 103 MMOL/L (ref 98–107)
CO2 SERPL-SCNC: 19 MMOL/L (ref 21–32)
CREAT SERPL-MCNC: 0.86 MG/DL (ref 0.5–1.3)
EGFRCR SERPLBLD CKD-EPI 2021: >90 ML/MIN/1.73M*2
ERYTHROCYTE [DISTWIDTH] IN BLOOD BY AUTOMATED COUNT: 13.7 % (ref 11.5–14.5)
GLUCOSE BLD MANUAL STRIP-MCNC: 344 MG/DL (ref 74–99)
GLUCOSE BLD MANUAL STRIP-MCNC: 358 MG/DL (ref 74–99)
GLUCOSE BLD MANUAL STRIP-MCNC: 402 MG/DL (ref 74–99)
GLUCOSE BLD MANUAL STRIP-MCNC: 427 MG/DL (ref 74–99)
GLUCOSE SERPL-MCNC: 439 MG/DL (ref 74–99)
HCT VFR BLD AUTO: 41.4 % (ref 41–52)
HGB BLD-MCNC: 14.5 G/DL (ref 13.5–17.5)
LACTATE SERPL-SCNC: 0.8 MMOL/L (ref 0.4–2)
MCH RBC QN AUTO: 31.3 PG (ref 26–34)
MCHC RBC AUTO-ENTMCNC: 35 G/DL (ref 32–36)
MCV RBC AUTO: 89 FL (ref 80–100)
NRBC BLD-RTO: 0 /100 WBCS (ref 0–0)
PLATELET # BLD AUTO: 156 X10*3/UL (ref 150–450)
POTASSIUM SERPL-SCNC: 5 MMOL/L (ref 3.5–5.3)
RBC # BLD AUTO: 4.63 X10*6/UL (ref 4.5–5.9)
SODIUM SERPL-SCNC: 132 MMOL/L (ref 136–145)
WBC # BLD AUTO: 8.4 X10*3/UL (ref 4.4–11.3)

## 2024-09-20 PROCEDURE — 36415 COLL VENOUS BLD VENIPUNCTURE: CPT | Performed by: INTERNAL MEDICINE

## 2024-09-20 PROCEDURE — 83605 ASSAY OF LACTIC ACID: CPT | Performed by: INTERNAL MEDICINE

## 2024-09-20 PROCEDURE — 99233 SBSQ HOSP IP/OBS HIGH 50: CPT | Performed by: INTERNAL MEDICINE

## 2024-09-20 PROCEDURE — 82947 ASSAY GLUCOSE BLOOD QUANT: CPT

## 2024-09-20 PROCEDURE — 85027 COMPLETE CBC AUTOMATED: CPT | Performed by: INTERNAL MEDICINE

## 2024-09-20 PROCEDURE — 2500000002 HC RX 250 W HCPCS SELF ADMINISTERED DRUGS (ALT 637 FOR MEDICARE OP, ALT 636 FOR OP/ED): Performed by: INTERNAL MEDICINE

## 2024-09-20 PROCEDURE — 1100000001 HC PRIVATE ROOM DAILY

## 2024-09-20 PROCEDURE — 2500000001 HC RX 250 WO HCPCS SELF ADMINISTERED DRUGS (ALT 637 FOR MEDICARE OP): Performed by: INTERNAL MEDICINE

## 2024-09-20 PROCEDURE — 80048 BASIC METABOLIC PNL TOTAL CA: CPT | Performed by: INTERNAL MEDICINE

## 2024-09-20 RX ORDER — INSULIN LISPRO 100 [IU]/ML
6 INJECTION, SOLUTION INTRAVENOUS; SUBCUTANEOUS
Status: DISCONTINUED | OUTPATIENT
Start: 2024-09-20 | End: 2024-09-21 | Stop reason: HOSPADM

## 2024-09-20 RX ORDER — CEPHALEXIN 500 MG/1
1000 CAPSULE ORAL EVERY 12 HOURS SCHEDULED
Status: DISCONTINUED | OUTPATIENT
Start: 2024-09-20 | End: 2024-09-21 | Stop reason: HOSPADM

## 2024-09-20 ASSESSMENT — PAIN SCALES - GENERAL
PAINLEVEL_OUTOF10: 0 - NO PAIN
PAINLEVEL_OUTOF10: 0 - NO PAIN

## 2024-09-20 ASSESSMENT — COGNITIVE AND FUNCTIONAL STATUS - GENERAL
MOBILITY SCORE: 24
DAILY ACTIVITIY SCORE: 24

## 2024-09-20 ASSESSMENT — PAIN - FUNCTIONAL ASSESSMENT: PAIN_FUNCTIONAL_ASSESSMENT: 0-10

## 2024-09-20 ASSESSMENT — ENCOUNTER SYMPTOMS
ABDOMINAL PAIN: 0
NAUSEA: 1
VOMITING: 0

## 2024-09-20 NOTE — CARE PLAN
Problem: Pain - Adult  Goal: Verbalizes/displays adequate comfort level or baseline comfort level  Outcome: Progressing     Problem: Discharge Planning  Goal: Discharge to home or other facility with appropriate resources  Outcome: Progressing     Problem: Chronic Conditions and Co-morbidities  Goal: Patient's chronic conditions and co-morbidity symptoms are monitored and maintained or improved  Outcome: Progressing     Problem: Diabetes  Goal: Increase self care and/or family involovement by end of shift  Outcome: Progressing     Problem: Nutrition  Goal: Oral intake greater 75%  Outcome: Progressing     Problem: Fall/Injury  Goal: Not fall by end of shift  Outcome: Progressing  Goal: Be free from injury by end of the shift  Outcome: Progressing   The patient's goals for the shift include

## 2024-09-20 NOTE — PROGRESS NOTES
Ed Sanon is a 33 y.o. male on day 2 of admission presenting with Diabetic ketoacidosis without coma associated with type 1 diabetes mellitus (Multi).      Subjective   Ed Sanon is a 33 y.o. male with PMHx s/f IDDM I with poor insulin compliance (initiated on insulin pump in December) and multiple episodes of DKA, anxiety, depression, and recent dental infection for which he is on amoxicillin, presenting with c/f recurrent DKA. Pt is well-known to myself and our IM/PCCM/Endo services in general. He unfortunately presents with much the same symptoms as are typical for his episodes of DKA - nausea, vomiting, decreased PO intake, and episodic RLQ abd discomfort since yesterday evening. His sugars have been running high once again, and he is not very forthcoming about how he is utilizing his pump / bolusing / etc - admits his mother is currently helping him get insulin products and coverage because he is not currently employed. He notes that he has been on abx for a dental infection since Tuesday of last week. He is thirsty. Denies cp/pressure, sob, palpitations, diaphoresis, dizziness/lightheadedness, vision changes, f/c/d.     ED Course (Summary):   Vitals on presentation: T98.2, /75, HR 51 (up to 140 on repeat), RR 20, SpO2 97% RA  Labs:   CBC: WBC 21.5 left shift, Hgb 18.3, platelets 237  CMP: Glucose 340, sodium 135 (corrects 141), potassium 4.7, bicarb 5, AG 34, BUN 17, serum creatinine 0.93.  Mag 1.61.  Beta hydroxybutyrate 8.65  VBG: pH 7.01, pCO2 22, calculated bicarb 5.6; lactate 8.6-3.6-repeat pending  UA: 1+ protein, over 4+ glucose, over 4+ ketones  Imaging: CXR negative  Interventions: 2 L LR, Compazine 10 mg x 1, vancomycin/Zosyn, initiated on heparin drip after bolus for DKA     9/19: Patient seen this morning. Appreciate endocrinology's input.  It appears patient's not been able to get all of his supplies his missed appointments excetra with his endocrinologist.  He states he has  been running in the 400s despite basal insulin infusion.  Something does not appear to be as stated at this time perhaps not checking sugars or not using the pump at all difficult to say at this point.  It appears however patient is having difficulty getting supplies.  Patient has been started on NPH with sliding scale at this time waiting on further recommendations per endocrinology regarding insulin dosing.  Clinically doing well enough to go to medical floor.      9/20: Patient seen this morning. Talk to the nurse patient refused insulin last night. Talking to the patient he said he took his insulin. Glucose level yesterday was 282 today it is 427. Na today is 132 yesterday was 131. Bicarb yesterday was 20 today it is 19.  During physical exam noted right arm swelling. Will continue to monitor.       Addendum: Patient is had fears of his blood sugars dropping states he feels horrible whenever he takes significant amounts of the short acting insulin.  Last time he was in the hospital for DKA was back in July.  If patient would take the insulin pump and use it consistently would likely be his best chance of staying out of frequent DKA.  However if he is not going to be compliant with using the insulin pump likely will require the basal list plan.  Continue monitor encouraged him to allow the nurses to give him his insulin appropriately.  Hopefully can discharge will be seen by my associate  tomorrow.  Objective     Last Recorded Vitals  /77 (BP Location: Right arm, Patient Position: Lying)   Pulse 93   Temp 36.4 °C (97.6 °F) (Temporal)   Resp 19   Wt 76.2 kg (167 lb 14.4 oz)   SpO2 97%   Intake/Output last 3 Shifts:    Intake/Output Summary (Last 24 hours) at 9/20/2024 0931  Last data filed at 9/19/2024 1703  Gross per 24 hour   Intake 12.58 ml   Output --   Net 12.58 ml       Admission Weight  Weight: 70.3 kg (155 lb) (09/18/24 1536)    Daily Weight  09/19/24 : 76.2 kg (167 lb 14.4  oz)    Image Results  ECG 12 lead  Sinus tachycardia  Probable left ventricular hypertrophy  Anterior ST elevation, probably due to LVH  Borderline prolonged QT interval      Physical Exam    Relevant Results  Constitutional: Patient is resting comfortably. NAD and non-toxic.     Head and Face: Face is symmetric. Head is grossly normal with no step-offs    Neck: Supple. Trachea midline. No LAD. No JVD.    Lungs: Clear to auscultation bilaterally. Effort normal    Cardiovascular: Normal heart rate and hugo. No murmurs, gallops, or rubs.     Abdominal: Tender in the right lower quadrant. Bowel sounds present in all four quadrants    Extremities: Swelling on the right arm    Neurological: Patient is A&Ox3. Speech is normal. No focal neural deficits.     Dermatologic: Normal sun and age related skin changes. Skin is warm and dry. Skin tone appropriate for ethnicity. No erythema or lesions noted.     Psychiatric/behavioral: Appropriate mood somewhat flat affect.          Assessment/Plan      Scheduled medications  enoxaparin, 40 mg, subcutaneous, q24h  insulin lispro, 0-10 Units, subcutaneous, Before meals & nightly  insulin lispro, 6 Units, subcutaneous, TID  insulin NPH (Isophane), 18 Units, subcutaneous, q12h SHEILA      Continuous medications     PRN medications  PRN medications: acetaminophen, dextrose, dextrose, glucagon, glucagon, ondansetron, promethazine        DKA without coma in T1DM   History of poorly-controlled T1DM  Pseudohyponatremia 2/2 hyperglycemia  Financial compliance issues  Metabolic acidosis improved  Right arm pain from phlebitis likely potassium infusion  Anxiety and depression   H/o HTN   Right ingrowing toenail  Onychomycosis of great toe left    18 units of NPH  NPH 8 units every 12  Sliding scale 3 times daily AC and at bedtime  Further recommendations per endocrinology pending  Correct electrolytes as needed  Discontinue antibiotics  IV hydration  Check labs in a.m.   see orders for  complete plan         MARIA ESTHER JULIO    Shared visit with student  Patient seen and examined  Note amended and addended  See my orders for complete plan

## 2024-09-20 NOTE — PROGRESS NOTES
"Ed Sanon is a 33 y.o. male on day 2 of admission presenting with Diabetic ketoacidosis without coma associated with type 1 diabetes mellitus (Multi).    Subjective   Patient had some mild nausea.   He has ordered breakfast.    He states that he feels unwell in the morning when he wears the pump but this improves throughout the day.  He states that his glucose values at best would be in the 200s    I have reviewed histories, allergies and medications have been reviewed and there are no changes       Objective   Review of Systems   Gastrointestinal:  Positive for nausea. Negative for abdominal pain and vomiting.   All other systems reviewed and are negative.    Physical Exam  Vitals and nursing note reviewed.   Constitutional:       General: He is not in acute distress.     Appearance: Normal appearance. He is normal weight.   HENT:      Head: Normocephalic and atraumatic.      Nose: Nose normal.      Mouth/Throat:      Mouth: Mucous membranes are moist.   Eyes:      Extraocular Movements: Extraocular movements intact.   Pulmonary:      Effort: Pulmonary effort is normal.   Musculoskeletal:         General: Normal range of motion.   Neurological:      Mental Status: He is alert and oriented to person, place, and time.   Psychiatric:         Mood and Affect: Mood normal.         Last Recorded Vitals  Blood pressure 123/77, pulse 93, temperature 36.4 °C (97.6 °F), temperature source Temporal, resp. rate 19, height 1.778 m (5' 10\"), weight 76.2 kg (167 lb 14.4 oz), SpO2 97%.  Intake/Output last 3 Shifts:  I/O last 3 completed shifts:  In: 4199.4 (55.1 mL/kg) [I.V.:2199.4 (28.9 mL/kg); IV Piggyback:2000]  Out: 1425 (18.7 mL/kg) [Urine:1425 (0.5 mL/kg/hr)]  Weight: 76.2 kg     Relevant Results  Results from last 7 days   Lab Units 09/20/24  0413 09/19/24  2025 09/19/24  1744 09/19/24  1606 09/19/24  1232 09/19/24  0924 09/19/24  0840 09/19/24  0458 09/19/24  0359 09/19/24  0059 09/18/24  2359 09/18/24  1934 " 09/18/24  1849   POCT GLUCOSE mg/dL  --  282*  --  353* 339* 241* 248*   < > 141*   < >  --    < >  --    GLUCOSE mg/dL 439*  --  393*  --   --   --   --   --  160*  160*  --  222*  --  340*    < > = values in this interval not displayed.     Scheduled medications  enoxaparin, 40 mg, subcutaneous, q24h  insulin lispro, 0-10 Units, subcutaneous, Before meals & nightly  insulin NPH (Isophane), 8 Units, subcutaneous, q12h SHEILA      Continuous medications     PRN medications  PRN medications: acetaminophen, dextrose, dextrose, glucagon, glucagon, ondansetron, promethazine       Results for orders placed or performed during the hospital encounter of 09/18/24 (from the past 24 hour(s))   POCT GLUCOSE   Result Value Ref Range    POCT Glucose 175 (H) 74 - 99 mg/dL   POCT GLUCOSE   Result Value Ref Range    POCT Glucose 248 (H) 74 - 99 mg/dL   POCT GLUCOSE   Result Value Ref Range    POCT Glucose 241 (H) 74 - 99 mg/dL   POCT GLUCOSE   Result Value Ref Range    POCT Glucose 339 (H) 74 - 99 mg/dL   POCT GLUCOSE   Result Value Ref Range    POCT Glucose 353 (H) 74 - 99 mg/dL   Basic Metabolic Panel   Result Value Ref Range    Glucose 393 (H) 74 - 99 mg/dL    Sodium 131 (L) 136 - 145 mmol/L    Potassium 4.0 3.5 - 5.3 mmol/L    Chloride 104 98 - 107 mmol/L    Bicarbonate 20 (L) 21 - 32 mmol/L    Anion Gap 11 10 - 20 mmol/L    Urea Nitrogen 13 6 - 23 mg/dL    Creatinine 1.05 0.50 - 1.30 mg/dL    eGFR >90 >60 mL/min/1.73m*2    Calcium 7.8 (L) 8.6 - 10.3 mg/dL   POCT GLUCOSE   Result Value Ref Range    POCT Glucose 282 (H) 74 - 99 mg/dL   Basic Metabolic Panel   Result Value Ref Range    Glucose 439 (H) 74 - 99 mg/dL    Sodium 132 (L) 136 - 145 mmol/L    Potassium 5.0 3.5 - 5.3 mmol/L    Chloride 103 98 - 107 mmol/L    Bicarbonate 19 (L) 21 - 32 mmol/L    Anion Gap 15 10 - 20 mmol/L    Urea Nitrogen 16 6 - 23 mg/dL    Creatinine 0.86 0.50 - 1.30 mg/dL    eGFR >90 >60 mL/min/1.73m*2    Calcium 7.8 (L) 8.6 - 10.3 mg/dL   CBC   Result  Value Ref Range    WBC 8.4 4.4 - 11.3 x10*3/uL    nRBC 0.0 0.0 - 0.0 /100 WBCs    RBC 4.63 4.50 - 5.90 x10*6/uL    Hemoglobin 14.5 13.5 - 17.5 g/dL    Hematocrit 41.4 41.0 - 52.0 %    MCV 89 80 - 100 fL    MCH 31.3 26.0 - 34.0 pg    MCHC 35.0 32.0 - 36.0 g/dL    RDW 13.7 11.5 - 14.5 %    Platelets 156 150 - 450 x10*3/uL      ECG 12 lead    Result Date: 9/19/2024  Sinus tachycardia Probable left ventricular hypertrophy Anterior ST elevation, probably due to LVH Borderline prolonged QT interval    XR chest 1 view    Result Date: 9/18/2024  STUDY: Chest Radiograph;  9/18/2024 7:50PM INDICATION: Sepsis. COMPARISON: 2/14/2024 XR Chest, 10/8/2023 XR Chest. ACCESSION NUMBER(S): [id number] ORDERING CLINICIAN: HEATHER BACK TECHNIQUE:  Frontal chest was obtained at 19:49 hours. FINDINGS: CARDIOMEDIASTINAL SILHOUETTE: Cardiomediastinal silhouette is normal in size and configuration.  LUNGS: Lungs are clear.  ABDOMEN: No remarkable upper abdominal findings.  BONES: No acute osseous changes.    No acute cardiopulmonary disease.  No significant interval change from the prior exam.. Signed by Edgard Serrato MD      Assessment/Plan   Assessment & Plan  Diabetic ketoacidosis without coma associated with type 1 diabetes mellitus (Multi)    DKA - resolved   POORLY CONTROLLED TYPE I DIABETES MELLITUS  [employer] 780G insulin pump - suboptimal use given lack of CGM synchronization, routine changes of pump sites every 3 days and lack of boluses  A1C of 13.3%  No improvement in glycemic control given insulin pump initiation in December 2023; A1C values have been 12.5% to 15.5%     RECOMMENDATIONS:  To change NPH to 18 units subcutaneous twice daily   To commence Humalog 5 units TID AC   To continue an insulin correction scale TID AC   Accu-Cheks ACHS   Hypoglycemic protocol   Counseled that he should likely go back to insulin shots given lack of insurance and cost of care with insulin pump therapy as well as suboptimal use of  the insulin pump since December 2023  Patient will opt to use pump until pump supplies are exhausted  Patient will need to resort to insulin pens with the use of GoodRx which improves on the cost  Counseled that symptoms of hypoglycemia should not occur in the 200s  However, given chronically elevated A1C values, symptoms of hypoglycemia can occur at normal/elevated values until glycemic control improves chronically    Will continue to follow    Lori Packer MD

## 2024-09-21 VITALS
DIASTOLIC BLOOD PRESSURE: 93 MMHG | BODY MASS INDEX: 24.04 KG/M2 | TEMPERATURE: 98.6 F | SYSTOLIC BLOOD PRESSURE: 135 MMHG | HEIGHT: 70 IN | HEART RATE: 98 BPM | RESPIRATION RATE: 15 BRPM | WEIGHT: 167.9 LBS | OXYGEN SATURATION: 97 %

## 2024-09-21 PROBLEM — E10.10 DIABETIC KETOACIDOSIS WITHOUT COMA ASSOCIATED WITH TYPE 1 DIABETES MELLITUS (MULTI): Status: RESOLVED | Noted: 2024-07-05 | Resolved: 2024-09-21

## 2024-09-21 LAB
ANION GAP SERPL CALC-SCNC: 11 MMOL/L (ref 10–20)
BASOPHILS # BLD AUTO: 0.03 X10*3/UL (ref 0–0.1)
BASOPHILS NFR BLD AUTO: 0.5 %
BUN SERPL-MCNC: 17 MG/DL (ref 6–23)
CALCIUM SERPL-MCNC: 7.8 MG/DL (ref 8.6–10.3)
CHLORIDE SERPL-SCNC: 105 MMOL/L (ref 98–107)
CO2 SERPL-SCNC: 26 MMOL/L (ref 21–32)
CREAT SERPL-MCNC: 0.68 MG/DL (ref 0.5–1.3)
EGFRCR SERPLBLD CKD-EPI 2021: >90 ML/MIN/1.73M*2
EOSINOPHIL # BLD AUTO: 0.06 X10*3/UL (ref 0–0.7)
EOSINOPHIL NFR BLD AUTO: 1 %
ERYTHROCYTE [DISTWIDTH] IN BLOOD BY AUTOMATED COUNT: 13.2 % (ref 11.5–14.5)
GLUCOSE BLD MANUAL STRIP-MCNC: 318 MG/DL (ref 74–99)
GLUCOSE BLD MANUAL STRIP-MCNC: 359 MG/DL (ref 74–99)
GLUCOSE SERPL-MCNC: 300 MG/DL (ref 74–99)
HCT VFR BLD AUTO: 38.4 % (ref 41–52)
HGB BLD-MCNC: 13.5 G/DL (ref 13.5–17.5)
IMM GRANULOCYTES # BLD AUTO: 0.02 X10*3/UL (ref 0–0.7)
IMM GRANULOCYTES NFR BLD AUTO: 0.3 % (ref 0–0.9)
LYMPHOCYTES # BLD AUTO: 1.74 X10*3/UL (ref 1.2–4.8)
LYMPHOCYTES NFR BLD AUTO: 27.6 %
MAGNESIUM SERPL-MCNC: 1.89 MG/DL (ref 1.6–2.4)
MCH RBC QN AUTO: 31.3 PG (ref 26–34)
MCHC RBC AUTO-ENTMCNC: 35.2 G/DL (ref 32–36)
MCV RBC AUTO: 89 FL (ref 80–100)
MONOCYTES # BLD AUTO: 0.89 X10*3/UL (ref 0.1–1)
MONOCYTES NFR BLD AUTO: 14.1 %
NEUTROPHILS # BLD AUTO: 3.57 X10*3/UL (ref 1.2–7.7)
NEUTROPHILS NFR BLD AUTO: 56.5 %
NRBC BLD-RTO: 0 /100 WBCS (ref 0–0)
PLATELET # BLD AUTO: 143 X10*3/UL (ref 150–450)
POTASSIUM SERPL-SCNC: 3.9 MMOL/L (ref 3.5–5.3)
RBC # BLD AUTO: 4.31 X10*6/UL (ref 4.5–5.9)
SODIUM SERPL-SCNC: 138 MMOL/L (ref 136–145)
WBC # BLD AUTO: 6.3 X10*3/UL (ref 4.4–11.3)

## 2024-09-21 PROCEDURE — 2500000001 HC RX 250 WO HCPCS SELF ADMINISTERED DRUGS (ALT 637 FOR MEDICARE OP): Performed by: INTERNAL MEDICINE

## 2024-09-21 PROCEDURE — 99239 HOSP IP/OBS DSCHRG MGMT >30: CPT | Performed by: INTERNAL MEDICINE

## 2024-09-21 PROCEDURE — 36415 COLL VENOUS BLD VENIPUNCTURE: CPT | Performed by: INTERNAL MEDICINE

## 2024-09-21 PROCEDURE — 80048 BASIC METABOLIC PNL TOTAL CA: CPT | Performed by: INTERNAL MEDICINE

## 2024-09-21 PROCEDURE — 82947 ASSAY GLUCOSE BLOOD QUANT: CPT

## 2024-09-21 PROCEDURE — 99231 SBSQ HOSP IP/OBS SF/LOW 25: CPT | Performed by: INTERNAL MEDICINE

## 2024-09-21 PROCEDURE — 85025 COMPLETE CBC W/AUTO DIFF WBC: CPT | Performed by: INTERNAL MEDICINE

## 2024-09-21 PROCEDURE — 2500000002 HC RX 250 W HCPCS SELF ADMINISTERED DRUGS (ALT 637 FOR MEDICARE OP, ALT 636 FOR OP/ED): Performed by: INTERNAL MEDICINE

## 2024-09-21 PROCEDURE — 83735 ASSAY OF MAGNESIUM: CPT | Performed by: INTERNAL MEDICINE

## 2024-09-21 RX ORDER — CEPHALEXIN 500 MG/1
1000 CAPSULE ORAL EVERY 12 HOURS SCHEDULED
Status: ON HOLD
Start: 2024-09-21

## 2024-09-21 RX ORDER — INSULIN LISPRO 100 [IU]/ML
6 INJECTION, SOLUTION INTRAVENOUS; SUBCUTANEOUS
Qty: 15 ML | Refills: 11 | Status: ON HOLD | OUTPATIENT
Start: 2024-09-21

## 2024-09-21 ASSESSMENT — COGNITIVE AND FUNCTIONAL STATUS - GENERAL
MOBILITY SCORE: 24
DAILY ACTIVITIY SCORE: 24

## 2024-09-21 ASSESSMENT — PAIN - FUNCTIONAL ASSESSMENT: PAIN_FUNCTIONAL_ASSESSMENT: 0-10

## 2024-09-21 ASSESSMENT — PAIN SCALES - GENERAL: PAINLEVEL_OUTOF10: 0 - NO PAIN

## 2024-09-21 NOTE — DISCHARGE SUMMARY
"Discharge Diagnosis  Diabetic ketoacidosis without coma associated with type 1 diabetes mellitus (Multi)  Poorly controlled diabetes  Metabolic acidosis  Anxiety and depression  Ingrowing toenail  Onychomycosis of the left toe.    Issues Requiring Follow-Up      Discharge Meds     Medication List      START taking these medications     cephalexin 500 mg capsule; Commonly known as: Keflex; Take 2 capsules   (1,000 mg) by mouth every 12 hours.     CHANGE how you take these medications     * insulin lispro 100 unit/mL injection; Commonly known as: HumaLOG; What   changed: Another medication with the same name was added. Make sure you   understand how and when to take each.   * insulin lispro 100 unit/mL injection; Commonly known as: HumaLOG;   Inject 6 Units under the skin 3 times a day before meals. Inject 1-4 times   per day as directed.; What changed: You were already taking a medication   with the same name, and this prescription was added. Make sure you   understand how and when to take each.   insulin NPH (Isophane) 100 unit/mL (3 mL) injection; Commonly known as:   HumuLIN N,NovoLIN N; Inject 18 Units under the skin 2 times a day before   meals. Take as directed per insulin instructions.; What changed:   medication strength, how much to take, when to take this  * This list has 2 medication(s) that are the same as other medications   prescribed for you. Read the directions carefully, and ask your doctor or   other care provider to review them with you.     CONTINUE taking these medications     Guardian 4 Glucose Sensor device; Generic drug: blood-glucose sensor;   Change insulin sensor every 7 days as directed, linked to Minimed insulin   pump, check with endocrinology for updated regimen   lancets misc   pen needle, diabetic 32 gauge x 5/32\" needle       Test Results Pending At Discharge  Pending Labs       Order Current Status    Blood Culture Preliminary result    Blood Culture Preliminary result      "       Hospital Course  Ed Sanon is a 33 y.o. male with PMHx s/f IDDM I with poor insulin compliance (initiated on insulin pump in December) and multiple episodes of DKA, anxiety, depression, and recent dental infection for which he is on amoxicillin, presenting with c/f recurrent DKA. Pt is well-known to myself and our IM/PCCM/Endo services in general. He unfortunately presents with much the same symptoms as are typical for his episodes of DKA - nausea, vomiting, decreased PO intake, and episodic RLQ abd discomfort since yesterday evening. His sugars have been running high once again, and he is not very forthcoming about how he is utilizing his pump / bolusing / etc - admits his mother is currently helping him get insulin products and coverage because he is not currently employed. He notes that he has been on abx for a dental infection since Tuesday of last week. He is thirsty. Denies cp/pressure, sob, palpitations, diaphoresis, dizziness/lightheadedness, vision changes, f/c/d.     ED Course (Summary):   Vitals on presentation: T98.2, /75, HR 51 (up to 140 on repeat), RR 20, SpO2 97% RA  Labs:   CBC: WBC 21.5 left shift, Hgb 18.3, platelets 237  CMP: Glucose 340, sodium 135 (corrects 141), potassium 4.7, bicarb 5, AG 34, BUN 17, serum creatinine 0.93.  Mag 1.61.  Beta hydroxybutyrate 8.65  VBG: pH 7.01, pCO2 22, calculated bicarb 5.6; lactate 8.6-3.6-repeat pending  UA: 1+ protein, over 4+ glucose, over 4+ ketones  Imaging: CXR negative  Interventions: 2 L LR, Compazine 10 mg x 1, vancomycin/Zosyn, initiated on heparin drip after bolus for DKA        9/19: Patient seen this morning. Appreciate endocrinology's input.  It appears patient's not been able to get all of his supplies his missed appointments excetra with his endocrinologist.  He states he has been running in the 400s despite basal insulin infusion.  Something does not appear to be as stated at this time perhaps not checking sugars or not  using the pump at all difficult to say at this point.  It appears however patient is having difficulty getting supplies.  Patient has been started on NPH with sliding scale at this time waiting on further recommendations per endocrinology regarding insulin dosing.  Clinically doing well enough to go to medical floor.        9/20: Patient seen this morning. Talk to the nurse patient refused insulin last night. Talking to the patient he said he took his insulin. Glucose level yesterday was 282 today it is 427. Na today is 132 yesterday was 131. Bicarb yesterday was 20 today it is 19.  During physical exam noted right arm swelling. Will continue to monitor.         Addendum: Patient is had fears of his blood sugars dropping states he feels horrible whenever he takes significant amounts of the short acting insulin.  Last time he was in the hospital for DKA was back in July.  If patient would take the insulin pump and use it consistently would likely be his best chance of staying out of frequent DKA.  However if he is not going to be compliant with using the insulin pump likely will require the basal list plan.  Continue monitor encouraged him to allow the nurses to give him his insulin appropriately.  Hopefully can discharge will be seen by my associate  tomorrow.  9/21: Patient was seen and examined.  Patient is still adamantly refusing multiple doses of insulin.  I sat with and spent extensive amount of time counseling him at bedside with mom send at bedside as well.  Seen by endocrinologist this morning.  Who recommended to continue 3 times daily before meals and twice daily NPH.  Prescription sent to pharmacy at [facility].  He was discharged in stable condition and recommended follow-up with Dr. Villar with a chart of blood glucose in the next 1 week.      The discharge process took about 35 minutes.  Pertinent Physical Exam At Time of Discharge  Physical Exam  Constitutional: Patient is resting  comfortably. NAD and non-toxic.      Head and Face: Face is symmetric. Head is grossly normal with no step-offs     Neck: Supple. Trachea midline. No LAD. No JVD.     Lungs: Clear to auscultation bilaterally. Effort normal     Cardiovascular: Normal heart rate and hugo. No murmurs, gallops, or rubs.      Abdominal: Tender in the right lower quadrant. Bowel sounds present in all four quadrants     Extremities: Swelling on the right arm     Neurological: Patient is A&Ox3. Speech is normal. No focal neural deficits.      Dermatologic: Normal sun and age related skin changes. Skin is warm and dry. Skin tone appropriate for ethnicity. No erythema or lesions noted.      Psychiatric/behavioral: Appropriate mood somewhat flat affect.  Outpatient Follow-Up  Future Appointments   Date Time Provider Department Center   10/2/2024  9:00 AM CHRISTIANO Montenegro-CNP [id number] [facility]         Liseth Ahn MD

## 2024-09-21 NOTE — NURSING NOTE
Patients blood sugar is 359. Tried to give the patient Humalog 6 units along with10 units from sliding scale. Patient refused insulin. Explained that he believes that the insulin is what is making him feel sick and bad. Nurse tried to educate and explain the high blood sugars is what is making him feel bad. Dr. Ahn came in at that time and had an extensive explanation of diabetes and how he needs to have controlled blood sugars to feel better. Dr. Ahn left and patient still refused insulin.

## 2024-09-21 NOTE — PROGRESS NOTES
"Ed Sanon is a 33 y.o. male on day 3 of admission presenting with Diabetic ketoacidosis without coma associated with type 1 diabetes mellitus (Multi).    Subjective   No new complaint  Patient wants to go home  He intends to resume insulin pump until supplies run out  He missed his last endocrinology appointment with Dr. Villar     Scheduled medications  cephalexin, 1,000 mg, oral, q12h SHEILA  enoxaparin, 40 mg, subcutaneous, q24h  insulin lispro, 0-10 Units, subcutaneous, Before meals & nightly  insulin lispro, 6 Units, subcutaneous, TID  insulin NPH (Isophane), 18 Units, subcutaneous, q12h SHEILA      Objective   Physical Exam  Constitutional:       General: He is not in acute distress.  Neurological:      Mental Status: He is alert.         Last Recorded Vitals  Blood pressure 125/84, pulse 91, temperature 36.4 °C (97.5 °F), temperature source Temporal, resp. rate 14, height 1.778 m (5' 10\"), weight 76.2 kg (167 lb 14.4 oz), SpO2 98%.  Intake/Output last 3 Shifts:  I/O last 3 completed shifts:  In: 720 (9.5 mL/kg) [P.O.:720]  Out: - (0 mL/kg)   Weight: 76.2 kg     Relevant Results  Results from last 7 days   Lab Units 09/21/24  0637 09/21/24  0442 09/20/24  2047 09/20/24  1642 09/20/24  1125 09/20/24  0625 09/20/24  0413 09/19/24  2025 09/19/24  1744 09/19/24  0458 09/19/24  0359 09/19/24  0059 09/18/24  2359   POCT GLUCOSE mg/dL 318*  --  358* 402* 344* 427*  --    < >  --    < > 141*   < >  --    GLUCOSE mg/dL  --  300*  --   --   --   --  439*  --  393*  --  160*  160*  --  222*    < > = values in this interval not displayed.        Latest Reference Range & Units 09/21/24 04:42   GLUCOSE 74 - 99 mg/dL 300 (H)   SODIUM 136 - 145 mmol/L 138   POTASSIUM 3.5 - 5.3 mmol/L 3.9   CHLORIDE 98 - 107 mmol/L 105   Bicarbonate 21 - 32 mmol/L 26   Anion Gap 10 - 20 mmol/L 11   Blood Urea Nitrogen 6 - 23 mg/dL 17   Creatinine 0.50 - 1.30 mg/dL 0.68   EGFR >60 mL/min/1.73m*2 >90   Calcium 8.6 - 10.3 mg/dL 7.8 (L) "       Assessment/Plan   Assessment & Plan  Diabetic ketoacidosis without coma associated with type 1 diabetes mellitus (Multi)      DIABETIC KETOACIDOSIS, RESOLVED  TYPE 1 DIABETES MELLITUS  History of frequent DKA due to insulin omission    RECOMMENDATIONS  Continue NPH BID for now  Continue lispro 6 units QAC plus scale, which he frequently refuses  Plan to resume insulin pump at discharge  Advised he needs follow up with Dr. Villar as soon as possible.      Shay Seals MD

## 2024-09-21 NOTE — NURSING NOTE
Discharge instructions reviewed with patient at bedside. Medications, including all new prescriptions, reviewed with patient. IV removed. All questions and concerns answered at this time. Patient safely discharged with all belongings.

## 2024-09-21 NOTE — CARE PLAN
The patient's goals for the shift include  Patient will have more controlled glucose levels    The clinical goals for the shift include glucose levels will normalize

## 2024-09-22 LAB
BACTERIA BLD CULT: ABNORMAL
BACTERIA BLD CULT: NORMAL
GRAM STN SPEC: ABNORMAL

## 2024-09-23 LAB
ATRIAL RATE: 130 BPM
BACTERIA BLD CULT: NORMAL
P AXIS: -12 DEGREES
PR INTERVAL: 133 MS
Q ONSET: 251 MS
QRS COUNT: 21 BEATS
QRS DURATION: 106 MS
QT INTERVAL: 324 MS
QTC CALCULATION(BAZETT): 475 MS
QTC FREDERICIA: 418 MS
R AXIS: 72 DEGREES
T AXIS: -24 DEGREES
T OFFSET: 413 MS
VENTRICULAR RATE: 129 BPM

## 2024-09-24 ENCOUNTER — PATIENT OUTREACH (OUTPATIENT)
Dept: PRIMARY CARE | Facility: CLINIC | Age: 34
End: 2024-09-24

## 2024-09-24 LAB
BACTERIA BLD AEROBE CULT: ABNORMAL
BACTERIA BLD CULT: ABNORMAL
GRAM STN SPEC: ABNORMAL

## 2024-09-24 NOTE — PROGRESS NOTES
Discharge Facility: [facility]   Discharge Diagnosis: Diabetic ketoacidosis without coma associated with type 1 diabetes mellitus (Multi)  Poorly controlled diabetes  Metabolic acidosis  Anxiety and depression  Ingrowing toenail  Onychomycosis of the left toe.  Admission Date: 9/18/24  Discharge Date: 9/21/24    PCP Appointment Date: no appointment, message to office  Specialist Appointment Date: n/a  Hospital Encounter and Summary Linked: Yes    Two attempts were made to reach patient within two business days after discharge. Voicemail left with contact information for patient to call back with any non-emergent questions or concerns.

## 2024-09-27 ENCOUNTER — TELEPHONE (OUTPATIENT)
Dept: CARDIOLOGY | Facility: HOSPITAL | Age: 34
End: 2024-09-27

## 2024-09-28 ENCOUNTER — HOSPITAL ENCOUNTER (INPATIENT)
Facility: HOSPITAL | Age: 34
End: 2024-09-28
Attending: EMERGENCY MEDICINE | Admitting: INTERNAL MEDICINE

## 2024-09-28 DIAGNOSIS — E10.10 DKA, TYPE 1, NOT AT GOAL: ICD-10-CM

## 2024-09-28 DIAGNOSIS — E10.10 DIABETIC KETOACIDOSIS WITHOUT COMA ASSOCIATED WITH TYPE 1 DIABETES MELLITUS (MULTI): Primary | ICD-10-CM

## 2024-09-28 LAB — GLUCOSE BLD MANUAL STRIP-MCNC: 440 MG/DL (ref 74–99)

## 2024-09-28 PROCEDURE — 83735 ASSAY OF MAGNESIUM: CPT | Performed by: EMERGENCY MEDICINE

## 2024-09-28 PROCEDURE — 84100 ASSAY OF PHOSPHORUS: CPT | Performed by: EMERGENCY MEDICINE

## 2024-09-28 PROCEDURE — 82805 BLOOD GASES W/O2 SATURATION: CPT | Performed by: EMERGENCY MEDICINE

## 2024-09-28 PROCEDURE — 36415 COLL VENOUS BLD VENIPUNCTURE: CPT | Performed by: EMERGENCY MEDICINE

## 2024-09-28 PROCEDURE — 99291 CRITICAL CARE FIRST HOUR: CPT | Performed by: EMERGENCY MEDICINE

## 2024-09-28 PROCEDURE — 82947 ASSAY GLUCOSE BLOOD QUANT: CPT

## 2024-09-28 PROCEDURE — 80053 COMPREHEN METABOLIC PANEL: CPT | Performed by: EMERGENCY MEDICINE

## 2024-09-28 PROCEDURE — 82010 KETONE BODYS QUAN: CPT | Performed by: EMERGENCY MEDICINE

## 2024-09-28 PROCEDURE — 2500000004 HC RX 250 GENERAL PHARMACY W/ HCPCS (ALT 636 FOR OP/ED): Performed by: EMERGENCY MEDICINE

## 2024-09-28 RX ORDER — DEXTROSE MONOHYDRATE 100 MG/ML
150 INJECTION, SOLUTION INTRAVENOUS CONTINUOUS
Status: DISCONTINUED | OUTPATIENT
Start: 2024-09-28 | End: 2024-09-29

## 2024-09-28 RX ORDER — DEXTROSE 50 % IN WATER (D50W) INTRAVENOUS SYRINGE
50
Status: DISCONTINUED | OUTPATIENT
Start: 2024-09-28 | End: 2024-09-30 | Stop reason: HOSPADM

## 2024-09-28 RX ADMIN — SODIUM CHLORIDE 2000 ML: 9 INJECTION, SOLUTION INTRAVENOUS at 23:45

## 2024-09-28 ASSESSMENT — PAIN DESCRIPTION - LOCATION: LOCATION: BACK

## 2024-09-28 ASSESSMENT — PAIN DESCRIPTION - PAIN TYPE: TYPE: ACUTE PAIN

## 2024-09-28 ASSESSMENT — PAIN SCALES - GENERAL: PAINLEVEL_OUTOF10: 6

## 2024-09-28 ASSESSMENT — PAIN - FUNCTIONAL ASSESSMENT: PAIN_FUNCTIONAL_ASSESSMENT: 0-10

## 2024-09-29 ENCOUNTER — APPOINTMENT (OUTPATIENT)
Dept: CARDIOLOGY | Facility: HOSPITAL | Age: 34
End: 2024-09-29

## 2024-09-29 VITALS
SYSTOLIC BLOOD PRESSURE: 117 MMHG | HEIGHT: 70 IN | RESPIRATION RATE: 18 BRPM | BODY MASS INDEX: 22.09 KG/M2 | WEIGHT: 154.32 LBS | DIASTOLIC BLOOD PRESSURE: 76 MMHG | HEART RATE: 105 BPM | TEMPERATURE: 98.5 F | OXYGEN SATURATION: 98 %

## 2024-09-29 PROBLEM — E10.10 DIABETIC KETOACIDOSIS WITHOUT COMA ASSOCIATED WITH TYPE 1 DIABETES MELLITUS (MULTI): Status: ACTIVE | Noted: 2024-09-29

## 2024-09-29 LAB
ALBUMIN SERPL BCP-MCNC: 3.1 G/DL (ref 3.4–5)
ALBUMIN SERPL BCP-MCNC: 3.2 G/DL (ref 3.4–5)
ALBUMIN SERPL BCP-MCNC: 4.2 G/DL (ref 3.4–5)
ALBUMIN SERPL BCP-MCNC: 4.8 G/DL (ref 3.4–5)
ALP SERPL-CCNC: 101 U/L (ref 33–120)
ALT SERPL W P-5'-P-CCNC: 14 U/L (ref 10–52)
ANION GAP SERPL CALC-SCNC: 12 MMOL/L (ref 10–20)
ANION GAP SERPL CALC-SCNC: 14 MMOL/L (ref 10–20)
ANION GAP SERPL CALC-SCNC: 20 MMOL/L (ref 10–20)
ANION GAP SERPL CALC-SCNC: 30 MMOL/L (ref 10–20)
ANION GAP SERPL CALC-SCNC: 33 MMOL/L (ref 10–20)
ANION GAP SERPL CALC-SCNC: 9 MMOL/L (ref 10–20)
APPEARANCE UR: CLEAR
AST SERPL W P-5'-P-CCNC: 12 U/L (ref 9–39)
B-OH-BUTYR SERPL-SCNC: 11.55 MMOL/L (ref 0.02–0.27)
BACTERIA BLD CULT: NORMAL
BACTERIA BLD CULT: NORMAL
BASE EXCESS BLDV CALC-SCNC: -10.4 MMOL/L (ref -2–3)
BASE EXCESS BLDV CALC-SCNC: -14.7 MMOL/L (ref -2–3)
BASE EXCESS BLDV CALC-SCNC: -24.3 MMOL/L (ref -2–3)
BASE EXCESS BLDV CALC-SCNC: -26 MMOL/L (ref -2–3)
BASE EXCESS BLDV CALC-SCNC: -26.5 MMOL/L (ref -2–3)
BASE EXCESS BLDV CALC-SCNC: -9.9 MMOL/L (ref -2–3)
BASOPHILS # BLD MANUAL: 0 X10*3/UL (ref 0–0.1)
BASOPHILS NFR BLD MANUAL: 0 %
BILIRUB SERPL-MCNC: 0.4 MG/DL (ref 0–1.2)
BILIRUB UR STRIP.AUTO-MCNC: NEGATIVE MG/DL
BODY TEMPERATURE: 37 DEGREES CELSIUS
BUN SERPL-MCNC: 10 MG/DL (ref 6–23)
BUN SERPL-MCNC: 11 MG/DL (ref 6–23)
BUN SERPL-MCNC: 11 MG/DL (ref 6–23)
BUN SERPL-MCNC: 14 MG/DL (ref 6–23)
BUN SERPL-MCNC: 16 MG/DL (ref 6–23)
BUN SERPL-MCNC: 9 MG/DL (ref 6–23)
CALCIUM SERPL-MCNC: 6.3 MG/DL (ref 8.6–10.3)
CALCIUM SERPL-MCNC: 7.2 MG/DL (ref 8.6–10.3)
CALCIUM SERPL-MCNC: 7.2 MG/DL (ref 8.6–10.3)
CALCIUM SERPL-MCNC: 7.3 MG/DL (ref 8.6–10.3)
CALCIUM SERPL-MCNC: 7.8 MG/DL (ref 8.6–10.3)
CALCIUM SERPL-MCNC: 9 MG/DL (ref 8.6–10.3)
CHLORIDE SERPL-SCNC: 107 MMOL/L (ref 98–107)
CHLORIDE SERPL-SCNC: 107 MMOL/L (ref 98–107)
CHLORIDE SERPL-SCNC: 111 MMOL/L (ref 98–107)
CHLORIDE SERPL-SCNC: 113 MMOL/L (ref 98–107)
CHLORIDE SERPL-SCNC: 114 MMOL/L (ref 98–107)
CHLORIDE SERPL-SCNC: 99 MMOL/L (ref 98–107)
CO2 SERPL-SCNC: 12 MMOL/L (ref 21–32)
CO2 SERPL-SCNC: 16 MMOL/L (ref 21–32)
CO2 SERPL-SCNC: 16 MMOL/L (ref 21–32)
CO2 SERPL-SCNC: 4 MMOL/L (ref 21–32)
CO2 SERPL-SCNC: 5 MMOL/L (ref 21–32)
CO2 SERPL-SCNC: 6 MMOL/L (ref 21–32)
COLOR UR: COLORLESS
CREAT SERPL-MCNC: 0.77 MG/DL (ref 0.5–1.3)
CREAT SERPL-MCNC: 0.82 MG/DL (ref 0.5–1.3)
CREAT SERPL-MCNC: 0.89 MG/DL (ref 0.5–1.3)
CREAT SERPL-MCNC: 0.89 MG/DL (ref 0.5–1.3)
CREAT SERPL-MCNC: 1.21 MG/DL (ref 0.5–1.3)
CREAT SERPL-MCNC: 1.42 MG/DL (ref 0.5–1.3)
EGFRCR SERPLBLD CKD-EPI 2021: 66 ML/MIN/1.73M*2
EGFRCR SERPLBLD CKD-EPI 2021: 80 ML/MIN/1.73M*2
EGFRCR SERPLBLD CKD-EPI 2021: >90 ML/MIN/1.73M*2
EOSINOPHIL # BLD MANUAL: 0 X10*3/UL (ref 0–0.7)
EOSINOPHIL NFR BLD MANUAL: 0 %
ERYTHROCYTE [DISTWIDTH] IN BLOOD BY AUTOMATED COUNT: 13.2 % (ref 11.5–14.5)
ERYTHROCYTE [DISTWIDTH] IN BLOOD BY AUTOMATED COUNT: 13.3 % (ref 11.5–14.5)
GLUCOSE BLD MANUAL STRIP-MCNC: 109 MG/DL (ref 74–99)
GLUCOSE BLD MANUAL STRIP-MCNC: 109 MG/DL (ref 74–99)
GLUCOSE BLD MANUAL STRIP-MCNC: 111 MG/DL (ref 74–99)
GLUCOSE BLD MANUAL STRIP-MCNC: 136 MG/DL (ref 74–99)
GLUCOSE BLD MANUAL STRIP-MCNC: 138 MG/DL (ref 74–99)
GLUCOSE BLD MANUAL STRIP-MCNC: 144 MG/DL (ref 74–99)
GLUCOSE BLD MANUAL STRIP-MCNC: 187 MG/DL (ref 74–99)
GLUCOSE BLD MANUAL STRIP-MCNC: 191 MG/DL (ref 74–99)
GLUCOSE BLD MANUAL STRIP-MCNC: 192 MG/DL (ref 74–99)
GLUCOSE BLD MANUAL STRIP-MCNC: 199 MG/DL (ref 74–99)
GLUCOSE BLD MANUAL STRIP-MCNC: 210 MG/DL (ref 74–99)
GLUCOSE BLD MANUAL STRIP-MCNC: 225 MG/DL (ref 74–99)
GLUCOSE BLD MANUAL STRIP-MCNC: 244 MG/DL (ref 74–99)
GLUCOSE BLD MANUAL STRIP-MCNC: 251 MG/DL (ref 74–99)
GLUCOSE BLD MANUAL STRIP-MCNC: 275 MG/DL (ref 74–99)
GLUCOSE BLD MANUAL STRIP-MCNC: 339 MG/DL (ref 74–99)
GLUCOSE BLD MANUAL STRIP-MCNC: 410 MG/DL (ref 74–99)
GLUCOSE BLD MANUAL STRIP-MCNC: 440 MG/DL (ref 74–99)
GLUCOSE SERPL-MCNC: 146 MG/DL (ref 74–99)
GLUCOSE SERPL-MCNC: 153 MG/DL (ref 74–99)
GLUCOSE SERPL-MCNC: 185 MG/DL (ref 74–99)
GLUCOSE SERPL-MCNC: 214 MG/DL (ref 74–99)
GLUCOSE SERPL-MCNC: 437 MG/DL (ref 74–99)
GLUCOSE SERPL-MCNC: 498 MG/DL (ref 74–99)
GLUCOSE UR STRIP.AUTO-MCNC: ABNORMAL MG/DL
HCO3 BLDV-SCNC: 11.5 MMOL/L (ref 22–26)
HCO3 BLDV-SCNC: 16.1 MMOL/L (ref 22–26)
HCO3 BLDV-SCNC: 16.3 MMOL/L (ref 22–26)
HCO3 BLDV-SCNC: 4.6 MMOL/L (ref 22–26)
HCO3 BLDV-SCNC: 4.8 MMOL/L (ref 22–26)
HCO3 BLDV-SCNC: 6 MMOL/L (ref 22–26)
HCT VFR BLD AUTO: 41.8 % (ref 41–52)
HCT VFR BLD AUTO: 51.7 % (ref 41–52)
HGB BLD-MCNC: 14.6 G/DL (ref 13.5–17.5)
HGB BLD-MCNC: 17 G/DL (ref 13.5–17.5)
HOLD SPECIMEN: NORMAL
HYALINE CASTS #/AREA URNS AUTO: ABNORMAL /LPF
IMM GRANULOCYTES # BLD AUTO: 0.22 X10*3/UL (ref 0–0.7)
IMM GRANULOCYTES NFR BLD AUTO: 1 % (ref 0–0.9)
INHALED O2 CONCENTRATION: 21 %
KETONES UR STRIP.AUTO-MCNC: ABNORMAL MG/DL
LACTATE SERPL-SCNC: 1.3 MMOL/L (ref 0.4–2)
LACTATE SERPL-SCNC: 1.5 MMOL/L (ref 0.4–2)
LACTATE SERPL-SCNC: 2 MMOL/L (ref 0.4–2)
LACTATE SERPL-SCNC: 2.7 MMOL/L (ref 0.4–2)
LEUKOCYTE ESTERASE UR QL STRIP.AUTO: NEGATIVE
LYMPHOCYTES # BLD MANUAL: 0.43 X10*3/UL (ref 1.2–4.8)
LYMPHOCYTES NFR BLD MANUAL: 2 %
MAGNESIUM SERPL-MCNC: 1.68 MG/DL (ref 1.6–2.4)
MAGNESIUM SERPL-MCNC: 1.76 MG/DL (ref 1.6–2.4)
MAGNESIUM SERPL-MCNC: 2.06 MG/DL (ref 1.6–2.4)
MAGNESIUM SERPL-MCNC: 2.41 MG/DL (ref 1.6–2.4)
MCH RBC QN AUTO: 30.9 PG (ref 26–34)
MCH RBC QN AUTO: 32.1 PG (ref 26–34)
MCHC RBC AUTO-ENTMCNC: 32.9 G/DL (ref 32–36)
MCHC RBC AUTO-ENTMCNC: 34.9 G/DL (ref 32–36)
MCV RBC AUTO: 92 FL (ref 80–100)
MCV RBC AUTO: 94 FL (ref 80–100)
MONOCYTES # BLD MANUAL: 1.28 X10*3/UL (ref 0.1–1)
MONOCYTES NFR BLD MANUAL: 6 %
MUCOUS THREADS #/AREA URNS AUTO: ABNORMAL /LPF
NEUTS SEG # BLD MANUAL: 19.6 X10*3/UL (ref 1.2–7)
NEUTS SEG NFR BLD MANUAL: 92 %
NITRITE UR QL STRIP.AUTO: NEGATIVE
NRBC BLD-RTO: 0 /100 WBCS (ref 0–0)
NRBC BLD-RTO: 0 /100 WBCS (ref 0–0)
OXYHGB MFR BLDV: 60.4 % (ref 45–75)
OXYHGB MFR BLDV: 71.6 % (ref 45–75)
OXYHGB MFR BLDV: 75.9 % (ref 45–75)
OXYHGB MFR BLDV: 78.6 % (ref 45–75)
OXYHGB MFR BLDV: 79.3 % (ref 45–75)
OXYHGB MFR BLDV: 90.3 % (ref 45–75)
PCO2 BLDV: 17 MM HG (ref 41–51)
PCO2 BLDV: 23 MM HG (ref 41–51)
PCO2 BLDV: 28 MM HG (ref 41–51)
PCO2 BLDV: 30 MM HG (ref 41–51)
PCO2 BLDV: 35 MM HG (ref 41–51)
PCO2 BLDV: 38 MM HG (ref 41–51)
PH BLDV: 6.91 PH (ref 7.33–7.43)
PH BLDV: 6.93 PH (ref 7.33–7.43)
PH BLDV: 7.04 PH (ref 7.33–7.43)
PH BLDV: 7.22 PH (ref 7.33–7.43)
PH BLDV: 7.24 PH (ref 7.33–7.43)
PH BLDV: 7.27 PH (ref 7.33–7.43)
PH UR STRIP.AUTO: 5.5 [PH]
PHOSPHATE SERPL-MCNC: 1.4 MG/DL (ref 2.5–4.9)
PHOSPHATE SERPL-MCNC: 1.6 MG/DL (ref 2.5–4.9)
PHOSPHATE SERPL-MCNC: 1.9 MG/DL (ref 2.5–4.9)
PHOSPHATE SERPL-MCNC: 2.5 MG/DL (ref 2.5–4.9)
PHOSPHATE SERPL-MCNC: 3.5 MG/DL (ref 2.5–4.9)
PHOSPHATE SERPL-MCNC: 4.3 MG/DL (ref 2.5–4.9)
PLATELET # BLD AUTO: 251 X10*3/UL (ref 150–450)
PLATELET # BLD AUTO: 316 X10*3/UL (ref 150–450)
PO2 BLDV: 31 MM HG (ref 35–45)
PO2 BLDV: 44 MM HG (ref 35–45)
PO2 BLDV: 46 MM HG (ref 35–45)
PO2 BLDV: 46 MM HG (ref 35–45)
PO2 BLDV: 49 MM HG (ref 35–45)
PO2 BLDV: 58 MM HG (ref 35–45)
POTASSIUM SERPL-SCNC: 3.3 MMOL/L (ref 3.5–5.3)
POTASSIUM SERPL-SCNC: 3.4 MMOL/L (ref 3.5–5.3)
POTASSIUM SERPL-SCNC: 3.5 MMOL/L (ref 3.5–5.3)
POTASSIUM SERPL-SCNC: 3.8 MMOL/L (ref 3.5–5.3)
POTASSIUM SERPL-SCNC: 5 MMOL/L (ref 3.5–5.3)
POTASSIUM SERPL-SCNC: 5.2 MMOL/L (ref 3.5–5.3)
PROT SERPL-MCNC: 8.7 G/DL (ref 6.4–8.2)
PROT UR STRIP.AUTO-MCNC: ABNORMAL MG/DL
RBC # BLD AUTO: 4.55 X10*6/UL (ref 4.5–5.9)
RBC # BLD AUTO: 5.5 X10*6/UL (ref 4.5–5.9)
RBC # UR STRIP.AUTO: NEGATIVE /UL
RBC #/AREA URNS AUTO: ABNORMAL /HPF
RBC MORPH BLD: ABNORMAL
SAO2 % BLDV: 61 % (ref 45–75)
SAO2 % BLDV: 73 % (ref 45–75)
SAO2 % BLDV: 77 % (ref 45–75)
SAO2 % BLDV: 80 % (ref 45–75)
SAO2 % BLDV: 81 % (ref 45–75)
SAO2 % BLDV: 92 % (ref 45–75)
SODIUM SERPL-SCNC: 129 MMOL/L (ref 136–145)
SODIUM SERPL-SCNC: 133 MMOL/L (ref 136–145)
SODIUM SERPL-SCNC: 135 MMOL/L (ref 136–145)
SODIUM SERPL-SCNC: 135 MMOL/L (ref 136–145)
SODIUM SERPL-SCNC: 136 MMOL/L (ref 136–145)
SODIUM SERPL-SCNC: 136 MMOL/L (ref 136–145)
SP GR UR STRIP.AUTO: >1.03
TOTAL CELLS COUNTED BLD: 100
UROBILINOGEN UR STRIP.AUTO-MCNC: ABNORMAL MG/DL
WBC # BLD AUTO: 15.3 X10*3/UL (ref 4.4–11.3)
WBC # BLD AUTO: 21.3 X10*3/UL (ref 4.4–11.3)
WBC #/AREA URNS AUTO: ABNORMAL /HPF

## 2024-09-29 PROCEDURE — 87040 BLOOD CULTURE FOR BACTERIA: CPT | Mod: PORLAB | Performed by: INTERNAL MEDICINE

## 2024-09-29 PROCEDURE — 2500000005 HC RX 250 GENERAL PHARMACY W/O HCPCS: Performed by: INTERNAL MEDICINE

## 2024-09-29 PROCEDURE — 2500000004 HC RX 250 GENERAL PHARMACY W/ HCPCS (ALT 636 FOR OP/ED): Mod: JZ | Performed by: INTERNAL MEDICINE

## 2024-09-29 PROCEDURE — 81003 URINALYSIS AUTO W/O SCOPE: CPT | Performed by: INTERNAL MEDICINE

## 2024-09-29 PROCEDURE — 82805 BLOOD GASES W/O2 SATURATION: CPT | Performed by: INTERNAL MEDICINE

## 2024-09-29 PROCEDURE — 85027 COMPLETE CBC AUTOMATED: CPT | Performed by: INTERNAL MEDICINE

## 2024-09-29 PROCEDURE — 2500000002 HC RX 250 W HCPCS SELF ADMINISTERED DRUGS (ALT 637 FOR MEDICARE OP, ALT 636 FOR OP/ED): Performed by: INTERNAL MEDICINE

## 2024-09-29 PROCEDURE — 96375 TX/PRO/DX INJ NEW DRUG ADDON: CPT

## 2024-09-29 PROCEDURE — 2500000005 HC RX 250 GENERAL PHARMACY W/O HCPCS

## 2024-09-29 PROCEDURE — 85027 COMPLETE CBC AUTOMATED: CPT | Performed by: EMERGENCY MEDICINE

## 2024-09-29 PROCEDURE — 1200000002 HC GENERAL ROOM WITH TELEMETRY DAILY

## 2024-09-29 PROCEDURE — 96367 TX/PROPH/DG ADDL SEQ IV INF: CPT

## 2024-09-29 PROCEDURE — 82947 ASSAY GLUCOSE BLOOD QUANT: CPT

## 2024-09-29 PROCEDURE — 80069 RENAL FUNCTION PANEL: CPT | Performed by: INTERNAL MEDICINE

## 2024-09-29 PROCEDURE — 99223 1ST HOSP IP/OBS HIGH 75: CPT | Performed by: INTERNAL MEDICINE

## 2024-09-29 PROCEDURE — 96374 THER/PROPH/DIAG INJ IV PUSH: CPT

## 2024-09-29 PROCEDURE — 81001 URINALYSIS AUTO W/SCOPE: CPT | Performed by: INTERNAL MEDICINE

## 2024-09-29 PROCEDURE — 2500000004 HC RX 250 GENERAL PHARMACY W/ HCPCS (ALT 636 FOR OP/ED): Performed by: EMERGENCY MEDICINE

## 2024-09-29 PROCEDURE — 99291 CRITICAL CARE FIRST HOUR: CPT | Performed by: INTERNAL MEDICINE

## 2024-09-29 PROCEDURE — 93005 ELECTROCARDIOGRAM TRACING: CPT

## 2024-09-29 PROCEDURE — 99291 CRITICAL CARE FIRST HOUR: CPT

## 2024-09-29 PROCEDURE — 83605 ASSAY OF LACTIC ACID: CPT | Performed by: INTERNAL MEDICINE

## 2024-09-29 PROCEDURE — 84100 ASSAY OF PHOSPHORUS: CPT

## 2024-09-29 PROCEDURE — 96361 HYDRATE IV INFUSION ADD-ON: CPT

## 2024-09-29 PROCEDURE — 83605 ASSAY OF LACTIC ACID: CPT

## 2024-09-29 PROCEDURE — 85007 BL SMEAR W/DIFF WBC COUNT: CPT | Performed by: EMERGENCY MEDICINE

## 2024-09-29 PROCEDURE — 2500000004 HC RX 250 GENERAL PHARMACY W/ HCPCS (ALT 636 FOR OP/ED)

## 2024-09-29 PROCEDURE — 2500000004 HC RX 250 GENERAL PHARMACY W/ HCPCS (ALT 636 FOR OP/ED): Performed by: INTERNAL MEDICINE

## 2024-09-29 PROCEDURE — 83735 ASSAY OF MAGNESIUM: CPT | Performed by: INTERNAL MEDICINE

## 2024-09-29 PROCEDURE — 96365 THER/PROPH/DIAG IV INF INIT: CPT

## 2024-09-29 PROCEDURE — 36415 COLL VENOUS BLD VENIPUNCTURE: CPT | Performed by: EMERGENCY MEDICINE

## 2024-09-29 PROCEDURE — 82805 BLOOD GASES W/O2 SATURATION: CPT

## 2024-09-29 PROCEDURE — 36415 COLL VENOUS BLD VENIPUNCTURE: CPT | Performed by: INTERNAL MEDICINE

## 2024-09-29 PROCEDURE — 83735 ASSAY OF MAGNESIUM: CPT

## 2024-09-29 RX ORDER — DEXTROSE 50 % IN WATER (D50W) INTRAVENOUS SYRINGE
12.5
Status: DISCONTINUED | OUTPATIENT
Start: 2024-09-29 | End: 2024-09-30 | Stop reason: HOSPADM

## 2024-09-29 RX ORDER — DEXTROSE 50 % IN WATER (D50W) INTRAVENOUS SYRINGE
25
Status: DISCONTINUED | OUTPATIENT
Start: 2024-09-29 | End: 2024-09-30 | Stop reason: HOSPADM

## 2024-09-29 RX ORDER — DEXTROSE MONOHYDRATE AND SODIUM CHLORIDE 5; .45 G/100ML; G/100ML
150 INJECTION, SOLUTION INTRAVENOUS CONTINUOUS
Status: DISCONTINUED | OUTPATIENT
Start: 2024-09-29 | End: 2024-09-29

## 2024-09-29 RX ORDER — POTASSIUM CHLORIDE 14.9 MG/ML
20 INJECTION INTRAVENOUS
Status: COMPLETED | OUTPATIENT
Start: 2024-09-29 | End: 2024-09-29

## 2024-09-29 RX ORDER — INSULIN LISPRO 100 [IU]/ML
6 INJECTION, SOLUTION INTRAVENOUS; SUBCUTANEOUS
Status: DISCONTINUED | OUTPATIENT
Start: 2024-09-29 | End: 2024-09-30 | Stop reason: HOSPADM

## 2024-09-29 RX ORDER — VANCOMYCIN HYDROCHLORIDE 1 G/20ML
INJECTION, POWDER, LYOPHILIZED, FOR SOLUTION INTRAVENOUS DAILY PRN
Status: DISCONTINUED | OUTPATIENT
Start: 2024-09-29 | End: 2024-09-29

## 2024-09-29 RX ORDER — SODIUM CHLORIDE 450 MG/100ML
250 INJECTION, SOLUTION INTRAVENOUS CONTINUOUS
Status: DISCONTINUED | OUTPATIENT
Start: 2024-09-29 | End: 2024-09-29

## 2024-09-29 RX ORDER — INSULIN LISPRO 100 [IU]/ML
0-10 INJECTION, SOLUTION INTRAVENOUS; SUBCUTANEOUS
Status: DISCONTINUED | OUTPATIENT
Start: 2024-09-29 | End: 2024-09-30 | Stop reason: HOSPADM

## 2024-09-29 RX ADMIN — SODIUM CHLORIDE, POTASSIUM CHLORIDE, SODIUM LACTATE AND CALCIUM CHLORIDE 2000 ML: 600; 310; 30; 20 INJECTION, SOLUTION INTRAVENOUS at 05:27

## 2024-09-29 RX ADMIN — INSULIN HUMAN 9.6 UNITS/HR: 1 INJECTION, SOLUTION INTRAVENOUS at 11:11

## 2024-09-29 RX ADMIN — SODIUM CHLORIDE 250 ML/HR: 4.5 INJECTION, SOLUTION INTRAVENOUS at 01:08

## 2024-09-29 RX ADMIN — DEXTROSE AND SODIUM CHLORIDE 150 ML/HR: 5; 450 INJECTION, SOLUTION INTRAVENOUS at 05:13

## 2024-09-29 RX ADMIN — SODIUM BICARBONATE: 84 INJECTION, SOLUTION INTRAVENOUS at 02:59

## 2024-09-29 RX ADMIN — POTASSIUM PHOSPHATE, MONOBASIC AND POTASSIUM PHOSPHATE, DIBASIC 30 MMOL: 224; 236 INJECTION, SOLUTION, CONCENTRATE INTRAVENOUS at 14:38

## 2024-09-29 RX ADMIN — PIPERACILLIN SODIUM AND TAZOBACTAM SODIUM 3.38 G: 3; .375 INJECTION, SOLUTION INTRAVENOUS at 03:39

## 2024-09-29 RX ADMIN — INSULIN LISPRO 6 UNITS: 100 INJECTION, SOLUTION INTRAVENOUS; SUBCUTANEOUS at 16:31

## 2024-09-29 RX ADMIN — INSULIN HUMAN 9.6 UNITS/HR: 1 INJECTION, SOLUTION INTRAVENOUS at 01:08

## 2024-09-29 RX ADMIN — POTASSIUM CHLORIDE 20 MEQ: 14.9 INJECTION, SOLUTION INTRAVENOUS at 06:13

## 2024-09-29 RX ADMIN — POTASSIUM CHLORIDE 20 MEQ: 14.9 INJECTION, SOLUTION INTRAVENOUS at 08:40

## 2024-09-29 RX ADMIN — POTASSIUM PHOSPHATE, MONOBASIC AND POTASSIUM PHOSPHATE, DIBASIC 21 MMOL: 224; 236 INJECTION, SOLUTION, CONCENTRATE INTRAVENOUS at 06:52

## 2024-09-29 RX ADMIN — VANCOMYCIN HYDROCHLORIDE 1250 MG: 1.25 INJECTION, POWDER, LYOPHILIZED, FOR SOLUTION INTRAVENOUS at 04:22

## 2024-09-29 RX ADMIN — INSULIN HUMAN 18 UNITS: 100 INJECTION, SUSPENSION SUBCUTANEOUS at 15:36

## 2024-09-29 SDOH — ECONOMIC STABILITY: FOOD INSECURITY: WITHIN THE PAST 12 MONTHS, YOU WORRIED THAT YOUR FOOD WOULD RUN OUT BEFORE YOU GOT MONEY TO BUY MORE.: NEVER TRUE

## 2024-09-29 SDOH — SOCIAL STABILITY: SOCIAL INSECURITY: WITHIN THE LAST YEAR, HAVE YOU BEEN AFRAID OF YOUR PARTNER OR EX-PARTNER?: NO

## 2024-09-29 SDOH — SOCIAL STABILITY: SOCIAL INSECURITY: WITHIN THE LAST YEAR, HAVE YOU BEEN HUMILIATED OR EMOTIONALLY ABUSED IN OTHER WAYS BY YOUR PARTNER OR EX-PARTNER?: NO

## 2024-09-29 SDOH — ECONOMIC STABILITY: HOUSING INSECURITY: AT ANY TIME IN THE PAST 12 MONTHS, WERE YOU HOMELESS OR LIVING IN A SHELTER (INCLUDING NOW)?: NO

## 2024-09-29 SDOH — ECONOMIC STABILITY: INCOME INSECURITY: HOW HARD IS IT FOR YOU TO PAY FOR THE VERY BASICS LIKE FOOD, HOUSING, MEDICAL CARE, AND HEATING?: PATIENT DECLINED

## 2024-09-29 SDOH — SOCIAL STABILITY: SOCIAL INSECURITY
WITHIN THE LAST YEAR, HAVE YOU BEEN KICKED, HIT, SLAPPED, OR OTHERWISE PHYSICALLY HURT BY YOUR PARTNER OR EX-PARTNER?: NO

## 2024-09-29 SDOH — ECONOMIC STABILITY: INCOME INSECURITY: IN THE PAST 12 MONTHS, HAS THE ELECTRIC, GAS, OIL, OR WATER COMPANY THREATENED TO SHUT OFF SERVICE IN YOUR HOME?: NO

## 2024-09-29 SDOH — HEALTH STABILITY: MENTAL HEALTH: HOW OFTEN DO YOU HAVE SIX OR MORE DRINKS ON ONE OCCASION?: NEVER

## 2024-09-29 SDOH — ECONOMIC STABILITY: HOUSING INSECURITY: IN THE LAST 12 MONTHS, WAS THERE A TIME WHEN YOU WERE NOT ABLE TO PAY THE MORTGAGE OR RENT ON TIME?: PATIENT DECLINED

## 2024-09-29 SDOH — HEALTH STABILITY: MENTAL HEALTH: HOW OFTEN DO YOU HAVE A DRINK CONTAINING ALCOHOL?: NEVER

## 2024-09-29 SDOH — SOCIAL STABILITY: SOCIAL INSECURITY
WITHIN THE LAST YEAR, HAVE YOU BEEN RAPED OR FORCED TO HAVE ANY KIND OF SEXUAL ACTIVITY BY YOUR PARTNER OR EX-PARTNER?: NO

## 2024-09-29 SDOH — ECONOMIC STABILITY: TRANSPORTATION INSECURITY
IN THE PAST 12 MONTHS, HAS THE LACK OF TRANSPORTATION KEPT YOU FROM MEDICAL APPOINTMENTS OR FROM GETTING MEDICATIONS?: NO

## 2024-09-29 SDOH — ECONOMIC STABILITY: FOOD INSECURITY: WITHIN THE PAST 12 MONTHS, YOU WORRIED THAT YOUR FOOD WOULD RUN OUT BEFORE YOU GOT THE MONEY TO BUY MORE.: NEVER TRUE

## 2024-09-29 SDOH — ECONOMIC STABILITY: INCOME INSECURITY: IN THE LAST 12 MONTHS, WAS THERE A TIME WHEN YOU WERE NOT ABLE TO PAY THE MORTGAGE OR RENT ON TIME?: PATIENT DECLINED

## 2024-09-29 SDOH — HEALTH STABILITY: MENTAL HEALTH: HOW OFTEN DO YOU HAVE 6 OR MORE DRINKS ON ONE OCCASION?: NEVER

## 2024-09-29 SDOH — ECONOMIC STABILITY: INCOME INSECURITY: IN THE PAST 12 MONTHS HAS THE ELECTRIC, GAS, OIL, OR WATER COMPANY THREATENED TO SHUT OFF SERVICES IN YOUR HOME?: NO

## 2024-09-29 SDOH — ECONOMIC STABILITY: FOOD INSECURITY: WITHIN THE PAST 12 MONTHS, THE FOOD YOU BOUGHT JUST DIDN'T LAST AND YOU DIDN'T HAVE MONEY TO GET MORE.: NEVER TRUE

## 2024-09-29 SDOH — SOCIAL STABILITY: SOCIAL INSECURITY: HAS ANYONE EVER THREATENED TO HURT YOUR FAMILY OR YOUR PETS?: NO

## 2024-09-29 SDOH — SOCIAL STABILITY: SOCIAL INSECURITY: DO YOU FEEL ANYONE HAS EXPLOITED OR TAKEN ADVANTAGE OF YOU FINANCIALLY OR OF YOUR PERSONAL PROPERTY?: NO

## 2024-09-29 SDOH — SOCIAL STABILITY: SOCIAL INSECURITY
WITHIN THE LAST YEAR, HAVE TO BEEN RAPED OR FORCED TO HAVE ANY KIND OF SEXUAL ACTIVITY BY YOUR PARTNER OR EX-PARTNER?: NO

## 2024-09-29 SDOH — HEALTH STABILITY: MENTAL HEALTH: HOW MANY DRINKS CONTAINING ALCOHOL DO YOU HAVE ON A TYPICAL DAY WHEN YOU ARE DRINKING?: PATIENT DOES NOT DRINK

## 2024-09-29 SDOH — SOCIAL STABILITY: SOCIAL INSECURITY: ARE YOU OR HAVE YOU BEEN THREATENED OR ABUSED PHYSICALLY, EMOTIONALLY, OR SEXUALLY BY ANYONE?: NO

## 2024-09-29 SDOH — SOCIAL STABILITY: SOCIAL INSECURITY: HAVE YOU HAD ANY THOUGHTS OF HARMING ANYONE ELSE?: NO

## 2024-09-29 SDOH — ECONOMIC STABILITY: TRANSPORTATION INSECURITY
IN THE PAST 12 MONTHS, HAS LACK OF TRANSPORTATION KEPT YOU FROM MEETINGS, WORK, OR FROM GETTING THINGS NEEDED FOR DAILY LIVING?: NO

## 2024-09-29 SDOH — SOCIAL STABILITY: SOCIAL INSECURITY: HAVE YOU HAD THOUGHTS OF HARMING ANYONE ELSE?: NO

## 2024-09-29 SDOH — ECONOMIC STABILITY: HOUSING INSECURITY: IN THE PAST 12 MONTHS, HOW MANY TIMES HAVE YOU MOVED WHERE YOU WERE LIVING?: 1

## 2024-09-29 SDOH — SOCIAL STABILITY: SOCIAL INSECURITY: DOES ANYONE TRY TO KEEP YOU FROM HAVING/CONTACTING OTHER FRIENDS OR DOING THINGS OUTSIDE YOUR HOME?: NO

## 2024-09-29 SDOH — HEALTH STABILITY: MENTAL HEALTH: HOW MANY STANDARD DRINKS CONTAINING ALCOHOL DO YOU HAVE ON A TYPICAL DAY?: PATIENT DOES NOT DRINK

## 2024-09-29 SDOH — ECONOMIC STABILITY: FOOD INSECURITY: HOW HARD IS IT FOR YOU TO PAY FOR THE VERY BASICS LIKE FOOD, HOUSING, MEDICAL CARE, AND HEATING?: PATIENT DECLINED

## 2024-09-29 SDOH — SOCIAL STABILITY: SOCIAL INSECURITY: ARE THERE ANY APPARENT SIGNS OF INJURIES/BEHAVIORS THAT COULD BE RELATED TO ABUSE/NEGLECT?: NO

## 2024-09-29 SDOH — ECONOMIC STABILITY: TRANSPORTATION INSECURITY: IN THE PAST 12 MONTHS, HAS LACK OF TRANSPORTATION KEPT YOU FROM MEDICAL APPOINTMENTS OR FROM GETTING MEDICATIONS?: NO

## 2024-09-29 SDOH — SOCIAL STABILITY: SOCIAL INSECURITY: DO YOU FEEL UNSAFE GOING BACK TO THE PLACE WHERE YOU ARE LIVING?: NO

## 2024-09-29 ASSESSMENT — ACTIVITIES OF DAILY LIVING (ADL)
DRESSING YOURSELF: INDEPENDENT
HEARING - RIGHT EAR: FUNCTIONAL
JUDGMENT_ADEQUATE_SAFELY_COMPLETE_DAILY_ACTIVITIES: YES
PATIENT'S MEMORY ADEQUATE TO SAFELY COMPLETE DAILY ACTIVITIES?: YES
HEARING - LEFT EAR: FUNCTIONAL
WALKS IN HOME: INDEPENDENT
FEEDING YOURSELF: INDEPENDENT
GROOMING: INDEPENDENT
TOILETING: INDEPENDENT
BATHING: INDEPENDENT
LACK_OF_TRANSPORTATION: NO
ADEQUATE_TO_COMPLETE_ADL: YES

## 2024-09-29 ASSESSMENT — COGNITIVE AND FUNCTIONAL STATUS - GENERAL
DAILY ACTIVITIY SCORE: 24
PATIENT BASELINE BEDBOUND: NO
MOBILITY SCORE: 24
DAILY ACTIVITIY SCORE: 24
MOBILITY SCORE: 24
DAILY ACTIVITIY SCORE: 24
MOBILITY SCORE: 24

## 2024-09-29 ASSESSMENT — ENCOUNTER SYMPTOMS
FATIGUE: 1
TROUBLE SWALLOWING: 0
BACK PAIN: 1
ABDOMINAL PAIN: 0
NUMBNESS: 0
WOUND: 0
DECREASED CONCENTRATION: 0
TREMORS: 0
DYSURIA: 0
JOINT SWELLING: 0
CHEST TIGHTNESS: 0
CONFUSION: 0
NECK PAIN: 0
APPETITE CHANGE: 1
LIGHT-HEADEDNESS: 0
HEMATURIA: 0
COUGH: 0
BLOOD IN STOOL: 0
SORE THROAT: 0
FEVER: 0
ACTIVITY CHANGE: 1
SHORTNESS OF BREATH: 0
VOMITING: 0
NECK STIFFNESS: 0
PHOTOPHOBIA: 0
RHINORRHEA: 0
DIZZINESS: 0
SPEECH DIFFICULTY: 0
VOMITING: 1
NERVOUS/ANXIOUS: 0
CONSTIPATION: 0
SEIZURES: 0
FLANK PAIN: 0
NAUSEA: 0
FREQUENCY: 0
DIAPHORESIS: 0
WHEEZING: 0
NAUSEA: 1
PALPITATIONS: 0
HEADACHES: 0
SINUS PAIN: 0
WEAKNESS: 0
DIFFICULTY URINATING: 0
MYALGIAS: 1
UNEXPECTED WEIGHT CHANGE: 0
VOICE CHANGE: 0
CHILLS: 0
ABDOMINAL PAIN: 1
SINUS PRESSURE: 0
ARTHRALGIAS: 0
FACIAL ASYMMETRY: 0
DIARRHEA: 0
ABDOMINAL DISTENTION: 0

## 2024-09-29 ASSESSMENT — PAIN - FUNCTIONAL ASSESSMENT
PAIN_FUNCTIONAL_ASSESSMENT: 0-10

## 2024-09-29 ASSESSMENT — PAIN SCALES - GENERAL
PAINLEVEL_OUTOF10: 3
PAINLEVEL_OUTOF10: 0 - NO PAIN

## 2024-09-29 ASSESSMENT — PATIENT HEALTH QUESTIONNAIRE - PHQ9
1. LITTLE INTEREST OR PLEASURE IN DOING THINGS: MORE THAN HALF THE DAYS
SUM OF ALL RESPONSES TO PHQ9 QUESTIONS 1 & 2: 4
2. FEELING DOWN, DEPRESSED OR HOPELESS: MORE THAN HALF THE DAYS

## 2024-09-29 ASSESSMENT — LIFESTYLE VARIABLES
SKIP TO QUESTIONS 9-10: 1
AUDIT-C TOTAL SCORE: 0
SKIP TO QUESTIONS 9-10: 1
AUDIT-C TOTAL SCORE: 0

## 2024-09-29 NOTE — ED PROVIDER NOTES
HPI   Chief Complaint   Patient presents with    Hyperglycemia       Patient presents emergency department with concerns of being DKA.  Patient reports having high blood sugars when he last took about 11 AM who reports concern 80.  Patient took 18 units of subcu insulin at that time.  Patient is getting better so came in here to Emergency Department for further evaluation.  Patient denies any fevers.              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    DIABETIC KETO ACIDOSIS    DKA (diabetic ketoacidosis) (Multi) 07/11/2023    DKA, type 1, not at goal (Multi) 04/21/2023    DM2 (diabetes mellitus, type 2) (Multi) 11/15/2023    DIABETES    Elevated blood sugar 11/15/2023    ELEVATED BLOOD SUGAR/ 375 LAST CHECK    Elevated blood-pressure reading, without diagnosis of hypertension 09/09/2020    Elevated blood pressure reading    Fatigue 07/11/2023    Generalized anxiety disorder 07/11/2023    Microalbuminuria due to type 1 diabetes mellitus (Multi) 07/11/2023    Personal history of COVID-19 04/02/2023    Poorly controlled diabetes mellitus (Multi) 07/11/2023    Type 1 diabetes mellitus (Multi) 07/11/2023    Type 1 diabetes mellitus with hyperglycemia (Multi) 02/25/2019    Vitamin B12 deficiency 07/11/2023    Vitamin D deficiency 07/11/2023    Vomiting 11/15/2023    VOMITING HEADACHE BACK ACHE     History reviewed. No pertinent surgical history.  Family History   Problem Relation Name Age of Onset    No Known Problems Mother      No Known Problems Father      Hypertension Other      Coronary artery disease Other      Diabetes Other      Heart failure Other       Social History     Tobacco Use    Smoking status: Never    Smokeless tobacco: Never   Vaping Use     Vaping status: Former   Substance Use Topics    Alcohol use: Never    Drug use: Never       Physical Exam   ED Triage Vitals [09/28/24 2244]   Temperature Heart Rate Respirations BP   36.1 °C (97 °F) (!) 133 (!) 22 135/88      Pulse Ox Temp Source Heart Rate Source Patient Position   99 % Temporal Monitor --      BP Location FiO2 (%)     -- --       Physical Exam  Vitals and nursing note reviewed.   Constitutional:       General: He is not in acute distress.     Appearance: He is well-developed.   HENT:      Head: Normocephalic and atraumatic.      Right Ear: External ear normal.      Left Ear: External ear normal.      Mouth/Throat:      Mouth: Mucous membranes are moist.      Pharynx: Oropharynx is clear.   Eyes:      Extraocular Movements: Extraocular movements intact.      Conjunctiva/sclera: Conjunctivae normal.   Neck:      Trachea: Trachea normal.   Cardiovascular:      Rate and Rhythm: Tachycardia present.   Pulmonary:      Effort: Pulmonary effort is normal. No respiratory distress.      Breath sounds: Normal breath sounds.      Comments: Tachypnea  Abdominal:      General: Bowel sounds are normal.      Palpations: Abdomen is soft.      Tenderness: There is no abdominal tenderness.   Musculoskeletal:         General: No swelling or tenderness.      Cervical back: No tenderness.   Skin:     General: Skin is warm and dry.      Capillary Refill: Capillary refill takes less than 2 seconds.   Neurological:      General: No focal deficit present.      Mental Status: He is alert and oriented to person, place, and time.   Psychiatric:         Mood and Affect: Mood normal.         Thought Content: Thought content does not include homicidal or suicidal ideation.           ED Course & [facility]   ED Course as of 10/07/24 1731   Sun Sep 29, 2024   0000 Patient presents with concern for DKA. Available chart reviewed. On initial assessment the patient was found non-toxic, no acute distress, vitals hemodynamically stable and  afebrile. Initial concern for DKA.  Patient initially given 2 L of fluid. [JH]   0011 POCT pH, Venous(!!): 6.91 [JH]   0031 Comprehensive Metabolic Panel(!!)  Metabolic panel shows hyperglycemia, slight hyponatremia 133, potassium normal but hemolyzed, carbonate 6, anion gap 33, creatinine is 1.42 [JH]   0032 MAGNESIUM(!): 2.41 [JH]   0040 Insulin ordered [JH]   0046 Beta-Hydroxybutyrate(!): 11.55 [JH]   0115 Patient care was discussed with overnight ICU GERRI as well as the admitting physician. [JH]   0234 EKG obtained at 231 interpreted myself shows sinus tachycardia, rate 130, , prolonged QTc of 512, no T wave changes, no ST segment changes [JH]      ED Course User Index  [JH] Santi Ortiz MD         Diagnoses as of 10/07/24 1731   Diabetic ketoacidosis without coma associated with type 1 diabetes mellitus (Multi)                 No data recorded     Alyce Coma Scale Score: 15 (09/28/24 2343 : Meredith Philip RN)                           Medical Decision Making      Procedure  Critical Care    Performed by: Santi Ortiz MD  Authorized by: Santi Ortiz MD    Critical care provider statement:     Critical care time (minutes):  30    Critical care time was exclusive of:  Separately billable procedures and treating other patients and teaching time    Critical care was necessary to treat or prevent imminent or life-threatening deterioration of the following conditions:  Metabolic crisis (DKA)    Critical care was time spent personally by me on the following activities:  Ordering and performing treatments and interventions, ordering and review of laboratory studies, re-evaluation of patient's condition, examination of patient, evaluation of patient's response to treatment, review of old charts and discussions with consultants    Care discussed with: admitting provider         Santi Ortiz MD  09/29/24 0115       Santi Ortiz MD  10/07/24 1731

## 2024-09-29 NOTE — CONSULTS
Critical Care Medicine Consult      Reason For Consult  DKA    History Of Present Illness  Ed Sanon is a 33 y.o. male with past medical history of diabetes mellitus type 1 with poor insulin compliance and several admissions for episodes of DKA presented to [facility] ED with concern of high blood sugars.  Patient reported blood sugars since he took his blood sugar around and took 18 units of subcutaneous insulin.  Felt like he was not improving emergency department for evaluation.  Upon ED workup, temperature 36.1 °C, heart rate 133, blood pressure 135/88, respiratory rate 22, and SpO2 99% on room air.  ED labs revealed blood glucose 498, sodium 133, potassium 5.2, bicarbonate 6, anion gap 33, creatinine 1.42 magnesium 2.41, beta hydroxybutyrate 11.55, WBC 21.3, hemoglobin 17, 51.7, venous pH 6.91, pCO2 30, and HCO3 calculated 6.  ED interventions included 2 L IV fluid bolus of normal saline and initiation of insulin infusion and IV fluids per DKA protocol.  Patient admitted as ICU status and critical care medicine was consulted.    Patient seen and examined in ED bed 4.  Patient alert and oriented x 4 but slightly lethargic.  Patient reported that he came to the ER he is he was having high blood sugars and around 11 AM he gave himself 18 units and had no improvement. Patient stated that he was recently treated for a dental infection and was on antibiotics about 3 weeks ago.  He does have poor dentition in general but does not appear to have any overt swelling or oropharyngeal complaints. He endorses nausea, vomiting, cramping abdominal pain, tachypnea, and shortness of breath.  He denies any recent sick contacts. Upon chart review, it is noted that patient stated that his blood sugars were running high and he tried insulin but didn't feel like it was necessary if he just stopped eating and his sugars would go down. It  was also noted that patient was counseled on his last admission to go back on insulin  injections given lack of insurance as well as suboptimal use of insulin pump since December 2023. He denies any fever, chills, night sweats loss of consciousness, dizziness, vertigo syncope, seizure-like activity, chest pain, coughing, congestion, wheezing palpitations, hemoptysis emesis, diarrhea, constipation, dysuria, hematuria hematochezia, or changes in bowel/bladder function/control.      Past Medical History:   Diagnosis Date    Abdominal pain 04/21/2023    Anxiety and depression 08/08/2021    Contact with and (suspected) exposure to covid-19 04/21/2023    COVID-19 virus infection 07/11/2023    Diabetes mellitus (Multi)     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    DIABETIC KETO ACIDOSIS    DKA (diabetic ketoacidosis) (Multi) 07/11/2023    DKA, type 1, not at goal (Multi) 04/21/2023    DM2 (diabetes mellitus, type 2) (Multi) 11/15/2023    DIABETES    Elevated blood sugar 11/15/2023    ELEVATED BLOOD SUGAR/ 375 LAST CHECK    Elevated blood-pressure reading, without diagnosis of hypertension 09/09/2020    Elevated blood pressure reading    Fatigue 07/11/2023    Generalized anxiety disorder 07/11/2023    Microalbuminuria due to type 1 diabetes mellitus (Multi) 07/11/2023    Personal history of COVID-19 04/02/2023    Poorly controlled diabetes mellitus (Multi) 07/11/2023    Type 1 diabetes mellitus (Multi) 07/11/2023    Type 1 diabetes mellitus with hyperglycemia (Multi) 02/25/2019    Vitamin B12 deficiency 07/11/2023    Vitamin D deficiency 07/11/2023    Vomiting 11/15/2023    VOMITING HEADACHE BACK ACHE     History reviewed. No pertinent surgical history.  (Not in a hospital admission)    Patient has no known allergies.  Social History     Tobacco Use    Smoking status: Never    Smokeless tobacco: Never   Vaping Use    Vaping status: Former   Substance Use Topics    Alcohol use: Never    Drug use: Never     Family History   Problem Relation Name Age of Onset    No Known Problems Mother      No Known Problems  Father      Hypertension Other      Coronary artery disease Other      Diabetes Other      Heart failure Other         Scheduled Medications:   piperacillin-tazobactam, 3.375 g, intravenous, Once  vancomycin, 1,250 mg, intravenous, Once         Continuous Medications:   dextrose 10 % in water (D10W), 150 mL/hr  dextrose 10 % in water (D10W), 150 mL/hr  dextrose 5%-0.45 % sodium chloride, 150 mL/hr  insulin regular, 0-50 Units/hr, Last Rate: 9.64 Units/hr (09/29/24 0209)  Nephrology IV Maintenance Fluid - 1000 mL, 50 mL/hr, Last Rate: 50 mL/hr (09/29/24 0259)  sodium chloride, 250 mL/hr, Last Rate: 250 mL/hr (09/29/24 0300)         PRN Medications:   PRN medications: dextrose, vancomycin    Review of Systems:  Review of Systems   Constitutional:  Positive for activity change, appetite change and fatigue.   Gastrointestinal:  Positive for abdominal pain, nausea and vomiting.   Musculoskeletal:  Positive for back pain and myalgias.        Objective   Vitals:  Most Recent:  Vitals:    09/29/24 0309   BP: (!) 154/94   Pulse: (!) 131   Resp: (!) 36   Temp:    SpO2: 100%       24hr Min/Max:  Temp  Min: 36.1 °C (97 °F)  Max: 36.6 °C (97.9 °F)  Pulse  Min: 123  Max: 133  BP  Min: 135/88  Max: 158/89  Resp  Min: 22  Max: 36  SpO2  Min: 99 %  Max: 100 %      Intake/Output Summary (Last 24 hours) at 9/29/2024 0320  Last data filed at 9/29/2024 0034  Gross per 24 hour   Intake 2000 ml   Output --   Net 2000 ml           Physical exam:    Physical Exam  Constitutional:       Appearance: He is ill-appearing and diaphoretic.   HENT:      Head: Normocephalic.      Nose: Nose normal.      Mouth/Throat:      Mouth: Mucous membranes are dry.      Comments: Poor dentition. Acetone breath  Eyes:      Extraocular Movements: Extraocular movements intact.      Pupils: Pupils are equal, round, and reactive to light.   Cardiovascular:      Rate and Rhythm: Regular rhythm. Tachycardia present.      Pulses: Normal pulses.      Heart sounds:  Normal heart sounds. No murmur heard.  Pulmonary:      Effort: Tachypnea present. No accessory muscle usage.      Breath sounds: Normal breath sounds.   Abdominal:      General: Bowel sounds are normal. There is no distension.      Palpations: Abdomen is soft.      Tenderness: There is abdominal tenderness.   Musculoskeletal:         General: Normal range of motion.      Cervical back: Normal range of motion.      Right lower leg: No edema.      Left lower leg: No edema.   Skin:     General: Skin is warm.      Capillary Refill: Capillary refill takes less than 2 seconds.   Neurological:      Mental Status: He is alert and oriented to person, place, and time.      Cranial Nerves: Cranial nerves 2-12 are intact.      Sensory: Sensation is intact.      Motor: Motor function is intact.      Coordination: Coordination is intact.      Comments: Slightly lethargy   Psychiatric:         Attention and Perception: Attention normal.         Mood and Affect: Affect is flat.         Speech: Speech normal.         Behavior: Behavior is cooperative.         Thought Content: Thought content normal.          Lab/Radiology/Diagnostic Review:  Results for orders placed or performed during the hospital encounter of 09/28/24 (from the past 24 hour(s))   POCT GLUCOSE   Result Value Ref Range    POCT Glucose 440 (H) 74 - 99 mg/dL   Magnesium   Result Value Ref Range    Magnesium 2.41 (H) 1.60 - 2.40 mg/dL   Phosphorus   Result Value Ref Range    Phosphorus 4.3 2.5 - 4.9 mg/dL   Blood Gas Venous   Result Value Ref Range    POCT pH, Venous 6.91 (LL) 7.33 - 7.43 pH    POCT pCO2, Venous 30 (L) 41 - 51 mm Hg    POCT pO2, Venous 49 (H) 35 - 45 mm Hg    POCT SO2, Venous 73 45 - 75 %    POCT Oxy Hemoglobin, Venous 71.6 45.0 - 75.0 %    POCT Base Excess, Venous -26.0 (L) -2.0 - 3.0 mmol/L    POCT HCO3 Calculated, Venous 6.0 (L) 22.0 - 26.0 mmol/L    Patient Temperature 37.0 degrees Celsius    FiO2 21 %   Comprehensive Metabolic Panel   Result  Value Ref Range    Glucose 498 (HH) 74 - 99 mg/dL    Sodium 133 (L) 136 - 145 mmol/L    Potassium 5.2 3.5 - 5.3 mmol/L    Chloride 99 98 - 107 mmol/L    Bicarbonate 6 (LL) 21 - 32 mmol/L    Anion Gap 33 (H) 10 - 20 mmol/L    Urea Nitrogen 16 6 - 23 mg/dL    Creatinine 1.42 (H) 0.50 - 1.30 mg/dL    eGFR 66 >60 mL/min/1.73m*2    Calcium 9.0 8.6 - 10.3 mg/dL    Albumin 4.8 3.4 - 5.0 g/dL    Alkaline Phosphatase 101 33 - 120 U/L    Total Protein 8.7 (H) 6.4 - 8.2 g/dL    AST 12 9 - 39 U/L    Bilirubin, Total 0.4 0.0 - 1.2 mg/dL    ALT 14 10 - 52 U/L   Beta Hydroxybutyrate   Result Value Ref Range    Beta-Hydroxybutyrate 11.55 (H) 0.02 - 0.27 mmol/L   POCT GLUCOSE   Result Value Ref Range    POCT Glucose 410 (H) 74 - 99 mg/dL   POCT GLUCOSE   Result Value Ref Range    POCT Glucose 440 (H) 74 - 99 mg/dL   CBC and Auto Differential   Result Value Ref Range    WBC 21.3 (H) 4.4 - 11.3 x10*3/uL    nRBC 0.0 0.0 - 0.0 /100 WBCs    RBC 5.50 4.50 - 5.90 x10*6/uL    Hemoglobin 17.0 13.5 - 17.5 g/dL    Hematocrit 51.7 41.0 - 52.0 %    MCV 94 80 - 100 fL    MCH 30.9 26.0 - 34.0 pg    MCHC 32.9 32.0 - 36.0 g/dL    RDW 13.2 11.5 - 14.5 %    Platelets 316 150 - 450 x10*3/uL    Immature Granulocytes %, Automated 1.0 (H) 0.0 - 0.9 %    Immature Granulocytes Absolute, Automated 0.22 0.00 - 0.70 x10*3/uL   Manual Differential   Result Value Ref Range    Neutrophils %, Manual 92.0 40.0 - 80.0 %    Lymphocytes %, Manual 2.0 13.0 - 44.0 %    Monocytes %, Manual 6.0 2.0 - 10.0 %    Eosinophils %, Manual 0.0 0.0 - 6.0 %    Basophils %, Manual 0.0 0.0 - 2.0 %    Seg Neutrophils Absolute, Manual 19.60 (H) 1.20 - 7.00 x10*3/uL    Lymphocytes Absolute, Manual 0.43 (L) 1.20 - 4.80 x10*3/uL    Monocytes Absolute, Manual 1.28 (H) 0.10 - 1.00 x10*3/uL    Eosinophils Absolute, Manual 0.00 0.00 - 0.70 x10*3/uL    Basophils Absolute, Manual 0.00 0.00 - 0.10 x10*3/uL    Total Cells Counted 100     RBC Morphology No significant RBC morphology present     Renal Function Panel   Result Value Ref Range    Glucose 437 (H) 74 - 99 mg/dL    Sodium 136 136 - 145 mmol/L    Potassium 5.0 3.5 - 5.3 mmol/L    Chloride 107 98 - 107 mmol/L    Bicarbonate 4 (LL) 21 - 32 mmol/L    Anion Gap 30 (H) 10 - 20 mmol/L    Urea Nitrogen 14 6 - 23 mg/dL    Creatinine 1.21 0.50 - 1.30 mg/dL    eGFR 80 >60 mL/min/1.73m*2    Calcium 7.8 (L) 8.6 - 10.3 mg/dL    Phosphorus 3.5 2.5 - 4.9 mg/dL    Albumin 4.2 3.4 - 5.0 g/dL   Magnesium   Result Value Ref Range    Magnesium 2.06 1.60 - 2.40 mg/dL   Blood Gas Venous   Result Value Ref Range    POCT pH, Venous 6.93 (LL) 7.33 - 7.43 pH    POCT pCO2, Venous 23 (L) 41 - 51 mm Hg    POCT pO2, Venous 46 (H) 35 - 45 mm Hg    POCT SO2, Venous 77 (H) 45 - 75 %    POCT Oxy Hemoglobin, Venous 75.9 (H) 45.0 - 75.0 %    POCT Base Excess, Venous -26.5 (L) -2.0 - 3.0 mmol/L    POCT HCO3 Calculated, Venous 4.8 (L) 22.0 - 26.0 mmol/L    Patient Temperature 37.0 degrees Celsius    FiO2 21 %   Lactate   Result Value Ref Range    Lactate 2.7 (H) 0.4 - 2.0 mmol/L   POCT GLUCOSE   Result Value Ref Range    POCT Glucose 339 (H) 74 - 99 mg/dL   Urinalysis with Reflex Culture and Microscopic   Result Value Ref Range    Color, Urine Colorless (N) Straw, Yellow    Appearance, Urine Clear Clear    Specific Gravity, Urine >1.030 (N) 1.005 - 1.035    pH, Urine 5.5 5.0, 5.5, 6.0, 6.5, 7.0, 7.5, 8.0    Protein, Urine 30 (1+) (A) NEGATIVE, 10 (TRACE) mg/dL    Glucose, Urine 1000 (4+) (A) NEGATIVE mg/dL    Blood, Urine NEGATIVE NEGATIVE    Ketones, Urine 150 (4+) (A) NEGATIVE mg/dL    Bilirubin, Urine NEGATIVE NEGATIVE    Urobilinogen, Urine NORM NORM mg/dL    Nitrite, Urine NEGATIVE NEGATIVE    Leukocyte Esterase, Urine NEGATIVE NEGATIVE   Urinalysis Microscopic   Result Value Ref Range    WBC, Urine NONE 1-5, NONE /HPF    RBC, Urine 3-5 NONE, 1-2, 3-5 /HPF    Mucus, Urine FEW Reference range not established. /LPF    Hyaline Casts, Urine 4+ (A) NONE /LPF   POCT GLUCOSE   Result  Value Ref Range    POCT Glucose 275 (H) 74 - 99 mg/dL     ECG 12 lead    Result Date: 9/23/2024  Sinus tachycardia Probable left ventricular hypertrophy Anterior ST elevation, probably due to LVH Borderline prolonged QT interval See ED provider note for full interpretation and clinical correlation Confirmed by Nata Hancock ([id number]) on 9/23/2024 12:54:16 PM    XR chest 1 view    Result Date: 9/18/2024  STUDY: Chest Radiograph;  9/18/2024 7:50PM INDICATION: Sepsis. COMPARISON: 2/14/2024 XR Chest, 10/8/2023 XR Chest. ACCESSION NUMBER(S): [id number] ORDERING CLINICIAN: HEATHER BACK TECHNIQUE:  Frontal chest was obtained at 19:49 hours. FINDINGS: CARDIOMEDIASTINAL SILHOUETTE: Cardiomediastinal silhouette is normal in size and configuration.  LUNGS: Lungs are clear.  ABDOMEN: No remarkable upper abdominal findings.  BONES: No acute osseous changes.    No acute cardiopulmonary disease.  No significant interval change from the prior exam.. Signed by Edgard Serrato MD      Assessment/Plan   Principal Problem:    Diabetic ketoacidosis without coma associated with type 1 diabetes mellitus (Multi)    Severe DKA with type 1 DM with poor compliance  -Presented with complaints of high blood glucose, nausea, vomiting, and cramping abdominal pain  -History of poor compliance with insulin with type 1 diabetes mellitus  -Blood glucose 498  -Bicarbonate 6>> 4  -Anion gap 33>> 30  -Creatinine 1.42>> 1.21  -Beta hydroxybutyrate 11.55  -VBG: pH 6.91>>6.93, pCO2 30>>23, HCO3 calculated 6>>4.8  -Bicarbonate 6>>4  -Lactate 2.7  -WBC 21.3  -Patient reports being on antibiotic therapy 3 weeks ago for dental infection.  Denies any fever, chills, dental pain, or swelling. He does meet SIRS criteria with leukocytosis, tachycardia, tachypnea but does not appear to have grossly evident infectious etiology.  -Received 2 L IV fluid bolus in ED  -Continue IV hydration and insulin infusion per DKA protocol  -POCT glucose checks every hour  while on insulin infusion  -N.p.o. on insulin infusion  -Add sodium bicarbonate infusion at 50 mL/h  -RFP, magnesium, and VBG every 4 hours while on insulin infusion  -Replete electrolytes as necessary  -Trend lactate every 4 hours until less than 2  -As needed Zofran for nausea and vomiting  -One-time dose of IV Zosyn and vancomycin  -Follow blood cultures  -Strict intake and output  -Endocrine consult, input appreciated    HAGMA with elevated lactate secondary to DKA  -Presented with complaints of high blood glucose, nausea, vomiting, and cramping abdominal pain  -History of poor compliance with insulin with type 1 diabetes mellitus  -Blood glucose 498  -Bicarbonate 6>> 4  -Anion gap 33>> 30  -Creatinine 1.42>> 1.21  -Beta hydroxybutyrate 11.55  -VBG: pH 6.91>>6.93, pCO2 30>>23, HCO3 calculated 6>>4.8  -Bicarbonate 6>>4  -Lactate 2.7  -Received 2 L IV fluid bolus in ED  -Continue IV hydration and insulin infusion per DKA protocol  -N.p.o. while on insulin fusion  -Add sodium bicarbonate infusion at 50 mL/h  -RFP, magnesium, and VBG every 4 hours while on insulin infusion  -Replete electrolytes as necessary  -Trend lactate every 4 hours until less than 2  -Strict intake and output  -Endocrine consult, input appreciated    MAYA  - BUN 16>>14  -Creatinine 1.42>>1.21, baseline ~0.8  -VBG: pH 6.91>>6.93, pCO2 30>>23, HCO3 calculated 6>>4.8  -Bicarbonate 6>>4  - Suspect in setting of DKA and volume depletion  - Received 2 liters IVF bolus in ED  - Continue IV hydration  and insulin infusion per DKA protocol  - Add sodium bicarbonate infusion at 50ml/hr  - RFP, magnesium, and VBG every 4 hours while on insulin infusion  -Replete electrolytes as necessary  -Strict intake and output    Pseudohyponatremia in setting of DKA  - Sodium level 133, corrected sodium for hyperglycemia ~139-143  -Received 2 liters IVF bolus of normal saline in ED  - Continue IV hydration per DKA protocol  -Follow RFP every 4 hours while on insulin  infusion  -Monitor intake and output    Leukocytosis  -WBC 21.3  -Lactate 2.7  - Reports being on antibiotics 3 weeks ago for dental infection. No overt oral swelling or oropharyngeal complaints  - He does meet SIRS criteria with leukocytosis, tachycardia, tachypnea but does not appear to have grossly evident infectious etiology.  -Suspect likely reactionary in setting of DKA  -Received 2 liters IVF bolus in ED  - Continue IV hydration and insuline infusion per DKA protocol  - Follow CBC and trend fevers  -1 time dose of Vancomycin and Zosyn ordered per IMS  -Follow blood cultures    Medical noncompliance   - History of medical noncompliance with management of IDDM1 and insulin use  - Extensive education provided in regards to compliance with medication regimen and management of IDDM1    I spent 40 minutes of cumulative critical care time with the patient.  Greater than 50% of that time was spent in the direct collaboration and or coordination of care of the patient.     Dragon dictation software was used to dictate this note and thus there may be minor errors in translation/transcription including garbled speech or misspellings. Please contact for clarification if needed.

## 2024-09-29 NOTE — CONSULTS
Inpatient consult to Endocrinology  Consult performed by: Lori Packer MD  Consult ordered by: Arash Carrillo DO  Reason for consult: DKA          Reason For Consult  DKA    History Of Present Illness  Ed Sanon is a 33 y.o. male presenting with nausea, vomiting, decreased oral intake and abdominal pain.   He states that his insulin regimen was not working but simultaneously admits to the fact that he was not taking it.  He was found to be tachycardic.  Initial lab data revealed a glucose value of 498mg/dL, bicarbonate of 6, anion gap of 33 and beta Hydroxybutyrate of 11.55.  He was started on IV fluids and an insulin infusion with admittance to the ICU.    He current has no medical insurance    Home Regimen since insulin pump was discontinued  Novolin N 18 units subcutaneous twice daily   Humalog 6 units TID AC     A1C of 13.3% as of two months ago    Past Medical History  He has a past medical history of Abdominal pain (04/21/2023), Anxiety and depression (08/08/2021), Contact with and (suspected) exposure to covid-19 (04/21/2023), COVID-19 virus infection (07/11/2023), Diabetes mellitus (Multi), Diabetic acetonemia (Multi) (11/15/2023), DKA (diabetic ketoacidosis) (Multi) (07/11/2023), DKA, type 1, not at goal (Multi) (04/21/2023), DM2 (diabetes mellitus, type 2) (Multi) (11/15/2023), Elevated blood sugar (11/15/2023), Elevated blood-pressure reading, without diagnosis of hypertension (09/09/2020), Fatigue (07/11/2023), Generalized anxiety disorder (07/11/2023), Microalbuminuria due to type 1 diabetes mellitus (Multi) (07/11/2023), Personal history of COVID-19 (04/02/2023), Poorly controlled diabetes mellitus (Multi) (07/11/2023), Type 1 diabetes mellitus (Multi) (07/11/2023), Type 1 diabetes mellitus with hyperglycemia (Multi) (02/25/2019), Vitamin B12 deficiency (07/11/2023), Vitamin D deficiency (07/11/2023), and Vomiting (11/15/2023).    Surgical History  He has no past surgical history  "on file.     Social History  He reports that he has never smoked. He has never used smokeless tobacco. He reports that he does not drink alcohol and does not use drugs.    Family History  Family History   Problem Relation Name Age of Onset    No Known Problems Mother      No Known Problems Father      Hypertension Other      Coronary artery disease Other      Diabetes Other      Heart failure Other          Allergies  Patient has no known allergies.    Review of Systems   Constitutional:  Negative for chills and fever.   Gastrointestinal:  Negative for abdominal pain, nausea and vomiting.   All other systems reviewed and are negative.       Physical Exam  Vitals and nursing note reviewed.   Constitutional:       General: He is not in acute distress.     Appearance: Normal appearance. He is normal weight.   HENT:      Head: Normocephalic and atraumatic.      Nose: Nose normal.      Mouth/Throat:      Mouth: Mucous membranes are moist.   Eyes:      Extraocular Movements: Extraocular movements intact.   Cardiovascular:      Rate and Rhythm: Normal rate and regular rhythm.   Pulmonary:      Effort: Pulmonary effort is normal.   Musculoskeletal:         General: Normal range of motion.   Skin:     General: Skin is warm.   Neurological:      Mental Status: He is alert and oriented to person, place, and time.   Psychiatric:         Mood and Affect: Mood normal.          Last Recorded Vitals  Blood pressure 117/75, pulse 108, temperature 36.4 °C (97.5 °F), temperature source Temporal, resp. rate (!) 6, height 1.778 m (5' 10\"), weight 70 kg (154 lb 5.2 oz), SpO2 96%.    Relevant Results  Scheduled medications  potassium chloride, 20 mEq, intravenous, q2h  potassium phosphate, 21 mmol, intravenous, Once  vancomycin, 1,250 mg, intravenous, q12h      Continuous medications  dextrose 10 % in water (D10W), 150 mL/hr  dextrose 10 % in water (D10W), 150 mL/hr  dextrose 5%-0.45 % sodium chloride, 150 mL/hr, Last Rate: 150 mL/hr " (09/29/24 0811)  insulin regular, 0-50 Units/hr, Last Rate: 9.6 Units/hr (09/29/24 0806)  Nephrology IV Maintenance Fluid - 1000 mL, 50 mL/hr, Last Rate: 50 mL/hr (09/29/24 0810)  sodium chloride, 250 mL/hr, Last Rate: Stopped (09/29/24 0513)      PRN medications  PRN medications: dextrose, vancomycin    Results for orders placed or performed during the hospital encounter of 09/28/24 (from the past 24 hour(s))   POCT GLUCOSE   Result Value Ref Range    POCT Glucose 440 (H) 74 - 99 mg/dL   Magnesium   Result Value Ref Range    Magnesium 2.41 (H) 1.60 - 2.40 mg/dL   Phosphorus   Result Value Ref Range    Phosphorus 4.3 2.5 - 4.9 mg/dL   Blood Gas Venous   Result Value Ref Range    POCT pH, Venous 6.91 (LL) 7.33 - 7.43 pH    POCT pCO2, Venous 30 (L) 41 - 51 mm Hg    POCT pO2, Venous 49 (H) 35 - 45 mm Hg    POCT SO2, Venous 73 45 - 75 %    POCT Oxy Hemoglobin, Venous 71.6 45.0 - 75.0 %    POCT Base Excess, Venous -26.0 (L) -2.0 - 3.0 mmol/L    POCT HCO3 Calculated, Venous 6.0 (L) 22.0 - 26.0 mmol/L    Patient Temperature 37.0 degrees Celsius    FiO2 21 %   Comprehensive Metabolic Panel   Result Value Ref Range    Glucose 498 (HH) 74 - 99 mg/dL    Sodium 133 (L) 136 - 145 mmol/L    Potassium 5.2 3.5 - 5.3 mmol/L    Chloride 99 98 - 107 mmol/L    Bicarbonate 6 (LL) 21 - 32 mmol/L    Anion Gap 33 (H) 10 - 20 mmol/L    Urea Nitrogen 16 6 - 23 mg/dL    Creatinine 1.42 (H) 0.50 - 1.30 mg/dL    eGFR 66 >60 mL/min/1.73m*2    Calcium 9.0 8.6 - 10.3 mg/dL    Albumin 4.8 3.4 - 5.0 g/dL    Alkaline Phosphatase 101 33 - 120 U/L    Total Protein 8.7 (H) 6.4 - 8.2 g/dL    AST 12 9 - 39 U/L    Bilirubin, Total 0.4 0.0 - 1.2 mg/dL    ALT 14 10 - 52 U/L   Beta Hydroxybutyrate   Result Value Ref Range    Beta-Hydroxybutyrate 11.55 (H) 0.02 - 0.27 mmol/L   POCT GLUCOSE   Result Value Ref Range    POCT Glucose 410 (H) 74 - 99 mg/dL   POCT GLUCOSE   Result Value Ref Range    POCT Glucose 440 (H) 74 - 99 mg/dL   CBC and Auto Differential    Result Value Ref Range    WBC 21.3 (H) 4.4 - 11.3 x10*3/uL    nRBC 0.0 0.0 - 0.0 /100 WBCs    RBC 5.50 4.50 - 5.90 x10*6/uL    Hemoglobin 17.0 13.5 - 17.5 g/dL    Hematocrit 51.7 41.0 - 52.0 %    MCV 94 80 - 100 fL    MCH 30.9 26.0 - 34.0 pg    MCHC 32.9 32.0 - 36.0 g/dL    RDW 13.2 11.5 - 14.5 %    Platelets 316 150 - 450 x10*3/uL    Immature Granulocytes %, Automated 1.0 (H) 0.0 - 0.9 %    Immature Granulocytes Absolute, Automated 0.22 0.00 - 0.70 x10*3/uL   Manual Differential   Result Value Ref Range    Neutrophils %, Manual 92.0 40.0 - 80.0 %    Lymphocytes %, Manual 2.0 13.0 - 44.0 %    Monocytes %, Manual 6.0 2.0 - 10.0 %    Eosinophils %, Manual 0.0 0.0 - 6.0 %    Basophils %, Manual 0.0 0.0 - 2.0 %    Seg Neutrophils Absolute, Manual 19.60 (H) 1.20 - 7.00 x10*3/uL    Lymphocytes Absolute, Manual 0.43 (L) 1.20 - 4.80 x10*3/uL    Monocytes Absolute, Manual 1.28 (H) 0.10 - 1.00 x10*3/uL    Eosinophils Absolute, Manual 0.00 0.00 - 0.70 x10*3/uL    Basophils Absolute, Manual 0.00 0.00 - 0.10 x10*3/uL    Total Cells Counted 100     RBC Morphology No significant RBC morphology present    Renal Function Panel   Result Value Ref Range    Glucose 437 (H) 74 - 99 mg/dL    Sodium 136 136 - 145 mmol/L    Potassium 5.0 3.5 - 5.3 mmol/L    Chloride 107 98 - 107 mmol/L    Bicarbonate 4 (LL) 21 - 32 mmol/L    Anion Gap 30 (H) 10 - 20 mmol/L    Urea Nitrogen 14 6 - 23 mg/dL    Creatinine 1.21 0.50 - 1.30 mg/dL    eGFR 80 >60 mL/min/1.73m*2    Calcium 7.8 (L) 8.6 - 10.3 mg/dL    Phosphorus 3.5 2.5 - 4.9 mg/dL    Albumin 4.2 3.4 - 5.0 g/dL   Magnesium   Result Value Ref Range    Magnesium 2.06 1.60 - 2.40 mg/dL   Blood Gas Venous   Result Value Ref Range    POCT pH, Venous 6.93 (LL) 7.33 - 7.43 pH    POCT pCO2, Venous 23 (L) 41 - 51 mm Hg    POCT pO2, Venous 46 (H) 35 - 45 mm Hg    POCT SO2, Venous 77 (H) 45 - 75 %    POCT Oxy Hemoglobin, Venous 75.9 (H) 45.0 - 75.0 %    POCT Base Excess, Venous -26.5 (L) -2.0 - 3.0 mmol/L     POCT HCO3 Calculated, Venous 4.8 (L) 22.0 - 26.0 mmol/L    Patient Temperature 37.0 degrees Celsius    FiO2 21 %   Lactate   Result Value Ref Range    Lactate 2.7 (H) 0.4 - 2.0 mmol/L   POCT GLUCOSE   Result Value Ref Range    POCT Glucose 339 (H) 74 - 99 mg/dL   Urinalysis with Reflex Culture and Microscopic   Result Value Ref Range    Color, Urine Colorless (N) Straw, Yellow    Appearance, Urine Clear Clear    Specific Gravity, Urine >1.030 (N) 1.005 - 1.035    pH, Urine 5.5 5.0, 5.5, 6.0, 6.5, 7.0, 7.5, 8.0    Protein, Urine 30 (1+) (A) NEGATIVE, 10 (TRACE) mg/dL    Glucose, Urine 1000 (4+) (A) NEGATIVE mg/dL    Blood, Urine NEGATIVE NEGATIVE    Ketones, Urine 150 (4+) (A) NEGATIVE mg/dL    Bilirubin, Urine NEGATIVE NEGATIVE    Urobilinogen, Urine NORM NORM mg/dL    Nitrite, Urine NEGATIVE NEGATIVE    Leukocyte Esterase, Urine NEGATIVE NEGATIVE   Urinalysis Microscopic   Result Value Ref Range    WBC, Urine NONE 1-5, NONE /HPF    RBC, Urine 3-5 NONE, 1-2, 3-5 /HPF    Mucus, Urine FEW Reference range not established. /LPF    Hyaline Casts, Urine 4+ (A) NONE /LPF   POCT GLUCOSE   Result Value Ref Range    POCT Glucose 275 (H) 74 - 99 mg/dL   POCT GLUCOSE   Result Value Ref Range    POCT Glucose 244 (H) 74 - 99 mg/dL   Renal Function Panel   Result Value Ref Range    Glucose 185 (H) 74 - 99 mg/dL    Sodium 129 (L) 136 - 145 mmol/L    Potassium 3.3 (L) 3.5 - 5.3 mmol/L    Chloride 107 98 - 107 mmol/L    Bicarbonate 5 (LL) 21 - 32 mmol/L    Anion Gap 20 10 - 20 mmol/L    Urea Nitrogen 11 6 - 23 mg/dL    Creatinine 0.89 0.50 - 1.30 mg/dL    eGFR >90 >60 mL/min/1.73m*2    Calcium 6.3 (L) 8.6 - 10.3 mg/dL    Phosphorus 1.9 (L) 2.5 - 4.9 mg/dL    Albumin 3.2 (L) 3.4 - 5.0 g/dL   Magnesium   Result Value Ref Range    Magnesium 1.68 1.60 - 2.40 mg/dL   Blood Gas Venous   Result Value Ref Range    POCT pH, Venous 7.04 (LL) 7.33 - 7.43 pH    POCT pCO2, Venous 17 (L) 41 - 51 mm Hg    POCT pO2, Venous 44 35 - 45 mm Hg     POCT SO2, Venous 80 (H) 45 - 75 %    POCT Oxy Hemoglobin, Venous 78.6 (H) 45.0 - 75.0 %    POCT Base Excess, Venous -24.3 (L) -2.0 - 3.0 mmol/L    POCT HCO3 Calculated, Venous 4.6 (L) 22.0 - 26.0 mmol/L    Patient Temperature 37.0 degrees Celsius    FiO2 21 %   CBC   Result Value Ref Range    WBC 15.3 (H) 4.4 - 11.3 x10*3/uL    nRBC 0.0 0.0 - 0.0 /100 WBCs    RBC 4.55 4.50 - 5.90 x10*6/uL    Hemoglobin 14.6 13.5 - 17.5 g/dL    Hematocrit 41.8 41.0 - 52.0 %    MCV 92 80 - 100 fL    MCH 32.1 26.0 - 34.0 pg    MCHC 34.9 32.0 - 36.0 g/dL    RDW 13.3 11.5 - 14.5 %    Platelets 251 150 - 450 x10*3/uL   Lactate   Result Value Ref Range    Lactate 2.0 0.4 - 2.0 mmol/L   POCT GLUCOSE   Result Value Ref Range    POCT Glucose 225 (H) 74 - 99 mg/dL   POCT GLUCOSE   Result Value Ref Range    POCT Glucose 192 (H) 74 - 99 mg/dL   POCT GLUCOSE   Result Value Ref Range    POCT Glucose 187 (H) 74 - 99 mg/dL   POCT GLUCOSE   Result Value Ref Range    POCT Glucose 210 (H) 74 - 99 mg/dL   Lactate   Result Value Ref Range    Lactate 1.5 0.4 - 2.0 mmol/L   Blood Gas Venous   Result Value Ref Range    POCT pH, Venous 7.22 (LL) 7.33 - 7.43 pH    POCT pCO2, Venous 28 (L) 41 - 51 mm Hg    POCT pO2, Venous 58 (H) 35 - 45 mm Hg    POCT SO2, Venous 92 (H) 45 - 75 %    POCT Oxy Hemoglobin, Venous 90.3 (H) 45.0 - 75.0 %    POCT Base Excess, Venous -14.7 (L) -2.0 - 3.0 mmol/L    POCT HCO3 Calculated, Venous 11.5 (L) 22.0 - 26.0 mmol/L    Patient Temperature 37.0 degrees Celsius    FiO2 21 %   Magnesium   Result Value Ref Range    Magnesium 1.76 1.60 - 2.40 mg/dL   Renal Function Panel   Result Value Ref Range    Glucose 214 (H) 74 - 99 mg/dL    Sodium 135 (L) 136 - 145 mmol/L    Potassium 3.8 3.5 - 5.3 mmol/L    Chloride 113 (H) 98 - 107 mmol/L    Bicarbonate 12 (L) 21 - 32 mmol/L    Anion Gap 14 10 - 20 mmol/L    Urea Nitrogen 11 6 - 23 mg/dL    Creatinine 0.89 0.50 - 1.30 mg/dL    eGFR >90 >60 mL/min/1.73m*2    Calcium 7.2 (L) 8.6 - 10.3 mg/dL     Phosphorus 1.6 (L) 2.5 - 4.9 mg/dL    Albumin 3.2 (L) 3.4 - 5.0 g/dL   POCT GLUCOSE   Result Value Ref Range    POCT Glucose 199 (H) 74 - 99 mg/dL       ECG 12 lead    Result Date: 9/23/2024  Sinus tachycardia Probable left ventricular hypertrophy Anterior ST elevation, probably due to LVH Borderline prolonged QT interval See ED provider note for full interpretation and clinical correlation Confirmed by Nata Hancock ([id number]) on 9/23/2024 12:54:16 PM    XR chest 1 view    Result Date: 9/18/2024  STUDY: Chest Radiograph;  9/18/2024 7:50PM INDICATION: Sepsis. COMPARISON: 2/14/2024 XR Chest, 10/8/2023 XR Chest. ACCESSION NUMBER(S): [id number] ORDERING CLINICIAN: HEATHER BACK TECHNIQUE:  Frontal chest was obtained at 19:49 hours. FINDINGS: CARDIOMEDIASTINAL SILHOUETTE: Cardiomediastinal silhouette is normal in size and configuration.  LUNGS: Lungs are clear.  ABDOMEN: No remarkable upper abdominal findings.  BONES: No acute osseous changes.    No acute cardiopulmonary disease.  No significant interval change from the prior exam.. Signed by Edgard Serrato MD      Assessment/Plan   IMPRESSION     DKA  POORLY CONTROLLED TYPE I DIABETES MELLITUS  [employer] 780G insulin pump - suboptimal use given lack of CGM synchronization, routine changes of pump sites every 3 days and lack of boluses  A1C of 13.3%  No improvement in glycemic control given insulin pump initiation in December 2023; A1C values have been 12.5% to 15.5%     RECOMMENDATIONS:  To continue insulin infusion at 9.6 units/hr and titrate as per protocol  Accuchecks every 1 hour  BMP every 4 hours  NPO  Hypoglycemic protocol   To continue  IVF D51/2NS at 150mLs/hr    13:30  To commence NPH 18 units subcutaneous twice daily   To commence an insulin correction scale TID AC   To commence Humalog 6 units TID AC   To discontinue insulin infusion two hours following NPH administration  Diabetic diet as tolerated   Accu-Cheks ACHS once the insulin drip has been  turned off; continue every 1 hour while on the insulin drip   Hypoglycemic protocol   Counseled that the insulin regimen will be effective if there is consistency with its use  Counseled that the goal A1C should be 7% or less  Counseled glycemic control is warranted to prevent microvascular complications  Will continue to follow     Thank you for the courtesy of this consult      Lori Packer MD

## 2024-09-29 NOTE — H&P
[facility] - GENERAL MEDICINE HISTORY AND PHYSICAL    History Obtained From: Chart Review, Discussion with the ED Physician and Patient    History Of Present Illness:  Ed Sanon is a 33 y.o.  male with a past medical history significant for IDDM1 with poor insulin compliance and several admissions for episodes for DKA at  and other facilities. He was initiated on a Insulin pump in 12/2024 apparently but states after his last hospitalizations he was told not to use it and has only been using insulin subcutaneous. He notes poor compliance with the insulin statin he did not feel it necessary to take that much insulin.  He presented with very similar symptoms in comparison to his prior hospitalizations for DKA with nausea, vomiting, decreased p.o. intake as well as cramping abdominal pain.  He has noted increasing shortness of breath which he states he is painting like a dog.  He states that his sugars have been running high and he did try to take some insulin but stated that he did not feel it was necessary if he just stopped eating and his sugars would go down.  It should be noted that he has been treated as of recently for dental infections and he does have some poor dentition in general but does not appear to have any overt swelling or oropharyngeal complaints at this time.  He denies any recent sick contacts, chemical/environmental exposures, changes in dietary habits or any recent traumatic event/falls other than noted above.  He denies any fevers, chills, night sweats, vision changes, auditory changes, change in taste/smell, loss of bowel/bladder control, loss consciousness, dizziness, vertigo, syncope, seizure-like activity, chest pain, palpitations, coughing, wheezing, congestion, hemoptysis, hematemesis, diarrhea, constipation, dysuria, hematuria, dyschezia, hematochezia or any lateralizing motor/sensory deficits other than noted above.    ED Course (Summary):   Vitals on  presentation: Temperature 36.1 °C, heart rate of 133, respirations 22, blood pressure 135/88, pulse ox at 99% on room air  EKG: Pending  WBC = 21.3 -->  Glucose = 498  Sodium = 133 -->  Potassium = 5.2 -->  Magnesium = 2.41 -->  Bicarb = 6  Anion Gap ~33  BUN/Creatinine = 16/1.42 -->  Lactate = 2.7  Beta Hydroxybutyrate = 11.55 -->  VBG = pH was 6.91, pCO2 30, pO2 49, SO2 of 73, calculated bicarb of 6  VBG (1 hour after presentation) = pH of 6.93, pCO2 of 23, pO2 of 46, SO2 of 77, calculated bicarb of 4.8  Patient was initiated on IV fluids and insulin in the ED per DKA protocol    ED Course (From Provider):  ED Course as of 09/29/24 0253   Sun Sep 29, 2024   0000 Patient presents with concern for DKA. Available chart reviewed. On initial assessment the patient was found non-toxic, no acute distress, vitals hemodynamically stable and afebrile. Initial concern for DKA.  Patient initially given 2 L of fluid. [JH]   0011 POCT pH, Venous(!!): 6.91 [JH]   0031 Comprehensive Metabolic Panel(!!)  Metabolic panel shows hyperglycemia, slight hyponatremia 133, potassium normal but hemolyzed, carbonate 6, anion gap 33, creatinine is 1.42 [JH]   0032 MAGNESIUM(!): 2.41 [JH]   0040 Insulin ordered [JH]   0046 Beta-Hydroxybutyrate(!): 11.55 [JH]   0115 Patient care was discussed with overnight ICU GERRI as well as the admitting physician. [JH]   0234 EKG obtained at 231 interpreted myself shows sinus tachycardia, rate 130, , prolonged QTc of 512, no T wave changes, no ST segment changes [JH]      ED Course User Index  [JH] Santi Ortiz MD         Diagnoses as of 09/29/24 0253   Diabetic ketoacidosis without coma associated with type 1 diabetes mellitus (Multi)     Relevant Results  Results for orders placed or performed during the hospital encounter of 09/28/24 (from the past 24 hour(s))   POCT GLUCOSE   Result Value Ref Range    POCT Glucose 440 (H) 74 - 99 mg/dL   Magnesium   Result Value Ref Range    Magnesium 2.41 (H)  1.60 - 2.40 mg/dL   Phosphorus   Result Value Ref Range    Phosphorus 4.3 2.5 - 4.9 mg/dL   Blood Gas Venous   Result Value Ref Range    POCT pH, Venous 6.91 (LL) 7.33 - 7.43 pH    POCT pCO2, Venous 30 (L) 41 - 51 mm Hg    POCT pO2, Venous 49 (H) 35 - 45 mm Hg    POCT SO2, Venous 73 45 - 75 %    POCT Oxy Hemoglobin, Venous 71.6 45.0 - 75.0 %    POCT Base Excess, Venous -26.0 (L) -2.0 - 3.0 mmol/L    POCT HCO3 Calculated, Venous 6.0 (L) 22.0 - 26.0 mmol/L    Patient Temperature 37.0 degrees Celsius    FiO2 21 %   Comprehensive Metabolic Panel   Result Value Ref Range    Glucose 498 (HH) 74 - 99 mg/dL    Sodium 133 (L) 136 - 145 mmol/L    Potassium 5.2 3.5 - 5.3 mmol/L    Chloride 99 98 - 107 mmol/L    Bicarbonate 6 (LL) 21 - 32 mmol/L    Anion Gap 33 (H) 10 - 20 mmol/L    Urea Nitrogen 16 6 - 23 mg/dL    Creatinine 1.42 (H) 0.50 - 1.30 mg/dL    eGFR 66 >60 mL/min/1.73m*2    Calcium 9.0 8.6 - 10.3 mg/dL    Albumin 4.8 3.4 - 5.0 g/dL    Alkaline Phosphatase 101 33 - 120 U/L    Total Protein 8.7 (H) 6.4 - 8.2 g/dL    AST 12 9 - 39 U/L    Bilirubin, Total 0.4 0.0 - 1.2 mg/dL    ALT 14 10 - 52 U/L   Beta Hydroxybutyrate   Result Value Ref Range    Beta-Hydroxybutyrate 11.55 (H) 0.02 - 0.27 mmol/L   POCT GLUCOSE   Result Value Ref Range    POCT Glucose 410 (H) 74 - 99 mg/dL   POCT GLUCOSE   Result Value Ref Range    POCT Glucose 440 (H) 74 - 99 mg/dL   Blood Gas Venous   Result Value Ref Range    POCT pH, Venous 6.93 (LL) 7.33 - 7.43 pH    POCT pCO2, Venous 23 (L) 41 - 51 mm Hg    POCT pO2, Venous 46 (H) 35 - 45 mm Hg    POCT SO2, Venous 77 (H) 45 - 75 %    POCT Oxy Hemoglobin, Venous 75.9 (H) 45.0 - 75.0 %    POCT Base Excess, Venous -26.5 (L) -2.0 - 3.0 mmol/L    POCT HCO3 Calculated, Venous 4.8 (L) 22.0 - 26.0 mmol/L    Patient Temperature 37.0 degrees Celsius    FiO2 21 %      No results found.   Scheduled medications:     Continuous medications:  dextrose 10 % in water (D10W), 150 mL/hr  dextrose 10 % in water (D10W),  150 mL/hr  dextrose 5%-0.45 % sodium chloride, 150 mL/hr  insulin regular, 0-50 Units/hr, Last Rate: 9.6 Units/hr (09/29/24 0108)  sodium chloride, 250 mL/hr, Last Rate: 250 mL/hr (09/29/24 0108)      PRN medications:  PRN medications: dextrose     Past Medical History  He has a past medical history of Abdominal pain (04/21/2023), Anxiety and depression (08/08/2021), Contact with and (suspected) exposure to covid-19 (04/21/2023), COVID-19 virus infection (07/11/2023), Diabetes mellitus (Multi), Diabetic acetonemia (Multi) (11/15/2023), DKA (diabetic ketoacidosis) (Multi) (07/11/2023), DKA, type 1, not at goal (Multi) (04/21/2023), DM2 (diabetes mellitus, type 2) (Multi) (11/15/2023), Elevated blood sugar (11/15/2023), Elevated blood-pressure reading, without diagnosis of hypertension (09/09/2020), Fatigue (07/11/2023), Generalized anxiety disorder (07/11/2023), Microalbuminuria due to type 1 diabetes mellitus (Multi) (07/11/2023), Personal history of COVID-19 (04/02/2023), Poorly controlled diabetes mellitus (Multi) (07/11/2023), Type 1 diabetes mellitus (Multi) (07/11/2023), Type 1 diabetes mellitus with hyperglycemia (Multi) (02/25/2019), Vitamin B12 deficiency (07/11/2023), Vitamin D deficiency (07/11/2023), and Vomiting (11/15/2023).    Surgical History  He has no past surgical history on file.     Social History  He reports that he has never smoked. He has never used smokeless tobacco. He reports that he does not drink alcohol and does not use drugs.    Family History  Family History   Problem Relation Name Age of Onset    No Known Problems Mother      No Known Problems Father      Hypertension Other      Coronary artery disease Other      Diabetes Other      Heart failure Other         Allergies  Patient has no known allergies.    Code Status  Full Code     Review of Systems   Constitutional:  Positive for activity change, appetite change and fatigue. Negative for chills, diaphoresis, fever and unexpected  weight change.   HENT:  Negative for congestion, drooling, hearing loss, postnasal drip, rhinorrhea, sinus pressure, sinus pain, sneezing, sore throat, tinnitus, trouble swallowing and voice change.    Eyes:  Negative for photophobia and visual disturbance.   Respiratory:  Negative for cough, chest tightness, shortness of breath and wheezing.    Cardiovascular:  Negative for chest pain, palpitations and leg swelling.   Gastrointestinal:  Positive for abdominal pain, nausea and vomiting. Negative for abdominal distention, blood in stool, constipation and diarrhea.   Genitourinary:  Negative for decreased urine volume, difficulty urinating, dysuria, flank pain, frequency, hematuria and urgency.   Musculoskeletal:  Positive for back pain and myalgias. Negative for arthralgias, gait problem, joint swelling, neck pain and neck stiffness.   Skin:  Negative for wound.   Neurological:  Negative for dizziness, tremors, seizures, syncope, facial asymmetry, speech difficulty, weakness, light-headedness, numbness and headaches.   Psychiatric/Behavioral:  Negative for confusion and decreased concentration. The patient is not nervous/anxious.    All other systems reviewed and are negative.      Last Recorded Vitals  /89   Pulse (!) 123   Temp 36.6 °C (97.9 °F) (Temporal)   Resp (!) 36   Wt 68.9 kg (152 lb)   SpO2 100%      Physical Exam  Vitals and nursing note reviewed.   Constitutional:       Appearance: He is ill-appearing and diaphoretic.   HENT:      Head: Normocephalic and atraumatic.      Mouth/Throat:      Mouth: Mucous membranes are dry.      Pharynx: Oropharynx is clear.      Comments: Poor dentition   Eyes:      General: No scleral icterus.     Extraocular Movements: Extraocular movements intact.      Conjunctiva/sclera: Conjunctivae normal.      Pupils: Pupils are equal, round, and reactive to light.   Neck:      Vascular: No carotid bruit.   Cardiovascular:      Rate and Rhythm: Regular rhythm. Tachycardia  present.      Pulses: Normal pulses.      Heart sounds: No murmur heard.  Pulmonary:      Effort: Pulmonary effort is normal. No respiratory distress.      Breath sounds: Normal breath sounds. No wheezing, rhonchi or rales.   Chest:      Chest wall: No tenderness.   Abdominal:      General: Abdomen is flat. Bowel sounds are normal. There is no distension.      Palpations: Abdomen is soft. There is no mass.      Tenderness: There is abdominal tenderness. There is no right CVA tenderness, left CVA tenderness, guarding or rebound.   Musculoskeletal:         General: Normal range of motion.      Cervical back: Normal range of motion and neck supple. No rigidity or tenderness.      Right lower leg: No edema.      Left lower leg: No edema.   Skin:     General: Skin is warm.      Findings: No lesion or rash.   Neurological:      General: No focal deficit present.      Mental Status: He is alert and oriented to person, place, and time. Mental status is at baseline.      Cranial Nerves: No cranial nerve deficit.      Sensory: No sensory deficit.      Motor: No weakness.      Coordination: Coordination normal.      Gait: Gait normal.   Psychiatric:         Mood and Affect: Mood normal.         Behavior: Behavior normal.         Thought Content: Thought content normal.         Judgment: Judgment normal.       Assessment/Plan   Assessment & Plan  Diabetic ketoacidosis without coma associated with type 1 diabetes mellitus (Multi)      DKA  IDDM1 with poor compliance with insulin therapies  Hyperglycemia  -Insulin GTT per DKA protocol  -Continued on IV fluids per DKA protocol  -Every 4 hours RFP/Mg/VBG  -Endocrinology Consult appreciated  -states he has not been using his insulin pump since discharge on 09/21/24 and only occasionally used subcutaneous insulin since  -Anion gap on presentation of around 33  -Beta hydroxybutyrate of 11.55 on presentation  -patient has had extensive education by medical staff and consult services  but admantly states he was never educated regarding his insulin pump and how to manage his diabetes.     High anion gap metabolic acidosis  -VBG = pH was 6.91, pCO2 30, pO2 49, SO2 of 73, calculated bicarb of 6  -VBG (1 hour after presentation and only briefly started on insulin gtt) = pH of 6.93, pCO2 of 23, pO2 of 46, SO2 of 77, calculated bicarb of 4.8  -will initiate a Bicarb drip   -Serum Bicarb = 6 -->  -POCT Bicarb = 6 --> 4.8 -->    Leukocytosis   -WBC = 21.3 (with left shift) -->  -suspect in setting of underlying reaction  -he does meet SIRS criteria with leukocytosis, elevated heart and respiratory rate but there is not grossly evident infectious etiologies  -Ua = pending  -Blood Cultures = pending   -Lactate = 2.7 -->  -Started empirically on Vancomycin x1, Zosyn x1 and then discuss further with the oncoming team  -Continue IV fluids per DKA protocol  -trend fever curve and if he does develop sooner then will discuss further also with infectious disease    MAYA  -BUN/Creatinine = 16/1.42 --> 14/1.21 -->  -suspect in setting of volume depletion in setting of DKA  -continued fluid resuscitation per DKA protocol     Anxiety/depression  -Continued on outpatient follow-up     Other  -extensive education provided on compliance but despite this he has repeatedly stated he feels he knows what to do to control his diabetes despite repeated admissions for DKA    I spent 35 minutes of cumulative critical care time with the patient.  Greater than 50% of that time was spent in the direct collaboration and or coordination of care of the patient.     Code Status: Full Code     DO Tova Hastings dictation software was used to dictate this note and thus there may be minor errors in translation/transcription including garbled speech or misspellings. Please contact for clarification if needed.

## 2024-09-29 NOTE — PROGRESS NOTES
Patient seen and examined.  History and chart reviewed.  Dr. Carrillo's assessment and plan appreciated.  Patient with multiple readmissions for DKA1.  Compliance remains a significant issue.   to be consulted tomorrow.  Continue current plan    Patient will require at least a 24 hours of inpatient service.    Nikko Pal MD, MRCP.

## 2024-09-29 NOTE — PROGRESS NOTES
Vancomycin Dosing by Pharmacy- INITIAL    Ed Sanon is a 33 y.o. year old male who Pharmacy has been consulted for vancomycin dosing for other Unknown etiology . Based on the patient's indication and renal status this patient will be dosed based on a goal AUC of 500-600.     Renal function is currently stable.    Visit Vitals  /75   Pulse 108   Temp 36.4 °C (97.5 °F) (Temporal)   Resp (!) 6        Lab Results   Component Value Date    CREATININE 0.89 2024    CREATININE 0.89 2024    CREATININE 1.21 2024    CREATININE 1.42 (H) 2024        Patient weight is as follows:   Vitals:    24 0800   Weight: 70 kg (154 lb 5.2 oz)       Cultures:  No results found for the encounter in last 14 days.        I/O last 3 completed shifts:  In:  (29 mL/kg) [IV Piggyback:]  Out:  (29 mL/kg) [Urine: (0.8 mL/kg/hr)]  Weight: 68.9 kg   I/O during current shift:  No intake/output data recorded.    Temp (24hrs), Av.4 °C (97.5 °F), Min:36.1 °C (97 °F), Max:36.6 °C (97.9 °F)         Assessment/Plan     Patient has already been given a loading dose of 1250 mg.  Will initiate vancomycin maintenance, a loading dose of 1250 mg followed by a dose of 1250 mg 16 hours later.    This dosing regimen is predicted by [employer] to result in the following pharmacokinetic parameters:  Loading dose: N/A  Regimen: 1250 mg IV every 12 hours.  Start time: 16:22 on 2024  Exposure target: AUC24 (range)400-600 mg/L.hr   [id number]: 517 mg/L.hr  AUC24,ss: 573 mg/L.hr  Probability of AUC24 > 400: 83 %  Ctrough,ss: 17 mg/L  Probability of Ctrough,ss > 20: 38 %      Follow-up level will be ordered on 10/1 at 0500 unless clinically indicated sooner.  Will continue to monitor renal function daily while on vancomycin and order serum creatinine at least every 48 hours if not already ordered.  Follow for continued vancomycin needs, clinical response, and signs/symptoms of toxicity.       Saeid Goel,  PharmD

## 2024-09-29 NOTE — ED TRIAGE NOTES
Pt think  he is in DKA he is c.o high blood sugar as well as back pain and nausea. Pt vomited about x12  times today

## 2024-09-29 NOTE — PROGRESS NOTES
Ed Sanon is a 33 y.o. male admitted for Diabetic ketoacidosis without coma associated with type 1 diabetes mellitus (Multi). Pharmacy reviewed the patient's [id number] medications and allergies for accuracy.    The list below reflects the PTA list prior to pharmacy medication history. A summary a changes to the PTA medication list has been listed below. Please review each medication in order reconciliation for additional clarification and justification.    Source of information: T2P     Medications added:    Medications modified:    Medications to be removed:  KEFLEX 500MG CAPSULE   HUMALOG INSULIN   NOVOLIN N INJECTION-DUPLICATE THERAPY     Medications of concern:      Prior to Admission Medications   Prescriptions Last Dose Informant Patient Reported? Taking?   Guardian 4 Glucose Sensor device More than a month  No No   Sig: Change insulin sensor every 7 days as directed, linked to Minimed insulin pump, check with endocrinology for updated regimen   Patient not taking: Reported on 9/29/2024   NovoLIN N NPH U-100 Insulin 100 unit/mL injection Past Week  Yes Yes   Sig: INJECT 18 UNITS UNDER THE SKIN 2 TIMES A DAY BEFORE MEALS. TAKE AS DIRECTED PER INSULIN INSTRUCTIONS   cephalexin (Keflex) 500 mg capsule 9/28/2024  No Yes   Sig: Take 2 capsules (1,000 mg) by mouth every 12 hours.   insulin NPH, Isophane, (HumuLIN N,NovoLIN N) 100 unit/mL (3 mL) injection 9/28/2024  No Yes   Sig: Inject 18 Units under the skin 2 times a day before meals. Take as directed per insulin instructions.   insulin lispro (HumaLOG) 100 unit/mL injection Past Month  Yes Yes   Sig: USE VIA INSULIN PUMP, USING UP  UNITS PER DAY   insulin lispro (HumaLOG) 100 unit/mL injection 9/28/2024  No Yes   Sig: Inject 6 Units under the skin 3 times a day before meals. Inject 1-4 times per day as directed.   lancets misc 9/28/2024  Yes Yes   Sig: Inject 1 Units under the skin 2 times a day. Sliding scale   pen needle, diabetic 32 gauge  "x 5/32\" needle Past Week  Yes Yes   Sig: Use as instructed 4 times daily      Facility-Administered Medications: None       DARCY UNGER        "

## 2024-09-29 NOTE — ED PROCEDURE NOTE
Procedure  Procedures  Peripheral Line    Performed by: Santi Ortiz MD  Authorized by: Santi Ortiz MD    Consent:     Consent obtained:  Verbal    Risks, benefits, and alternatives were discussed: yes      Risks discussed:  Bleeding and infection    Alternatives discussed:  No treatment  Universal protocol:     Patient identity confirmed:  Verbally with patient  Pre-procedure details:     Indication(s) comment:  No peripheral access    Hand hygiene: Hand hygiene performed prior to insertion      Skin preparation:  Chlorhexidine    Skin preparation agent: Skin preparation agent completely dried prior to procedure    Sedation:     Sedation type:  None  Anesthesia:     Anesthesia method:  None  Procedure details:     Location: Left antecubital fossa.    Procedural supplies:  Single lumen    Landmarks identified: yes      Ultrasound guidance: yes      Ultrasound guidance timing: real time      Number of attempts:  1    Successful placement: yes    Post-procedure details:     Post-procedure:  Dressing applied    Procedure completion:  Tolerated         Santi Ortiz MD  09/29/24 0120

## 2024-09-30 VITALS
HEIGHT: 70 IN | BODY MASS INDEX: 22.09 KG/M2 | HEART RATE: 98 BPM | WEIGHT: 154.32 LBS | OXYGEN SATURATION: 98 % | DIASTOLIC BLOOD PRESSURE: 84 MMHG | SYSTOLIC BLOOD PRESSURE: 123 MMHG | RESPIRATION RATE: 18 BRPM | TEMPERATURE: 98.2 F

## 2024-09-30 PROBLEM — E10.10 DIABETIC KETOACIDOSIS WITHOUT COMA ASSOCIATED WITH TYPE 1 DIABETES MELLITUS (MULTI): Status: RESOLVED | Noted: 2024-09-29 | Resolved: 2024-09-30

## 2024-09-30 LAB
ANION GAP SERPL CALC-SCNC: 12 MMOL/L (ref 10–20)
BASOPHILS # BLD AUTO: 0.02 X10*3/UL (ref 0–0.1)
BASOPHILS NFR BLD AUTO: 0.4 %
BUN SERPL-MCNC: 11 MG/DL (ref 6–23)
CALCIUM SERPL-MCNC: 7.9 MG/DL (ref 8.6–10.3)
CHLORIDE SERPL-SCNC: 109 MMOL/L (ref 98–107)
CO2 SERPL-SCNC: 19 MMOL/L (ref 21–32)
CREAT SERPL-MCNC: 0.8 MG/DL (ref 0.5–1.3)
EGFRCR SERPLBLD CKD-EPI 2021: >90 ML/MIN/1.73M*2
EOSINOPHIL # BLD AUTO: 0.07 X10*3/UL (ref 0–0.7)
EOSINOPHIL NFR BLD AUTO: 1.3 %
ERYTHROCYTE [DISTWIDTH] IN BLOOD BY AUTOMATED COUNT: 13.4 % (ref 11.5–14.5)
GLUCOSE BLD MANUAL STRIP-MCNC: 312 MG/DL (ref 74–99)
GLUCOSE BLD MANUAL STRIP-MCNC: 352 MG/DL (ref 74–99)
GLUCOSE SERPL-MCNC: 321 MG/DL (ref 74–99)
HCT VFR BLD AUTO: 39.3 % (ref 41–52)
HGB BLD-MCNC: 14 G/DL (ref 13.5–17.5)
IMM GRANULOCYTES # BLD AUTO: 0.02 X10*3/UL (ref 0–0.7)
IMM GRANULOCYTES NFR BLD AUTO: 0.4 % (ref 0–0.9)
LYMPHOCYTES # BLD AUTO: 1.77 X10*3/UL (ref 1.2–4.8)
LYMPHOCYTES NFR BLD AUTO: 33.4 %
MAGNESIUM SERPL-MCNC: 1.8 MG/DL (ref 1.6–2.4)
MCH RBC QN AUTO: 30.9 PG (ref 26–34)
MCHC RBC AUTO-ENTMCNC: 35.6 G/DL (ref 32–36)
MCV RBC AUTO: 87 FL (ref 80–100)
MONOCYTES # BLD AUTO: 0.6 X10*3/UL (ref 0.1–1)
MONOCYTES NFR BLD AUTO: 11.3 %
NEUTROPHILS # BLD AUTO: 2.82 X10*3/UL (ref 1.2–7.7)
NEUTROPHILS NFR BLD AUTO: 53.2 %
NRBC BLD-RTO: 0 /100 WBCS (ref 0–0)
PLATELET # BLD AUTO: 210 X10*3/UL (ref 150–450)
POTASSIUM SERPL-SCNC: 3.6 MMOL/L (ref 3.5–5.3)
RBC # BLD AUTO: 4.53 X10*6/UL (ref 4.5–5.9)
SODIUM SERPL-SCNC: 136 MMOL/L (ref 136–145)
WBC # BLD AUTO: 5.3 X10*3/UL (ref 4.4–11.3)

## 2024-09-30 PROCEDURE — 36415 COLL VENOUS BLD VENIPUNCTURE: CPT | Performed by: INTERNAL MEDICINE

## 2024-09-30 PROCEDURE — 2500000002 HC RX 250 W HCPCS SELF ADMINISTERED DRUGS (ALT 637 FOR MEDICARE OP, ALT 636 FOR OP/ED): Performed by: INTERNAL MEDICINE

## 2024-09-30 PROCEDURE — 99232 SBSQ HOSP IP/OBS MODERATE 35: CPT | Performed by: INTERNAL MEDICINE

## 2024-09-30 PROCEDURE — 80048 BASIC METABOLIC PNL TOTAL CA: CPT | Performed by: INTERNAL MEDICINE

## 2024-09-30 PROCEDURE — 99239 HOSP IP/OBS DSCHRG MGMT >30: CPT | Performed by: INTERNAL MEDICINE

## 2024-09-30 PROCEDURE — 83735 ASSAY OF MAGNESIUM: CPT | Performed by: INTERNAL MEDICINE

## 2024-09-30 PROCEDURE — 82947 ASSAY GLUCOSE BLOOD QUANT: CPT

## 2024-09-30 PROCEDURE — 85025 COMPLETE CBC W/AUTO DIFF WBC: CPT | Performed by: INTERNAL MEDICINE

## 2024-09-30 RX ORDER — INSULIN LISPRO 100 [IU]/ML
0-10 INJECTION, SOLUTION INTRAVENOUS; SUBCUTANEOUS
Start: 2024-09-30

## 2024-09-30 RX ORDER — INSULIN LISPRO 100 [IU]/ML
6 INJECTION, SOLUTION INTRAVENOUS; SUBCUTANEOUS
Start: 2024-09-30

## 2024-09-30 RX ADMIN — INSULIN HUMAN 18 UNITS: 100 INJECTION, SUSPENSION SUBCUTANEOUS at 03:46

## 2024-09-30 RX ADMIN — INSULIN LISPRO 6 UNITS: 100 INJECTION, SOLUTION INTRAVENOUS; SUBCUTANEOUS at 08:49

## 2024-09-30 ASSESSMENT — COGNITIVE AND FUNCTIONAL STATUS - GENERAL
DAILY ACTIVITIY SCORE: 24
MOBILITY SCORE: 24

## 2024-09-30 ASSESSMENT — PAIN SCALES - GENERAL: PAINLEVEL_OUTOF10: 0 - NO PAIN

## 2024-09-30 NOTE — DISCHARGE SUMMARY
"Discharge Diagnosis  Diabetic ketoacidosis without coma associated with type 1 diabetes mellitus (Multi)  DM type I, insulin-dependent      Issues Requiring Follow-Up  Follow-up with PCP.  Follow-up with endocrinology.  Patient instructed to check his glucose when feeling unwell.    Discharge Meds     Medication List      CHANGE how you take these medications     * insulin lispro 100 unit/mL injection; Commonly known as: HumaLOG;   Inject 6 Units under the skin 3 times daily (morning, midday, late   afternoon). Take as directed per insulin instructions.; What changed: when   to take this, additional instructions   * insulin lispro 100 unit/mL injection; Commonly known as: HumaLOG;   Inject 0-10 Units under the skin 3 times daily (morning, midday, late   afternoon). Take as directed per insulin instructions.; What changed: You   were already taking a medication with the same name, and this prescription   was added. Make sure you understand how and when to take each.  * This list has 2 medication(s) that are the same as other medications   prescribed for you. Read the directions carefully, and ask your doctor or   other care provider to review them with you.     CONTINUE taking these medications     Guardian 4 Glucose Sensor device; Generic drug: blood-glucose sensor;   Change insulin sensor every 7 days as directed, linked to Minimed insulin   pump, check with endocrinology for updated regimen   insulin NPH (Isophane) 100 unit/mL (3 mL) injection; Commonly known as:   HumuLIN N,NovoLIN N; Inject 18 Units under the skin 2 times a day before   meals. Take as directed per insulin instructions.   lancets misc   pen needle, diabetic 32 gauge x 5/32\" needle     STOP taking these medications     cephalexin 500 mg capsule; Commonly known as: Keflex       Test Results Pending At Discharge  Pending Labs       Order Current Status    Blood Culture Preliminary result    Blood Culture Preliminary result            Hospital " César Sanon is a 33 y.o.  male with a past medical history significant for IDDM1 with poor insulin compliance and several admissions for episodes for DKA at  and other facilities. He was initiated on a Insulin pump in 12/2024 apparently but states after his last hospitalizations he was told not to use it and has only been using insulin subcutaneous. He notes poor compliance with the insulin statin he did not feel it necessary to take that much insulin.  He presented with very similar symptoms in comparison to his prior hospitalizations for DKA with nausea, vomiting, decreased p.o. intake as well as cramping abdominal pain.  He has noted increasing shortness of breath which he states he is painting like a dog.  He states that his sugars have been running high and he did try to take some insulin but stated that he did not feel it was necessary if he just stopped eating and his sugars would go down.  It should be noted that he has been treated as of recently for dental infections and he does have some poor dentition in general but does not appear to have any overt swelling or oropharyngeal complaints at this time.  He denies any recent sick contacts, chemical/environmental exposures, changes in dietary habits or any recent traumatic event/falls other than noted above.  He denies any fevers, chills, night sweats, vision changes, auditory changes, change in taste/smell, loss of bowel/bladder control, loss consciousness, dizziness, vertigo, syncope, seizure-like activity, chest pain, palpitations, coughing, wheezing, congestion, hemoptysis, hematemesis, diarrhea, constipation, dysuria, hematuria, dyschezia, hematochezia or any lateralizing motor/sensory deficits other than noted above.     ED Course (Summary):   Vitals on presentation: Temperature 36.1 °C, heart rate of 133, respirations 22, blood pressure 135/88, pulse ox at 99% on room air  EKG: Pending  WBC = 21.3 -->  Glucose = 498  Sodium =  133 -->  Potassium = 5.2 -->  Magnesium = 2.41 -->  Bicarb = 6  Anion Gap ~33  BUN/Creatinine = 16/1.42 -->  Lactate = 2.7  Beta Hydroxybutyrate = 11.55 -->  VBG = pH was 6.91, pCO2 30, pO2 49, SO2 of 73, calculated bicarb of 6  VBG (1 hour after presentation) = pH of 6.93, pCO2 of 23, pO2 of 46, SO2 of 77, calculated bicarb of 4.8  Patient was initiated on IV fluids and insulin in the ED per DKA protocol    9/30: Patient seen.  Successfully treated with intravenous insulin until DKA resolved.  Transition to general medical floor.  Continues to do well.  Glucose is acceptable.  Acidosis appears to resolved.  Well-known to endocrinology service.  Follows with [facility] endocrinology as outpatient.  Patient states he is applying for Medicaid again.  Patient states he has sufficient supplies of insulin at home.  Will discharge home on current long-acting and short acting regimen.  Continue sliding scale.  Patient felt unwell this morning but was found to be hyperglycemic not hypoglycemic.  Patient advised once again to check his glucose when not feeling well, may be high as opposed to low.  Follow-up PCP in 1 to 2 weeks.  Follow-up with outpatient endocrinology in 1 week.    This discharge took greater than 35 minutes to arrange.    Pertinent Physical Exam At Time of Discharge  Physical Exam  Constitutional:       General: He is not in acute distress.  HENT:      Head: Normocephalic and atraumatic.      Nose: Nose normal. No congestion or rhinorrhea.      Mouth/Throat:      Mouth: Mucous membranes are moist.      Pharynx: Oropharynx is clear.   Eyes:      General: No scleral icterus.     Extraocular Movements: Extraocular movements intact.      Pupils: Pupils are equal, round, and reactive to light.   Cardiovascular:      Rate and Rhythm: Normal rate and regular rhythm.      Heart sounds: Normal heart sounds. No murmur heard.     No friction rub. No gallop.   Pulmonary:      Effort: Pulmonary effort is normal.      Breath  sounds: Normal breath sounds. No wheezing, rhonchi or rales.   Chest:      Chest wall: No tenderness.   Abdominal:      General: There is no distension.      Palpations: Abdomen is soft.      Tenderness: There is no abdominal tenderness. There is no guarding or rebound.   Musculoskeletal:         General: No swelling, tenderness or signs of injury. Normal range of motion.      Cervical back: Normal range of motion.   Skin:     General: Skin is warm and dry.      Coloration: Skin is not jaundiced.      Findings: No bruising, erythema or rash.   Neurological:      General: No focal deficit present.      Mental Status: He is alert and oriented to person, place, and time.         Outpatient Follow-Up  Future Appointments   Date Time Provider Department Center   10/2/2024  9:00 AM CHRISTIANO Montenegro-CNP [id number]         Santi Pinto MD

## 2024-09-30 NOTE — PROGRESS NOTES
09/30/24 0926   Discharge Planning   Living Arrangements Parent   Support Systems Parent   Assistance Needed none   Type of Residence Private residence   Home or Post Acute Services None   Expected Discharge Disposition Home   Does the patient need discharge transport arranged? No     Patient from home, independent in his own care. Patient admitted for DKA. No insurance listed, patient with difficulty being compliant with his diabetic regimen. Social Work consulted. TCC to continue to follow for discharge planning.

## 2024-09-30 NOTE — PROGRESS NOTES
SW received pending medicaid number from [facility], [id number].    Bandar Tavera MSW, LSW ([id number])   Care Transitions

## 2024-09-30 NOTE — PROGRESS NOTES
"Ed Sanon is a 33 y.o. male on day 1 of admission presenting with Diabetic ketoacidosis without coma associated with type 1 diabetes mellitus (Multi).    Subjective   Patient states that he is having a difficult time and feels as though his glucose value is low.  He has eaten eggs and has not yet gotten prandial insulin.       I have reviewed histories, allergies and medications have been reviewed and there are no changes       Objective     Physical Exam  Vitals and nursing note reviewed.   Constitutional:       General: He is not in acute distress.     Appearance: Normal appearance. He is normal weight.   HENT:      Head: Normocephalic and atraumatic.      Nose: Nose normal.      Mouth/Throat:      Mouth: Mucous membranes are moist.   Eyes:      Extraocular Movements: Extraocular movements intact.   Pulmonary:      Effort: Pulmonary effort is normal.   Musculoskeletal:         General: Normal range of motion.   Neurological:      Mental Status: He is alert and oriented to person, place, and time.   Psychiatric:         Mood and Affect: Mood normal.         Last Recorded Vitals  Blood pressure 112/73, pulse 89, temperature 36.5 °C (97.7 °F), temperature source Temporal, resp. rate 17, height 1.778 m (5' 10\"), weight 70 kg (154 lb 5.2 oz), SpO2 97%.  Intake/Output last 3 Shifts:  I/O last 3 completed shifts:  In: 5109.2 (73 mL/kg) [P.O.:1040; I.V.:1809.2 (25.8 mL/kg); IV Piggyback:2260]  Out: 3900 (55.7 mL/kg) [Urine:3900 (1.5 mL/kg/hr)]  Weight: 70 kg     Relevant Results  Results from last 7 days   Lab Units 09/30/24  0400 09/29/24 2011 09/29/24  1628 09/29/24  1619 09/29/24  1559 09/29/24  1505 09/29/24  1403 09/29/24  1312 09/29/24  1127 09/29/24  0905 09/29/24  0758 09/29/24  0523 09/29/24  0415   POCT GLUCOSE mg/dL  --  251* 138*  --  136* 111* 109*   < >  --    < >  --    < >  --    GLUCOSE mg/dL 321*  --   --  153*  --   --   --   --  146*  --  214*  --  185*    < > = values in this interval not " displayed.     Scheduled medications  insulin lispro, 0-10 Units, subcutaneous, TID  insulin lispro, 6 Units, subcutaneous, TID  insulin NPH (Isophane), 18 Units, subcutaneous, q12h      Continuous medications     PRN medications  PRN medications: dextrose, dextrose, dextrose, glucagon, glucagon    Results for orders placed or performed during the hospital encounter of 09/28/24 (from the past 24 hour(s))   Lactate   Result Value Ref Range    Lactate 1.5 0.4 - 2.0 mmol/L   Blood Gas Venous   Result Value Ref Range    POCT pH, Venous 7.22 (LL) 7.33 - 7.43 pH    POCT pCO2, Venous 28 (L) 41 - 51 mm Hg    POCT pO2, Venous 58 (H) 35 - 45 mm Hg    POCT SO2, Venous 92 (H) 45 - 75 %    POCT Oxy Hemoglobin, Venous 90.3 (H) 45.0 - 75.0 %    POCT Base Excess, Venous -14.7 (L) -2.0 - 3.0 mmol/L    POCT HCO3 Calculated, Venous 11.5 (L) 22.0 - 26.0 mmol/L    Patient Temperature 37.0 degrees Celsius    FiO2 21 %   Magnesium   Result Value Ref Range    Magnesium 1.76 1.60 - 2.40 mg/dL   Renal Function Panel   Result Value Ref Range    Glucose 214 (H) 74 - 99 mg/dL    Sodium 135 (L) 136 - 145 mmol/L    Potassium 3.8 3.5 - 5.3 mmol/L    Chloride 113 (H) 98 - 107 mmol/L    Bicarbonate 12 (L) 21 - 32 mmol/L    Anion Gap 14 10 - 20 mmol/L    Urea Nitrogen 11 6 - 23 mg/dL    Creatinine 0.89 0.50 - 1.30 mg/dL    eGFR >90 >60 mL/min/1.73m*2    Calcium 7.2 (L) 8.6 - 10.3 mg/dL    Phosphorus 1.6 (L) 2.5 - 4.9 mg/dL    Albumin 3.2 (L) 3.4 - 5.0 g/dL   POCT GLUCOSE   Result Value Ref Range    POCT Glucose 199 (H) 74 - 99 mg/dL   POCT GLUCOSE   Result Value Ref Range    POCT Glucose 191 (H) 74 - 99 mg/dL   POCT GLUCOSE   Result Value Ref Range    POCT Glucose 144 (H) 74 - 99 mg/dL   Lactate   Result Value Ref Range    Lactate 1.3 0.4 - 2.0 mmol/L   Blood Gas Venous   Result Value Ref Range    POCT pH, Venous 7.27 (L) 7.33 - 7.43 pH    POCT pCO2, Venous 35 (L) 41 - 51 mm Hg    POCT pO2, Venous 46 (H) 35 - 45 mm Hg    POCT SO2, Venous 81 (H) 45 -  75 %    POCT Oxy Hemoglobin, Venous 79.3 (H) 45.0 - 75.0 %    POCT Base Excess, Venous -9.9 (L) -2.0 - 3.0 mmol/L    POCT HCO3 Calculated, Venous 16.1 (L) 22.0 - 26.0 mmol/L    Patient Temperature 37.0 degrees Celsius    FiO2 21 %   Renal Function Panel   Result Value Ref Range    Glucose 146 (H) 74 - 99 mg/dL    Sodium 135 (L) 136 - 145 mmol/L    Potassium 3.5 3.5 - 5.3 mmol/L    Chloride 114 (H) 98 - 107 mmol/L    Bicarbonate 16 (L) 21 - 32 mmol/L    Anion Gap 9 (L) 10 - 20 mmol/L    Urea Nitrogen 10 6 - 23 mg/dL    Creatinine 0.82 0.50 - 1.30 mg/dL    eGFR >90 >60 mL/min/1.73m*2    Calcium 7.3 (L) 8.6 - 10.3 mg/dL    Phosphorus 1.4 (L) 2.5 - 4.9 mg/dL    Albumin 3.2 (L) 3.4 - 5.0 g/dL   POCT GLUCOSE   Result Value Ref Range    POCT Glucose 109 (H) 74 - 99 mg/dL   POCT GLUCOSE   Result Value Ref Range    POCT Glucose 109 (H) 74 - 99 mg/dL   POCT GLUCOSE   Result Value Ref Range    POCT Glucose 111 (H) 74 - 99 mg/dL   POCT GLUCOSE   Result Value Ref Range    POCT Glucose 136 (H) 74 - 99 mg/dL   Blood Gas Venous   Result Value Ref Range    POCT pH, Venous 7.24 (LL) 7.33 - 7.43 pH    POCT pCO2, Venous 38 (L) 41 - 51 mm Hg    POCT pO2, Venous 31 (L) 35 - 45 mm Hg    POCT SO2, Venous 61 45 - 75 %    POCT Oxy Hemoglobin, Venous 60.4 45.0 - 75.0 %    POCT Base Excess, Venous -10.4 (L) -2.0 - 3.0 mmol/L    POCT HCO3 Calculated, Venous 16.3 (L) 22.0 - 26.0 mmol/L    Patient Temperature 37.0 degrees Celsius    FiO2 21 %   Renal Function Panel   Result Value Ref Range    Glucose 153 (H) 74 - 99 mg/dL    Sodium 136 136 - 145 mmol/L    Potassium 3.4 (L) 3.5 - 5.3 mmol/L    Chloride 111 (H) 98 - 107 mmol/L    Bicarbonate 16 (L) 21 - 32 mmol/L    Anion Gap 12 10 - 20 mmol/L    Urea Nitrogen 9 6 - 23 mg/dL    Creatinine 0.77 0.50 - 1.30 mg/dL    eGFR >90 >60 mL/min/1.73m*2    Calcium 7.2 (L) 8.6 - 10.3 mg/dL    Phosphorus 2.5 2.5 - 4.9 mg/dL    Albumin 3.1 (L) 3.4 - 5.0 g/dL   POCT GLUCOSE   Result Value Ref Range    POCT Glucose  138 (H) 74 - 99 mg/dL   POCT GLUCOSE   Result Value Ref Range    POCT Glucose 251 (H) 74 - 99 mg/dL   CBC and Auto Differential   Result Value Ref Range    WBC 5.3 4.4 - 11.3 x10*3/uL    nRBC 0.0 0.0 - 0.0 /100 WBCs    RBC 4.53 4.50 - 5.90 x10*6/uL    Hemoglobin 14.0 13.5 - 17.5 g/dL    Hematocrit 39.3 (L) 41.0 - 52.0 %    MCV 87 80 - 100 fL    MCH 30.9 26.0 - 34.0 pg    MCHC 35.6 32.0 - 36.0 g/dL    RDW 13.4 11.5 - 14.5 %    Platelets 210 150 - 450 x10*3/uL    Neutrophils % 53.2 40.0 - 80.0 %    Immature Granulocytes %, Automated 0.4 0.0 - 0.9 %    Lymphocytes % 33.4 13.0 - 44.0 %    Monocytes % 11.3 2.0 - 10.0 %    Eosinophils % 1.3 0.0 - 6.0 %    Basophils % 0.4 0.0 - 2.0 %    Neutrophils Absolute 2.82 1.20 - 7.70 x10*3/uL    Immature Granulocytes Absolute, Automated 0.02 0.00 - 0.70 x10*3/uL    Lymphocytes Absolute 1.77 1.20 - 4.80 x10*3/uL    Monocytes Absolute 0.60 0.10 - 1.00 x10*3/uL    Eosinophils Absolute 0.07 0.00 - 0.70 x10*3/uL    Basophils Absolute 0.02 0.00 - 0.10 x10*3/uL   Basic Metabolic Panel   Result Value Ref Range    Glucose 321 (H) 74 - 99 mg/dL    Sodium 136 136 - 145 mmol/L    Potassium 3.6 3.5 - 5.3 mmol/L    Chloride 109 (H) 98 - 107 mmol/L    Bicarbonate 19 (L) 21 - 32 mmol/L    Anion Gap 12 10 - 20 mmol/L    Urea Nitrogen 11 6 - 23 mg/dL    Creatinine 0.80 0.50 - 1.30 mg/dL    eGFR >90 >60 mL/min/1.73m*2    Calcium 7.9 (L) 8.6 - 10.3 mg/dL   Magnesium   Result Value Ref Range    Magnesium 1.80 1.60 - 2.40 mg/dL            ECG 12 lead    Result Date: 9/23/2024  Sinus tachycardia Probable left ventricular hypertrophy Anterior ST elevation, probably due to LVH Borderline prolonged QT interval See ED provider note for full interpretation and clinical correlation Confirmed by Nata Hancock ([id number]) on 9/23/2024 12:54:16 PM    XR chest 1 view    Result Date: 9/18/2024  STUDY: Chest Radiograph;  9/18/2024 7:50PM INDICATION: Sepsis. COMPARISON: 2/14/2024 XR Chest, 10/8/2023 XR Chest. ACCESSION  NUMBER(S): [id number] ORDERING CLINICIAN: HEATHER BACK TECHNIQUE:  Frontal chest was obtained at 19:49 hours. FINDINGS: CARDIOMEDIASTINAL SILHOUETTE: Cardiomediastinal silhouette is normal in size and configuration.  LUNGS: Lungs are clear.  ABDOMEN: No remarkable upper abdominal findings.  BONES: No acute osseous changes.    No acute cardiopulmonary disease.  No significant interval change from the prior exam.. Signed by Edgard Serrato MD    Assessment/Plan   Assessment & Plan  Diabetic ketoacidosis without coma associated with type 1 diabetes mellitus (Multi)    IMPRESSION     DKA -resolved   POORLY CONTROLLED TYPE I DIABETES MELLITUS  A1C of 13.3%     RECOMMENDATIONS:  Glucose value is 312mg/dL and thus is not reflective of hypoglycemia   To continue NPH 18 units subcutaneous twice daily   To continue an insulin correction scale TID AC   To continue Humalog 6 units TID AC   Diabetic diet as tolerated   Accu-Cheks ACHS  Hypoglycemic protocol   Will continue to follow    Lori Packer MD

## 2024-09-30 NOTE — CARE PLAN
The patient's goals for the shift include      The clinical goals for the shift include pt's blood glucose will remain less than 200 this shift      Problem: Pain - Adult  Goal: Verbalizes/displays adequate comfort level or baseline comfort level  Outcome: Progressing     Problem: Safety - Adult  Goal: Free from fall injury  Outcome: Progressing     Problem: Discharge Planning  Goal: Discharge to home or other facility with appropriate resources  Outcome: Progressing     Problem: Chronic Conditions and Co-morbidities  Goal: Patient's chronic conditions and co-morbidity symptoms are monitored and maintained or improved  Outcome: Progressing     Problem: Diabetes  Goal: Achieve decreasing blood glucose levels by end of shift  Outcome: Progressing  Goal: Increase stability of blood glucose readings by end of shift  Outcome: Progressing  Goal: Decrease in ketones present in urine by end of shift  Outcome: Progressing  Goal: Maintain electrolyte levels within acceptable range throughout shift  Outcome: Progressing  Goal: Maintain glucose levels >70mg/dl to <250mg/dl throughout shift  Outcome: Progressing  Goal: No changes in neurological exam by end of shift  Outcome: Progressing  Goal: Learn about and adhere to nutrition recommendations by end of shift  Outcome: Progressing  Goal: Vital signs within normal range for age by end of shift  Outcome: Progressing  Goal: Increase self care and/or family involovement by end of shift  Outcome: Progressing  Goal: Receive DSME education by end of shift  Outcome: Progressing     Problem: Skin  Goal: Decreased wound size/increased tissue granulation at next dressing change  Outcome: Progressing  Goal: Participates in plan/prevention/treatment measures  Outcome: Progressing  Goal: Prevent/manage excess moisture  Outcome: Progressing  Goal: Prevent/minimize sheer/friction injuries  Outcome: Progressing  Goal: Promote/optimize nutrition  Outcome: Progressing  Goal: Promote skin  healing  Outcome: Progressing

## 2024-09-30 NOTE — CARE PLAN
The patient's goals for the shift include      The clinical goals for the shift include pt will mainntian BG less than 200 throughout shift    Over the shift, the patient did not make progress toward the following goals. Barriers to progression include pt diet choices. Recommendations to address these barriers include educate pt on good food choices.

## 2024-09-30 NOTE — DISCHARGE INSTRUCTIONS
Follow up with PCP in 1-2 weeks. Call to schedule. Bring all your discharge paperwork and medications when you go to your follow up appointment. Return to ED if your symptoms come back.    Follow-up with endocrinology.    CHECK BLOOD GLUCOSE if you are not feeling well.

## 2024-10-01 ENCOUNTER — PATIENT OUTREACH (OUTPATIENT)
Dept: PRIMARY CARE | Facility: CLINIC | Age: 34
End: 2024-10-01

## 2024-10-01 LAB
ATRIAL RATE: 130 BPM
P AXIS: 69 DEGREES
PR INTERVAL: 142 MS
Q ONSET: 251 MS
QRS COUNT: 21 BEATS
QRS DURATION: 75 MS
QT INTERVAL: 348 MS
QTC CALCULATION(BAZETT): 512 MS
QTC FREDERICIA: 450 MS
R AXIS: 48 DEGREES
T AXIS: 20 DEGREES
T OFFSET: 425 MS
VENTRICULAR RATE: 130 BPM

## 2024-10-01 NOTE — PROGRESS NOTES
Discharge Facility: [facility]   Discharge Diagnosis: Diabetic ketoacidosis without coma associated with type 1 diabetes mellitus (Multi)  DM type I, insulin-dependent  Admission Date: 9/29/24  Discharge Date: 9/30/24    PCP Appointment Date: no appointment, message to office  Specialist Appointment Date:   OCT 2  2024 09:00 AM - Cardiology Clinic Visit  [facility] - HEATHER Montenegro      OCT 9  2024 01:00 PM - Office Visit  [facility] - Phoebe Villar APRN - CNP     Hospital Encounter and Summary Linked: Yes.    Two attempts were made to reach patient within two business days after discharge. Voicemail left with contact information for patient to call back with any non-emergent questions or concerns.

## 2024-10-02 ENCOUNTER — APPOINTMENT (OUTPATIENT)
Dept: CARDIOLOGY | Facility: CLINIC | Age: 34
End: 2024-10-02
Payer: COMMERCIAL

## 2024-10-03 LAB
BACTERIA BLD CULT: NORMAL
BACTERIA BLD CULT: NORMAL

## 2024-10-08 DIAGNOSIS — E10.9 TYPE 1 DIABETES MELLITUS WITHOUT COMPLICATION: Primary | ICD-10-CM

## 2024-10-22 ENCOUNTER — PATIENT OUTREACH (OUTPATIENT)
Dept: PRIMARY CARE | Facility: CLINIC | Age: 34
End: 2024-10-22

## 2024-11-07 ENCOUNTER — APPOINTMENT (OUTPATIENT)
Dept: RADIOLOGY | Facility: HOSPITAL | Age: 34
End: 2024-11-07
Payer: MEDICAID

## 2024-11-07 ENCOUNTER — HOSPITAL ENCOUNTER (INPATIENT)
Facility: HOSPITAL | Age: 34
LOS: 2 days | Discharge: LEFT BEFORE DISCHARGE | End: 2024-11-09
Attending: EMERGENCY MEDICINE | Admitting: INTERNAL MEDICINE
Payer: MEDICAID

## 2024-11-07 ENCOUNTER — APPOINTMENT (OUTPATIENT)
Dept: CARDIOLOGY | Facility: HOSPITAL | Age: 34
End: 2024-11-07
Payer: MEDICAID

## 2024-11-07 DIAGNOSIS — E10.10 DKA, TYPE 1, NOT AT GOAL: Primary | ICD-10-CM

## 2024-11-07 LAB
ALBUMIN SERPL BCP-MCNC: 5.2 G/DL (ref 3.4–5)
ALP SERPL-CCNC: 100 U/L (ref 33–120)
ALT SERPL W P-5'-P-CCNC: 12 U/L (ref 10–52)
ANION GAP BLDV CALCULATED.4IONS-SCNC: 34 MMOL/L (ref 10–25)
ANION GAP SERPL CALC-SCNC: 37 MMOL/L (ref 10–20)
AST SERPL W P-5'-P-CCNC: 10 U/L (ref 9–39)
B-OH-BUTYR SERPL-SCNC: 11.75 MMOL/L (ref 0.02–0.27)
BASE EXCESS BLDV CALC-SCNC: -29 MMOL/L (ref -2–3)
BASOPHILS # BLD MANUAL: 0 X10*3/UL (ref 0–0.1)
BASOPHILS NFR BLD MANUAL: 0 %
BILIRUB SERPL-MCNC: 0.4 MG/DL (ref 0–1.2)
BODY TEMPERATURE: 37 DEGREES CELSIUS
BUN SERPL-MCNC: 17 MG/DL (ref 6–23)
CA-I BLDV-SCNC: 1.31 MMOL/L (ref 1.1–1.33)
CALCIUM SERPL-MCNC: 9.2 MG/DL (ref 8.6–10.3)
CHLORIDE BLDV-SCNC: 100 MMOL/L (ref 98–107)
CHLORIDE SERPL-SCNC: 101 MMOL/L (ref 98–107)
CO2 SERPL-SCNC: 4 MMOL/L (ref 21–32)
CREAT SERPL-MCNC: 1.32 MG/DL (ref 0.5–1.3)
EGFRCR SERPLBLD CKD-EPI 2021: 72 ML/MIN/1.73M*2
EOSINOPHIL # BLD MANUAL: 0 X10*3/UL (ref 0–0.7)
EOSINOPHIL NFR BLD MANUAL: 0 %
ERYTHROCYTE [DISTWIDTH] IN BLOOD BY AUTOMATED COUNT: 14.3 % (ref 11.5–14.5)
GLUCOSE BLD MANUAL STRIP-MCNC: 318 MG/DL (ref 74–99)
GLUCOSE BLD MANUAL STRIP-MCNC: 346 MG/DL (ref 74–99)
GLUCOSE BLD MANUAL STRIP-MCNC: 356 MG/DL (ref 74–99)
GLUCOSE BLD MANUAL STRIP-MCNC: 371 MG/DL (ref 74–99)
GLUCOSE BLDV-MCNC: 449 MG/DL (ref 74–99)
GLUCOSE SERPL-MCNC: 394 MG/DL (ref 74–99)
HCO3 BLDV-SCNC: 5.2 MMOL/L (ref 22–26)
HCT VFR BLD AUTO: 56.9 % (ref 41–52)
HCT VFR BLD EST: 56 % (ref 41–52)
HGB BLD-MCNC: 18.9 G/DL (ref 13.5–17.5)
HGB BLDV-MCNC: 18.7 G/DL (ref 13.5–17.5)
IMM GRANULOCYTES # BLD AUTO: 0.25 X10*3/UL (ref 0–0.7)
IMM GRANULOCYTES NFR BLD AUTO: 1.2 % (ref 0–0.9)
INHALED O2 CONCENTRATION: 21 %
LACTATE BLDV-SCNC: 5.1 MMOL/L (ref 0.4–2)
LACTATE BLDV-SCNC: 5.3 MMOL/L (ref 0.4–2)
LYMPHOCYTES # BLD MANUAL: 1.02 X10*3/UL (ref 1.2–4.8)
LYMPHOCYTES NFR BLD MANUAL: 5 %
MAGNESIUM SERPL-MCNC: 2.38 MG/DL (ref 1.6–2.4)
MCH RBC QN AUTO: 32 PG (ref 26–34)
MCHC RBC AUTO-ENTMCNC: 33.2 G/DL (ref 32–36)
MCV RBC AUTO: 96 FL (ref 80–100)
MONOCYTES # BLD MANUAL: 1.42 X10*3/UL (ref 0.1–1)
MONOCYTES NFR BLD MANUAL: 7 %
NEUTROPHILS # BLD MANUAL: 17.86 X10*3/UL (ref 1.2–7.7)
NEUTS BAND # BLD MANUAL: 1.62 X10*3/UL (ref 0–0.7)
NEUTS BAND NFR BLD MANUAL: 8 %
NEUTS SEG # BLD MANUAL: 16.24 X10*3/UL (ref 1.2–7)
NEUTS SEG NFR BLD MANUAL: 80 %
NRBC BLD-RTO: 0 /100 WBCS (ref 0–0)
OXYHGB MFR BLDV: 69.4 % (ref 45–75)
PCO2 BLDV: 31 MM HG (ref 41–51)
PH BLDV: 6.83 PH (ref 7.33–7.43)
PHOSPHATE SERPL-MCNC: 5.4 MG/DL (ref 2.5–4.9)
PLATELET # BLD AUTO: 266 X10*3/UL (ref 150–450)
PO2 BLDV: 45 MM HG (ref 35–45)
POTASSIUM BLDV-SCNC: 5.1 MMOL/L (ref 3.5–5.3)
POTASSIUM SERPL-SCNC: 4.5 MMOL/L (ref 3.5–5.3)
PROT SERPL-MCNC: 8.5 G/DL (ref 6.4–8.2)
RBC # BLD AUTO: 5.9 X10*6/UL (ref 4.5–5.9)
RBC MORPH BLD: ABNORMAL
SAO2 % BLDV: 71 % (ref 45–75)
SODIUM BLDV-SCNC: 134 MMOL/L (ref 136–145)
SODIUM SERPL-SCNC: 137 MMOL/L (ref 136–145)
TOTAL CELLS COUNTED BLD: 100
WBC # BLD AUTO: 20.3 X10*3/UL (ref 4.4–11.3)

## 2024-11-07 PROCEDURE — 82947 ASSAY GLUCOSE BLOOD QUANT: CPT

## 2024-11-07 PROCEDURE — 36415 COLL VENOUS BLD VENIPUNCTURE: CPT | Performed by: EMERGENCY MEDICINE

## 2024-11-07 PROCEDURE — 99285 EMERGENCY DEPT VISIT HI MDM: CPT

## 2024-11-07 PROCEDURE — 85027 COMPLETE CBC AUTOMATED: CPT | Performed by: EMERGENCY MEDICINE

## 2024-11-07 PROCEDURE — 84132 ASSAY OF SERUM POTASSIUM: CPT | Performed by: EMERGENCY MEDICINE

## 2024-11-07 PROCEDURE — 71045 X-RAY EXAM CHEST 1 VIEW: CPT | Performed by: SURGERY

## 2024-11-07 PROCEDURE — 71045 X-RAY EXAM CHEST 1 VIEW: CPT

## 2024-11-07 PROCEDURE — 84100 ASSAY OF PHOSPHORUS: CPT | Performed by: EMERGENCY MEDICINE

## 2024-11-07 PROCEDURE — 2020000001 HC ICU ROOM DAILY

## 2024-11-07 PROCEDURE — 82010 KETONE BODYS QUAN: CPT | Performed by: EMERGENCY MEDICINE

## 2024-11-07 PROCEDURE — 96360 HYDRATION IV INFUSION INIT: CPT

## 2024-11-07 PROCEDURE — 99291 CRITICAL CARE FIRST HOUR: CPT | Performed by: EMERGENCY MEDICINE

## 2024-11-07 PROCEDURE — 2500000004 HC RX 250 GENERAL PHARMACY W/ HCPCS (ALT 636 FOR OP/ED): Performed by: INTERNAL MEDICINE

## 2024-11-07 PROCEDURE — 99291 CRITICAL CARE FIRST HOUR: CPT

## 2024-11-07 PROCEDURE — 85007 BL SMEAR W/DIFF WBC COUNT: CPT | Performed by: EMERGENCY MEDICINE

## 2024-11-07 PROCEDURE — 83605 ASSAY OF LACTIC ACID: CPT | Performed by: EMERGENCY MEDICINE

## 2024-11-07 PROCEDURE — 96361 HYDRATE IV INFUSION ADD-ON: CPT

## 2024-11-07 PROCEDURE — 2500000004 HC RX 250 GENERAL PHARMACY W/ HCPCS (ALT 636 FOR OP/ED)

## 2024-11-07 PROCEDURE — 2500000004 HC RX 250 GENERAL PHARMACY W/ HCPCS (ALT 636 FOR OP/ED): Performed by: EMERGENCY MEDICINE

## 2024-11-07 PROCEDURE — 93005 ELECTROCARDIOGRAM TRACING: CPT

## 2024-11-07 PROCEDURE — 81003 URINALYSIS AUTO W/O SCOPE: CPT | Performed by: EMERGENCY MEDICINE

## 2024-11-07 PROCEDURE — 83735 ASSAY OF MAGNESIUM: CPT | Performed by: EMERGENCY MEDICINE

## 2024-11-07 PROCEDURE — 99223 1ST HOSP IP/OBS HIGH 75: CPT | Performed by: INTERNAL MEDICINE

## 2024-11-07 RX ORDER — DEXTROSE 50 % IN WATER (D50W) INTRAVENOUS SYRINGE
25
Status: DISCONTINUED | OUTPATIENT
Start: 2024-11-07 | End: 2024-11-08

## 2024-11-07 RX ORDER — SODIUM CHLORIDE 450 MG/100ML
250 INJECTION, SOLUTION INTRAVENOUS CONTINUOUS
Status: DISCONTINUED | OUTPATIENT
Start: 2024-11-07 | End: 2024-11-07

## 2024-11-07 RX ORDER — DEXTROSE 50 % IN WATER (D50W) INTRAVENOUS SYRINGE
50
Status: DISCONTINUED | OUTPATIENT
Start: 2024-11-07 | End: 2024-11-09 | Stop reason: HOSPADM

## 2024-11-07 RX ORDER — DEXTROSE MONOHYDRATE AND SODIUM CHLORIDE 5; .45 G/100ML; G/100ML
150 INJECTION, SOLUTION INTRAVENOUS CONTINUOUS
Status: DISCONTINUED | OUTPATIENT
Start: 2024-11-07 | End: 2024-11-07

## 2024-11-07 RX ORDER — SODIUM CHLORIDE 450 MG/100ML
250 INJECTION, SOLUTION INTRAVENOUS CONTINUOUS
Status: DISCONTINUED | OUTPATIENT
Start: 2024-11-07 | End: 2024-11-08

## 2024-11-07 RX ORDER — DEXTROSE 50 % IN WATER (D50W) INTRAVENOUS SYRINGE
12.5
Status: DISCONTINUED | OUTPATIENT
Start: 2024-11-07 | End: 2024-11-08

## 2024-11-07 RX ORDER — DEXTROSE MONOHYDRATE 100 MG/ML
150 INJECTION, SOLUTION INTRAVENOUS CONTINUOUS
Status: DISCONTINUED | OUTPATIENT
Start: 2024-11-07 | End: 2024-11-07

## 2024-11-07 RX ORDER — SODIUM CHLORIDE 9 MG/ML
125 INJECTION, SOLUTION INTRAVENOUS CONTINUOUS
Status: DISCONTINUED | OUTPATIENT
Start: 2024-11-07 | End: 2024-11-07

## 2024-11-07 RX ORDER — HEPARIN SODIUM 5000 [USP'U]/ML
5000 INJECTION, SOLUTION INTRAVENOUS; SUBCUTANEOUS EVERY 8 HOURS
Status: DISCONTINUED | OUTPATIENT
Start: 2024-11-07 | End: 2024-11-08

## 2024-11-07 RX ORDER — DEXTROSE 50 % IN WATER (D50W) INTRAVENOUS SYRINGE
50
Status: DISCONTINUED | OUTPATIENT
Start: 2024-11-07 | End: 2024-11-07

## 2024-11-07 RX ORDER — DEXTROSE MONOHYDRATE AND SODIUM CHLORIDE 5; .45 G/100ML; G/100ML
150 INJECTION, SOLUTION INTRAVENOUS CONTINUOUS
Status: DISCONTINUED | OUTPATIENT
Start: 2024-11-07 | End: 2024-11-08

## 2024-11-07 SDOH — SOCIAL STABILITY: SOCIAL INSECURITY: HAVE YOU HAD THOUGHTS OF HARMING ANYONE ELSE?: NO

## 2024-11-07 SDOH — ECONOMIC STABILITY: INCOME INSECURITY: IN THE PAST 12 MONTHS HAS THE ELECTRIC, GAS, OIL, OR WATER COMPANY THREATENED TO SHUT OFF SERVICES IN YOUR HOME?: NO

## 2024-11-07 SDOH — SOCIAL STABILITY: SOCIAL INSECURITY: ARE THERE ANY APPARENT SIGNS OF INJURIES/BEHAVIORS THAT COULD BE RELATED TO ABUSE/NEGLECT?: NO

## 2024-11-07 SDOH — ECONOMIC STABILITY: FOOD INSECURITY: WITHIN THE PAST 12 MONTHS, YOU WORRIED THAT YOUR FOOD WOULD RUN OUT BEFORE YOU GOT THE MONEY TO BUY MORE.: NEVER TRUE

## 2024-11-07 SDOH — ECONOMIC STABILITY: FOOD INSECURITY: WITHIN THE PAST 12 MONTHS, THE FOOD YOU BOUGHT JUST DIDN'T LAST AND YOU DIDN'T HAVE MONEY TO GET MORE.: NEVER TRUE

## 2024-11-07 SDOH — SOCIAL STABILITY: SOCIAL INSECURITY: DOES ANYONE TRY TO KEEP YOU FROM HAVING/CONTACTING OTHER FRIENDS OR DOING THINGS OUTSIDE YOUR HOME?: NO

## 2024-11-07 SDOH — SOCIAL STABILITY: SOCIAL INSECURITY: ABUSE: ADULT

## 2024-11-07 SDOH — SOCIAL STABILITY: SOCIAL INSECURITY: HAS ANYONE EVER THREATENED TO HURT YOUR FAMILY OR YOUR PETS?: NO

## 2024-11-07 SDOH — SOCIAL STABILITY: SOCIAL INSECURITY: WITHIN THE LAST YEAR, HAVE YOU BEEN AFRAID OF YOUR PARTNER OR EX-PARTNER?: NO

## 2024-11-07 SDOH — SOCIAL STABILITY: SOCIAL INSECURITY: WITHIN THE LAST YEAR, HAVE YOU BEEN HUMILIATED OR EMOTIONALLY ABUSED IN OTHER WAYS BY YOUR PARTNER OR EX-PARTNER?: NO

## 2024-11-07 SDOH — SOCIAL STABILITY: SOCIAL INSECURITY: ARE YOU OR HAVE YOU BEEN THREATENED OR ABUSED PHYSICALLY, EMOTIONALLY, OR SEXUALLY BY ANYONE?: NO

## 2024-11-07 SDOH — SOCIAL STABILITY: SOCIAL INSECURITY: DO YOU FEEL ANYONE HAS EXPLOITED OR TAKEN ADVANTAGE OF YOU FINANCIALLY OR OF YOUR PERSONAL PROPERTY?: NO

## 2024-11-07 SDOH — SOCIAL STABILITY: SOCIAL INSECURITY: HAVE YOU HAD ANY THOUGHTS OF HARMING ANYONE ELSE?: NO

## 2024-11-07 SDOH — SOCIAL STABILITY: SOCIAL INSECURITY: DO YOU FEEL UNSAFE GOING BACK TO THE PLACE WHERE YOU ARE LIVING?: NO

## 2024-11-07 ASSESSMENT — LIFESTYLE VARIABLES
EVER HAD A DRINK FIRST THING IN THE MORNING TO STEADY YOUR NERVES TO GET RID OF A HANGOVER: NO
EVER FELT BAD OR GUILTY ABOUT YOUR DRINKING: NO
SKIP TO QUESTIONS 9-10: 1
TOTAL SCORE: 0
HOW MANY STANDARD DRINKS CONTAINING ALCOHOL DO YOU HAVE ON A TYPICAL DAY: PATIENT DOES NOT DRINK
HAVE YOU EVER FELT YOU SHOULD CUT DOWN ON YOUR DRINKING: NO
HOW OFTEN DO YOU HAVE 6 OR MORE DRINKS ON ONE OCCASION: NEVER
AUDIT-C TOTAL SCORE: 0
HAVE PEOPLE ANNOYED YOU BY CRITICIZING YOUR DRINKING: NO
AUDIT-C TOTAL SCORE: 0
HOW OFTEN DO YOU HAVE A DRINK CONTAINING ALCOHOL: NEVER

## 2024-11-07 ASSESSMENT — ACTIVITIES OF DAILY LIVING (ADL)
GROOMING: INDEPENDENT
BATHING: INDEPENDENT
TOILETING: INDEPENDENT
PATIENT'S MEMORY ADEQUATE TO SAFELY COMPLETE DAILY ACTIVITIES?: YES
DRESSING YOURSELF: INDEPENDENT
FEEDING YOURSELF: INDEPENDENT
ADEQUATE_TO_COMPLETE_ADL: YES
WALKS IN HOME: INDEPENDENT
JUDGMENT_ADEQUATE_SAFELY_COMPLETE_DAILY_ACTIVITIES: YES
HEARING - LEFT EAR: FUNCTIONAL
LACK_OF_TRANSPORTATION: NO
HEARING - RIGHT EAR: FUNCTIONAL

## 2024-11-07 ASSESSMENT — COGNITIVE AND FUNCTIONAL STATUS - GENERAL
MOBILITY SCORE: 24
DAILY ACTIVITIY SCORE: 24
PATIENT BASELINE BEDBOUND: NO

## 2024-11-07 ASSESSMENT — PAIN - FUNCTIONAL ASSESSMENT: PAIN_FUNCTIONAL_ASSESSMENT: 0-10

## 2024-11-07 ASSESSMENT — ENCOUNTER SYMPTOMS
WEAKNESS: 1
FATIGUE: 1

## 2024-11-07 ASSESSMENT — PAIN DESCRIPTION - LOCATION: LOCATION: BACK

## 2024-11-07 ASSESSMENT — PATIENT HEALTH QUESTIONNAIRE - PHQ9
1. LITTLE INTEREST OR PLEASURE IN DOING THINGS: MORE THAN HALF THE DAYS
2. FEELING DOWN, DEPRESSED OR HOPELESS: MORE THAN HALF THE DAYS
SUM OF ALL RESPONSES TO PHQ9 QUESTIONS 1 & 2: 4

## 2024-11-07 ASSESSMENT — PAIN DESCRIPTION - ORIENTATION: ORIENTATION: LOWER

## 2024-11-07 ASSESSMENT — PAIN SCALES - GENERAL: PAINLEVEL_OUTOF10: 6

## 2024-11-08 LAB
AMORPH CRY #/AREA UR COMP ASSIST: ABNORMAL /HPF
ANION GAP BLDV CALCULATED.4IONS-SCNC: 24 MMOL/L (ref 10–25)
ANION GAP BLDV CALCULATED.4IONS-SCNC: 28 MMOL/L (ref 10–25)
ANION GAP SERPL CALC-SCNC: 10 MMOL/L (ref 10–20)
ANION GAP SERPL CALC-SCNC: 14 MMOL/L (ref 10–20)
ANION GAP SERPL CALC-SCNC: 22 MMOL/L (ref 10–20)
ANION GAP SERPL CALC-SCNC: 31 MMOL/L (ref 10–20)
ANION GAP SERPL CALC-SCNC: 8 MMOL/L (ref 10–20)
APPEARANCE UR: CLEAR
ATRIAL RATE: 121 BPM
BASE EXCESS BLDV CALC-SCNC: -10.3 MMOL/L (ref -2–3)
BASE EXCESS BLDV CALC-SCNC: -14.4 MMOL/L (ref -2–3)
BASE EXCESS BLDV CALC-SCNC: -22 MMOL/L (ref -2–3)
BASE EXCESS BLDV CALC-SCNC: -22 MMOL/L (ref -2–3)
BASE EXCESS BLDV CALC-SCNC: -27.5 MMOL/L (ref -2–3)
BASE EXCESS BLDV CALC-SCNC: -9.2 MMOL/L (ref -2–3)
BASOPHILS # BLD AUTO: 0.02 X10*3/UL (ref 0–0.1)
BASOPHILS NFR BLD AUTO: 0.2 %
BILIRUB UR STRIP.AUTO-MCNC: NEGATIVE MG/DL
BODY TEMPERATURE: 37 DEGREES CELSIUS
BUN SERPL-MCNC: 11 MG/DL (ref 6–23)
BUN SERPL-MCNC: 12 MG/DL (ref 6–23)
BUN SERPL-MCNC: 12 MG/DL (ref 6–23)
BUN SERPL-MCNC: 13 MG/DL (ref 6–23)
BUN SERPL-MCNC: 16 MG/DL (ref 6–23)
CA-I BLDV-SCNC: 1.19 MMOL/L (ref 1.1–1.33)
CA-I BLDV-SCNC: 1.32 MMOL/L (ref 1.1–1.33)
CALCIUM SERPL-MCNC: 6.8 MG/DL (ref 8.6–10.3)
CALCIUM SERPL-MCNC: 7.8 MG/DL (ref 8.6–10.3)
CALCIUM SERPL-MCNC: 7.8 MG/DL (ref 8.6–10.3)
CALCIUM SERPL-MCNC: 8 MG/DL (ref 8.6–10.3)
CALCIUM SERPL-MCNC: 8.2 MG/DL (ref 8.6–10.3)
CHLORIDE BLDV-SCNC: 106 MMOL/L (ref 98–107)
CHLORIDE BLDV-SCNC: 107 MMOL/L (ref 98–107)
CHLORIDE SERPL-SCNC: 108 MMOL/L (ref 98–107)
CHLORIDE SERPL-SCNC: 110 MMOL/L (ref 98–107)
CHLORIDE SERPL-SCNC: 111 MMOL/L (ref 98–107)
CHLORIDE SERPL-SCNC: 112 MMOL/L (ref 98–107)
CHLORIDE SERPL-SCNC: 113 MMOL/L (ref 98–107)
CO2 SERPL-SCNC: 11 MMOL/L (ref 21–32)
CO2 SERPL-SCNC: 16 MMOL/L (ref 21–32)
CO2 SERPL-SCNC: 17 MMOL/L (ref 21–32)
CO2 SERPL-SCNC: 4 MMOL/L (ref 21–32)
CO2 SERPL-SCNC: 6 MMOL/L (ref 21–32)
COLOR UR: COLORLESS
CREAT SERPL-MCNC: 0.65 MG/DL (ref 0.5–1.3)
CREAT SERPL-MCNC: 0.79 MG/DL (ref 0.5–1.3)
CREAT SERPL-MCNC: 0.81 MG/DL (ref 0.5–1.3)
CREAT SERPL-MCNC: 0.88 MG/DL (ref 0.5–1.3)
CREAT SERPL-MCNC: 1.15 MG/DL (ref 0.5–1.3)
EGFRCR SERPLBLD CKD-EPI 2021: 85 ML/MIN/1.73M*2
EGFRCR SERPLBLD CKD-EPI 2021: >90 ML/MIN/1.73M*2
EOSINOPHIL # BLD AUTO: 0 X10*3/UL (ref 0–0.7)
EOSINOPHIL NFR BLD AUTO: 0 %
ERYTHROCYTE [DISTWIDTH] IN BLOOD BY AUTOMATED COUNT: 14 % (ref 11.5–14.5)
GLUCOSE BLD MANUAL STRIP-MCNC: 129 MG/DL (ref 74–99)
GLUCOSE BLD MANUAL STRIP-MCNC: 151 MG/DL (ref 74–99)
GLUCOSE BLD MANUAL STRIP-MCNC: 189 MG/DL (ref 74–99)
GLUCOSE BLD MANUAL STRIP-MCNC: 191 MG/DL (ref 74–99)
GLUCOSE BLD MANUAL STRIP-MCNC: 195 MG/DL (ref 74–99)
GLUCOSE BLD MANUAL STRIP-MCNC: 208 MG/DL (ref 74–99)
GLUCOSE BLD MANUAL STRIP-MCNC: 211 MG/DL (ref 74–99)
GLUCOSE BLD MANUAL STRIP-MCNC: 217 MG/DL (ref 74–99)
GLUCOSE BLD MANUAL STRIP-MCNC: 222 MG/DL (ref 74–99)
GLUCOSE BLD MANUAL STRIP-MCNC: 229 MG/DL (ref 74–99)
GLUCOSE BLD MANUAL STRIP-MCNC: 229 MG/DL (ref 74–99)
GLUCOSE BLD MANUAL STRIP-MCNC: 266 MG/DL (ref 74–99)
GLUCOSE BLD MANUAL STRIP-MCNC: 271 MG/DL (ref 74–99)
GLUCOSE BLD MANUAL STRIP-MCNC: 303 MG/DL (ref 74–99)
GLUCOSE BLD MANUAL STRIP-MCNC: 335 MG/DL (ref 74–99)
GLUCOSE BLD MANUAL STRIP-MCNC: 335 MG/DL (ref 74–99)
GLUCOSE BLDV-MCNC: 221 MG/DL (ref 74–99)
GLUCOSE BLDV-MCNC: 289 MG/DL (ref 74–99)
GLUCOSE SERPL-MCNC: 189 MG/DL (ref 74–99)
GLUCOSE SERPL-MCNC: 229 MG/DL (ref 74–99)
GLUCOSE SERPL-MCNC: 248 MG/DL (ref 74–99)
GLUCOSE SERPL-MCNC: 263 MG/DL (ref 74–99)
GLUCOSE SERPL-MCNC: 279 MG/DL (ref 74–99)
GLUCOSE UR STRIP.AUTO-MCNC: ABNORMAL MG/DL
HCO3 BLDV-SCNC: 10.6 MMOL/L (ref 22–26)
HCO3 BLDV-SCNC: 16.2 MMOL/L (ref 22–26)
HCO3 BLDV-SCNC: 16.2 MMOL/L (ref 22–26)
HCO3 BLDV-SCNC: 4.4 MMOL/L (ref 22–26)
HCO3 BLDV-SCNC: 6.2 MMOL/L (ref 22–26)
HCO3 BLDV-SCNC: 6.2 MMOL/L (ref 22–26)
HCT VFR BLD AUTO: 41.2 % (ref 41–52)
HCT VFR BLD EST: 47 % (ref 41–52)
HCT VFR BLD EST: 52 % (ref 41–52)
HGB BLD-MCNC: 14.6 G/DL (ref 13.5–17.5)
HGB BLDV-MCNC: 15.8 G/DL (ref 13.5–17.5)
HGB BLDV-MCNC: 17.3 G/DL (ref 13.5–17.5)
HOLD SPECIMEN: NORMAL
HYALINE CASTS #/AREA URNS AUTO: ABNORMAL /LPF
IMM GRANULOCYTES # BLD AUTO: 0.09 X10*3/UL (ref 0–0.7)
IMM GRANULOCYTES NFR BLD AUTO: 0.9 % (ref 0–0.9)
INHALED O2 CONCENTRATION: 21 %
KETONES UR STRIP.AUTO-MCNC: ABNORMAL MG/DL
LACTATE BLDV-SCNC: 2.5 MMOL/L (ref 0.4–2)
LACTATE BLDV-SCNC: 5.4 MMOL/L (ref 0.4–2)
LACTATE SERPL-SCNC: 1 MMOL/L (ref 0.4–2)
LEUKOCYTE ESTERASE UR QL STRIP.AUTO: NEGATIVE
LYMPHOCYTES # BLD AUTO: 0.97 X10*3/UL (ref 1.2–4.8)
LYMPHOCYTES NFR BLD AUTO: 9.2 %
MAGNESIUM SERPL-MCNC: 1.47 MG/DL (ref 1.6–2.4)
MAGNESIUM SERPL-MCNC: 1.65 MG/DL (ref 1.6–2.4)
MAGNESIUM SERPL-MCNC: 1.7 MG/DL (ref 1.6–2.4)
MAGNESIUM SERPL-MCNC: 1.82 MG/DL (ref 1.6–2.4)
MAGNESIUM SERPL-MCNC: 2.23 MG/DL (ref 1.6–2.4)
MCH RBC QN AUTO: 32.3 PG (ref 26–34)
MCHC RBC AUTO-ENTMCNC: 35.4 G/DL (ref 32–36)
MCV RBC AUTO: 91 FL (ref 80–100)
MONOCYTES # BLD AUTO: 1.3 X10*3/UL (ref 0.1–1)
MONOCYTES NFR BLD AUTO: 12.4 %
MUCOUS THREADS #/AREA URNS AUTO: ABNORMAL /LPF
NEUTROPHILS # BLD AUTO: 8.11 X10*3/UL (ref 1.2–7.7)
NEUTROPHILS NFR BLD AUTO: 77.3 %
NITRITE UR QL STRIP.AUTO: NEGATIVE
NRBC BLD-RTO: 0 /100 WBCS (ref 0–0)
OXYHGB MFR BLDV: 71.2 % (ref 45–75)
OXYHGB MFR BLDV: 71.2 % (ref 45–75)
OXYHGB MFR BLDV: 71.3 % (ref 45–75)
OXYHGB MFR BLDV: 72.6 % (ref 45–75)
OXYHGB MFR BLDV: 80.8 % (ref 45–75)
OXYHGB MFR BLDV: 94.9 % (ref 45–75)
P AXIS: 112 DEGREES
PCO2 BLDV: 21 MM HG (ref 41–51)
PCO2 BLDV: 21 MM HG (ref 41–51)
PCO2 BLDV: 22 MM HG (ref 41–51)
PCO2 BLDV: 23 MM HG (ref 41–51)
PCO2 BLDV: 33 MM HG (ref 41–51)
PCO2 BLDV: 37 MM HG (ref 41–51)
PH BLDV: 6.91 PH (ref 7.33–7.43)
PH BLDV: 7.08 PH (ref 7.33–7.43)
PH BLDV: 7.08 PH (ref 7.33–7.43)
PH BLDV: 7.25 PH (ref 7.33–7.43)
PH BLDV: 7.27 PH (ref 7.33–7.43)
PH BLDV: 7.3 PH (ref 7.33–7.43)
PH UR STRIP.AUTO: 5.5 [PH]
PHOSPHATE SERPL-MCNC: 1.2 MG/DL (ref 2.5–4.9)
PHOSPHATE SERPL-MCNC: 1.5 MG/DL (ref 2.5–4.9)
PHOSPHATE SERPL-MCNC: 2.5 MG/DL (ref 2.5–4.9)
PHOSPHATE SERPL-MCNC: 2.8 MG/DL (ref 2.5–4.9)
PHOSPHATE SERPL-MCNC: 4.2 MG/DL (ref 2.5–4.9)
PLATELET # BLD AUTO: 189 X10*3/UL (ref 150–450)
PO2 BLDV: 37 MM HG (ref 35–45)
PO2 BLDV: 38 MM HG (ref 35–45)
PO2 BLDV: 38 MM HG (ref 35–45)
PO2 BLDV: 43 MM HG (ref 35–45)
PO2 BLDV: 45 MM HG (ref 35–45)
PO2 BLDV: 71 MM HG (ref 35–45)
POTASSIUM BLDV-SCNC: 5.3 MMOL/L (ref 3.5–5.3)
POTASSIUM BLDV-SCNC: 9 MMOL/L (ref 3.5–5.3)
POTASSIUM SERPL-SCNC: 3.4 MMOL/L (ref 3.5–5.3)
POTASSIUM SERPL-SCNC: 3.6 MMOL/L (ref 3.5–5.3)
POTASSIUM SERPL-SCNC: 3.7 MMOL/L (ref 3.5–5.3)
POTASSIUM SERPL-SCNC: 4.1 MMOL/L (ref 3.5–5.3)
POTASSIUM SERPL-SCNC: 5.8 MMOL/L (ref 3.5–5.3)
PR INTERVAL: 137 MS
PROT UR STRIP.AUTO-MCNC: ABNORMAL MG/DL
Q ONSET: 252 MS
QRS COUNT: 19 BEATS
QRS DURATION: 99 MS
QT INTERVAL: 343 MS
QTC CALCULATION(BAZETT): 487 MS
QTC FREDERICIA: 433 MS
R AXIS: 57 DEGREES
RBC # BLD AUTO: 4.52 X10*6/UL (ref 4.5–5.9)
RBC # UR STRIP.AUTO: ABNORMAL /UL
RBC #/AREA URNS AUTO: ABNORMAL /HPF
SAO2 % BLDV: 72 % (ref 45–75)
SAO2 % BLDV: 72 % (ref 45–75)
SAO2 % BLDV: 73 % (ref 45–75)
SAO2 % BLDV: 74 % (ref 45–75)
SAO2 % BLDV: 82 % (ref 45–75)
SAO2 % BLDV: 97 % (ref 45–75)
SODIUM BLDV-SCNC: 128 MMOL/L (ref 136–145)
SODIUM BLDV-SCNC: 133 MMOL/L (ref 136–145)
SODIUM SERPL-SCNC: 132 MMOL/L (ref 136–145)
SODIUM SERPL-SCNC: 134 MMOL/L (ref 136–145)
SODIUM SERPL-SCNC: 134 MMOL/L (ref 136–145)
SODIUM SERPL-SCNC: 135 MMOL/L (ref 136–145)
SODIUM SERPL-SCNC: 137 MMOL/L (ref 136–145)
SP GR UR STRIP.AUTO: >1.03
T AXIS: 46 DEGREES
T OFFSET: 423 MS
UROBILINOGEN UR STRIP.AUTO-MCNC: NORMAL MG/DL
VENTRICULAR RATE: 121 BPM
WBC # BLD AUTO: 10.5 X10*3/UL (ref 4.4–11.3)
WBC #/AREA URNS AUTO: ABNORMAL /HPF

## 2024-11-08 PROCEDURE — 36415 COLL VENOUS BLD VENIPUNCTURE: CPT

## 2024-11-08 PROCEDURE — 99233 SBSQ HOSP IP/OBS HIGH 50: CPT | Performed by: INTERNAL MEDICINE

## 2024-11-08 PROCEDURE — 2500000001 HC RX 250 WO HCPCS SELF ADMINISTERED DRUGS (ALT 637 FOR MEDICARE OP): Performed by: INTERNAL MEDICINE

## 2024-11-08 PROCEDURE — 84132 ASSAY OF SERUM POTASSIUM: CPT

## 2024-11-08 PROCEDURE — 82805 BLOOD GASES W/O2 SATURATION: CPT

## 2024-11-08 PROCEDURE — 99254 IP/OBS CNSLTJ NEW/EST MOD 60: CPT | Performed by: INTERNAL MEDICINE

## 2024-11-08 PROCEDURE — 2500000001 HC RX 250 WO HCPCS SELF ADMINISTERED DRUGS (ALT 637 FOR MEDICARE OP): Performed by: STUDENT IN AN ORGANIZED HEALTH CARE EDUCATION/TRAINING PROGRAM

## 2024-11-08 PROCEDURE — 2500000002 HC RX 250 W HCPCS SELF ADMINISTERED DRUGS (ALT 637 FOR MEDICARE OP, ALT 636 FOR OP/ED): Performed by: INTERNAL MEDICINE

## 2024-11-08 PROCEDURE — 83605 ASSAY OF LACTIC ACID: CPT

## 2024-11-08 PROCEDURE — 2500000005 HC RX 250 GENERAL PHARMACY W/O HCPCS

## 2024-11-08 PROCEDURE — 82947 ASSAY GLUCOSE BLOOD QUANT: CPT

## 2024-11-08 PROCEDURE — 2500000004 HC RX 250 GENERAL PHARMACY W/ HCPCS (ALT 636 FOR OP/ED)

## 2024-11-08 PROCEDURE — 84100 ASSAY OF PHOSPHORUS: CPT

## 2024-11-08 PROCEDURE — 83735 ASSAY OF MAGNESIUM: CPT

## 2024-11-08 PROCEDURE — 85025 COMPLETE CBC W/AUTO DIFF WBC: CPT

## 2024-11-08 PROCEDURE — 99291 CRITICAL CARE FIRST HOUR: CPT | Performed by: INTERNAL MEDICINE

## 2024-11-08 PROCEDURE — 2060000001 HC INTERMEDIATE ICU ROOM DAILY

## 2024-11-08 RX ORDER — DEXTROSE 50 % IN WATER (D50W) INTRAVENOUS SYRINGE
25
Status: DISCONTINUED | OUTPATIENT
Start: 2024-11-08 | End: 2024-11-09 | Stop reason: HOSPADM

## 2024-11-08 RX ORDER — ONDANSETRON 4 MG/1
4 TABLET, ORALLY DISINTEGRATING ORAL EVERY 8 HOURS PRN
Status: DISCONTINUED | OUTPATIENT
Start: 2024-11-08 | End: 2024-11-09 | Stop reason: HOSPADM

## 2024-11-08 RX ORDER — INSULIN LISPRO 100 [IU]/ML
0-10 INJECTION, SOLUTION INTRAVENOUS; SUBCUTANEOUS
Status: DISCONTINUED | OUTPATIENT
Start: 2024-11-08 | End: 2024-11-09 | Stop reason: HOSPADM

## 2024-11-08 RX ORDER — POTASSIUM CHLORIDE 14.9 MG/ML
20 INJECTION INTRAVENOUS ONCE
Status: COMPLETED | OUTPATIENT
Start: 2024-11-08 | End: 2024-11-08

## 2024-11-08 RX ORDER — DEXTROSE 50 % IN WATER (D50W) INTRAVENOUS SYRINGE
12.5
Status: DISCONTINUED | OUTPATIENT
Start: 2024-11-08 | End: 2024-11-09 | Stop reason: HOSPADM

## 2024-11-08 RX ORDER — ACETAMINOPHEN 325 MG/1
650 TABLET ORAL EVERY 4 HOURS PRN
Status: DISCONTINUED | OUTPATIENT
Start: 2024-11-08 | End: 2024-11-09 | Stop reason: HOSPADM

## 2024-11-08 RX ORDER — SODIUM,POTASSIUM PHOSPHATES 280-250MG
2 POWDER IN PACKET (EA) ORAL
Status: DISCONTINUED | OUTPATIENT
Start: 2024-11-08 | End: 2024-11-09 | Stop reason: HOSPADM

## 2024-11-08 RX ORDER — ONDANSETRON HYDROCHLORIDE 2 MG/ML
4 INJECTION, SOLUTION INTRAVENOUS EVERY 8 HOURS PRN
Status: DISCONTINUED | OUTPATIENT
Start: 2024-11-08 | End: 2024-11-09 | Stop reason: HOSPADM

## 2024-11-08 RX ORDER — INSULIN GLARGINE 100 [IU]/ML
30 INJECTION, SOLUTION SUBCUTANEOUS NIGHTLY
Status: DISCONTINUED | OUTPATIENT
Start: 2024-11-08 | End: 2024-11-09 | Stop reason: HOSPADM

## 2024-11-08 RX ORDER — INSULIN GLARGINE 100 [IU]/ML
30 INJECTION, SOLUTION SUBCUTANEOUS NIGHTLY
Status: CANCELLED | OUTPATIENT
Start: 2024-11-08

## 2024-11-08 RX ORDER — INSULIN LISPRO 100 [IU]/ML
5 INJECTION, SOLUTION INTRAVENOUS; SUBCUTANEOUS ONCE
Status: COMPLETED | OUTPATIENT
Start: 2024-11-08 | End: 2024-11-08

## 2024-11-08 RX ORDER — LISINOPRIL 5 MG/1
5 TABLET ORAL DAILY
COMMUNITY

## 2024-11-08 RX ORDER — INSULIN LISPRO 100 [IU]/ML
5 INJECTION, SOLUTION INTRAVENOUS; SUBCUTANEOUS
Status: DISCONTINUED | OUTPATIENT
Start: 2024-11-08 | End: 2024-11-09 | Stop reason: HOSPADM

## 2024-11-08 ASSESSMENT — COGNITIVE AND FUNCTIONAL STATUS - GENERAL
DAILY ACTIVITIY SCORE: 24
MOBILITY SCORE: 24
MOBILITY SCORE: 24
DAILY ACTIVITIY SCORE: 24

## 2024-11-08 ASSESSMENT — ENCOUNTER SYMPTOMS
VOMITING: 0
CONFUSION: 0
ABDOMINAL PAIN: 1
NAUSEA: 0

## 2024-11-08 ASSESSMENT — PAIN - FUNCTIONAL ASSESSMENT
PAIN_FUNCTIONAL_ASSESSMENT: 0-10

## 2024-11-08 ASSESSMENT — PAIN SCALES - GENERAL
PAINLEVEL_OUTOF10: 0 - NO PAIN

## 2024-11-08 ASSESSMENT — ACTIVITIES OF DAILY LIVING (ADL): LACK_OF_TRANSPORTATION: NO

## 2024-11-08 NOTE — CONSULTS
Critical Care Medicine Consult      Reason For Consult  DKA    History Of Present Illness  Ed Sanon is a 34 y.o. male with past medical history of diabetes mellitus type 1 with poor insulin compliance and several admissions for episodes of DKA presented to [facility] ED with fatigue and generalized weakness which began around 10 AM. Upon ED workup, temperature 36.7 °C, heart rate 118, respiratory rate 24, blood pressure 140/78, and SpO2 100% on room air.  ED labs revealed blood glucose 394, bicarbonate 4, anion gap 37, BUN 17, creatinine 1.32, phosphorus 5.4, albumin 5.2, total protein 8.5, beta hydroxybutyrate 11.75, WBC 20.3, RBC 5.90, hemoglobin 18.9, hematocrit 56.9, venous pH 6.83, pCO2 31, HCO3 calculated 5.2, and venous lactate 5.3>> 5.1.  Chest x-ray negative for acute cardiopulmonary process.  ED interventions included 1 L of LR IV fluid bolus, insulin infusion at 7 units/h, and normal saline at 125 mL/h.  Patient admitted to ICU and critical care medicine was consulted.    Patient seen and examined in ED bed 13.  Patient alert and oriented x 4.  Patient reported that he came to the ER because he was having weakness and fatigue which began around 10 AM. Patient reported that his blood sugars have been running in the 350s for the last several days and reports that he has been taking his insulin as prescribed.  He denies any recent sick contacts or illnesses.  He denies any fevers, chills, chest pain, cough, congestion, nausea, vomiting, diarrhea, or  abdominal pain. Patient well-known to ICU service due to recurrent admissions for DKA.    Past Medical History:   Diagnosis Date    Abdominal pain 04/21/2023    Anxiety and depression 08/08/2021    Contact with and (suspected) exposure to covid-19 04/21/2023    COVID-19 virus infection 07/11/2023    Diabetes mellitus (Multi)     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    DIABETIC KETO ACIDOSIS    DKA (diabetic ketoacidosis) (Multi) 07/11/2023    DKA, type  "1, not at goal 04/21/2023    DM2 (diabetes mellitus, type 2) (Multi) 11/15/2023    DIABETES    Elevated blood sugar 11/15/2023    ELEVATED BLOOD SUGAR/ 375 LAST CHECK    Elevated blood-pressure reading, without diagnosis of hypertension 09/09/2020    Elevated blood pressure reading    Fatigue 07/11/2023    Generalized anxiety disorder 07/11/2023    Microalbuminuria due to type 1 diabetes mellitus (Multi) 07/11/2023    Personal history of COVID-19 04/02/2023    Poorly controlled diabetes mellitus (Multi) 07/11/2023    Type 1 diabetes mellitus 07/11/2023    Type 1 diabetes mellitus with hyperglycemia (Multi) 02/25/2019    Vitamin B12 deficiency 07/11/2023    Vitamin D deficiency 07/11/2023    Vomiting 11/15/2023    VOMITING HEADACHE BACK ACHE     History reviewed. No pertinent surgical history.  Medications Prior to Admission   Medication Sig Dispense Refill Last Dose/Taking    Guardian 4 Glucose Sensor device Change insulin sensor every 7 days as directed, linked to Minimed insulin pump, check with endocrinology for updated regimen (Patient not taking: Reported on 9/29/2024) 13 each 2     insulin lispro (HumaLOG) 100 unit/mL injection Inject 6 Units under the skin 3 times daily (morning, midday, late afternoon). Take as directed per insulin instructions.       insulin lispro (HumaLOG) 100 unit/mL injection Inject 0-10 Units under the skin 3 times daily (morning, midday, late afternoon). Take as directed per insulin instructions.       insulin NPH, Isophane, (HumuLIN N,NovoLIN N) 100 unit/mL (3 mL) injection Inject 18 Units under the skin 2 times a day before meals. Take as directed per insulin instructions. 60 each 0     lancets misc Inject 1 Units under the skin 2 times a day. Sliding scale       pen needle, diabetic 32 gauge x 5/32\" needle Use as instructed 4 times daily        Patient has no known allergies.  Social History     Tobacco Use    Smoking status: Never    Smokeless tobacco: Never   Vaping Use    Vaping " status: Former   Substance Use Topics    Alcohol use: Never    Drug use: Never     Family History   Problem Relation Name Age of Onset    No Known Problems Mother      No Known Problems Father      Hypertension Other      Coronary artery disease Other      Diabetes Other      Heart failure Other         Scheduled Medications:   heparin (porcine), 5,000 Units, subcutaneous, q8h  lactated Ringer's, 1,000 mL, intravenous, Once         Continuous Medications:   dextrose 5%-0.45 % sodium chloride, 150 mL/hr  insulin regular, 0-50 Units/hr  sodium chloride, 250 mL/hr         PRN Medications:   PRN medications: dextrose, dextrose, dextrose, glucagon, insulin regular    Review of Systems:  Review of Systems   Constitutional:  Positive for fatigue.   Neurological:  Positive for weakness.   All other systems reviewed and are negative.       Objective   Vitals:  Most Recent:  Vitals:    11/07/24 2247   BP: (!) 159/92   Pulse: (!) 120   Resp: (!) 23   Temp:    SpO2: 100%       24hr Min/Max:  Temp  Min: 36.7 °C (98.1 °F)  Max: 36.7 °C (98.1 °F)  Pulse  Min: 115  Max: 120  BP  Min: 140/78  Max: 159/92  Resp  Min: 23  Max: 26  SpO2  Min: 100 %  Max: 100 %        Intake/Output Summary (Last 24 hours) at 11/7/2024 2315  Last data filed at 11/7/2024 2306  Gross per 24 hour   Intake 1100 ml   Output --   Net 1100 ml           Physical exam:    Physical Exam  Constitutional:       Appearance: He is ill-appearing.      Comments: Acetone breath   HENT:      Head: Normocephalic.      Nose: Nose normal.      Mouth/Throat:      Mouth: Mucous membranes are dry.   Eyes:      Extraocular Movements: Extraocular movements intact.      Pupils: Pupils are equal, round, and reactive to light.   Cardiovascular:      Rate and Rhythm: Regular rhythm. Tachycardia present.      Pulses: Normal pulses.      Heart sounds: Normal heart sounds. No murmur heard.  Pulmonary:      Effort: Pulmonary effort is normal. Tachypnea present. No accessory muscle usage  or respiratory distress.      Breath sounds: Normal breath sounds and air entry.   Abdominal:      General: Bowel sounds are normal. There is no distension.      Palpations: Abdomen is soft.      Tenderness: There is no abdominal tenderness.   Musculoskeletal:         General: Normal range of motion.      Cervical back: Normal range of motion.      Right lower leg: No edema.      Left lower leg: No edema.   Skin:     General: Skin is warm and dry.      Capillary Refill: Capillary refill takes less than 2 seconds.   Neurological:      General: No focal deficit present.      Mental Status: He is alert and oriented to person, place, and time.   Psychiatric:         Attention and Perception: Attention normal.         Mood and Affect: Affect is flat.         Speech: Speech normal.         Behavior: Behavior is cooperative.         Thought Content: Thought content normal.         Cognition and Memory: Cognition normal.         Judgment: Judgment normal.          Lab/Radiology/Diagnostic Review:  Results for orders placed or performed during the hospital encounter of 11/07/24 (from the past 24 hours)   POCT GLUCOSE   Result Value Ref Range    POCT Glucose 371 (H) 74 - 99 mg/dL   CBC and Auto Differential   Result Value Ref Range    WBC 20.3 (H) 4.4 - 11.3 x10*3/uL    nRBC 0.0 0.0 - 0.0 /100 WBCs    RBC 5.90 4.50 - 5.90 x10*6/uL    Hemoglobin 18.9 (H) 13.5 - 17.5 g/dL    Hematocrit 56.9 (H) 41.0 - 52.0 %    MCV 96 80 - 100 fL    MCH 32.0 26.0 - 34.0 pg    MCHC 33.2 32.0 - 36.0 g/dL    RDW 14.3 11.5 - 14.5 %    Platelets 266 150 - 450 x10*3/uL    Immature Granulocytes %, Automated 1.2 (H) 0.0 - 0.9 %    Immature Granulocytes Absolute, Automated 0.25 0.00 - 0.70 x10*3/uL   Phosphorus   Result Value Ref Range    Phosphorus 5.4 (H) 2.5 - 4.9 mg/dL   Magnesium   Result Value Ref Range    Magnesium 2.38 1.60 - 2.40 mg/dL   Comprehensive metabolic panel   Result Value Ref Range    Glucose 394 (H) 74 - 99 mg/dL    Sodium 137 136 -  145 mmol/L    Potassium 4.5 3.5 - 5.3 mmol/L    Chloride 101 98 - 107 mmol/L    Bicarbonate 4 (LL) 21 - 32 mmol/L    Anion Gap 37 (H) 10 - 20 mmol/L    Urea Nitrogen 17 6 - 23 mg/dL    Creatinine 1.32 (H) 0.50 - 1.30 mg/dL    eGFR 72 >60 mL/min/1.73m*2    Calcium 9.2 8.6 - 10.3 mg/dL    Albumin 5.2 (H) 3.4 - 5.0 g/dL    Alkaline Phosphatase 100 33 - 120 U/L    Total Protein 8.5 (H) 6.4 - 8.2 g/dL    AST 10 9 - 39 U/L    Bilirubin, Total 0.4 0.0 - 1.2 mg/dL    ALT 12 10 - 52 U/L   Blood Gas Venous Full Panel   Result Value Ref Range    POCT pH, Venous 6.83 (LL) 7.33 - 7.43 pH    POCT pCO2, Venous 31 (L) 41 - 51 mm Hg    POCT pO2, Venous 45 35 - 45 mm Hg    POCT SO2, Venous 71 45 - 75 %    POCT Oxy Hemoglobin, Venous 69.4 45.0 - 75.0 %    POCT Hematocrit Calculated, Venous 56.0 (H) 41.0 - 52.0 %    POCT Sodium, Venous 134 (L) 136 - 145 mmol/L    POCT Potassium, Venous 5.1 3.5 - 5.3 mmol/L    POCT Chloride, Venous 100 98 - 107 mmol/L    POCT Ionized Calicum, Venous 1.31 1.10 - 1.33 mmol/L    POCT Glucose, Venous 449 (H) 74 - 99 mg/dL    POCT Lactate, Venous 5.3 (HH) 0.4 - 2.0 mmol/L    POCT Base Excess, Venous -29.0 (L) -2.0 - 3.0 mmol/L    POCT HCO3 Calculated, Venous 5.2 (L) 22.0 - 26.0 mmol/L    POCT Hemoglobin, Venous 18.7 (H) 13.5 - 17.5 g/dL    POCT Anion Gap, Venous 34.0 (H) 10.0 - 25.0 mmol/L    Patient Temperature 37.0 degrees Celsius    FiO2 21 %   Beta Hydroxybutyrate   Result Value Ref Range    Beta-Hydroxybutyrate 11.75 (H) 0.02 - 0.27 mmol/L   Manual Differential   Result Value Ref Range    Neutrophils %, Manual 80.0 40.0 - 80.0 %    Bands %, Manual 8.0 0.0 - 5.0 %    Lymphocytes %, Manual 5.0 13.0 - 44.0 %    Monocytes %, Manual 7.0 2.0 - 10.0 %    Eosinophils %, Manual 0.0 0.0 - 6.0 %    Basophils %, Manual 0.0 0.0 - 2.0 %    Seg Neutrophils Absolute, Manual 16.24 (H) 1.20 - 7.00 x10*3/uL    Bands Absolute, Manual 1.62 (H) 0.00 - 0.70 x10*3/uL    Lymphocytes Absolute, Manual 1.02 (L) 1.20 - 4.80  x10*3/uL    Monocytes Absolute, Manual 1.42 (H) 0.10 - 1.00 x10*3/uL    Eosinophils Absolute, Manual 0.00 0.00 - 0.70 x10*3/uL    Basophils Absolute, Manual 0.00 0.00 - 0.10 x10*3/uL    Total Cells Counted 100     Neutrophils Absolute, Manual 17.86 (H) 1.20 - 7.70 x10*3/uL    RBC Morphology No significant RBC morphology present    POCT GLUCOSE   Result Value Ref Range    POCT Glucose 356 (H) 74 - 99 mg/dL   Blood Gas Lactic Acid, Venous   Result Value Ref Range    POCT Lactate, Venous 5.1 (HH) 0.4 - 2.0 mmol/L   POCT GLUCOSE   Result Value Ref Range    POCT Glucose 346 (H) 74 - 99 mg/dL     XR chest 1 view    Result Date: 11/7/2024  Interpreted By:  Mark Murillo, STUDY: XR CHEST 1 VIEW;  11/7/2024 9:45 pm   INDICATION: Signs/Symptoms:sob.     COMPARISON: 09/18/2024.   ACCESSION NUMBER(S): [id number]   ORDERING CLINICIAN: MARC MIDDLETON   FINDINGS: AP radiograph of the chest was provided.   Leads overlie the chest, partially obscuring the field-of-view.   CARDIOMEDIASTINAL SILHOUETTE: Cardiomediastinal silhouette is normal in size and configuration.   LUNGS: Lungs are clear. No pleural effusion or pneumothorax.   ABDOMEN: No remarkable upper abdominal findings.   BONES: No acute osseous changes.       1.  No evidence of acute cardiopulmonary process.       MACRO: None   Signed by: Mark Murillo 11/7/2024 10:13 PM Dictation workstation:   [id number]      Assessment/Plan   Principal Problem:    DKA, type 1, not at goal    Severe DKA with type 1 diabetes mellitus with poor compliance  -Presented with generalized weakness and fatigue and found to be in DKA.  Patient reports blood glucose has been in the 350s for last several days.  -Blood glucose 394 on presentation  -Bicarbonate 4  -Anion gap 37  -BUN 17, Creatinine 1.32  -Phosphorus 5.4  -Lactate 5.3>> 5.1  -VBG: pH 6.83, pCO2 31, HCO3 calculated 5.2  -Received 1 L LR IV fluid bolus in ED  -Discontinue normal saline at 125 mL/h  -Give additional 1 L LR IV fluid  bolus  -IV fluids per DKA protocol  -Insulin infusion per DKA protocol  -POCT glucose checks hourly while on insulin infusion  -Follow BMP, phosphorus, magnesium, and VBG full panel every 4 hours while on insulin infusion  -Replete electrolytes as necessary  -Hypoglycemia protocol as needed  -NPO while on insulin infusion  -Monitor intake and output  - Will bridge off insulin infusion once anion gap less than 15 and bicarbonate greater than 15  -Endocrinology consult, input appreciated    HAGMA, elevated lactic acid  -Presented with generalized weakness and fatigue and found to be in DKA.  Patient reports blood glucose has been in the 350s for last several days  -Blood glucose 394 on presentation  -BUN 17  -Creatinine 1.32, baseline creatinine ~0.8  -Bicarbonate 4  -Anion gap 37  -Phosphorus 5.4  -Lactate 5.3>> 5.1  -VBG: pH 6.83, pCO2 31, HCO3 calculated 5.2  -Received 1 L LR IV fluid bolus in ED  -Discontinue normal saline at 125 mL/h  -Give additional 1 L LR IV fluid bolus  -IV fluids per DKA protocol  -Insulin infusion per DKA protocol  -POCT glucose check hourly while on insulin infusion  -Follow BMP, phosphorus, magnesium, and VBG full panel every 4 hours while on insulin infusion  -Replete electrolytes as necessary  -NPO while on insulin infusion  -Monitor intake and output  -Endocrinology consult, input appreciated    MAYA  -Presented with generalized weakness and fatigue and found to be in DKA  -Blood glucose 394 on presentation  -BUN 17  -Creatinine 1.32, baseline creatinine ~0.8  -Bicarbonate 4  -Anion gap 37  -Phosphorus 5.4  -VBG: pH 6.83, pCO2 31, HCO3 calculated 5.2  -Suspect likely prerenal in setting of DKA  -Received 1 L LR IV fluid bolus in ED  -Discontinue normal saline at 125 mL/h  -Give additional 1 L LR IV fluid bolus  -IV fluids per DKA protocol  -Insulin infusion per DKA protocol  -Follow-up BMP, phosphorus, magnesium, and VBG full panel every 4 hours on insulin infusion  -Replete  electrolytes as necessary  -Monitor intake and output  -Avoid nephrotoxic agents as able    Leukocytosis  -Presented with generalized weakness and fatigue  -WBC 20.9  -Lactate 5.3>> 5.1  -Chest x-ray negative for acute cardiopulmonary process  -Suspect likely reactionary in setting of DKA and dehydration   -Received 1 L LR IV fluid bolus in ED  -Continue IV fluids and insulin infusion per DKA protocol  -UA with reflex microscopic and culture pending  -Follow CBC and trend fevers  -Will hold off on antibiotic therapy given no grossly evident etiology of infection      I spent 35 minutes of cumulative critical care time with the patient.  Greater than 50% of that time was spent in the direct collaboration and or coordination of care of the patient.     Dragon dictation software was used to dictate this note and thus there may be minor errors in translation/transcription including garbled speech or misspellings. Please contact for clarification if needed.

## 2024-11-08 NOTE — CONSULTS
Inpatient consult to Endocrinology  Consult performed by: Lori Packer MD  Consult ordered by: Yaron Pack MD  Reason for consult: freuqent DKA events, On NPH but now has insurance          Reason For Consult  freuqent DKA events, On NPH but now has insurance     History Of Present Illness  Ed Sanon is a 34 y.o. male presenting with fatigue and generalized weakness.  His sugars, as per usual, were in the 350mg/dL range.  He was found to be tachycardic.  Initial lab data revealed a glucose value of 394mg/dL, bicarbonate 4, anion gap 37,  beta hydroxybutyrate 11.75, and venous pH 6.83.  Chest x-ray negative for acute cardiopulmonary process.  He was given IVF and started on an insulin infusion with admittance to the ICU.  His insulin infusion has now been discontinued as there was currently now IVF D5 available to infuse with it.     His home regimen consists of:  NPH 18 units subcutaneous twice daily    Humalog 6 units TID AC  However, he states that he cut down his Humalog to 2 units as his glucose value can drop by 250mg/dL      He saw Phoebe Villar NP at [facility].      He has recently obtained insurance.    His A1C was 13.3% as of 4 months ago.    He is about to eat lunch.      Past Medical History  He has a past medical history of Abdominal pain (04/21/2023), Anxiety and depression (08/08/2021), Contact with and (suspected) exposure to covid-19 (04/21/2023), COVID-19 virus infection (07/11/2023), Diabetes mellitus (Multi), Diabetic acetonemia (Multi) (11/15/2023), DKA (diabetic ketoacidosis) (Multi) (07/11/2023), DKA, type 1, not at goal (04/21/2023), DM2 (diabetes mellitus, type 2) (Multi) (11/15/2023), Elevated blood sugar (11/15/2023), Elevated blood-pressure reading, without diagnosis of hypertension (09/09/2020), Fatigue (07/11/2023), Generalized anxiety disorder (07/11/2023), Microalbuminuria due to type 1 diabetes mellitus (Multi) (07/11/2023), Personal history of COVID-19  "(04/02/2023), Poorly controlled diabetes mellitus (Multi) (07/11/2023), Type 1 diabetes mellitus (07/11/2023), Type 1 diabetes mellitus with hyperglycemia (Multi) (02/25/2019), Vitamin B12 deficiency (07/11/2023), Vitamin D deficiency (07/11/2023), and Vomiting (11/15/2023).    Surgical History  He has no past surgical history on file.     Social History  He reports that he has never smoked. He has never used smokeless tobacco. He reports that he does not drink alcohol and does not use drugs.    Family History  Family History   Problem Relation Name Age of Onset    No Known Problems Mother      No Known Problems Father      Hypertension Other      Coronary artery disease Other      Diabetes Other      Heart failure Other          Allergies  Patient has no known allergies.    Review of Systems   Gastrointestinal:  Positive for abdominal pain. Negative for nausea and vomiting.   Psychiatric/Behavioral:  Negative for confusion.    All other systems reviewed and are negative.       Physical Exam  Vitals and nursing note reviewed.   Constitutional:       General: He is not in acute distress.     Appearance: Normal appearance. He is normal weight.   HENT:      Head: Normocephalic and atraumatic.      Nose: Nose normal.      Mouth/Throat:      Mouth: Mucous membranes are moist.   Eyes:      Extraocular Movements: Extraocular movements intact.   Cardiovascular:      Rate and Rhythm: Normal rate and regular rhythm.   Pulmonary:      Effort: Pulmonary effort is normal.   Musculoskeletal:         General: Normal range of motion.   Neurological:      Mental Status: He is alert and oriented to person, place, and time.   Psychiatric:         Mood and Affect: Mood normal.          Last Recorded Vitals  Blood pressure 115/72, pulse 100, temperature 36.9 °C (98.5 °F), temperature source Temporal, resp. rate 15, height 1.778 m (5' 10\"), weight 63.9 kg (140 lb 14 oz), SpO2 99%.    Relevant Results  Scheduled medications  potassium " phosphate, 21 mmol, intravenous, Once  potassium, sodium phosphates, 2 packet, oral, With meals & nightly      Continuous medications  dextrose 5%-0.45 % sodium chloride, 150 mL/hr, Last Rate: 150 mL/hr (11/08/24 0523)  insulin regular, 0-50 Units/hr, Last Rate: 7 Units/hr (11/08/24 0653)  sodium chloride, 250 mL/hr, Last Rate: Stopped (11/08/24 0121)      PRN medications  PRN medications: dextrose, dextrose, dextrose, glucagon, insulin regular, ondansetron ODT **OR** ondansetron    Results for orders placed or performed during the hospital encounter of 11/07/24 (from the past 96 hours)   POCT GLUCOSE   Result Value Ref Range    POCT Glucose 371 (H) 74 - 99 mg/dL   CBC and Auto Differential   Result Value Ref Range    WBC 20.3 (H) 4.4 - 11.3 x10*3/uL    nRBC 0.0 0.0 - 0.0 /100 WBCs    RBC 5.90 4.50 - 5.90 x10*6/uL    Hemoglobin 18.9 (H) 13.5 - 17.5 g/dL    Hematocrit 56.9 (H) 41.0 - 52.0 %    MCV 96 80 - 100 fL    MCH 32.0 26.0 - 34.0 pg    MCHC 33.2 32.0 - 36.0 g/dL    RDW 14.3 11.5 - 14.5 %    Platelets 266 150 - 450 x10*3/uL    Immature Granulocytes %, Automated 1.2 (H) 0.0 - 0.9 %    Immature Granulocytes Absolute, Automated 0.25 0.00 - 0.70 x10*3/uL   Phosphorus   Result Value Ref Range    Phosphorus 5.4 (H) 2.5 - 4.9 mg/dL   Magnesium   Result Value Ref Range    Magnesium 2.38 1.60 - 2.40 mg/dL   Comprehensive metabolic panel   Result Value Ref Range    Glucose 394 (H) 74 - 99 mg/dL    Sodium 137 136 - 145 mmol/L    Potassium 4.5 3.5 - 5.3 mmol/L    Chloride 101 98 - 107 mmol/L    Bicarbonate 4 (LL) 21 - 32 mmol/L    Anion Gap 37 (H) 10 - 20 mmol/L    Urea Nitrogen 17 6 - 23 mg/dL    Creatinine 1.32 (H) 0.50 - 1.30 mg/dL    eGFR 72 >60 mL/min/1.73m*2    Calcium 9.2 8.6 - 10.3 mg/dL    Albumin 5.2 (H) 3.4 - 5.0 g/dL    Alkaline Phosphatase 100 33 - 120 U/L    Total Protein 8.5 (H) 6.4 - 8.2 g/dL    AST 10 9 - 39 U/L    Bilirubin, Total 0.4 0.0 - 1.2 mg/dL    ALT 12 10 - 52 U/L   Blood Gas Venous Full Panel    Result Value Ref Range    POCT pH, Venous 6.83 (LL) 7.33 - 7.43 pH    POCT pCO2, Venous 31 (L) 41 - 51 mm Hg    POCT pO2, Venous 45 35 - 45 mm Hg    POCT SO2, Venous 71 45 - 75 %    POCT Oxy Hemoglobin, Venous 69.4 45.0 - 75.0 %    POCT Hematocrit Calculated, Venous 56.0 (H) 41.0 - 52.0 %    POCT Sodium, Venous 134 (L) 136 - 145 mmol/L    POCT Potassium, Venous 5.1 3.5 - 5.3 mmol/L    POCT Chloride, Venous 100 98 - 107 mmol/L    POCT Ionized Calicum, Venous 1.31 1.10 - 1.33 mmol/L    POCT Glucose, Venous 449 (H) 74 - 99 mg/dL    POCT Lactate, Venous 5.3 (HH) 0.4 - 2.0 mmol/L    POCT Base Excess, Venous -29.0 (L) -2.0 - 3.0 mmol/L    POCT HCO3 Calculated, Venous 5.2 (L) 22.0 - 26.0 mmol/L    POCT Hemoglobin, Venous 18.7 (H) 13.5 - 17.5 g/dL    POCT Anion Gap, Venous 34.0 (H) 10.0 - 25.0 mmol/L    Patient Temperature 37.0 degrees Celsius    FiO2 21 %   Beta Hydroxybutyrate   Result Value Ref Range    Beta-Hydroxybutyrate 11.75 (H) 0.02 - 0.27 mmol/L   Manual Differential   Result Value Ref Range    Neutrophils %, Manual 80.0 40.0 - 80.0 %    Bands %, Manual 8.0 0.0 - 5.0 %    Lymphocytes %, Manual 5.0 13.0 - 44.0 %    Monocytes %, Manual 7.0 2.0 - 10.0 %    Eosinophils %, Manual 0.0 0.0 - 6.0 %    Basophils %, Manual 0.0 0.0 - 2.0 %    Seg Neutrophils Absolute, Manual 16.24 (H) 1.20 - 7.00 x10*3/uL    Bands Absolute, Manual 1.62 (H) 0.00 - 0.70 x10*3/uL    Lymphocytes Absolute, Manual 1.02 (L) 1.20 - 4.80 x10*3/uL    Monocytes Absolute, Manual 1.42 (H) 0.10 - 1.00 x10*3/uL    Eosinophils Absolute, Manual 0.00 0.00 - 0.70 x10*3/uL    Basophils Absolute, Manual 0.00 0.00 - 0.10 x10*3/uL    Total Cells Counted 100     Neutrophils Absolute, Manual 17.86 (H) 1.20 - 7.70 x10*3/uL    RBC Morphology No significant RBC morphology present    POCT GLUCOSE   Result Value Ref Range    POCT Glucose 356 (H) 74 - 99 mg/dL   Blood Gas Lactic Acid, Venous   Result Value Ref Range    POCT Lactate, Venous 5.1 (HH) 0.4 - 2.0 mmol/L    POCT GLUCOSE   Result Value Ref Range    POCT Glucose 346 (H) 74 - 99 mg/dL   POCT GLUCOSE   Result Value Ref Range    POCT Glucose 318 (H) 74 - 99 mg/dL   Urinalysis with Reflex Culture and Microscopic   Result Value Ref Range    Color, Urine Colorless (N) Light-Yellow, Yellow, Dark-Yellow    Appearance, Urine Clear Clear    Specific Gravity, Urine >1.030 1.005 - 1.035    pH, Urine 5.5 5.0, 5.5, 6.0, 6.5, 7.0, 7.5, 8.0    Protein, Urine 70 (1+) (A) NEGATIVE, 10 (TRACE), 20 (TRACE) mg/dL    Glucose, Urine OVER (4+) (A) Normal mg/dL    Blood, Urine 0.03 (TRACE) (A) NEGATIVE    Ketones, Urine OVER (4+) (A) NEGATIVE mg/dL    Bilirubin, Urine NEGATIVE NEGATIVE    Urobilinogen, Urine Normal Normal mg/dL    Nitrite, Urine NEGATIVE NEGATIVE    Leukocyte Esterase, Urine NEGATIVE NEGATIVE   Urinalysis Microscopic   Result Value Ref Range    WBC, Urine NONE 1-5, NONE /HPF    RBC, Urine NONE NONE, 1-2, 3-5 /HPF    Mucus, Urine 1+ Reference range not established. /LPF    Hyaline Casts, Urine 3+ (A) NONE /LPF    Amorphous Crystals, Urine 1+ NONE, 1+, 2+ /HPF   POCT GLUCOSE   Result Value Ref Range    POCT Glucose 271 (H) 74 - 99 mg/dL   Magnesium   Result Value Ref Range    Magnesium 2.23 1.60 - 2.40 mg/dL   Phosphorus   Result Value Ref Range    Phosphorus 4.2 2.5 - 4.9 mg/dL   Blood Gas Venous Full Panel   Result Value Ref Range    POCT pH, Venous 6.91 (LL) 7.33 - 7.43 pH    POCT pCO2, Venous 22 (L) 41 - 51 mm Hg    POCT pO2, Venous 43 35 - 45 mm Hg    POCT SO2, Venous 74 45 - 75 %    POCT Oxy Hemoglobin, Venous 72.6 45.0 - 75.0 %    POCT Hematocrit Calculated, Venous 52.0 41.0 - 52.0 %    POCT Sodium, Venous 133 (L) 136 - 145 mmol/L    POCT Potassium, Venous 5.3 3.5 - 5.3 mmol/L    POCT Chloride, Venous 106 98 - 107 mmol/L    POCT Ionized Calicum, Venous 1.32 1.10 - 1.33 mmol/L    POCT Glucose, Venous 289 (H) 74 - 99 mg/dL    POCT Lactate, Venous 5.4 (HH) 0.4 - 2.0 mmol/L    POCT Base Excess, Venous -27.5 (L) -2.0 - 3.0  mmol/L    POCT HCO3 Calculated, Venous 4.4 (L) 22.0 - 26.0 mmol/L    POCT Hemoglobin, Venous 17.3 13.5 - 17.5 g/dL    POCT Anion Gap, Venous 28.0 (H) 10.0 - 25.0 mmol/L    Patient Temperature 37.0 degrees Celsius    FiO2 21 %   Basic Metabolic Panel   Result Value Ref Range    Glucose 263 (H) 74 - 99 mg/dL    Sodium 137 136 - 145 mmol/L    Potassium 5.8 (H) 3.5 - 5.3 mmol/L    Chloride 108 (H) 98 - 107 mmol/L    Bicarbonate 4 (LL) 21 - 32 mmol/L    Anion Gap 31 (H) 10 - 20 mmol/L    Urea Nitrogen 16 6 - 23 mg/dL    Creatinine 1.15 0.50 - 1.30 mg/dL    eGFR 85 >60 mL/min/1.73m*2    Calcium 8.2 (L) 8.6 - 10.3 mg/dL   POCT GLUCOSE   Result Value Ref Range    POCT Glucose 229 (H) 74 - 99 mg/dL   POCT GLUCOSE   Result Value Ref Range    POCT Glucose 208 (H) 74 - 99 mg/dL   Blood Gas Venous Full Panel   Result Value Ref Range    POCT pH, Venous 7.08 (LL) 7.33 - 7.43 pH    POCT pCO2, Venous 21 (L) 41 - 51 mm Hg    POCT pO2, Venous 38 35 - 45 mm Hg    POCT SO2, Venous 72 45 - 75 %    POCT Oxy Hemoglobin, Venous 71.2 45.0 - 75.0 %    POCT Hematocrit Calculated, Venous 47.0 41.0 - 52.0 %    POCT Sodium, Venous 128 (L) 136 - 145 mmol/L    POCT Potassium, Venous 9.0 (HH) 3.5 - 5.3 mmol/L    POCT Chloride, Venous 107 98 - 107 mmol/L    POCT Ionized Calicum, Venous 1.19 1.10 - 1.33 mmol/L    POCT Glucose, Venous 221 (H) 74 - 99 mg/dL    POCT Lactate, Venous 2.5 (H) 0.4 - 2.0 mmol/L    POCT Base Excess, Venous -22.0 (L) -2.0 - 3.0 mmol/L    POCT HCO3 Calculated, Venous 6.2 (L) 22.0 - 26.0 mmol/L    POCT Hemoglobin, Venous 15.8 13.5 - 17.5 g/dL    POCT Anion Gap, Venous 24.0 10.0 - 25.0 mmol/L    Patient Temperature 37.0 degrees Celsius    FiO2 21 %   BLOOD GAS VENOUS   Result Value Ref Range    POCT pH, Venous 7.08 (LL) 7.33 - 7.43 pH    POCT pCO2, Venous 21 (L) 41 - 51 mm Hg    POCT pO2, Venous 38 35 - 45 mm Hg    POCT SO2, Venous 72 45 - 75 %    POCT Oxy Hemoglobin, Venous 71.2 45.0 - 75.0 %    POCT Base Excess, Venous -22.0 (L)  -2.0 - 3.0 mmol/L    POCT HCO3 Calculated, Venous 6.2 (L) 22.0 - 26.0 mmol/L    Patient Temperature 37.0 degrees Celsius    FiO2 21 %   Basic Metabolic Panel   Result Value Ref Range    Glucose 248 (H) 74 - 99 mg/dL    Sodium 135 (L) 136 - 145 mmol/L    Potassium 4.1 3.5 - 5.3 mmol/L    Chloride 111 (H) 98 - 107 mmol/L    Bicarbonate 6 (LL) 21 - 32 mmol/L    Anion Gap 22 (H) 10 - 20 mmol/L    Urea Nitrogen 13 6 - 23 mg/dL    Creatinine 0.88 0.50 - 1.30 mg/dL    eGFR >90 >60 mL/min/1.73m*2    Calcium 7.8 (L) 8.6 - 10.3 mg/dL   Phosphorus   Result Value Ref Range    Phosphorus 2.5 2.5 - 4.9 mg/dL   Magnesium   Result Value Ref Range    Magnesium 1.82 1.60 - 2.40 mg/dL   POCT GLUCOSE   Result Value Ref Range    POCT Glucose 211 (H) 74 - 99 mg/dL   POCT GLUCOSE   Result Value Ref Range    POCT Glucose 222 (H) 74 - 99 mg/dL   POCT GLUCOSE   Result Value Ref Range    POCT Glucose 229 (H) 74 - 99 mg/dL   POCT GLUCOSE   Result Value Ref Range    POCT Glucose 217 (H) 74 - 99 mg/dL   CBC and Auto Differential   Result Value Ref Range    WBC 10.5 4.4 - 11.3 x10*3/uL    nRBC 0.0 0.0 - 0.0 /100 WBCs    RBC 4.52 4.50 - 5.90 x10*6/uL    Hemoglobin 14.6 13.5 - 17.5 g/dL    Hematocrit 41.2 41.0 - 52.0 %    MCV 91 80 - 100 fL    MCH 32.3 26.0 - 34.0 pg    MCHC 35.4 32.0 - 36.0 g/dL    RDW 14.0 11.5 - 14.5 %    Platelets 189 150 - 450 x10*3/uL    Neutrophils % 77.3 40.0 - 80.0 %    Immature Granulocytes %, Automated 0.9 0.0 - 0.9 %    Lymphocytes % 9.2 13.0 - 44.0 %    Monocytes % 12.4 2.0 - 10.0 %    Eosinophils % 0.0 0.0 - 6.0 %    Basophils % 0.2 0.0 - 2.0 %    Neutrophils Absolute 8.11 (H) 1.20 - 7.70 x10*3/uL    Immature Granulocytes Absolute, Automated 0.09 0.00 - 0.70 x10*3/uL    Lymphocytes Absolute 0.97 (L) 1.20 - 4.80 x10*3/uL    Monocytes Absolute 1.30 (H) 0.10 - 1.00 x10*3/uL    Eosinophils Absolute 0.00 0.00 - 0.70 x10*3/uL    Basophils Absolute 0.02 0.00 - 0.10 x10*3/uL   BLOOD GAS VENOUS   Result Value Ref Range    POCT  pH, Venous 7.27 (L) 7.33 - 7.43 pH    POCT pCO2, Venous 23 (L) 41 - 51 mm Hg    POCT pO2, Venous 71 (H) 35 - 45 mm Hg    POCT SO2, Venous 97 (H) 45 - 75 %    POCT Oxy Hemoglobin, Venous 94.9 (H) 45.0 - 75.0 %    POCT Base Excess, Venous -14.4 (L) -2.0 - 3.0 mmol/L    POCT HCO3 Calculated, Venous 10.6 (L) 22.0 - 26.0 mmol/L    Patient Temperature 37.0 degrees Celsius    FiO2 21 %   Magnesium   Result Value Ref Range    Magnesium 1.70 1.60 - 2.40 mg/dL   Phosphorus   Result Value Ref Range    Phosphorus 1.2 (L) 2.5 - 4.9 mg/dL   Basic metabolic panel   Result Value Ref Range    Glucose 229 (H) 74 - 99 mg/dL    Sodium 134 (L) 136 - 145 mmol/L    Potassium 3.6 3.5 - 5.3 mmol/L    Chloride 113 (H) 98 - 107 mmol/L    Bicarbonate 11 (L) 21 - 32 mmol/L    Anion Gap 14 10 - 20 mmol/L    Urea Nitrogen 12 6 - 23 mg/dL    Creatinine 0.81 0.50 - 1.30 mg/dL    eGFR >90 >60 mL/min/1.73m*2    Calcium 8.0 (L) 8.6 - 10.3 mg/dL   POCT GLUCOSE   Result Value Ref Range    POCT Glucose 195 (H) 74 - 99 mg/dL   Lactate   Result Value Ref Range    Lactate 1.0 0.4 - 2.0 mmol/L   POCT GLUCOSE   Result Value Ref Range    POCT Glucose 189 (H) 74 - 99 mg/dL   POCT GLUCOSE   Result Value Ref Range    POCT Glucose 191 (H) 74 - 99 mg/dL   BLOOD GAS VENOUS   Result Value Ref Range    POCT pH, Venous 7.25 (LL) 7.33 - 7.43 pH    POCT pCO2, Venous 37 (L) 41 - 51 mm Hg    POCT pO2, Venous 37 35 - 45 mm Hg    POCT SO2, Venous 73 45 - 75 %    POCT Oxy Hemoglobin, Venous 71.3 45.0 - 75.0 %    POCT Base Excess, Venous -10.3 (L) -2.0 - 3.0 mmol/L    POCT HCO3 Calculated, Venous 16.2 (L) 22.0 - 26.0 mmol/L    Patient Temperature 37.0 degrees Celsius    FiO2 21 %   Magnesium   Result Value Ref Range    Magnesium 1.65 1.60 - 2.40 mg/dL   Phosphorus   Result Value Ref Range    Phosphorus 1.5 (L) 2.5 - 4.9 mg/dL   Basic metabolic panel   Result Value Ref Range    Glucose 189 (H) 74 - 99 mg/dL    Sodium 134 (L) 136 - 145 mmol/L    Potassium 3.4 (L) 3.5 - 5.3  mmol/L    Chloride 112 (H) 98 - 107 mmol/L    Bicarbonate 17 (L) 21 - 32 mmol/L    Anion Gap 8 (L) 10 - 20 mmol/L    Urea Nitrogen 12 6 - 23 mg/dL    Creatinine 0.79 0.50 - 1.30 mg/dL    eGFR >90 >60 mL/min/1.73m*2    Calcium 7.8 (L) 8.6 - 10.3 mg/dL   POCT GLUCOSE   Result Value Ref Range    POCT Glucose 151 (H) 74 - 99 mg/dL       XR chest 1 view    Result Date: 11/7/2024  Interpreted By:  Mark Murillo, STUDY: XR CHEST 1 VIEW;  11/7/2024 9:45 pm   INDICATION: Signs/Symptoms:sob.     COMPARISON: 09/18/2024.   ACCESSION NUMBER(S): [id number]   ORDERING CLINICIAN: MARC MIDDLETON   FINDINGS: AP radiograph of the chest was provided.   Leads overlie the chest, partially obscuring the field-of-view.   CARDIOMEDIASTINAL SILHOUETTE: Cardiomediastinal silhouette is normal in size and configuration.   LUNGS: Lungs are clear. No pleural effusion or pneumothorax.   ABDOMEN: No remarkable upper abdominal findings.   BONES: No acute osseous changes.       1.  No evidence of acute cardiopulmonary process.       MACRO: None   Signed by: Mark Murillo 11/7/2024 10:13 PM Dictation workstation:   [id number]    Assessment/Plan     IMPRESSION     DKA  POORLY CONTROLLED TYPE I DIABETES MELLITUS  A1C of 13.3% 4 months ago      RECOMMENDATIONS:  To commence NPH 10 units subcutaneous x 1   To commence Lantus 30 units subcutaneous bedtime   To commence an insulin correction scale TID AC   To commence Humalog 5 units TID AC   Diabetic diet as tolerated   Accu-Cheks ACHS   Hypoglycemic protocol   Will continue to follow    Thank you for the courtesy of this consult     Lori Packer MD

## 2024-11-08 NOTE — PROGRESS NOTES
Critical Care Daily Progress Notes        Subjective   Patient is a 34 y.o. male admitted on 11/7/2024  8:56 PM with the following indication(s) for ICU care: DKA.     Interval History:   No acute events overnight  Feels better  Tolerating p.o. intake  Anion gap is closed.       Scheduled Medications:   heparin (porcine), 5,000 Units, subcutaneous, q8h  potassium phosphate, 21 mmol, intravenous, Once         Continuous Medications:   dextrose 5%-0.45 % sodium chloride, 150 mL/hr, Last Rate: 150 mL/hr (11/08/24 0523)  insulin regular, 0-50 Units/hr, Last Rate: 7 Units/hr (11/08/24 0653)  sodium chloride, 250 mL/hr, Last Rate: Stopped (11/08/24 0121)         PRN Medications:   PRN medications: dextrose, dextrose, dextrose, glucagon, insulin regular, ondansetron ODT **OR** ondansetron    Objective   Vitals:  Most Recent:  Vitals:    11/08/24 0900   BP: 113/77   Pulse: 101   Resp: (!) 6   Temp:    SpO2: 100%       24hr Min/Max:  Temp  Min: 36.4 °C (97.5 °F)  Max: 36.9 °C (98.5 °F)  Pulse  Min: 101  Max: 129  BP  Min: 105/68  Max: 162/93  Resp  Min: 6  Max: 31  SpO2  Min: 97 %  Max: 100 %    LDA:         Vent settings:       Hemodynamic parameters for last 24 hours:       I/O:    Intake/Output Summary (Last 24 hours) at 11/8/2024 0931  Last data filed at 11/8/2024 0506  Gross per 24 hour   Intake 1100 ml   Output 2400 ml   Net -1300 ml       Physical Exam:   Physical Exam  Vitals reviewed.   Constitutional:       Appearance: Normal appearance.   HENT:      Head: Normocephalic and atraumatic.   Eyes:      Pupils: Pupils are equal, round, and reactive to light.   Cardiovascular:      Rate and Rhythm: Normal rate and regular rhythm.   Pulmonary:      Effort: Pulmonary effort is normal.      Breath sounds: Normal breath sounds.   Abdominal:      Palpations: Abdomen is soft.   Musculoskeletal:         General: No swelling or tenderness.   Skin:     General: Skin is warm and dry.      Capillary Refill: Capillary refill takes  less than 2 seconds.   Neurological:      General: No focal deficit present.      Mental Status: He is alert and oriented to person, place, and time.   Psychiatric:         Mood and Affect: Mood normal.         Behavior: Behavior normal.          Lab/Radiology/Diagnostic Review:  Results for orders placed or performed during the hospital encounter of 11/07/24 (from the past 24 hours)   POCT GLUCOSE   Result Value Ref Range    POCT Glucose 371 (H) 74 - 99 mg/dL   CBC and Auto Differential   Result Value Ref Range    WBC 20.3 (H) 4.4 - 11.3 x10*3/uL    nRBC 0.0 0.0 - 0.0 /100 WBCs    RBC 5.90 4.50 - 5.90 x10*6/uL    Hemoglobin 18.9 (H) 13.5 - 17.5 g/dL    Hematocrit 56.9 (H) 41.0 - 52.0 %    MCV 96 80 - 100 fL    MCH 32.0 26.0 - 34.0 pg    MCHC 33.2 32.0 - 36.0 g/dL    RDW 14.3 11.5 - 14.5 %    Platelets 266 150 - 450 x10*3/uL    Immature Granulocytes %, Automated 1.2 (H) 0.0 - 0.9 %    Immature Granulocytes Absolute, Automated 0.25 0.00 - 0.70 x10*3/uL   Phosphorus   Result Value Ref Range    Phosphorus 5.4 (H) 2.5 - 4.9 mg/dL   Magnesium   Result Value Ref Range    Magnesium 2.38 1.60 - 2.40 mg/dL   Comprehensive metabolic panel   Result Value Ref Range    Glucose 394 (H) 74 - 99 mg/dL    Sodium 137 136 - 145 mmol/L    Potassium 4.5 3.5 - 5.3 mmol/L    Chloride 101 98 - 107 mmol/L    Bicarbonate 4 (LL) 21 - 32 mmol/L    Anion Gap 37 (H) 10 - 20 mmol/L    Urea Nitrogen 17 6 - 23 mg/dL    Creatinine 1.32 (H) 0.50 - 1.30 mg/dL    eGFR 72 >60 mL/min/1.73m*2    Calcium 9.2 8.6 - 10.3 mg/dL    Albumin 5.2 (H) 3.4 - 5.0 g/dL    Alkaline Phosphatase 100 33 - 120 U/L    Total Protein 8.5 (H) 6.4 - 8.2 g/dL    AST 10 9 - 39 U/L    Bilirubin, Total 0.4 0.0 - 1.2 mg/dL    ALT 12 10 - 52 U/L   Blood Gas Venous Full Panel   Result Value Ref Range    POCT pH, Venous 6.83 (LL) 7.33 - 7.43 pH    POCT pCO2, Venous 31 (L) 41 - 51 mm Hg    POCT pO2, Venous 45 35 - 45 mm Hg    POCT SO2, Venous 71 45 - 75 %    POCT Oxy Hemoglobin, Venous  69.4 45.0 - 75.0 %    POCT Hematocrit Calculated, Venous 56.0 (H) 41.0 - 52.0 %    POCT Sodium, Venous 134 (L) 136 - 145 mmol/L    POCT Potassium, Venous 5.1 3.5 - 5.3 mmol/L    POCT Chloride, Venous 100 98 - 107 mmol/L    POCT Ionized Calicum, Venous 1.31 1.10 - 1.33 mmol/L    POCT Glucose, Venous 449 (H) 74 - 99 mg/dL    POCT Lactate, Venous 5.3 (HH) 0.4 - 2.0 mmol/L    POCT Base Excess, Venous -29.0 (L) -2.0 - 3.0 mmol/L    POCT HCO3 Calculated, Venous 5.2 (L) 22.0 - 26.0 mmol/L    POCT Hemoglobin, Venous 18.7 (H) 13.5 - 17.5 g/dL    POCT Anion Gap, Venous 34.0 (H) 10.0 - 25.0 mmol/L    Patient Temperature 37.0 degrees Celsius    FiO2 21 %   Beta Hydroxybutyrate   Result Value Ref Range    Beta-Hydroxybutyrate 11.75 (H) 0.02 - 0.27 mmol/L   Manual Differential   Result Value Ref Range    Neutrophils %, Manual 80.0 40.0 - 80.0 %    Bands %, Manual 8.0 0.0 - 5.0 %    Lymphocytes %, Manual 5.0 13.0 - 44.0 %    Monocytes %, Manual 7.0 2.0 - 10.0 %    Eosinophils %, Manual 0.0 0.0 - 6.0 %    Basophils %, Manual 0.0 0.0 - 2.0 %    Seg Neutrophils Absolute, Manual 16.24 (H) 1.20 - 7.00 x10*3/uL    Bands Absolute, Manual 1.62 (H) 0.00 - 0.70 x10*3/uL    Lymphocytes Absolute, Manual 1.02 (L) 1.20 - 4.80 x10*3/uL    Monocytes Absolute, Manual 1.42 (H) 0.10 - 1.00 x10*3/uL    Eosinophils Absolute, Manual 0.00 0.00 - 0.70 x10*3/uL    Basophils Absolute, Manual 0.00 0.00 - 0.10 x10*3/uL    Total Cells Counted 100     Neutrophils Absolute, Manual 17.86 (H) 1.20 - 7.70 x10*3/uL    RBC Morphology No significant RBC morphology present    POCT GLUCOSE   Result Value Ref Range    POCT Glucose 356 (H) 74 - 99 mg/dL   Blood Gas Lactic Acid, Venous   Result Value Ref Range    POCT Lactate, Venous 5.1 (HH) 0.4 - 2.0 mmol/L   POCT GLUCOSE   Result Value Ref Range    POCT Glucose 346 (H) 74 - 99 mg/dL   POCT GLUCOSE   Result Value Ref Range    POCT Glucose 318 (H) 74 - 99 mg/dL   Urinalysis with Reflex Culture and Microscopic   Result  Value Ref Range    Color, Urine Colorless (N) Light-Yellow, Yellow, Dark-Yellow    Appearance, Urine Clear Clear    Specific Gravity, Urine >1.030 1.005 - 1.035    pH, Urine 5.5 5.0, 5.5, 6.0, 6.5, 7.0, 7.5, 8.0    Protein, Urine 70 (1+) (A) NEGATIVE, 10 (TRACE), 20 (TRACE) mg/dL    Glucose, Urine OVER (4+) (A) Normal mg/dL    Blood, Urine 0.03 (TRACE) (A) NEGATIVE    Ketones, Urine OVER (4+) (A) NEGATIVE mg/dL    Bilirubin, Urine NEGATIVE NEGATIVE    Urobilinogen, Urine Normal Normal mg/dL    Nitrite, Urine NEGATIVE NEGATIVE    Leukocyte Esterase, Urine NEGATIVE NEGATIVE   Urinalysis Microscopic   Result Value Ref Range    WBC, Urine NONE 1-5, NONE /HPF    RBC, Urine NONE NONE, 1-2, 3-5 /HPF    Mucus, Urine 1+ Reference range not established. /LPF    Hyaline Casts, Urine 3+ (A) NONE /LPF    Amorphous Crystals, Urine 1+ NONE, 1+, 2+ /HPF   POCT GLUCOSE   Result Value Ref Range    POCT Glucose 271 (H) 74 - 99 mg/dL   Magnesium   Result Value Ref Range    Magnesium 2.23 1.60 - 2.40 mg/dL   Phosphorus   Result Value Ref Range    Phosphorus 4.2 2.5 - 4.9 mg/dL   Blood Gas Venous Full Panel   Result Value Ref Range    POCT pH, Venous 6.91 (LL) 7.33 - 7.43 pH    POCT pCO2, Venous 22 (L) 41 - 51 mm Hg    POCT pO2, Venous 43 35 - 45 mm Hg    POCT SO2, Venous 74 45 - 75 %    POCT Oxy Hemoglobin, Venous 72.6 45.0 - 75.0 %    POCT Hematocrit Calculated, Venous 52.0 41.0 - 52.0 %    POCT Sodium, Venous 133 (L) 136 - 145 mmol/L    POCT Potassium, Venous 5.3 3.5 - 5.3 mmol/L    POCT Chloride, Venous 106 98 - 107 mmol/L    POCT Ionized Calicum, Venous 1.32 1.10 - 1.33 mmol/L    POCT Glucose, Venous 289 (H) 74 - 99 mg/dL    POCT Lactate, Venous 5.4 (HH) 0.4 - 2.0 mmol/L    POCT Base Excess, Venous -27.5 (L) -2.0 - 3.0 mmol/L    POCT HCO3 Calculated, Venous 4.4 (L) 22.0 - 26.0 mmol/L    POCT Hemoglobin, Venous 17.3 13.5 - 17.5 g/dL    POCT Anion Gap, Venous 28.0 (H) 10.0 - 25.0 mmol/L    Patient Temperature 37.0 degrees Celsius     FiO2 21 %   Basic Metabolic Panel   Result Value Ref Range    Glucose 263 (H) 74 - 99 mg/dL    Sodium 137 136 - 145 mmol/L    Potassium 5.8 (H) 3.5 - 5.3 mmol/L    Chloride 108 (H) 98 - 107 mmol/L    Bicarbonate 4 (LL) 21 - 32 mmol/L    Anion Gap 31 (H) 10 - 20 mmol/L    Urea Nitrogen 16 6 - 23 mg/dL    Creatinine 1.15 0.50 - 1.30 mg/dL    eGFR 85 >60 mL/min/1.73m*2    Calcium 8.2 (L) 8.6 - 10.3 mg/dL   POCT GLUCOSE   Result Value Ref Range    POCT Glucose 229 (H) 74 - 99 mg/dL   POCT GLUCOSE   Result Value Ref Range    POCT Glucose 208 (H) 74 - 99 mg/dL   Blood Gas Venous Full Panel   Result Value Ref Range    POCT pH, Venous 7.08 (LL) 7.33 - 7.43 pH    POCT pCO2, Venous 21 (L) 41 - 51 mm Hg    POCT pO2, Venous 38 35 - 45 mm Hg    POCT SO2, Venous 72 45 - 75 %    POCT Oxy Hemoglobin, Venous 71.2 45.0 - 75.0 %    POCT Hematocrit Calculated, Venous 47.0 41.0 - 52.0 %    POCT Sodium, Venous 128 (L) 136 - 145 mmol/L    POCT Potassium, Venous 9.0 (HH) 3.5 - 5.3 mmol/L    POCT Chloride, Venous 107 98 - 107 mmol/L    POCT Ionized Calicum, Venous 1.19 1.10 - 1.33 mmol/L    POCT Glucose, Venous 221 (H) 74 - 99 mg/dL    POCT Lactate, Venous 2.5 (H) 0.4 - 2.0 mmol/L    POCT Base Excess, Venous -22.0 (L) -2.0 - 3.0 mmol/L    POCT HCO3 Calculated, Venous 6.2 (L) 22.0 - 26.0 mmol/L    POCT Hemoglobin, Venous 15.8 13.5 - 17.5 g/dL    POCT Anion Gap, Venous 24.0 10.0 - 25.0 mmol/L    Patient Temperature 37.0 degrees Celsius    FiO2 21 %   BLOOD GAS VENOUS   Result Value Ref Range    POCT pH, Venous 7.08 (LL) 7.33 - 7.43 pH    POCT pCO2, Venous 21 (L) 41 - 51 mm Hg    POCT pO2, Venous 38 35 - 45 mm Hg    POCT SO2, Venous 72 45 - 75 %    POCT Oxy Hemoglobin, Venous 71.2 45.0 - 75.0 %    POCT Base Excess, Venous -22.0 (L) -2.0 - 3.0 mmol/L    POCT HCO3 Calculated, Venous 6.2 (L) 22.0 - 26.0 mmol/L    Patient Temperature 37.0 degrees Celsius    FiO2 21 %   Basic Metabolic Panel   Result Value Ref Range    Glucose 248 (H) 74 - 99 mg/dL     Sodium 135 (L) 136 - 145 mmol/L    Potassium 4.1 3.5 - 5.3 mmol/L    Chloride 111 (H) 98 - 107 mmol/L    Bicarbonate 6 (LL) 21 - 32 mmol/L    Anion Gap 22 (H) 10 - 20 mmol/L    Urea Nitrogen 13 6 - 23 mg/dL    Creatinine 0.88 0.50 - 1.30 mg/dL    eGFR >90 >60 mL/min/1.73m*2    Calcium 7.8 (L) 8.6 - 10.3 mg/dL   Phosphorus   Result Value Ref Range    Phosphorus 2.5 2.5 - 4.9 mg/dL   Magnesium   Result Value Ref Range    Magnesium 1.82 1.60 - 2.40 mg/dL   POCT GLUCOSE   Result Value Ref Range    POCT Glucose 211 (H) 74 - 99 mg/dL   POCT GLUCOSE   Result Value Ref Range    POCT Glucose 222 (H) 74 - 99 mg/dL   POCT GLUCOSE   Result Value Ref Range    POCT Glucose 229 (H) 74 - 99 mg/dL   POCT GLUCOSE   Result Value Ref Range    POCT Glucose 217 (H) 74 - 99 mg/dL   CBC and Auto Differential   Result Value Ref Range    WBC 10.5 4.4 - 11.3 x10*3/uL    nRBC 0.0 0.0 - 0.0 /100 WBCs    RBC 4.52 4.50 - 5.90 x10*6/uL    Hemoglobin 14.6 13.5 - 17.5 g/dL    Hematocrit 41.2 41.0 - 52.0 %    MCV 91 80 - 100 fL    MCH 32.3 26.0 - 34.0 pg    MCHC 35.4 32.0 - 36.0 g/dL    RDW 14.0 11.5 - 14.5 %    Platelets 189 150 - 450 x10*3/uL    Neutrophils % 77.3 40.0 - 80.0 %    Immature Granulocytes %, Automated 0.9 0.0 - 0.9 %    Lymphocytes % 9.2 13.0 - 44.0 %    Monocytes % 12.4 2.0 - 10.0 %    Eosinophils % 0.0 0.0 - 6.0 %    Basophils % 0.2 0.0 - 2.0 %    Neutrophils Absolute 8.11 (H) 1.20 - 7.70 x10*3/uL    Immature Granulocytes Absolute, Automated 0.09 0.00 - 0.70 x10*3/uL    Lymphocytes Absolute 0.97 (L) 1.20 - 4.80 x10*3/uL    Monocytes Absolute 1.30 (H) 0.10 - 1.00 x10*3/uL    Eosinophils Absolute 0.00 0.00 - 0.70 x10*3/uL    Basophils Absolute 0.02 0.00 - 0.10 x10*3/uL   BLOOD GAS VENOUS   Result Value Ref Range    POCT pH, Venous 7.27 (L) 7.33 - 7.43 pH    POCT pCO2, Venous 23 (L) 41 - 51 mm Hg    POCT pO2, Venous 71 (H) 35 - 45 mm Hg    POCT SO2, Venous 97 (H) 45 - 75 %    POCT Oxy Hemoglobin, Venous 94.9 (H) 45.0 - 75.0 %     POCT Base Excess, Venous -14.4 (L) -2.0 - 3.0 mmol/L    POCT HCO3 Calculated, Venous 10.6 (L) 22.0 - 26.0 mmol/L    Patient Temperature 37.0 degrees Celsius    FiO2 21 %   Magnesium   Result Value Ref Range    Magnesium 1.70 1.60 - 2.40 mg/dL   Phosphorus   Result Value Ref Range    Phosphorus 1.2 (L) 2.5 - 4.9 mg/dL   Basic metabolic panel   Result Value Ref Range    Glucose 229 (H) 74 - 99 mg/dL    Sodium 134 (L) 136 - 145 mmol/L    Potassium 3.6 3.5 - 5.3 mmol/L    Chloride 113 (H) 98 - 107 mmol/L    Bicarbonate 11 (L) 21 - 32 mmol/L    Anion Gap 14 10 - 20 mmol/L    Urea Nitrogen 12 6 - 23 mg/dL    Creatinine 0.81 0.50 - 1.30 mg/dL    eGFR >90 >60 mL/min/1.73m*2    Calcium 8.0 (L) 8.6 - 10.3 mg/dL   POCT GLUCOSE   Result Value Ref Range    POCT Glucose 195 (H) 74 - 99 mg/dL   Lactate   Result Value Ref Range    Lactate 1.0 0.4 - 2.0 mmol/L     XR chest 1 view    Result Date: 11/7/2024  Interpreted By:  Mark Murillo, STUDY: XR CHEST 1 VIEW;  11/7/2024 9:45 pm   INDICATION: Signs/Symptoms:sob.     COMPARISON: 09/18/2024.   ACCESSION NUMBER(S): [id number]   ORDERING CLINICIAN: MARC MIDDLETON   FINDINGS: AP radiograph of the chest was provided.   Leads overlie the chest, partially obscuring the field-of-view.   CARDIOMEDIASTINAL SILHOUETTE: Cardiomediastinal silhouette is normal in size and configuration.   LUNGS: Lungs are clear. No pleural effusion or pneumothorax.   ABDOMEN: No remarkable upper abdominal findings.   BONES: No acute osseous changes.       1.  No evidence of acute cardiopulmonary process.       MACRO: None   Signed by: Mark Murillo 11/7/2024 10:13 PM Dictation workstation:   [id number]      Assessment/Plan   Principal Problem:    DKA, type 1, not at goal    34-year-old gentleman history of type 1 diabetes who is admitted to hospital with recurrent DKA    Neuro   -He is alert and oriented x 3 no concerns    Respiratory   -Patient is on room air    Cardiac   -Hemodynamically  stable    Renal   -Serum creatinine stable at 0.79 mg/dL   -Electrolytes    -Hypokalemia, hypophosphatemia this is being replaced   -Acidemia    -In setting of DKA, this is improved most recent blood gas pH of 7.3, pCO2 33 serum bicarb earlier 17 on BMP    GI   -Diet restarted   -Aggressive replacement of phosphorus to avoid refeeding syndrome    Endocrine   -Recurrent DKA secondary to noncompliance    -Anion gap is now closed bridged to basal insulin, encourage p.o. intake.  DC IV fluids    -Appreciate endocrine's input    Disposition: Patient be transferred out of ICU    Alfredo Gill MD  I spent 45 minutes of cumulative critical care time with the patient.  Greater than 50% of that time was spent in the direct collaboration and or coordination of care of the patient.     Dragon dictation software was used to dictate this note and thus there may be minor errors in translation/transcription including garbled speech or misspellings. Please contact for clarification if needed.

## 2024-11-08 NOTE — ED PROVIDER NOTES
Ed Sanon is a 34 y.o. patient presenting to the ED for fatigue and generalize weakness. He states he has felt bad since 10am today. He has history of missing doses of insulin due to feeling like is blood sugar is low and ending up in DKA. States he feels like he is in DKA now. Denies missing any does of insulin recently. No recent fever, cough. Does have nausea.     Additional History Obtained from: none  Limitations to History: none  ------------------------------------------------------------------------------------------------------------------------------------------  Physical Exam:  Appearance: Alert, cooperative, ill appearing.  Skin: Warm, sweaty, appropriate color for ethnicity.  Eyes: Cornea clear. No scleral icterus or injection.   ENT: Mucous membranes dry.  Pulmonary: Kussmaul respirations. No stridor. Clear lung sounds bilaterally without rhonchi or wheezing.   Cardiac: Heart sounds regular without murmur. B/L radial pulses full and symmetric.   Abdomen: Soft, not tender.  No rebound or guarding.   Musculoskeletal: No gross deformities.   Neurological: Face symmetrical. Voice clear. Appropriately conversant.   Psychiatric: Appropriate mood and affect.    Medical Decision Making:  Chronic Medical Conditions Significantly Affecting Care:  has a past medical history of Abdominal pain (04/21/2023), Anxiety and depression (08/08/2021), Contact with and (suspected) exposure to covid-19 (04/21/2023), COVID-19 virus infection (07/11/2023), Diabetes mellitus (Multi), Diabetic acetonemia (Multi) (11/15/2023), DKA (diabetic ketoacidosis) (Multi) (07/11/2023), DKA, type 1, not at goal (Multi) (04/21/2023), DM2 (diabetes mellitus, type 2) (Multi) (11/15/2023), Elevated blood sugar (11/15/2023), Elevated blood-pressure reading, without diagnosis of hypertension (09/09/2020), Fatigue (07/11/2023), Generalized anxiety disorder (07/11/2023), Microalbuminuria due to type 1 diabetes mellitus (Multi) (07/11/2023),  Personal history of COVID-19 (04/02/2023), Poorly controlled diabetes mellitus (Multi) (07/11/2023), Type 1 diabetes mellitus (Multi) (07/11/2023), Type 1 diabetes mellitus with hyperglycemia (Multi) (02/25/2019), Vitamin B12 deficiency (07/11/2023), Vitamin D deficiency (07/11/2023), and Vomiting (11/15/2023).    Social Determinants of Health Significantly Affecting Care: multiple admissions for DKA in setting of poor insulin adherence    Differential Diagnosis Considered but not limited to: Suspect DKA. Possible associated electrolyte disturbance. Appears dehydrated.       External Records Reviewed:   I reviewed recent and relevant outside records including:     Independent Interpretation of Studies: The following studies were ordered as part of the emergency department work up and independently interpreted by me. See ED Course for details.    CBC with leukocytosis. CMP with elevated anion gap and bicarb of 4. Lactate elevated at 5- no evidence of infection, and while does have elevated WBCs and lactate has no clinical evidence of infection. I suspect lactate elevation is due to DKA and not sepsis. Beta hydroxybuterate elevated at almost 12.     ED Course as of 11/07/24 2312   Thu Nov 07, 2024   2148 Severe metabolic acidosis with pH <7. K 5.1. Ordered insulin drip. Will start additional fluids after bolus completed. [SP]      ED Course User Index  [SP] Phoebe Rae DO         Diagnoses as of 11/07/24 2312   DKA, type 1, not at goal       Discussed with Dr. Pack and ICU GERRI. Patient accepted to ICU for further care.     Critical Care    Performed by: Phoebe Rae DO  Authorized by: Phoebe Rae DO    Critical care provider statement:     Critical care time (minutes):  35    Critical care time was exclusive of:  Separately billable procedures and treating other patients    Critical care was necessary to treat or prevent imminent or life-threatening deterioration of the following conditions:  Endocrine crisis     Critical care was time spent personally by me on the following activities:  Development of treatment plan with patient or surrogate, evaluation of patient's response to treatment, examination of patient, obtaining history from patient or surrogate, ordering and performing treatments and interventions, ordering and review of laboratory studies, ordering and review of radiographic studies, pulse oximetry and re-evaluation of patient's condition    Care discussed with: admitting provider             Phoebe Rae DO  11/08/24 1859      EKG performed at 2238 and interpreted by me shows sinus tachycardia at a rate of 121. Intervals and axis are normal. No ST or T changes. No STEMI.     Phoebe Rae DO  11/27/24 [id number]

## 2024-11-08 NOTE — PROGRESS NOTES
Ed Sanon is a 34 y.o. male on day 1 of admission presenting with DKA, type 1, not at goal.    Subjective   Ed Sanon is a 34 y.o. male with past medical history of diabetes mellitus type 1 with poor insulin compliance and several admissions for episodes of DKA presented to [facility] ED with fatigue and generalized weakness which began around 10 AM. Patient reported that his blood sugars have been running in the 350s for the last several days and reports that he has been taking his insulin as prescribed. Is on NPH (18 units BID) and Humalog (6 units TID) due to lack of insurance in the past but says he recently got insurance. He denies any recent sick contacts or illnesses.  He denies any fevers, chills, chest pain, cough, congestion, nausea, vomiting, diarrhea, or  abdominal pain  Upon ED workup, temperature 36.7 °C, heart rate 118, respiratory rate 24, blood pressure 140/78, and SpO2 100% on room air.  ED labs revealed blood glucose 394, bicarbonate 4, anion gap 37, BUN 17, creatinine 1.32, phosphorus 5.4, albumin 5.2, total protein 8.5, beta hydroxybutyrate 11.75, WBC 20.3, RBC 5.90, hemoglobin 18.9, hematocrit 56.9, venous pH 6.83, pCO2 31, HCO3 calculated 5.2, and venous lactate 5.3>> 5.1.  Chest x-ray negative for acute cardiopulmonary process.  ED interventions included 1 L of LR IV fluid bolus, insulin infusion at 7 units/h, and normal saline at 125 mL/h.  Patient admitted to ICU and critical care medicine was consulted.     11/8: Patient seen this morning. Patient reports he doing well, he did have 1 episode of emesis this AM- noting he usually is nauseous in the morning. Otherwise no fevers, chills, abdominal pain, chest pain, SOB, numbness or tingling. He continues with the DKA protocol, most recent glucose reading was 129. Patient has tried using an insulin pump and insulin lispro without improvement secondary to misuse of pump and side effects of lispro. Patient reports good diet at home  "and monitors his glucose regularly. He has had close to 10 admissions in the past year for DKA/poor management of his glucose. His A1C was 13.3% as of 4 months ago. Will plan to stabilize blood glucose levels and electrolytes while in the hospital, may need to alter his insulin regimen for longterm management- Endocrine was consulted for further evaluation and recommendations. Plan for discharge once patient has stabilizes and has a plan for long term management of his DM-1.    Objective     Physical Exam:  Constitutional: Patient is resting in bed comfortably. NAD and non-toxic.   Head: Atraumatic, normocephalic.   Neck: Supple. Trachea midline. No LAD. No JVD.  Lungs: Clear to auscultation bilaterally. Effort normal. On room air.   Cardiovascular: Tachycardic, regular rhythm. Confirmed on tele. No murmurs, gallops, or rubs.   Abdominal: Soft, non-tender, non-distended. Bowel sounds in all 4 quadrants.  Extremities: No swelling or erythema.  Neurological: Patient is A&Ox3. Speech is normal. No focal neural deficits.   Psychiatric/behavioral: Appropriate mood and flat affect.     Last Recorded Vitals  Blood pressure 116/81, pulse 106, temperature 36.9 °C (98.5 °F), temperature source Temporal, resp. rate 19, height 1.778 m (5' 10\"), weight 63.8 kg (140 lb 11.2 oz), SpO2 100%.  Intake/Output last 3 Shifts:  I/O last 3 completed shifts:  In: 1100 (17.2 mL/kg) [I.V.:100 (1.6 mL/kg); IV Piggyback:1000]  Out: 2400 (37.6 mL/kg) [Urine:2400 (1 mL/kg/hr)]  Weight: 63.8 kg     Relevant Results    Results from last 7 days   Lab Units 11/08/24  0604 11/08/24  0231 11/08/24  0020 11/07/24  2121   SODIUM mmol/L 134* 135* 137 137   POTASSIUM mmol/L 3.6 4.1 5.8* 4.5   CHLORIDE mmol/L 113* 111* 108* 101   CO2 mmol/L 11* 6* 4* 4*   BUN mg/dL 12 13 16 17   CREATININE mg/dL 0.81 0.88 1.15 1.32*   CALCIUM mg/dL 8.0* 7.8* 8.2* 9.2   PROTEIN TOTAL g/dL  --   --   --  8.5*   BILIRUBIN TOTAL mg/dL  --   --   --  0.4   ALK PHOS U/L  --   -- "   --  100   ALT U/L  --   --   --  12   AST U/L  --   --   --  10   GLUCOSE mg/dL 229* 248* 263* 394*       Results from last 7 days   Lab Units 11/08/24  0604 11/07/24  2121   WBC AUTO x10*3/uL 10.5 20.3*   HEMOGLOBIN g/dL 14.6 18.9*   HEMATOCRIT % 41.2 56.9*   PLATELETS AUTO x10*3/uL 189 266       Results from last 7 days   Lab Units 11/08/24  0604 11/08/24  0210 11/08/24  0020   POCT PH, VENOUS pH 7.27* 7.08*  7.08* 6.91*   POCT PCO2, VENOUS mm Hg 23* 21*  21* 22*   POCT PO2, VENOUS mm Hg 71* 38  38 43   POCT HCO3 CALCULATED, VENOUS mmol/L 10.6* 6.2*  6.2* 4.4*   POCT BASE EXCESS, VENOUS mmol/L -14.4* -22.0*  -22.0* -27.5*     Scheduled medications  heparin (porcine), 5,000 Units, subcutaneous, q8h  potassium phosphate, 21 mmol, intravenous, Once    Continuous medications  dextrose 5%-0.45 % sodium chloride, 150 mL/hr, Last Rate: 150 mL/hr (11/08/24 0523)  insulin regular, 0-50 Units/hr, Last Rate: 7 Units/hr (11/08/24 0653)  sodium chloride, 250 mL/hr, Last Rate: Stopped (11/08/24 0121)    PRN medications  PRN medications: dextrose, dextrose, dextrose, glucagon, insulin regular, ondansetron ODT **OR** ondansetron    XR chest 1 view    Result Date: 11/7/2024  Interpreted By:  Mark Murillo, STUDY: XR CHEST 1 VIEW;  11/7/2024 9:45 pm   INDICATION: Signs/Symptoms:sob.     COMPARISON: 09/18/2024.   ACCESSION NUMBER(S): [id number]   ORDERING CLINICIAN: MARC MIDDLETON   FINDINGS: AP radiograph of the chest was provided.   Leads overlie the chest, partially obscuring the field-of-view.   CARDIOMEDIASTINAL SILHOUETTE: Cardiomediastinal silhouette is normal in size and configuration.   LUNGS: Lungs are clear. No pleural effusion or pneumothorax.   ABDOMEN: No remarkable upper abdominal findings.   BONES: No acute osseous changes.       1.  No evidence of acute cardiopulmonary process.       MACRO: None   Signed by: Mark Murillo 11/7/2024 10:13 PM Dictation workstation:   [id number]               Assessment/Plan    Assessment & Plan  DKA, type 1, not at goal    Diabetic Ketoacidosis, recurrent with poor compliance  - Presented with fatigue, weakness and glucose level of 394 initial pH of 6.83, pCO2 of 31, bicarb of 5.2, anion gap of 34.0, lactate of 5.3. Symptoms have greatly improved following DKA protocol with insulin infusion and IV fluids  - Last VBG: pH 7.25, pCO2 37, bicarb 16.2, lactate 1.0    - Last POCT glucose: 129   - No obvious sources of infection, WBC likely elevated secondary to DKA  - Will bridge off insulin infusion once anion gap less than 15 and bicarbonate greater than 15   - Endocrine was consulted for further evaluation and recommendations regarding the patient's insulin regimen; recs are as follows:    -To commence NPH 10 units subcutaneous x 1   -To commence Lantus 30 units subcutaneous bedtime   -To commence an insulin correction scale TID AC   -To commence Humalog 5 units TID AC   -Diabetic diet as tolerated  - Appreciate the consult     Hypovolemic Hyponatremia   - At admission, patient with sodium value of 137; value has since dropped to 134   - Will continue to monitor and adjust as needed    Hypokalemia   - At admission, patient with potassium value of 4.5; value has since dropped to 3.4   - Will continue to monitor and adjust as needed    Hypophosphatemia  - At admission, patient with phosphorous value of 5.4 likely falsely elevated due to hemolysis; value has since dropped to 1.5   - Will continue to monitor and adjust as needed    Acute Kidney Injury  - At admission, patient with creatinine of 1.32; value has since stabilized at 0.81, which is near baseline for him (0.7-0.8)  - Will continue to monitor    Leukocytosis   - Likely secondary to DKA. No evidence of infection (no person, wounds, active fever)   - WBC 20.3 on admission, stable now at 10.5    - UA with evidence of kidney injury, no signs of infection   - Follow CBC and trend fevers     DVT Prophylaxis   - Heparin    Charis Benjamin  PAMianS

## 2024-11-08 NOTE — PROGRESS NOTES
11/08/24 1018   Discharge Planning   Living Arrangements Parent   Support Systems Parent   Assistance Needed Independent   Type of Residence Private residence   Home or Post Acute Services None   Expected Discharge Disposition Home   Does the patient need discharge transport arranged? No   Financial Resource Strain   How hard is it for you to pay for the very basics like food, housing, medical care, and heating? Not hard   Housing Stability   In the last 12 months, was there a time when you were not able to pay the mortgage or rent on time? N   At any time in the past 12 months, were you homeless or living in a shelter (including now)? N   Transportation Needs   In the past 12 months, has lack of transportation kept you from medical appointments or from getting medications? no   In the past 12 months, has lack of transportation kept you from meetings, work, or from getting things needed for daily living? No     PCP is Henri Brumfield MD. Patient is from home with his mom. Patient is independent with ambulation, self care, driving, shopping, and meals. He confirms he recently obtained insurance and has not been able to get arrangements set up with the new insurance. Patient prefers to return home with no needs upon discharge. TCC will continue to follow for needs if they arise.

## 2024-11-08 NOTE — PROGRESS NOTES
"Ed Sanon is a 34 y.o. male admitted for DKA, type 1, not at goal. Pharmacy reviewed the patient's [id number] medications and allergies for accuracy.    The list below reflects the PTA list prior to pharmacy medication history. A summary a changes to the PTA medication list has been listed below. Please review each medication in order reconciliation for additional clarification and justification.    Source of information:  PATIENT  MOM    Medications added:    Medications modified:    Medications to be removed:  HUMALOG (DUPLICATE)    Medications of concern:  LISINOPRIL 5MG 1 every day (PT STOPPED TAKING AFTER 3 DAYS) HE STATES HIS B/P DROPPED      Prior to Admission Medications   Prescriptions Last Dose Informant Patient Reported? Taking?   Guardian 4 Glucose Sensor device   No No   Sig: Change insulin sensor every 7 days as directed, linked to Minimed insulin pump, check with endocrinology for updated regimen   Patient not taking: Reported on 9/29/2024   insulin NPH, Isophane, (HumuLIN N,NovoLIN N) 100 unit/mL (3 mL) injection   No No   Sig: Inject 18 Units under the skin 2 times a day before meals. Take as directed per insulin instructions.   insulin lispro (HumaLOG) 100 unit/mL injection   No No   Sig: Inject 6 Units under the skin 3 times daily (morning, midday, late afternoon). Take as directed per insulin instructions.   insulin lispro (HumaLOG) 100 unit/mL injection   No No   Sig: Inject 0-10 Units under the skin 3 times daily (morning, midday, late afternoon). Take as directed per insulin instructions.   lancets misc   Yes No   Sig: Inject 1 Units under the skin 2 times a day. Sliding scale   pen needle, diabetic 32 gauge x 5/32\" needle   Yes No   Sig: Use as instructed 4 times daily      Facility-Administered Medications: None       Nanette Santoro       "

## 2024-11-08 NOTE — H&P
[facility]    Hospital Medicine History & Physical     Assessment & Plan     ASSESSMENT    34M with history of IDDM Type I with frequent admissions for diabetic ketoacidosis presents with fatigue and generalized weakness and found to be in DKA.    PLAN    Diabetic Ketoacidosis, Recurrent  -Presents with fatigue and generalized weakness, initial pF 6.83 AG 37  -Has frequent DKA events, and past noted to be due to noncompliance but endorses compliance with admission  -No evidence of infection, denies drug use  -Is on NPH due to lack of insurance in the past but says he recently got insurance  -DKA protocol with insulin infusion, IVF, and serial labs  -Endocrinology consult, now that patient has insurance should be switched to Lantus or consider insulin pump    Other Issues  -Lactic acidosis: Suspect due to DKA.  No evidence of infection.  IVF and trend  -Leukocytosis: Suspect stress response in setting of DKA. Will obtain UA to round out infectious workup  -Acute Renal Failure: Suspect pre-renal in setting of DKA, IVF per above and trend    DVT Prophy  -Heparin    Disposition  -ICU      Azeem Pack MD    History of Present Illness     Ed Sanon is a 34 y.o. with history of IDDM Type I with frequent admissions for diabetic ketoacidosis presents with fatigue and generalized weakness.  Started having symptoms around 10 AM this morning.  Subjective fevers and chills in addition to generalized weakness, fatigue, and malaise.  Denies shortness of breath but says his breathing is labored.  Denies cough.  Denies chest pain.  Denies abdominal pain, diarrhea, or constipation issues.  Has frequent admissions for DKA.  Past admissions note insulin noncompliance the patient says that he has been recently compliant with his insulin.  Is on NPH due to lack of insurance in the past but says he recently got insurance.  In the ED, tachycardic and tachypneic.  Other VSS.  Glucose 394.   Anion gap 37.  Bicarbonate 4.  Creatinine 1.32.  WBC 20.3.  pH 6.83.  Lactate 5.1.  CXR negative.  UA pending.  Started on DKA protocol and admit medicine.    Review of Systems     A Comprehensive greater than 10 system review of systems was carried out.  Pertinent positives and negatives are noted above.  Otherwise negative for contributory information.     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    DIABETIC KETO ACIDOSIS    DKA (diabetic ketoacidosis) (Multi) 07/11/2023    DKA, type 1, not at goal 04/21/2023    DM2 (diabetes mellitus, type 2) (Multi) 11/15/2023    DIABETES    Elevated blood sugar 11/15/2023    ELEVATED BLOOD SUGAR/ 375 LAST CHECK    Elevated blood-pressure reading, without diagnosis of hypertension 09/09/2020    Elevated blood pressure reading    Fatigue 07/11/2023    Generalized anxiety disorder 07/11/2023    Microalbuminuria due to type 1 diabetes mellitus (Multi) 07/11/2023    Personal history of COVID-19 04/02/2023    Poorly controlled diabetes mellitus (Multi) 07/11/2023    Type 1 diabetes mellitus 07/11/2023    Type 1 diabetes mellitus with hyperglycemia (Multi) 02/25/2019    Vitamin B12 deficiency 07/11/2023    Vitamin D deficiency 07/11/2023    Vomiting 11/15/2023    VOMITING HEADACHE BACK ACHE       Medications     Medications Prior to Admission   Medication Sig Dispense Refill Last Dose/Taking    Guardian 4 Glucose Sensor device Change insulin sensor every 7 days as directed, linked to Minimed insulin pump, check with endocrinology for updated regimen (Patient not taking: Reported on 9/29/2024) 13 each 2     insulin lispro (HumaLOG) 100 unit/mL injection Inject 6 Units under the skin 3 times daily (morning, midday, late  "afternoon). Take as directed per insulin instructions.       insulin lispro (HumaLOG) 100 unit/mL injection Inject 0-10 Units under the skin 3 times daily (morning, midday, late afternoon). Take as directed per insulin instructions.       insulin NPH, Isophane, (HumuLIN N,NovoLIN N) 100 unit/mL (3 mL) injection Inject 18 Units under the skin 2 times a day before meals. Take as directed per insulin instructions. 60 each 0     lancets misc Inject 1 Units under the skin 2 times a day. Sliding scale       pen needle, diabetic 32 gauge x 5/32\" needle Use as instructed 4 times daily        Past Surgical History   History reviewed. No pertinent surgical history.     Family History   Reviewed and non-contributory to presenting complaints    Allergies   No Known Allergies    Social History     Social History     Tobacco Use    Smoking status: Never    Smokeless tobacco: Never   Substance Use Topics    Alcohol use: Never     Physical Exam   Blood pressure (!) 159/92, pulse (!) 120, temperature 36.7 °C (98.1 °F), temperature source Temporal, resp. rate (!) 23, height 1.778 m (5' 10\"), weight 70.3 kg (155 lb), SpO2 100%.    General: Ill appearing, appears uncomfortable  HEENT: Atraumatic. No erythema in posterior pharynx.  Lymph: No cervical or inguinal lymphadenopathy.  Cardiac: Tachycardic but regular rhythm. No murmurs.  Lungs: CTAB. Nl WOB.  Abd: Non-tender. No rebound or gaurding. Nl bowel sounds.  Ext: No edema. 2+ pulses.  Skin: No rashes, abrasions, or contusions.  Psych: A&Ox3. Nl affect.  Neuro: 5/5 strength. Sensation intact.    Labs & Imaging     Reviewed and Pertinent results discussed in assessment and plan.      "

## 2024-11-09 VITALS
HEART RATE: 91 BPM | TEMPERATURE: 97.5 F | SYSTOLIC BLOOD PRESSURE: 111 MMHG | BODY MASS INDEX: 19.92 KG/M2 | WEIGHT: 139.11 LBS | DIASTOLIC BLOOD PRESSURE: 73 MMHG | HEIGHT: 70 IN | RESPIRATION RATE: 18 BRPM | OXYGEN SATURATION: 97 %

## 2024-11-09 LAB
ANION GAP SERPL CALC-SCNC: 17 MMOL/L (ref 10–20)
BUN SERPL-MCNC: 14 MG/DL (ref 6–23)
CALCIUM SERPL-MCNC: 7.7 MG/DL (ref 8.6–10.3)
CHLORIDE SERPL-SCNC: 105 MMOL/L (ref 98–107)
CO2 SERPL-SCNC: 19 MMOL/L (ref 21–32)
CREAT SERPL-MCNC: 0.68 MG/DL (ref 0.5–1.3)
EGFRCR SERPLBLD CKD-EPI 2021: >90 ML/MIN/1.73M*2
GLUCOSE BLD MANUAL STRIP-MCNC: 331 MG/DL (ref 74–99)
GLUCOSE BLD MANUAL STRIP-MCNC: 350 MG/DL (ref 74–99)
GLUCOSE SERPL-MCNC: 348 MG/DL (ref 74–99)
POTASSIUM SERPL-SCNC: 3.6 MMOL/L (ref 3.5–5.3)
SODIUM SERPL-SCNC: 137 MMOL/L (ref 136–145)

## 2024-11-09 PROCEDURE — 99239 HOSP IP/OBS DSCHRG MGMT >30: CPT | Performed by: INTERNAL MEDICINE

## 2024-11-09 PROCEDURE — 2500000002 HC RX 250 W HCPCS SELF ADMINISTERED DRUGS (ALT 637 FOR MEDICARE OP, ALT 636 FOR OP/ED): Performed by: STUDENT IN AN ORGANIZED HEALTH CARE EDUCATION/TRAINING PROGRAM

## 2024-11-09 PROCEDURE — 80048 BASIC METABOLIC PNL TOTAL CA: CPT | Performed by: STUDENT IN AN ORGANIZED HEALTH CARE EDUCATION/TRAINING PROGRAM

## 2024-11-09 PROCEDURE — 36415 COLL VENOUS BLD VENIPUNCTURE: CPT | Performed by: STUDENT IN AN ORGANIZED HEALTH CARE EDUCATION/TRAINING PROGRAM

## 2024-11-09 PROCEDURE — 82947 ASSAY GLUCOSE BLOOD QUANT: CPT

## 2024-11-09 ASSESSMENT — COGNITIVE AND FUNCTIONAL STATUS - GENERAL
DAILY ACTIVITIY SCORE: 24
MOBILITY SCORE: 24

## 2024-11-09 ASSESSMENT — PAIN - FUNCTIONAL ASSESSMENT
PAIN_FUNCTIONAL_ASSESSMENT: 0-10
PAIN_FUNCTIONAL_ASSESSMENT: 0-10

## 2024-11-09 ASSESSMENT — PAIN SCALES - GENERAL
PAINLEVEL_OUTOF10: 0 - NO PAIN
PAINLEVEL_OUTOF10: 0 - NO PAIN

## 2024-11-09 NOTE — CARE PLAN
The patient's goals for the shift include      The clinical goals for the shift include maintain glucose WNL throughout the shift      Problem: Skin  Goal: Decreased wound size/increased tissue granulation at next dressing change  Outcome: Progressing  Goal: Participates in plan/prevention/treatment measures  Outcome: Progressing  Goal: Prevent/manage excess moisture  Outcome: Progressing  Goal: Prevent/minimize sheer/friction injuries  Outcome: Progressing  Goal: Promote/optimize nutrition  Outcome: Progressing  Goal: Promote skin healing  Outcome: Progressing     Problem: Diabetes  Goal: Achieve decreasing blood glucose levels by end of shift  Outcome: Progressing  Goal: Increase stability of blood glucose readings by end of shift  Outcome: Progressing  Goal: Decrease in ketones present in urine by end of shift  Outcome: Progressing  Goal: Maintain electrolyte levels within acceptable range throughout shift  Outcome: Progressing  Goal: Maintain glucose levels >70mg/dl to <250mg/dl throughout shift  Outcome: Progressing  Goal: No changes in neurological exam by end of shift  Outcome: Progressing  Goal: Learn about and adhere to nutrition recommendations by end of shift  Outcome: Progressing  Goal: Vital signs within normal range for age by end of shift  Outcome: Progressing  Goal: Increase self care and/or family involovement by end of shift  Outcome: Progressing  Goal: Receive DSME education by end of shift  Outcome: Progressing

## 2024-11-10 NOTE — DISCHARGE SUMMARY
"Discharge Diagnosis  DKA secondary to poorly controlled diabetes mellitus type 1 due to noncompliance  Acute kidney injury due to dehydration  Hypovolemia hyponatremia  Hypokalemia  Hypophosphatemia  Lactic acidosis    Issues Requiring Follow-Up  Follow-up with PCP in 1 week's time.  Follow-up in the endocrinology clinic in 1 week's time.      Discharge Meds     Medication List      CONTINUE taking these medications     Guardian 4 Glucose Sensor device; Generic drug: blood-glucose sensor;   Change insulin sensor every 7 days as directed, linked to Minimed insulin   pump, check with endocrinology for updated regimen   lancets misc   pen needle, diabetic 32 gauge x 5/32\" needle     ASK your doctor about these medications     * insulin lispro 100 unit/mL injection; Inject 6 Units under the skin 3   times daily (morning, midday, late afternoon). Take as directed per   insulin instructions.   * insulin lispro 100 unit/mL injection; Inject 0-10 Units under the skin   3 times daily (morning, midday, late afternoon). Take as directed per   insulin instructions.   insulin NPH (Isophane) 100 unit/mL (3 mL) injection; Commonly known as:   HumuLIN N,NovoLIN N; Inject 18 Units under the skin 2 times a day before   meals. Take as directed per insulin instructions.   lisinopril 5 mg tablet  * This list has 2 medication(s) that are the same as other medications   prescribed for you. Read the directions carefully, and ask your doctor or   other care provider to review them with you.       Test Results Pending At Discharge  Pending Labs       No current pending labs.            Hospital Course   Ed Sanon is a 34 y.o. male with past medical history of diabetes mellitus type 1 with poor insulin compliance and several admissions for episodes of DKA presented to [facility] ED with fatigue and generalized weakness which began around 10 AM. Patient reported that his blood sugars have been running in the 350s for the last several " days and reports that he has been taking his insulin as prescribed. Is on NPH (18 units BID) and Humalog (6 units TID) due to lack of insurance in the past but says he recently got insurance. He denies any recent sick contacts or illnesses.  He denies any fevers, chills, chest pain, cough, congestion, nausea, vomiting, diarrhea, or  abdominal pain  Upon ED workup, temperature 36.7 °C, heart rate 118, respiratory rate 24, blood pressure 140/78, and SpO2 100% on room air.  ED labs revealed blood glucose 394, bicarbonate 4, anion gap 37, BUN 17, creatinine 1.32, phosphorus 5.4, albumin 5.2, total protein 8.5, beta hydroxybutyrate 11.75, WBC 20.3, RBC 5.90, hemoglobin 18.9, hematocrit 56.9, venous pH 6.83, pCO2 31, HCO3 calculated 5.2, and venous lactate 5.3>> 5.1.  Chest x-ray negative for acute cardiopulmonary process.  ED interventions included 1 L of LR IV fluid bolus, insulin infusion at 7 units/h, and normal saline at 125 mL/h.  Patient admitted to ICU and critical care medicine was consulted.      11/8: Patient seen this morning. Patient reports he doing well, he did have 1 episode of emesis this AM- noting he usually is nauseous in the morning. Otherwise no fevers, chills, abdominal pain, chest pain, SOB, numbness or tingling. He continues with the DKA protocol, most recent glucose reading was 129. Patient has tried using an insulin pump and insulin lispro without improvement secondary to misuse of pump and side effects of lispro. Patient reports good diet at home and monitors his glucose regularly. He has had close to 10 admissions in the past year for DKA/poor management of his glucose. His A1C was 13.3% as of 4 months ago. Will plan to stabilize blood glucose levels and electrolytes while in the hospital, may need to alter his insulin regimen for longterm management- Endocrine was consulted for further evaluation and recommendations. Plan for discharge once patient has stabilizes and has a plan for long term  management of his DM-1.    11/9: No acute events overnight.  Patient feels much better.  His bicarb is up to 19 but he is still acidotic and will still require close monitoring.  Patient has refused insulin last night.  He is eager to be discharged home today as he has a wedding to attend.  I did discuss the case with the endocrinology team.  Patient still needs at least a good 24 hours of insulin treatment and close monitoring.  Mom was at bedside.  I have explained this to the mom and to the patient.  The patient still opted to leave AGAINST MEDICAL ADVICE.  All risks including death were explained to the patient who signed the AMA form.     Pertinent Physical Exam At Time of Discharge  Physical Exam  CONSTITUTIONAL - alert, in no acute distress, flat affect  CHEST - clear to auscultation, no wheezing, no crackles and no rales, good effort  CARDIAC - regular rate and regular rhythm, no murmur  ABDOMEN - no organomegaly, soft, nontender  EXTREMITIES - no edema, no deformities  NEUROLOGICAL - alert, oriented x3       Outpatient Follow-Up  No future appointments.    I have spent more than 30 minutes discussing the risk of leaving AGAINST MEDICAL ADVICE with patient and his mom and preparing this discharge summary.  Nikko Pal MD

## 2024-11-12 ENCOUNTER — PATIENT OUTREACH (OUTPATIENT)
Dept: PRIMARY CARE | Facility: CLINIC | Age: 34
End: 2024-11-12
Payer: MEDICAID

## 2024-11-12 NOTE — PROGRESS NOTES
Discharge Facility: [facility]   Discharge Diagnosis: DKA secondary to poorly controlled diabetes mellitus type 1 due to noncompliance  Acute kidney injury due to dehydration  Hypovolemia hyponatremia  Hypokalemia  Hypophosphatemia  Lactic acidosis  Admission Date: 11/7/24  Discharge Date: 11/9/24 AMA    PCP Appointment Date: no appointment, message to office  Specialist Appointment Date: n/a  Hospital Encounter and Summary Linked: Yes    Two attempts were made to reach patient within two business days after discharge. Voicemail left with contact information for patient to call back with any non-emergent questions or concerns.

## 2024-11-14 ENCOUNTER — HOSPITAL ENCOUNTER (INPATIENT)
Facility: HOSPITAL | Age: 34
LOS: 2 days | Discharge: HOME | End: 2024-11-16
Attending: STUDENT IN AN ORGANIZED HEALTH CARE EDUCATION/TRAINING PROGRAM | Admitting: INTERNAL MEDICINE
Payer: MEDICAID

## 2024-11-14 ENCOUNTER — APPOINTMENT (OUTPATIENT)
Dept: CARDIOLOGY | Facility: HOSPITAL | Age: 34
End: 2024-11-14
Payer: MEDICAID

## 2024-11-14 DIAGNOSIS — E11.10 DIABETIC KETOACIDOSIS WITHOUT COMA ASSOCIATED WITH TYPE 2 DIABETES MELLITUS (MULTI): ICD-10-CM

## 2024-11-14 DIAGNOSIS — E87.6 HYPOKALEMIA: ICD-10-CM

## 2024-11-14 DIAGNOSIS — E10.10 DIABETIC KETOACIDOSIS WITHOUT COMA ASSOCIATED WITH TYPE 1 DIABETES MELLITUS (MULTI): Primary | ICD-10-CM

## 2024-11-14 LAB
ALBUMIN SERPL BCP-MCNC: 4.1 G/DL (ref 3.4–5)
ALP SERPL-CCNC: 115 U/L (ref 33–120)
ALT SERPL W P-5'-P-CCNC: 8 U/L (ref 10–52)
ANION GAP BLDV CALCULATED.4IONS-SCNC: 24 MMOL/L (ref 10–25)
ANION GAP SERPL CALC-SCNC: 13 MMOL/L (ref 10–20)
ANION GAP SERPL CALC-SCNC: 22 MMOL/L (ref 10–20)
ANION GAP SERPL CALC-SCNC: 24 MMOL/L (ref 10–20)
APPEARANCE UR: CLEAR
AST SERPL W P-5'-P-CCNC: 7 U/L (ref 9–39)
B-OH-BUTYR SERPL-SCNC: 9.77 MMOL/L (ref 0.02–0.27)
BASE EXCESS BLDV CALC-SCNC: -15.7 MMOL/L (ref -2–3)
BASOPHILS # BLD AUTO: 0.02 X10*3/UL (ref 0–0.1)
BASOPHILS NFR BLD AUTO: 0.4 %
BILIRUB SERPL-MCNC: 0.5 MG/DL (ref 0–1.2)
BILIRUB UR STRIP.AUTO-MCNC: NEGATIVE MG/DL
BODY TEMPERATURE: 37 DEGREES CELSIUS
BUN SERPL-MCNC: 14 MG/DL (ref 6–23)
BUN SERPL-MCNC: 14 MG/DL (ref 6–23)
BUN SERPL-MCNC: 17 MG/DL (ref 6–23)
CA-I BLDV-SCNC: 1.2 MMOL/L (ref 1.1–1.33)
CALCIUM SERPL-MCNC: 7.7 MG/DL (ref 8.6–10.3)
CALCIUM SERPL-MCNC: 7.8 MG/DL (ref 8.6–10.3)
CALCIUM SERPL-MCNC: 8.7 MG/DL (ref 8.6–10.3)
CHLORIDE BLDV-SCNC: 100 MMOL/L (ref 98–107)
CHLORIDE SERPL-SCNC: 101 MMOL/L (ref 98–107)
CHLORIDE SERPL-SCNC: 108 MMOL/L (ref 98–107)
CHLORIDE SERPL-SCNC: 109 MMOL/L (ref 98–107)
CO2 SERPL-SCNC: 11 MMOL/L (ref 21–32)
CO2 SERPL-SCNC: 16 MMOL/L (ref 21–32)
CO2 SERPL-SCNC: 9 MMOL/L (ref 21–32)
COLOR UR: COLORLESS
CREAT SERPL-MCNC: 0.82 MG/DL (ref 0.5–1.3)
CREAT SERPL-MCNC: 0.88 MG/DL (ref 0.5–1.3)
CREAT SERPL-MCNC: 1.1 MG/DL (ref 0.5–1.3)
EGFRCR SERPLBLD CKD-EPI 2021: 89 ML/MIN/1.73M*2
EGFRCR SERPLBLD CKD-EPI 2021: >90 ML/MIN/1.73M*2
EGFRCR SERPLBLD CKD-EPI 2021: >90 ML/MIN/1.73M*2
EOSINOPHIL # BLD AUTO: 0.01 X10*3/UL (ref 0–0.7)
EOSINOPHIL NFR BLD AUTO: 0.2 %
ERYTHROCYTE [DISTWIDTH] IN BLOOD BY AUTOMATED COUNT: 14.4 % (ref 11.5–14.5)
GLUCOSE BLD MANUAL STRIP-MCNC: 127 MG/DL (ref 74–99)
GLUCOSE BLD MANUAL STRIP-MCNC: 140 MG/DL (ref 74–99)
GLUCOSE BLD MANUAL STRIP-MCNC: 145 MG/DL (ref 74–99)
GLUCOSE BLD MANUAL STRIP-MCNC: 175 MG/DL (ref 74–99)
GLUCOSE BLD MANUAL STRIP-MCNC: 176 MG/DL (ref 74–99)
GLUCOSE BLD MANUAL STRIP-MCNC: 179 MG/DL (ref 74–99)
GLUCOSE BLD MANUAL STRIP-MCNC: 182 MG/DL (ref 74–99)
GLUCOSE BLD MANUAL STRIP-MCNC: 216 MG/DL (ref 74–99)
GLUCOSE BLD MANUAL STRIP-MCNC: 288 MG/DL (ref 74–99)
GLUCOSE BLD MANUAL STRIP-MCNC: 317 MG/DL (ref 74–99)
GLUCOSE BLD MANUAL STRIP-MCNC: 368 MG/DL (ref 74–99)
GLUCOSE BLDV-MCNC: 524 MG/DL (ref 74–99)
GLUCOSE SERPL-MCNC: 188 MG/DL (ref 74–99)
GLUCOSE SERPL-MCNC: 261 MG/DL (ref 74–99)
GLUCOSE SERPL-MCNC: 461 MG/DL (ref 74–99)
GLUCOSE UR STRIP.AUTO-MCNC: ABNORMAL MG/DL
HCO3 BLDV-SCNC: 12.2 MMOL/L (ref 22–26)
HCT VFR BLD AUTO: 48.3 % (ref 41–52)
HCT VFR BLD EST: 51 % (ref 41–52)
HGB BLD-MCNC: 16.5 G/DL (ref 13.5–17.5)
HGB BLDV-MCNC: 16.9 G/DL (ref 13.5–17.5)
IMM GRANULOCYTES # BLD AUTO: 0.03 X10*3/UL (ref 0–0.7)
IMM GRANULOCYTES NFR BLD AUTO: 0.7 % (ref 0–0.9)
INHALED O2 CONCENTRATION: 21 %
KETONES UR STRIP.AUTO-MCNC: ABNORMAL MG/DL
LACTATE BLDV-SCNC: 1.6 MMOL/L (ref 0.4–2)
LACTATE SERPL-SCNC: 0.7 MMOL/L (ref 0.4–2)
LEUKOCYTE ESTERASE UR QL STRIP.AUTO: NEGATIVE
LIPASE SERPL-CCNC: 49 U/L (ref 9–82)
LYMPHOCYTES # BLD AUTO: 0.92 X10*3/UL (ref 1.2–4.8)
LYMPHOCYTES NFR BLD AUTO: 20.1 %
MAGNESIUM SERPL-MCNC: 1.77 MG/DL (ref 1.6–2.4)
MAGNESIUM SERPL-MCNC: 2.06 MG/DL (ref 1.6–2.4)
MCH RBC QN AUTO: 32 PG (ref 26–34)
MCHC RBC AUTO-ENTMCNC: 34.2 G/DL (ref 32–36)
MCV RBC AUTO: 94 FL (ref 80–100)
MONOCYTES # BLD AUTO: 0.48 X10*3/UL (ref 0.1–1)
MONOCYTES NFR BLD AUTO: 10.5 %
NEUTROPHILS # BLD AUTO: 3.12 X10*3/UL (ref 1.2–7.7)
NEUTROPHILS NFR BLD AUTO: 68.1 %
NITRITE UR QL STRIP.AUTO: NEGATIVE
NRBC BLD-RTO: 0 /100 WBCS (ref 0–0)
OXYHGB MFR BLDV: 51.7 % (ref 45–75)
PCO2 BLDV: 35 MM HG (ref 41–51)
PH BLDV: 7.15 PH (ref 7.33–7.43)
PH UR STRIP.AUTO: 5 [PH]
PHOSPHATE SERPL-MCNC: 2 MG/DL (ref 2.5–4.9)
PLATELET # BLD AUTO: 173 X10*3/UL (ref 150–450)
PO2 BLDV: 35 MM HG (ref 35–45)
POTASSIUM BLDV-SCNC: 5.6 MMOL/L (ref 3.5–5.3)
POTASSIUM SERPL-SCNC: 3.1 MMOL/L (ref 3.5–5.3)
POTASSIUM SERPL-SCNC: 3.9 MMOL/L (ref 3.5–5.3)
POTASSIUM SERPL-SCNC: 5.2 MMOL/L (ref 3.5–5.3)
PROT SERPL-MCNC: 6.8 G/DL (ref 6.4–8.2)
PROT UR STRIP.AUTO-MCNC: NEGATIVE MG/DL
RBC # BLD AUTO: 5.16 X10*6/UL (ref 4.5–5.9)
RBC # UR STRIP.AUTO: NEGATIVE /UL
SAO2 % BLDV: 52 % (ref 45–75)
SODIUM BLDV-SCNC: 131 MMOL/L (ref 136–145)
SODIUM SERPL-SCNC: 131 MMOL/L (ref 136–145)
SODIUM SERPL-SCNC: 135 MMOL/L (ref 136–145)
SODIUM SERPL-SCNC: 135 MMOL/L (ref 136–145)
SP GR UR STRIP.AUTO: 1.02
UROBILINOGEN UR STRIP.AUTO-MCNC: ABNORMAL MG/DL
WBC # BLD AUTO: 4.6 X10*3/UL (ref 4.4–11.3)

## 2024-11-14 PROCEDURE — 2500000004 HC RX 250 GENERAL PHARMACY W/ HCPCS (ALT 636 FOR OP/ED): Performed by: STUDENT IN AN ORGANIZED HEALTH CARE EDUCATION/TRAINING PROGRAM

## 2024-11-14 PROCEDURE — 82947 ASSAY GLUCOSE BLOOD QUANT: CPT

## 2024-11-14 PROCEDURE — 2500000004 HC RX 250 GENERAL PHARMACY W/ HCPCS (ALT 636 FOR OP/ED): Performed by: INTERNAL MEDICINE

## 2024-11-14 PROCEDURE — 83605 ASSAY OF LACTIC ACID: CPT | Performed by: STUDENT IN AN ORGANIZED HEALTH CARE EDUCATION/TRAINING PROGRAM

## 2024-11-14 PROCEDURE — 83036 HEMOGLOBIN GLYCOSYLATED A1C: CPT | Performed by: INTERNAL MEDICINE

## 2024-11-14 PROCEDURE — 96361 HYDRATE IV INFUSION ADD-ON: CPT

## 2024-11-14 PROCEDURE — 84100 ASSAY OF PHOSPHORUS: CPT | Performed by: INTERNAL MEDICINE

## 2024-11-14 PROCEDURE — 84132 ASSAY OF SERUM POTASSIUM: CPT | Performed by: INTERNAL MEDICINE

## 2024-11-14 PROCEDURE — 83735 ASSAY OF MAGNESIUM: CPT | Performed by: STUDENT IN AN ORGANIZED HEALTH CARE EDUCATION/TRAINING PROGRAM

## 2024-11-14 PROCEDURE — 96374 THER/PROPH/DIAG INJ IV PUSH: CPT

## 2024-11-14 PROCEDURE — 85025 COMPLETE CBC W/AUTO DIFF WBC: CPT | Performed by: STUDENT IN AN ORGANIZED HEALTH CARE EDUCATION/TRAINING PROGRAM

## 2024-11-14 PROCEDURE — 2500000004 HC RX 250 GENERAL PHARMACY W/ HCPCS (ALT 636 FOR OP/ED): Performed by: NURSE PRACTITIONER

## 2024-11-14 PROCEDURE — 36415 COLL VENOUS BLD VENIPUNCTURE: CPT | Performed by: STUDENT IN AN ORGANIZED HEALTH CARE EDUCATION/TRAINING PROGRAM

## 2024-11-14 PROCEDURE — 99285 EMERGENCY DEPT VISIT HI MDM: CPT | Mod: 25

## 2024-11-14 PROCEDURE — 99291 CRITICAL CARE FIRST HOUR: CPT | Performed by: INTERNAL MEDICINE

## 2024-11-14 PROCEDURE — 80053 COMPREHEN METABOLIC PANEL: CPT | Performed by: STUDENT IN AN ORGANIZED HEALTH CARE EDUCATION/TRAINING PROGRAM

## 2024-11-14 PROCEDURE — 84132 ASSAY OF SERUM POTASSIUM: CPT | Performed by: STUDENT IN AN ORGANIZED HEALTH CARE EDUCATION/TRAINING PROGRAM

## 2024-11-14 PROCEDURE — 99223 1ST HOSP IP/OBS HIGH 75: CPT | Performed by: NURSE PRACTITIONER

## 2024-11-14 PROCEDURE — 81003 URINALYSIS AUTO W/O SCOPE: CPT | Performed by: STUDENT IN AN ORGANIZED HEALTH CARE EDUCATION/TRAINING PROGRAM

## 2024-11-14 PROCEDURE — 93005 ELECTROCARDIOGRAM TRACING: CPT

## 2024-11-14 PROCEDURE — 2500000002 HC RX 250 W HCPCS SELF ADMINISTERED DRUGS (ALT 637 FOR MEDICARE OP, ALT 636 FOR OP/ED)

## 2024-11-14 PROCEDURE — 82010 KETONE BODYS QUAN: CPT | Performed by: STUDENT IN AN ORGANIZED HEALTH CARE EDUCATION/TRAINING PROGRAM

## 2024-11-14 PROCEDURE — 2020000001 HC ICU ROOM DAILY

## 2024-11-14 PROCEDURE — 83690 ASSAY OF LIPASE: CPT | Performed by: STUDENT IN AN ORGANIZED HEALTH CARE EDUCATION/TRAINING PROGRAM

## 2024-11-14 PROCEDURE — 2500000001 HC RX 250 WO HCPCS SELF ADMINISTERED DRUGS (ALT 637 FOR MEDICARE OP)

## 2024-11-14 PROCEDURE — 83735 ASSAY OF MAGNESIUM: CPT | Performed by: INTERNAL MEDICINE

## 2024-11-14 RX ORDER — LISINOPRIL 5 MG/1
5 TABLET ORAL DAILY
Status: DISCONTINUED | OUTPATIENT
Start: 2024-11-14 | End: 2024-11-16 | Stop reason: HOSPADM

## 2024-11-14 RX ORDER — DEXTROSE 50 % IN WATER (D50W) INTRAVENOUS SYRINGE
12.5
Status: DISCONTINUED | OUTPATIENT
Start: 2024-11-14 | End: 2024-11-15 | Stop reason: SDUPTHER

## 2024-11-14 RX ORDER — DEXTROSE MONOHYDRATE 100 MG/ML
150 INJECTION, SOLUTION INTRAVENOUS CONTINUOUS
Status: DISCONTINUED | OUTPATIENT
Start: 2024-11-14 | End: 2024-11-14

## 2024-11-14 RX ORDER — SODIUM CHLORIDE 450 MG/100ML
250 INJECTION, SOLUTION INTRAVENOUS CONTINUOUS
Status: DISCONTINUED | OUTPATIENT
Start: 2024-11-14 | End: 2024-11-14

## 2024-11-14 RX ORDER — FAMOTIDINE 10 MG/ML
20 INJECTION INTRAVENOUS 2 TIMES DAILY
Status: DISCONTINUED | OUTPATIENT
Start: 2024-11-14 | End: 2024-11-15

## 2024-11-14 RX ORDER — SODIUM,POTASSIUM PHOSPHATES 280-250MG
1 POWDER IN PACKET (EA) ORAL ONCE
Status: COMPLETED | OUTPATIENT
Start: 2024-11-14 | End: 2024-11-14

## 2024-11-14 RX ORDER — DEXTROSE MONOHYDRATE AND SODIUM CHLORIDE 5; .45 G/100ML; G/100ML
150 INJECTION, SOLUTION INTRAVENOUS CONTINUOUS
Status: CANCELLED | OUTPATIENT
Start: 2024-11-14 | End: 2024-11-14

## 2024-11-14 RX ORDER — ONDANSETRON HYDROCHLORIDE 2 MG/ML
4 INJECTION, SOLUTION INTRAVENOUS EVERY 8 HOURS PRN
Status: DISCONTINUED | OUTPATIENT
Start: 2024-11-14 | End: 2024-11-16 | Stop reason: HOSPADM

## 2024-11-14 RX ORDER — FAMOTIDINE 20 MG/1
20 TABLET, FILM COATED ORAL 2 TIMES DAILY
Status: DISCONTINUED | OUTPATIENT
Start: 2024-11-14 | End: 2024-11-15

## 2024-11-14 RX ORDER — ONDANSETRON 4 MG/1
4 TABLET, FILM COATED ORAL EVERY 8 HOURS PRN
Status: DISCONTINUED | OUTPATIENT
Start: 2024-11-14 | End: 2024-11-16 | Stop reason: HOSPADM

## 2024-11-14 RX ORDER — DEXTROSE 50 % IN WATER (D50W) INTRAVENOUS SYRINGE
25
Status: DISCONTINUED | OUTPATIENT
Start: 2024-11-14 | End: 2024-11-16 | Stop reason: HOSPADM

## 2024-11-14 RX ORDER — DEXTROSE 50 % IN WATER (D50W) INTRAVENOUS SYRINGE
50
Status: DISCONTINUED | OUTPATIENT
Start: 2024-11-14 | End: 2024-11-14

## 2024-11-14 RX ORDER — POTASSIUM CHLORIDE 20 MEQ/1
40 TABLET, EXTENDED RELEASE ORAL ONCE
Status: COMPLETED | OUTPATIENT
Start: 2024-11-14 | End: 2024-11-14

## 2024-11-14 RX ORDER — DEXTROSE MONOHYDRATE 100 MG/ML
150 INJECTION, SOLUTION INTRAVENOUS CONTINUOUS
Status: DISCONTINUED | OUTPATIENT
Start: 2024-11-14 | End: 2024-11-15

## 2024-11-14 RX ORDER — INSULIN LISPRO 100 [IU]/ML
0-5 INJECTION, SOLUTION INTRAVENOUS; SUBCUTANEOUS
Status: DISCONTINUED | OUTPATIENT
Start: 2024-11-14 | End: 2024-11-16 | Stop reason: HOSPADM

## 2024-11-14 RX ORDER — DEXTROSE 50 % IN WATER (D50W) INTRAVENOUS SYRINGE
12.5
Status: CANCELLED | OUTPATIENT
Start: 2024-11-14

## 2024-11-14 RX ORDER — DEXTROSE MONOHYDRATE AND SODIUM CHLORIDE 5; .45 G/100ML; G/100ML
150 INJECTION, SOLUTION INTRAVENOUS CONTINUOUS
Status: DISCONTINUED | OUTPATIENT
Start: 2024-11-14 | End: 2024-11-14

## 2024-11-14 RX ORDER — DEXTROSE 50 % IN WATER (D50W) INTRAVENOUS SYRINGE
12.5
Status: DISCONTINUED | OUTPATIENT
Start: 2024-11-14 | End: 2024-11-16 | Stop reason: HOSPADM

## 2024-11-14 RX ORDER — INSULIN LISPRO 100 [IU]/ML
5 INJECTION, SOLUTION INTRAVENOUS; SUBCUTANEOUS
Status: DISCONTINUED | OUTPATIENT
Start: 2024-11-15 | End: 2024-11-16 | Stop reason: HOSPADM

## 2024-11-14 RX ORDER — DEXTROSE 50 % IN WATER (D50W) INTRAVENOUS SYRINGE
25
Status: CANCELLED | OUTPATIENT
Start: 2024-11-14

## 2024-11-14 SDOH — ECONOMIC STABILITY: FOOD INSECURITY: WITHIN THE PAST 12 MONTHS, THE FOOD YOU BOUGHT JUST DIDN'T LAST AND YOU DIDN'T HAVE MONEY TO GET MORE.: NEVER TRUE

## 2024-11-14 SDOH — HEALTH STABILITY: MENTAL HEALTH: HOW MANY DRINKS CONTAINING ALCOHOL DO YOU HAVE ON A TYPICAL DAY WHEN YOU ARE DRINKING?: PATIENT DOES NOT DRINK

## 2024-11-14 SDOH — ECONOMIC STABILITY: INCOME INSECURITY: IN THE PAST 12 MONTHS HAS THE ELECTRIC, GAS, OIL, OR WATER COMPANY THREATENED TO SHUT OFF SERVICES IN YOUR HOME?: NO

## 2024-11-14 SDOH — SOCIAL STABILITY: SOCIAL INSECURITY: WERE YOU ABLE TO COMPLETE ALL THE BEHAVIORAL HEALTH SCREENINGS?: YES

## 2024-11-14 SDOH — ECONOMIC STABILITY: FOOD INSECURITY: WITHIN THE PAST 12 MONTHS, YOU WORRIED THAT YOUR FOOD WOULD RUN OUT BEFORE YOU GOT THE MONEY TO BUY MORE.: NEVER TRUE

## 2024-11-14 SDOH — SOCIAL STABILITY: SOCIAL INSECURITY: WITHIN THE LAST YEAR, HAVE YOU BEEN HUMILIATED OR EMOTIONALLY ABUSED IN OTHER WAYS BY YOUR PARTNER OR EX-PARTNER?: NO

## 2024-11-14 SDOH — HEALTH STABILITY: MENTAL HEALTH: HOW OFTEN DO YOU HAVE SIX OR MORE DRINKS ON ONE OCCASION?: NEVER

## 2024-11-14 SDOH — HEALTH STABILITY: MENTAL HEALTH: HOW OFTEN DO YOU HAVE A DRINK CONTAINING ALCOHOL?: NEVER

## 2024-11-14 SDOH — SOCIAL STABILITY: SOCIAL INSECURITY: WITHIN THE LAST YEAR, HAVE YOU BEEN AFRAID OF YOUR PARTNER OR EX-PARTNER?: NO

## 2024-11-14 SDOH — SOCIAL STABILITY: SOCIAL INSECURITY: HAVE YOU HAD THOUGHTS OF HARMING ANYONE ELSE?: NO

## 2024-11-14 SDOH — SOCIAL STABILITY: SOCIAL INSECURITY: ABUSE: ADULT

## 2024-11-14 ASSESSMENT — COGNITIVE AND FUNCTIONAL STATUS - GENERAL
PATIENT BASELINE BEDBOUND: NO
MOBILITY SCORE: 24
MOBILITY SCORE: 24
DAILY ACTIVITIY SCORE: 24
MOBILITY SCORE: 24

## 2024-11-14 ASSESSMENT — PAIN SCALES - GENERAL
PAINLEVEL_OUTOF10: 0 - NO PAIN
PAINLEVEL_OUTOF10: 0 - NO PAIN
PAINLEVEL_OUTOF10: 5 - MODERATE PAIN

## 2024-11-14 ASSESSMENT — LIFESTYLE VARIABLES
SKIP TO QUESTIONS 9-10: 1
HOW OFTEN DO YOU HAVE A DRINK CONTAINING ALCOHOL: NEVER
AUDIT-C TOTAL SCORE: 0
TOTAL SCORE: 0
HAVE YOU EVER FELT YOU SHOULD CUT DOWN ON YOUR DRINKING: NO
HOW MANY STANDARD DRINKS CONTAINING ALCOHOL DO YOU HAVE ON A TYPICAL DAY: PATIENT DOES NOT DRINK
AUDIT-C TOTAL SCORE: 0
AUDIT-C TOTAL SCORE: 0
EVER FELT BAD OR GUILTY ABOUT YOUR DRINKING: NO
EVER HAD A DRINK FIRST THING IN THE MORNING TO STEADY YOUR NERVES TO GET RID OF A HANGOVER: NO
HOW OFTEN DO YOU HAVE 6 OR MORE DRINKS ON ONE OCCASION: NEVER
HAVE PEOPLE ANNOYED YOU BY CRITICIZING YOUR DRINKING: NO
SKIP TO QUESTIONS 9-10: 1

## 2024-11-14 ASSESSMENT — ENCOUNTER SYMPTOMS
ABDOMINAL PAIN: 0
HEADACHES: 0
PHOTOPHOBIA: 0
PALPITATIONS: 0
MYALGIAS: 0
APPETITE CHANGE: 1
FREQUENCY: 0
FATIGUE: 1
SINUS PAIN: 0
CONSTIPATION: 0
EYE PAIN: 0
NECK PAIN: 0
EYE ITCHING: 0
FLANK PAIN: 0
WOUND: 0
ARTHRALGIAS: 0
ABDOMINAL DISTENTION: 0
POLYDIPSIA: 0
CHEST TIGHTNESS: 0
CHILLS: 0
HEMATURIA: 0
VOICE CHANGE: 0
DIFFICULTY URINATING: 0
SORE THROAT: 0
APNEA: 0
SHORTNESS OF BREATH: 1
SEIZURES: 0
VOMITING: 0
BACK PAIN: 0
NUMBNESS: 0
NAUSEA: 0
DIZZINESS: 0
SLEEP DISTURBANCE: 0
SPEECH DIFFICULTY: 0
NECK STIFFNESS: 0
CONFUSION: 0
UNEXPECTED WEIGHT CHANGE: 0
DIARRHEA: 0
WHEEZING: 0
EYE DISCHARGE: 0
NERVOUS/ANXIOUS: 0
DYSURIA: 0
FATIGUE: 0
HALLUCINATIONS: 0
NAUSEA: 1
CHOKING: 0
DYSPHORIC MOOD: 0
POLYPHAGIA: 0
TROUBLE SWALLOWING: 0
ADENOPATHY: 0
CONFUSION: 1
DIAPHORESIS: 0
WEAKNESS: 0
FEVER: 0
BLOOD IN STOOL: 0
LIGHT-HEADEDNESS: 0
COLOR CHANGE: 0
COUGH: 0
BRUISES/BLEEDS EASILY: 0
SHORTNESS OF BREATH: 0
TREMORS: 0

## 2024-11-14 ASSESSMENT — PAIN DESCRIPTION - PAIN TYPE: TYPE: ACUTE PAIN

## 2024-11-14 ASSESSMENT — ACTIVITIES OF DAILY LIVING (ADL)
ADEQUATE_TO_COMPLETE_ADL: YES
HEARING - RIGHT EAR: FUNCTIONAL
WALKS IN HOME: INDEPENDENT
HEARING - LEFT EAR: FUNCTIONAL
LACK_OF_TRANSPORTATION: NO
DRESSING YOURSELF: INDEPENDENT
TOILETING: INDEPENDENT
JUDGMENT_ADEQUATE_SAFELY_COMPLETE_DAILY_ACTIVITIES: YES
BATHING: INDEPENDENT
GROOMING: INDEPENDENT
PATIENT'S MEMORY ADEQUATE TO SAFELY COMPLETE DAILY ACTIVITIES?: YES
FEEDING YOURSELF: INDEPENDENT

## 2024-11-14 ASSESSMENT — PAIN DESCRIPTION - DESCRIPTORS: DESCRIPTORS: ACHING

## 2024-11-14 ASSESSMENT — PAIN DESCRIPTION - LOCATION: LOCATION: ABDOMEN

## 2024-11-14 ASSESSMENT — PAIN - FUNCTIONAL ASSESSMENT
PAIN_FUNCTIONAL_ASSESSMENT: 0-10

## 2024-11-14 NOTE — PROGRESS NOTES
"Ed Sanon is a 34 y.o. male admitted for No Principal Problem: There is no principal problem currently on the Problem List. Please update the Problem List and refresh.. Pharmacy reviewed the patient's [id number]-liylieaxm medications and allergies for accuracy.    The list below reflects the PTA list prior to pharmacy medication history. A summary a changes to the PTA medication list has been listed below. Please review each medication in order reconciliation for additional clarification and justification.    Source of information: T2P    Medications added:    Medications modified:    Medications to be removed:  HUMALOG-DUPLICATE THERAPY     Medications of concern:      Prior to Admission Medications   Prescriptions Last Dose Informant Patient Reported? Taking?   Guardian 4 Glucose Sensor device   No No   Sig: Change insulin sensor every 7 days as directed, linked to Minimed insulin pump, check with endocrinology for updated regimen   Patient not taking: Reported on 9/29/2024   insulin NPH, Isophane, (HumuLIN N,NovoLIN N) 100 unit/mL (3 mL) injection   No No   Sig: Inject 18 Units under the skin 2 times a day before meals. Take as directed per insulin instructions.   insulin lispro (HumaLOG) 100 unit/mL injection   No No   Sig: Inject 6 Units under the skin 3 times daily (morning, midday, late afternoon). Take as directed per insulin instructions.   insulin lispro (HumaLOG) 100 unit/mL injection   No No   Sig: Inject 0-10 Units under the skin 3 times daily (morning, midday, late afternoon). Take as directed per insulin instructions.   lancets misc   Yes No   Sig: Inject 1 Units under the skin 2 times a day. Sliding scale   lisinopril 5 mg tablet   Yes No   Sig: Take 1 tablet (5 mg) by mouth once daily.   Patient not taking: Reported on 11/8/2024   pen needle, diabetic 32 gauge x 5/32\" needle   Yes No   Sig: Use as instructed 4 times daily      Facility-Administered Medications: None       DARCY SON" UNGER

## 2024-11-14 NOTE — H&P
History Obtained From: Patient    History Of Present Illness:  Ed Sanon is a 34 y.o. male with PMHx s/f Type 1 diabetes, hx recurrent admissions for DKA,  anxiety and depression presenting with elevated blood glucose   presenting with anorexia and nausea. The patient had recently been admitted to the ICU last week, he was improving and had transitioned from insulin drip to basal bolus dosing. He had refused his evening dose of insulin and signed out AMA before he fully stabilized. Since then the patient had gradually become more fatigued and is eating less. No fever or chills. No vomiting or diarrhea. He denies cough, abdominal pain, rash or urinary symptoms.  Presenting to the emergency department temperature 98.1 heart rate 98 regular respiratory rate 16 blood pressure 138/94 SpO2 99% on room air.  Chemistry panel showed sodium 131 potassium 5.2 bicarb 11 anion gap 24 BUN 17 creatinine 1.10 glucose 461 lactate level 0.7 beta hydroxybutyrate 9.77 CBC is unremarkable.  Blood gas showed the patient to indeed be acidotic pH is 7.15 with pCO2 35 bicarb 12.  Patient was given a liter of normal saline subsequently given a liter bolus of lactated Ringer's started on insulin drip and referred for admission in order to further manage this patient presenting in DKA.  Patient will be admitted into the intensive care unit for intensive monitoring and to continue the insulin drip close his anion gap and then transition to basal bolus insulin.  Length of stay is uncertain at this point that patient would need more than 2 midnights to accomplish this.    ED Course:  ED Course as of 11/14/24 1455   u Nov 14, 2024   1404 POCT pH, Venous(!!): 7.15 [CF]      ED Course User Index  [CF] Barbie Early MD         Diagnoses as of 11/14/24 1455   Diabetic ketoacidosis without coma associated with type 1 diabetes mellitus (Multi)     Relevant Results  Results for orders placed or performed during the hospital encounter of  11/14/24 (from the past 24 hours)   POCT GLUCOSE   Result Value Ref Range    POCT Glucose 368 (H) 74 - 99 mg/dL   ECG 12 lead   Result Value Ref Range    Ventricular Rate 87 BPM    Atrial Rate 86 BPM    NJ Interval 152 ms    QRS Duration 95 ms    QT Interval 362 ms    QTC Calculation(Bazett) 436 ms    P Axis 73 degrees    R Axis 33 degrees    T Axis 39 degrees    QRS Count 14 beats    Q Onset 249 ms    T Offset 430 ms    QTC Fredericia 409 ms   CBC and Auto Differential   Result Value Ref Range    WBC 4.6 4.4 - 11.3 x10*3/uL    nRBC 0.0 0.0 - 0.0 /100 WBCs    RBC 5.16 4.50 - 5.90 x10*6/uL    Hemoglobin 16.5 13.5 - 17.5 g/dL    Hematocrit 48.3 41.0 - 52.0 %    MCV 94 80 - 100 fL    MCH 32.0 26.0 - 34.0 pg    MCHC 34.2 32.0 - 36.0 g/dL    RDW 14.4 11.5 - 14.5 %    Platelets 173 150 - 450 x10*3/uL    Neutrophils % 68.1 40.0 - 80.0 %    Immature Granulocytes %, Automated 0.7 0.0 - 0.9 %    Lymphocytes % 20.1 13.0 - 44.0 %    Monocytes % 10.5 2.0 - 10.0 %    Eosinophils % 0.2 0.0 - 6.0 %    Basophils % 0.4 0.0 - 2.0 %    Neutrophils Absolute 3.12 1.20 - 7.70 x10*3/uL    Immature Granulocytes Absolute, Automated 0.03 0.00 - 0.70 x10*3/uL    Lymphocytes Absolute 0.92 (L) 1.20 - 4.80 x10*3/uL    Monocytes Absolute 0.48 0.10 - 1.00 x10*3/uL    Eosinophils Absolute 0.01 0.00 - 0.70 x10*3/uL    Basophils Absolute 0.02 0.00 - 0.10 x10*3/uL   Magnesium   Result Value Ref Range    Magnesium 2.06 1.60 - 2.40 mg/dL   Lipase   Result Value Ref Range    Lipase 49 9 - 82 U/L   Comprehensive metabolic panel   Result Value Ref Range    Glucose 461 (HH) 74 - 99 mg/dL    Sodium 131 (L) 136 - 145 mmol/L    Potassium 5.2 3.5 - 5.3 mmol/L    Chloride 101 98 - 107 mmol/L    Bicarbonate 11 (L) 21 - 32 mmol/L    Anion Gap 24 (H) 10 - 20 mmol/L    Urea Nitrogen 17 6 - 23 mg/dL    Creatinine 1.10 0.50 - 1.30 mg/dL    eGFR 89 >60 mL/min/1.73m*2    Calcium 8.7 8.6 - 10.3 mg/dL    Albumin 4.1 3.4 - 5.0 g/dL    Alkaline Phosphatase 115 33 - 120 U/L     Total Protein 6.8 6.4 - 8.2 g/dL    AST 7 (L) 9 - 39 U/L    Bilirubin, Total 0.5 0.0 - 1.2 mg/dL    ALT 8 (L) 10 - 52 U/L   Blood Gas Venous Full Panel   Result Value Ref Range    POCT pH, Venous 7.15 (LL) 7.33 - 7.43 pH    POCT pCO2, Venous 35 (L) 41 - 51 mm Hg    POCT pO2, Venous 35 35 - 45 mm Hg    POCT SO2, Venous 52 45 - 75 %    POCT Oxy Hemoglobin, Venous 51.7 45.0 - 75.0 %    POCT Hematocrit Calculated, Venous 51.0 41.0 - 52.0 %    POCT Sodium, Venous 131 (L) 136 - 145 mmol/L    POCT Potassium, Venous 5.6 (H) 3.5 - 5.3 mmol/L    POCT Chloride, Venous 100 98 - 107 mmol/L    POCT Ionized Calicum, Venous 1.20 1.10 - 1.33 mmol/L    POCT Glucose, Venous 524 (HH) 74 - 99 mg/dL    POCT Lactate, Venous 1.6 0.4 - 2.0 mmol/L    POCT Base Excess, Venous -15.7 (L) -2.0 - 3.0 mmol/L    POCT HCO3 Calculated, Venous 12.2 (L) 22.0 - 26.0 mmol/L    POCT Hemoglobin, Venous 16.9 13.5 - 17.5 g/dL    POCT Anion Gap, Venous 24.0 10.0 - 25.0 mmol/L    Patient Temperature 37.0 degrees Celsius    FiO2 21 %   Beta Hydroxybutyrate   Result Value Ref Range    Beta-Hydroxybutyrate 9.77 (H) 0.02 - 0.27 mmol/L   Urinalysis with Reflex Culture and Microscopic   Result Value Ref Range    Color, Urine Colorless (N) Straw, Yellow    Appearance, Urine Clear Clear    Specific Gravity, Urine 1.025 1.005 - 1.035    pH, Urine 5.0 5.0, 5.5, 6.0, 6.5, 7.0, 7.5, 8.0    Protein, Urine NEGATIVE NEGATIVE, 10 (TRACE) mg/dL    Glucose, Urine 1000 (4+) (A) NEGATIVE mg/dL    Blood, Urine NEGATIVE NEGATIVE    Ketones, Urine 150 (4+) (A) NEGATIVE mg/dL    Bilirubin, Urine NEGATIVE NEGATIVE    Urobilinogen, Urine NORM NORM mg/dL    Nitrite, Urine NEGATIVE NEGATIVE    Leukocyte Esterase, Urine NEGATIVE NEGATIVE   Lactate   Result Value Ref Range    Lactate 0.7 0.4 - 2.0 mmol/L      ECG 12 lead    Result Date: 11/14/2024  Sinus rhythm ST elev, probable normal early repol pattern    ECG 12 lead    Result Date: 11/8/2024  Sinus tachycardia Borderline prolonged  QT interval    XR chest 1 view    Result Date: 11/7/2024  Interpreted By:  Mark Murillo, STUDY: XR CHEST 1 VIEW;  11/7/2024 9:45 pm   INDICATION: Signs/Symptoms:sob.     COMPARISON: 09/18/2024.   ACCESSION NUMBER(S): [id number]   ORDERING CLINICIAN: MARC MIDDLETON   FINDINGS: AP radiograph of the chest was provided.   Leads overlie the chest, partially obscuring the field-of-view.   CARDIOMEDIASTINAL SILHOUETTE: Cardiomediastinal silhouette is normal in size and configuration.   LUNGS: Lungs are clear. No pleural effusion or pneumothorax.   ABDOMEN: No remarkable upper abdominal findings.   BONES: No acute osseous changes.       1.  No evidence of acute cardiopulmonary process.       MACRO: None   Signed by: Mark Murillo 11/7/2024 10:13 PM Dictation workstation:   [id number]     Scheduled medications:  lactated Ringer's, 1,000 mL, intravenous, Once      Continuous medications:  insulin regular, 7 Units/hr, Last Rate: 7 Units/hr (11/14/24 1427)      PRN medications:        Past Medical History  He has a past medical history of Abdominal pain (04/21/2023), Anxiety and depression (08/08/2021), Contact with and (suspected) exposure to covid-19 (04/21/2023), COVID-19 virus infection (07/11/2023), Diabetes mellitus (Multi), Diabetic acetonemia (Multi) (11/15/2023), DKA (diabetic ketoacidosis) (Multi) (07/11/2023), DKA, type 1, not at goal (04/21/2023), DM2 (diabetes mellitus, type 2) (Multi) (11/15/2023), Elevated blood sugar (11/15/2023), Elevated blood-pressure reading, without diagnosis of hypertension (09/09/2020), Fatigue (07/11/2023), Generalized anxiety disorder (07/11/2023), Microalbuminuria due to type 1 diabetes mellitus (Multi) (07/11/2023), Personal history of COVID-19 (04/02/2023), Poorly controlled diabetes mellitus (Multi) (07/11/2023), Type 1 diabetes mellitus (07/11/2023), Type 1 diabetes mellitus with hyperglycemia (Multi) (02/25/2019), Vitamin B12 deficiency (07/11/2023), Vitamin D deficiency  (07/11/2023), and Vomiting (11/15/2023).    Surgical History  He has no past surgical history on file.     Social History  He reports that he has never smoked. He has never used smokeless tobacco. He reports that he does not drink alcohol and does not use drugs.    Family History  Family History   Problem Relation Name Age of Onset    No Known Problems Mother      No Known Problems Father      Hypertension Other      Coronary artery disease Other      Diabetes Other      Heart failure Other          Allergies  Patient has no known allergies.    Code Status  Full Code     Review of Systems   Constitutional:  Positive for appetite change. Negative for chills, fatigue, fever and unexpected weight change.   HENT:  Negative for congestion, ear discharge, ear pain, mouth sores, nosebleeds, sinus pain, sore throat, trouble swallowing and voice change.    Eyes:  Negative for photophobia, pain, discharge, itching and visual disturbance.   Respiratory:  Negative for apnea, cough, choking, chest tightness, shortness of breath and wheezing.    Cardiovascular:  Negative for chest pain, palpitations and leg swelling.   Gastrointestinal:  Positive for nausea. Negative for abdominal distention, abdominal pain, blood in stool, constipation, diarrhea and vomiting.   Endocrine: Negative for cold intolerance, heat intolerance, polydipsia, polyphagia and polyuria.   Genitourinary:  Negative for decreased urine volume, difficulty urinating, dysuria, flank pain, frequency, hematuria and urgency.   Musculoskeletal:  Negative for arthralgias, back pain, gait problem, myalgias, neck pain and neck stiffness.   Skin:  Negative for color change, pallor and wound.   Allergic/Immunologic: Negative for food allergies and immunocompromised state.   Neurological:  Negative for dizziness, tremors, seizures, syncope, speech difficulty, weakness, light-headedness, numbness and headaches.   Hematological:  Negative for adenopathy. Does not bruise/bleed  easily.   Psychiatric/Behavioral:  Negative for confusion, dysphoric mood, hallucinations, sleep disturbance and suicidal ideas. The patient is not nervous/anxious.        Last Recorded Vitals  /84 (BP Location: Left arm, Patient Position: Sitting)   Pulse (!) 105   Temp 36.7 °C (98.1 °F)   Resp 18   Wt 70.3 kg (155 lb)   SpO2 99%      Physical Exam  Vitals reviewed.   Constitutional:       General: He is not in acute distress.  HENT:      Head: Normocephalic and atraumatic.      Right Ear: External ear normal.      Left Ear: External ear normal.      Nose: Nose normal. No congestion.      Mouth/Throat:      Mouth: Mucous membranes are moist.      Pharynx: Oropharynx is clear.   Eyes:      General: No scleral icterus.     Extraocular Movements: Extraocular movements intact.      Conjunctiva/sclera: Conjunctivae normal.      Pupils: Pupils are equal, round, and reactive to light.   Neck:      Vascular: No carotid bruit.   Cardiovascular:      Rate and Rhythm: Regular rhythm. Tachycardia present.      Pulses: Normal pulses.      Heart sounds: Normal heart sounds. No murmur heard.  Pulmonary:      Effort: Pulmonary effort is normal. No respiratory distress.      Breath sounds: Normal breath sounds. No wheezing, rhonchi or rales.   Chest:      Chest wall: No tenderness.   Abdominal:      General: Bowel sounds are normal. There is no distension.      Palpations: Abdomen is soft. There is no mass.      Tenderness: There is no abdominal tenderness. There is no rebound.   Musculoskeletal:         General: No swelling or deformity. Normal range of motion.      Cervical back: Normal range of motion.      Right lower leg: No edema.      Left lower leg: No edema.   Skin:     General: Skin is warm and dry.      Capillary Refill: Capillary refill takes less than 2 seconds.   Neurological:      General: No focal deficit present.      Mental Status: He is alert and oriented to person, place, and time.      Cranial  Nerves: No cranial nerve deficit.      Sensory: No sensory deficit.      Motor: No weakness.   Psychiatric:         Behavior: Behavior normal.      Comments: Flat affect         Assessment/Plan   Assessment & Plan    Diabetic Ketoacidosis  Continue IV hydration  Insulin drip per protocol  Consult to intensivist    IDDM 1   When patient off drip return to basal bolus insulin    Intentional noncompliance with medication regime  Pt counseled that the continued noncompliance could lead to debilitating complications      No new Assessment & Plan notes have been filed under this hospital service since the last note was generated.  Service: Internal Medicine          Hayden Rice, APRN-CNP    Dragon dictation software was used to dictate this note and thus there may be minor errors in translation/transcription including garbled speech or misspellings. Please contact for clarification if needed.

## 2024-11-14 NOTE — CONSULTS
"Critical Care Medicine Consult      Reason For Consult  Diabetic Ketoacidosis secondary to medication noncompliance now on insulin drip    History Of Present Illness  Ed Sanon is a 34 y.o. male PMHx significant for IDDM type 1 with poor insulin compliance, recurrent admissions for DKA (left AMA during most recent on 11/09/2024), anxiety, depression admitted to the ICU for DKA.    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  Endorses access to both his short acting (lispro 6 units 3 times daily on 0-10 sliding scale) and long acting insulin (Novolin 18 units BID). However, only compliant with his Novolin. Attributes insulin lispro noncompliance to sensation of pseudohypoglycemia (\"brain fog/feeling off\" when glucose \"drops quickly from usual 300 to 200s\").  Home rayshawn glucose monitor \"intermittently malfunctions.\"  Current DKA symptoms remain mild compared to prior admissions with very mild shortness of breath, fatigue/\"brain fog\", and polyuria.  Denying any nausea/vomiting, chest pain, or palpitations. Patient amenable to admission until DKA resolves, states he recognizes he probably should have stayed last time.      Past Medical History:   Diagnosis Date    Abdominal pain 04/21/2023    Anxiety and depression 08/08/2021    Contact with and (suspected) exposure to covid-19 04/21/2023    COVID-19 virus infection 07/11/2023    Diabetes mellitus (Multi)     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    DIABETIC KETO ACIDOSIS    DKA (diabetic ketoacidosis) (Multi) 07/11/2023    DKA, type 1, not at goal 04/21/2023    DM2 (diabetes mellitus, type 2) (Multi) 11/15/2023    DIABETES    Elevated blood sugar 11/15/2023    ELEVATED BLOOD SUGAR/ 375 LAST CHECK    Elevated blood-pressure reading, without diagnosis of hypertension 09/09/2020    Elevated blood pressure reading    Fatigue " "07/11/2023    Generalized anxiety disorder 07/11/2023    Microalbuminuria due to type 1 diabetes mellitus (Multi) 07/11/2023    Personal history of COVID-19 04/02/2023    Poorly controlled diabetes mellitus (Multi) 07/11/2023    Type 1 diabetes mellitus 07/11/2023    Type 1 diabetes mellitus with hyperglycemia (Multi) 02/25/2019    Vitamin B12 deficiency 07/11/2023    Vitamin D deficiency 07/11/2023    Vomiting 11/15/2023    VOMITING HEADACHE BACK ACHE     No past surgical history on file.  (Not in a hospital admission)    Patient has no known allergies.  Social History     Tobacco Use    Smoking status: Never    Smokeless tobacco: Never   Vaping Use    Vaping status: Former   Substance Use Topics    Alcohol use: Never    Drug use: Never     Family History   Problem Relation Name Age of Onset    No Known Problems Mother      No Known Problems Father      Hypertension Other      Coronary artery disease Other      Diabetes Other      Heart failure Other         Scheduled Medications:   lactated Ringer's, 1,000 mL, intravenous, Once         Continuous Medications:   insulin regular, 7 Units/hr, Last Rate: 7 Units/hr (11/14/24 1427)         PRN Medications:       Review of Systems:  Review of Systems   Constitutional:  Positive for fatigue (\"brain fog\"). Negative for chills, diaphoresis and fever.   Respiratory:  Positive for shortness of breath (very mild). Negative for cough and chest tightness.    Cardiovascular:  Negative for chest pain, palpitations and leg swelling.   Gastrointestinal:  Negative for abdominal pain, nausea and vomiting.   Endocrine: Positive for polyuria.   Neurological:  Negative for dizziness, seizures, syncope and weakness.   Psychiatric/Behavioral:  Positive for confusion (\"brain fog\").         Objective   Vitals:  Most Recent:  Vitals:    11/14/24 1442   BP: 123/84   Pulse: (!) 105   Resp: 18   Temp:    SpO2: 99%       24hr Min/Max:  Temp  Min: 36.7 °C (98.1 °F)  Max: 36.7 °C (98.1 °F)  Pulse  " Min: 98  Max: 105  BP  Min: 123/84  Max: 149/93  Resp  Min: 16  Max: 18  SpO2  Min: 98 %  Max: 99 %    LDA:          Vent settings:       Hemodynamic parameters for last 24 hours:       No intake or output data in the 24 hours ending 11/14/24 1455        Physical exam:    Physical Exam  Constitutional:       General: He is awake. He is not in acute distress.     Appearance: Normal appearance. He is not ill-appearing, toxic-appearing or diaphoretic.   HENT:      Head: Normocephalic and atraumatic.      Mouth/Throat:      Mouth: Mucous membranes are dry.      Dentition: Dental caries present.   Eyes:      Extraocular Movements: Extraocular movements intact.      Conjunctiva/sclera: Conjunctivae normal.   Cardiovascular:      Rate and Rhythm: Regular rhythm. Tachycardia present.      Heart sounds: Normal heart sounds.   Pulmonary:      Effort: Pulmonary effort is normal.      Breath sounds: Normal breath sounds.   Abdominal:      General: Abdomen is flat. There is no distension.      Palpations: Abdomen is soft.      Tenderness: There is no abdominal tenderness. There is no guarding or rebound.   Skin:     General: Skin is warm and dry.   Neurological:      General: No focal deficit present.      Mental Status: He is alert and oriented to person, place, and time.      Sensory: Sensation is intact.      Motor: Motor function is intact.   Psychiatric:         Attention and Perception: Attention and perception normal.         Mood and Affect: Mood and affect normal.         Speech: Speech normal.         Behavior: Behavior normal. Behavior is cooperative.         Thought Content: Thought content normal.         Cognition and Memory: Cognition and memory normal.         Judgment: Judgment normal.          Lab/Radiology/Diagnostic Review:  Results for orders placed or performed during the hospital encounter of 11/14/24 (from the past 24 hours)   POCT GLUCOSE   Result Value Ref Range    POCT Glucose 368 (H) 74 - 99 mg/dL    ECG 12 lead   Result Value Ref Range    Ventricular Rate 87 BPM    Atrial Rate 86 BPM    NM Interval 152 ms    QRS Duration 95 ms    QT Interval 362 ms    QTC Calculation(Bazett) 436 ms    P Axis 73 degrees    R Axis 33 degrees    T Axis 39 degrees    QRS Count 14 beats    Q Onset 249 ms    T Offset 430 ms    QTC Fredericia 409 ms   CBC and Auto Differential   Result Value Ref Range    WBC 4.6 4.4 - 11.3 x10*3/uL    nRBC 0.0 0.0 - 0.0 /100 WBCs    RBC 5.16 4.50 - 5.90 x10*6/uL    Hemoglobin 16.5 13.5 - 17.5 g/dL    Hematocrit 48.3 41.0 - 52.0 %    MCV 94 80 - 100 fL    MCH 32.0 26.0 - 34.0 pg    MCHC 34.2 32.0 - 36.0 g/dL    RDW 14.4 11.5 - 14.5 %    Platelets 173 150 - 450 x10*3/uL    Neutrophils % 68.1 40.0 - 80.0 %    Immature Granulocytes %, Automated 0.7 0.0 - 0.9 %    Lymphocytes % 20.1 13.0 - 44.0 %    Monocytes % 10.5 2.0 - 10.0 %    Eosinophils % 0.2 0.0 - 6.0 %    Basophils % 0.4 0.0 - 2.0 %    Neutrophils Absolute 3.12 1.20 - 7.70 x10*3/uL    Immature Granulocytes Absolute, Automated 0.03 0.00 - 0.70 x10*3/uL    Lymphocytes Absolute 0.92 (L) 1.20 - 4.80 x10*3/uL    Monocytes Absolute 0.48 0.10 - 1.00 x10*3/uL    Eosinophils Absolute 0.01 0.00 - 0.70 x10*3/uL    Basophils Absolute 0.02 0.00 - 0.10 x10*3/uL   Magnesium   Result Value Ref Range    Magnesium 2.06 1.60 - 2.40 mg/dL   Lipase   Result Value Ref Range    Lipase 49 9 - 82 U/L   Comprehensive metabolic panel   Result Value Ref Range    Glucose 461 (HH) 74 - 99 mg/dL    Sodium 131 (L) 136 - 145 mmol/L    Potassium 5.2 3.5 - 5.3 mmol/L    Chloride 101 98 - 107 mmol/L    Bicarbonate 11 (L) 21 - 32 mmol/L    Anion Gap 24 (H) 10 - 20 mmol/L    Urea Nitrogen 17 6 - 23 mg/dL    Creatinine 1.10 0.50 - 1.30 mg/dL    eGFR 89 >60 mL/min/1.73m*2    Calcium 8.7 8.6 - 10.3 mg/dL    Albumin 4.1 3.4 - 5.0 g/dL    Alkaline Phosphatase 115 33 - 120 U/L    Total Protein 6.8 6.4 - 8.2 g/dL    AST 7 (L) 9 - 39 U/L    Bilirubin, Total 0.5 0.0 - 1.2 mg/dL    ALT 8 (L)  10 - 52 U/L   Blood Gas Venous Full Panel   Result Value Ref Range    POCT pH, Venous 7.15 (LL) 7.33 - 7.43 pH    POCT pCO2, Venous 35 (L) 41 - 51 mm Hg    POCT pO2, Venous 35 35 - 45 mm Hg    POCT SO2, Venous 52 45 - 75 %    POCT Oxy Hemoglobin, Venous 51.7 45.0 - 75.0 %    POCT Hematocrit Calculated, Venous 51.0 41.0 - 52.0 %    POCT Sodium, Venous 131 (L) 136 - 145 mmol/L    POCT Potassium, Venous 5.6 (H) 3.5 - 5.3 mmol/L    POCT Chloride, Venous 100 98 - 107 mmol/L    POCT Ionized Calicum, Venous 1.20 1.10 - 1.33 mmol/L    POCT Glucose, Venous 524 (HH) 74 - 99 mg/dL    POCT Lactate, Venous 1.6 0.4 - 2.0 mmol/L    POCT Base Excess, Venous -15.7 (L) -2.0 - 3.0 mmol/L    POCT HCO3 Calculated, Venous 12.2 (L) 22.0 - 26.0 mmol/L    POCT Hemoglobin, Venous 16.9 13.5 - 17.5 g/dL    POCT Anion Gap, Venous 24.0 10.0 - 25.0 mmol/L    Patient Temperature 37.0 degrees Celsius    FiO2 21 %   Beta Hydroxybutyrate   Result Value Ref Range    Beta-Hydroxybutyrate 9.77 (H) 0.02 - 0.27 mmol/L   Urinalysis with Reflex Culture and Microscopic   Result Value Ref Range    Color, Urine Colorless (N) Straw, Yellow    Appearance, Urine Clear Clear    Specific Gravity, Urine 1.025 1.005 - 1.035    pH, Urine 5.0 5.0, 5.5, 6.0, 6.5, 7.0, 7.5, 8.0    Protein, Urine NEGATIVE NEGATIVE, 10 (TRACE) mg/dL    Glucose, Urine 1000 (4+) (A) NEGATIVE mg/dL    Blood, Urine NEGATIVE NEGATIVE    Ketones, Urine 150 (4+) (A) NEGATIVE mg/dL    Bilirubin, Urine NEGATIVE NEGATIVE    Urobilinogen, Urine NORM NORM mg/dL    Nitrite, Urine NEGATIVE NEGATIVE    Leukocyte Esterase, Urine NEGATIVE NEGATIVE   Lactate   Result Value Ref Range    Lactate 0.7 0.4 - 2.0 mmol/L     ECG 12 lead    Result Date: 11/14/2024  Sinus rhythm ST elev, probable normal early repol pattern    ECG 12 lead    Result Date: 11/8/2024  Sinus tachycardia Borderline prolonged QT interval    XR chest 1 view    Result Date: 11/7/2024  Interpreted By:  Mark Murillo, STUDY: XR CHEST 1 VIEW;   11/7/2024 9:45 pm   INDICATION: Signs/Symptoms:sob.     COMPARISON: 09/18/2024.   ACCESSION NUMBER(S): [id number]   ORDERING CLINICIAN: MARC MIDDLETON   FINDINGS: AP radiograph of the chest was provided.   Leads overlie the chest, partially obscuring the field-of-view.   CARDIOMEDIASTINAL SILHOUETTE: Cardiomediastinal silhouette is normal in size and configuration.   LUNGS: Lungs are clear. No pleural effusion or pneumothorax.   ABDOMEN: No remarkable upper abdominal findings.   BONES: No acute osseous changes.       1.  No evidence of acute cardiopulmonary process.       MACRO: None   Signed by: Mark Murillo 11/7/2024 10:13 PM Dictation workstation:   [id number]      Assessment/Plan   Principal Problem:    Diabetes mellitus with ketoacidosis and lactic acidosis but without coma    Ed Sanon is a 33 y.o. male with a PMHx significant for IDDM type 1 with poor insulin compliance, recurrent admissions for DKA (left AMA during most recent on 11/09/2024), anxiety, depression admitted to the ICU for DKA     Diabetic ketoacidosis secondary to noncompliance with the insulin pump and supplies   11/14/2024: Endorsed medication noncompliance proposed primary driving factor for DKA   -Mild sx presentation with mild shortness of breath and polyuria but no nausea/vomiting.  -Endorses ongoing elevated blood sugars at home   -Noncompliant with home Humalog 6 units TID; compliant with home Novolin 18 units BID  Initial blood glucose of 461 upon arrival.  Elevated beta hydroxybutyrate of 9.77, bicarb 11, anion gap 24  Hyponatremia 131 (corrected sodium of 137)  Normal potassium 5.2, WBC 4.6, lipase 49, and lactate 0.7  UA negative for UTI (positive glucosuria and ketones)  ECG: NSR with vent rate 87; normal SHARON 132 and QTc 436; no acute ST changes.  Received 2 L of IVF while in ED (1L NS and 1L LR) and initiated on insulin infusion.   Proceed with standard Diabetic Ketoacidosis protocol as reflected in order set:   Insulin  "drip as per DKA protocol  Point-of-care glucose every 1 hour and BMP every 4 hours while on insulin drip  IVF: 1/2 NS for blood glucose >250; D5W with 1/2 NS for blood glucose <250; D10W for blood glucose<150 if AG open or <70 with closed AG.   Hold insulin for potassium <3.3; resume insulin when potassium >3.3 (electrolyte repletion q2-4h)  After BOTH Anion Gap < 15 AND Bicarb >15, switch from insulin drip to subcutaneous   Zofran 4 mg IV for nausea  Endocrinology consultation appreciated.  Patient may benefit from high risk care plans     2. Metabolic acidosis secondary to DKA   11/14/2024: HAGMA with pH 7.15, pCO2 35, HCO3 12.2, and anion gap 24.  Normal venous lactate 1.6  Continue IV hydration and adhere to standard DKA protocol as mentioned above  Trend VBG    3. Poorly controlled type 1 diabetes   11/14/2024: Very poorly controlled IDDM 1 with last HbA1c 13.3 (7/6/2024)  Transition between endocrinology providers at [facility] (3 month follow up 1/2025-2/2025).    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  Prescribed glucose rayshawn monitor (\"intermittently malfunctions\")  Again counseled on importance of glycemic control  Endocrinology consulted, appreciate recommendations.      I spent 55 minutes of cumulative critical care time with the patient.  Greater than 50% of that time was spent in the direct collaboration and or coordination of care of the patient.      Tabatha Bennett   [facility], MS-4      I was present with the Medical student who participated in the documentation of this note. I have personally seen and re-examined the patient and performed the medical decision-making components (assessment and plan of care). I have reviewed the Medical student documentation and verified the findings in the note as written with additions or exceptions as stated in the body of this note.     I spent 55 minutes of " cumulative critical care time with the patient.  Greater than 50% of that time was spent in the direct collaboration and or coordination of care of the patient.   Will transfer the patient to [facility] with telemetry.      Dragon dictation software was used to dictate this note and thus there may be minor errors in translation/transcription including garbled speech or misspellings. Please contact for clarification if needed.

## 2024-11-14 NOTE — ED PROVIDER NOTES
HPI   Chief Complaint   Patient presents with    Abdominal Pain     Concern for DKA       34-year-old male with past medical history of 1 diabetes with poor compliance with several admissions for DKA presents emerged department today for concerns of DKA.  Patient states that he was seen here about 5 days ago and he was discharged AGAINST MEDICAL ADVICE because was his sister's wedding.  He states that originally he was starting to feel better but recently he started to feel worse.  He states he has been doing his insulin correctly.  He denies any nausea, vomiting, shortness of breath he just feels more rundown.                          Alyce Coma Scale Score: 15                  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    DIABETIC KETO ACIDOSIS    DKA (diabetic ketoacidosis) (Multi) 07/11/2023    DKA, type 1, not at goal 04/21/2023    DM2 (diabetes mellitus, type 2) (Multi) 11/15/2023    DIABETES    Elevated blood sugar 11/15/2023    ELEVATED BLOOD SUGAR/ 375 LAST CHECK    Elevated blood-pressure reading, without diagnosis of hypertension 09/09/2020    Elevated blood pressure reading    Fatigue 07/11/2023    Generalized anxiety disorder 07/11/2023    Microalbuminuria due to type 1 diabetes mellitus (Multi) 07/11/2023    Personal history of COVID-19 04/02/2023    Poorly controlled diabetes mellitus (Multi) 07/11/2023    Type 1 diabetes mellitus 07/11/2023    Type 1 diabetes mellitus with hyperglycemia (Multi) 02/25/2019    Vitamin B12 deficiency 07/11/2023    Vitamin D deficiency 07/11/2023    Vomiting 11/15/2023    VOMITING HEADACHE BACK ACHE     No past surgical history on file.  Family History   Problem Relation Name Age of Onset    No Known Problems Mother       No Known Problems Father      Hypertension Other      Coronary artery disease Other      Diabetes Other      Heart failure Other       Social History     Tobacco Use    Smoking status: Never    Smokeless tobacco: Never   Vaping Use    Vaping status: Former   Substance Use Topics    Alcohol use: Never    Drug use: Never       Physical Exam   ED Triage Vitals [11/14/24 1143]   Temperature Heart Rate Respirations BP   36.7 °C (98.1 °F) 98 16 (!) 138/94      Pulse Ox Temp src Heart Rate Source Patient Position   99 % -- -- --      BP Location FiO2 (%)     -- --       Physical Exam  Constitutional:       General: He is not in acute distress.  HENT:      Head: Normocephalic and atraumatic.      Right Ear: Tympanic membrane normal.      Left Ear: Tympanic membrane normal.      Mouth/Throat:      Mouth: Mucous membranes are moist.   Eyes:      Extraocular Movements: Extraocular movements intact.      Conjunctiva/sclera: Conjunctivae normal.      Pupils: Pupils are equal, round, and reactive to light.   Cardiovascular:      Rate and Rhythm: Normal rate and regular rhythm.      Heart sounds: No murmur heard.  Pulmonary:      Effort: Pulmonary effort is normal. No respiratory distress.      Breath sounds: Normal breath sounds. No stridor. No wheezing or rales.   Abdominal:      General: Bowel sounds are normal. There is no distension.      Tenderness: There is no abdominal tenderness. There is no guarding or rebound.   Musculoskeletal:         General: No swelling, tenderness or deformity. Normal range of motion.   Skin:     General: Skin is warm and dry.      Coloration: Skin is not jaundiced.      Findings: No bruising or lesion.   Neurological:      General: No focal deficit present.      Mental Status: He is alert and oriented to person, place, and time. Mental status is at baseline.      Cranial Nerves: No cranial nerve deficit.      Motor: No weakness.   Psychiatric:         Mood and Affect: Mood normal.       Labs  Reviewed   POCT GLUCOSE - Abnormal       Result Value    POCT Glucose 368 (*)      No orders to display       ED Course & MDM   ED Course as of 11/14/24 1432   Thu Nov 14, 2024   1404 POCT pH, Venous(!!): 7.15 [CF]      ED Course User Index  [CF] Barbie Early MD         Diagnoses as of 11/14/24 1432   Diabetic ketoacidosis without coma associated with type 1 diabetes mellitus (Multi)       Medical Decision Making  This is a 34-year-old male with type 1 diabetes who presents emerged part for concern of DKA he recently left AGAINST MEDICAL ADVICE while still in DKA.  He states he has been compliant with his meds.  Here his glucose was found to be elevated in the 300s on point-of-care.  He was given 1 L fluid and labs were sent he was found to have a pH of 7.15 hydroxybutyrate of 9 anion gap of 24 and a bicarb of 11.  He is given another liter of fluid and insulin was added.  His potassium found to be 5.2 so no potassium was added he was accepted by [facility] and the interventionalists was notified.    EKG on my read shows sinus rhythm at a rate of 87 bpm ST elevations in lead aVF most likely benign repolarization no ST depressions seen.  Normal intervals.        Procedure  Critical Care    Performed by: Barbie Early MD  Authorized by: Barbie Early MD    Critical care provider statement:     Critical care time (minutes):  25    Critical care time was exclusive of:  Separately billable procedures and treating other patients    Critical care was necessary to treat or prevent imminent or life-threatening deterioration of the following conditions:  Endocrine crisis    Critical care was time spent personally by me on the following activities:  Blood draw for specimens, development of treatment plan with patient or surrogate, discussions with consultants, evaluation of patient's response to treatment, examination of patient, ordering and performing treatments and interventions, ordering and review of  laboratory studies, ordering and review of radiographic studies, pulse oximetry, re-evaluation of patient's condition and review of old charts    Care discussed with: admitting provider         Barbie Early MD  11/14/24 [id number]       Barbie Early MD  12/09/24 0046

## 2024-11-15 LAB
ANION GAP SERPL CALC-SCNC: 13 MMOL/L (ref 10–20)
ATRIAL RATE: 86 BPM
BASOPHILS # BLD AUTO: 0.03 X10*3/UL (ref 0–0.1)
BASOPHILS NFR BLD AUTO: 0.8 %
BUN SERPL-MCNC: 13 MG/DL (ref 6–23)
CALCIUM SERPL-MCNC: 7.8 MG/DL (ref 8.6–10.3)
CHLORIDE SERPL-SCNC: 107 MMOL/L (ref 98–107)
CO2 SERPL-SCNC: 19 MMOL/L (ref 21–32)
CREAT SERPL-MCNC: 0.81 MG/DL (ref 0.5–1.3)
EGFRCR SERPLBLD CKD-EPI 2021: >90 ML/MIN/1.73M*2
EOSINOPHIL # BLD AUTO: 0.07 X10*3/UL (ref 0–0.7)
EOSINOPHIL NFR BLD AUTO: 1.8 %
ERYTHROCYTE [DISTWIDTH] IN BLOOD BY AUTOMATED COUNT: 14 % (ref 11.5–14.5)
EST. AVERAGE GLUCOSE BLD GHB EST-MCNC: 332 MG/DL
GLUCOSE BLD MANUAL STRIP-MCNC: 238 MG/DL (ref 74–99)
GLUCOSE BLD MANUAL STRIP-MCNC: 252 MG/DL (ref 74–99)
GLUCOSE BLD MANUAL STRIP-MCNC: 268 MG/DL (ref 74–99)
GLUCOSE BLD MANUAL STRIP-MCNC: 279 MG/DL (ref 74–99)
GLUCOSE BLD MANUAL STRIP-MCNC: 284 MG/DL (ref 74–99)
GLUCOSE BLD MANUAL STRIP-MCNC: 372 MG/DL (ref 74–99)
GLUCOSE SERPL-MCNC: 259 MG/DL (ref 74–99)
HBA1C MFR BLD: 13.2 %
HCT VFR BLD AUTO: 41.4 % (ref 41–52)
HGB BLD-MCNC: 14.5 G/DL (ref 13.5–17.5)
HOLD SPECIMEN: NORMAL
IMM GRANULOCYTES # BLD AUTO: 0.02 X10*3/UL (ref 0–0.7)
IMM GRANULOCYTES NFR BLD AUTO: 0.5 % (ref 0–0.9)
LYMPHOCYTES # BLD AUTO: 1.48 X10*3/UL (ref 1.2–4.8)
LYMPHOCYTES NFR BLD AUTO: 37.5 %
MAGNESIUM SERPL-MCNC: 1.75 MG/DL (ref 1.6–2.4)
MCH RBC QN AUTO: 31.5 PG (ref 26–34)
MCHC RBC AUTO-ENTMCNC: 35 G/DL (ref 32–36)
MCV RBC AUTO: 90 FL (ref 80–100)
MONOCYTES # BLD AUTO: 0.71 X10*3/UL (ref 0.1–1)
MONOCYTES NFR BLD AUTO: 18 %
NEUTROPHILS # BLD AUTO: 1.64 X10*3/UL (ref 1.2–7.7)
NEUTROPHILS NFR BLD AUTO: 41.4 %
NRBC BLD-RTO: 0 /100 WBCS (ref 0–0)
P AXIS: 73 DEGREES
PLATELET # BLD AUTO: 150 X10*3/UL (ref 150–450)
POTASSIUM SERPL-SCNC: 4 MMOL/L (ref 3.5–5.3)
PR INTERVAL: 152 MS
Q ONSET: 249 MS
QRS COUNT: 14 BEATS
QRS DURATION: 95 MS
QT INTERVAL: 362 MS
QTC CALCULATION(BAZETT): 436 MS
QTC FREDERICIA: 409 MS
R AXIS: 33 DEGREES
RBC # BLD AUTO: 4.61 X10*6/UL (ref 4.5–5.9)
SODIUM SERPL-SCNC: 135 MMOL/L (ref 136–145)
T AXIS: 39 DEGREES
T OFFSET: 430 MS
VENTRICULAR RATE: 87 BPM
WBC # BLD AUTO: 4 X10*3/UL (ref 4.4–11.3)

## 2024-11-15 PROCEDURE — 82374 ASSAY BLOOD CARBON DIOXIDE: CPT | Performed by: NURSE PRACTITIONER

## 2024-11-15 PROCEDURE — 2500000002 HC RX 250 W HCPCS SELF ADMINISTERED DRUGS (ALT 637 FOR MEDICARE OP, ALT 636 FOR OP/ED): Performed by: INTERNAL MEDICINE

## 2024-11-15 PROCEDURE — 83735 ASSAY OF MAGNESIUM: CPT | Performed by: NURSE PRACTITIONER

## 2024-11-15 PROCEDURE — 82947 ASSAY GLUCOSE BLOOD QUANT: CPT

## 2024-11-15 PROCEDURE — 1200000002 HC GENERAL ROOM WITH TELEMETRY DAILY

## 2024-11-15 PROCEDURE — 85025 COMPLETE CBC W/AUTO DIFF WBC: CPT | Performed by: NURSE PRACTITIONER

## 2024-11-15 PROCEDURE — 2500000002 HC RX 250 W HCPCS SELF ADMINISTERED DRUGS (ALT 637 FOR MEDICARE OP, ALT 636 FOR OP/ED)

## 2024-11-15 PROCEDURE — 99254 IP/OBS CNSLTJ NEW/EST MOD 60: CPT | Performed by: INTERNAL MEDICINE

## 2024-11-15 PROCEDURE — 99291 CRITICAL CARE FIRST HOUR: CPT | Performed by: INTERNAL MEDICINE

## 2024-11-15 PROCEDURE — 99233 SBSQ HOSP IP/OBS HIGH 50: CPT | Performed by: INTERNAL MEDICINE

## 2024-11-15 PROCEDURE — 36415 COLL VENOUS BLD VENIPUNCTURE: CPT | Performed by: NURSE PRACTITIONER

## 2024-11-15 ASSESSMENT — COGNITIVE AND FUNCTIONAL STATUS - GENERAL
DAILY ACTIVITIY SCORE: 24
MOBILITY SCORE: 24
MOBILITY SCORE: 24
DAILY ACTIVITIY SCORE: 24

## 2024-11-15 ASSESSMENT — ENCOUNTER SYMPTOMS
CONFUSION: 0
NAUSEA: 1
VOMITING: 1
ABDOMINAL PAIN: 0

## 2024-11-15 ASSESSMENT — PAIN - FUNCTIONAL ASSESSMENT
PAIN_FUNCTIONAL_ASSESSMENT: 0-10

## 2024-11-15 ASSESSMENT — PAIN SCALES - GENERAL
PAINLEVEL_OUTOF10: 0 - NO PAIN

## 2024-11-15 NOTE — PROGRESS NOTES
Ed Sanon is a 34 y.o. male on day 1 of admission presenting with No Principal Problem: There is no principal problem currently on the Problem List. Please update the Problem List and refresh..      Subjective   Ed Sanon is a 34 y.o. male with PMHx s/f Type 1 diabetes, hx recurrent admissions for DKA,  anxiety and depression presenting with elevated blood glucose   presenting with anorexia and nausea. The patient had recently been admitted to the ICU last week, he was improving and had transitioned from insulin drip to basal bolus dosing. He had refused his evening dose of insulin and signed out AMA before he fully stabilized. Since then the patient had gradually become more fatigued and is eating less. No fever or chills. No vomiting or diarrhea. He denies cough, abdominal pain, rash or urinary symptoms.  Presenting to the emergency department temperature 98.1 heart rate 98 regular respiratory rate 16 blood pressure 138/94 SpO2 99% on room air.  Chemistry panel showed sodium 131 potassium 5.2 bicarb 11 anion gap 24 BUN 17 creatinine 1.10 glucose 461 lactate level 0.7 beta hydroxybutyrate 9.77 CBC is unremarkable.  Blood gas showed the patient to indeed be acidotic pH is 7.15 with pCO2 35 bicarb 12.  Patient was given a liter of normal saline subsequently given a liter bolus of lactated Ringer's started on insulin drip and referred for admission in order to further manage this patient presenting in DKA.  Patient will be admitted into the intensive care unit for intensive monitoring and to continue the insulin drip close his anion gap and then transition to basal bolus insulin.  Length of stay is uncertain at this point that patient would need more than 2 midnights to accomplish this.      11/15: Patient was seen and examined.  Iron gap is now closed.  Patient started on NPH twice daily and Humalog 3 times daily.  Will get hemoglobin A1c in AM.  Potential discharge in the next 24 to 48  hours.      Objective     Last Recorded Vitals  /78   Pulse 102   Temp 36.2 °C (97.2 °F)   Resp 20   Wt 67.1 kg (147 lb 14.9 oz)   SpO2 99%   Intake/Output last 3 Shifts:    Intake/Output Summary (Last 24 hours) at 11/15/2024 1423  Last data filed at 11/15/2024 1200  Gross per 24 hour   Intake 2155.8 ml   Output 2 ml   Net 2153.8 ml       Admission Weight  Weight: 70.3 kg (155 lb) (11/14/24 1143)    Daily Weight  11/15/24 : 67.1 kg (147 lb 14.9 oz)    Image Results  ECG 12 lead  Sinus rhythm  ST elev, probable normal early repol pattern    See ED provider note for full interpretation and clinical correlation  Confirmed by Jamie North ([id number]) on 11/15/2024 12:30:36 PM      Physical Exam  Constitutional:       General: He is not in acute distress.  HENT:      Head: Normocephalic and atraumatic.      Right Ear: External ear normal.      Left Ear: External ear normal.      Nose: Nose normal. No congestion.      Mouth/Throat:      Mouth: Mucous membranes are moist.      Pharynx: Oropharynx is clear.   Eyes:      General: No scleral icterus.     Extraocular Movements: Extraocular movements intact.      Conjunctiva/sclera: Conjunctivae normal.      Pupils: Pupils are equal, round, and reactive to light.   Neck:      Vascular: No carotid bruit.   Cardiovascular:      Rate and Rhythm: Regular rhythm. Tachycardia present.      Pulses: Normal pulses.      Heart sounds: Normal heart sounds. No murmur heard.  Pulmonary:      Effort: Pulmonary effort is normal. No respiratory distress.      Breath sounds: Normal breath sounds. No wheezing, rhonchi or rales.   Chest:      Chest wall: No tenderness.   Abdominal:      General: Bowel sounds are normal. There is no distension.      Palpations: Abdomen is soft. There is no mass.      Tenderness: There is no abdominal tenderness. There is no rebound.   Musculoskeletal:         General: No swelling or deformity. Normal range of motion.      Cervical back: Normal range of  motion.      Right lower leg: No edema.      Left lower leg: No edema.   Skin:     General: Skin is warm and dry.      Capillary Refill: Capillary refill takes less than 2 seconds.   Neurological:      General: No focal deficit present.      Mental Status: He is alert and oriented to person, place, and time.      Cranial Nerves: No cranial nerve deficit.      Sensory: No sensory deficit.      Motor: No weakness.   Psychiatric:         Behavior: Behavior normal.      Comments: Flat affect   Relevant Results               Assessment/Plan   This patient currently has cardiac telemetry ordered; if you would like to modify or discontinue the telemetry order, click here to go to the orders activity to modify/discontinue the order.              Assessment & Plan    Diabetic Ketoacidosis  Anion gap resolved  Resumed NPH insulin twice daily  Continue Humalog 3 times daily  Repeat hemoglobin A1c in a.m.  Intensivist and endocrinologist on board       IDDM 1   Accu-Cheks ACHS  NPH twice daily  Humalog 3 times daily  Hemoglobin A1c in a.m.    Intentional noncompliance with medication regime  Pt counseled that the continued noncompliance could lead to debilitating complications        No new Assessment & Plan notes have been filed under this hospital service since the last note was generated.  Service: Internal Medicine          Spent 35 minutes in the follow-up management of this patient today       Liseth Ahn MD

## 2024-11-15 NOTE — CONSULTS
Inpatient consult to Endocrinology  Consult performed by: Lori Packer MD  Consult ordered by: Hayden Rice, APRN-CNP  Reason for consult: DKA          Reason For Consult  DKA    History Of Present Illness  Ed Sanon is a 34 y.o. male presenting with hyperglycemia.  He recently left AMA less than one week ago when he was admitted for DKA.  He was found to be hemodynamically stable.  Initial lab data revealed a bicarbonate of 11,  anion gap of 24, glucose of 461mg/dL, beta hydroxybutyrate of 9.77.   He was given IVF and started on an insulin infusion with admittance to the ICU.       His home regimen consists of:  NPH 18 units subcutaneous twice daily    Humalog 6 units TID AC     He saw Phoebe Villar NP at [facility] but will need to see someone new in three months as she is leaving the practice.  He was last seen in October.         His A1C was 13.3% as of July 2024.    He is eating breakfast.  He states that he wakes up nauseated most mornings.  He did have emesis earlier this morning.       Past Medical History  He has a past medical history of Abdominal pain (04/21/2023), Anxiety and depression (08/08/2021), Contact with and (suspected) exposure to covid-19 (04/21/2023), COVID-19 virus infection (07/11/2023), Diabetes mellitus (Multi), Diabetic acetonemia (Multi) (11/15/2023), DKA (diabetic ketoacidosis) (Multi) (07/11/2023), DKA, type 1, not at goal (04/21/2023), DM2 (diabetes mellitus, type 2) (Multi) (11/15/2023), Elevated blood sugar (11/15/2023), Elevated blood-pressure reading, without diagnosis of hypertension (09/09/2020), Fatigue (07/11/2023), Generalized anxiety disorder (07/11/2023), Microalbuminuria due to type 1 diabetes mellitus (Multi) (07/11/2023), Personal history of COVID-19 (04/02/2023), Poorly controlled diabetes mellitus (Multi) (07/11/2023), Type 1 diabetes mellitus (07/11/2023), Type 1 diabetes mellitus with hyperglycemia (Multi) (02/25/2019), Vitamin B12 deficiency  "(07/11/2023), Vitamin D deficiency (07/11/2023), and Vomiting (11/15/2023).    Surgical History  He has no past surgical history on file.     Social History  He reports that he has never smoked. He has never used smokeless tobacco. He reports that he does not drink alcohol and does not use drugs.    Family History  Family History   Problem Relation Name Age of Onset    No Known Problems Mother      No Known Problems Father      Hypertension Other      Coronary artery disease Other      Diabetes Other      Heart failure Other          Allergies  Patient has no known allergies.    Review of Systems   Gastrointestinal:  Positive for nausea and vomiting. Negative for abdominal pain.   Psychiatric/Behavioral:  Negative for confusion.    All other systems reviewed and are negative.       Physical Exam  Vitals and nursing note reviewed.   Constitutional:       General: He is not in acute distress.     Appearance: Normal appearance. He is normal weight.   HENT:      Head: Normocephalic and atraumatic.      Nose: Nose normal.      Mouth/Throat:      Mouth: Mucous membranes are moist.   Eyes:      Extraocular Movements: Extraocular movements intact.   Cardiovascular:      Rate and Rhythm: Normal rate and regular rhythm.   Pulmonary:      Effort: Pulmonary effort is normal.   Musculoskeletal:         General: Normal range of motion.   Neurological:      Mental Status: He is alert and oriented to person, place, and time.   Psychiatric:         Mood and Affect: Mood normal.          Last Recorded Vitals  Blood pressure 118/84, pulse 85, temperature 36.4 °C (97.5 °F), temperature source Temporal, resp. rate 13, height 1.778 m (5' 10\"), weight 67 kg (147 lb 11.3 oz), SpO2 99%.    Relevant Results  Scheduled medications  famotidine, 20 mg, oral, BID   Or  famotidine, 20 mg, intravenous, BID  insulin lispro, 0-5 Units, subcutaneous, TID AC  insulin lispro, 5 Units, subcutaneous, TID AC  insulin NPH (Isophane), 18 Units, " subcutaneous, q12h [facility]  [Held by provider] lisinopril, 5 mg, oral, Daily      Continuous medications  dextrose 10 % in water (D10W), 150 mL/hr      PRN medications  PRN medications: dextrose, dextrose, dextrose, glucagon, glucagon, glucagon, ondansetron **OR** ondansetron    Results for orders placed or performed during the hospital encounter of 11/14/24 (from the past 96 hours)   POCT GLUCOSE   Result Value Ref Range    POCT Glucose 368 (H) 74 - 99 mg/dL   ECG 12 lead   Result Value Ref Range    Ventricular Rate 87 BPM    Atrial Rate 86 BPM    KS Interval 152 ms    QRS Duration 95 ms    QT Interval 362 ms    QTC Calculation(Bazett) 436 ms    P Axis 73 degrees    R Axis 33 degrees    T Axis 39 degrees    QRS Count 14 beats    Q Onset 249 ms    T Offset 430 ms    QTC Fredericia 409 ms   CBC and Auto Differential   Result Value Ref Range    WBC 4.6 4.4 - 11.3 x10*3/uL    nRBC 0.0 0.0 - 0.0 /100 WBCs    RBC 5.16 4.50 - 5.90 x10*6/uL    Hemoglobin 16.5 13.5 - 17.5 g/dL    Hematocrit 48.3 41.0 - 52.0 %    MCV 94 80 - 100 fL    MCH 32.0 26.0 - 34.0 pg    MCHC 34.2 32.0 - 36.0 g/dL    RDW 14.4 11.5 - 14.5 %    Platelets 173 150 - 450 x10*3/uL    Neutrophils % 68.1 40.0 - 80.0 %    Immature Granulocytes %, Automated 0.7 0.0 - 0.9 %    Lymphocytes % 20.1 13.0 - 44.0 %    Monocytes % 10.5 2.0 - 10.0 %    Eosinophils % 0.2 0.0 - 6.0 %    Basophils % 0.4 0.0 - 2.0 %    Neutrophils Absolute 3.12 1.20 - 7.70 x10*3/uL    Immature Granulocytes Absolute, Automated 0.03 0.00 - 0.70 x10*3/uL    Lymphocytes Absolute 0.92 (L) 1.20 - 4.80 x10*3/uL    Monocytes Absolute 0.48 0.10 - 1.00 x10*3/uL    Eosinophils Absolute 0.01 0.00 - 0.70 x10*3/uL    Basophils Absolute 0.02 0.00 - 0.10 x10*3/uL   Magnesium   Result Value Ref Range    Magnesium 2.06 1.60 - 2.40 mg/dL   Lipase   Result Value Ref Range    Lipase 49 9 - 82 U/L   Comprehensive metabolic panel   Result Value Ref Range    Glucose 461 (HH) 74 - 99 mg/dL    Sodium 131 (L) 136 - 145  mmol/L    Potassium 5.2 3.5 - 5.3 mmol/L    Chloride 101 98 - 107 mmol/L    Bicarbonate 11 (L) 21 - 32 mmol/L    Anion Gap 24 (H) 10 - 20 mmol/L    Urea Nitrogen 17 6 - 23 mg/dL    Creatinine 1.10 0.50 - 1.30 mg/dL    eGFR 89 >60 mL/min/1.73m*2    Calcium 8.7 8.6 - 10.3 mg/dL    Albumin 4.1 3.4 - 5.0 g/dL    Alkaline Phosphatase 115 33 - 120 U/L    Total Protein 6.8 6.4 - 8.2 g/dL    AST 7 (L) 9 - 39 U/L    Bilirubin, Total 0.5 0.0 - 1.2 mg/dL    ALT 8 (L) 10 - 52 U/L   Blood Gas Venous Full Panel   Result Value Ref Range    POCT pH, Venous 7.15 (LL) 7.33 - 7.43 pH    POCT pCO2, Venous 35 (L) 41 - 51 mm Hg    POCT pO2, Venous 35 35 - 45 mm Hg    POCT SO2, Venous 52 45 - 75 %    POCT Oxy Hemoglobin, Venous 51.7 45.0 - 75.0 %    POCT Hematocrit Calculated, Venous 51.0 41.0 - 52.0 %    POCT Sodium, Venous 131 (L) 136 - 145 mmol/L    POCT Potassium, Venous 5.6 (H) 3.5 - 5.3 mmol/L    POCT Chloride, Venous 100 98 - 107 mmol/L    POCT Ionized Calicum, Venous 1.20 1.10 - 1.33 mmol/L    POCT Glucose, Venous 524 (HH) 74 - 99 mg/dL    POCT Lactate, Venous 1.6 0.4 - 2.0 mmol/L    POCT Base Excess, Venous -15.7 (L) -2.0 - 3.0 mmol/L    POCT HCO3 Calculated, Venous 12.2 (L) 22.0 - 26.0 mmol/L    POCT Hemoglobin, Venous 16.9 13.5 - 17.5 g/dL    POCT Anion Gap, Venous 24.0 10.0 - 25.0 mmol/L    Patient Temperature 37.0 degrees Celsius    FiO2 21 %   Beta Hydroxybutyrate   Result Value Ref Range    Beta-Hydroxybutyrate 9.77 (H) 0.02 - 0.27 mmol/L   Urinalysis with Reflex Culture and Microscopic   Result Value Ref Range    Color, Urine Colorless (N) Straw, Yellow    Appearance, Urine Clear Clear    Specific Gravity, Urine 1.025 1.005 - 1.035    pH, Urine 5.0 5.0, 5.5, 6.0, 6.5, 7.0, 7.5, 8.0    Protein, Urine NEGATIVE NEGATIVE, 10 (TRACE) mg/dL    Glucose, Urine 1000 (4+) (A) NEGATIVE mg/dL    Blood, Urine NEGATIVE NEGATIVE    Ketones, Urine 150 (4+) (A) NEGATIVE mg/dL    Bilirubin, Urine NEGATIVE NEGATIVE    Urobilinogen, Urine  NORM NORM mg/dL    Nitrite, Urine NEGATIVE NEGATIVE    Leukocyte Esterase, Urine NEGATIVE NEGATIVE   Extra Urine Gray Tube   Result Value Ref Range    Extra Tube Hold for add-ons.    Lactate   Result Value Ref Range    Lactate 0.7 0.4 - 2.0 mmol/L   POCT GLUCOSE   Result Value Ref Range    POCT Glucose 317 (H) 74 - 99 mg/dL   Magnesium   Result Value Ref Range    Magnesium 1.77 1.60 - 2.40 mg/dL   Phosphorus   Result Value Ref Range    Phosphorus 2.0 (L) 2.5 - 4.9 mg/dL   Basic Metabolic Panel   Result Value Ref Range    Glucose 261 (H) 74 - 99 mg/dL    Sodium 135 (L) 136 - 145 mmol/L    Potassium 3.9 3.5 - 5.3 mmol/L    Chloride 108 (H) 98 - 107 mmol/L    Bicarbonate 9 (LL) 21 - 32 mmol/L    Anion Gap 22 (H) 10 - 20 mmol/L    Urea Nitrogen 14 6 - 23 mg/dL    Creatinine 0.88 0.50 - 1.30 mg/dL    eGFR >90 >60 mL/min/1.73m*2    Calcium 7.8 (L) 8.6 - 10.3 mg/dL   POCT GLUCOSE   Result Value Ref Range    POCT Glucose 288 (H) 74 - 99 mg/dL   POCT GLUCOSE   Result Value Ref Range    POCT Glucose 175 (H) 74 - 99 mg/dL   POCT GLUCOSE   Result Value Ref Range    POCT Glucose 176 (H) 74 - 99 mg/dL   POCT GLUCOSE   Result Value Ref Range    POCT Glucose 182 (H) 74 - 99 mg/dL   Basic Metabolic Panel   Result Value Ref Range    Glucose 188 (H) 74 - 99 mg/dL    Sodium 135 (L) 136 - 145 mmol/L    Potassium 3.1 (L) 3.5 - 5.3 mmol/L    Chloride 109 (H) 98 - 107 mmol/L    Bicarbonate 16 (L) 21 - 32 mmol/L    Anion Gap 13 10 - 20 mmol/L    Urea Nitrogen 14 6 - 23 mg/dL    Creatinine 0.82 0.50 - 1.30 mg/dL    eGFR >90 >60 mL/min/1.73m*2    Calcium 7.7 (L) 8.6 - 10.3 mg/dL   POCT GLUCOSE   Result Value Ref Range    POCT Glucose 179 (H) 74 - 99 mg/dL   POCT GLUCOSE   Result Value Ref Range    POCT Glucose 127 (H) 74 - 99 mg/dL   POCT GLUCOSE   Result Value Ref Range    POCT Glucose 140 (H) 74 - 99 mg/dL   POCT GLUCOSE   Result Value Ref Range    POCT Glucose 145 (H) 74 - 99 mg/dL   POCT GLUCOSE   Result Value Ref Range    POCT Glucose  216 (H) 74 - 99 mg/dL   Basic Metabolic Panel   Result Value Ref Range    Glucose 259 (H) 74 - 99 mg/dL    Sodium 135 (L) 136 - 145 mmol/L    Potassium 4.0 3.5 - 5.3 mmol/L    Chloride 107 98 - 107 mmol/L    Bicarbonate 19 (L) 21 - 32 mmol/L    Anion Gap 13 10 - 20 mmol/L    Urea Nitrogen 13 6 - 23 mg/dL    Creatinine 0.81 0.50 - 1.30 mg/dL    eGFR >90 >60 mL/min/1.73m*2    Calcium 7.8 (L) 8.6 - 10.3 mg/dL   CBC and Auto Differential   Result Value Ref Range    WBC 4.0 (L) 4.4 - 11.3 x10*3/uL    nRBC 0.0 0.0 - 0.0 /100 WBCs    RBC 4.61 4.50 - 5.90 x10*6/uL    Hemoglobin 14.5 13.5 - 17.5 g/dL    Hematocrit 41.4 41.0 - 52.0 %    MCV 90 80 - 100 fL    MCH 31.5 26.0 - 34.0 pg    MCHC 35.0 32.0 - 36.0 g/dL    RDW 14.0 11.5 - 14.5 %    Platelets 150 150 - 450 x10*3/uL    Neutrophils % 41.4 40.0 - 80.0 %    Immature Granulocytes %, Automated 0.5 0.0 - 0.9 %    Lymphocytes % 37.5 13.0 - 44.0 %    Monocytes % 18.0 2.0 - 10.0 %    Eosinophils % 1.8 0.0 - 6.0 %    Basophils % 0.8 0.0 - 2.0 %    Neutrophils Absolute 1.64 1.20 - 7.70 x10*3/uL    Immature Granulocytes Absolute, Automated 0.02 0.00 - 0.70 x10*3/uL    Lymphocytes Absolute 1.48 1.20 - 4.80 x10*3/uL    Monocytes Absolute 0.71 0.10 - 1.00 x10*3/uL    Eosinophils Absolute 0.07 0.00 - 0.70 x10*3/uL    Basophils Absolute 0.03 0.00 - 0.10 x10*3/uL   Magnesium   Result Value Ref Range    Magnesium 1.75 1.60 - 2.40 mg/dL      ECG 12 lead    Result Date: 11/14/2024  Sinus rhythm ST elev, probable normal early repol pattern    ECG 12 lead    Result Date: 11/8/2024  Sinus tachycardia Borderline prolonged QT interval    XR chest 1 view    Result Date: 11/7/2024  Interpreted By:  Mark Murillo, STUDY: XR CHEST 1 VIEW;  11/7/2024 9:45 pm   INDICATION: Signs/Symptoms:sob.     COMPARISON: 09/18/2024.   ACCESSION NUMBER(S): [id number]   ORDERING CLINICIAN: MARC MIDDLETON   FINDINGS: AP radiograph of the chest was provided.   Leads overlie the chest, partially obscuring the  field-of-view.   CARDIOMEDIASTINAL SILHOUETTE: Cardiomediastinal silhouette is normal in size and configuration.   LUNGS: Lungs are clear. No pleural effusion or pneumothorax.   ABDOMEN: No remarkable upper abdominal findings.   BONES: No acute osseous changes.       1.  No evidence of acute cardiopulmonary process.       MACRO: None   Signed by: Mark Murillo 11/7/2024 10:13 PM Dictation workstation:   [id number]       Assessment/Plan     IMPRESSION     DKA  POORLY CONTROLLED TYPE I DIABETES MELLITUS  A1C of 13.3% as of July 2024     RECOMMENDATIONS:  To continue NPH 18 units subcutaneous twice daily   To continue an insulin correction scale TID AC in 1 unit increments   To continue Humalog 5 units TID AC   Diabetic diet as tolerated   Accu-Cheks ACHS   Hypoglycemic protocol   History of refusal of insulin regimen and thus recurrent episodes of DKA  To continue the use of the [employer] CGM for the intensive glucose monitoring due to the dynamic nature of insulin requirements, the narrow therapeutic index of insulin and the potentially fatal consequences of treatment  For outpatient follow up with established outpatient endocrinology practice  Will sign off at this time      Thank you for the courtesy of this consult      Lori Packer MD

## 2024-11-15 NOTE — PROGRESS NOTES
Critical Care Daily Progress Notes        Subjective   Patient is a 34 y.o. male admitted on 11/14/2024 11:57 AM with the following indication(s) for ICU care: Diabetic Ketoacidosis secondary to medication noncompliance .     Interval History: Ed Sanon is a 34 y.o. male PMHx significant for IDDM type 1 with poor insulin compliance, recurrent admissions for DKA (left AMA during most recent on 11/09/2024), anxiety, depression admitted to the ICU for DKA.     11/15/2024: Alert/awake sitting up in bed eating breakfast with no acute complaints.  No acute events overnight.  Resolved DKA with closed anion gap 13 and bicarb 19.  Insulin bridged to SubQ insulin (NPH 18 units BID and level #1 lispro 5 unis TID AC on sliding scale) with insulin drip discontinued.     Patient deferred nighttime lispro with subsequent hyperglycemia (259) on morning blood draw. Patient's reasoning being concern for hypoglycemia (known h/o hypoglycemia complications with his brother). Educated patient that he is more at risk for additional episodes of DKA and not hypoglycemia. Reemphasized importance of medication compliance and the immediate/long-term effects of repetitive DKA episodes to which patient endorsed understanding. Patient endorses access to home insulin including his Humalog but unsure of last fill date.     Repeat HbA1c remains pending.  Transfer order to [facility] placed for later today.  Believe patient may benefit from high risk care plan which was communicated to patient and in a secure message.      Scheduled Medications:   famotidine, 20 mg, oral, BID   Or  famotidine, 20 mg, intravenous, BID  insulin lispro, 0-5 Units, subcutaneous, TID AC  insulin lispro, 5 Units, subcutaneous, TID AC  insulin NPH (Isophane), 18 Units, subcutaneous, q12h SHEILA  [Held by provider] lisinopril, 5 mg, oral, Daily         Continuous Medications:   dextrose 10 % in water (D10W), 150 mL/hr         PRN Medications:   PRN medications: dextrose,  dextrose, dextrose, glucagon, glucagon, glucagon, ondansetron **OR** ondansetron    Objective   Vitals:  Most Recent:  Vitals:    11/15/24 0725   BP:    Pulse:    Resp:    Temp: 36 °C (96.8 °F)   SpO2:        24hr Min/Max:  Temp  Min: 36 °C (96.8 °F)  Max: 36.7 °C (98.1 °F)  Pulse  Min: 80  Max: 109  BP  Min: 101/70  Max: 149/93  Resp  Min: 9  Max: 34  SpO2  Min: 97 %  Max: 100 %    LDA:         Vent settings:       Hemodynamic parameters for last 24 hours:       I/O:    Intake/Output Summary (Last 24 hours) at 11/15/2024 0807  Last data filed at 11/14/2024 2231  Gross per 24 hour   Intake 2155.8 ml   Output --   Net 2155.8 ml       Physical Exam:   Physical Exam  Constitutional:       General: He is awake. He is not in acute distress.     Appearance: Normal appearance. He is not ill-appearing, toxic-appearing or diaphoretic.   HENT:      Head: Normocephalic and atraumatic.      Mouth/Throat:      Lips: Pink.      Mouth: Mucous membranes are moist.      Dentition: Dental caries present.   Eyes:      General: Lids are normal.      Extraocular Movements: Extraocular movements intact.      Conjunctiva/sclera: Conjunctivae normal.   Cardiovascular:      Rate and Rhythm: Normal rate and regular rhythm.      Heart sounds: Normal heart sounds.   Pulmonary:      Effort: Pulmonary effort is normal.      Breath sounds: Normal breath sounds.   Abdominal:      General: Abdomen is flat. There is no distension.      Palpations: Abdomen is soft.      Tenderness: There is no abdominal tenderness. There is no guarding or rebound.   Musculoskeletal:      Cervical back: Normal range of motion and neck supple.      Right lower leg: No edema.      Left lower leg: No edema.   Skin:     General: Skin is warm and dry.   Neurological:      General: No focal deficit present.      Mental Status: He is alert and oriented to person, place, and time.      Sensory: Sensation is intact.      Motor: Motor function is intact.   Psychiatric:          Attention and Perception: Attention and perception normal.         Mood and Affect: Mood and affect normal.         Speech: Speech normal.         Behavior: Behavior normal. Behavior is cooperative.         Thought Content: Thought content normal.         Cognition and Memory: Cognition and memory normal.         Judgment: Judgment normal.          Lab/Radiology/Diagnostic Review:  Results for orders placed or performed during the hospital encounter of 11/14/24 (from the past 24 hours)   POCT GLUCOSE   Result Value Ref Range    POCT Glucose 368 (H) 74 - 99 mg/dL   ECG 12 lead   Result Value Ref Range    Ventricular Rate 87 BPM    Atrial Rate 86 BPM    AR Interval 152 ms    QRS Duration 95 ms    QT Interval 362 ms    QTC Calculation(Bazett) 436 ms    P Axis 73 degrees    R Axis 33 degrees    T Axis 39 degrees    QRS Count 14 beats    Q Onset 249 ms    T Offset 430 ms    QTC Fredericia 409 ms   CBC and Auto Differential   Result Value Ref Range    WBC 4.6 4.4 - 11.3 x10*3/uL    nRBC 0.0 0.0 - 0.0 /100 WBCs    RBC 5.16 4.50 - 5.90 x10*6/uL    Hemoglobin 16.5 13.5 - 17.5 g/dL    Hematocrit 48.3 41.0 - 52.0 %    MCV 94 80 - 100 fL    MCH 32.0 26.0 - 34.0 pg    MCHC 34.2 32.0 - 36.0 g/dL    RDW 14.4 11.5 - 14.5 %    Platelets 173 150 - 450 x10*3/uL    Neutrophils % 68.1 40.0 - 80.0 %    Immature Granulocytes %, Automated 0.7 0.0 - 0.9 %    Lymphocytes % 20.1 13.0 - 44.0 %    Monocytes % 10.5 2.0 - 10.0 %    Eosinophils % 0.2 0.0 - 6.0 %    Basophils % 0.4 0.0 - 2.0 %    Neutrophils Absolute 3.12 1.20 - 7.70 x10*3/uL    Immature Granulocytes Absolute, Automated 0.03 0.00 - 0.70 x10*3/uL    Lymphocytes Absolute 0.92 (L) 1.20 - 4.80 x10*3/uL    Monocytes Absolute 0.48 0.10 - 1.00 x10*3/uL    Eosinophils Absolute 0.01 0.00 - 0.70 x10*3/uL    Basophils Absolute 0.02 0.00 - 0.10 x10*3/uL   Magnesium   Result Value Ref Range    Magnesium 2.06 1.60 - 2.40 mg/dL   Lipase   Result Value Ref Range    Lipase 49 9 - 82 U/L    Comprehensive metabolic panel   Result Value Ref Range    Glucose 461 (HH) 74 - 99 mg/dL    Sodium 131 (L) 136 - 145 mmol/L    Potassium 5.2 3.5 - 5.3 mmol/L    Chloride 101 98 - 107 mmol/L    Bicarbonate 11 (L) 21 - 32 mmol/L    Anion Gap 24 (H) 10 - 20 mmol/L    Urea Nitrogen 17 6 - 23 mg/dL    Creatinine 1.10 0.50 - 1.30 mg/dL    eGFR 89 >60 mL/min/1.73m*2    Calcium 8.7 8.6 - 10.3 mg/dL    Albumin 4.1 3.4 - 5.0 g/dL    Alkaline Phosphatase 115 33 - 120 U/L    Total Protein 6.8 6.4 - 8.2 g/dL    AST 7 (L) 9 - 39 U/L    Bilirubin, Total 0.5 0.0 - 1.2 mg/dL    ALT 8 (L) 10 - 52 U/L   Blood Gas Venous Full Panel   Result Value Ref Range    POCT pH, Venous 7.15 (LL) 7.33 - 7.43 pH    POCT pCO2, Venous 35 (L) 41 - 51 mm Hg    POCT pO2, Venous 35 35 - 45 mm Hg    POCT SO2, Venous 52 45 - 75 %    POCT Oxy Hemoglobin, Venous 51.7 45.0 - 75.0 %    POCT Hematocrit Calculated, Venous 51.0 41.0 - 52.0 %    POCT Sodium, Venous 131 (L) 136 - 145 mmol/L    POCT Potassium, Venous 5.6 (H) 3.5 - 5.3 mmol/L    POCT Chloride, Venous 100 98 - 107 mmol/L    POCT Ionized Calicum, Venous 1.20 1.10 - 1.33 mmol/L    POCT Glucose, Venous 524 (HH) 74 - 99 mg/dL    POCT Lactate, Venous 1.6 0.4 - 2.0 mmol/L    POCT Base Excess, Venous -15.7 (L) -2.0 - 3.0 mmol/L    POCT HCO3 Calculated, Venous 12.2 (L) 22.0 - 26.0 mmol/L    POCT Hemoglobin, Venous 16.9 13.5 - 17.5 g/dL    POCT Anion Gap, Venous 24.0 10.0 - 25.0 mmol/L    Patient Temperature 37.0 degrees Celsius    FiO2 21 %   Beta Hydroxybutyrate   Result Value Ref Range    Beta-Hydroxybutyrate 9.77 (H) 0.02 - 0.27 mmol/L   Urinalysis with Reflex Culture and Microscopic   Result Value Ref Range    Color, Urine Colorless (N) Straw, Yellow    Appearance, Urine Clear Clear    Specific Gravity, Urine 1.025 1.005 - 1.035    pH, Urine 5.0 5.0, 5.5, 6.0, 6.5, 7.0, 7.5, 8.0    Protein, Urine NEGATIVE NEGATIVE, 10 (TRACE) mg/dL    Glucose, Urine 1000 (4+) (A) NEGATIVE mg/dL    Blood, Urine NEGATIVE  NEGATIVE    Ketones, Urine 150 (4+) (A) NEGATIVE mg/dL    Bilirubin, Urine NEGATIVE NEGATIVE    Urobilinogen, Urine NORM NORM mg/dL    Nitrite, Urine NEGATIVE NEGATIVE    Leukocyte Esterase, Urine NEGATIVE NEGATIVE   Extra Urine Gray Tube   Result Value Ref Range    Extra Tube Hold for add-ons.    Lactate   Result Value Ref Range    Lactate 0.7 0.4 - 2.0 mmol/L   POCT GLUCOSE   Result Value Ref Range    POCT Glucose 317 (H) 74 - 99 mg/dL   Magnesium   Result Value Ref Range    Magnesium 1.77 1.60 - 2.40 mg/dL   Phosphorus   Result Value Ref Range    Phosphorus 2.0 (L) 2.5 - 4.9 mg/dL   Basic Metabolic Panel   Result Value Ref Range    Glucose 261 (H) 74 - 99 mg/dL    Sodium 135 (L) 136 - 145 mmol/L    Potassium 3.9 3.5 - 5.3 mmol/L    Chloride 108 (H) 98 - 107 mmol/L    Bicarbonate 9 (LL) 21 - 32 mmol/L    Anion Gap 22 (H) 10 - 20 mmol/L    Urea Nitrogen 14 6 - 23 mg/dL    Creatinine 0.88 0.50 - 1.30 mg/dL    eGFR >90 >60 mL/min/1.73m*2    Calcium 7.8 (L) 8.6 - 10.3 mg/dL   POCT GLUCOSE   Result Value Ref Range    POCT Glucose 288 (H) 74 - 99 mg/dL   POCT GLUCOSE   Result Value Ref Range    POCT Glucose 175 (H) 74 - 99 mg/dL   POCT GLUCOSE   Result Value Ref Range    POCT Glucose 176 (H) 74 - 99 mg/dL   POCT GLUCOSE   Result Value Ref Range    POCT Glucose 182 (H) 74 - 99 mg/dL   Basic Metabolic Panel   Result Value Ref Range    Glucose 188 (H) 74 - 99 mg/dL    Sodium 135 (L) 136 - 145 mmol/L    Potassium 3.1 (L) 3.5 - 5.3 mmol/L    Chloride 109 (H) 98 - 107 mmol/L    Bicarbonate 16 (L) 21 - 32 mmol/L    Anion Gap 13 10 - 20 mmol/L    Urea Nitrogen 14 6 - 23 mg/dL    Creatinine 0.82 0.50 - 1.30 mg/dL    eGFR >90 >60 mL/min/1.73m*2    Calcium 7.7 (L) 8.6 - 10.3 mg/dL   POCT GLUCOSE   Result Value Ref Range    POCT Glucose 179 (H) 74 - 99 mg/dL   POCT GLUCOSE   Result Value Ref Range    POCT Glucose 127 (H) 74 - 99 mg/dL   POCT GLUCOSE   Result Value Ref Range    POCT Glucose 140 (H) 74 - 99 mg/dL   POCT GLUCOSE    Result Value Ref Range    POCT Glucose 145 (H) 74 - 99 mg/dL   POCT GLUCOSE   Result Value Ref Range    POCT Glucose 216 (H) 74 - 99 mg/dL   Basic Metabolic Panel   Result Value Ref Range    Glucose 259 (H) 74 - 99 mg/dL    Sodium 135 (L) 136 - 145 mmol/L    Potassium 4.0 3.5 - 5.3 mmol/L    Chloride 107 98 - 107 mmol/L    Bicarbonate 19 (L) 21 - 32 mmol/L    Anion Gap 13 10 - 20 mmol/L    Urea Nitrogen 13 6 - 23 mg/dL    Creatinine 0.81 0.50 - 1.30 mg/dL    eGFR >90 >60 mL/min/1.73m*2    Calcium 7.8 (L) 8.6 - 10.3 mg/dL   CBC and Auto Differential   Result Value Ref Range    WBC 4.0 (L) 4.4 - 11.3 x10*3/uL    nRBC 0.0 0.0 - 0.0 /100 WBCs    RBC 4.61 4.50 - 5.90 x10*6/uL    Hemoglobin 14.5 13.5 - 17.5 g/dL    Hematocrit 41.4 41.0 - 52.0 %    MCV 90 80 - 100 fL    MCH 31.5 26.0 - 34.0 pg    MCHC 35.0 32.0 - 36.0 g/dL    RDW 14.0 11.5 - 14.5 %    Platelets 150 150 - 450 x10*3/uL    Neutrophils % 41.4 40.0 - 80.0 %    Immature Granulocytes %, Automated 0.5 0.0 - 0.9 %    Lymphocytes % 37.5 13.0 - 44.0 %    Monocytes % 18.0 2.0 - 10.0 %    Eosinophils % 1.8 0.0 - 6.0 %    Basophils % 0.8 0.0 - 2.0 %    Neutrophils Absolute 1.64 1.20 - 7.70 x10*3/uL    Immature Granulocytes Absolute, Automated 0.02 0.00 - 0.70 x10*3/uL    Lymphocytes Absolute 1.48 1.20 - 4.80 x10*3/uL    Monocytes Absolute 0.71 0.10 - 1.00 x10*3/uL    Eosinophils Absolute 0.07 0.00 - 0.70 x10*3/uL    Basophils Absolute 0.03 0.00 - 0.10 x10*3/uL   Magnesium   Result Value Ref Range    Magnesium 1.75 1.60 - 2.40 mg/dL     ECG 12 lead    Result Date: 11/14/2024  Sinus rhythm ST elev, probable normal early repol pattern    ECG 12 lead    Result Date: 11/8/2024  Sinus tachycardia Borderline prolonged QT interval    XR chest 1 view    Result Date: 11/7/2024  Interpreted By:  Mark Murillo, STUDY: XR CHEST 1 VIEW;  11/7/2024 9:45 pm   INDICATION: Signs/Symptoms:sob.     COMPARISON: 09/18/2024.   ACCESSION NUMBER(S): [id number]   ORDERING CLINICIAN: MARC  MIDDLETON   FINDINGS: AP radiograph of the chest was provided.   Leads overlie the chest, partially obscuring the field-of-view.   CARDIOMEDIASTINAL SILHOUETTE: Cardiomediastinal silhouette is normal in size and configuration.   LUNGS: Lungs are clear. No pleural effusion or pneumothorax.   ABDOMEN: No remarkable upper abdominal findings.   BONES: No acute osseous changes.       1.  No evidence of acute cardiopulmonary process.       MACRO: None   Signed by: Mark Murillo 11/7/2024 10:13 PM Dictation workstation:   [id number]                     Assessment/Plan   Active Problems:  There are no active Hospital Problems.    Ed Sanon is a 33 y.o. male with a PMHx significant for IDDM type 1 with poor insulin compliance, recurrent admissions for DKA (left AMA during most recent on 11/09/2024), anxiety, depression admitted to the ICU for DKA      Diabetic ketoacidosis secondary to noncompliance with the insulin pump and supplies   11/14/2024: Endorsed medication noncompliance proposed primary driving factor for DKA   -Mild sx presentation with mild shortness of breath and polyuria but no nausea/vomiting.  -Endorses ongoing elevated blood sugars at home   -Noncompliant with home Humalog 6 units TID; compliant with home Novolin 18 units BID  Initial blood glucose of 461 upon arrival.  Elevated beta hydroxybutyrate of 9.77, bicarb 11, anion gap 24  Hyponatremia 131 (corrected sodium of 137)  Normal potassium 5.2, WBC 4.6, lipase 49, and lactate 0.7  UA negative for UTI (positive glucosuria and ketones)  ECG: NSR with vent rate 87; normal SHARON 132 and QTc 436; no acute ST changes.  Received 2 L of IVF while in ED (1L NS and 1L LR) and initiated on insulin infusion.   Proceed with standard Diabetic Ketoacidosis protocol as reflected in order set:   Insulin drip as per DKA protocol  Point-of-care glucose every 1 hour and BMP every 4 hours while on insulin drip  IVF: 1/2 NS for blood glucose >250; D5W with 1/2 NS for blood  "glucose <250; D10W for blood glucose<150 if AG open or <70 with closed AG.   Hold insulin for potassium <3.3; resume insulin when potassium >3.3 (electrolyte repletion q2-4h)  After BOTH Anion Gap < 15 AND Bicarb >15, switch from insulin drip to subcutaneous   Zofran 4 mg IV for nausea  Endocrinology consultation appreciated.  Patient may benefit from high risk care plans   11/15/2024: Resolved DKA (bicarb 19; anion gap 13) with pt bridged to SubQ insulin   Bridged to SubQ insulin (NPH 18 units BID and level #1 lispro 0-5 units TID AC sliding scale)  Discontinued insulin drip after pt was bridged.  Pt deferred nighttime insulin lispro correlating with morning hyperglycemia (259) today.   Re-emphasized importance of medication compliance.   No other acute electrolyte derangements requiring replenishment.  Discontinue point-of-care glucose every hour and BMP every 4 hour.  Zofran 4 mg IV for nausea.  Transfer order placed to [facility] later today.   Endocrinology consult appreciated.  Patient may benefit from high risk care plans.     2. Metabolic acidosis secondary to DKA   11/14/2024: HAGMA with pH 7.15, pCO2 35, HCO3 12.2, and anion gap 24.  Normal venous lactate 1.6  Continue IV hydration and adhere to standard DKA protocol as mentioned above  Trend VBG  11/15/2024: Resolved with resolution of DKA as mentioned above.   Bridged to SubQ insulin as summarized above.   Transfer order placed to [facility] later today.     3. Poorly controlled type 1 diabetes   11/14/2024: Very poorly controlled IDDM 1 with last HbA1c 13.3 (7/6/2024)  Transition between endocrinology providers at [facility] (3 month follow up 1/2025-2/2025).    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  Prescribed glucose rayshawn monitor (\"intermittently malfunctions\")  Again counseled on importance of glycemic control  Endocrinology consulted, appreciate " "recommendations.  11/15/2024: Very poorly controlled IDDM 1 with last HbA1c 13.3 (7/6/2024)  Repeat HbA1c during this admission is 13.2  Bridged to SubQ insulin (NPH 18 units BID and level #1 lispro 5 units TID AC on sliding scale).   Re-emphasized importance of medication compliance including his short-acting insulin lispro.   Endocrinology consulted, recommendations appreciated.   Pt may benefit from high risk care plan.     4. History of Hypertension  11/14/2024: Remains hemodynamically stable and normotensive.   Lisinopril 5 mg daily taken for 3 days in October (self-discontinued for feelings of \"brain fog\").   Continue hold on lisinopril 5 mg daily.    I spent 45 minutes of cumulative critical care time with the patient.  Greater than 50% of that time was spent in the direct collaboration and or coordination of care of the patient.      Tabatha Bennett   [facility], MS-4      I was present with the Medical student who participated in the documentation of this note. I have personally seen and re-examined the patient and performed the medical decision-making components (assessment and plan of care). I have reviewed the Medical student documentation and verified the findings in the note as written with additions or exceptions as stated in the body of this note.     I spent 45 minutes of cumulative critical care time with the patient.  Greater than 50% of that time was spent in the direct collaboration and or coordination of care of the patient.   Will transfer the [facility] to [facility] with telemetry.      Dragon dictation software was used to dictate this note and thus there may be minor errors in translation/transcription including garbled speech or misspellings. Please contact for clarification if needed.     "

## 2024-11-15 NOTE — PROGRESS NOTES
11/15/24 1127   Discharge Planning   Living Arrangements Parent   Support Systems Parent   Assistance Needed none   Type of Residence Private residence   Home or Post Acute Services None   Expected Discharge Disposition Home   Does the patient need discharge transport arranged? No     PCP Henri Brumfield. Lives at home with parent. Independent in his own care. No needs on discharge. Admitted for DKA in ICU.

## 2024-11-15 NOTE — CARE PLAN
The patient's goals for the shift include      The clinical goals for the shift include Pt will be out of DKA by end of shift    Pt is out of DKA and off insulin drip    Problem: Diabetes  Goal: Achieve decreasing blood glucose levels by end of shift  Outcome: Progressing  Goal: Increase stability of blood glucose readings by end of shift  Outcome: Progressing  Goal: Maintain electrolyte levels within acceptable range throughout shift  Outcome: Progressing  Goal: Maintain glucose levels >70mg/dl to <250mg/dl throughout shift  Outcome: Progressing  Goal: Learn about and adhere to nutrition recommendations by end of shift  Outcome: Progressing  Goal: Vital signs within normal range for age by end of shift  Outcome: Progressing  Goal: Increase self care and/or family involovement by end of shift  Outcome: Progressing     Problem: Pain  Goal: Takes deep breaths with improved pain control throughout the shift  Outcome: Progressing  Goal: Turns in bed with improved pain control throughout the shift  Outcome: Progressing  Goal: Walks with improved pain control throughout the shift  Outcome: Progressing  Goal: Performs ADL's with improved pain control throughout shift  Outcome: Progressing     Problem: Pain - Adult  Goal: Verbalizes/displays adequate comfort level or baseline comfort level  Outcome: Progressing     Problem: Safety - Adult  Goal: Free from fall injury  Outcome: Progressing     Problem: Discharge Planning  Goal: Discharge to home or other facility with appropriate resources  Outcome: Progressing     Problem: Chronic Conditions and Co-morbidities  Goal: Patient's chronic conditions and co-morbidity symptoms are monitored and maintained or improved  Outcome: Progressing

## 2024-11-16 VITALS
OXYGEN SATURATION: 98 % | WEIGHT: 147.93 LBS | DIASTOLIC BLOOD PRESSURE: 85 MMHG | BODY MASS INDEX: 21.18 KG/M2 | TEMPERATURE: 96.8 F | RESPIRATION RATE: 15 BRPM | SYSTOLIC BLOOD PRESSURE: 126 MMHG | HEART RATE: 91 BPM | HEIGHT: 70 IN

## 2024-11-16 PROBLEM — E10.10 DIABETIC KETOACIDOSIS WITHOUT COMA ASSOCIATED WITH TYPE 1 DIABETES MELLITUS (MULTI): Status: ACTIVE | Noted: 2024-11-16

## 2024-11-16 PROBLEM — E10.10 DIABETIC KETOACIDOSIS WITHOUT COMA ASSOCIATED WITH TYPE 1 DIABETES MELLITUS (MULTI): Status: RESOLVED | Noted: 2024-11-16 | Resolved: 2024-11-16

## 2024-11-16 LAB
ANION GAP SERPL CALC-SCNC: 11 MMOL/L (ref 10–20)
ATRIAL RATE: 121 BPM
BUN SERPL-MCNC: 21 MG/DL (ref 6–23)
CALCIUM SERPL-MCNC: 8.2 MG/DL (ref 8.6–10.3)
CHLORIDE SERPL-SCNC: 105 MMOL/L (ref 98–107)
CO2 SERPL-SCNC: 25 MMOL/L (ref 21–32)
CREAT SERPL-MCNC: 0.64 MG/DL (ref 0.5–1.3)
EGFRCR SERPLBLD CKD-EPI 2021: >90 ML/MIN/1.73M*2
ERYTHROCYTE [DISTWIDTH] IN BLOOD BY AUTOMATED COUNT: 14.4 % (ref 11.5–14.5)
GLUCOSE BLD MANUAL STRIP-MCNC: 269 MG/DL (ref 74–99)
GLUCOSE BLD MANUAL STRIP-MCNC: 330 MG/DL (ref 74–99)
GLUCOSE SERPL-MCNC: 284 MG/DL (ref 74–99)
HCT VFR BLD AUTO: 40.9 % (ref 41–52)
HGB BLD-MCNC: 14.3 G/DL (ref 13.5–17.5)
MCH RBC QN AUTO: 32.3 PG (ref 26–34)
MCHC RBC AUTO-ENTMCNC: 35 G/DL (ref 32–36)
MCV RBC AUTO: 92 FL (ref 80–100)
NRBC BLD-RTO: 0 /100 WBCS (ref 0–0)
P AXIS: 112 DEGREES
PLATELET # BLD AUTO: 144 X10*3/UL (ref 150–450)
POTASSIUM SERPL-SCNC: 3.3 MMOL/L (ref 3.5–5.3)
PR INTERVAL: 137 MS
Q ONSET: 252 MS
QRS COUNT: 19 BEATS
QRS DURATION: 99 MS
QT INTERVAL: 343 MS
QTC CALCULATION(BAZETT): 487 MS
QTC FREDERICIA: 433 MS
R AXIS: 57 DEGREES
RBC # BLD AUTO: 4.43 X10*6/UL (ref 4.5–5.9)
SODIUM SERPL-SCNC: 138 MMOL/L (ref 136–145)
T AXIS: 46 DEGREES
T OFFSET: 423 MS
VENTRICULAR RATE: 121 BPM
WBC # BLD AUTO: 4.1 X10*3/UL (ref 4.4–11.3)

## 2024-11-16 PROCEDURE — 82947 ASSAY GLUCOSE BLOOD QUANT: CPT

## 2024-11-16 PROCEDURE — 2500000001 HC RX 250 WO HCPCS SELF ADMINISTERED DRUGS (ALT 637 FOR MEDICARE OP): Performed by: INTERNAL MEDICINE

## 2024-11-16 PROCEDURE — 2500000002 HC RX 250 W HCPCS SELF ADMINISTERED DRUGS (ALT 637 FOR MEDICARE OP, ALT 636 FOR OP/ED): Performed by: INTERNAL MEDICINE

## 2024-11-16 PROCEDURE — 82374 ASSAY BLOOD CARBON DIOXIDE: CPT | Performed by: INTERNAL MEDICINE

## 2024-11-16 PROCEDURE — 36415 COLL VENOUS BLD VENIPUNCTURE: CPT | Performed by: INTERNAL MEDICINE

## 2024-11-16 PROCEDURE — 85027 COMPLETE CBC AUTOMATED: CPT | Performed by: INTERNAL MEDICINE

## 2024-11-16 PROCEDURE — 99239 HOSP IP/OBS DSCHRG MGMT >30: CPT | Performed by: INTERNAL MEDICINE

## 2024-11-16 RX ORDER — POTASSIUM CHLORIDE 20 MEQ/1
40 TABLET, EXTENDED RELEASE ORAL ONCE
Status: COMPLETED | OUTPATIENT
Start: 2024-11-16 | End: 2024-11-16

## 2024-11-16 RX ORDER — POTASSIUM CHLORIDE 20 MEQ/1
20 TABLET, EXTENDED RELEASE ORAL DAILY
Qty: 3 TABLET | Refills: 0 | Status: SHIPPED | OUTPATIENT
Start: 2024-11-16 | End: 2024-11-19

## 2024-11-16 ASSESSMENT — COGNITIVE AND FUNCTIONAL STATUS - GENERAL
DAILY ACTIVITIY SCORE: 24
MOBILITY SCORE: 24

## 2024-11-16 ASSESSMENT — PAIN SCALES - GENERAL: PAINLEVEL_OUTOF10: 0 - NO PAIN

## 2024-11-16 NOTE — DISCHARGE SUMMARY
"Discharge Diagnosis  Diabetic ketoacidosis without coma associated with type 1 diabetes mellitus (Multi)  Poor compliance to insulin  Hypertension      Issues Requiring Follow-Up      Discharge Meds     Medication List      CHANGE how you take these medications     insulin lispro 100 unit/mL injection; Inject 6 Units under the skin 3   times daily (morning, midday, late afternoon). Take as directed per   insulin instructions.; What changed: Another medication with the same name   was removed. Continue taking this medication, and follow the directions   you see here.     CONTINUE taking these medications     Guardian 4 Glucose Sensor device; Generic drug: blood-glucose sensor;   Change insulin sensor every 7 days as directed, linked to Minimed insulin   pump, check with endocrinology for updated regimen   insulin NPH (Isophane) 100 unit/mL (3 mL) injection; Commonly known as:   HumuLIN N,NovoLIN N; Inject 18 Units under the skin 2 times a day before   meals. Take as directed per insulin instructions.   lancets misc   pen needle, diabetic 32 gauge x 5/32\" needle     ASK your doctor about these medications     lisinopril 5 mg tablet       Test Results Pending At Discharge  Pending Labs       No current pending labs.            Hospital Course  Ed Sanon is a 34 y.o. male with PMHx s/f Type 1 diabetes, hx recurrent admissions for DKA,  anxiety and depression presenting with elevated blood glucose   presenting with anorexia and nausea. The patient had recently been admitted to the ICU last week, he was improving and had transitioned from insulin drip to basal bolus dosing. He had refused his evening dose of insulin and signed out AMA before he fully stabilized. Since then the patient had gradually become more fatigued and is eating less. No fever or chills. No vomiting or diarrhea. He denies cough, abdominal pain, rash or urinary symptoms.  Presenting to the emergency department temperature 98.1 heart rate 98 " regular respiratory rate 16 blood pressure 138/94 SpO2 99% on room air.  Chemistry panel showed sodium 131 potassium 5.2 bicarb 11 anion gap 24 BUN 17 creatinine 1.10 glucose 461 lactate level 0.7 beta hydroxybutyrate 9.77 CBC is unremarkable.  Blood gas showed the patient to indeed be acidotic pH is 7.15 with pCO2 35 bicarb 12.  Patient was given a liter of normal saline subsequently given a liter bolus of lactated Ringer's started on insulin drip and referred for admission in order to further manage this patient presenting in DKA.  Patient will be admitted into the intensive care unit for intensive monitoring and to continue the insulin drip close his anion gap and then transition to basal bolus insulin.  Length of stay is uncertain at this point that patient would need more than 2 midnights to accomplish this.       11/15: Patient was seen and examined.  Iron gap is now closed.  Patient started on NPH twice daily and Humalog 3 times daily.  Will get hemoglobin A1c in AM.  Potential discharge in the next 24 to 48 hours.  11/16: Patient was seen and examined.  Hypokalemia noted on potassium supplementation given patient was discharged with recommendation to follow-up for medical discharge.      The discharge process took about 35 minutes.    Pertinent Physical Exam At Time of Discharge  Physical Exam  Constitutional:       General: He is not in acute distress.  HENT:      Head: Normocephalic and atraumatic.      Right Ear: External ear normal.      Left Ear: External ear normal.      Nose: Nose normal. No congestion.      Mouth/Throat:      Mouth: Mucous membranes are moist.      Pharynx: Oropharynx is clear.   Eyes:      General: No scleral icterus.     Extraocular Movements: Extraocular movements intact.      Conjunctiva/sclera: Conjunctivae normal.      Pupils: Pupils are equal, round, and reactive to light.   Neck:      Vascular: No carotid bruit.   Cardiovascular:      Rate and Rhythm: Regular rhythm.  Tachycardia present.      Pulses: Normal pulses.      Heart sounds: Normal heart sounds. No murmur heard.  Pulmonary:      Effort: Pulmonary effort is normal. No respiratory distress.      Breath sounds: Normal breath sounds. No wheezing, rhonchi or rales.   Chest:      Chest wall: No tenderness.   Abdominal:      General: Bowel sounds are normal. There is no distension.      Palpations: Abdomen is soft. There is no mass.      Tenderness: There is no abdominal tenderness. There is no rebound.   Musculoskeletal:         General: No swelling or deformity. Normal range of motion.      Cervical back: Normal range of motion.      Right lower leg: No edema.      Left lower leg: No edema.   Skin:     General: Skin is warm and dry.      Capillary Refill: Capillary refill takes less than 2 seconds.   Neurological:      General: No focal deficit present.      Mental Status: He is alert and oriented to person, place, and time.      Cranial Nerves: No cranial nerve deficit.      Sensory: No sensory deficit.      Motor: No weakness.   Psychiatric:         Behavior: Behavior normal.      Comments: Flat affect   Outpatient Follow-Up  No future appointments.      Liseth Ahn MD

## 2024-11-16 NOTE — CARE PLAN
The patient's goals for the shift include      The clinical goals for the shift include Patient will maintain BG  throughout the shift      Problem: Diabetes  Goal: Achieve decreasing blood glucose levels by end of shift  Outcome: Progressing     Problem: Pain - Adult  Goal: Verbalizes/displays adequate comfort level or baseline comfort level  Outcome: Progressing     Problem: Safety - Adult  Goal: Free from fall injury  Outcome: Progressing     Problem: Discharge Planning  Goal: Discharge to home or other facility with appropriate resources  Outcome: Progressing

## 2024-11-16 NOTE — CARE PLAN
The patient's goals for the shift include      The clinical goals for the shift include Patient will maintain BG  throughout the shift

## 2024-11-16 NOTE — NURSING NOTE
IV accesses removed.  Tele removed.  Verbalized understanding of discharge instructions.  Pt still refused sliding scale coverage despite talk with MD

## 2024-11-18 ENCOUNTER — PATIENT OUTREACH (OUTPATIENT)
Dept: PRIMARY CARE | Facility: CLINIC | Age: 34
End: 2024-11-18
Payer: MEDICAID

## 2024-11-18 NOTE — PROGRESS NOTES
30 Day readmission  Discharge Facility: [facility]  Discharge Diagnosis: Diabetic ketoacidosis without coma associated with type 1 diabetes mellitus (Multi)  Poor compliance to insulin  Hypertension  Admission Date: 11/14/24  Discharge Date: 11/16/24    PCP Appointment Date: no appointment, message to office  Specialist Appointment Date: needs endocrinology  Hospital Encounter and Summary Linked: Yes    Two attempts were made to reach patient within two business days after discharge. Voicemail left with contact information for patient to call back with any non-emergent questions or concerns.

## 2024-12-04 ENCOUNTER — PATIENT OUTREACH (OUTPATIENT)
Dept: PRIMARY CARE | Facility: CLINIC | Age: 34
End: 2024-12-04
Payer: MEDICAID

## 2024-12-04 ENCOUNTER — HOSPITAL ENCOUNTER (INPATIENT)
Facility: HOSPITAL | Age: 34
LOS: 1 days | Discharge: HOME HEALTH | End: 2024-12-06
Attending: EMERGENCY MEDICINE | Admitting: INTERNAL MEDICINE
Payer: MEDICAID

## 2024-12-04 DIAGNOSIS — E10.10 TYPE 1 DIABETES MELLITUS WITH KETOACIDOSIS WITHOUT COMA: Primary | ICD-10-CM

## 2024-12-04 DIAGNOSIS — R11.2 NAUSEA AND VOMITING, UNSPECIFIED VOMITING TYPE: ICD-10-CM

## 2024-12-04 DIAGNOSIS — E11.10 DIABETIC KETOACIDOSIS WITHOUT COMA ASSOCIATED WITH TYPE 2 DIABETES MELLITUS (MULTI): ICD-10-CM

## 2024-12-04 LAB
ANION GAP BLDV CALCULATED.4IONS-SCNC: 31 MMOL/L (ref 10–25)
BASE EXCESS BLDV CALC-SCNC: -22.3 MMOL/L (ref -2–3)
BASOPHILS # BLD AUTO: 0.09 X10*3/UL (ref 0–0.1)
BASOPHILS NFR BLD AUTO: 0.7 %
BODY TEMPERATURE: 37 DEGREES CELSIUS
CA-I BLDV-SCNC: 1.31 MMOL/L (ref 1.1–1.33)
CHLORIDE BLDV-SCNC: 99 MMOL/L (ref 98–107)
EOSINOPHIL # BLD AUTO: 0.03 X10*3/UL (ref 0–0.7)
EOSINOPHIL NFR BLD AUTO: 0.2 %
ERYTHROCYTE [DISTWIDTH] IN BLOOD BY AUTOMATED COUNT: 13.6 % (ref 11.5–14.5)
GLUCOSE BLDV-MCNC: 515 MG/DL (ref 74–99)
HCO3 BLDV-SCNC: 7.7 MMOL/L (ref 22–26)
HCT VFR BLD AUTO: 57.5 % (ref 41–52)
HCT VFR BLD EST: 58 % (ref 41–52)
HGB BLD-MCNC: 19.1 G/DL (ref 13.5–17.5)
HGB BLDV-MCNC: 19.4 G/DL (ref 13.5–17.5)
IMM GRANULOCYTES # BLD AUTO: 0.13 X10*3/UL (ref 0–0.7)
IMM GRANULOCYTES NFR BLD AUTO: 1.1 % (ref 0–0.9)
INHALED O2 CONCENTRATION: 21 %
LACTATE BLDV-SCNC: 4.4 MMOL/L (ref 0.4–2)
LYMPHOCYTES # BLD AUTO: 0.97 X10*3/UL (ref 1.2–4.8)
LYMPHOCYTES NFR BLD AUTO: 7.9 %
MCH RBC QN AUTO: 31.7 PG (ref 26–34)
MCHC RBC AUTO-ENTMCNC: 33.2 G/DL (ref 32–36)
MCV RBC AUTO: 95 FL (ref 80–100)
MONOCYTES # BLD AUTO: 0.94 X10*3/UL (ref 0.1–1)
MONOCYTES NFR BLD AUTO: 7.7 %
NEUTROPHILS # BLD AUTO: 10.09 X10*3/UL (ref 1.2–7.7)
NEUTROPHILS NFR BLD AUTO: 82.4 %
NRBC BLD-RTO: 0 /100 WBCS (ref 0–0)
OXYHGB MFR BLDV: 68.5 % (ref 45–75)
PCO2 BLDV: 30 MM HG (ref 41–51)
PH BLDV: 7.02 PH (ref 7.33–7.43)
PLATELET # BLD AUTO: 217 X10*3/UL (ref 150–450)
PO2 BLDV: 47 MM HG (ref 35–45)
POTASSIUM BLDV-SCNC: 6.1 MMOL/L (ref 3.5–5.3)
RBC # BLD AUTO: 6.03 X10*6/UL (ref 4.5–5.9)
RBC MORPH BLD: NORMAL
SAO2 % BLDV: 70 % (ref 45–75)
SODIUM BLDV-SCNC: 132 MMOL/L (ref 136–145)
WBC # BLD AUTO: 12.3 X10*3/UL (ref 4.4–11.3)

## 2024-12-04 PROCEDURE — 96361 HYDRATE IV INFUSION ADD-ON: CPT

## 2024-12-04 PROCEDURE — 82010 KETONE BODYS QUAN: CPT | Performed by: EMERGENCY MEDICINE

## 2024-12-04 PROCEDURE — 83690 ASSAY OF LIPASE: CPT | Performed by: EMERGENCY MEDICINE

## 2024-12-04 PROCEDURE — 2500000004 HC RX 250 GENERAL PHARMACY W/ HCPCS (ALT 636 FOR OP/ED): Performed by: EMERGENCY MEDICINE

## 2024-12-04 PROCEDURE — 99291 CRITICAL CARE FIRST HOUR: CPT | Performed by: EMERGENCY MEDICINE

## 2024-12-04 PROCEDURE — 84132 ASSAY OF SERUM POTASSIUM: CPT | Performed by: EMERGENCY MEDICINE

## 2024-12-04 PROCEDURE — 83605 ASSAY OF LACTIC ACID: CPT | Performed by: EMERGENCY MEDICINE

## 2024-12-04 PROCEDURE — 84295 ASSAY OF SERUM SODIUM: CPT | Performed by: EMERGENCY MEDICINE

## 2024-12-04 PROCEDURE — 99285 EMERGENCY DEPT VISIT HI MDM: CPT | Mod: 25 | Performed by: EMERGENCY MEDICINE

## 2024-12-04 PROCEDURE — 82435 ASSAY OF BLOOD CHLORIDE: CPT | Performed by: EMERGENCY MEDICINE

## 2024-12-04 PROCEDURE — 36415 COLL VENOUS BLD VENIPUNCTURE: CPT | Performed by: EMERGENCY MEDICINE

## 2024-12-04 PROCEDURE — 83735 ASSAY OF MAGNESIUM: CPT | Performed by: EMERGENCY MEDICINE

## 2024-12-04 PROCEDURE — 84100 ASSAY OF PHOSPHORUS: CPT | Performed by: EMERGENCY MEDICINE

## 2024-12-04 PROCEDURE — 85025 COMPLETE CBC W/AUTO DIFF WBC: CPT | Performed by: EMERGENCY MEDICINE

## 2024-12-04 RX ORDER — SODIUM CHLORIDE, SODIUM LACTATE, POTASSIUM CHLORIDE, CALCIUM CHLORIDE 600; 310; 30; 20 MG/100ML; MG/100ML; MG/100ML; MG/100ML
250 INJECTION, SOLUTION INTRAVENOUS CONTINUOUS
Status: DISCONTINUED | OUTPATIENT
Start: 2024-12-05 | End: 2024-12-05

## 2024-12-04 ASSESSMENT — PAIN DESCRIPTION - PAIN TYPE: TYPE: ACUTE PAIN

## 2024-12-04 ASSESSMENT — PAIN - FUNCTIONAL ASSESSMENT: PAIN_FUNCTIONAL_ASSESSMENT: 0-10

## 2024-12-04 ASSESSMENT — PAIN DESCRIPTION - LOCATION
LOCATION_3: BACK
LOCATION_4: ABDOMEN
LOCATION: HEAD
LOCATION_2: CHEST

## 2024-12-04 ASSESSMENT — PAIN DESCRIPTION - ORIENTATION
ORIENTATION_3: LEFT;RIGHT;LOWER
ORIENTATION_4: LOWER;RIGHT;LEFT
ORIENTATION_2: ANTERIOR

## 2024-12-04 ASSESSMENT — PAIN DESCRIPTION - FREQUENCY: FREQUENCY: CONSTANT/CONTINUOUS

## 2024-12-04 ASSESSMENT — PAIN DESCRIPTION - DESCRIPTORS
DESCRIPTORS: HEADACHE
DESCRIPTORS_2: ACHING
DESCRIPTORS_4: CRAMPING
DESCRIPTORS_3: SHARP

## 2024-12-04 ASSESSMENT — PAIN SCALES - GENERAL
PAINLEVEL_OUTOF10: 3
PAINLEVEL_OUTOF10: 7
PAINLEVEL_OUTOF10: 4
PAINLEVEL_OUTOF10: 6

## 2024-12-05 PROBLEM — E10.10 TYPE 1 DIABETES MELLITUS WITH KETOACIDOSIS WITHOUT COMA: Status: ACTIVE | Noted: 2024-12-05

## 2024-12-05 LAB
ALBUMIN SERPL BCP-MCNC: 4.5 G/DL (ref 3.4–5)
ALP SERPL-CCNC: 108 U/L (ref 33–120)
ALT SERPL W P-5'-P-CCNC: 12 U/L (ref 10–52)
ANION GAP BLDV CALCULATED.4IONS-SCNC: 28 MMOL/L (ref 10–25)
ANION GAP SERPL CALC-SCNC: 11 MMOL/L (ref 10–20)
ANION GAP SERPL CALC-SCNC: 11 MMOL/L (ref 10–20)
ANION GAP SERPL CALC-SCNC: 16 MMOL/L (ref 10–20)
ANION GAP SERPL CALC-SCNC: 27 MMOL/L (ref 10–20)
ANION GAP SERPL CALC-SCNC: 35 MMOL/L (ref 10–20)
APPEARANCE UR: CLEAR
AST SERPL W P-5'-P-CCNC: 17 U/L (ref 9–39)
B-OH-BUTYR SERPL-SCNC: 12.17 MMOL/L (ref 0.02–0.27)
BACTERIA #/AREA URNS AUTO: ABNORMAL /HPF
BASE EXCESS BLDV CALC-SCNC: -10 MMOL/L (ref -2–3)
BASE EXCESS BLDV CALC-SCNC: -19.1 MMOL/L (ref -2–3)
BASE EXCESS BLDV CALC-SCNC: -3 MMOL/L (ref -2–3)
BASE EXCESS BLDV CALC-SCNC: -7.1 MMOL/L (ref -2–3)
BILIRUB SERPL-MCNC: 0.4 MG/DL (ref 0–1.2)
BILIRUB UR STRIP.AUTO-MCNC: NEGATIVE MG/DL
BODY TEMPERATURE: 37 DEGREES CELSIUS
BUN SERPL-MCNC: 18 MG/DL (ref 6–23)
BUN SERPL-MCNC: 18 MG/DL (ref 6–23)
BUN SERPL-MCNC: 19 MG/DL (ref 6–23)
BUN SERPL-MCNC: 19 MG/DL (ref 6–23)
BUN SERPL-MCNC: 20 MG/DL (ref 6–23)
CA-I BLDV-SCNC: 1.35 MMOL/L (ref 1.1–1.33)
CALCIUM SERPL-MCNC: 7.8 MG/DL (ref 8.6–10.3)
CALCIUM SERPL-MCNC: 8 MG/DL (ref 8.6–10.3)
CALCIUM SERPL-MCNC: 8 MG/DL (ref 8.6–10.3)
CALCIUM SERPL-MCNC: 9 MG/DL (ref 8.6–10.3)
CALCIUM SERPL-MCNC: 9.1 MG/DL (ref 8.6–10.3)
CHLORIDE BLDV-SCNC: 103 MMOL/L (ref 98–107)
CHLORIDE SERPL-SCNC: 102 MMOL/L (ref 98–107)
CHLORIDE SERPL-SCNC: 105 MMOL/L (ref 98–107)
CHLORIDE SERPL-SCNC: 107 MMOL/L (ref 98–107)
CHLORIDE SERPL-SCNC: 108 MMOL/L (ref 98–107)
CHLORIDE SERPL-SCNC: 109 MMOL/L (ref 98–107)
CO2 SERPL-SCNC: 16 MMOL/L (ref 21–32)
CO2 SERPL-SCNC: 20 MMOL/L (ref 21–32)
CO2 SERPL-SCNC: 22 MMOL/L (ref 21–32)
CO2 SERPL-SCNC: 4 MMOL/L (ref 21–32)
CO2 SERPL-SCNC: 8 MMOL/L (ref 21–32)
COLOR UR: ABNORMAL
CREAT SERPL-MCNC: 1 MG/DL (ref 0.5–1.3)
CREAT SERPL-MCNC: 1.02 MG/DL (ref 0.5–1.3)
CREAT SERPL-MCNC: 1.06 MG/DL (ref 0.5–1.3)
CREAT SERPL-MCNC: 1.28 MG/DL (ref 0.5–1.3)
CREAT SERPL-MCNC: 1.34 MG/DL (ref 0.5–1.3)
EGFRCR SERPLBLD CKD-EPI 2021: 70 ML/MIN/1.73M*2
EGFRCR SERPLBLD CKD-EPI 2021: 74 ML/MIN/1.73M*2
EGFRCR SERPLBLD CKD-EPI 2021: >90 ML/MIN/1.73M*2
GLUCOSE BLD MANUAL STRIP-MCNC: 132 MG/DL (ref 74–99)
GLUCOSE BLD MANUAL STRIP-MCNC: 134 MG/DL (ref 74–99)
GLUCOSE BLD MANUAL STRIP-MCNC: 169 MG/DL (ref 74–99)
GLUCOSE BLD MANUAL STRIP-MCNC: 182 MG/DL (ref 74–99)
GLUCOSE BLD MANUAL STRIP-MCNC: 185 MG/DL (ref 74–99)
GLUCOSE BLD MANUAL STRIP-MCNC: 212 MG/DL (ref 74–99)
GLUCOSE BLD MANUAL STRIP-MCNC: 226 MG/DL (ref 74–99)
GLUCOSE BLD MANUAL STRIP-MCNC: 258 MG/DL (ref 74–99)
GLUCOSE BLD MANUAL STRIP-MCNC: 263 MG/DL (ref 74–99)
GLUCOSE BLD MANUAL STRIP-MCNC: 267 MG/DL (ref 74–99)
GLUCOSE BLD MANUAL STRIP-MCNC: 291 MG/DL (ref 74–99)
GLUCOSE BLD MANUAL STRIP-MCNC: 391 MG/DL (ref 74–99)
GLUCOSE BLD MANUAL STRIP-MCNC: 412 MG/DL (ref 74–99)
GLUCOSE BLDV-MCNC: 254 MG/DL (ref 74–99)
GLUCOSE SERPL-MCNC: 149 MG/DL (ref 74–99)
GLUCOSE SERPL-MCNC: 196 MG/DL (ref 74–99)
GLUCOSE SERPL-MCNC: 212 MG/DL (ref 74–99)
GLUCOSE SERPL-MCNC: 248 MG/DL (ref 74–99)
GLUCOSE SERPL-MCNC: 423 MG/DL (ref 74–99)
GLUCOSE UR STRIP.AUTO-MCNC: ABNORMAL MG/DL
HCO3 BLDV-SCNC: 16.2 MMOL/L (ref 22–26)
HCO3 BLDV-SCNC: 19.3 MMOL/L (ref 22–26)
HCO3 BLDV-SCNC: 22.7 MMOL/L (ref 22–26)
HCO3 BLDV-SCNC: 8.3 MMOL/L (ref 22–26)
HCT VFR BLD EST: 54 % (ref 41–52)
HGB BLDV-MCNC: 18 G/DL (ref 13.5–17.5)
HOLD SPECIMEN: NORMAL
INHALED O2 CONCENTRATION: 100 %
INHALED O2 CONCENTRATION: 100 %
INHALED O2 CONCENTRATION: 21 %
INHALED O2 CONCENTRATION: 21 %
KETONES UR STRIP.AUTO-MCNC: ABNORMAL MG/DL
LACTATE BLDV-SCNC: 3.6 MMOL/L (ref 0.4–2)
LACTATE BLDV-SCNC: 4 MMOL/L (ref 0.4–2)
LACTATE SERPL-SCNC: 0.8 MMOL/L (ref 0.4–2)
LACTATE SERPL-SCNC: 0.9 MMOL/L (ref 0.4–2)
LACTATE SERPL-SCNC: 1.3 MMOL/L (ref 0.4–2)
LEUKOCYTE ESTERASE UR QL STRIP.AUTO: NEGATIVE
LIPASE SERPL-CCNC: 11 U/L (ref 9–82)
MAGNESIUM SERPL-MCNC: 1.64 MG/DL (ref 1.6–2.4)
MAGNESIUM SERPL-MCNC: 1.66 MG/DL (ref 1.6–2.4)
MAGNESIUM SERPL-MCNC: 1.69 MG/DL (ref 1.6–2.4)
MAGNESIUM SERPL-MCNC: 1.94 MG/DL (ref 1.6–2.4)
MAGNESIUM SERPL-MCNC: 1.95 MG/DL (ref 1.6–2.4)
MUCOUS THREADS #/AREA URNS AUTO: ABNORMAL /LPF
NITRITE UR QL STRIP.AUTO: NEGATIVE
OXYHGB MFR BLDV: 67.6 % (ref 45–75)
OXYHGB MFR BLDV: 69.8 % (ref 45–75)
OXYHGB MFR BLDV: 72.3 % (ref 45–75)
OXYHGB MFR BLDV: 83.2 % (ref 45–75)
PCO2 BLDV: 25 MM HG (ref 41–51)
PCO2 BLDV: 36 MM HG (ref 41–51)
PCO2 BLDV: 41 MM HG (ref 41–51)
PCO2 BLDV: 42 MM HG (ref 41–51)
PH BLDV: 7.13 PH (ref 7.33–7.43)
PH BLDV: 7.26 PH (ref 7.33–7.43)
PH BLDV: 7.28 PH (ref 7.33–7.43)
PH BLDV: 7.34 PH (ref 7.33–7.43)
PH UR STRIP.AUTO: 5.5 [PH]
PHOSPHATE SERPL-MCNC: 1.9 MG/DL (ref 2.5–4.9)
PHOSPHATE SERPL-MCNC: 2.7 MG/DL (ref 2.5–4.9)
PHOSPHATE SERPL-MCNC: 3 MG/DL (ref 2.5–4.9)
PHOSPHATE SERPL-MCNC: 3.4 MG/DL (ref 2.5–4.9)
PHOSPHATE SERPL-MCNC: 4.4 MG/DL (ref 2.5–4.9)
PO2 BLDV: 41 MM HG (ref 35–45)
PO2 BLDV: 41 MM HG (ref 35–45)
PO2 BLDV: 42 MM HG (ref 35–45)
PO2 BLDV: 52 MM HG (ref 35–45)
POTASSIUM BLDV-SCNC: 4.1 MMOL/L (ref 3.5–5.3)
POTASSIUM SERPL-SCNC: 3.7 MMOL/L (ref 3.5–5.3)
POTASSIUM SERPL-SCNC: 3.9 MMOL/L (ref 3.5–5.3)
POTASSIUM SERPL-SCNC: 4 MMOL/L (ref 3.5–5.3)
POTASSIUM SERPL-SCNC: 4 MMOL/L (ref 3.5–5.3)
POTASSIUM SERPL-SCNC: 7.5 MMOL/L (ref 3.5–5.3)
PROT SERPL-MCNC: 7.4 G/DL (ref 6.4–8.2)
PROT UR STRIP.AUTO-MCNC: ABNORMAL MG/DL
RBC # UR STRIP.AUTO: NEGATIVE /UL
RBC #/AREA URNS AUTO: ABNORMAL /HPF
SAO2 % BLDV: 69 % (ref 45–75)
SAO2 % BLDV: 71 % (ref 45–75)
SAO2 % BLDV: 74 % (ref 45–75)
SAO2 % BLDV: 85 % (ref 45–75)
SODIUM BLDV-SCNC: 135 MMOL/L (ref 136–145)
SODIUM SERPL-SCNC: 133 MMOL/L (ref 136–145)
SODIUM SERPL-SCNC: 134 MMOL/L (ref 136–145)
SODIUM SERPL-SCNC: 136 MMOL/L (ref 136–145)
SODIUM SERPL-SCNC: 136 MMOL/L (ref 136–145)
SODIUM SERPL-SCNC: 138 MMOL/L (ref 136–145)
SP GR UR STRIP.AUTO: 1.03
UROBILINOGEN UR STRIP.AUTO-MCNC: NORMAL MG/DL
WBC #/AREA URNS AUTO: ABNORMAL /HPF

## 2024-12-05 PROCEDURE — 96360 HYDRATION IV INFUSION INIT: CPT | Mod: 59

## 2024-12-05 PROCEDURE — 81001 URINALYSIS AUTO W/SCOPE: CPT | Performed by: EMERGENCY MEDICINE

## 2024-12-05 PROCEDURE — 36415 COLL VENOUS BLD VENIPUNCTURE: CPT

## 2024-12-05 PROCEDURE — 2500000004 HC RX 250 GENERAL PHARMACY W/ HCPCS (ALT 636 FOR OP/ED): Performed by: PHARMACIST

## 2024-12-05 PROCEDURE — 80048 BASIC METABOLIC PNL TOTAL CA: CPT | Performed by: INTERNAL MEDICINE

## 2024-12-05 PROCEDURE — 84132 ASSAY OF SERUM POTASSIUM: CPT

## 2024-12-05 PROCEDURE — 96365 THER/PROPH/DIAG IV INF INIT: CPT | Mod: 59

## 2024-12-05 PROCEDURE — 1210000001 HC SEMI-PRIVATE ROOM DAILY

## 2024-12-05 PROCEDURE — 82805 BLOOD GASES W/O2 SATURATION: CPT

## 2024-12-05 PROCEDURE — 84132 ASSAY OF SERUM POTASSIUM: CPT | Performed by: INTERNAL MEDICINE

## 2024-12-05 PROCEDURE — 99223 1ST HOSP IP/OBS HIGH 75: CPT | Performed by: INTERNAL MEDICINE

## 2024-12-05 PROCEDURE — 99254 IP/OBS CNSLTJ NEW/EST MOD 60: CPT | Performed by: INTERNAL MEDICINE

## 2024-12-05 PROCEDURE — 82435 ASSAY OF BLOOD CHLORIDE: CPT

## 2024-12-05 PROCEDURE — 84100 ASSAY OF PHOSPHORUS: CPT

## 2024-12-05 PROCEDURE — 2500000004 HC RX 250 GENERAL PHARMACY W/ HCPCS (ALT 636 FOR OP/ED): Performed by: EMERGENCY MEDICINE

## 2024-12-05 PROCEDURE — 83605 ASSAY OF LACTIC ACID: CPT

## 2024-12-05 PROCEDURE — 99233 SBSQ HOSP IP/OBS HIGH 50: CPT | Performed by: FAMILY MEDICINE

## 2024-12-05 PROCEDURE — 2500000005 HC RX 250 GENERAL PHARMACY W/O HCPCS: Performed by: PHARMACIST

## 2024-12-05 PROCEDURE — 82947 ASSAY GLUCOSE BLOOD QUANT: CPT

## 2024-12-05 PROCEDURE — 84100 ASSAY OF PHOSPHORUS: CPT | Performed by: INTERNAL MEDICINE

## 2024-12-05 PROCEDURE — 96361 HYDRATE IV INFUSION ADD-ON: CPT

## 2024-12-05 PROCEDURE — 83735 ASSAY OF MAGNESIUM: CPT

## 2024-12-05 PROCEDURE — 2500000004 HC RX 250 GENERAL PHARMACY W/ HCPCS (ALT 636 FOR OP/ED): Performed by: INTERNAL MEDICINE

## 2024-12-05 PROCEDURE — 2500000002 HC RX 250 W HCPCS SELF ADMINISTERED DRUGS (ALT 637 FOR MEDICARE OP, ALT 636 FOR OP/ED): Performed by: INTERNAL MEDICINE

## 2024-12-05 PROCEDURE — 99291 CRITICAL CARE FIRST HOUR: CPT

## 2024-12-05 PROCEDURE — 96366 THER/PROPH/DIAG IV INF ADDON: CPT

## 2024-12-05 PROCEDURE — 2500000002 HC RX 250 W HCPCS SELF ADMINISTERED DRUGS (ALT 637 FOR MEDICARE OP, ALT 636 FOR OP/ED): Performed by: FAMILY MEDICINE

## 2024-12-05 RX ORDER — DEXTROSE MONOHYDRATE AND SODIUM CHLORIDE 5; .45 G/100ML; G/100ML
150 INJECTION, SOLUTION INTRAVENOUS CONTINUOUS
Status: DISCONTINUED | OUTPATIENT
Start: 2024-12-05 | End: 2024-12-05

## 2024-12-05 RX ORDER — DEXTROSE MONOHYDRATE 100 MG/ML
150 INJECTION, SOLUTION INTRAVENOUS CONTINUOUS
Status: DISCONTINUED | OUTPATIENT
Start: 2024-12-05 | End: 2024-12-05

## 2024-12-05 RX ORDER — DEXTROSE 50 % IN WATER (D50W) INTRAVENOUS SYRINGE
25
Status: DISCONTINUED | OUTPATIENT
Start: 2024-12-05 | End: 2024-12-06 | Stop reason: HOSPADM

## 2024-12-05 RX ORDER — SODIUM CHLORIDE 450 MG/100ML
250 INJECTION, SOLUTION INTRAVENOUS CONTINUOUS
Status: DISCONTINUED | OUTPATIENT
Start: 2024-12-05 | End: 2024-12-05

## 2024-12-05 RX ORDER — DEXTROSE 50 % IN WATER (D50W) INTRAVENOUS SYRINGE
50
Status: DISCONTINUED | OUTPATIENT
Start: 2024-12-05 | End: 2024-12-06 | Stop reason: HOSPADM

## 2024-12-05 RX ORDER — DEXTROSE 50 % IN WATER (D50W) INTRAVENOUS SYRINGE
25
Status: DISCONTINUED | OUTPATIENT
Start: 2024-12-05 | End: 2024-12-05

## 2024-12-05 RX ORDER — DEXTROSE 50 % IN WATER (D50W) INTRAVENOUS SYRINGE
12.5
Status: DISCONTINUED | OUTPATIENT
Start: 2024-12-05 | End: 2024-12-05

## 2024-12-05 SDOH — SOCIAL STABILITY: SOCIAL INSECURITY: WERE YOU ABLE TO COMPLETE ALL THE BEHAVIORAL HEALTH SCREENINGS?: YES

## 2024-12-05 SDOH — SOCIAL STABILITY: SOCIAL INSECURITY: HAVE YOU HAD ANY THOUGHTS OF HARMING ANYONE ELSE?: NO

## 2024-12-05 SDOH — ECONOMIC STABILITY: HOUSING INSECURITY: IN THE LAST 12 MONTHS, WAS THERE A TIME WHEN YOU WERE NOT ABLE TO PAY THE MORTGAGE OR RENT ON TIME?: NO

## 2024-12-05 SDOH — SOCIAL STABILITY: SOCIAL INSECURITY: ARE YOU OR HAVE YOU BEEN THREATENED OR ABUSED PHYSICALLY, EMOTIONALLY, OR SEXUALLY BY ANYONE?: NO

## 2024-12-05 SDOH — SOCIAL STABILITY: SOCIAL INSECURITY: HAS ANYONE EVER THREATENED TO HURT YOUR FAMILY OR YOUR PETS?: NO

## 2024-12-05 SDOH — ECONOMIC STABILITY: FOOD INSECURITY: HOW HARD IS IT FOR YOU TO PAY FOR THE VERY BASICS LIKE FOOD, HOUSING, MEDICAL CARE, AND HEATING?: NOT HARD AT ALL

## 2024-12-05 SDOH — ECONOMIC STABILITY: FOOD INSECURITY: WITHIN THE PAST 12 MONTHS, THE FOOD YOU BOUGHT JUST DIDN'T LAST AND YOU DIDN'T HAVE MONEY TO GET MORE.: NEVER TRUE

## 2024-12-05 SDOH — SOCIAL STABILITY: SOCIAL INSECURITY: DO YOU FEEL UNSAFE GOING BACK TO THE PLACE WHERE YOU ARE LIVING?: NO

## 2024-12-05 SDOH — ECONOMIC STABILITY: INCOME INSECURITY: IN THE PAST 12 MONTHS HAS THE ELECTRIC, GAS, OIL, OR WATER COMPANY THREATENED TO SHUT OFF SERVICES IN YOUR HOME?: NO

## 2024-12-05 SDOH — SOCIAL STABILITY: SOCIAL INSECURITY: WITHIN THE LAST YEAR, HAVE YOU BEEN HUMILIATED OR EMOTIONALLY ABUSED IN OTHER WAYS BY YOUR PARTNER OR EX-PARTNER?: NO

## 2024-12-05 SDOH — ECONOMIC STABILITY: FOOD INSECURITY: WITHIN THE PAST 12 MONTHS, YOU WORRIED THAT YOUR FOOD WOULD RUN OUT BEFORE YOU GOT THE MONEY TO BUY MORE.: NEVER TRUE

## 2024-12-05 SDOH — HEALTH STABILITY: PHYSICAL HEALTH
HOW OFTEN DO YOU NEED TO HAVE SOMEONE HELP YOU WHEN YOU READ INSTRUCTIONS, PAMPHLETS, OR OTHER WRITTEN MATERIAL FROM YOUR DOCTOR OR PHARMACY?: NEVER

## 2024-12-05 SDOH — HEALTH STABILITY: PHYSICAL HEALTH: ON AVERAGE, HOW MANY DAYS PER WEEK DO YOU ENGAGE IN MODERATE TO STRENUOUS EXERCISE (LIKE A BRISK WALK)?: 0 DAYS

## 2024-12-05 SDOH — SOCIAL STABILITY: SOCIAL INSECURITY: WITHIN THE LAST YEAR, HAVE YOU BEEN AFRAID OF YOUR PARTNER OR EX-PARTNER?: NO

## 2024-12-05 SDOH — HEALTH STABILITY: PHYSICAL HEALTH: ON AVERAGE, HOW MANY MINUTES DO YOU ENGAGE IN EXERCISE AT THIS LEVEL?: 0 MIN

## 2024-12-05 SDOH — SOCIAL STABILITY: SOCIAL INSECURITY: ABUSE: ADULT

## 2024-12-05 SDOH — ECONOMIC STABILITY: TRANSPORTATION INSECURITY: IN THE PAST 12 MONTHS, HAS LACK OF TRANSPORTATION KEPT YOU FROM MEDICAL APPOINTMENTS OR FROM GETTING MEDICATIONS?: NO

## 2024-12-05 SDOH — ECONOMIC STABILITY: HOUSING INSECURITY: IN THE PAST 12 MONTHS, HOW MANY TIMES HAVE YOU MOVED WHERE YOU WERE LIVING?: 1

## 2024-12-05 SDOH — SOCIAL STABILITY: SOCIAL INSECURITY: DOES ANYONE TRY TO KEEP YOU FROM HAVING/CONTACTING OTHER FRIENDS OR DOING THINGS OUTSIDE YOUR HOME?: NO

## 2024-12-05 SDOH — ECONOMIC STABILITY: HOUSING INSECURITY: AT ANY TIME IN THE PAST 12 MONTHS, WERE YOU HOMELESS OR LIVING IN A SHELTER (INCLUDING NOW)?: NO

## 2024-12-05 SDOH — SOCIAL STABILITY: SOCIAL INSECURITY: ARE THERE ANY APPARENT SIGNS OF INJURIES/BEHAVIORS THAT COULD BE RELATED TO ABUSE/NEGLECT?: NO

## 2024-12-05 SDOH — SOCIAL STABILITY: SOCIAL INSECURITY: HAVE YOU HAD THOUGHTS OF HARMING ANYONE ELSE?: NO

## 2024-12-05 SDOH — SOCIAL STABILITY: SOCIAL INSECURITY: DO YOU FEEL ANYONE HAS EXPLOITED OR TAKEN ADVANTAGE OF YOU FINANCIALLY OR OF YOUR PERSONAL PROPERTY?: NO

## 2024-12-05 ASSESSMENT — PAIN - FUNCTIONAL ASSESSMENT
PAIN_FUNCTIONAL_ASSESSMENT: 0-10

## 2024-12-05 ASSESSMENT — LIFESTYLE VARIABLES
SKIP TO QUESTIONS 9-10: 1
TOTAL SCORE: 0
EVER FELT BAD OR GUILTY ABOUT YOUR DRINKING: NO
HAVE PEOPLE ANNOYED YOU BY CRITICIZING YOUR DRINKING: NO
HOW MANY STANDARD DRINKS CONTAINING ALCOHOL DO YOU HAVE ON A TYPICAL DAY: PATIENT DOES NOT DRINK
EVER HAD A DRINK FIRST THING IN THE MORNING TO STEADY YOUR NERVES TO GET RID OF A HANGOVER: NO
HOW OFTEN DO YOU HAVE A DRINK CONTAINING ALCOHOL: NEVER
HAVE YOU EVER FELT YOU SHOULD CUT DOWN ON YOUR DRINKING: NO
AUDIT-C TOTAL SCORE: 0
SUBSTANCE_ABUSE_PAST_12_MONTHS: NO
PRESCIPTION_ABUSE_PAST_12_MONTHS: NO
AUDIT-C TOTAL SCORE: 0
HOW OFTEN DO YOU HAVE 6 OR MORE DRINKS ON ONE OCCASION: NEVER

## 2024-12-05 ASSESSMENT — PAIN SCALES - GENERAL
PAINLEVEL_OUTOF10: 0 - NO PAIN

## 2024-12-05 ASSESSMENT — ENCOUNTER SYMPTOMS
FATIGUE: 1
VOMITING: 0
VOMITING: 1
CONFUSION: 0
NAUSEA: 1
ABDOMINAL PAIN: 0
NAUSEA: 0

## 2024-12-05 ASSESSMENT — ACTIVITIES OF DAILY LIVING (ADL)
ADEQUATE_TO_COMPLETE_ADL: YES
BATHING: INDEPENDENT
FEEDING YOURSELF: INDEPENDENT
LACK_OF_TRANSPORTATION: NO
HEARING - LEFT EAR: FUNCTIONAL
HEARING - RIGHT EAR: FUNCTIONAL
PATIENT'S MEMORY ADEQUATE TO SAFELY COMPLETE DAILY ACTIVITIES?: YES
TOILETING: INDEPENDENT
LACK_OF_TRANSPORTATION: NO
JUDGMENT_ADEQUATE_SAFELY_COMPLETE_DAILY_ACTIVITIES: YES
WALKS IN HOME: INDEPENDENT
GROOMING: INDEPENDENT
DRESSING YOURSELF: INDEPENDENT

## 2024-12-05 ASSESSMENT — VISUAL ACUITY: OU: 1

## 2024-12-05 NOTE — CONSULTS
Critical Care Medicine Consult      Reason For Consult  DKA    History Of Present Illness  Ed Sanon is a 34 y.o. male with past medical history of diabetes mellitus type 1 with poor insulin compliance, anxiety, depression, and several admissions for episodes of DKA presented to [facility] ED with nausea, vomiting, fatigue, generalized weakness, and concern for DKA. Upon ED workup, temperature 37.1 °C, heart rate 131, blood pressure 110/66, respiratory rate 20, and SpO2 98% on room air.  ED labs revealed blood glucose 423, sodium 133, potassium 7.5 (mildly hemolyzed), bicarbonate 4, anion gap 35, creatinine 1.34, beta hydroxybutyrate 12.17, WBC 12.3, RBC 6.03, hemoglobin 19.1, hematocrit 57.5, venous pH 7.02, pCO2 30, pO2 47, POCT potassium 6.1, HCO3 calculated 7.7, and lactate 4.4>> 4.0.  UA with reflex microscopic negative for UTI, positive for 1+ protein, 4+ glucose, 1+ bacteria and 4+ ketones.  ED interventions included 1 L IV fluid bolus lactated Ringer's and initiation of insulin infusion per DKA protocol.  Patient admitted to ICU and critical care medicine was consulted.    Patient seen and examined in ED bed 5.  Patient alert and oriented x 4 and in no acute distress.  Patient reported that he came to the ER because he was concerned that he is in DKA.  Patient reported that he had nausea, vomiting, fatigue, and generalized weakness x 1 day.  Patient reported blood glucose has not been under 300 and weeks.  Patient reported that he is taking his insulin as prescribed but his blood glucose not controlled.  On chart review, it is noted by ED provider that patient stated that he does not take lispro because his blood sugar drops too much and has been taking his NPH twice a day as prescribed.  Patient denies any any fevers, chills, chest pain, cough, congestion, or abdominal pain.      Past Medical History:   Diagnosis Date    Abdominal pain 04/21/2023    Anxiety and depression 08/08/2021    Contact with  and (suspected) exposure to covid-19 04/21/2023    COVID-19 virus infection 07/11/2023    Diabetes mellitus (Multi)     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    DIABETIC KETO ACIDOSIS    DKA (diabetic ketoacidosis) (Multi) 07/11/2023    DKA, type 1, not at goal 04/21/2023    DM2 (diabetes mellitus, type 2) (Multi) 11/15/2023    DIABETES    Elevated blood sugar 11/15/2023    ELEVATED BLOOD SUGAR/ 375 LAST CHECK    Elevated blood-pressure reading, without diagnosis of hypertension 09/09/2020    Elevated blood pressure reading    Fatigue 07/11/2023    Generalized anxiety disorder 07/11/2023    Microalbuminuria due to type 1 diabetes mellitus (Multi) 07/11/2023    Personal history of COVID-19 04/02/2023    Poorly controlled diabetes mellitus (Multi) 07/11/2023    Type 1 diabetes mellitus 07/11/2023    Type 1 diabetes mellitus with hyperglycemia (Multi) 02/25/2019    Vitamin B12 deficiency 07/11/2023    Vitamin D deficiency 07/11/2023    Vomiting 11/15/2023    VOMITING HEADACHE BACK ACHE     History reviewed. No pertinent surgical history.  (Not in a hospital admission)    Patient has no known allergies.  Social History     Tobacco Use    Smoking status: Never    Smokeless tobacco: Never   Vaping Use    Vaping status: Former   Substance Use Topics    Alcohol use: Never    Drug use: Never     Family History   Problem Relation Name Age of Onset    No Known Problems Mother      No Known Problems Father      Hypertension Other      Coronary artery disease Other      Diabetes Other      Heart failure Other         Scheduled Medications:   sodium chloride, 1,000 mL, intravenous, Once         Continuous Medications:   dextrose 10 % in water (D10W), 150 mL/hr  dextrose 10 % in water (D10W), 150 mL/hr  dextrose 5%-0.45 % sodium chloride, 150 mL/hr  insulin regular, 0-50 Units/hr  sodium chloride, 250 mL/hr         PRN Medications:   PRN medications: dextrose, insulin regular    Review of Systems:  Review of Systems    Constitutional:  Positive for fatigue.   Gastrointestinal:  Positive for nausea and vomiting.        Objective   Vitals:  Most Recent:  Vitals:    12/05/24 0100   BP: 146/84   Pulse: (!) 133   Resp: 20   Temp:    SpO2: 98%       24hr Min/Max:  Temp  Min: 37.1 °C (98.7 °F)  Max: 37.1 °C (98.7 °F)  Pulse  Min: 123  Max: 133  BP  Min: 110/66  Max: 146/84  Resp  Min: 20  Max: 20  SpO2  Min: 98 %  Max: 100 %        Physical exam:    Physical Exam  Constitutional:       General: He is not in acute distress.     Appearance: Normal appearance. He is ill-appearing.   HENT:      Head: Normocephalic.      Nose: Nose normal.      Mouth/Throat:      Mouth: Mucous membranes are dry.   Eyes:      General: Lids are normal. Vision grossly intact. Gaze aligned appropriately.      Extraocular Movements: Extraocular movements intact.      Conjunctiva/sclera: Conjunctivae normal.      Pupils: Pupils are equal, round, and reactive to light.   Cardiovascular:      Rate and Rhythm: Regular rhythm. Tachycardia present.      Pulses: Normal pulses.      Heart sounds: Normal heart sounds. No murmur heard.  Pulmonary:      Effort: Pulmonary effort is normal. No respiratory distress.      Breath sounds: Normal breath sounds.   Abdominal:      General: Bowel sounds are normal. There is no distension.      Palpations: Abdomen is soft.      Tenderness: There is no abdominal tenderness. There is no guarding.   Musculoskeletal:         General: Normal range of motion.      Cervical back: Normal range of motion.      Right lower leg: No edema.      Left lower leg: No edema.   Skin:     General: Skin is warm and dry.      Capillary Refill: Capillary refill takes less than 2 seconds.   Neurological:      General: No focal deficit present.      Mental Status: He is alert and oriented to person, place, and time. Mental status is at baseline.      Cranial Nerves: Cranial nerves 2-12 are intact.      Sensory: Sensation is intact.      Motor: Motor  function is intact.      Coordination: Coordination is intact.   Psychiatric:         Mood and Affect: Mood normal.         Speech: Speech normal.         Behavior: Behavior normal. Behavior is cooperative.         Thought Content: Thought content normal.         Judgment: Judgment normal.          Lab/Radiology/Diagnostic Review:  Results for orders placed or performed during the hospital encounter of 12/04/24 (from the past 24 hours)   CBC and Auto Differential   Result Value Ref Range    WBC 12.3 (H) 4.4 - 11.3 x10*3/uL    nRBC 0.0 0.0 - 0.0 /100 WBCs    RBC 6.03 (H) 4.50 - 5.90 x10*6/uL    Hemoglobin 19.1 (H) 13.5 - 17.5 g/dL    Hematocrit 57.5 (H) 41.0 - 52.0 %    MCV 95 80 - 100 fL    MCH 31.7 26.0 - 34.0 pg    MCHC 33.2 32.0 - 36.0 g/dL    RDW 13.6 11.5 - 14.5 %    Platelets 217 150 - 450 x10*3/uL    Neutrophils % 82.4 40.0 - 80.0 %    Immature Granulocytes %, Automated 1.1 (H) 0.0 - 0.9 %    Lymphocytes % 7.9 13.0 - 44.0 %    Monocytes % 7.7 2.0 - 10.0 %    Eosinophils % 0.2 0.0 - 6.0 %    Basophils % 0.7 0.0 - 2.0 %    Neutrophils Absolute 10.09 (H) 1.20 - 7.70 x10*3/uL    Immature Granulocytes Absolute, Automated 0.13 0.00 - 0.70 x10*3/uL    Lymphocytes Absolute 0.97 (L) 1.20 - 4.80 x10*3/uL    Monocytes Absolute 0.94 0.10 - 1.00 x10*3/uL    Eosinophils Absolute 0.03 0.00 - 0.70 x10*3/uL    Basophils Absolute 0.09 0.00 - 0.10 x10*3/uL   Blood Gas Venous Full Panel   Result Value Ref Range    POCT pH, Venous 7.02 (LL) 7.33 - 7.43 pH    POCT pCO2, Venous 30 (L) 41 - 51 mm Hg    POCT pO2, Venous 47 (H) 35 - 45 mm Hg    POCT SO2, Venous 70 45 - 75 %    POCT Oxy Hemoglobin, Venous 68.5 45.0 - 75.0 %    POCT Hematocrit Calculated, Venous 58.0 (H) 41.0 - 52.0 %    POCT Sodium, Venous 132 (L) 136 - 145 mmol/L    POCT Potassium, Venous 6.1 (HH) 3.5 - 5.3 mmol/L    POCT Chloride, Venous 99 98 - 107 mmol/L    POCT Ionized Calicum, Venous 1.31 1.10 - 1.33 mmol/L    POCT Glucose, Venous 515 (HH) 74 - 99 mg/dL    POCT  Lactate, Venous 4.4 (HH) 0.4 - 2.0 mmol/L    POCT Base Excess, Venous -22.3 (L) -2.0 - 3.0 mmol/L    POCT HCO3 Calculated, Venous 7.7 (L) 22.0 - 26.0 mmol/L    POCT Hemoglobin, Venous 19.4 (H) 13.5 - 17.5 g/dL    POCT Anion Gap, Venous 31.0 (H) 10.0 - 25.0 mmol/L    Patient Temperature 37.0 degrees Celsius    FiO2 21 %   Morphology   Result Value Ref Range    RBC Morphology No significant RBC morphology present    Magnesium   Result Value Ref Range    Magnesium 1.95 1.60 - 2.40 mg/dL   Comprehensive metabolic panel   Result Value Ref Range    Glucose 423 (H) 74 - 99 mg/dL    Sodium 133 (L) 136 - 145 mmol/L    Potassium 7.5 (HH) 3.5 - 5.3 mmol/L    Chloride 102 98 - 107 mmol/L    Bicarbonate 4 (LL) 21 - 32 mmol/L    Anion Gap 35 (H) 10 - 20 mmol/L    Urea Nitrogen 20 6 - 23 mg/dL    Creatinine 1.34 (H) 0.50 - 1.30 mg/dL    eGFR 70 >60 mL/min/1.73m*2    Calcium 9.0 8.6 - 10.3 mg/dL    Albumin 4.5 3.4 - 5.0 g/dL    Alkaline Phosphatase 108 33 - 120 U/L    Total Protein 7.4 6.4 - 8.2 g/dL    AST 17 9 - 39 U/L    Bilirubin, Total 0.4 0.0 - 1.2 mg/dL    ALT 12 10 - 52 U/L   Lipase   Result Value Ref Range    Lipase 11 9 - 82 U/L   Beta Hydroxybutyrate   Result Value Ref Range    Beta-Hydroxybutyrate 12.17 (H) 0.02 - 0.27 mmol/L   Blood Gas Lactic Acid, Venous   Result Value Ref Range    POCT Lactate, Venous 4.0 (HH) 0.4 - 2.0 mmol/L   Phosphorus   Result Value Ref Range    Phosphorus 4.4 2.5 - 4.9 mg/dL   POCT GLUCOSE   Result Value Ref Range    POCT Glucose 412 (H) 74 - 99 mg/dL   POCT GLUCOSE   Result Value Ref Range    POCT Glucose 391 (H) 74 - 99 mg/dL   Urinalysis with Reflex Culture and Microscopic   Result Value Ref Range    Color, Urine Light-Yellow Light-Yellow, Yellow, Dark-Yellow    Appearance, Urine Clear Clear    Specific Gravity, Urine 1.029 1.005 - 1.035    pH, Urine 5.5 5.0, 5.5, 6.0, 6.5, 7.0, 7.5, 8.0    Protein, Urine 30 (1+) (A) NEGATIVE, 10 (TRACE), 20 (TRACE) mg/dL    Glucose, Urine OVER (4+) (A)  Normal mg/dL    Blood, Urine NEGATIVE NEGATIVE    Ketones, Urine OVER (4+) (A) NEGATIVE mg/dL    Bilirubin, Urine NEGATIVE NEGATIVE    Urobilinogen, Urine Normal Normal mg/dL    Nitrite, Urine NEGATIVE NEGATIVE    Leukocyte Esterase, Urine NEGATIVE NEGATIVE   Urinalysis Microscopic   Result Value Ref Range    WBC, Urine NONE 1-5, NONE /HPF    RBC, Urine 1-2 NONE, 1-2, 3-5 /HPF    Bacteria, Urine 1+ (A) NONE SEEN /HPF    Mucus, Urine FEW Reference range not established. /LPF   POCT GLUCOSE   Result Value Ref Range    POCT Glucose 258 (H) 74 - 99 mg/dL   POCT GLUCOSE   Result Value Ref Range    POCT Glucose 263 (H) 74 - 99 mg/dL   POCT GLUCOSE   Result Value Ref Range    POCT Glucose 226 (H) 74 - 99 mg/dL     ECG 12 lead    Result Date: 11/16/2024  Sinus tachycardia Borderline prolonged QT interval See ED provider note for full interpretation and clinical correlation Confirmed by Nata Hancock ([id number]) on 11/16/2024 2:47:31 PM    ECG 12 lead    Result Date: 11/15/2024  Sinus rhythm ST elev, probable normal early repol pattern See ED provider note for full interpretation and clinical correlation Confirmed by Jamie North ([id number]) on 11/15/2024 12:30:36 PM    XR chest 1 view    Result Date: 11/7/2024  Interpreted By:  Mark Murillo, STUDY: XR CHEST 1 VIEW;  11/7/2024 9:45 pm   INDICATION: Signs/Symptoms:sob.     COMPARISON: 09/18/2024.   ACCESSION NUMBER(S): [id number]   ORDERING CLINICIAN: MARC MIDDLETON   FINDINGS: AP radiograph of the chest was provided.   Leads overlie the chest, partially obscuring the field-of-view.   CARDIOMEDIASTINAL SILHOUETTE: Cardiomediastinal silhouette is normal in size and configuration.   LUNGS: Lungs are clear. No pleural effusion or pneumothorax.   ABDOMEN: No remarkable upper abdominal findings.   BONES: No acute osseous changes.       1.  No evidence of acute cardiopulmonary process.       MACRO: None   Signed by: Mark Murillo 11/7/2024 10:13 PM Dictation workstation:    [id number]      Assessment/Plan   Principal Problem:    Type 1 diabetes mellitus with ketoacidosis without coma    DKA with type 1 diabetes mellitus with poor compliance  -Presented with nausea, vomiting, and fatigue and found to be in DKA.  -Blood glucose 423 on presentation  -BUN 20  -Creatinine 1.34, baseline creatinine ~0.8  -Potassium 7.5 (mild hemolysis)  -Bicarbonate 4  -Anion gap 35  -Lactate  4.4>>4>>3.6  -Beta hydroxybutyrate 12.17  -VBG: pH 7.02, pCO2 30, HCO3 calculated 7.7  -Received 1 L LR IV fluid bolus in ED  -Give additional 1 L normal saline IV fluid bolus  -IV fluids per DKA protocol  -Insulin infusion per DKA protocol  -POCT glucose checks hourly while on insulin infusion  -Follow BMP, phosphorus, magnesium, and VBG full panel every 4 hours while on insulin infusion  -Replete electrolytes as necessary  -Hypoglycemia protocol as needed  -NPO while on insulin infusion  -Monitor intake and output  - Will bridge off insulin infusion once anion gap less than 15 and bicarbonate greater than 15  -Endocrinology consult, input appreciated     HAGMA, elevated lactic acid  -Presented with nausea, vomiting, and fatigue and found to be in DKA.  -Blood glucose 423 on presentation  -BUN 20  -Creatinine 1.34, baseline creatinine ~0.8  -Potassium 7.5 (mild hemolysis)  -Bicarbonate 4  -Anion gap 35  -Lactate  4.4>>4>>3.6  -Beta hydroxybutyrate 12.17  -VBG: pH 7.02, pCO2 30, HCO3 calculated 7.7  -Received 1 L LR IV fluid bolus in ED  -Give 1 L normal saline IV fluid bolus  -IV fluids per DKA protocol  -Insulin infusion per DKA protocol  -POCT glucose check hourly while on insulin infusion  -Follow BMP, phosphorus, magnesium, and VBG full panel every 4 hours while on insulin infusion  -Replete electrolytes as necessary  -NPO while on insulin infusion  -Monitor intake and output  -Endocrinology consult, input appreciated     MAYA  -Presented with nausea, vomiting, and  fatigue and found to be in DKA  -Blood glucose  423 on presentation  -BUN 20  -Creatinine 1.34, baseline creatinine ~0.8  -Potassium 7.5 (mild hemolysis)  -Bicarbonate 4  -Anion gap 35  -VBG:   -Suspect likely prerenal in setting of DKA  -Received 1 L LR IV fluid bolus in ED  -Give additional 1 L normal saline IV fluid bolus  -IV fluids per DKA protocol  -Insulin infusion per DKA protocol  -Follow-up BMP, phosphorus, magnesium, and VBG full panel every 4 hours on insulin infusion  -Replete electrolytes as necessary  -Monitor intake and output  -Avoid nephrotoxic agents as able     Leukocytosis  -Presented with nausea, vomiting, and fatigue  -WBC 12.3  -Lactate 4.4>>4>>3.6  -Suspect likely reactionary in setting of DKA and dehydration   -Received 1 L LR IV fluid bolus in ED  -Continue IV fluids and insulin infusion per DKA protocol  -UA with reflex microscopic and culture negative for UTI  -Follow CBC and trend fevers  -Will hold off on antibiotic therapy given no grossly evident etiology of infection       I spent 40 minutes of cumulative critical care time with the patient.  Greater than 50% of that time was spent in the direct collaboration and or coordination of care of the patient.     Dragon dictation software was used to dictate this note and thus there may be minor errors in translation/transcription including garbled speech or misspellings. Please contact for clarification if needed.

## 2024-12-05 NOTE — H&P
"      [facility]    Hospital Medicine History & Physical     Assessment & Plan     ASSESSMENT     34M with history of IDDM Type I with frequent admissions for diabetic ketoacidosis presents with fatigue and generalized weakness and found to be in DKA.     PLAN     Diabetic Ketoacidosis, Recurrent  -Presents with fatigue and generalized weakness, initial pH 7.02 AG 35  -Has frequent DKA events, doesn't take short-acting insulin because he doesn't feel good when glucose is trending down  -However, continuous glucose monitor to indicate he is has not been under 300 in weeks  -No evidence of infection, denies drug use  -DKA protocol with insulin infusion, IVF, and serial labs  -Endocrinology consult, may benefit from insulin pump (was on this in the past, I believe discontinued because lost insurance)     Other Issues  -Lactic acidosis: Suspect due to DKA.  No evidence of infection.  IVF and trend  -Leukocytosis: Suspect stress response in setting of DKA. Will obtain UA to round out infectious workup  -Acute Renal Failure: Suspect pre-renal in setting of DKA, IVF per above and trend     DVT Prophy  -Heparin     Disposition  -ICU        Azeem Pack MD    History of Present Illness     Ed Sanon is a 34 y.o. with history of IDDM Type I with frequent admissions for diabetic ketoacidosis presents with fatigue and generalized weakness. Patient was in his normal state of health until yesterday when he woke up and started experiencing nausea, vomiting, and generalized abdominal pain.  Denies diarrhea or constipation issues.  Says he gets changes in his vision where he can feel that is going into DKA.  History of recurrent admissions for DKA.  Has a continuous glucose monitor.  Refuses to take his short acting insulin because he \" does not feel good\" when his blood sugars are getting too low.  However, reviewed his glucose trended in the has not been under 300 and weeks.  In the ED, " tachycardic but other VSS.  Potassium 7.5. glucose 423.  Creatinine 1.24.  pH 7.02.  Started on insulin drip and admitted to medicine.    Review of Systems     A Comprehensive greater than 10 system review of systems was carried out.  Pertinent positives and negatives are noted above.  Otherwise negative for contributory information.     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    DIABETIC KETO ACIDOSIS    DKA (diabetic ketoacidosis) (Multi) 07/11/2023    DKA, type 1, not at goal 04/21/2023    DM2 (diabetes mellitus, type 2) (Multi) 11/15/2023    DIABETES    Elevated blood sugar 11/15/2023    ELEVATED BLOOD SUGAR/ 375 LAST CHECK    Elevated blood-pressure reading, without diagnosis of hypertension 09/09/2020    Elevated blood pressure reading    Fatigue 07/11/2023    Generalized anxiety disorder 07/11/2023    Microalbuminuria due to type 1 diabetes mellitus (Multi) 07/11/2023    Personal history of COVID-19 04/02/2023    Poorly controlled diabetes mellitus (Multi) 07/11/2023    Type 1 diabetes mellitus 07/11/2023    Type 1 diabetes mellitus with hyperglycemia (Multi) 02/25/2019    Vitamin B12 deficiency 07/11/2023    Vitamin D deficiency 07/11/2023    Vomiting 11/15/2023    VOMITING HEADACHE BACK ACHE     Medications   (Not in a hospital admission)       Past Surgical History   History reviewed. No pertinent surgical history.     Family History   Reviewed and non-contributory to presenting complaints    Allergies   No Known Allergies    Social History     Social History     Tobacco Use    Smoking status: Never    Smokeless tobacco: Never   Substance Use Topics    Alcohol use: Never     Physical Exam   Blood pressure 146/84, pulse (!) 133, temperature 37.1  "°C (98.7 °F), temperature source Oral, resp. rate 20, height 1.778 m (5' 10\"), weight 68 kg (150 lb), SpO2 98%.    General: Ill appearing, cooperative with exam, in NAD.  HEENT: Atraumatic. No erythema in posterior pharynx.  Lymph: No cervical or inguinal lymphadenopathy.  Cardiac: RRR. No murmurs.  Lungs: CTAB. Nl WOB.  Abd: Non-tender. No rebound or gaurding. Nl bowel sounds.  Ext: No edema. 2+ pulses.  Skin: No rashes, abrasions, or contusions.  Psych: A&Ox3. Nl affect.  Neuro: 5/5 strength. Sensation intact.    Labs & Imaging     Reviewed and Pertinent results discussed in assessment and plan.      "

## 2024-12-05 NOTE — ED NOTES
Pt arrives to ED via car from home    Code Status:  Full Code    HPI     Chief Complaint   Patient presents with    Hyperglycemia    Vomiting     Pt states blood sugar elevated today with vomiting Hx DKA       /74   Pulse (!) 120   Temp 37.1 °C (98.7 °F) (Oral)   Resp 14   Wt 68 kg (150 lb)   SpO2 99%     Alyce Coma Scale Score: 15      LDA:   Peripheral IV 12/04/24 20 G Left;Proximal;Anterior Forearm (Active)   Placement Date/Time: 12/04/24 2250   Hand Hygiene Completed: Yes  Size (Gauge): 20 G  Orientation: Left;Proximal;Anterior  Location: Forearm  Site Prep: Chlorhexidine   Technique: Ultrasound guidance  Placed by: Anuja   Number of days: 0       Peripheral IV 12/05/24 20 G Right;Anterior Forearm (Active)   Placement Date/Time: 12/05/24 0353   Hand Hygiene Completed: Yes  Size (Gauge): 20 G  Orientation: Right;Anterior  Location: Forearm  Site Prep: Chlorhexidine   Technique: Ultrasound guidance  Placed by: ICU RN  Insertion attempts: 1   Number of days: 0        BACKGROUND  Past Medical History:   Diagnosis Date    Abdominal pain 04/21/2023    Anxiety and depression 08/08/2021    Contact with and (suspected) exposure to covid-19 04/21/2023    COVID-19 virus infection 07/11/2023    Diabetes mellitus (Multi)     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    DIABETIC KETO ACIDOSIS    DKA (diabetic ketoacidosis) (Multi) 07/11/2023    DKA, type 1, not at goal 04/21/2023    DM2 (diabetes mellitus, type 2) (Multi) 11/15/2023    DIABETES    Elevated blood sugar 11/15/2023    ELEVATED BLOOD SUGAR/ 375 LAST CHECK    Elevated blood-pressure reading, without diagnosis of hypertension 09/09/2020    Elevated blood pressure reading    Fatigue 07/11/2023    Generalized anxiety disorder 07/11/2023    Microalbuminuria due to type 1 diabetes mellitus (Multi) 07/11/2023    Personal history of COVID-19 04/02/2023    Poorly controlled diabetes mellitus (Multi) 07/11/2023    Type 1 diabetes mellitus 07/11/2023    Type 1  "diabetes mellitus with hyperglycemia (Multi) 02/25/2019    Vitamin B12 deficiency 07/11/2023    Vitamin D deficiency 07/11/2023    Vomiting 11/15/2023    VOMITING HEADACHE BACK ACHE     History reviewed. No pertinent surgical history.  No current facility-administered medications on file prior to encounter.     Current Outpatient Medications on File Prior to Encounter   Medication Sig Dispense Refill    Guardian 4 Glucose Sensor device Change insulin sensor every 7 days as directed, linked to Minimed insulin pump, check with endocrinology for updated regimen (Patient not taking: Reported on 9/29/2024) 13 each 2    insulin lispro (HumaLOG) 100 unit/mL injection Inject 6 Units under the skin 3 times daily (morning, midday, late afternoon). Take as directed per insulin instructions.      insulin NPH, Isophane, (HumuLIN N,NovoLIN N) 100 unit/mL (3 mL) injection Inject 18 Units under the skin 2 times a day before meals. Take as directed per insulin instructions. 60 each 0    lancets misc Inject 1 Units under the skin 2 times a day. Sliding scale      lisinopril 5 mg tablet Take 1 tablet (5 mg) by mouth once daily. (Patient not taking: Reported on 11/8/2024)      pen needle, diabetic 32 gauge x 5/32\" needle Use as instructed 4 times daily          ASSESSMENT  Diagnoses as of 12/05/24 0810   Type 1 diabetes mellitus with ketoacidosis without coma   Nausea and vomiting, unspecified vomiting type       Medications Currently Running:  dextrose 10 % in water (D10W), 150 mL/hr  dextrose 10 % in water (D10W), 150 mL/hr  dextrose 5%-0.45 % sodium chloride, 150 mL/hr, Last Rate: 150 mL/hr (12/05/24 0427)  insulin regular, 0-50 Units/hr, Last Rate: 7 Units/hr (12/05/24 0428)  sodium chloride, 250 mL/hr         Medications Given:  ED Medication Administration from 12/04/2024 2131 to 12/05/2024 0810         Date/Time Order Dose Route Action Action by     12/04/2024 2307 EST lactated Ringer's bolus 1,000 mL 1,000 mL intravenous New " Bag Langenfeld, A     12/05/2024 0006 EST lactated Ringer's bolus 1,000 mL 0 mL intravenous Stopped Langenfeld, A     12/05/2024 0023 EST insulin regular 100 unit/100 mL (1 unit/mL) in 0.9 % NaCl infusion 7 Units/hr intravenous New Bag Langenfeld, A     12/05/2024 0118 EST lactated Ringer's infusion 250 mL/hr intravenous New Bag Langenfeld, A     12/05/2024 0136 EST insulin regular (HumuLIN, NovoLIN) bolus from bag 7 Units 7 Units intravenous Bolus from Bag Langenfeld, A     12/05/2024 0308 EST lactated Ringer's infusion 0 mL/hr intravenous Stopped Langenfeld, A     12/05/2024 0359 EST insulin regular 100 unit/100 mL (1 unit/mL) in 0.9 % NaCl infusion 0 Units/hr intravenous Stopped Langenfeld, A     12/05/2024 0427 EST dextrose 5%-0.45 % sodium chloride infusion 150 mL/hr intravenous New Bag Langenfeld, A     12/05/2024 0428 EST insulin regular 100 unit/100 mL (1 unit/mL) in 0.9 % NaCl infusion 7 Units/hr intravenous New Bag Langenfeld, A     12/05/2024 0432 EST sodium chloride 0.9 % bolus 1,000 mL 1,000 mL intravenous New Bag Langenfeld, A     12/05/2024 0434 EST dextrose 10 % in water (D10W) infusion 150 mL/hr intravenous Not Given Langenfeld, A     12/05/2024 0434 EST dextrose 10 % in water (D10W) infusion 150 mL/hr intravenous Not Given Langenfeld, A     12/05/2024 0434 EST sodium chloride 0.45 % infusion 250 mL/hr intravenous Not Given Langenfeld, A     12/05/2024 0722 EST sodium chloride 0.9 % bolus 1,000 mL 0 mL intravenous Stopped English, M                 RESULTS    Imaging:  No orders to display      }  Labs ::99  Abnormal Labs Reviewed   CBC WITH AUTO DIFFERENTIAL - Abnormal; Notable for the following components:       Result Value    WBC 12.3 (*)     RBC 6.03 (*)     Hemoglobin 19.1 (*)     Hematocrit 57.5 (*)     Immature Granulocytes %, Automated 1.1 (*)     Neutrophils Absolute 10.09 (*)     Lymphocytes Absolute 0.97 (*)     All other components within normal limits   COMPREHENSIVE METABOLIC PANEL -  Abnormal; Notable for the following components:    Glucose 423 (*)     Sodium 133 (*)     Potassium 7.5 (*)     Bicarbonate 4 (*)     Anion Gap 35 (*)     Creatinine 1.34 (*)     All other components within normal limits   BLOOD GAS VENOUS FULL PANEL - Abnormal; Notable for the following components:    POCT pH, Venous 7.02 (*)     POCT pCO2, Venous 30 (*)     POCT pO2, Venous 47 (*)     POCT Hematocrit Calculated, Venous 58.0 (*)     POCT Sodium, Venous 132 (*)     POCT Potassium, Venous 6.1 (*)     POCT Glucose, Venous 515 (*)     POCT Lactate, Venous 4.4 (*)     POCT Base Excess, Venous -22.3 (*)     POCT HCO3 Calculated, Venous 7.7 (*)     POCT Hemoglobin, Venous 19.4 (*)     POCT Anion Gap, Venous 31.0 (*)     All other components within normal limits   BETA HYDROXYBUTYRATE - Abnormal; Notable for the following components:    Beta-Hydroxybutyrate 12.17 (*)     All other components within normal limits    Narrative:     The beta-hydroxybutyrate test performance characteristics have been validated by  [facility]. This test has not been approved by the FDA; however,such approval is not necessary.     URINALYSIS WITH REFLEX CULTURE AND MICROSCOPIC - Abnormal; Notable for the following components:    Protein, Urine 30 (1+) (*)     Glucose, Urine OVER (4+) (*)     Ketones, Urine OVER (4+) (*)     All other components within normal limits    Narrative:     OVER is reported when the result is greater than the clinically reportable range.   BLOOD GAS LACTIC ACID, VENOUS - Abnormal; Notable for the following components:    POCT Lactate, Venous 4.0 (*)     All other components within normal limits   BASIC METABOLIC PANEL - Abnormal; Notable for the following components:    Glucose 212 (*)     Bicarbonate 8 (*)     Anion Gap 27 (*)     All other components within normal limits   BLOOD GAS VENOUS FULL PANEL - Abnormal; Notable for the following components:    POCT pH, Venous 7.13  (*)     POCT pCO2, Venous 25 (*)     POCT Hematocrit Calculated, Venous 54.0 (*)     POCT Sodium, Venous 135 (*)     POCT Ionized Calicum, Venous 1.35 (*)     POCT Glucose, Venous 254 (*)     POCT Lactate, Venous 3.6 (*)     POCT Base Excess, Venous -19.1 (*)     POCT HCO3 Calculated, Venous 8.3 (*)     POCT Hemoglobin, Venous 18.0 (*)     POCT Anion Gap, Venous 28.0 (*)     All other components within normal limits   BLOOD GAS VENOUS - Abnormal; Notable for the following components:    POCT pH, Venous 7.26 (*)     POCT pCO2, Venous 36 (*)     POCT pO2, Venous 52 (*)     POCT SO2, Venous 85 (*)     POCT Oxy Hemoglobin, Venous 83.2 (*)     POCT Base Excess, Venous -10.0 (*)     POCT HCO3 Calculated, Venous 16.2 (*)     All other components within normal limits   POCT GLUCOSE - Abnormal; Notable for the following components:    POCT Glucose 391 (*)     All other components within normal limits   POCT GLUCOSE - Abnormal; Notable for the following components:    POCT Glucose 412 (*)     All other components within normal limits   POCT GLUCOSE - Abnormal; Notable for the following components:    POCT Glucose 263 (*)     All other components within normal limits   POCT GLUCOSE - Abnormal; Notable for the following components:    POCT Glucose 258 (*)     All other components within normal limits   POCT GLUCOSE - Abnormal; Notable for the following components:    POCT Glucose 226 (*)     All other components within normal limits   POCT GLUCOSE - Abnormal; Notable for the following components:    POCT Glucose 212 (*)     All other components within normal limits   POCT GLUCOSE - Abnormal; Notable for the following components:    POCT Glucose 182 (*)     All other components within normal limits   POCT GLUCOSE - Abnormal; Notable for the following components:    POCT Glucose 185 (*)     All other components within normal limits   URINALYSIS MICROSCOPIC WITH REFLEX CULTURE - Abnormal; Notable for the following components:     Bacteria, Urine 1+ (*)     All other components within normal limits            Report to Anuja CHU in ICU.     Raysa Berrios RN  12/05/24 2864

## 2024-12-05 NOTE — CONSULTS
Inpatient consult to Endocrinology  Consult performed by: Lori Packer MD  Consult ordered by: Livia Wolf, APRN-CNP  Reason for consult: DKA          Reason For Consult  DKA    History Of Present Illness  Ed Sanon is a 34 y.o. male presenting with fatigue and weakness.  He woke up yesterday with nausea, vomiting, and generalized abdominal pain.  He was found to be hemodynamically stable.  Initial lab data revealed a glucose value of  423mg/dL, a bicarbonate of 4, anion gap of 35, beta hydroxybutyrate of 12.17 and a  pH of 7.02.  He was started on an insulin infusion.  He is awaiting admission to the ICU.   and admitted to medicine.     His home regimen consists of:  NPH 18 units subcutaneous twice daily    He states that he was taking some prandial insulin but stopped as 1 unit would drop him by 200mg/dL.       His A1C was 13.2% as of November 2024.     He is currently on an insulin infusion at 7 units/hr.    Past Medical History  He has a past medical history of Abdominal pain (04/21/2023), Anxiety and depression (08/08/2021), Contact with and (suspected) exposure to covid-19 (04/21/2023), COVID-19 virus infection (07/11/2023), Diabetes mellitus (Multi), Diabetic acetonemia (Multi) (11/15/2023), DKA (diabetic ketoacidosis) (Multi) (07/11/2023), DKA, type 1, not at goal (04/21/2023), DM2 (diabetes mellitus, type 2) (Multi) (11/15/2023), Elevated blood sugar (11/15/2023), Elevated blood-pressure reading, without diagnosis of hypertension (09/09/2020), Fatigue (07/11/2023), Generalized anxiety disorder (07/11/2023), Microalbuminuria due to type 1 diabetes mellitus (Multi) (07/11/2023), Personal history of COVID-19 (04/02/2023), Poorly controlled diabetes mellitus (Multi) (07/11/2023), Type 1 diabetes mellitus (07/11/2023), Type 1 diabetes mellitus with hyperglycemia (Multi) (02/25/2019), Vitamin B12 deficiency (07/11/2023), Vitamin D deficiency (07/11/2023), and Vomiting  "(11/15/2023).    Surgical History  He has no past surgical history on file.     Social History  He reports that he has never smoked. He has never used smokeless tobacco. He reports that he does not drink alcohol and does not use drugs.    Family History  Family History   Problem Relation Name Age of Onset    No Known Problems Mother      No Known Problems Father      Hypertension Other      Coronary artery disease Other      Diabetes Other      Heart failure Other          Allergies  Patient has no known allergies.    Review of Systems   Gastrointestinal:  Negative for abdominal pain, nausea and vomiting.   Psychiatric/Behavioral:  Negative for confusion.    All other systems reviewed and are negative.       Physical Exam  Vitals and nursing note reviewed.   Constitutional:       General: He is not in acute distress.     Appearance: Normal appearance. He is normal weight.   HENT:      Head: Normocephalic and atraumatic.      Nose: Nose normal.      Mouth/Throat:      Mouth: Mucous membranes are moist.   Eyes:      Extraocular Movements: Extraocular movements intact.   Cardiovascular:      Rate and Rhythm: Regular rhythm. Tachycardia present.   Pulmonary:      Effort: Pulmonary effort is normal.   Musculoskeletal:         General: Normal range of motion.   Neurological:      Mental Status: He is alert and oriented to person, place, and time.   Psychiatric:         Mood and Affect: Mood normal.          Last Recorded Vitals  Blood pressure 112/74, pulse (!) 120, temperature 37.1 °C (98.7 °F), temperature source Oral, resp. rate 14, height 1.778 m (5' 10\"), weight 68 kg (150 lb), SpO2 99%.    Relevant Results  Scheduled medications     Continuous medications  dextrose 10 % in water (D10W), 150 mL/hr  dextrose 10 % in water (D10W), 150 mL/hr  dextrose 5%-0.45 % sodium chloride, 150 mL/hr, Last Rate: 150 mL/hr (12/05/24 0427)  insulin regular, 0-50 Units/hr, Last Rate: 7 Units/hr (12/05/24 0428)  sodium chloride, 250 " mL/hr      PRN medications  PRN medications: dextrose, insulin regular    Results for orders placed or performed during the hospital encounter of 12/04/24 (from the past 24 hours)   CBC and Auto Differential   Result Value Ref Range    WBC 12.3 (H) 4.4 - 11.3 x10*3/uL    nRBC 0.0 0.0 - 0.0 /100 WBCs    RBC 6.03 (H) 4.50 - 5.90 x10*6/uL    Hemoglobin 19.1 (H) 13.5 - 17.5 g/dL    Hematocrit 57.5 (H) 41.0 - 52.0 %    MCV 95 80 - 100 fL    MCH 31.7 26.0 - 34.0 pg    MCHC 33.2 32.0 - 36.0 g/dL    RDW 13.6 11.5 - 14.5 %    Platelets 217 150 - 450 x10*3/uL    Neutrophils % 82.4 40.0 - 80.0 %    Immature Granulocytes %, Automated 1.1 (H) 0.0 - 0.9 %    Lymphocytes % 7.9 13.0 - 44.0 %    Monocytes % 7.7 2.0 - 10.0 %    Eosinophils % 0.2 0.0 - 6.0 %    Basophils % 0.7 0.0 - 2.0 %    Neutrophils Absolute 10.09 (H) 1.20 - 7.70 x10*3/uL    Immature Granulocytes Absolute, Automated 0.13 0.00 - 0.70 x10*3/uL    Lymphocytes Absolute 0.97 (L) 1.20 - 4.80 x10*3/uL    Monocytes Absolute 0.94 0.10 - 1.00 x10*3/uL    Eosinophils Absolute 0.03 0.00 - 0.70 x10*3/uL    Basophils Absolute 0.09 0.00 - 0.10 x10*3/uL   Blood Gas Venous Full Panel   Result Value Ref Range    POCT pH, Venous 7.02 (LL) 7.33 - 7.43 pH    POCT pCO2, Venous 30 (L) 41 - 51 mm Hg    POCT pO2, Venous 47 (H) 35 - 45 mm Hg    POCT SO2, Venous 70 45 - 75 %    POCT Oxy Hemoglobin, Venous 68.5 45.0 - 75.0 %    POCT Hematocrit Calculated, Venous 58.0 (H) 41.0 - 52.0 %    POCT Sodium, Venous 132 (L) 136 - 145 mmol/L    POCT Potassium, Venous 6.1 (HH) 3.5 - 5.3 mmol/L    POCT Chloride, Venous 99 98 - 107 mmol/L    POCT Ionized Calicum, Venous 1.31 1.10 - 1.33 mmol/L    POCT Glucose, Venous 515 (HH) 74 - 99 mg/dL    POCT Lactate, Venous 4.4 (HH) 0.4 - 2.0 mmol/L    POCT Base Excess, Venous -22.3 (L) -2.0 - 3.0 mmol/L    POCT HCO3 Calculated, Venous 7.7 (L) 22.0 - 26.0 mmol/L    POCT Hemoglobin, Venous 19.4 (H) 13.5 - 17.5 g/dL    POCT Anion Gap, Venous 31.0 (H) 10.0 - 25.0  mmol/L    Patient Temperature 37.0 degrees Celsius    FiO2 21 %   Morphology   Result Value Ref Range    RBC Morphology No significant RBC morphology present    Magnesium   Result Value Ref Range    Magnesium 1.95 1.60 - 2.40 mg/dL   Comprehensive metabolic panel   Result Value Ref Range    Glucose 423 (H) 74 - 99 mg/dL    Sodium 133 (L) 136 - 145 mmol/L    Potassium 7.5 (HH) 3.5 - 5.3 mmol/L    Chloride 102 98 - 107 mmol/L    Bicarbonate 4 (LL) 21 - 32 mmol/L    Anion Gap 35 (H) 10 - 20 mmol/L    Urea Nitrogen 20 6 - 23 mg/dL    Creatinine 1.34 (H) 0.50 - 1.30 mg/dL    eGFR 70 >60 mL/min/1.73m*2    Calcium 9.0 8.6 - 10.3 mg/dL    Albumin 4.5 3.4 - 5.0 g/dL    Alkaline Phosphatase 108 33 - 120 U/L    Total Protein 7.4 6.4 - 8.2 g/dL    AST 17 9 - 39 U/L    Bilirubin, Total 0.4 0.0 - 1.2 mg/dL    ALT 12 10 - 52 U/L   Lipase   Result Value Ref Range    Lipase 11 9 - 82 U/L   Beta Hydroxybutyrate   Result Value Ref Range    Beta-Hydroxybutyrate 12.17 (H) 0.02 - 0.27 mmol/L   Blood Gas Lactic Acid, Venous   Result Value Ref Range    POCT Lactate, Venous 4.0 (HH) 0.4 - 2.0 mmol/L   Phosphorus   Result Value Ref Range    Phosphorus 4.4 2.5 - 4.9 mg/dL   POCT GLUCOSE   Result Value Ref Range    POCT Glucose 412 (H) 74 - 99 mg/dL   POCT GLUCOSE   Result Value Ref Range    POCT Glucose 391 (H) 74 - 99 mg/dL   Urinalysis with Reflex Culture and Microscopic   Result Value Ref Range    Color, Urine Light-Yellow Light-Yellow, Yellow, Dark-Yellow    Appearance, Urine Clear Clear    Specific Gravity, Urine 1.029 1.005 - 1.035    pH, Urine 5.5 5.0, 5.5, 6.0, 6.5, 7.0, 7.5, 8.0    Protein, Urine 30 (1+) (A) NEGATIVE, 10 (TRACE), 20 (TRACE) mg/dL    Glucose, Urine OVER (4+) (A) Normal mg/dL    Blood, Urine NEGATIVE NEGATIVE    Ketones, Urine OVER (4+) (A) NEGATIVE mg/dL    Bilirubin, Urine NEGATIVE NEGATIVE    Urobilinogen, Urine Normal Normal mg/dL    Nitrite, Urine NEGATIVE NEGATIVE    Leukocyte Esterase, Urine NEGATIVE NEGATIVE    Urinalysis Microscopic   Result Value Ref Range    WBC, Urine NONE 1-5, NONE /HPF    RBC, Urine 1-2 NONE, 1-2, 3-5 /HPF    Bacteria, Urine 1+ (A) NONE SEEN /HPF    Mucus, Urine FEW Reference range not established. /LPF   POCT GLUCOSE   Result Value Ref Range    POCT Glucose 258 (H) 74 - 99 mg/dL   POCT GLUCOSE   Result Value Ref Range    POCT Glucose 263 (H) 74 - 99 mg/dL   Basic metabolic panel   Result Value Ref Range    Glucose 212 (H) 74 - 99 mg/dL    Sodium 138 136 - 145 mmol/L    Potassium 4.0 3.5 - 5.3 mmol/L    Chloride 107 98 - 107 mmol/L    Bicarbonate 8 (LL) 21 - 32 mmol/L    Anion Gap 27 (H) 10 - 20 mmol/L    Urea Nitrogen 19 6 - 23 mg/dL    Creatinine 1.28 0.50 - 1.30 mg/dL    eGFR 74 >60 mL/min/1.73m*2    Calcium 9.1 8.6 - 10.3 mg/dL   Phosphorus   Result Value Ref Range    Phosphorus 3.0 2.5 - 4.9 mg/dL   Magnesium   Result Value Ref Range    Magnesium 1.94 1.60 - 2.40 mg/dL   Blood Gas Venous Full Panel   Result Value Ref Range    POCT pH, Venous 7.13 (LL) 7.33 - 7.43 pH    POCT pCO2, Venous 25 (L) 41 - 51 mm Hg    POCT pO2, Venous 41 35 - 45 mm Hg    POCT SO2, Venous 69 45 - 75 %    POCT Oxy Hemoglobin, Venous 67.6 45.0 - 75.0 %    POCT Hematocrit Calculated, Venous 54.0 (H) 41.0 - 52.0 %    POCT Sodium, Venous 135 (L) 136 - 145 mmol/L    POCT Potassium, Venous 4.1 3.5 - 5.3 mmol/L    POCT Chloride, Venous 103 98 - 107 mmol/L    POCT Ionized Calicum, Venous 1.35 (H) 1.10 - 1.33 mmol/L    POCT Glucose, Venous 254 (H) 74 - 99 mg/dL    POCT Lactate, Venous 3.6 (H) 0.4 - 2.0 mmol/L    POCT Base Excess, Venous -19.1 (L) -2.0 - 3.0 mmol/L    POCT HCO3 Calculated, Venous 8.3 (L) 22.0 - 26.0 mmol/L    POCT Hemoglobin, Venous 18.0 (H) 13.5 - 17.5 g/dL    POCT Anion Gap, Venous 28.0 (H) 10.0 - 25.0 mmol/L    Patient Temperature 37.0 degrees Celsius    FiO2 21 %   POCT GLUCOSE   Result Value Ref Range    POCT Glucose 226 (H) 74 - 99 mg/dL   POCT GLUCOSE   Result Value Ref Range    POCT Glucose 212 (H) 74 -  99 mg/dL   POCT GLUCOSE   Result Value Ref Range    POCT Glucose 182 (H) 74 - 99 mg/dL   POCT GLUCOSE   Result Value Ref Range    POCT Glucose 185 (H) 74 - 99 mg/dL   BLOOD GAS VENOUS   Result Value Ref Range    POCT pH, Venous 7.26 (L) 7.33 - 7.43 pH    POCT pCO2, Venous 36 (L) 41 - 51 mm Hg    POCT pO2, Venous 52 (H) 35 - 45 mm Hg    POCT SO2, Venous 85 (H) 45 - 75 %    POCT Oxy Hemoglobin, Venous 83.2 (H) 45.0 - 75.0 %    POCT Base Excess, Venous -10.0 (L) -2.0 - 3.0 mmol/L    POCT HCO3 Calculated, Venous 16.2 (L) 22.0 - 26.0 mmol/L    Patient Temperature 37.0 degrees Celsius    FiO2 21 %         ECG 12 lead    Result Date: 11/16/2024  Sinus tachycardia Borderline prolonged QT interval See ED provider note for full interpretation and clinical correlation Confirmed by Nata Hancock ([id number]) on 11/16/2024 2:47:31 PM    ECG 12 lead    Result Date: 11/15/2024  Sinus rhythm ST elev, probable normal early repol pattern See ED provider note for full interpretation and clinical correlation Confirmed by Jamie North ([id number]) on 11/15/2024 12:30:36 PM    XR chest 1 view    Result Date: 11/7/2024  Interpreted By:  Mark Murillo, STUDY: XR CHEST 1 VIEW;  11/7/2024 9:45 pm   INDICATION: Signs/Symptoms:sob.     COMPARISON: 09/18/2024.   ACCESSION NUMBER(S): [id number]   ORDERING CLINICIAN: MARC MIDDLETON   FINDINGS: AP radiograph of the chest was provided.   Leads overlie the chest, partially obscuring the field-of-view.   CARDIOMEDIASTINAL SILHOUETTE: Cardiomediastinal silhouette is normal in size and configuration.   LUNGS: Lungs are clear. No pleural effusion or pneumothorax.   ABDOMEN: No remarkable upper abdominal findings.   BONES: No acute osseous changes.       1.  No evidence of acute cardiopulmonary process.       MACRO: None   Signed by: Mark Murillo 11/7/2024 10:13 PM Dictation workstation:   [id number]    Assessment/Plan     IMPRESSION:  DKA  POORLY CONTROLLED TYPE I DIABETES MELLITUS  A1C of 13.2% as of  November 2024    RECOMMENDATIONS:  To continue an insulin infusion at 7 units per hour and titrate as per protocol  Accuchecks every 1 hour  BMP every 4 hours  NPO  Hypoglycemic protocol   IV fluid management as follows:  1/2 NS for blood glucose >250mg/dL  D5W with 1/2 NS for blood glucose <250mg/dL  D10W for blood glucose <150mg/dL if anion gap is open or <70 with closed AG.   Please hold insulin for serum potassium <3.3  Will plan to bridge with NPH when the anion Gap is less than 15 and the Bicarbonate is more than 15     Thank you for the courtesy of this consult     Lori Packer MD

## 2024-12-05 NOTE — PROGRESS NOTES
12/5/24 1115   12/05/24 1115   Discharge Planning   Living Arrangements Parent   Support Systems Parent   Assistance Needed none   Type of Residence Private residence   Number of Stairs to Enter Residence 5   Number of Stairs Within Residence 0   Do you have animals or pets at home? Yes   Type of Animals or Pets 1 dog and 1 cat   Home or Post Acute Services None   Expected Discharge Disposition Home   Does the patient need discharge transport arranged? No   Intensity of Service   Intensity of Service 0-30 min     Spoke with pt and pt mother at bedside. Pt from home and lives in a house with mother. Pt appeared alert and oriented. Pt states being independent and linda use of any assistive devices. Pt states still drives and is able to obtain meds and get to appointments. Discussed recent admissions with pt. Pt states does check sugars regularly at home but pt states insulin does not work after a while. Requested further elaboration on this. Pt states sugars start to drop too much with continued use. [facility] what [facility]. Pt is agreeable to this. Referral sent. Pt aware to reach out if needs arise.   Juanita Rodríguez RN, BSN, Mehreen/ Krystal SAPP Supervisor

## 2024-12-05 NOTE — CARE PLAN
The patient's goals for the shift include      The clinical goals for the shift include Close anion gap      Problem: Pain - Adult  Goal: Verbalizes/displays adequate comfort level or baseline comfort level  Outcome: Progressing     Problem: Safety - Adult  Goal: Free from fall injury  Outcome: Progressing     Problem: Discharge Planning  Goal: Discharge to home or other facility with appropriate resources  Outcome: Progressing     Problem: Chronic Conditions and Co-morbidities  Goal: Patient's chronic conditions and co-morbidity symptoms are monitored and maintained or improved  Outcome: Progressing     Problem: Skin  Goal: Participates in plan/prevention/treatment measures  Outcome: Progressing  Flowsheets (Taken 12/5/2024 0849)  Participates in plan/prevention/treatment measures: Elevate heels  Goal: Promote/optimize nutrition  Outcome: Progressing  Flowsheets (Taken 12/5/2024 0849)  Promote/optimize nutrition: Consume > 50% meals/supplements  Goal: Promote skin healing  Outcome: Progressing  Flowsheets (Taken 12/5/2024 0849)  Promote skin healing: Turn/reposition every 2 hours/use positioning/transfer devices     Problem: Fall/Injury  Goal: Not fall by end of shift  Outcome: Progressing  Goal: Be free from injury by end of the shift  Outcome: Progressing  Goal: Verbalize understanding of personal risk factors for fall in the hospital  Outcome: Progressing  Goal: Verbalize understanding of risk factor reduction measures to prevent injury from fall in the home  Outcome: Progressing  Goal: Use assistive devices by end of the shift  Outcome: Progressing  Goal: Pace activities to prevent fatigue by end of the shift  Outcome: Progressing     Problem: Nutrition  Goal: Less than 5 days NPO/clear liquids  Outcome: Progressing  Goal: Oral intake greater than 50%  Outcome: Progressing  Goal: Oral intake greater 75%  Outcome: Progressing  Goal: Consume prescribed supplement  Outcome: Progressing  Goal: Adequate PO fluid  intake  Outcome: Progressing  Goal: Nutrition support goals are met within 48 hrs  Outcome: Progressing  Goal: Nutrition support is meeting 75% of nutrient needs  Outcome: Progressing  Goal: Tube feed tolerance  Outcome: Progressing  Goal: BG  mg/dL  Outcome: Progressing  Goal: Lab values WNL  Outcome: Progressing  Goal: Electrolytes WNL  Outcome: Progressing  Goal: Promote healing  Outcome: Progressing  Goal: Maintain stable weight  Outcome: Progressing  Goal: Reduce weight from edema/fluid  Outcome: Progressing  Goal: Gradual weight gain  Outcome: Progressing  Goal: Improve ostomy output  Outcome: Progressing     Problem: Pain  Goal: Takes deep breaths with improved pain control throughout the shift  Outcome: Progressing  Goal: Turns in bed with improved pain control throughout the shift  Outcome: Progressing  Goal: Walks with improved pain control throughout the shift  Outcome: Progressing  Goal: Performs ADL's with improved pain control throughout shift  Outcome: Progressing  Goal: Participates in PT with improved pain control throughout the shift  Outcome: Progressing  Goal: Free from opioid side effects throughout the shift  Outcome: Progressing  Goal: Free from acute confusion related to pain meds throughout the shift  Outcome: Progressing

## 2024-12-05 NOTE — ED PROVIDER NOTES
Ed Sanon is a 34 y.o. patient presenting to the ED for concern for DKA.  The patient states that he has been taking his NovoLog twice a day as prescribed.  He does not take lispro because it drops his sugar too much, sometimes 200 points.  He denies any episodes of hypoglycemia.  He had been feeling well after leaving the hospital last until this morning.  This morning he woke up feeling generally poorly with nausea and vomiting.  He is continue to feel worse throughout the day and believes that he may be in DKA now.    Additional History Obtained from: None  Limitations to History: None  ------------------------------------------------------------------------------------------------------------------------------------------  Physical Exam:  Appearance: Alert, cooperative.  Skin: Warm, dry, appropriate color for ethnicity.  Eyes: Cornea clear. No scleral icterus or injection.   ENT: Mucous membranes dry.  Pulmonary: No accessory muscle use or stridor. Clear lung sounds bilaterally without rhonchi or wheezing.   Cardiac: Heart sounds regular without murmur. B/L radial pulses full and symmetric.   Abdomen: Soft, mild diffuse tenderness.  No rebound or guarding.   Musculoskeletal: No gross deformities.   Neurological: Face symmetrical. Voice clear. Appropriately conversant.   Psychiatric: Appropriate mood and affect.    Medical Decision Making:  Chronic Medical Conditions Significantly Affecting Care:  has a past medical history of Abdominal pain (04/21/2023), Anxiety and depression (08/08/2021), Contact with and (suspected) exposure to covid-19 (04/21/2023), COVID-19 virus infection (07/11/2023), Diabetes mellitus (Multi), Diabetic acetonemia (Multi) (11/15/2023), DKA (diabetic ketoacidosis) (Multi) (07/11/2023), DKA, type 1, not at goal (04/21/2023), DM2 (diabetes mellitus, type 2) (Multi) (11/15/2023), Elevated blood sugar (11/15/2023), Elevated blood-pressure reading, without diagnosis of hypertension  (09/09/2020), Fatigue (07/11/2023), Generalized anxiety disorder (07/11/2023), Microalbuminuria due to type 1 diabetes mellitus (Multi) (07/11/2023), Personal history of COVID-19 (04/02/2023), Poorly controlled diabetes mellitus (Multi) (07/11/2023), Type 1 diabetes mellitus (07/11/2023), Type 1 diabetes mellitus with hyperglycemia (Multi) (02/25/2019), Vitamin B12 deficiency (07/11/2023), Vitamin D deficiency (07/11/2023), and Vomiting (11/15/2023).    Social Determinants of Health Significantly Affecting Care: Does not adhere to medication regimen    Differential Diagnosis Considered but not limited to: Patient is tachycardic, but appears dry, and is mildly tachypneic.  I do have strong suspicion for DKA.  Additionally dehydration, electrolyte disturbance or further considerations.  I suspect this is likely due to and adherence to medication regimen as he was not having infection symptoms prior to today.      External Records Reviewed:   I reviewed recent and relevant outside records including:     Independent Interpretation of Studies: The following studies were ordered as part of the emergency department work up and independently interpreted by me. See ED Course for details.    CBC with minimal leukocytosis to 12.3 and otherwise unremarkable.     VBG with partially compensated metabolic acidosis- pH 7.02, pCO2 30, and lactate 4.4. Marked hyperglycemia. BHB elevated at 12. CMP with hyperkalemia to 7.5 and bicarb of 4. All consistent with DKA.     Patient was started on insulin drip. I do recommend admission and the patient is agreeable.     Dr. Pack was contacted and will admit the patient to the ICU.     Diagnoses as of 12/09/24 1552   Type 1 diabetes mellitus with ketoacidosis without coma   Nausea and vomiting, unspecified vomiting type     Critical Care    Performed by: Phoebe Rae DO  Authorized by: Phoebe Rae DO    Critical care provider statement:     Critical care time (minutes):  35    Critical care  time was exclusive of:  Separately billable procedures and treating other patients    Critical care was necessary to treat or prevent imminent or life-threatening deterioration of the following conditions:  Endocrine crisis    Critical care was time spent personally by me on the following activities:  Development of treatment plan with patient or surrogate, evaluation of patient's response to treatment, examination of patient, obtaining history from patient or surrogate, ordering and performing treatments and interventions, ordering and review of laboratory studies and pulse oximetry    Care discussed with: admitting provider             Phoebe Rae DO  12/09/24 [id number]

## 2024-12-05 NOTE — PROGRESS NOTES
Patient seen and examined  Multidisciplinary rounds completed    Overall continues to have clinical improvement  Acidemia now resolved with closure of AG  Patient transition NPH  Diet initiated  Appreciate endocrine's input    Transfer to medical floor    Alfredo Gill MD

## 2024-12-06 VITALS
RESPIRATION RATE: 16 BRPM | HEART RATE: 96 BPM | TEMPERATURE: 98.3 F | BODY MASS INDEX: 23.55 KG/M2 | DIASTOLIC BLOOD PRESSURE: 87 MMHG | SYSTOLIC BLOOD PRESSURE: 132 MMHG | HEIGHT: 70 IN | OXYGEN SATURATION: 96 % | WEIGHT: 164.46 LBS

## 2024-12-06 PROBLEM — E10.10 TYPE 1 DIABETES MELLITUS WITH KETOACIDOSIS WITHOUT COMA: Status: RESOLVED | Noted: 2024-12-05 | Resolved: 2024-12-06

## 2024-12-06 LAB
ANION GAP SERPL CALC-SCNC: 11 MMOL/L (ref 10–20)
BUN SERPL-MCNC: 18 MG/DL (ref 6–23)
CALCIUM SERPL-MCNC: 8 MG/DL (ref 8.6–10.3)
CHLORIDE SERPL-SCNC: 107 MMOL/L (ref 98–107)
CO2 SERPL-SCNC: 23 MMOL/L (ref 21–32)
CREAT SERPL-MCNC: 0.78 MG/DL (ref 0.5–1.3)
EGFRCR SERPLBLD CKD-EPI 2021: >90 ML/MIN/1.73M*2
ERYTHROCYTE [DISTWIDTH] IN BLOOD BY AUTOMATED COUNT: 13.6 % (ref 11.5–14.5)
GLUCOSE BLD MANUAL STRIP-MCNC: 175 MG/DL (ref 74–99)
GLUCOSE BLD MANUAL STRIP-MCNC: 270 MG/DL (ref 74–99)
GLUCOSE BLD MANUAL STRIP-MCNC: 315 MG/DL (ref 74–99)
GLUCOSE BLD MANUAL STRIP-MCNC: 325 MG/DL (ref 74–99)
GLUCOSE SERPL-MCNC: 325 MG/DL (ref 74–99)
HCT VFR BLD AUTO: 42.1 % (ref 41–52)
HGB BLD-MCNC: 14.7 G/DL (ref 13.5–17.5)
MAGNESIUM SERPL-MCNC: 1.81 MG/DL (ref 1.6–2.4)
MCH RBC QN AUTO: 31.7 PG (ref 26–34)
MCHC RBC AUTO-ENTMCNC: 34.9 G/DL (ref 32–36)
MCV RBC AUTO: 91 FL (ref 80–100)
NRBC BLD-RTO: 0 /100 WBCS (ref 0–0)
PHOSPHATE SERPL-MCNC: 2.4 MG/DL (ref 2.5–4.9)
PLATELET # BLD AUTO: 159 X10*3/UL (ref 150–450)
POTASSIUM SERPL-SCNC: 3.4 MMOL/L (ref 3.5–5.3)
RBC # BLD AUTO: 4.63 X10*6/UL (ref 4.5–5.9)
SODIUM SERPL-SCNC: 138 MMOL/L (ref 136–145)
WBC # BLD AUTO: 4.5 X10*3/UL (ref 4.4–11.3)

## 2024-12-06 PROCEDURE — 99239 HOSP IP/OBS DSCHRG MGMT >30: CPT | Performed by: FAMILY MEDICINE

## 2024-12-06 PROCEDURE — 99233 SBSQ HOSP IP/OBS HIGH 50: CPT | Performed by: INTERNAL MEDICINE

## 2024-12-06 PROCEDURE — 82565 ASSAY OF CREATININE: CPT

## 2024-12-06 PROCEDURE — 85027 COMPLETE CBC AUTOMATED: CPT | Performed by: FAMILY MEDICINE

## 2024-12-06 PROCEDURE — 2500000002 HC RX 250 W HCPCS SELF ADMINISTERED DRUGS (ALT 637 FOR MEDICARE OP, ALT 636 FOR OP/ED): Performed by: FAMILY MEDICINE

## 2024-12-06 PROCEDURE — 82947 ASSAY GLUCOSE BLOOD QUANT: CPT

## 2024-12-06 PROCEDURE — 36415 COLL VENOUS BLD VENIPUNCTURE: CPT | Performed by: FAMILY MEDICINE

## 2024-12-06 PROCEDURE — 82374 ASSAY BLOOD CARBON DIOXIDE: CPT

## 2024-12-06 PROCEDURE — 2500000002 HC RX 250 W HCPCS SELF ADMINISTERED DRUGS (ALT 637 FOR MEDICARE OP, ALT 636 FOR OP/ED): Performed by: INTERNAL MEDICINE

## 2024-12-06 PROCEDURE — 84100 ASSAY OF PHOSPHORUS: CPT

## 2024-12-06 PROCEDURE — 83735 ASSAY OF MAGNESIUM: CPT

## 2024-12-06 RX ORDER — POTASSIUM CHLORIDE 20 MEQ/1
40 TABLET, EXTENDED RELEASE ORAL ONCE
Status: COMPLETED | OUTPATIENT
Start: 2024-12-06 | End: 2024-12-06

## 2024-12-06 ASSESSMENT — ENCOUNTER SYMPTOMS
NAUSEA: 0
ABDOMINAL PAIN: 0
VOMITING: 0

## 2024-12-06 ASSESSMENT — COGNITIVE AND FUNCTIONAL STATUS - GENERAL
MOBILITY SCORE: 24
DAILY ACTIVITIY SCORE: 24

## 2024-12-06 ASSESSMENT — PAIN - FUNCTIONAL ASSESSMENT: PAIN_FUNCTIONAL_ASSESSMENT: 0-10

## 2024-12-06 ASSESSMENT — PAIN SCALES - GENERAL: PAINLEVEL_OUTOF10: 0 - NO PAIN

## 2024-12-06 NOTE — PROGRESS NOTES
"Ed Sanon is a 34 y.o. male on day 0 of admission presenting with Type 1 diabetes mellitus with ketoacidosis without coma.      Subjective   34 y.o. with history of IDDM Type I with frequent admissions for diabetic ketoacidosis presents with fatigue and generalized weakness. Patient was in his normal state of health until yesterday when he woke up and started experiencing nausea, vomiting, and generalized abdominal pain.  Denies diarrhea or constipation issues.  Says he gets changes in his vision where he can feel that is going into DKA.  History of recurrent admissions for DKA.  Has a continuous glucose monitor.  Refuses to take his short acting insulin because he \" does not feel good\" when his blood sugars are getting too low.  However, reviewed his glucose trended in the has not been under 300 and weeks.  In the ED, tachycardic but other VSS.  Potassium 7.5. glucose 423.  Creatinine 1.24.  pH 7.02.  Started on insulin drip     12/5:                 Today patient is seen in the ICU awake alert and interactive appropriately appearing NAD.  States he is feeling much better and has been able to tolerate diet well today.  As anion gap is closed and being transitioned to basal and bolus insulin will transfer to the medical floor.  Likely if continues improvement can be discharged home tomorrow.  Otherwise denies interval new symptoms or complaints including chest pain palpitations pleuritic type pain unusual shortness of breath cough sputum nausea abdominal pain flank pain dysuria diarrhea headache fever chills.       Objective     Last Recorded Vitals  /76   Pulse 106   Temp 37.1 °C (98.7 °F) (Oral)   Resp 15   Wt 74.6 kg (164 lb 7.4 oz)   SpO2 96%   Intake/Output last 3 Shifts:    Intake/Output Summary (Last 24 hours) at 12/5/2024 1909  Last data filed at 12/5/2024 1800  Gross per 24 hour   Intake 2428.63 ml   Output --   Net 2428.63 ml       Admission Weight  Weight: 68 kg (150 lb) (12/04/24 " 2149)    Daily Weight  12/05/24 : 74.6 kg (164 lb 7.4 oz)    Image Results  ECG 12 lead  Sinus tachycardia  Borderline prolonged QT interval    See ED provider note for full interpretation and clinical correlation  Confirmed by Nata Hancock ([id number]) on 11/16/2024 2:47:31 PM      Physical Exam  General: Nontoxic appearing, cooperative with exam, in NAD.  HEENT: Atraumatic. No erythema in posterior pharynx.  Lymph: No cervical or inguinal lymphadenopathy.  Cardiac: RRR. No murmurs.  Lungs: CTAB. Nl WOB.  Abd: Non-tender. No rebound or gaurding. Nl bowel sounds.  Ext: No edema. 2+ pulses.  Skin: No rashes, abrasions, or contusions.  Psych: A&Ox3.  Somewhat flat affect.  Cooperative to exam  Neuro: 5/5 strength. Sensation intact    Relevant Results               Assessment/Plan                  Assessment & Plan  Type 1 diabetes mellitus with ketoacidosis without coma    Diabetic Ketoacidosis, Recurrent  -Presents with fatigue and generalized weakness, initial pH 7.02 AG 35  -Has frequent DKA events, doesn't take short-acting insulin because he doesn't feel good when glucose is trending down  -However, continuous glucose monitor to indicate he is has not been under 300 in weeks  -No evidence of infection, denies drug use  -DKA protocol with insulin infusion, IVF, and serial labs  -Endocrinology consult, may benefit from insulin pump (was on this in the past, I believe discontinued because lost insurance)  12/5: Anion gap closed.  Bicarbonate greater than 15.  Transition to basal and bolus insulin.  Transferred to medical floor.     Other Issues  -Lactic acidosis: Suspect due to DKA.  No evidence of infection.  IVF and trend  -Leukocytosis: Suspect stress response in setting of DKA. Will obtain UA to round out infectious workup  12/5: WBC trending down.  -Acute Renal Failure: Suspect pre-renal in setting of DKA, IVF per above and trend  12/5: Creatinine improving.     DVT Prophy  -Heparin              Cuate Wynn,  MD

## 2024-12-06 NOTE — PROGRESS NOTES
"Ed Sanon is a 34 y.o. male on day 1 of admission presenting with Type 1 diabetes mellitus with ketoacidosis without coma.    Subjective   Patient ate two slices of pizza and a bag of tortilla chips last night (46g of carb).  He states that he felt better after eating yesterday.       I have reviewed histories, allergies and medications have been reviewed and there are no changes       Objective   Review of Systems   Gastrointestinal:  Negative for abdominal pain, nausea and vomiting.   All other systems reviewed and are negative.    Physical Exam  Vitals and nursing note reviewed.   Constitutional:       General: He is not in acute distress.     Appearance: Normal appearance. He is normal weight.   HENT:      Head: Normocephalic and atraumatic.      Nose: Nose normal.      Mouth/Throat:      Mouth: Mucous membranes are moist.   Eyes:      Extraocular Movements: Extraocular movements intact.   Pulmonary:      Effort: Pulmonary effort is normal.   Musculoskeletal:         General: Normal range of motion.   Neurological:      Mental Status: He is alert and oriented to person, place, and time.   Psychiatric:         Mood and Affect: Mood normal.         Last Recorded Vitals  Blood pressure 108/71, pulse 83, temperature 36.4 °C (97.5 °F), temperature source Temporal, resp. rate 17, height 1.778 m (5' 10\"), weight 74.6 kg (164 lb 7.4 oz), SpO2 97%.  Intake/Output last 3 Shifts:  I/O last 3 completed shifts:  In: 2650.6 (35.5 mL/kg) [P.O.:222; I.V.:1171.6 (15.7 mL/kg); IV Piggyback:1257]  Out: - (0 mL/kg)   Weight: 74.6 kg     Relevant Results  Results from last 7 days   Lab Units 12/06/24  0636 12/06/24  0434 12/05/24  2041 12/05/24  1619 12/05/24  1545 12/05/24  1104 12/05/24  1032 12/05/24  0959 12/05/24  0909 12/05/24  0758 12/05/24  0411 12/05/24  0409   POCT GLUCOSE mg/dL 315*  --  291*  --  267* 132*  --  134*   < >  --    < >  --    GLUCOSE mg/dL  --  325*  --  248*  --   --  149*  --   --  196*  --  212* "    < > = values in this interval not displayed.     Scheduled medications  insulin NPH (Isophane), 18 Units, subcutaneous, q12h SHEILA  insulin regular, 0-10 Units, subcutaneous, TID AC  insulin regular, 5 Units, subcutaneous, TID AC      Continuous medications     PRN medications  PRN medications: dextrose, dextrose, glucagon    Results for orders placed or performed during the hospital encounter of 12/04/24 (from the past 24 hours)   POCT GLUCOSE   Result Value Ref Range    POCT Glucose 169 (H) 74 - 99 mg/dL   POCT GLUCOSE   Result Value Ref Range    POCT Glucose 134 (H) 74 - 99 mg/dL   Basic Metabolic Panel   Result Value Ref Range    Glucose 149 (H) 74 - 99 mg/dL    Sodium 136 136 - 145 mmol/L    Potassium 3.7 3.5 - 5.3 mmol/L    Chloride 109 (H) 98 - 107 mmol/L    Bicarbonate 20 (L) 21 - 32 mmol/L    Anion Gap 11 10 - 20 mmol/L    Urea Nitrogen 18 6 - 23 mg/dL    Creatinine 1.00 0.50 - 1.30 mg/dL    eGFR >90 >60 mL/min/1.73m*2    Calcium 8.0 (L) 8.6 - 10.3 mg/dL   POCT GLUCOSE   Result Value Ref Range    POCT Glucose 132 (H) 74 - 99 mg/dL   Magnesium   Result Value Ref Range    Magnesium 1.66 1.60 - 2.40 mg/dL   Phosphorus   Result Value Ref Range    Phosphorus 2.7 2.5 - 4.9 mg/dL   BLOOD GAS VENOUS   Result Value Ref Range    POCT pH, Venous 7.28 (L) 7.33 - 7.43 pH    POCT pCO2, Venous 41 41 - 51 mm Hg    POCT pO2, Venous 42 35 - 45 mm Hg    POCT SO2, Venous 74 45 - 75 %    POCT Oxy Hemoglobin, Venous 72.3 45.0 - 75.0 %    POCT Base Excess, Venous -7.1 (L) -2.0 - 3.0 mmol/L    POCT HCO3 Calculated, Venous 19.3 (L) 22.0 - 26.0 mmol/L    Patient Temperature 37.0 degrees Celsius    FiO2 100 %   Lactate   Result Value Ref Range    Lactate 0.9 0.4 - 2.0 mmol/L   POCT GLUCOSE   Result Value Ref Range    POCT Glucose 267 (H) 74 - 99 mg/dL   Basic Metabolic Panel   Result Value Ref Range    Glucose 248 (H) 74 - 99 mg/dL    Sodium 134 (L) 136 - 145 mmol/L    Potassium 4.0 3.5 - 5.3 mmol/L    Chloride 105 98 - 107 mmol/L     Bicarbonate 22 21 - 32 mmol/L    Anion Gap 11 10 - 20 mmol/L    Urea Nitrogen 18 6 - 23 mg/dL    Creatinine 1.06 0.50 - 1.30 mg/dL    eGFR >90 >60 mL/min/1.73m*2    Calcium 8.0 (L) 8.6 - 10.3 mg/dL   Magnesium   Result Value Ref Range    Magnesium 1.69 1.60 - 2.40 mg/dL   Phosphorus   Result Value Ref Range    Phosphorus 3.4 2.5 - 4.9 mg/dL   BLOOD GAS VENOUS   Result Value Ref Range    POCT pH, Venous 7.34 7.33 - 7.43 pH    POCT pCO2, Venous 42 41 - 51 mm Hg    POCT pO2, Venous 41 35 - 45 mm Hg    POCT SO2, Venous 71 45 - 75 %    POCT Oxy Hemoglobin, Venous 69.8 45.0 - 75.0 %    POCT Base Excess, Venous -3.0 (L) -2.0 - 3.0 mmol/L    POCT HCO3 Calculated, Venous 22.7 22.0 - 26.0 mmol/L    Patient Temperature 37.0 degrees Celsius    FiO2 100 %   Lactate   Result Value Ref Range    Lactate 0.8 0.4 - 2.0 mmol/L   POCT GLUCOSE   Result Value Ref Range    POCT Glucose 291 (H) 74 - 99 mg/dL   Basic Metabolic Panel   Result Value Ref Range    Glucose 325 (H) 74 - 99 mg/dL    Sodium 138 136 - 145 mmol/L    Potassium 3.4 (L) 3.5 - 5.3 mmol/L    Chloride 107 98 - 107 mmol/L    Bicarbonate 23 21 - 32 mmol/L    Anion Gap 11 10 - 20 mmol/L    Urea Nitrogen 18 6 - 23 mg/dL    Creatinine 0.78 0.50 - 1.30 mg/dL    eGFR >90 >60 mL/min/1.73m*2    Calcium 8.0 (L) 8.6 - 10.3 mg/dL   Magnesium   Result Value Ref Range    Magnesium 1.81 1.60 - 2.40 mg/dL   Phosphorus   Result Value Ref Range    Phosphorus 2.4 (L) 2.5 - 4.9 mg/dL   CBC   Result Value Ref Range    WBC 4.5 4.4 - 11.3 x10*3/uL    nRBC 0.0 0.0 - 0.0 /100 WBCs    RBC 4.63 4.50 - 5.90 x10*6/uL    Hemoglobin 14.7 13.5 - 17.5 g/dL    Hematocrit 42.1 41.0 - 52.0 %    MCV 91 80 - 100 fL    MCH 31.7 26.0 - 34.0 pg    MCHC 34.9 32.0 - 36.0 g/dL    RDW 13.6 11.5 - 14.5 %    Platelets 159 150 - 450 x10*3/uL   POCT GLUCOSE   Result Value Ref Range    POCT Glucose 315 (H) 74 - 99 mg/dL         ECG 12 lead    Result Date: 11/16/2024  Sinus tachycardia Borderline prolonged QT interval  See ED provider note for full interpretation and clinical correlation Confirmed by Nata Hancock ([id number]) on 11/16/2024 2:47:31 PM    ECG 12 lead    Result Date: 11/15/2024  Sinus rhythm ST elev, probable normal early repol pattern See ED provider note for full interpretation and clinical correlation Confirmed by Jamie North ([id number]) on 11/15/2024 12:30:36 PM    XR chest 1 view    Result Date: 11/7/2024  Interpreted By:  Mark Murillo, STUDY: XR CHEST 1 VIEW;  11/7/2024 9:45 pm   INDICATION: Signs/Symptoms:sob.     COMPARISON: 09/18/2024.   ACCESSION NUMBER(S): [id number]   ORDERING CLINICIAN: MARC MIDDLETON   FINDINGS: AP radiograph of the chest was provided.   Leads overlie the chest, partially obscuring the field-of-view.   CARDIOMEDIASTINAL SILHOUETTE: Cardiomediastinal silhouette is normal in size and configuration.   LUNGS: Lungs are clear. No pleural effusion or pneumothorax.   ABDOMEN: No remarkable upper abdominal findings.   BONES: No acute osseous changes.       1.  No evidence of acute cardiopulmonary process.       MACRO: None   Signed by: Mark Murillo 11/7/2024 10:13 PM Dictation workstation:   [id number]        Assessment/Plan   Assessment & Plan  Type 1 diabetes mellitus with ketoacidosis without coma    IMPRESSION:  DKA -resolved   POORLY CONTROLLED TYPE I DIABETES MELLITUS  A1C of 13.2% as of November 2024     RECOMMENDATIONS:  To increase NPH to 22 units subcutaneous twice daily   To increase Regular insulin to 6 units TID AC   To continue insulin correction scale TID AC   Regular diet ordered; changed to carb controlled at 60g per meal   Accu-Cheks ACHS    Hypoglycemic protocol   To try regular insulin instead of Humalog upon discharge given slower mechanism of action  Patient may be discharged today from endocrinology perspective  Will continue to follow    Lori Packer MD

## 2024-12-06 NOTE — NURSING NOTE
Patient discharged to home. Discharge instructions were reviewed with the patient. Patient's IV accesses were removed and he left with all of his belongings.

## 2024-12-06 NOTE — CARE PLAN
The patient's goals for the shift include      The clinical goals for the shift include patient blood sugar will remain below 300 during this shift      Problem: Pain - Adult  Goal: Verbalizes/displays adequate comfort level or baseline comfort level  Outcome: Progressing     Problem: Safety - Adult  Goal: Free from fall injury  Outcome: Progressing     Problem: Discharge Planning  Goal: Discharge to home or other facility with appropriate resources  Outcome: Progressing     Problem: Chronic Conditions and Co-morbidities  Goal: Patient's chronic conditions and co-morbidity symptoms are monitored and maintained or improved  Outcome: Progressing     Problem: Skin  Goal: Participates in plan/prevention/treatment measures  Outcome: Progressing  Goal: Promote/optimize nutrition  Outcome: Progressing  Goal: Promote skin healing  Outcome: Progressing     Problem: Fall/Injury  Goal: Not fall by end of shift  Outcome: Progressing  Goal: Be free from injury by end of the shift  Outcome: Progressing  Goal: Verbalize understanding of personal risk factors for fall in the hospital  Outcome: Progressing  Goal: Verbalize understanding of risk factor reduction measures to prevent injury from fall in the home  Outcome: Progressing  Goal: Use assistive devices by end of the shift  Outcome: Progressing  Goal: Pace activities to prevent fatigue by end of the shift  Outcome: Progressing     Problem: Nutrition  Goal: Less than 5 days NPO/clear liquids  Outcome: Progressing  Goal: Oral intake greater than 50%  Outcome: Progressing  Goal: Oral intake greater 75%  Outcome: Progressing  Goal: Consume prescribed supplement  Outcome: Progressing  Goal: Adequate PO fluid intake  Outcome: Progressing  Goal: Nutrition support goals are met within 48 hrs  Outcome: Progressing  Goal: Nutrition support is meeting 75% of nutrient needs  Outcome: Progressing  Goal: Tube feed tolerance  Outcome: Progressing  Goal: BG  mg/dL  Outcome:  Progressing  Goal: Lab values WNL  Outcome: Progressing  Goal: Electrolytes WNL  Outcome: Progressing  Goal: Promote healing  Outcome: Progressing  Goal: Maintain stable weight  Outcome: Progressing  Goal: Reduce weight from edema/fluid  Outcome: Progressing  Goal: Gradual weight gain  Outcome: Progressing  Goal: Improve ostomy output  Outcome: Progressing     Problem: Pain  Goal: Takes deep breaths with improved pain control throughout the shift  Outcome: Progressing  Goal: Turns in bed with improved pain control throughout the shift  Outcome: Progressing  Goal: Walks with improved pain control throughout the shift  Outcome: Progressing  Goal: Performs ADL's with improved pain control throughout shift  Outcome: Progressing  Goal: Participates in PT with improved pain control throughout the shift  Outcome: Progressing  Goal: Free from opioid side effects throughout the shift  Outcome: Progressing  Goal: Free from acute confusion related to pain meds throughout the shift  Outcome: Progressing

## 2024-12-07 NOTE — DISCHARGE SUMMARY
"Discharge Diagnosis  Type 1 diabetes mellitus with ketoacidosis without coma    Issues Requiring Follow-Up  Poorly controlled diabetes, DKA, noncompliance with medication recommendations    This discharge took greater than 35 minutes.    Test Results Pending At Discharge  Pending Labs       No current pending labs.            Hospital Course   34 y.o. with history of IDDM Type I with frequent admissions for diabetic ketoacidosis presents with fatigue and generalized weakness. Patient was in his normal state of health until yesterday when he woke up and started experiencing nausea, vomiting, and generalized abdominal pain.  Denies diarrhea or constipation issues.  Says he gets changes in his vision where he can feel that is going into DKA.  History of recurrent admissions for DKA.  Has a continuous glucose monitor.  Refuses to take his short acting insulin because he \" does not feel good\" when his blood sugars are getting too low.  However, reviewed his glucose trended in the has not been under 300 and weeks.  In the ED, tachycardic but other VSS.  Potassium 7.5. glucose 423.  Creatinine 1.24.  pH 7.02.  Started on insulin drip      12/5:                 Today patient is seen in the ICU awake alert and interactive appropriately appearing NAD.  States he is feeling much better and has been able to tolerate diet well today.  As anion gap is closed and being transitioned to basal and bolus insulin will transfer to the medical floor.  Likely if continues improvement can be discharged home tomorrow.  Otherwise denies interval new symptoms or complaints including chest pain palpitations pleuritic type pain unusual shortness of breath cough sputum nausea abdominal pain flank pain dysuria diarrhea headache fever chills.    12/6:               Today patient seen on the medical floor.  Remains awake alert and interactive appropriately.  Appears NAD.  Taking diet well.  However nurse reports that he is refusing SSI insulin " despite blood sugar of 330.  Unfortunately this has been his pattern in the past as well.  Endocrinology recommending trial of regular mealtime insulin and will be discharged on NPH 22 units twice daily and regular insulin 6 units 3 times daily before meals. Otherwise denies interval new symptoms or complaints including chest pain palpitations pleuritic type pain unusual shortness of breath cough sputum nausea abdominal pain flank pain dysuria diarrhea headache fever chills.          Pertinent Physical Exam At Time of Discharge  Physical Exam  Diabetic Ketoacidosis, Recurrent  -Presents with fatigue and generalized weakness, initial pH 7.02 AG 35  -Has frequent DKA events, doesn't take short-acting insulin because he doesn't feel good when glucose is trending down  -However, continuous glucose monitor to indicate he is has not been under 300 in weeks  -No evidence of infection, denies drug use  -DKA protocol with insulin infusion, IVF, and serial labs  -Endocrinology consult, may benefit from insulin pump (was on this in the past, I believe discontinued because lost insurance)  12/5: Anion gap closed.  Bicarbonate greater than 15.  Transition to basal and bolus insulin.  Transferred to medical floor.  12/6: Remains resolved DKA.  Taking diet well.  However he is very noncompliant with recommended SSI which is not unusual for him unfortunately.  Endocrinology recommends NPH 22 units twice daily and trial of regular insulin 6 units 3 times daily before meals on discharge.  He will follow-up with endocrinology.     Other Issues  -Lactic acidosis: Suspect due to DKA.  No evidence of infection.  IVF and trend  -Leukocytosis: Suspect stress response in setting of DKA. Will obtain UA to round out infectious workup  12/5: WBC trending down.  -Acute Renal Failure: Suspect pre-renal in setting of DKA, IVF per above and trend  12/5: Creatinine improving.  Home Medications     Medication List      START taking these medications  "    * insulin regular 100 unit/mL injection; Commonly known as: HumuLIN   R,NovoLIN R; Inject 6 Units under the skin 3 times a day before meals.   Take as directed per insulin instructions.   * insulin regular 100 unit/mL injection; Commonly known as: HumuLIN   R,NovoLIN R; Inject 0-10 Units under the skin 3 times a day before meals.    0 unit(s) if Blood glucose is between  2 unit(s) if Blood glucose is   between 151-200 4 unit(s) if Blood glucose is between 201-250 6 unit(s) if   Blood glucose is between 251-300 8 unit(s) if Blood glucose is between   301-350 10 unit(s) if Blood glucose is between 351-400; Start taking on:   December 7, 2024  * This list has 2 medication(s) that are the same as other medications   prescribed for you. Read the directions carefully, and ask your doctor or   other care provider to review them with you.     CHANGE how you take these medications     insulin NPH (Isophane) 100 unit/mL (3 mL) injection; Commonly known as:   HumuLIN N,NovoLIN N; Inject 22 Units under the skin 2 times a day before   meals. Take as directed per insulin instructions.; What changed: how much   to take     CONTINUE taking these medications     Guardian 4 Glucose Sensor device; Generic drug: blood-glucose sensor;   Change insulin sensor every 7 days as directed, linked to Minimed insulin   pump, check with endocrinology for updated regimen   lancets misc   lisinopril 5 mg tablet   pen needle, diabetic 32 gauge x 5/32\" needle     STOP taking these medications     insulin lispro 100 unit/mL injection       Outpatient Follow-Up  PCP, endocrinology    Cuate Wynn MD  "

## 2024-12-10 ENCOUNTER — PATIENT OUTREACH (OUTPATIENT)
Dept: PRIMARY CARE | Facility: CLINIC | Age: 34
End: 2024-12-10
Payer: MEDICAID

## 2024-12-10 NOTE — PROGRESS NOTES
30 Day Readmission  Discharge Facility: [facility]   Discharge Diagnosis: Type 1 diabetes mellitus with ketoacidosis without coma   Admission Date: 12/5/24  Discharge Date: 12/6/24    PCP Appointment Date: no appointment, message to office  Specialist Appointment Date: endocrinology 2/17/24  Hospital Encounter and Summary Linked: Yes    Two attempts were made to reach patient within two business days after discharge. Voicemail left with contact information for patient to call back with any non-emergent questions or concerns.

## 2024-12-18 ENCOUNTER — OFFICE VISIT (OUTPATIENT)
Dept: PRIMARY CARE | Facility: CLINIC | Age: 34
End: 2024-12-18
Payer: MEDICAID

## 2024-12-18 VITALS
RESPIRATION RATE: 16 BRPM | TEMPERATURE: 97.1 F | OXYGEN SATURATION: 98 % | BODY MASS INDEX: 23.77 KG/M2 | DIASTOLIC BLOOD PRESSURE: 88 MMHG | SYSTOLIC BLOOD PRESSURE: 142 MMHG | HEART RATE: 80 BPM | WEIGHT: 166 LBS | HEIGHT: 70 IN

## 2024-12-18 DIAGNOSIS — E11.65 POORLY CONTROLLED DIABETES MELLITUS (MULTI): ICD-10-CM

## 2024-12-18 DIAGNOSIS — R03.0 ELEVATED BLOOD PRESSURE READING: ICD-10-CM

## 2024-12-18 DIAGNOSIS — E55.9 VITAMIN D DEFICIENCY: ICD-10-CM

## 2024-12-18 DIAGNOSIS — E53.8 VITAMIN B12 DEFICIENCY: ICD-10-CM

## 2024-12-18 DIAGNOSIS — E10.40 DIABETIC NEUROPATHY, TYPE I DIABETES MELLITUS: ICD-10-CM

## 2024-12-18 DIAGNOSIS — E10.65 TYPE 1 DIABETES MELLITUS WITH HYPERGLYCEMIA (MULTI): ICD-10-CM

## 2024-12-18 DIAGNOSIS — E10.10 DIABETIC KETOACIDOSIS WITHOUT COMA ASSOCIATED WITH TYPE 1 DIABETES MELLITUS (MULTI): Primary | ICD-10-CM

## 2024-12-18 PROCEDURE — 3048F LDL-C <100 MG/DL: CPT | Performed by: INTERNAL MEDICINE

## 2024-12-18 PROCEDURE — 3046F HEMOGLOBIN A1C LEVEL >9.0%: CPT | Performed by: INTERNAL MEDICINE

## 2024-12-18 PROCEDURE — 99495 TRANSJ CARE MGMT MOD F2F 14D: CPT | Performed by: INTERNAL MEDICINE

## 2024-12-18 PROCEDURE — 1036F TOBACCO NON-USER: CPT | Performed by: INTERNAL MEDICINE

## 2024-12-18 PROCEDURE — 3060F POS MICROALBUMINURIA REV: CPT | Performed by: INTERNAL MEDICINE

## 2024-12-18 PROCEDURE — 3008F BODY MASS INDEX DOCD: CPT | Performed by: INTERNAL MEDICINE

## 2024-12-18 PROCEDURE — 3077F SYST BP >= 140 MM HG: CPT | Performed by: INTERNAL MEDICINE

## 2024-12-18 PROCEDURE — 4010F ACE/ARB THERAPY RXD/TAKEN: CPT | Performed by: INTERNAL MEDICINE

## 2024-12-18 PROCEDURE — 3079F DIAST BP 80-89 MM HG: CPT | Performed by: INTERNAL MEDICINE

## 2024-12-18 RX ORDER — LISINOPRIL 5 MG/1
5 TABLET ORAL DAILY
Qty: 90 TABLET | Refills: 1 | Status: SHIPPED | OUTPATIENT
Start: 2024-12-18 | End: 2025-12-18

## 2024-12-18 SDOH — ECONOMIC STABILITY: FOOD INSECURITY: WITHIN THE PAST 12 MONTHS, THE FOOD YOU BOUGHT JUST DIDN'T LAST AND YOU DIDN'T HAVE MONEY TO GET MORE.: NEVER TRUE

## 2024-12-18 SDOH — ECONOMIC STABILITY: FOOD INSECURITY: WITHIN THE PAST 12 MONTHS, YOU WORRIED THAT YOUR FOOD WOULD RUN OUT BEFORE YOU GOT MONEY TO BUY MORE.: NEVER TRUE

## 2024-12-18 ASSESSMENT — LIFESTYLE VARIABLES
HOW OFTEN DO YOU HAVE A DRINK CONTAINING ALCOHOL: NEVER
AUDIT-C TOTAL SCORE: 0
HOW OFTEN DO YOU HAVE SIX OR MORE DRINKS ON ONE OCCASION: NEVER
HOW MANY STANDARD DRINKS CONTAINING ALCOHOL DO YOU HAVE ON A TYPICAL DAY: PATIENT DOES NOT DRINK
SKIP TO QUESTIONS 9-10: 1

## 2024-12-18 ASSESSMENT — PATIENT HEALTH QUESTIONNAIRE - PHQ9
SUM OF ALL RESPONSES TO PHQ9 QUESTIONS 1 & 2: 0
2. FEELING DOWN, DEPRESSED OR HOPELESS: NOT AT ALL
1. LITTLE INTEREST OR PLEASURE IN DOING THINGS: NOT AT ALL

## 2024-12-18 ASSESSMENT — PAIN SCALES - GENERAL: PAINLEVEL_OUTOF10: 0-NO PAIN

## 2024-12-18 NOTE — ASSESSMENT & PLAN NOTE
Refer to DKA above. Patient is referred to endocrinology for a re evaluation. Will refer to see if any additional education may help wit compliance issues. Patient is willing to go to [facility] for evaluation.     Orders:    Referral to Endocrinology; Future    lisinopril 5 mg tablet; Take 1 tablet (5 mg) by mouth once daily.    CBC and Auto Differential; Future    Comprehensive Metabolic Panel; Future    Hemoglobin A1C; Future    Albumin-Creatinine Ratio, Urine Random; Future    Lipid Panel; Future

## 2024-12-18 NOTE — PROGRESS NOTES
"Patient: Ed Sanon  : 1990  PCP: Henri Brumfield MD  MRN: [id number]  Program: Transitional Care Management  Status: Enrolled  Effective Dates: 12/10/2024 - present  Responsible Staff: Zuleyak Small RN  Social Drivers to be Addressed: Physical Activity, Social Connections, Stress         Ed Sanon is a 34 y.o. male presenting today for follow-up after being discharged from the hospital 12 days ago. The main problem requiring admission was diabetic ketoacidosis. The discharge summary and/or Transitional Care Management documentation was reviewed. Medication reconciliation was performed as indicated via the \"[place] as Reviewed\" timestamp.     Ed Sanon was contacted by Transitional Care Management services two days after his discharge. This encounter and supporting documentation was reviewed.    Uncontrolled DM type 1/Diabetic neuropathy/Medication noncompliance: Admitted 7 times this year for DKA related to insulin noncompliance. Most recent hospitalization as noted above. Sees Previously saw Constance Villar at [facility]. Remains with practice since Dr. Villar left [facility] and is supposed to see a new Endocrinologist (not seen yet). He has been diabetic for about 13 years. Vision \"seems ok\" at the present time.    Uncontrolled DM I with HbA1c 13.2 on 2024. Only taking his insulin NPH (NovoLIN N) 22 units BID but not his short acting insulin (Novolin R). Not using his insulin pump either. Is fitted with and using his CGM (average blood glucose 325 over past 14 days).  Patient has taken Lantus and Basiglar in the past. Patient states that Basiglar \"made my sugars low\". Patient has had multiple episodes of DKA in the recent past. Patient used to have pump therapy. Patient states that it was discontinued because it wasn't working correctly.     He has some visual changes at \"a far distance\". He had eyes checked for first time last year.     States he feels \"fine' at " "current blood glucose (350's). When he takes his short acting insulin, which is very rare, he says his blood sugar will drop significantly (\"dropped 200 points after 1 unit\"). Endorses eating after administering short acting insulin and will feel fine for a while, with feels of hypoglycemia coming hours later. Denies caring snacks with him for these occurrences.     Discussed long term effects of prolonged hyperglycemia. Remains firm on not wanting to take his short acting insulin.   Starting to get neuropathy in his feet described as burning/\"shocks\" in his feet.   Endorses almost daily muscle cramps with intermittent ployuria. Drinks 60 ounces of water on average per day.     HTN/HLD: /88 today. No taking his lisinopril 5 mg daily (\"makes me feel funny\"). Patient takes no meds for BP or lipids. Patient stopped the med because it made him \"feel funny\"     Anxiety issues: patient has never had anxiety in the past, he states that he has had some anxiety recently, he was given a med by endocrinologist, he never took the med .            Review of Systems    otherwise negative aside from what was mentioned above in HPI.     /88 (BP Location: Left arm, Patient Position: Sitting, BP Cuff Size: Large adult)   Pulse 80   Temp 36.2 °C (97.1 °F)   Resp 16   Ht 1.778 m (5' 10\")   Wt 75.3 kg (166 lb)   SpO2 98%   BMI 23.82 kg/m²     Physical Exam    Gen: Alert,  young male, NAD, he is quiet, affect is somewhat flat   HEENT:  EOMI, conjunctiva and sclera normal in appearance, no thyromegaly or neck masses  Respiratory:  Lungs CTAB  Cardiovascular:  Heart RRR. No M/R/G, no carotid bruits noted, no peripheral edema  Neuro:  Gross motor and sensory intact  Skin:  No suspicious lesions present on exposed skin, no current discoloration of skin of hands  Psychiatry: affect is flat today     The complexity of medical decision making for this patient's transitional care is moderate.    Assessment/Plan   Problem List " Items Addressed This Visit             ICD-10-CM    Diabetic ketoacidosis without coma (Multi) - Primary E11.10     Very poorly controlled related to medication noncompliance with pt's short acting insulin.   Chart review indicating 7 individual hospitalizations for DKA from medication noncompliance  Today, presenting for hospital discharge follow-up for DKA related to insulin noncompliance (12/7/2024).   Again emphasized the importance of insulin compliance, educated pt on complications from uncontrolled diabetes, including his newer onset diabetic neuropathy.   Educated on psuedohypoglycemia sensation with short term insulin given his chronically severely high glucose levels.   Strongly advised continuued use of his CGM.   Start using short acting insulin with record to reference to with his CGM at next office visit.   Continue insulin NPH 22 units BID.               Relevant Orders    Referral to Endocrinology    CBC and Auto Differential    Comprehensive Metabolic Panel    Poorly controlled diabetes mellitus (Multi) E11.65     Refer to DKA above.               Relevant Medications    lisinopril 5 mg tablet    Other Relevant Orders    Referral to Endocrinology    CBC and Auto Differential    Comprehensive Metabolic Panel    Hemoglobin A1C    Albumin-Creatinine Ratio, Urine Random    Lipid Panel    Type 1 diabetes mellitus with hyperglycemia (Multi) E10.65     Refer to DKA above.               Relevant Orders    Referral to Endocrinology    CBC and Auto Differential    Comprehensive Metabolic Panel    Hemoglobin A1C    Albumin-Creatinine Ratio, Urine Random    Vitamin B12 deficiency E53.8                   Relevant Orders    CBC and Auto Differential    Comprehensive Metabolic Panel    Vitamin B12    Vitamin D deficiency E55.9     Deficient with Vitamin D 21 on 2/23/2024.               Relevant Orders    CBC and Auto Differential    Comprehensive Metabolic Panel    Vitamin D 25-Hydroxy,Total (for eval of Vitamin D  levels)     Other Visit Diagnoses         Codes    Diabetic neuropathy, type I diabetes mellitus     E10.40    Relevant Orders    Referral to Endocrinology    CBC and Auto Differential    Comprehensive Metabolic Panel    Lipid Panel    Vitamin B12    Elevated blood pressure reading     R03.0    Relevant Medications    lisinopril 5 mg tablet    Other Relevant Orders    CBC and Auto Differential    Comprehensive Metabolic Panel    Albumin-Creatinine Ratio, Urine Random          Assessment & Plan  Diabetic ketoacidosis without coma associated with type 1 diabetes mellitus (Multi)  Very poorly controlled related to medication noncompliance with pt's short acting insulin.   Chart review indicating 7 individual hospitalizations for DKA from medication noncompliance  Today, presenting for hospital discharge follow-up for DKA related to insulin noncompliance (12/7/2024).   Again emphasized the importance of insulin compliance, educated pt on complications from uncontrolled diabetes, including his newer onset diabetic neuropathy.   Educated on psuedohypoglycemia sensation with short term insulin given his chronically severely high glucose levels.   Strongly advised continuued use of his CGM. Patient would benefit from pump therapy, he has had a pump in the past   Start using short acting insulin with record to reference to with his CGM at next office visit.   Continue insulin NPH 22 units BID. Patient is advised that NPH insulin will cause some peaks and troughs in glucose readings. I attempted to educate the patient on long term complications of poorly controlled DM, he was diagnosed 13 years ago. He may experience additional complications soon if he does not improve compliance    Orders:    Referral to Endocrinology; Future    CBC and Auto Differential; Future    Comprehensive Metabolic Panel; Future    Poorly controlled diabetes mellitus (Multi)  Refer to DKA above. Patient is referred to endocrinology for a re  evaluation. Will refer to see if any additional education may help wit compliance issues. Patient is willing to go to [facility] for evaluation.     Orders:    Referral to Endocrinology; Future    lisinopril 5 mg tablet; Take 1 tablet (5 mg) by mouth once daily.    CBC and Auto Differential; Future    Comprehensive Metabolic Panel; Future    Hemoglobin A1C; Future    Albumin-Creatinine Ratio, Urine Random; Future    Lipid Panel; Future    Type 1 diabetes mellitus with hyperglycemia (Multi)  Refer to DKA above.   Orders:    Referral to Endocrinology; Future    CBC and Auto Differential; Future    Comprehensive Metabolic Panel; Future    Hemoglobin A1C; Future    Albumin-Creatinine Ratio, Urine Random; Future    Vitamin B12 deficiency  Recheck labs   Orders:    CBC and Auto Differential; Future    Comprehensive Metabolic Panel; Future    Vitamin B12; Future    Vitamin D deficiency  Will recheck labs as ordered  Orders:    CBC and Auto Differential; Future    Comprehensive Metabolic Panel; Future    Vitamin D 25-Hydroxy,Total (for eval of Vitamin D levels); Future    Diabetic neuropathy, type I diabetes mellitus  Refer to DKA above.   Orders:    Referral to Endocrinology; Future    CBC and Auto Differential; Future    Comprehensive Metabolic Panel; Future    Lipid Panel; Future    Vitamin B12; Future    Elevated blood pressure reading  Will reorder lisinopril 5 mg daily  Orders:    lisinopril 5 mg tablet; Take 1 tablet (5 mg) by mouth once daily.    CBC and Auto Differential; Future    Comprehensive Metabolic Panel; Future    Albumin-Creatinine Ratio, Urine Random; Future        Offered with pt deferring influenza immunization or Tdap booster today.  14 day TCM note completed, recheck labs prior to next visit in 3 months, see endocrinology as ordered

## 2024-12-18 NOTE — ASSESSMENT & PLAN NOTE
Recheck labs   Orders:    CBC and Auto Differential; Future    Comprehensive Metabolic Panel; Future    Vitamin B12; Future

## 2024-12-18 NOTE — ASSESSMENT & PLAN NOTE
Very poorly controlled related to medication noncompliance with pt's short acting insulin.   Chart review indicating 7 individual hospitalizations for DKA from medication noncompliance  Today, presenting for hospital discharge follow-up for DKA related to insulin noncompliance (12/7/2024).   Again emphasized the importance of insulin compliance, educated pt on complications from uncontrolled diabetes, including his newer onset diabetic neuropathy.   Educated on psuedohypoglycemia sensation with short term insulin given his chronically severely high glucose levels.   Strongly advised continuued use of his CGM. Patient would benefit from pump therapy, he has had a pump in the past   Start using short acting insulin with record to reference to with his CGM at next office visit.   Continue insulin NPH 22 units BID. Patient is advised that NPH insulin will cause some peaks and troughs in glucose readings. I attempted to educate the patient on long term complications of poorly controlled DM, he was diagnosed 13 years ago. He may experience additional complications soon if he does not improve compliance    Orders:    Referral to Endocrinology; Future    CBC and Auto Differential; Future    Comprehensive Metabolic Panel; Future

## 2024-12-18 NOTE — ASSESSMENT & PLAN NOTE
Refer to DKA above.   Orders:    Referral to Endocrinology; Future    CBC and Auto Differential; Future    Comprehensive Metabolic Panel; Future    Hemoglobin A1C; Future    Albumin-Creatinine Ratio, Urine Random; Future

## 2024-12-18 NOTE — ASSESSMENT & PLAN NOTE
Will recheck labs as ordered  Orders:    CBC and Auto Differential; Future    Comprehensive Metabolic Panel; Future    Vitamin D 25-Hydroxy,Total (for eval of Vitamin D levels); Future

## 2024-12-20 ENCOUNTER — PATIENT OUTREACH (OUTPATIENT)
Dept: PRIMARY CARE | Facility: CLINIC | Age: 34
End: 2024-12-20
Payer: MEDICAID

## 2024-12-22 NOTE — DOCUMENTATION CLARIFICATION NOTE
"    PATIENT:               ALESHA LUNDY  ACCT #:                  [id number]  MRN:                       [id number]  :                       1990  ADMIT DATE:       2024 9:53 PM  DISCH DATE:        2024 6:52 PM  RESPONDING PROVIDER #:        [id number]          PROVIDER RESPONSE TEXT:    Non-infectious SIRS with acute organ dysfunction of MAYA    CDI QUERY TEXT:    Clarification    Instruction:    Based on your assessment of the patient and the clinical information, please provide the requested documentation by clicking on the appropriate radio button and enter any additional information if prompted.    Question: Please further clarify if there is a diagnosis related to the clinical information    When answering this query, please exercise your independent professional judgment. The fact that a question is being asked, does not imply that any particular answer is desired or expected.    The patient's clinical indicators include:  Clinical Information: Admit in DKA    Clinical Indicators:    H&P states \"Diabetic Ketoacidosis, Recurrent\", \"Leukocytosis: Suspect stress response in setting of DKA.\" and \"Acute Renal Failure: Suspect pre-renal in setting of DKA, IVF per above and trend\" and \" In the ED, tachycardic but other VSS.\"    VS range, first 6 hrs from admit, temp 37.1, hr  123-131, rr 20-21, bp 110//84    WBC 12.3 on admit,    Creatinine 1.34 on admit, trended to 0.78 on DC    No infection noted.    Treatment: lab monitoring, IVFB and continuous, insulin drip    Risk Factors: tachycardia, leukocytosis and MAYA in setting of DKA  Options provided:  -- Non-infectious SIRS with acute organ dysfunction of MAYA  -- Non-infectious SIRS without acute organ dysfunction  -- Other - I will add my own diagnosis  -- Refer to Clinical Documentation Reviewer    Query created by: Storm Jones on 2024 1:33 PM      Electronically signed by:  ALESSANDRA DELA CRUZ MD 2024 6:50 PM          "

## 2025-01-15 ENCOUNTER — APPOINTMENT (OUTPATIENT)
Dept: RADIOLOGY | Facility: HOSPITAL | Age: 35
End: 2025-01-15
Payer: MEDICAID

## 2025-01-15 ENCOUNTER — HOSPITAL ENCOUNTER (INPATIENT)
Facility: HOSPITAL | Age: 35
LOS: 2 days | Discharge: HOME | End: 2025-01-17
Attending: STUDENT IN AN ORGANIZED HEALTH CARE EDUCATION/TRAINING PROGRAM | Admitting: STUDENT IN AN ORGANIZED HEALTH CARE EDUCATION/TRAINING PROGRAM
Payer: MEDICAID

## 2025-01-15 ENCOUNTER — APPOINTMENT (OUTPATIENT)
Dept: CARDIOLOGY | Facility: HOSPITAL | Age: 35
End: 2025-01-15
Payer: MEDICAID

## 2025-01-15 DIAGNOSIS — E10.10 DIABETIC KETOACIDOSIS WITHOUT COMA ASSOCIATED WITH TYPE 1 DIABETES MELLITUS (MULTI): Primary | ICD-10-CM

## 2025-01-15 DIAGNOSIS — E10.10 TYPE 1 DIABETES MELLITUS WITH KETOACIDOSIS WITHOUT COMA: ICD-10-CM

## 2025-01-15 PROBLEM — E87.20 METABOLIC ACIDOSIS WITH INCREASED ANION GAP AND ACCUMULATION OF ORGANIC ACIDS: Status: ACTIVE | Noted: 2025-01-15

## 2025-01-15 LAB
ALBUMIN SERPL BCP-MCNC: 3.3 G/DL (ref 3.4–5)
ALBUMIN SERPL BCP-MCNC: 3.4 G/DL (ref 3.4–5)
ALBUMIN SERPL BCP-MCNC: 3.5 G/DL (ref 3.4–5)
ALBUMIN SERPL BCP-MCNC: 3.6 G/DL (ref 3.4–5)
ALBUMIN SERPL BCP-MCNC: 4.4 G/DL (ref 3.4–5)
ALP SERPL-CCNC: 96 U/L (ref 33–120)
ALT SERPL W P-5'-P-CCNC: 11 U/L (ref 10–52)
ANION GAP BLDV CALCULATED.4IONS-SCNC: 25 MMOL/L (ref 10–25)
ANION GAP BLDV CALCULATED.4IONS-SCNC: 30 MMOL/L (ref 10–25)
ANION GAP SERPL CALC-SCNC: 10 MMOL/L (ref 10–20)
ANION GAP SERPL CALC-SCNC: 10 MMOL/L (ref 10–20)
ANION GAP SERPL CALC-SCNC: 14 MMOL/L (ref 10–20)
ANION GAP SERPL CALC-SCNC: 23 MMOL/L (ref 10–20)
ANION GAP SERPL CALC-SCNC: 23 MMOL/L (ref 10–20)
ANION GAP SERPL CALC-SCNC: 33 MMOL/L (ref 10–20)
APPEARANCE UR: CLEAR
AST SERPL W P-5'-P-CCNC: 15 U/L (ref 9–39)
ATRIAL RATE: 117 BPM
B-OH-BUTYR SERPL-SCNC: 10.99 MMOL/L (ref 0.02–0.27)
BASE EXCESS BLDV CALC-SCNC: -11.7 MMOL/L (ref -2–3)
BASE EXCESS BLDV CALC-SCNC: -23.8 MMOL/L (ref -2–3)
BASE EXCESS BLDV CALC-SCNC: -24.2 MMOL/L (ref -2–3)
BASE EXCESS BLDV CALC-SCNC: -25.9 MMOL/L (ref -2–3)
BASE EXCESS BLDV CALC-SCNC: -8.5 MMOL/L (ref -2–3)
BASOPHILS # BLD AUTO: 0.13 X10*3/UL (ref 0–0.1)
BASOPHILS NFR BLD AUTO: 0.7 %
BILIRUB SERPL-MCNC: 0.4 MG/DL (ref 0–1.2)
BILIRUB UR STRIP.AUTO-MCNC: NEGATIVE MG/DL
BODY TEMPERATURE: 37 DEGREES CELSIUS
BUN SERPL-MCNC: 15 MG/DL (ref 6–23)
BUN SERPL-MCNC: 16 MG/DL (ref 6–23)
BUN SERPL-MCNC: 16 MG/DL (ref 6–23)
BUN SERPL-MCNC: 17 MG/DL (ref 6–23)
BUN SERPL-MCNC: 17 MG/DL (ref 6–23)
BUN SERPL-MCNC: 19 MG/DL (ref 6–23)
CA-I BLDV-SCNC: 1.25 MMOL/L (ref 1.1–1.33)
CA-I BLDV-SCNC: 1.32 MMOL/L (ref 1.1–1.33)
CALCIUM SERPL-MCNC: 7.6 MG/DL (ref 8.6–10.3)
CALCIUM SERPL-MCNC: 7.6 MG/DL (ref 8.6–10.3)
CALCIUM SERPL-MCNC: 7.7 MG/DL (ref 8.6–10.3)
CALCIUM SERPL-MCNC: 7.8 MG/DL (ref 8.6–10.3)
CALCIUM SERPL-MCNC: 7.8 MG/DL (ref 8.6–10.3)
CALCIUM SERPL-MCNC: 9.2 MG/DL (ref 8.6–10.3)
CHLORIDE BLDV-SCNC: 106 MMOL/L (ref 98–107)
CHLORIDE BLDV-SCNC: 110 MMOL/L (ref 98–107)
CHLORIDE SERPL-SCNC: 100 MMOL/L (ref 98–107)
CHLORIDE SERPL-SCNC: 107 MMOL/L (ref 98–107)
CHLORIDE SERPL-SCNC: 111 MMOL/L (ref 98–107)
CHLORIDE SERPL-SCNC: 113 MMOL/L (ref 98–107)
CO2 SERPL-SCNC: 14 MMOL/L (ref 21–32)
CO2 SERPL-SCNC: 19 MMOL/L (ref 21–32)
CO2 SERPL-SCNC: 20 MMOL/L (ref 21–32)
CO2 SERPL-SCNC: 6 MMOL/L (ref 21–32)
CO2 SERPL-SCNC: 6 MMOL/L (ref 21–32)
CO2 SERPL-SCNC: 7 MMOL/L (ref 21–32)
COLOR UR: COLORLESS
CREAT SERPL-MCNC: 0.78 MG/DL (ref 0.5–1.3)
CREAT SERPL-MCNC: 0.79 MG/DL (ref 0.5–1.3)
CREAT SERPL-MCNC: 0.93 MG/DL (ref 0.5–1.3)
CREAT SERPL-MCNC: 1.03 MG/DL (ref 0.5–1.3)
CREAT SERPL-MCNC: 1.03 MG/DL (ref 0.5–1.3)
CREAT SERPL-MCNC: 1.24 MG/DL (ref 0.5–1.3)
EGFRCR SERPLBLD CKD-EPI 2021: 77 ML/MIN/1.73M*2
EGFRCR SERPLBLD CKD-EPI 2021: >90 ML/MIN/1.73M*2
EOSINOPHIL # BLD AUTO: 0.01 X10*3/UL (ref 0–0.7)
EOSINOPHIL NFR BLD AUTO: 0.1 %
ERYTHROCYTE [DISTWIDTH] IN BLOOD BY AUTOMATED COUNT: 12.7 % (ref 11.5–14.5)
FLUAV RNA RESP QL NAA+PROBE: NOT DETECTED
FLUBV RNA RESP QL NAA+PROBE: NOT DETECTED
GLUCOSE BLD MANUAL STRIP-MCNC: 104 MG/DL (ref 74–99)
GLUCOSE BLD MANUAL STRIP-MCNC: 115 MG/DL (ref 74–99)
GLUCOSE BLD MANUAL STRIP-MCNC: 122 MG/DL (ref 74–99)
GLUCOSE BLD MANUAL STRIP-MCNC: 141 MG/DL (ref 74–99)
GLUCOSE BLD MANUAL STRIP-MCNC: 154 MG/DL (ref 74–99)
GLUCOSE BLD MANUAL STRIP-MCNC: 156 MG/DL (ref 74–99)
GLUCOSE BLD MANUAL STRIP-MCNC: 157 MG/DL (ref 74–99)
GLUCOSE BLD MANUAL STRIP-MCNC: 176 MG/DL (ref 74–99)
GLUCOSE BLD MANUAL STRIP-MCNC: 180 MG/DL (ref 74–99)
GLUCOSE BLD MANUAL STRIP-MCNC: 184 MG/DL (ref 74–99)
GLUCOSE BLD MANUAL STRIP-MCNC: 202 MG/DL (ref 74–99)
GLUCOSE BLD MANUAL STRIP-MCNC: 265 MG/DL (ref 74–99)
GLUCOSE BLD MANUAL STRIP-MCNC: 342 MG/DL (ref 74–99)
GLUCOSE BLD MANUAL STRIP-MCNC: 383 MG/DL (ref 74–99)
GLUCOSE BLD MANUAL STRIP-MCNC: 408 MG/DL (ref 74–99)
GLUCOSE BLD MANUAL STRIP-MCNC: 454 MG/DL (ref 74–99)
GLUCOSE BLD MANUAL STRIP-MCNC: 96 MG/DL (ref 74–99)
GLUCOSE BLDV-MCNC: 302 MG/DL (ref 74–99)
GLUCOSE BLDV-MCNC: 507 MG/DL (ref 74–99)
GLUCOSE SERPL-MCNC: 161 MG/DL (ref 74–99)
GLUCOSE SERPL-MCNC: 220 MG/DL (ref 74–99)
GLUCOSE SERPL-MCNC: 227 MG/DL (ref 74–99)
GLUCOSE SERPL-MCNC: 227 MG/DL (ref 74–99)
GLUCOSE SERPL-MCNC: 467 MG/DL (ref 74–99)
GLUCOSE SERPL-MCNC: 96 MG/DL (ref 74–99)
GLUCOSE UR STRIP.AUTO-MCNC: ABNORMAL MG/DL
HCO3 BLDV-SCNC: 13.6 MMOL/L (ref 22–26)
HCO3 BLDV-SCNC: 17.3 MMOL/L (ref 22–26)
HCO3 BLDV-SCNC: 3.9 MMOL/L (ref 22–26)
HCO3 BLDV-SCNC: 4.6 MMOL/L (ref 22–26)
HCO3 BLDV-SCNC: 6.8 MMOL/L (ref 22–26)
HCT VFR BLD AUTO: 50.5 % (ref 41–52)
HCT VFR BLD EST: 47 % (ref 41–52)
HCT VFR BLD EST: 50 % (ref 41–52)
HGB BLD-MCNC: 16.6 G/DL (ref 13.5–17.5)
HGB BLDV-MCNC: 15.7 G/DL (ref 13.5–17.5)
HGB BLDV-MCNC: 16.5 G/DL (ref 13.5–17.5)
HOLD SPECIMEN: NORMAL
IMM GRANULOCYTES # BLD AUTO: 0.35 X10*3/UL (ref 0–0.7)
IMM GRANULOCYTES NFR BLD AUTO: 1.8 % (ref 0–0.9)
INHALED O2 CONCENTRATION: 21 %
INHALED O2 CONCENTRATION: 28 %
KETONES UR STRIP.AUTO-MCNC: ABNORMAL MG/DL
LACTATE BLDV-SCNC: 2.3 MMOL/L (ref 0.4–2)
LACTATE BLDV-SCNC: 3.7 MMOL/L (ref 0.4–2)
LACTATE SERPL-SCNC: 0.9 MMOL/L (ref 0.4–2)
LACTATE SERPL-SCNC: 1.1 MMOL/L (ref 0.4–2)
LACTATE SERPL-SCNC: 1.3 MMOL/L (ref 0.4–2)
LEUKOCYTE ESTERASE UR QL STRIP.AUTO: NEGATIVE
LIPASE SERPL-CCNC: 13 U/L (ref 9–82)
LYMPHOCYTES # BLD AUTO: 1.6 X10*3/UL (ref 1.2–4.8)
LYMPHOCYTES NFR BLD AUTO: 8 %
MAGNESIUM SERPL-MCNC: 1.63 MG/DL (ref 1.6–2.4)
MAGNESIUM SERPL-MCNC: 1.66 MG/DL (ref 1.6–2.4)
MAGNESIUM SERPL-MCNC: 1.79 MG/DL (ref 1.6–2.4)
MAGNESIUM SERPL-MCNC: 1.94 MG/DL (ref 1.6–2.4)
MAGNESIUM SERPL-MCNC: 2.07 MG/DL (ref 1.6–2.4)
MCH RBC QN AUTO: 31.7 PG (ref 26–34)
MCHC RBC AUTO-ENTMCNC: 32.9 G/DL (ref 32–36)
MCV RBC AUTO: 97 FL (ref 80–100)
MONOCYTES # BLD AUTO: 1.72 X10*3/UL (ref 0.1–1)
MONOCYTES NFR BLD AUTO: 8.6 %
MUCOUS THREADS #/AREA URNS AUTO: NORMAL /LPF
NEUTROPHILS # BLD AUTO: 16.17 X10*3/UL (ref 1.2–7.7)
NEUTROPHILS NFR BLD AUTO: 80.8 %
NITRITE UR QL STRIP.AUTO: NEGATIVE
NRBC BLD-RTO: 0 /100 WBCS (ref 0–0)
OXYHGB MFR BLDV: 70.3 % (ref 45–75)
OXYHGB MFR BLDV: 91.8 % (ref 45–75)
OXYHGB MFR BLDV: 94.7 % (ref 45–75)
OXYHGB MFR BLDV: 94.9 % (ref 45–75)
OXYHGB MFR BLDV: 96 % (ref 45–75)
P AXIS: 84 DEGREES
PCO2 BLDV: 16 MM HG (ref 41–51)
PCO2 BLDV: 17 MM HG (ref 41–51)
PCO2 BLDV: 29 MM HG (ref 41–51)
PCO2 BLDV: 29 MM HG (ref 41–51)
PCO2 BLDV: 36 MM HG (ref 41–51)
PH BLDV: 6.98 PH (ref 7.33–7.43)
PH BLDV: 6.99 PH (ref 7.33–7.43)
PH BLDV: 7.04 PH (ref 7.33–7.43)
PH BLDV: 7.28 PH (ref 7.33–7.43)
PH BLDV: 7.29 PH (ref 7.33–7.43)
PH UR STRIP.AUTO: 5 [PH]
PHOSPHATE SERPL-MCNC: 1.8 MG/DL (ref 2.5–4.9)
PHOSPHATE SERPL-MCNC: 2.8 MG/DL (ref 2.5–4.9)
PHOSPHATE SERPL-MCNC: 3 MG/DL (ref 2.5–4.9)
PHOSPHATE SERPL-MCNC: 3.6 MG/DL (ref 2.5–4.9)
PHOSPHATE SERPL-MCNC: 4.8 MG/DL (ref 2.5–4.9)
PLATELET # BLD AUTO: 188 X10*3/UL (ref 150–450)
PO2 BLDV: 48 MM HG (ref 35–45)
PO2 BLDV: 69 MM HG (ref 35–45)
PO2 BLDV: 73 MM HG (ref 35–45)
PO2 BLDV: 85 MM HG (ref 35–45)
PO2 BLDV: 87 MM HG (ref 35–45)
POTASSIUM BLDV-SCNC: 4.7 MMOL/L (ref 3.5–5.3)
POTASSIUM BLDV-SCNC: 5.4 MMOL/L (ref 3.5–5.3)
POTASSIUM SERPL-SCNC: 3.4 MMOL/L (ref 3.5–5.3)
POTASSIUM SERPL-SCNC: 3.6 MMOL/L (ref 3.5–5.3)
POTASSIUM SERPL-SCNC: 3.6 MMOL/L (ref 3.5–5.3)
POTASSIUM SERPL-SCNC: 3.8 MMOL/L (ref 3.5–5.3)
POTASSIUM SERPL-SCNC: 3.8 MMOL/L (ref 3.5–5.3)
POTASSIUM SERPL-SCNC: 5.9 MMOL/L (ref 3.5–5.3)
PR INTERVAL: 142 MS
PROT SERPL-MCNC: 6.9 G/DL (ref 6.4–8.2)
PROT UR STRIP.AUTO-MCNC: ABNORMAL MG/DL
Q ONSET: 252 MS
QRS COUNT: 19 BEATS
QRS DURATION: 98 MS
QT INTERVAL: 362 MS
QTC CALCULATION(BAZETT): 505 MS
QTC FREDERICIA: 452 MS
R AXIS: 68 DEGREES
RBC # BLD AUTO: 5.23 X10*6/UL (ref 4.5–5.9)
RBC # UR STRIP.AUTO: NEGATIVE /UL
RBC #/AREA URNS AUTO: NORMAL /HPF
SAO2 % BLDV: 71 % (ref 45–75)
SAO2 % BLDV: 94 % (ref 45–75)
SAO2 % BLDV: 96 % (ref 45–75)
SAO2 % BLDV: 97 % (ref 45–75)
SAO2 % BLDV: 98 % (ref 45–75)
SARS-COV-2 RNA RESP QL NAA+PROBE: NOT DETECTED
SODIUM BLDV-SCNC: 134 MMOL/L (ref 136–145)
SODIUM BLDV-SCNC: 135 MMOL/L (ref 136–145)
SODIUM SERPL-SCNC: 133 MMOL/L (ref 136–145)
SODIUM SERPL-SCNC: 134 MMOL/L (ref 136–145)
SODIUM SERPL-SCNC: 137 MMOL/L (ref 136–145)
SODIUM SERPL-SCNC: 137 MMOL/L (ref 136–145)
SODIUM SERPL-SCNC: 138 MMOL/L (ref 136–145)
SODIUM SERPL-SCNC: 138 MMOL/L (ref 136–145)
SP GR UR STRIP.AUTO: 1.02
T AXIS: 23 DEGREES
T OFFSET: 433 MS
UROBILINOGEN UR STRIP.AUTO-MCNC: NORMAL MG/DL
VENTRICULAR RATE: 117 BPM
WBC # BLD AUTO: 20 X10*3/UL (ref 4.4–11.3)
WBC #/AREA URNS AUTO: NORMAL /HPF

## 2025-01-15 PROCEDURE — 2500000002 HC RX 250 W HCPCS SELF ADMINISTERED DRUGS (ALT 637 FOR MEDICARE OP, ALT 636 FOR OP/ED): Performed by: INTERNAL MEDICINE

## 2025-01-15 PROCEDURE — 83605 ASSAY OF LACTIC ACID: CPT

## 2025-01-15 PROCEDURE — 84100 ASSAY OF PHOSPHORUS: CPT | Performed by: STUDENT IN AN ORGANIZED HEALTH CARE EDUCATION/TRAINING PROGRAM

## 2025-01-15 PROCEDURE — 82947 ASSAY GLUCOSE BLOOD QUANT: CPT

## 2025-01-15 PROCEDURE — 99291 CRITICAL CARE FIRST HOUR: CPT | Performed by: STUDENT IN AN ORGANIZED HEALTH CARE EDUCATION/TRAINING PROGRAM

## 2025-01-15 PROCEDURE — 85025 COMPLETE CBC W/AUTO DIFF WBC: CPT | Performed by: STUDENT IN AN ORGANIZED HEALTH CARE EDUCATION/TRAINING PROGRAM

## 2025-01-15 PROCEDURE — 71045 X-RAY EXAM CHEST 1 VIEW: CPT

## 2025-01-15 PROCEDURE — 96361 HYDRATE IV INFUSION ADD-ON: CPT

## 2025-01-15 PROCEDURE — 2500000005 HC RX 250 GENERAL PHARMACY W/O HCPCS: Performed by: INTERNAL MEDICINE

## 2025-01-15 PROCEDURE — 2500000004 HC RX 250 GENERAL PHARMACY W/ HCPCS (ALT 636 FOR OP/ED)

## 2025-01-15 PROCEDURE — 99255 IP/OBS CONSLTJ NEW/EST HI 80: CPT | Performed by: INTERNAL MEDICINE

## 2025-01-15 PROCEDURE — 84132 ASSAY OF SERUM POTASSIUM: CPT | Performed by: STUDENT IN AN ORGANIZED HEALTH CARE EDUCATION/TRAINING PROGRAM

## 2025-01-15 PROCEDURE — 99233 SBSQ HOSP IP/OBS HIGH 50: CPT | Performed by: INTERNAL MEDICINE

## 2025-01-15 PROCEDURE — 36415 COLL VENOUS BLD VENIPUNCTURE: CPT

## 2025-01-15 PROCEDURE — 96375 TX/PRO/DX INJ NEW DRUG ADDON: CPT

## 2025-01-15 PROCEDURE — 82805 BLOOD GASES W/O2 SATURATION: CPT

## 2025-01-15 PROCEDURE — 93005 ELECTROCARDIOGRAM TRACING: CPT

## 2025-01-15 PROCEDURE — 84100 ASSAY OF PHOSPHORUS: CPT

## 2025-01-15 PROCEDURE — 82805 BLOOD GASES W/O2 SATURATION: CPT | Performed by: STUDENT IN AN ORGANIZED HEALTH CARE EDUCATION/TRAINING PROGRAM

## 2025-01-15 PROCEDURE — 82435 ASSAY OF BLOOD CHLORIDE: CPT

## 2025-01-15 PROCEDURE — 36415 COLL VENOUS BLD VENIPUNCTURE: CPT | Performed by: STUDENT IN AN ORGANIZED HEALTH CARE EDUCATION/TRAINING PROGRAM

## 2025-01-15 PROCEDURE — 83690 ASSAY OF LIPASE: CPT | Performed by: STUDENT IN AN ORGANIZED HEALTH CARE EDUCATION/TRAINING PROGRAM

## 2025-01-15 PROCEDURE — 99291 CRITICAL CARE FIRST HOUR: CPT

## 2025-01-15 PROCEDURE — 1210000001 HC SEMI-PRIVATE ROOM DAILY

## 2025-01-15 PROCEDURE — 1100000001 HC PRIVATE ROOM DAILY

## 2025-01-15 PROCEDURE — 82010 KETONE BODYS QUAN: CPT | Performed by: STUDENT IN AN ORGANIZED HEALTH CARE EDUCATION/TRAINING PROGRAM

## 2025-01-15 PROCEDURE — 81001 URINALYSIS AUTO W/SCOPE: CPT | Performed by: STUDENT IN AN ORGANIZED HEALTH CARE EDUCATION/TRAINING PROGRAM

## 2025-01-15 PROCEDURE — 2500000004 HC RX 250 GENERAL PHARMACY W/ HCPCS (ALT 636 FOR OP/ED): Performed by: STUDENT IN AN ORGANIZED HEALTH CARE EDUCATION/TRAINING PROGRAM

## 2025-01-15 PROCEDURE — 83735 ASSAY OF MAGNESIUM: CPT | Performed by: STUDENT IN AN ORGANIZED HEALTH CARE EDUCATION/TRAINING PROGRAM

## 2025-01-15 PROCEDURE — 83735 ASSAY OF MAGNESIUM: CPT

## 2025-01-15 PROCEDURE — 71045 X-RAY EXAM CHEST 1 VIEW: CPT | Performed by: RADIOLOGY

## 2025-01-15 PROCEDURE — 80053 COMPREHEN METABOLIC PANEL: CPT | Performed by: STUDENT IN AN ORGANIZED HEALTH CARE EDUCATION/TRAINING PROGRAM

## 2025-01-15 PROCEDURE — 87636 SARSCOV2 & INF A&B AMP PRB: CPT | Performed by: STUDENT IN AN ORGANIZED HEALTH CARE EDUCATION/TRAINING PROGRAM

## 2025-01-15 PROCEDURE — 2500000004 HC RX 250 GENERAL PHARMACY W/ HCPCS (ALT 636 FOR OP/ED): Performed by: INTERNAL MEDICINE

## 2025-01-15 PROCEDURE — 96374 THER/PROPH/DIAG INJ IV PUSH: CPT

## 2025-01-15 RX ORDER — DEXTROSE 50 % IN WATER (D50W) INTRAVENOUS SYRINGE
50
Status: DISCONTINUED | OUTPATIENT
Start: 2025-01-15 | End: 2025-01-15

## 2025-01-15 RX ORDER — BISACODYL 5 MG
10 TABLET, DELAYED RELEASE (ENTERIC COATED) ORAL DAILY PRN
Status: DISCONTINUED | OUTPATIENT
Start: 2025-01-15 | End: 2025-01-17 | Stop reason: HOSPADM

## 2025-01-15 RX ORDER — ONDANSETRON 4 MG/1
4 TABLET, FILM COATED ORAL EVERY 8 HOURS PRN
Status: DISCONTINUED | OUTPATIENT
Start: 2025-01-15 | End: 2025-01-17 | Stop reason: HOSPADM

## 2025-01-15 RX ORDER — BISACODYL 10 MG/1
10 SUPPOSITORY RECTAL DAILY PRN
Status: DISCONTINUED | OUTPATIENT
Start: 2025-01-15 | End: 2025-01-17 | Stop reason: HOSPADM

## 2025-01-15 RX ORDER — DEXTROSE 50 % IN WATER (D50W) INTRAVENOUS SYRINGE
25
Status: DISCONTINUED | OUTPATIENT
Start: 2025-01-15 | End: 2025-01-17 | Stop reason: HOSPADM

## 2025-01-15 RX ORDER — SODIUM CHLORIDE 450 MG/100ML
250 INJECTION, SOLUTION INTRAVENOUS CONTINUOUS
Status: DISCONTINUED | OUTPATIENT
Start: 2025-01-15 | End: 2025-01-15

## 2025-01-15 RX ORDER — ONDANSETRON HYDROCHLORIDE 2 MG/ML
4 INJECTION, SOLUTION INTRAVENOUS ONCE
Status: COMPLETED | OUTPATIENT
Start: 2025-01-15 | End: 2025-01-15

## 2025-01-15 RX ORDER — GUAIFENESIN 600 MG/1
600 TABLET, EXTENDED RELEASE ORAL EVERY 12 HOURS PRN
Status: DISCONTINUED | OUTPATIENT
Start: 2025-01-15 | End: 2025-01-17 | Stop reason: HOSPADM

## 2025-01-15 RX ORDER — DEXTROSE MONOHYDRATE AND SODIUM CHLORIDE 5; .45 G/100ML; G/100ML
150 INJECTION, SOLUTION INTRAVENOUS CONTINUOUS PRN
Status: DISCONTINUED | OUTPATIENT
Start: 2025-01-15 | End: 2025-01-15

## 2025-01-15 RX ORDER — DEXTROSE MONOHYDRATE 100 MG/ML
150 INJECTION, SOLUTION INTRAVENOUS CONTINUOUS PRN
Status: DISCONTINUED | OUTPATIENT
Start: 2025-01-15 | End: 2025-01-15

## 2025-01-15 RX ORDER — TALC
3 POWDER (GRAM) TOPICAL NIGHTLY PRN
Status: DISCONTINUED | OUTPATIENT
Start: 2025-01-15 | End: 2025-01-17 | Stop reason: HOSPADM

## 2025-01-15 RX ORDER — DEXTROSE 50 % IN WATER (D50W) INTRAVENOUS SYRINGE
12.5
Status: DISCONTINUED | OUTPATIENT
Start: 2025-01-15 | End: 2025-01-17 | Stop reason: HOSPADM

## 2025-01-15 RX ORDER — ONDANSETRON HYDROCHLORIDE 2 MG/ML
4 INJECTION, SOLUTION INTRAVENOUS EVERY 8 HOURS PRN
Status: DISCONTINUED | OUTPATIENT
Start: 2025-01-15 | End: 2025-01-17 | Stop reason: HOSPADM

## 2025-01-15 RX ORDER — INSULIN LISPRO 100 [IU]/ML
5 INJECTION, SOLUTION INTRAVENOUS; SUBCUTANEOUS
Status: DISCONTINUED | OUTPATIENT
Start: 2025-01-16 | End: 2025-01-17 | Stop reason: HOSPADM

## 2025-01-15 RX ORDER — INSULIN LISPRO 100 [IU]/ML
0-5 INJECTION, SOLUTION INTRAVENOUS; SUBCUTANEOUS
Status: DISCONTINUED | OUTPATIENT
Start: 2025-01-16 | End: 2025-01-17

## 2025-01-15 RX ADMIN — SODIUM CHLORIDE 1500 ML: 9 INJECTION, SOLUTION INTRAVENOUS at 02:17

## 2025-01-15 RX ADMIN — ONDANSETRON 4 MG: 2 INJECTION INTRAMUSCULAR; INTRAVENOUS at 02:15

## 2025-01-15 RX ADMIN — DEXTROSE AND SODIUM CHLORIDE 150 ML/HR: 5; 450 INJECTION, SOLUTION INTRAVENOUS at 14:39

## 2025-01-15 RX ADMIN — INSULIN HUMAN 10.79 UNITS/HR: 1 INJECTION, SOLUTION INTRAVENOUS at 03:41

## 2025-01-15 RX ADMIN — SODIUM CHLORIDE 1000 ML: 9 INJECTION, SOLUTION INTRAVENOUS at 04:36

## 2025-01-15 RX ADMIN — SODIUM CHLORIDE 250 ML/HR: 4.5 INJECTION, SOLUTION INTRAVENOUS at 04:13

## 2025-01-15 RX ADMIN — DEXTROSE AND SODIUM CHLORIDE 150 ML/HR: 5; 450 INJECTION, SOLUTION INTRAVENOUS at 07:50

## 2025-01-15 RX ADMIN — INSULIN HUMAN 22 UNITS: 100 INJECTION, SUSPENSION SUBCUTANEOUS at 18:09

## 2025-01-15 RX ADMIN — INSULIN HUMAN 13.5 UNITS/HR: 1 INJECTION, SOLUTION INTRAVENOUS at 11:27

## 2025-01-15 RX ADMIN — DEXTROSE MONOHYDRATE 150 ML/HR: 10 INJECTION, SOLUTION INTRAVENOUS at 17:14

## 2025-01-15 RX ADMIN — POTASSIUM PHOSPHATE, MONOBASIC AND POTASSIUM PHOSPHATE, DIBASIC 30 MMOL: 224; 236 INJECTION, SOLUTION, CONCENTRATE INTRAVENOUS at 10:20

## 2025-01-15 SDOH — ECONOMIC STABILITY: HOUSING INSECURITY: AT ANY TIME IN THE PAST 12 MONTHS, WERE YOU HOMELESS OR LIVING IN A SHELTER (INCLUDING NOW)?: NO

## 2025-01-15 SDOH — HEALTH STABILITY: MENTAL HEALTH: HOW OFTEN DO YOU HAVE SIX OR MORE DRINKS ON ONE OCCASION?: NEVER

## 2025-01-15 SDOH — ECONOMIC STABILITY: HOUSING INSECURITY: IN THE PAST 12 MONTHS, HOW MANY TIMES HAVE YOU MOVED WHERE YOU WERE LIVING?: 0

## 2025-01-15 SDOH — SOCIAL STABILITY: SOCIAL INSECURITY: WERE YOU ABLE TO COMPLETE ALL THE BEHAVIORAL HEALTH SCREENINGS?: YES

## 2025-01-15 SDOH — SOCIAL STABILITY: SOCIAL INSECURITY: WITHIN THE LAST YEAR, HAVE YOU BEEN AFRAID OF YOUR PARTNER OR EX-PARTNER?: NO

## 2025-01-15 SDOH — SOCIAL STABILITY: SOCIAL INSECURITY: WITHIN THE LAST YEAR, HAVE YOU BEEN HUMILIATED OR EMOTIONALLY ABUSED IN OTHER WAYS BY YOUR PARTNER OR EX-PARTNER?: NO

## 2025-01-15 SDOH — ECONOMIC STABILITY: HOUSING INSECURITY: IN THE LAST 12 MONTHS, WAS THERE A TIME WHEN YOU WERE NOT ABLE TO PAY THE MORTGAGE OR RENT ON TIME?: NO

## 2025-01-15 SDOH — ECONOMIC STABILITY: TRANSPORTATION INSECURITY: IN THE PAST 12 MONTHS, HAS LACK OF TRANSPORTATION KEPT YOU FROM MEDICAL APPOINTMENTS OR FROM GETTING MEDICATIONS?: NO

## 2025-01-15 SDOH — ECONOMIC STABILITY: INCOME INSECURITY: IN THE PAST 12 MONTHS HAS THE ELECTRIC, GAS, OIL, OR WATER COMPANY THREATENED TO SHUT OFF SERVICES IN YOUR HOME?: NO

## 2025-01-15 SDOH — SOCIAL STABILITY: SOCIAL INSECURITY: HAS ANYONE EVER THREATENED TO HURT YOUR FAMILY OR YOUR PETS?: NO

## 2025-01-15 SDOH — HEALTH STABILITY: MENTAL HEALTH: HOW OFTEN DO YOU HAVE A DRINK CONTAINING ALCOHOL?: NEVER

## 2025-01-15 SDOH — SOCIAL STABILITY: SOCIAL INSECURITY: ABUSE: ADULT

## 2025-01-15 SDOH — SOCIAL STABILITY: SOCIAL INSECURITY: DO YOU FEEL ANYONE HAS EXPLOITED OR TAKEN ADVANTAGE OF YOU FINANCIALLY OR OF YOUR PERSONAL PROPERTY?: NO

## 2025-01-15 SDOH — ECONOMIC STABILITY: FOOD INSECURITY: HOW HARD IS IT FOR YOU TO PAY FOR THE VERY BASICS LIKE FOOD, HOUSING, MEDICAL CARE, AND HEATING?: NOT HARD AT ALL

## 2025-01-15 SDOH — SOCIAL STABILITY: SOCIAL INSECURITY: ARE YOU OR HAVE YOU BEEN THREATENED OR ABUSED PHYSICALLY, EMOTIONALLY, OR SEXUALLY BY ANYONE?: NO

## 2025-01-15 SDOH — SOCIAL STABILITY: SOCIAL INSECURITY: DOES ANYONE TRY TO KEEP YOU FROM HAVING/CONTACTING OTHER FRIENDS OR DOING THINGS OUTSIDE YOUR HOME?: NO

## 2025-01-15 SDOH — SOCIAL STABILITY: SOCIAL INSECURITY: DO YOU FEEL UNSAFE GOING BACK TO THE PLACE WHERE YOU ARE LIVING?: NO

## 2025-01-15 SDOH — SOCIAL STABILITY: SOCIAL INSECURITY: HAVE YOU HAD THOUGHTS OF HARMING ANYONE ELSE?: NO

## 2025-01-15 SDOH — ECONOMIC STABILITY: FOOD INSECURITY: WITHIN THE PAST 12 MONTHS, THE FOOD YOU BOUGHT JUST DIDN'T LAST AND YOU DIDN'T HAVE MONEY TO GET MORE.: NEVER TRUE

## 2025-01-15 SDOH — ECONOMIC STABILITY: FOOD INSECURITY: WITHIN THE PAST 12 MONTHS, YOU WORRIED THAT YOUR FOOD WOULD RUN OUT BEFORE YOU GOT THE MONEY TO BUY MORE.: NEVER TRUE

## 2025-01-15 SDOH — SOCIAL STABILITY: SOCIAL INSECURITY: ARE THERE ANY APPARENT SIGNS OF INJURIES/BEHAVIORS THAT COULD BE RELATED TO ABUSE/NEGLECT?: NO

## 2025-01-15 SDOH — HEALTH STABILITY: MENTAL HEALTH: HOW MANY DRINKS CONTAINING ALCOHOL DO YOU HAVE ON A TYPICAL DAY WHEN YOU ARE DRINKING?: PATIENT DOES NOT DRINK

## 2025-01-15 ASSESSMENT — ENCOUNTER SYMPTOMS
ACTIVITY CHANGE: 0
VOMITING: 1
DIARRHEA: 0
PALPITATIONS: 0
SINUS PAIN: 0
HEADACHES: 0
DIAPHORESIS: 0
CHEST TIGHTNESS: 0
APNEA: 0
STRIDOR: 0
DYSURIA: 0
CONFUSION: 0
SORE THROAT: 0
AGITATION: 0
DECREASED CONCENTRATION: 0
CHILLS: 0
DIFFICULTY URINATING: 0
COLOR CHANGE: 0
JOINT SWELLING: 0
ABDOMINAL PAIN: 0
POLYDIPSIA: 1
FATIGUE: 0
RHINORRHEA: 0
APPETITE CHANGE: 1
NERVOUS/ANXIOUS: 0
FREQUENCY: 1
BACK PAIN: 0
MYALGIAS: 0
NUMBNESS: 0
NAUSEA: 1
FLANK PAIN: 0
LIGHT-HEADEDNESS: 0
WHEEZING: 0
ABDOMINAL DISTENTION: 0
NAUSEA: 0
SHORTNESS OF BREATH: 0
ARTHRALGIAS: 0
VOMITING: 0
WOUND: 0
DIZZINESS: 0
CONSTIPATION: 0
COUGH: 0
FEVER: 0
HEMATURIA: 0
WEAKNESS: 1

## 2025-01-15 ASSESSMENT — ACTIVITIES OF DAILY LIVING (ADL)
FEEDING YOURSELF: INDEPENDENT
TOILETING: INDEPENDENT
BATHING: INDEPENDENT
HEARING - RIGHT EAR: FUNCTIONAL
DRESSING YOURSELF: INDEPENDENT
HEARING - LEFT EAR: FUNCTIONAL
LACK_OF_TRANSPORTATION: NO
GROOMING: INDEPENDENT
LACK_OF_TRANSPORTATION: NO
WALKS IN HOME: INDEPENDENT
ADEQUATE_TO_COMPLETE_ADL: YES
JUDGMENT_ADEQUATE_SAFELY_COMPLETE_DAILY_ACTIVITIES: YES
PATIENT'S MEMORY ADEQUATE TO SAFELY COMPLETE DAILY ACTIVITIES?: YES

## 2025-01-15 ASSESSMENT — LIFESTYLE VARIABLES
AUDIT-C TOTAL SCORE: 0
HAVE PEOPLE ANNOYED YOU BY CRITICIZING YOUR DRINKING: NO
EVER FELT BAD OR GUILTY ABOUT YOUR DRINKING: NO
HOW MANY STANDARD DRINKS CONTAINING ALCOHOL DO YOU HAVE ON A TYPICAL DAY: PATIENT DOES NOT DRINK
HAVE YOU EVER FELT YOU SHOULD CUT DOWN ON YOUR DRINKING: NO
HOW OFTEN DO YOU HAVE 6 OR MORE DRINKS ON ONE OCCASION: NEVER
SKIP TO QUESTIONS 9-10: 1
HOW OFTEN DO YOU HAVE A DRINK CONTAINING ALCOHOL: NEVER
AUDIT-C TOTAL SCORE: 0
TOTAL SCORE: 0
AUDIT-C TOTAL SCORE: 0
SKIP TO QUESTIONS 9-10: 1
EVER HAD A DRINK FIRST THING IN THE MORNING TO STEADY YOUR NERVES TO GET RID OF A HANGOVER: NO

## 2025-01-15 ASSESSMENT — PAIN - FUNCTIONAL ASSESSMENT
PAIN_FUNCTIONAL_ASSESSMENT: 0-10

## 2025-01-15 ASSESSMENT — PAIN SCALES - GENERAL
PAINLEVEL_OUTOF10: 1
PAINLEVEL_OUTOF10: 0 - NO PAIN
PAINLEVEL_OUTOF10: 0 - NO PAIN

## 2025-01-15 NOTE — ASSESSMENT & PLAN NOTE
DKA:  Labs demonstrating DKA with beta-hydroxybutyrate at 10.99, glucose 467, and VBG pH 6.98. BMP bicarb 7 and anion gap 33. K 5.9.  Continue 1/2 NS at 250 ml/hr, until glucose <250, then switch to D5 1/2  ml/hr.  Insulin drip started, titrate per order set.  Trend RFP, Mag and VBG q4h.  Check glucose q1h  Endocrinology consulted  Pt needs to be more compliant with his insulin.     Diet: Carb-controlled  DVT Prophylaxis: None needed per guidelines  Code Status: Full code  Case Discussed With: ED provider  Additional Sources Reviewed: Past admissions.     Anticipated Length of Stay (LOS): Patient will require 1-2 midnights for treatment of DKA.

## 2025-01-15 NOTE — PROGRESS NOTES
01/15/25 1158   Discharge Planning   Living Arrangements Parent   Support Systems Parent   Assistance Needed none   Type of Residence Private residence   Home or Post Acute Services None   Expected Discharge Disposition Home   Does the patient need discharge transport arranged? No     Patient admitted to ICU for DKA. Lives at home with mother. Well known to [facility] for his DKA admissions. He was sent with [facility] on last DC. New referral placed on discharge this admission. Preference is to return home when medically ready. TCC to follow.

## 2025-01-15 NOTE — ED PROVIDER NOTES
HPI   Chief Complaint   Patient presents with    Vomiting     PT has been vomiting today, has hx of diabetes. Has been in DKA in past.  in triage.        34-year-old male with past med history of type 1 diabetes presents ED with concerns for nausea and vomiting.  Symptoms started earlier today.  Says had 3-6 episodes of it.  No blood in the vomit.  No abdominal pain.  No diarrhea or constipation.  No chest pain or shortness of breath.  He says he had a slight cough.  No fever.  No dysuria or hematuria.  Says he is compliant with his insulin.  No recent changes to his medications.              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    DIABETIC KETO ACIDOSIS    DKA (diabetic ketoacidosis) (Multi) 07/11/2023    DKA, type 1, not at goal 04/21/2023    DM2 (diabetes mellitus, type 2) (Multi) 11/15/2023    DIABETES    Elevated blood sugar 11/15/2023    ELEVATED BLOOD SUGAR/ 375 LAST CHECK    Elevated blood-pressure reading, without diagnosis of hypertension 09/09/2020    Elevated blood pressure reading    Fatigue 07/11/2023    Generalized anxiety disorder 07/11/2023    Microalbuminuria due to type 1 diabetes mellitus (Multi) 07/11/2023    Personal history of COVID-19 04/02/2023    Poorly controlled diabetes mellitus (Multi) 07/11/2023    Type 1 diabetes mellitus 07/11/2023    Type 1 diabetes mellitus with hyperglycemia (Multi) 02/25/2019    Vitamin B12 deficiency 07/11/2023    Vitamin D deficiency 07/11/2023    Vomiting 11/15/2023    VOMITING HEADACHE BACK ACHE     History reviewed. No pertinent surgical history.  Family History   Problem Relation Name Age of Onset    No Known Problems Mother      No Known Problems Father      Hypertension Other      Coronary  artery disease Other      Diabetes Other      Heart failure Other       Social History     Tobacco Use    Smoking status: Never    Smokeless tobacco: Never   Vaping Use    Vaping status: Former   Substance Use Topics    Alcohol use: Never    Drug use: Never       Physical Exam   ED Triage Vitals [01/15/25 0019]   Temperature Heart Rate Respirations BP   36.7 °C (98.1 °F) (!) 120 16 105/62      Pulse Ox Temp src Heart Rate Source Patient Position   98 % -- -- --      BP Location FiO2 (%)     -- --       Physical Exam  Vitals and nursing note reviewed.   Constitutional:       General: He is not in acute distress.     Appearance: He is well-developed.   HENT:      Head: Normocephalic and atraumatic.   Eyes:      Conjunctiva/sclera: Conjunctivae normal.   Cardiovascular:      Rate and Rhythm: Normal rate and regular rhythm.      Heart sounds: No murmur heard.  Pulmonary:      Effort: Pulmonary effort is normal. No respiratory distress.      Breath sounds: Normal breath sounds.   Abdominal:      Palpations: Abdomen is soft.      Tenderness: There is no abdominal tenderness.   Musculoskeletal:         General: No swelling.      Cervical back: Neck supple.   Skin:     General: Skin is warm and dry.      Capillary Refill: Capillary refill takes less than 2 seconds.   Neurological:      Mental Status: He is alert.   Psychiatric:         Mood and Affect: Mood normal.           ED Course & MDM   Diagnoses as of 01/15/25 0322   Diabetic ketoacidosis without coma associated with type 1 diabetes mellitus (Multi)                 No data recorded     P[place]oy Coma Scale Score: 15 (01/15/25 0016 : Bari Bunch, KIMBERLEY)                           Medical Decision Making  HISTORIAN:  Patient    CHART REVIEW:  No pertinent findings    PT SUMMARY:  34-year-old male presents to ED with nausea and vomiting.  Upon arrival he is tachycardic otherwise stable.    DDX:  DKA, hyperglycemia, gastritis, gastric ulcer, pancreatitis    PLAN:  Obtain  CBC, CMP, lipase, ketones, VBG, UA, chest x-ray, viral testing    DISPO/RE-EVAL:  Patient CBC shows a leukocytosis of 20,000.  CMP shows elevated anion gap of 33 and a bicarb of 7.  Mild hyper-K at 5.9.  Lipase negative.  Ketones up at 10.9.  VBG shows a pH is 6.98 with a low bicarb.  Chest x-ray negative.  Overall these labs suggest DKA.  He was given 1.5 L IV fluid and Zofran.  Will start patient on IV insulin along with maintenance fluids.  Will admit patient to ICU for further management.          Procedure  Critical Care    Performed by: Geovanny Almonte DO  Authorized by: Geovanny Almonte DO    Critical care provider statement:     Critical care time (minutes):  30    Critical care time was exclusive of:  Separately billable procedures and treating other patients    Critical care was necessary to treat or prevent imminent or life-threatening deterioration of the following conditions:  Endocrine crisis    Critical care was time spent personally by me on the following activities:  Development of treatment plan with patient or surrogate, evaluation of patient's response to treatment, examination of patient, interpretation of cardiac output measurements, obtaining history from patient or surrogate, ordering and performing treatments and interventions, ordering and review of laboratory studies, ordering and review of radiographic studies, pulse oximetry and re-evaluation of patient's condition    Care discussed with: admitting provider         Geovanny Almonte DO  01/15/25 0324

## 2025-01-15 NOTE — PROGRESS NOTES
Ed Sanon is a 34 y.o. male on day 0 of admission presenting with Diabetic ketoacidosis without coma associated with type 1 diabetes mellitus (Multi).      Subjective   Ed Sanon is a 34 y.o. male with PMHx s/f IDDM 1 presenting with DKA. He has a history of recurrent admissions for DKA, including 5 over the past month. He says over the past day he has had nausea, vomiting, weakness, abdominal and chest pain.  No inciting factors noted; he was at his baseline up until then with no recent sick contacts or exposures. No appetite, has not been able to eat anything. He does say he has been urinating more than usual. He has a continuous glucose monitor. He says he has not missed any doses of his insulin, although he told the medical student that he did. No shortness of breath, cough, congestion, fever, chills, lightheadedness, leg swelling, bowel issues or other symptoms. Last time he was admitted he refused to take SSI if his blood sugar was less than 300.     ED Course (Summary - please note all labs, imaging studies, and interventions noted below have been personally reviewed and/or interpreted on day of admission):   Vitals on presentation: 98.1 F, 120 bpm, 16 rr, 105/62, 98% on RA  Labs: CMP glu 467, , K 5.9, bicarb 7, anion gap 33  Phos 4.8  Mag 2.07  Lipase 13  Beta-hydroxybutyrate 10.99  CBC WBC 10.0, absolute neutrophils 16.17  VBG pH 6.98, pCO2 29, pO2 48, HCO3 6.8  Imaging: CXR - No airspace consolidation or pleural effusion.   Interventions: 1.5 L NS bolus, Zofran 4 mg. Pt admitted to ICU for DKA treatment   1/15/2025: Patient was seen and examined.  Bicarb levels is improving but still at 14.  Continue insulin drip and trend BMP Q4 hourly.  Endocrinologist consulted.  Intensivist on board and recommendations also appreciated.  Objective     Last Recorded Vitals  /64   Pulse 106   Temp 37.7 °C (99.9 °F)   Resp 20   Wt 73.1 kg (161 lb 2.5 oz)   SpO2 97%   Intake/Output last 3  Shifts:    Intake/Output Summary (Last 24 hours) at 1/15/2025 1433  Last data filed at 1/15/2025 0754  Gross per 24 hour   Intake 1948.51 ml   Output 900 ml   Net 1048.51 ml       Admission Weight  Weight: 77.1 kg (170 lb) (01/15/25 0019)    Daily Weight  01/15/25 : 73.1 kg (161 lb 2.5 oz)    Image Results  ECG 12 lead  Sinus tachycardia  Prolonged QT interval  XR chest 1 view  Narrative: Interpreted By:  Santi De Paz,   STUDY:  XR CHEST 1 VIEW;  1/15/2025 2:00 am      INDICATION:  Signs/Symptoms:cough.      COMPARISON:  11/7/2024      ACCESSION NUMBER(S):  [id number]      ORDERING CLINICIAN:  ROSITA REYNOLDS      FINDINGS:  The cardiac silhouette is normal in size. No focal airspace  consolidation or pleural effusion. No pneumothorax.      Impression: No airspace consolidation or pleural effusion.      MACRO:  None      Signed by: Santi De Paz 1/15/2025 2:28 AM  Dictation workstation:   [id number]      Physical Exam  Vitals:    01/15/25 1400   BP: 111/64   Pulse: 106   Resp: 20   Temp:    SpO2: 97%     Constitutional: Pleasant and cooperative. Laying in bed in no acute distress. Conversant. Somewhat fatigued/lethargic  Skin: Warm and dry; no obvious lesions, rashes, pallor, or jaundice.   Eyes: EOMI. Anicteric sclera.   ENT: Mucous membranes moist; no obvious injury or deformity appreciated.   Head and Neck: Normocephalic, atraumatic. ROM preserved. Trachea midline. No appreciable JVD.   Respiratory: Nonlabored on RA. Lungs clear to auscultation bilaterally without obvious adventitious sounds. Chest rise is equal.  Cardiovascular: RRR. No gross murmur, gallop, or rub. Extremities are warm and well-perfused with good capillary refill (< 3 seconds). No chest wall tenderness.   GI: Abdomen soft, nontender, nondistended. No obvious organomegaly appreciated. Bowel sounds are present.  : No CVA tenderness.   MSK: No gross abnormalities appreciated. No limitations to AROM/PROM appreciated.   Extremities: No cyanosis,  edema, or clubbing evident. Neurovascularly intact.   Neuro: A&Ox3. CN 2-12 grossly intact. Able to respond to questions appropriately and clearly. No acute focal neurologic deficits appreciated.  Psych: Appropriate mood and behavior.  Relevant Results               Assessment/Plan                  Assessment & Plan  Diabetic ketoacidosis without coma associated with type 1 diabetes mellitus (Multi)    Hyperkalemia    Vomiting    Metabolic acidosis with increased anion gap and accumulation of organic acids  DKA:  Labs demonstrating DKA with beta-hydroxybutyrate at 10.99, glucose 467, and VBG pH 6.98. BMP bicarb 7 and anion gap 33. K 5.9.  Continue 1/2 NS at 250 ml/hr, until glucose <250, then switch to D5 1/2  ml/hr.  Insulin drip started, titrate per order set.  Trend RFP, Mag and VBG q4h.  Check glucose q1h  Endocrinology consulted  Pt needs to be more compliant with his insulin.     Diet: Carb-controlled  DVT Prophylaxis: None needed per guidelines  Code Status: Full code  Case Discussed With: ED provider  Additional Sources Reviewed: Past admissions.     Anticipated Length of Stay (LOS): Patient will require 1-2 midnights for treatment of DKA.            Spent 35 minutes in the follow-up management of this patient today    Liseth Ahn MD

## 2025-01-15 NOTE — CONSULTS
"Critical Care Medicine Consult      Reason For Consult  DKA    History Of Present Illness  Ed Sanon is a 34 y.o. male with  past medical history of diabetes mellitus type 1 with poor insulin compliance, anxiety, depression, and several admissions for episodes of DKA presented to [facility] ED with nausea, vomiting, fatigue, generalized weakness, and abdominal pain. Upon ED workup, temperature 36.7 °C, heart rate 120, respiratory rate 16, blood pressure 105/62, and SpO2 98% on room air.  ED labs revealed blood glucose 467, sodium 134, potassium 5.9 (mildly hemolyzed), bicarbonate 7, anion gap 33, beta hydroxybutyrate 10.99, WBC 20, venous pH 6.98, pCO2 29, pO2 48, HCO3 calculated 6.8.  Chest x-ray revealed no acute cardiopulmonary process.  ED interventions included 4 mg IV Zofran, 1.5 L normal saline IV fluid bolus, and initiation of insulin infusion per DKA protocol.  Patient admitted to ICU and critical care medicine was consulted.    Patient seen and examined in ED bed 2.  Patient lying on ED bed, alert and oriented, and in no acute distress.  Patient reported that he came to the ER because he was having nausea, vomiting, weakness, and abdominal pain.  Patient reports that his appetite has been fine and he has been eating and drinking normal until yesterday. Patient denies any recent weight loss and reports that he thinks he has gained weight. He denies any sick contact or exposures. When asked about insulin usage at home, he reports that he has only been taking 22 units of NPH. When asked about short-acting insulin, patient reported that he hasn't been taking it for a few days and that \"it doesn't work.\" When asked for clarification about the short-acting insulin not working, patient stated that when he takes his short-acting insulin by the time he eats and the insulin kicks in, his food is gone and his blood sugar is low. Patient unable to provide what his blood glucose levels have been when they were " low. He reports that his blood sugars have been around 250. He does have a continuous glucose monitor. He denies any lightheadedness, dizziness, shortness of breath, congestion, fever, chills, hemoptysis, hematemesis, hematochezia, dyschezia, or ay other symptoms. He stated that he feels dryer that what he was when he came to the hospital. Discussed with patient about plan of care including additional IVF bolus, maintenance IV fluids per DKA protocol, IV insulin, and labs. Patient stated understanding and had no further questions.       Past Medical History:   Diagnosis Date    Abdominal pain 04/21/2023    Anxiety and depression 08/08/2021    Contact with and (suspected) exposure to covid-19 04/21/2023    COVID-19 virus infection 07/11/2023    Diabetes mellitus (Multi)     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    DIABETIC KETO ACIDOSIS    DKA (diabetic ketoacidosis) (Multi) 07/11/2023    DKA, type 1, not at goal 04/21/2023    DM2 (diabetes mellitus, type 2) (Multi) 11/15/2023    DIABETES    Elevated blood sugar 11/15/2023    ELEVATED BLOOD SUGAR/ 375 LAST CHECK    Elevated blood-pressure reading, without diagnosis of hypertension 09/09/2020    Elevated blood pressure reading    Fatigue 07/11/2023    Generalized anxiety disorder 07/11/2023    Microalbuminuria due to type 1 diabetes mellitus (Multi) 07/11/2023    Personal history of COVID-19 04/02/2023    Poorly controlled diabetes mellitus (Multi) 07/11/2023    Type 1 diabetes mellitus 07/11/2023    Type 1 diabetes mellitus with hyperglycemia (Multi) 02/25/2019    Vitamin B12 deficiency 07/11/2023    Vitamin D deficiency 07/11/2023    Vomiting 11/15/2023    VOMITING HEADACHE BACK ACHE     History reviewed. No pertinent surgical history.  (Not in a hospital admission)    Patient has no known allergies.  Social History     Tobacco Use    Smoking status: Never    Smokeless tobacco: Never   Vaping Use    Vaping status: Former   Substance Use Topics    Alcohol use: Never     Drug use: Never     Family History   Problem Relation Name Age of Onset    No Known Problems Mother      No Known Problems Father      Hypertension Other      Coronary artery disease Other      Diabetes Other      Heart failure Other         Scheduled Medications:         Continuous Medications:   dextrose 10 % in water (D10W), 150 mL/hr  dextrose 10 % in water (D10W), 150 mL/hr  dextrose 5%-0.45 % sodium chloride, 150 mL/hr  insulin regular, 0-50 Units/hr, Last Rate: 10.79 Units/hr (01/15/25 0341)  sodium chloride, 250 mL/hr         PRN Medications:   PRN medications: bisacodyl, bisacodyl, dextrose 10 % in water (D10W), dextrose 10 % in water (D10W), dextrose 5%-0.45 % sodium chloride, dextrose, guaiFENesin, insulin regular, melatonin, ondansetron **OR** ondansetron    Review of Systems:  Review of Systems   Constitutional:  Positive for appetite change.   Gastrointestinal:  Positive for nausea and vomiting.   Neurological:  Positive for weakness (generalized weakness).        Objective   Vitals:  Most Recent:  Vitals:    01/15/25 0316   BP:    Pulse:    Resp:    Temp:    SpO2: 99%       24hr Min/Max:  Temp  Min: 36.7 °C (98.1 °F)  Max: 36.7 °C (98.1 °F)  Pulse  Min: 118  Max: 120  BP  Min: 105/62  Max: 120/67  Resp  Min: 16  Max: 16  SpO2  Min: 98 %  Max: 99 %    LDA:          Vent settings:       Hemodynamic parameters for last 24 hours:       No intake or output data in the 24 hours ending 01/15/25 0402        Physical exam:    Physical Exam  Constitutional:       General: He is awake. He is not in acute distress.     Appearance: He is ill-appearing.   HENT:      Head: Normocephalic.      Comments: Acetone breath     Nose: Nose normal.      Mouth/Throat:      Mouth: Mucous membranes are dry.   Eyes:      Extraocular Movements: Extraocular movements intact.      Conjunctiva/sclera: Conjunctivae normal.      Pupils: Pupils are equal, round, and reactive to light.   Cardiovascular:      Rate and Rhythm: Regular  rhythm. Tachycardia present.      Pulses: Normal pulses.      Heart sounds: Normal heart sounds.   Pulmonary:      Effort: Pulmonary effort is normal. No respiratory distress.      Breath sounds: Normal breath sounds.   Abdominal:      General: Bowel sounds are normal. There is no distension.      Palpations: Abdomen is soft.      Tenderness: There is no abdominal tenderness. There is no guarding.   Musculoskeletal:         General: Normal range of motion.      Cervical back: Normal range of motion.      Right lower leg: No edema.      Left lower leg: No edema.   Skin:     General: Skin is warm and dry.      Capillary Refill: Capillary refill takes less than 2 seconds.   Neurological:      General: No focal deficit present.      Mental Status: He is alert and oriented to person, place, and time. Mental status is at baseline.      Cranial Nerves: Cranial nerves 2-12 are intact.      Sensory: Sensation is intact.      Motor: Motor function is intact.   Psychiatric:         Attention and Perception: Attention normal.         Mood and Affect: Mood normal.         Speech: Speech normal.         Behavior: Behavior normal. Behavior is cooperative.         Thought Content: Thought content normal.         Judgment: Judgment normal.          Lab/Radiology/Diagnostic Review:  Results for orders placed or performed during the hospital encounter of 01/15/25 (from the past 24 hours)   POCT GLUCOSE   Result Value Ref Range    POCT Glucose 408 (H) 74 - 99 mg/dL   CBC and Auto Differential   Result Value Ref Range    WBC 20.0 (H) 4.4 - 11.3 x10*3/uL    nRBC 0.0 0.0 - 0.0 /100 WBCs    RBC 5.23 4.50 - 5.90 x10*6/uL    Hemoglobin 16.6 13.5 - 17.5 g/dL    Hematocrit 50.5 41.0 - 52.0 %    MCV 97 80 - 100 fL    MCH 31.7 26.0 - 34.0 pg    MCHC 32.9 32.0 - 36.0 g/dL    RDW 12.7 11.5 - 14.5 %    Platelets 188 150 - 450 x10*3/uL    Neutrophils % 80.8 40.0 - 80.0 %    Immature Granulocytes %, Automated 1.8 (H) 0.0 - 0.9 %    Lymphocytes % 8.0  13.0 - 44.0 %    Monocytes % 8.6 2.0 - 10.0 %    Eosinophils % 0.1 0.0 - 6.0 %    Basophils % 0.7 0.0 - 2.0 %    Neutrophils Absolute 16.17 (H) 1.20 - 7.70 x10*3/uL    Immature Granulocytes Absolute, Automated 0.35 0.00 - 0.70 x10*3/uL    Lymphocytes Absolute 1.60 1.20 - 4.80 x10*3/uL    Monocytes Absolute 1.72 (H) 0.10 - 1.00 x10*3/uL    Eosinophils Absolute 0.01 0.00 - 0.70 x10*3/uL    Basophils Absolute 0.13 (H) 0.00 - 0.10 x10*3/uL   Comprehensive metabolic panel   Result Value Ref Range    Glucose 467 (HH) 74 - 99 mg/dL    Sodium 134 (L) 136 - 145 mmol/L    Potassium 5.9 (H) 3.5 - 5.3 mmol/L    Chloride 100 98 - 107 mmol/L    Bicarbonate 7 (LL) 21 - 32 mmol/L    Anion Gap 33 (H) 10 - 20 mmol/L    Urea Nitrogen 19 6 - 23 mg/dL    Creatinine 1.24 0.50 - 1.30 mg/dL    eGFR 77 >60 mL/min/1.73m*2    Calcium 9.2 8.6 - 10.3 mg/dL    Albumin 4.4 3.4 - 5.0 g/dL    Alkaline Phosphatase 96 33 - 120 U/L    Total Protein 6.9 6.4 - 8.2 g/dL    AST 15 9 - 39 U/L    Bilirubin, Total 0.4 0.0 - 1.2 mg/dL    ALT 11 10 - 52 U/L   Lipase   Result Value Ref Range    Lipase 13 9 - 82 U/L   Beta Hydroxybutyrate   Result Value Ref Range    Beta-Hydroxybutyrate 10.99 (H) 0.02 - 0.27 mmol/L   Blood Gas Venous   Result Value Ref Range    POCT pH, Venous 6.98 (LL) 7.33 - 7.43 pH    POCT pCO2, Venous 29 (L) 41 - 51 mm Hg    POCT pO2, Venous 48 (H) 35 - 45 mm Hg    POCT SO2, Venous 71 45 - 75 %    POCT Oxy Hemoglobin, Venous 70.3 45.0 - 75.0 %    POCT Base Excess, Venous -23.8 (L) -2.0 - 3.0 mmol/L    POCT HCO3 Calculated, Venous 6.8 (L) 22.0 - 26.0 mmol/L    Patient Temperature 37.0 degrees Celsius    FiO2 21 %   Magnesium   Result Value Ref Range    Magnesium 2.07 1.60 - 2.40 mg/dL   Phosphorus   Result Value Ref Range    Phosphorus 4.8 2.5 - 4.9 mg/dL     XR chest 1 view    Result Date: 1/15/2025  Interpreted By:  Santi De Paz, STUDY: XR CHEST 1 VIEW;  1/15/2025 2:00 am   INDICATION: Signs/Symptoms:cough.   COMPARISON: 11/7/2024    "ACCESSION NUMBER(S): [id number]   ORDERING CLINICIAN: ROSITA REYNOLDS   FINDINGS: The cardiac silhouette is normal in size. No focal airspace consolidation or pleural effusion. No pneumothorax.       No airspace consolidation or pleural effusion.   MACRO: None   Signed by: Santi De Paz 1/15/2025 2:28 AM Dictation workstation:   [id number]      Assessment/Plan   Assessment & Plan  Diabetic ketoacidosis without coma associated with type 1 diabetes mellitus (Multi)    Vomiting    Metabolic acidosis with increased anion gap and accumulation of organic acids    DKA with type 1 diabetes mellitus with poor compliance  -Presented with nausea, vomiting, and generalized weakness and found to be in DKA.  - Patient reported that he has been taking 22 units of NPH but stopped taking his regular insulin because \"it doesn't work\"  -Blood glucose 467 on presentation  -BUN 19  -Creatinine 1.24, baseline creatinine ~0.8  -Potassium 5.9 (mild hemolysis)  -Bicarbonate 7  -Anion gap 33  -Beta hydroxybutyrate 10.99  -VBG: pH 6.98, pCO2 29, HCO3 calculated 6.8  -Received 1.5 L NS IV fluid bolus in ED  -Give additional 1 L normal saline IV fluid bolus  -IV fluids per DKA protocol:  1/2 normal saline at 250ml/hr until blood glucose less than 250, then switch to D5 1/2 NS at 150ml/hr.  -Insulin infusion per DKA protocol, titrate per order  -POCT glucose checks hourly while on insulin infusion  -Follow RFP, magnesium, and VBG every 4 hours while on insulin infusion  -Replete electrolytes as necessary  -Hypoglycemia protocol as needed  -NPO while on insulin infusion  -Monitor intake and output  - Will bridge off insulin infusion once anion gap less than 15 and bicarbonate greater than 15 with  - Plan to bridge with NPH 22 units once anion gap closed.   -60 gm/meal carb controlled diet once bridged off insulin infusion   -Endocrinology consult, input appreciated     JADE  -Presented with nausea, vomiting, and generalized weakness and found to " be in DKA.  -Blood glucose 467 on presentation  -BUN 19  -Creatinine 1.24, baseline creatinine ~0.8  -Potassium 5.9 (mild hemolysis)  -Bicarbonate 7  -Anion gap 33  -Beta hydroxybutyrate 10.99  -VBG: pH 6.98, pCO2 29, HCO3 calculated 6.8  -Received 1.5 L NS IV fluid bolus in ED  -Give 1 L normal saline IV fluid bolus  - Check VBG full panel  -IV fluids per DKA protocol: 1/2 normal saline at 250ml/hr until blood glucose less than 250, then switch to D5 1/2 NS at 150ml/hr.  -Insulin infusion per DKA protocol, titrate per order  -POCT glucose check hourly while on insulin infusion  -Follow RFP, magnesium, and VBG every 4 hours while on insulin infusion  -Replete electrolytes as necessary  -NPO while on insulin infusion  -Monitor intake and output  -Endocrinology consult, input appreciated     MAYA  -Presented with nausea, vomiting, and generalized weakness and found to be in DKA  -Blood glucose 467 on presentation  -BUN 19  -Creatinine 1.24, baseline creatinine ~0.8  -Potassium 5.9 (mild hemolysis)  -Sodium 134 (correction for hyperglycemia is 141)  -Bicarbonate 7  -Anion gap 33  -VBG:  pH 6.98, pCO2 29, HCO3 calculated 6.8  -Suspect likely prerenal in setting of DKA  -Received 1.5 L NS IV fluid bolus in ED  -Give additional 1 L normal saline IV fluid bolus  -IV fluids per DKA protocol: 1/2 normal saline at 250ml/hr until blood glucose less than 250, then switch to D5 1/2 NS at 150ml/hr.  -Insulin infusion per DKA protocol, titrate per order  -Follow BMP, phosphorus, magnesium, and VBG every 4 hours on insulin infusion  -Replete electrolytes as necessary  -Monitor intake and output  -Avoid nephrotoxic agents as able     Pseudohyponatremia  -Presented with nausea, vomiting, fatigue, and generalized weakness and found to be in DKA  -Blood glucose 467 on presentation  -Sodium 134 (correction for hyperglycemia is 141)  -Received 1.5 L NS IV fluid bolus in ED  -Give additional 1 L normal saline IV fluid bolus  -IV fluids per  DKA protocol: 1/2 normal saline at 250ml/hr until blood glucose less than 250, then switch to D5 1/2 NS at 150ml/hr.  -Insulin infusion per DKA protocol, titrate per order  - Follow BMP, phosphorus, magnesium, and VBG every 4 hours on insulin infusion  - Monitor intake and output    Leukocytosis  -Presented with nausea, vomiting, fatigue, and generalized weakness  -WBC 20  - Chest x-ray negative for acute cardiopulmonary process  -Suspect likely reactionary in setting of DKA and dehydration   -Received 1.5 L NS IV fluid bolus in ED  -Continue IV fluids and insulin infusion per DKA protocol as above  -UA with reflex microscopic and culture pending  -Influenza A/B and COVID-19 PCR pending  -Follow CBC and trend fevers  -Will hold off on antibiotic therapy given no grossly evident etiology of infection      I spent 40 minutes of cumulative critical care time with the patient.  Greater than 50% of that time was spent in the direct collaboration and or coordination of care of the patient.     Dragon dictation software was used to dictate this note and thus there may be minor errors in translation/transcription including garbled speech or misspellings. Please contact for clarification if needed.

## 2025-01-15 NOTE — CONSULTS
Inpatient consult to Endocrinology  Consult performed by: Lori Packer MD  Consult ordered by: Liseth Ahn MD  Reason for consult: DKA          Reason For Consult  DKA    History Of Present Illness  Ed Sanon is a 34 y.o. male presenting with nausea, vomiting, weakness, abdominal and chest pain.  He was found to be tachycardic.  Initial lab data revealed a glucose value of 467mg/dL, bicarbonate of 7, anion gap of 33, beta-hydroxybutyrate of 10.99 and pH of 6.98. He was given IVF and admitted to the ICU for an insulin infusion.    His home regimen consists of:  NPH 22 units subcutaneous twice daily    Regular insulin though he finds that this is not working for him     His A1C was 13.2% as of November 2024.    He has been wearing the Freestyle Elmer CGM and states that his glucose values have improved to 180-225mg/dL, which is an improvement for him.       He is currently on an insulin infusion at 13.5 units/hr.      Past Medical History  He has a past medical history of Abdominal pain (04/21/2023), Anxiety and depression (08/08/2021), Contact with and (suspected) exposure to covid-19 (04/21/2023), COVID-19 virus infection (07/11/2023), Diabetes mellitus (Multi), Diabetic acetonemia (Multi) (11/15/2023), DKA (diabetic ketoacidosis) (Multi) (07/11/2023), DKA, type 1, not at goal (04/21/2023), DM2 (diabetes mellitus, type 2) (Multi) (11/15/2023), Elevated blood sugar (11/15/2023), Elevated blood-pressure reading, without diagnosis of hypertension (09/09/2020), Fatigue (07/11/2023), Generalized anxiety disorder (07/11/2023), Microalbuminuria due to type 1 diabetes mellitus (Multi) (07/11/2023), Personal history of COVID-19 (04/02/2023), Poorly controlled diabetes mellitus (Multi) (07/11/2023), Type 1 diabetes mellitus (07/11/2023), Type 1 diabetes mellitus with hyperglycemia (Multi) (02/25/2019), Vitamin B12 deficiency (07/11/2023), Vitamin D deficiency (07/11/2023), and Vomiting  "(11/15/2023).    Surgical History  He has no past surgical history on file.     Social History  He reports that he has never smoked. He has never used smokeless tobacco. He reports that he does not drink alcohol and does not use drugs.    Family History  Family History   Problem Relation Name Age of Onset    No Known Problems Mother      No Known Problems Father      Hypertension Other      Coronary artery disease Other      Diabetes Other      Heart failure Other          Allergies  Patient has no known allergies.    Review of Systems   Gastrointestinal:  Negative for abdominal pain, nausea and vomiting.   Endocrine: Positive for polydipsia.   Psychiatric/Behavioral:  Negative for confusion.    All other systems reviewed and are negative.       Physical Exam  Vitals and nursing note reviewed.   Constitutional:       General: He is not in acute distress.     Appearance: Normal appearance. He is normal weight.   HENT:      Head: Normocephalic and atraumatic.      Nose: Nose normal.      Mouth/Throat:      Mouth: Mucous membranes are moist.   Eyes:      Extraocular Movements: Extraocular movements intact.   Cardiovascular:      Rate and Rhythm: Regular rhythm. Tachycardia present.   Pulmonary:      Effort: Pulmonary effort is normal.   Musculoskeletal:         General: Normal range of motion.   Neurological:      Mental Status: He is alert and oriented to person, place, and time.   Psychiatric:         Mood and Affect: Mood normal.          Last Recorded Vitals  Blood pressure 98/56, pulse (!) 122, temperature 37 °C (98.6 °F), temperature source Temporal, resp. rate 18, height 1.791 m (5' 10.5\"), weight 73.1 kg (161 lb 2.5 oz), SpO2 97%.    Relevant Results  Scheduled medications     Continuous medications  dextrose 10 % in water (D10W), 150 mL/hr  dextrose 10 % in water (D10W), 150 mL/hr  dextrose 5%-0.45 % sodium chloride, 150 mL/hr, Last Rate: 150 mL/hr (01/15/25 0750)  insulin regular, 0-50 Units/hr, Last Rate: " 13.5 Units/hr (01/15/25 0615)  sodium chloride, 250 mL/hr, Last Rate: Stopped (01/15/25 0754)      PRN medications  PRN medications: bisacodyl, bisacodyl, dextrose 10 % in water (D10W), dextrose 10 % in water (D10W), dextrose 5%-0.45 % sodium chloride, dextrose, guaiFENesin, insulin regular, melatonin, ondansetron **OR** ondansetron    Results for orders placed or performed during the hospital encounter of 01/15/25 (from the past 96 hours)   POCT GLUCOSE   Result Value Ref Range    POCT Glucose 408 (H) 74 - 99 mg/dL   Sars-CoV-2 and Influenza A/B PCR   Result Value Ref Range    Flu A Result Not Detected Not Detected    Flu B Result Not Detected Not Detected    Coronavirus 2019, PCR Not Detected Not Detected   ECG 12 lead   Result Value Ref Range    Ventricular Rate 117 BPM    Atrial Rate 117 BPM    MA Interval 142 ms    QRS Duration 98 ms    QT Interval 362 ms    QTC Calculation(Bazett) 505 ms    P Axis 84 degrees    R Axis 68 degrees    T Axis 23 degrees    QRS Count 19 beats    Q Onset 252 ms    T Offset 433 ms    QTC Fredericia 452 ms   CBC and Auto Differential   Result Value Ref Range    WBC 20.0 (H) 4.4 - 11.3 x10*3/uL    nRBC 0.0 0.0 - 0.0 /100 WBCs    RBC 5.23 4.50 - 5.90 x10*6/uL    Hemoglobin 16.6 13.5 - 17.5 g/dL    Hematocrit 50.5 41.0 - 52.0 %    MCV 97 80 - 100 fL    MCH 31.7 26.0 - 34.0 pg    MCHC 32.9 32.0 - 36.0 g/dL    RDW 12.7 11.5 - 14.5 %    Platelets 188 150 - 450 x10*3/uL    Neutrophils % 80.8 40.0 - 80.0 %    Immature Granulocytes %, Automated 1.8 (H) 0.0 - 0.9 %    Lymphocytes % 8.0 13.0 - 44.0 %    Monocytes % 8.6 2.0 - 10.0 %    Eosinophils % 0.1 0.0 - 6.0 %    Basophils % 0.7 0.0 - 2.0 %    Neutrophils Absolute 16.17 (H) 1.20 - 7.70 x10*3/uL    Immature Granulocytes Absolute, Automated 0.35 0.00 - 0.70 x10*3/uL    Lymphocytes Absolute 1.60 1.20 - 4.80 x10*3/uL    Monocytes Absolute 1.72 (H) 0.10 - 1.00 x10*3/uL    Eosinophils Absolute 0.01 0.00 - 0.70 x10*3/uL    Basophils Absolute 0.13  (H) 0.00 - 0.10 x10*3/uL   Comprehensive metabolic panel   Result Value Ref Range    Glucose 467 (HH) 74 - 99 mg/dL    Sodium 134 (L) 136 - 145 mmol/L    Potassium 5.9 (H) 3.5 - 5.3 mmol/L    Chloride 100 98 - 107 mmol/L    Bicarbonate 7 (LL) 21 - 32 mmol/L    Anion Gap 33 (H) 10 - 20 mmol/L    Urea Nitrogen 19 6 - 23 mg/dL    Creatinine 1.24 0.50 - 1.30 mg/dL    eGFR 77 >60 mL/min/1.73m*2    Calcium 9.2 8.6 - 10.3 mg/dL    Albumin 4.4 3.4 - 5.0 g/dL    Alkaline Phosphatase 96 33 - 120 U/L    Total Protein 6.9 6.4 - 8.2 g/dL    AST 15 9 - 39 U/L    Bilirubin, Total 0.4 0.0 - 1.2 mg/dL    ALT 11 10 - 52 U/L   Lipase   Result Value Ref Range    Lipase 13 9 - 82 U/L   Beta Hydroxybutyrate   Result Value Ref Range    Beta-Hydroxybutyrate 10.99 (H) 0.02 - 0.27 mmol/L   Blood Gas Venous   Result Value Ref Range    POCT pH, Venous 6.98 (LL) 7.33 - 7.43 pH    POCT pCO2, Venous 29 (L) 41 - 51 mm Hg    POCT pO2, Venous 48 (H) 35 - 45 mm Hg    POCT SO2, Venous 71 45 - 75 %    POCT Oxy Hemoglobin, Venous 70.3 45.0 - 75.0 %    POCT Base Excess, Venous -23.8 (L) -2.0 - 3.0 mmol/L    POCT HCO3 Calculated, Venous 6.8 (L) 22.0 - 26.0 mmol/L    Patient Temperature 37.0 degrees Celsius    FiO2 21 %   Magnesium   Result Value Ref Range    Magnesium 2.07 1.60 - 2.40 mg/dL   Phosphorus   Result Value Ref Range    Phosphorus 4.8 2.5 - 4.9 mg/dL   POCT GLUCOSE   Result Value Ref Range    POCT Glucose 454 (H) 74 - 99 mg/dL   Urinalysis with Reflex Culture and Microscopic   Result Value Ref Range    Color, Urine Colorless (N) Light-Yellow, Yellow, Dark-Yellow    Appearance, Urine Clear Clear    Specific Gravity, Urine 1.023 1.005 - 1.035    pH, Urine 5.0 5.0, 5.5, 6.0, 6.5, 7.0, 7.5, 8.0    Protein, Urine 20 (TRACE) NEGATIVE, 10 (TRACE), 20 (TRACE) mg/dL    Glucose, Urine OVER (4+) (A) Normal mg/dL    Blood, Urine NEGATIVE NEGATIVE    Ketones, Urine OVER (4+) (A) NEGATIVE mg/dL    Bilirubin, Urine NEGATIVE NEGATIVE    Urobilinogen, Urine  Normal Normal mg/dL    Nitrite, Urine NEGATIVE NEGATIVE    Leukocyte Esterase, Urine NEGATIVE NEGATIVE   Urinalysis Microscopic   Result Value Ref Range    WBC, Urine NONE 1-5, NONE /HPF    RBC, Urine NONE NONE, 1-2, 3-5 /HPF    Mucus, Urine FEW Reference range not established. /LPF   Blood Gas Venous Full Panel   Result Value Ref Range    POCT pH, Venous 6.99 (LL) 7.33 - 7.43 pH    POCT pCO2, Venous 16 (L) 41 - 51 mm Hg    POCT pO2, Venous 87 (H) 35 - 45 mm Hg    POCT SO2, Venous 96 (H) 45 - 75 %    POCT Oxy Hemoglobin, Venous 94.7 (H) 45.0 - 75.0 %    POCT Hematocrit Calculated, Venous 50.0 41.0 - 52.0 %    POCT Sodium, Venous 134 (L) 136 - 145 mmol/L    POCT Potassium, Venous 5.4 (H) 3.5 - 5.3 mmol/L    POCT Chloride, Venous 106 98 - 107 mmol/L    POCT Ionized Calicum, Venous 1.25 1.10 - 1.33 mmol/L    POCT Glucose, Venous 507 (HH) 74 - 99 mg/dL    POCT Lactate, Venous 3.7 (H) 0.4 - 2.0 mmol/L    POCT Base Excess, Venous -25.9 (L) -2.0 - 3.0 mmol/L    POCT HCO3 Calculated, Venous 3.9 (L) 22.0 - 26.0 mmol/L    POCT Hemoglobin, Venous 16.5 13.5 - 17.5 g/dL    POCT Anion Gap, Venous 30.0 (H) 10.0 - 25.0 mmol/L    Patient Temperature 37.0 degrees Celsius    FiO2 21 %   POCT GLUCOSE   Result Value Ref Range    POCT Glucose 383 (H) 74 - 99 mg/dL   Blood Gas Venous Full Panel   Result Value Ref Range    POCT pH, Venous 7.04 (LL) 7.33 - 7.43 pH    POCT pCO2, Venous 17 (L) 41 - 51 mm Hg    POCT pO2, Venous 69 (H) 35 - 45 mm Hg    POCT SO2, Venous 94 (H) 45 - 75 %    POCT Oxy Hemoglobin, Venous 91.8 (H) 45.0 - 75.0 %    POCT Hematocrit Calculated, Venous 47.0 41.0 - 52.0 %    POCT Sodium, Venous 135 (L) 136 - 145 mmol/L    POCT Potassium, Venous 4.7 3.5 - 5.3 mmol/L    POCT Chloride, Venous 110 (H) 98 - 107 mmol/L    POCT Ionized Calicum, Venous 1.32 1.10 - 1.33 mmol/L    POCT Glucose, Venous 302 (H) 74 - 99 mg/dL    POCT Lactate, Venous 2.3 (H) 0.4 - 2.0 mmol/L    POCT Base Excess, Venous -24.2 (L) -2.0 - 3.0 mmol/L     POCT HCO3 Calculated, Venous 4.6 (L) 22.0 - 26.0 mmol/L    POCT Hemoglobin, Venous 15.7 13.5 - 17.5 g/dL    POCT Anion Gap, Venous 25.0 10.0 - 25.0 mmol/L    Patient Temperature 37.0 degrees Celsius    FiO2 28 %   Basic metabolic panel   Result Value Ref Range    Glucose 227 (H) 74 - 99 mg/dL    Sodium 138 136 - 145 mmol/L    Potassium 3.8 3.5 - 5.3 mmol/L    Chloride 113 (H) 98 - 107 mmol/L    Bicarbonate 6 (LL) 21 - 32 mmol/L    Anion Gap 23 (H) 10 - 20 mmol/L    Urea Nitrogen 17 6 - 23 mg/dL    Creatinine 1.03 0.50 - 1.30 mg/dL    eGFR >90 >60 mL/min/1.73m*2    Calcium 7.6 (L) 8.6 - 10.3 mg/dL   Renal function panel   Result Value Ref Range    Glucose 227 (H) 74 - 99 mg/dL    Sodium 138 136 - 145 mmol/L    Potassium 3.8 3.5 - 5.3 mmol/L    Chloride 113 (H) 98 - 107 mmol/L    Bicarbonate 6 (LL) 21 - 32 mmol/L    Anion Gap 23 (H) 10 - 20 mmol/L    Urea Nitrogen 17 6 - 23 mg/dL    Creatinine 1.03 0.50 - 1.30 mg/dL    eGFR >90 >60 mL/min/1.73m*2    Calcium 7.6 (L) 8.6 - 10.3 mg/dL    Phosphorus 2.8 2.5 - 4.9 mg/dL    Albumin 3.6 3.4 - 5.0 g/dL   Magnesium   Result Value Ref Range    Magnesium 1.94 1.60 - 2.40 mg/dL     *Note: Due to a large number of results and/or encounters for the requested time period, some results have not been displayed. A complete set of results can be found in Results Review.      ECG 12 lead    Result Date: 1/15/2025  Sinus tachycardia Prolonged QT interval    XR chest 1 view    Result Date: 1/15/2025  Interpreted By:  Santi De Paz, STUDY: XR CHEST 1 VIEW;  1/15/2025 2:00 am   INDICATION: Signs/Symptoms:cough.   COMPARISON: 11/7/2024   ACCESSION NUMBER(S): [id number]   ORDERING CLINICIAN: ROSITA REYNOLDS   FINDINGS: The cardiac silhouette is normal in size. No focal airspace consolidation or pleural effusion. No pneumothorax.       No airspace consolidation or pleural effusion.   MACRO: None   Signed by: Santi De Paz 1/15/2025 2:28 AM Dictation workstation:   [id number]        Assessment/Plan     IMPRESSION:  DKA  POORLY CONTROLLED TYPE I DIABETES MELLITUS  A1C of 13.2% as of November 2024     RECOMMENDATIONS:  To continue an insulin infusion at 13.5 units per hour and titrate as per protocol  Accuchecks every 1 hour  BMP every 4 hours  NPO  Hypoglycemic protocol   IV fluid management as follows:  1/2 NS for blood glucose >250mg/dL  D5W with 1/2 NS for blood glucose <250mg/dL  D10W for blood glucose <150mg/dL if anion gap is open or <70 with closed AG.   Please hold insulin for serum potassium <3.3  Will plan to bridge with NPH 22 units subcutaneous twice daily when the anion Gap is less than 15 and the Bicarbonate is more than 15   Counseled regarding the pharmacokinetics of NPH vs. Regular vs. Humalog insulins   Counseled that there is no insulin molecule that is inherently bad for him  Counseled that what is inherently bad is recurrent bouts of DKA and poorly controlled diabetes     17:50  To commence NPH 22 units subcutaneous twice daily   TO discontinue the insulin infusion two hours following NPH administration   To commence Humalog 5 units TID AC  To commence insulin correction scale TID AC in 1 unit increments   Diabetic diet as tolerated   Accu-Cheks ACHS once off the insulin drip   Hypoglycemic protocol   Will continue to follow        Thank you for the courtesy of this consult       Lori Packer MD

## 2025-01-15 NOTE — CONSULTS
Nutrition Initial Assessment:   Nutrition Assessment    Reason for Assessment: Admission nursing screening    Medical history per chart:   diabetes mellitus type 1 with poor insulin compliance, anxiety, depression, and several admissions for episodes of DKA     HPI:  Patient presents with N/V, weakness, fatigue, abdominal pain, DKA noted    1/15:  Patient sleeping in ICU at time of visit, history obtained during IDT rounds and per chart review.  Patient with recurrent admissions secondary to DKA, familiar with patient, history of diet education given during previous admissions.  Currently, patient is NPO, will monitor and follow per protocol.          Current Diet: NPO Diet Except: Ice chips, Sips with meds; Effective now      Nutrition Related Findings:   Oral Symptoms: none     GI symptoms: nausea, emesis, and abdominal pain.   BM:    Food allergies: NKFA. has No Known Allergies.  Meds/Labs reviewed.  potassium phosphate, 30 mmol, intravenous, Once       dextrose 10 % in water (D10W), 150 mL/hr  dextrose 10 % in water (D10W), 150 mL/hr  dextrose 5%-0.45 % sodium chloride, 150 mL/hr, Last Rate: 150 mL/hr (01/15/25 0750)  insulin regular, 0-50 Units/hr, Last Rate: 13.5 Units/hr (01/15/25 1127)  sodium chloride, 250 mL/hr, Last Rate: Stopped (01/15/25 0754)         Nutrition Significant Labs:    Results from last 7 days   Lab Units 01/15/25  1140 01/15/25  0657 01/15/25  0212   GLUCOSE mg/dL 161* 227*  227* 467*   SODIUM mmol/L 137 138  138 134*   POTASSIUM mmol/L 3.6 3.8  3.8 5.9*   CHLORIDE mmol/L 113* 113*  113* 100   CO2 mmol/L 14* 6*  6* 7*   BUN mg/dL 16 17  17 19   CREATININE mg/dL 0.93 1.03  1.03 1.24   EGFR mL/min/1.73m*2 >90 >90  >90 77   CALCIUM mg/dL 7.8* 7.6*  7.6* 9.2   PHOSPHORUS mg/dL 1.8* 2.8 4.8   MAGNESIUM mg/dL 1.79 1.94 2.07     Lab Results   Component Value Date    HGBA1C 13.2 (H) 11/14/2024    HGBA1C 13.3 (H) 07/06/2024     Results from last 7 days   Lab Units 01/15/25  1105  "01/15/25  1003 01/15/25  0857 01/15/25  0749 01/15/25  0702 01/15/25  0613 01/15/25  0505 01/15/25  0359   POCT GLUCOSE mg/dL 156* 157* 184* 180* 265* 342* 383* 454*       Anthropometrics:  Height: 179.1 cm (5' 10.5\")   Weight: 73.1 kg (161 lb 2.5 oz)   BMI (Calculated): 22.79  IBW/kg (Dietitian Calculated): 76.7 kg          Weight History:   Wt Readings from Last 10 Encounters:   01/15/25 73.1 kg (161 lb 2.5 oz)   12/18/24 75.3 kg (166 lb)   12/05/24 74.6 kg (164 lb 7.4 oz)   11/15/24 67.1 kg (147 lb 14.9 oz)   11/09/24 63.1 kg (139 lb 1.8 oz)   09/29/24 70 kg (154 lb 5.2 oz)   09/19/24 76.2 kg (167 lb 14.4 oz)   08/13/24 75.6 kg (166 lb 10.7 oz)   07/07/24 65.2 kg (143 lb 12.8 oz)   04/01/24 68.9 kg (152 lb)        Weight Change %:  Weight History / % Weight Change: History of OBW reported at ~155 lbs.  Variable weights per history, potential 3% loss x 1 month.  Significant Weight Loss: No          Nutrition Focused Physical Exam Findings:  defer: patient asleep     Physical Findings:  Skin: Negative    Estimated Needs:   Total Energy Estimated Needs (kCal):  (5788-0083)  Method for Estimating Needs: 30, CBW  Total Protein Estimated Needs (g):  (70-80)  Method for Estimating Needs: 1, CBW     Method for Estimating Needs: 1 mL, kcal or per physician        Nutrition Diagnosis   Nutrition Diagnosis:       Nutrition Diagnosis  Patient has Nutrition Diagnosis: Yes  Diagnosis Status (1): New  Nutrition Diagnosis 1: Altered nutrition related to laboratory values  Related to (1): history of medication/diet non-compliance  As Evidenced by (1): recurrent DKA, most recent A1C 13.2%       Nutrition Interventions/Recommendations   Nutrition Interventions and Recommendations:        Nutrition Prescription:  Individualized Nutrition Prescription Provided for : Carbohydrate consistent diet once diet allowed        Nutrition Interventions:   Food and/or Nutrient Delivery Interventions  Interventions: Meals and snacks  Goal: >75% " of meals once able    Coordination of Nutrition Care by a Nutrition Professional  Collaboration and Referral of Nutrition Care: Collaboration by nutrition professional with other providers  Goal: IDT rounds    Nutrition Education:   Education Documentation  No documentation found.      Nutrition Counseling  Counseling Theoretical Approach: Other (Comment)  Goal: N/A - patient asleep, history of diet education previously given       Nutrition Monitoring and Evaluation   Monitoring/Evaluation:   Food/Nutrient Related History Monitoring  Monitoring and Evaluation Plan: Energy intake  Energy Intake: Estimated energy intake  Criteria: meet >75% of estimated needs as medically feasible    Body Composition/Growth/Weight History  Monitoring and Evaluation Plan: Weight  Weight: Weight change  Criteria: Maintain stable weight    Biochemical Data, Medical Tests and Procedures  Monitoring and Evaluation Plan: Glucose/endocrine profile  Glucose/Endocrine Profile: Glucose, casual, Hemoglobin A1c (HgbA1c)  Criteria: Improve, WNL    Nutrition Focused Physical Findings  Monitoring and Evaluation Plan: Skin  Criteria: Maintain skin integrity            Time Spent/Follow-up Reminder:   Follow Up  Time Spent (min): 45 minutes  Last Date of Nutrition Visit: 01/15/25  Nutrition Follow-Up Needed?: Dietitian to reassess per policy  Follow up Comment: 1/20

## 2025-01-15 NOTE — H&P
[facility] - GENERAL MEDICINE HISTORY AND PHYSICAL    History Obtained From (Primary Source): Patient  Collateral History (Secondary Sources): D/w ED physician    History Of Present Illness (HPI):  Ed Sanon is a 34 y.o. male with PMHx s/f IDDM 1 presenting with DKA. He has a history of recurrent admissions for DKA, including 5 over the past month. He says over the past day he has had nausea, vomiting, weakness, abdominal and chest pain.  No inciting factors noted; he was at his baseline up until then with no recent sick contacts or exposures. No appetite, has not been able to eat anything. He does say he has been urinating more than usual. He has a continuous glucose monitor. He says he has not missed any doses of his insulin, although he told the medical student that he did. No shortness of breath, cough, congestion, fever, chills, lightheadedness, leg swelling, bowel issues or other symptoms. Last time he was admitted he refused to take SSI if his blood sugar was less than 300.    ED Course (Summary - please note all labs, imaging studies, and interventions noted below have been personally reviewed and/or interpreted on day of admission):   Vitals on presentation: 98.1 F, 120 bpm, 16 rr, 105/62, 98% on RA  Labs: CMP glu 467, , K 5.9, bicarb 7, anion gap 33  Phos 4.8  Mag 2.07  Lipase 13  Beta-hydroxybutyrate 10.99  CBC WBC 10.0, absolute neutrophils 16.17  VBG pH 6.98, pCO2 29, pO2 48, HCO3 6.8  Imaging: CXR - No airspace consolidation or pleural effusion.   Interventions: 1.5 L NS bolus, Zofran 4 mg. Pt admitted to ICU for DKA treatment.    12-point ROS reviewed and found to be negative aside from aforementioned positives in HPI and/or noted in dedicated ROS section below.     ED Course (From ED Provider):  Diagnoses as of 01/15/25 0343   Diabetic ketoacidosis without coma associated with type 1 diabetes mellitus (Multi)     Relevant Results  Results for orders placed or performed  during the hospital encounter of 01/15/25 (from the past 24 hours)   POCT GLUCOSE   Result Value Ref Range    POCT Glucose 408 (H) 74 - 99 mg/dL   CBC and Auto Differential   Result Value Ref Range    WBC 20.0 (H) 4.4 - 11.3 x10*3/uL    nRBC 0.0 0.0 - 0.0 /100 WBCs    RBC 5.23 4.50 - 5.90 x10*6/uL    Hemoglobin 16.6 13.5 - 17.5 g/dL    Hematocrit 50.5 41.0 - 52.0 %    MCV 97 80 - 100 fL    MCH 31.7 26.0 - 34.0 pg    MCHC 32.9 32.0 - 36.0 g/dL    RDW 12.7 11.5 - 14.5 %    Platelets 188 150 - 450 x10*3/uL    Neutrophils % 80.8 40.0 - 80.0 %    Immature Granulocytes %, Automated 1.8 (H) 0.0 - 0.9 %    Lymphocytes % 8.0 13.0 - 44.0 %    Monocytes % 8.6 2.0 - 10.0 %    Eosinophils % 0.1 0.0 - 6.0 %    Basophils % 0.7 0.0 - 2.0 %    Neutrophils Absolute 16.17 (H) 1.20 - 7.70 x10*3/uL    Immature Granulocytes Absolute, Automated 0.35 0.00 - 0.70 x10*3/uL    Lymphocytes Absolute 1.60 1.20 - 4.80 x10*3/uL    Monocytes Absolute 1.72 (H) 0.10 - 1.00 x10*3/uL    Eosinophils Absolute 0.01 0.00 - 0.70 x10*3/uL    Basophils Absolute 0.13 (H) 0.00 - 0.10 x10*3/uL   Comprehensive metabolic panel   Result Value Ref Range    Glucose 467 (HH) 74 - 99 mg/dL    Sodium 134 (L) 136 - 145 mmol/L    Potassium 5.9 (H) 3.5 - 5.3 mmol/L    Chloride 100 98 - 107 mmol/L    Bicarbonate 7 (LL) 21 - 32 mmol/L    Anion Gap 33 (H) 10 - 20 mmol/L    Urea Nitrogen 19 6 - 23 mg/dL    Creatinine 1.24 0.50 - 1.30 mg/dL    eGFR 77 >60 mL/min/1.73m*2    Calcium 9.2 8.6 - 10.3 mg/dL    Albumin 4.4 3.4 - 5.0 g/dL    Alkaline Phosphatase 96 33 - 120 U/L    Total Protein 6.9 6.4 - 8.2 g/dL    AST 15 9 - 39 U/L    Bilirubin, Total 0.4 0.0 - 1.2 mg/dL    ALT 11 10 - 52 U/L   Lipase   Result Value Ref Range    Lipase 13 9 - 82 U/L   Beta Hydroxybutyrate   Result Value Ref Range    Beta-Hydroxybutyrate 10.99 (H) 0.02 - 0.27 mmol/L   Blood Gas Venous   Result Value Ref Range    POCT pH, Venous 6.98 (LL) 7.33 - 7.43 pH    POCT pCO2, Venous 29 (L) 41 - 51 mm Hg    POCT  pO2, Venous 48 (H) 35 - 45 mm Hg    POCT SO2, Venous 71 45 - 75 %    POCT Oxy Hemoglobin, Venous 70.3 45.0 - 75.0 %    POCT Base Excess, Venous -23.8 (L) -2.0 - 3.0 mmol/L    POCT HCO3 Calculated, Venous 6.8 (L) 22.0 - 26.0 mmol/L    Patient Temperature 37.0 degrees Celsius    FiO2 21 %   Magnesium   Result Value Ref Range    Magnesium 2.07 1.60 - 2.40 mg/dL   Phosphorus   Result Value Ref Range    Phosphorus 4.8 2.5 - 4.9 mg/dL      XR chest 1 view    Result Date: 1/15/2025  Interpreted By:  Santi De Paz, STUDY: XR CHEST 1 VIEW;  1/15/2025 2:00 am   INDICATION: Signs/Symptoms:cough.   COMPARISON: 11/7/2024   ACCESSION NUMBER(S): [id number]   ORDERING CLINICIAN: ROSITA REYNOLDS   FINDINGS: The cardiac silhouette is normal in size. No focal airspace consolidation or pleural effusion. No pneumothorax.       No airspace consolidation or pleural effusion.   MACRO: None   Signed by: Santi De Paz 1/15/2025 2:28 AM Dictation workstation:   [id number]    Scheduled medications:     Continuous medications:  dextrose 10 % in water (D10W), 150 mL/hr  dextrose 10 % in water (D10W), 150 mL/hr  dextrose 5%-0.45 % sodium chloride, 150 mL/hr  insulin regular, 0-50 Units/hr  sodium chloride, 250 mL/hr      PRN medications:  PRN medications: bisacodyl, bisacodyl, dextrose 10 % in water (D10W), dextrose 10 % in water (D10W), dextrose 5%-0.45 % sodium chloride, dextrose, guaiFENesin, insulin regular, melatonin, ondansetron **OR** ondansetron     Past Medical History  He has a past medical history of Abdominal pain (04/21/2023), Anxiety and depression (08/08/2021), Contact with and (suspected) exposure to covid-19 (04/21/2023), COVID-19 virus infection (07/11/2023), Diabetes mellitus (Multi), Diabetic acetonemia (Multi) (11/15/2023), DKA (diabetic ketoacidosis) (Multi) (07/11/2023), DKA, type 1, not at goal (04/21/2023), DM2 (diabetes mellitus, type 2) (Multi) (11/15/2023), Elevated blood sugar (11/15/2023), Elevated blood-pressure  reading, without diagnosis of hypertension (09/09/2020), Fatigue (07/11/2023), Generalized anxiety disorder (07/11/2023), Microalbuminuria due to type 1 diabetes mellitus (Multi) (07/11/2023), Personal history of COVID-19 (04/02/2023), Poorly controlled diabetes mellitus (Multi) (07/11/2023), Type 1 diabetes mellitus (07/11/2023), Type 1 diabetes mellitus with hyperglycemia (Multi) (02/25/2019), Vitamin B12 deficiency (07/11/2023), Vitamin D deficiency (07/11/2023), and Vomiting (11/15/2023).    Surgical History  He has no past surgical history on file.     Social History  He reports that he has never smoked. He has never used smokeless tobacco. He reports that he does not drink alcohol and does not use drugs.    Family History  Family History   Problem Relation Name Age of Onset    No Known Problems Mother      No Known Problems Father      Hypertension Other      Coronary artery disease Other      Diabetes Other      Heart failure Other         Allergies  Patient has no known allergies.    Code Status  Full Code     Review of Systems   Constitutional:  Positive for appetite change. Negative for activity change, chills, diaphoresis, fatigue and fever.   HENT:  Negative for congestion, ear pain, rhinorrhea, sinus pain and sore throat.    Respiratory:  Negative for apnea, cough, chest tightness, shortness of breath, wheezing and stridor.    Cardiovascular:  Negative for chest pain, palpitations and leg swelling.   Gastrointestinal:  Positive for nausea and vomiting. Negative for abdominal distention, abdominal pain, constipation and diarrhea.   Genitourinary:  Positive for frequency. Negative for difficulty urinating, dysuria, flank pain, hematuria and urgency.   Musculoskeletal:  Negative for arthralgias, back pain, gait problem, joint swelling and myalgias.   Skin:  Negative for color change, pallor, rash and wound.   Neurological:  Positive for weakness. Negative for dizziness, syncope, light-headedness, numbness  and headaches.   Psychiatric/Behavioral:  Negative for agitation, behavioral problems, confusion and decreased concentration. The patient is not nervous/anxious.    All other systems reviewed and are negative.      Last Recorded Vitals  /67   Pulse (!) 118   Temp 36.7 °C (98.1 °F)   Resp 16   Wt 77.1 kg (170 lb)   SpO2 99%      Physical Exam:  Vital signs and nursing notes reviewed.   Constitutional: Pleasant and cooperative. Laying in bed in no acute distress. Conversant. Somewhat fatigued/lethargic  Skin: Warm and dry; no obvious lesions, rashes, pallor, or jaundice.   Eyes: EOMI. Anicteric sclera.   ENT: Mucous membranes moist; no obvious injury or deformity appreciated.   Head and Neck: Normocephalic, atraumatic. ROM preserved. Trachea midline. No appreciable JVD.   Respiratory: Nonlabored on RA. Lungs clear to auscultation bilaterally without obvious adventitious sounds. Chest rise is equal.  Cardiovascular: RRR. No gross murmur, gallop, or rub. Extremities are warm and well-perfused with good capillary refill (< 3 seconds). No chest wall tenderness.   GI: Abdomen soft, nontender, nondistended. No obvious organomegaly appreciated. Bowel sounds are present.  : No CVA tenderness.   MSK: No gross abnormalities appreciated. No limitations to AROM/PROM appreciated.   Extremities: No cyanosis, edema, or clubbing evident. Neurovascularly intact.   Neuro: A&Ox3. CN 2-12 grossly intact. Able to respond to questions appropriately and clearly. No acute focal neurologic deficits appreciated.  Psych: Appropriate mood and behavior.    Assessment/Plan     34 y.o. male with PMHx s/f IDDM 1 presenting with DKA.    Plan:  Admit to ICU  Consult intensivist    DKA:  Labs demonstrating DKA with beta-hydroxybutyrate at 10.99, glucose 467, and VBG pH 6.98. BMP bicarb 7 and anion gap 33. K 5.9.  Continue 1/2 NS at 250 ml/hr, until glucose <250, then switch to D5 1/2  ml/hr.  Insulin drip started, titrate per order  set.  Trend RFP, Mag and VBG q4h.  Check glucose q1h  Endocrinology consulted  Pt needs to be more compliant with his insulin.    Diet: Carb-controlled  DVT Prophylaxis: None needed per guidelines  Code Status: Full code  Case Discussed With: ED provider  Additional Sources Reviewed: Past admissions.    Anticipated Length of Stay (LOS): Patient will require 1-2 midnights for treatment of DKA.    45 minutes of critical care time spent on this pt encounter today.     DO Tova Abrams dictation software was used to dictate this note and thus there may be minor errors in translation/transcription including garbled speech or misspellings. Please contact for clarification if needed.

## 2025-01-16 LAB
ALBUMIN SERPL BCP-MCNC: 3.2 G/DL (ref 3.4–5)
ANION GAP SERPL CALC-SCNC: 15 MMOL/L (ref 10–20)
BUN SERPL-MCNC: 15 MG/DL (ref 6–23)
CALCIUM SERPL-MCNC: 8.2 MG/DL (ref 8.6–10.3)
CHLORIDE SERPL-SCNC: 107 MMOL/L (ref 98–107)
CO2 SERPL-SCNC: 19 MMOL/L (ref 21–32)
CREAT SERPL-MCNC: 0.78 MG/DL (ref 0.5–1.3)
EGFRCR SERPLBLD CKD-EPI 2021: >90 ML/MIN/1.73M*2
ERYTHROCYTE [DISTWIDTH] IN BLOOD BY AUTOMATED COUNT: 12.5 % (ref 11.5–14.5)
GLUCOSE BLD MANUAL STRIP-MCNC: 205 MG/DL (ref 74–99)
GLUCOSE BLD MANUAL STRIP-MCNC: 234 MG/DL (ref 74–99)
GLUCOSE BLD MANUAL STRIP-MCNC: 282 MG/DL (ref 74–99)
GLUCOSE BLD MANUAL STRIP-MCNC: 312 MG/DL (ref 74–99)
GLUCOSE SERPL-MCNC: 239 MG/DL (ref 74–99)
HCT VFR BLD AUTO: 40.9 % (ref 41–52)
HGB BLD-MCNC: 13.8 G/DL (ref 13.5–17.5)
MAGNESIUM SERPL-MCNC: 1.76 MG/DL (ref 1.6–2.4)
MCH RBC QN AUTO: 31.8 PG (ref 26–34)
MCHC RBC AUTO-ENTMCNC: 33.7 G/DL (ref 32–36)
MCV RBC AUTO: 94 FL (ref 80–100)
NRBC BLD-RTO: 0 /100 WBCS (ref 0–0)
PHOSPHATE SERPL-MCNC: 3.1 MG/DL (ref 2.5–4.9)
PLATELET # BLD AUTO: 155 X10*3/UL (ref 150–450)
POTASSIUM SERPL-SCNC: 3.8 MMOL/L (ref 3.5–5.3)
RBC # BLD AUTO: 4.34 X10*6/UL (ref 4.5–5.9)
SODIUM SERPL-SCNC: 137 MMOL/L (ref 136–145)
WBC # BLD AUTO: 7.9 X10*3/UL (ref 4.4–11.3)

## 2025-01-16 PROCEDURE — 2500000001 HC RX 250 WO HCPCS SELF ADMINISTERED DRUGS (ALT 637 FOR MEDICARE OP): Performed by: INTERNAL MEDICINE

## 2025-01-16 PROCEDURE — 36415 COLL VENOUS BLD VENIPUNCTURE: CPT | Performed by: INTERNAL MEDICINE

## 2025-01-16 PROCEDURE — 82947 ASSAY GLUCOSE BLOOD QUANT: CPT

## 2025-01-16 PROCEDURE — 99233 SBSQ HOSP IP/OBS HIGH 50: CPT | Performed by: INTERNAL MEDICINE

## 2025-01-16 PROCEDURE — 99232 SBSQ HOSP IP/OBS MODERATE 35: CPT | Performed by: INTERNAL MEDICINE

## 2025-01-16 PROCEDURE — 83735 ASSAY OF MAGNESIUM: CPT | Performed by: INTERNAL MEDICINE

## 2025-01-16 PROCEDURE — 2500000002 HC RX 250 W HCPCS SELF ADMINISTERED DRUGS (ALT 637 FOR MEDICARE OP, ALT 636 FOR OP/ED): Performed by: INTERNAL MEDICINE

## 2025-01-16 PROCEDURE — 1210000001 HC SEMI-PRIVATE ROOM DAILY

## 2025-01-16 PROCEDURE — 85027 COMPLETE CBC AUTOMATED: CPT | Performed by: INTERNAL MEDICINE

## 2025-01-16 PROCEDURE — 80069 RENAL FUNCTION PANEL: CPT | Performed by: INTERNAL MEDICINE

## 2025-01-16 RX ORDER — ACETAMINOPHEN 160 MG/5ML
650 SOLUTION ORAL EVERY 4 HOURS PRN
Status: DISCONTINUED | OUTPATIENT
Start: 2025-01-16 | End: 2025-01-17 | Stop reason: HOSPADM

## 2025-01-16 RX ORDER — ACETAMINOPHEN 325 MG/1
650 TABLET ORAL EVERY 4 HOURS PRN
Status: DISCONTINUED | OUTPATIENT
Start: 2025-01-16 | End: 2025-01-17 | Stop reason: HOSPADM

## 2025-01-16 RX ORDER — ACETAMINOPHEN 650 MG/1
650 SUPPOSITORY RECTAL EVERY 4 HOURS PRN
Status: DISCONTINUED | OUTPATIENT
Start: 2025-01-16 | End: 2025-01-17 | Stop reason: HOSPADM

## 2025-01-16 RX ADMIN — INSULIN HUMAN 22 UNITS: 100 INJECTION, SUSPENSION SUBCUTANEOUS at 10:05

## 2025-01-16 RX ADMIN — INSULIN HUMAN 22 UNITS: 100 INJECTION, SUSPENSION SUBCUTANEOUS at 21:37

## 2025-01-16 RX ADMIN — INSULIN LISPRO 5 UNITS: 100 INJECTION, SOLUTION INTRAVENOUS; SUBCUTANEOUS at 09:16

## 2025-01-16 RX ADMIN — INSULIN LISPRO 2 UNITS: 100 INJECTION, SOLUTION INTRAVENOUS; SUBCUTANEOUS at 09:12

## 2025-01-16 RX ADMIN — ACETAMINOPHEN 650 MG: 325 TABLET ORAL at 13:36

## 2025-01-16 RX ADMIN — INSULIN LISPRO 5 UNITS: 100 INJECTION, SOLUTION INTRAVENOUS; SUBCUTANEOUS at 15:00

## 2025-01-16 ASSESSMENT — PAIN DESCRIPTION - LOCATION: LOCATION: ABDOMEN

## 2025-01-16 ASSESSMENT — PAIN SCALES - GENERAL
PAINLEVEL_OUTOF10: 1
PAINLEVEL_OUTOF10: 0 - NO PAIN
PAINLEVEL_OUTOF10: 3
PAINLEVEL_OUTOF10: 0 - NO PAIN

## 2025-01-16 ASSESSMENT — PAIN DESCRIPTION - DESCRIPTORS: DESCRIPTORS: ACHING

## 2025-01-16 ASSESSMENT — ENCOUNTER SYMPTOMS
VOMITING: 0
HEADACHES: 1
NAUSEA: 0

## 2025-01-16 ASSESSMENT — PAIN - FUNCTIONAL ASSESSMENT
PAIN_FUNCTIONAL_ASSESSMENT: 0-10

## 2025-01-16 NOTE — SIGNIFICANT EVENT
DKA resolved and patient bridged off of insulin infusion. Tolerating diet.  Patient stable for transfer out of ICU. Critical care medicine will sign off at this time. Transfer to general medicine floor.

## 2025-01-16 NOTE — PROGRESS NOTES
Ed Sanon is a 34 y.o. male admitted for Diabetic ketoacidosis without coma associated with type 1 diabetes mellitus (Multi). Pharmacy reviewed the patient's [id number] medications and allergies for accuracy.    The list below reflects the PTA list prior to pharmacy medication history. A summary a changes to the PTA medication list has been listed below. Please review each medication in order reconciliation for additional clarification and justification.    Source of information: t2p     Medications added:    Medications modified:    Medications to be removed:  Insulin regular x2   Lisinopril 5mg     Medications of concern:      Prior to Admission Medications   Prescriptions Last Dose Informant Patient Reported? Taking?   Guardian 4 Glucose Sensor device   No No   Sig: Change insulin sensor every 7 days as directed, linked to Minimed insulin pump, check with endocrinology for updated regimen   Patient not taking: Reported on 12/18/2024   insulin NPH, Isophane, (HumuLIN N,NovoLIN N) 100 unit/mL (3 mL) injection   No No   Sig: Inject 22 Units under the skin 2 times a day before meals. Take as directed per insulin instructions.   insulin regular (HumuLIN R,NovoLIN R) 100 unit/mL injection   No No   Sig: Inject 6 Units under the skin 3 times a day before meals. Take as directed per insulin instructions.   Patient not taking: Reported on 12/18/2024   insulin regular (HumuLIN R,NovoLIN R) 100 unit/mL injection   No No   Sig: Inject 0-10 Units under the skin 3 times a day before meals.   0 unit(s) if Blood glucose is between   2 unit(s) if Blood glucose is between 151-200  4 unit(s) if Blood glucose is between 201-250  6 unit(s) if Blood glucose is between 251-300  8 unit(s) if Blood glucose is between 301-350  10 unit(s) if Blood glucose is between 351-400   Patient not taking: Reported on 12/18/2024   lancets misc   Yes No   Sig: Inject 1 Units under the skin 2 times a day. Sliding scale   lisinopril  "5 mg tablet More than a month  No No   Sig: Take 1 tablet (5 mg) by mouth once daily.   Patient not taking: Reported on 1/15/2025   pen needle, diabetic 32 gauge x 5/32\" needle   Yes No   Sig: Use as instructed 4 times daily      Facility-Administered Medications: None       DARCY UNGER        "

## 2025-01-16 NOTE — PROGRESS NOTES
"dE Sanon is a 34 y.o. male on day 1 of admission presenting with Diabetic ketoacidosis without coma associated with type 1 diabetes mellitus (Multi).    Subjective   Patient has no complaints, except for a headache.  He has breakfast on his tray is about to get his insulin.    FreeStyle Elmer Data: 252mg/dL and above target range all night     I have reviewed histories, allergies and medications have been reviewed and there are no changes       Objective   Review of Systems   Gastrointestinal:  Negative for nausea and vomiting.   Neurological:  Positive for headaches.   All other systems reviewed and are negative.    Physical Exam  Vitals and nursing note reviewed.   Constitutional:       General: He is not in acute distress.     Appearance: Normal appearance. He is normal weight.   HENT:      Head: Normocephalic and atraumatic.      Nose: Nose normal.      Mouth/Throat:      Mouth: Mucous membranes are moist.   Eyes:      Extraocular Movements: Extraocular movements intact.   Pulmonary:      Effort: Pulmonary effort is normal.   Musculoskeletal:         General: Normal range of motion.   Neurological:      Mental Status: He is alert and oriented to person, place, and time.   Psychiatric:         Mood and Affect: Mood normal.         Last Recorded Vitals  Blood pressure 105/69, pulse 98, temperature 36.5 °C (97.7 °F), temperature source Temporal, resp. rate 18, height 1.791 m (5' 10.5\"), weight 73.1 kg (161 lb 2.5 oz), SpO2 97%.  Intake/Output last 3 Shifts:  I/O last 3 completed shifts:  In: 1948.5 (26.7 mL/kg) [I.V.:948.5 (13 mL/kg); IV Piggyback:1000]  Out: 900 (12.3 mL/kg) [Urine:900 (0.3 mL/kg/hr)]  Weight: 73.1 kg     Relevant Results  Results from last 7 days   Lab Units 01/16/25  0634 01/16/25  0547 01/15/25  2006 01/15/25  2005 01/15/25  1924 01/15/25  1801 01/15/25  1709 01/15/25  1558 01/15/25  1554 01/15/25  1253 01/15/25  1140 01/15/25  0702 01/15/25  0657   POCT GLUCOSE mg/dL 205*  --   --  " 202* 154* 115* 96   < >  --    < >  --    < >  --    GLUCOSE mg/dL  --  239* 220*  --   --   --   --   --  96  --  161*  --  227*  227*    < > = values in this interval not displayed.            Scheduled medications  insulin lispro, 0-5 Units, subcutaneous, TID AC  insulin lispro, 5 Units, subcutaneous, TID AC  insulin NPH (Isophane), 22 Units, subcutaneous, q12h SHEILA      Continuous medications     PRN medications  PRN medications: bisacodyl, bisacodyl, dextrose, dextrose, glucagon, glucagon, guaiFENesin, melatonin, ondansetron **OR** ondansetron    Results for orders placed or performed during the hospital encounter of 01/15/25 (from the past 96 hours)   POCT GLUCOSE   Result Value Ref Range    POCT Glucose 408 (H) 74 - 99 mg/dL   Sars-CoV-2 and Influenza A/B PCR   Result Value Ref Range    Flu A Result Not Detected Not Detected    Flu B Result Not Detected Not Detected    Coronavirus 2019, PCR Not Detected Not Detected   ECG 12 lead   Result Value Ref Range    Ventricular Rate 117 BPM    Atrial Rate 117 BPM    [place] Interval 142 ms    QRS Duration 98 ms    QT Interval 362 ms    QTC Calculation(Bazett) 505 ms    P Axis 84 degrees    R Axis 68 degrees    T Axis 23 degrees    QRS Count 19 beats    Q Onset 252 ms    T Offset 433 ms    QTC Fredericia 452 ms   CBC and Auto Differential   Result Value Ref Range    WBC 20.0 (H) 4.4 - 11.3 x10*3/uL    nRBC 0.0 0.0 - 0.0 /100 WBCs    RBC 5.23 4.50 - 5.90 x10*6/uL    Hemoglobin 16.6 13.5 - 17.5 g/dL    Hematocrit 50.5 41.0 - 52.0 %    MCV 97 80 - 100 fL    MCH 31.7 26.0 - 34.0 pg    MCHC 32.9 32.0 - 36.0 g/dL    RDW 12.7 11.5 - 14.5 %    Platelets 188 150 - 450 x10*3/uL    Neutrophils % 80.8 40.0 - 80.0 %    Immature Granulocytes %, Automated 1.8 (H) 0.0 - 0.9 %    Lymphocytes % 8.0 13.0 - 44.0 %    Monocytes % 8.6 2.0 - 10.0 %    Eosinophils % 0.1 0.0 - 6.0 %    Basophils % 0.7 0.0 - 2.0 %    Neutrophils Absolute 16.17 (H) 1.20 - 7.70 x10*3/uL    Immature Granulocytes  Absolute, Automated 0.35 0.00 - 0.70 x10*3/uL    Lymphocytes Absolute 1.60 1.20 - 4.80 x10*3/uL    Monocytes Absolute 1.72 (H) 0.10 - 1.00 x10*3/uL    Eosinophils Absolute 0.01 0.00 - 0.70 x10*3/uL    Basophils Absolute 0.13 (H) 0.00 - 0.10 x10*3/uL   Comprehensive metabolic panel   Result Value Ref Range    Glucose 467 (HH) 74 - 99 mg/dL    Sodium 134 (L) 136 - 145 mmol/L    Potassium 5.9 (H) 3.5 - 5.3 mmol/L    Chloride 100 98 - 107 mmol/L    Bicarbonate 7 (LL) 21 - 32 mmol/L    Anion Gap 33 (H) 10 - 20 mmol/L    Urea Nitrogen 19 6 - 23 mg/dL    Creatinine 1.24 0.50 - 1.30 mg/dL    eGFR 77 >60 mL/min/1.73m*2    Calcium 9.2 8.6 - 10.3 mg/dL    Albumin 4.4 3.4 - 5.0 g/dL    Alkaline Phosphatase 96 33 - 120 U/L    Total Protein 6.9 6.4 - 8.2 g/dL    AST 15 9 - 39 U/L    Bilirubin, Total 0.4 0.0 - 1.2 mg/dL    ALT 11 10 - 52 U/L   Lipase   Result Value Ref Range    Lipase 13 9 - 82 U/L   Beta Hydroxybutyrate   Result Value Ref Range    Beta-Hydroxybutyrate 10.99 (H) 0.02 - 0.27 mmol/L   Blood Gas Venous   Result Value Ref Range    POCT pH, Venous 6.98 (LL) 7.33 - 7.43 pH    POCT pCO2, Venous 29 (L) 41 - 51 mm Hg    POCT pO2, Venous 48 (H) 35 - 45 mm Hg    POCT SO2, Venous 71 45 - 75 %    POCT Oxy Hemoglobin, Venous 70.3 45.0 - 75.0 %    POCT Base Excess, Venous -23.8 (L) -2.0 - 3.0 mmol/L    POCT HCO3 Calculated, Venous 6.8 (L) 22.0 - 26.0 mmol/L    Patient Temperature 37.0 degrees Celsius    FiO2 21 %   Magnesium   Result Value Ref Range    Magnesium 2.07 1.60 - 2.40 mg/dL   Phosphorus   Result Value Ref Range    Phosphorus 4.8 2.5 - 4.9 mg/dL   POCT GLUCOSE   Result Value Ref Range    POCT Glucose 454 (H) 74 - 99 mg/dL   Urinalysis with Reflex Culture and Microscopic   Result Value Ref Range    Color, Urine Colorless (N) Light-Yellow, Yellow, Dark-Yellow    Appearance, Urine Clear Clear    Specific Gravity, Urine 1.023 1.005 - 1.035    pH, Urine 5.0 5.0, 5.5, 6.0, 6.5, 7.0, 7.5, 8.0    Protein, Urine 20 (TRACE)  NEGATIVE, 10 (TRACE), 20 (TRACE) mg/dL    Glucose, Urine OVER (4+) (A) Normal mg/dL    Blood, Urine NEGATIVE NEGATIVE    Ketones, Urine OVER (4+) (A) NEGATIVE mg/dL    Bilirubin, Urine NEGATIVE NEGATIVE    Urobilinogen, Urine Normal Normal mg/dL    Nitrite, Urine NEGATIVE NEGATIVE    Leukocyte Esterase, Urine NEGATIVE NEGATIVE   Extra Urine Gray Tube   Result Value Ref Range    Extra Tube Hold for add-ons.    Urinalysis Microscopic   Result Value Ref Range    WBC, Urine NONE 1-5, NONE /HPF    RBC, Urine NONE NONE, 1-2, 3-5 /HPF    Mucus, Urine FEW Reference range not established. /LPF   Blood Gas Venous Full Panel   Result Value Ref Range    POCT pH, Venous 6.99 (LL) 7.33 - 7.43 pH    POCT pCO2, Venous 16 (L) 41 - 51 mm Hg    POCT pO2, Venous 87 (H) 35 - 45 mm Hg    POCT SO2, Venous 96 (H) 45 - 75 %    POCT Oxy Hemoglobin, Venous 94.7 (H) 45.0 - 75.0 %    POCT Hematocrit Calculated, Venous 50.0 41.0 - 52.0 %    POCT Sodium, Venous 134 (L) 136 - 145 mmol/L    POCT Potassium, Venous 5.4 (H) 3.5 - 5.3 mmol/L    POCT Chloride, Venous 106 98 - 107 mmol/L    POCT Ionized Calicum, Venous 1.25 1.10 - 1.33 mmol/L    POCT Glucose, Venous 507 (HH) 74 - 99 mg/dL    POCT Lactate, Venous 3.7 (H) 0.4 - 2.0 mmol/L    POCT Base Excess, Venous -25.9 (L) -2.0 - 3.0 mmol/L    POCT HCO3 Calculated, Venous 3.9 (L) 22.0 - 26.0 mmol/L    POCT Hemoglobin, Venous 16.5 13.5 - 17.5 g/dL    POCT Anion Gap, Venous 30.0 (H) 10.0 - 25.0 mmol/L    Patient Temperature 37.0 degrees Celsius    FiO2 21 %   POCT GLUCOSE   Result Value Ref Range    POCT Glucose 383 (H) 74 - 99 mg/dL   Blood Gas Venous Full Panel   Result Value Ref Range    POCT pH, Venous 7.04 (LL) 7.33 - 7.43 pH    POCT pCO2, Venous 17 (L) 41 - 51 mm Hg    POCT pO2, Venous 69 (H) 35 - 45 mm Hg    POCT SO2, Venous 94 (H) 45 - 75 %    POCT Oxy Hemoglobin, Venous 91.8 (H) 45.0 - 75.0 %    POCT Hematocrit Calculated, Venous 47.0 41.0 - 52.0 %    POCT Sodium, Venous 135 (L) 136 - 145 mmol/L     POCT Potassium, Venous 4.7 3.5 - 5.3 mmol/L    POCT Chloride, Venous 110 (H) 98 - 107 mmol/L    POCT Ionized Calicum, Venous 1.32 1.10 - 1.33 mmol/L    POCT Glucose, Venous 302 (H) 74 - 99 mg/dL    POCT Lactate, Venous 2.3 (H) 0.4 - 2.0 mmol/L    POCT Base Excess, Venous -24.2 (L) -2.0 - 3.0 mmol/L    POCT HCO3 Calculated, Venous 4.6 (L) 22.0 - 26.0 mmol/L    POCT Hemoglobin, Venous 15.7 13.5 - 17.5 g/dL    POCT Anion Gap, Venous 25.0 10.0 - 25.0 mmol/L    Patient Temperature 37.0 degrees Celsius    FiO2 28 %   POCT GLUCOSE   Result Value Ref Range    POCT Glucose 342 (H) 74 - 99 mg/dL   Basic metabolic panel   Result Value Ref Range    Glucose 227 (H) 74 - 99 mg/dL    Sodium 138 136 - 145 mmol/L    Potassium 3.8 3.5 - 5.3 mmol/L    Chloride 113 (H) 98 - 107 mmol/L    Bicarbonate 6 (LL) 21 - 32 mmol/L    Anion Gap 23 (H) 10 - 20 mmol/L    Urea Nitrogen 17 6 - 23 mg/dL    Creatinine 1.03 0.50 - 1.30 mg/dL    eGFR >90 >60 mL/min/1.73m*2    Calcium 7.6 (L) 8.6 - 10.3 mg/dL   Renal function panel   Result Value Ref Range    Glucose 227 (H) 74 - 99 mg/dL    Sodium 138 136 - 145 mmol/L    Potassium 3.8 3.5 - 5.3 mmol/L    Chloride 113 (H) 98 - 107 mmol/L    Bicarbonate 6 (LL) 21 - 32 mmol/L    Anion Gap 23 (H) 10 - 20 mmol/L    Urea Nitrogen 17 6 - 23 mg/dL    Creatinine 1.03 0.50 - 1.30 mg/dL    eGFR >90 >60 mL/min/1.73m*2    Calcium 7.6 (L) 8.6 - 10.3 mg/dL    Phosphorus 2.8 2.5 - 4.9 mg/dL    Albumin 3.6 3.4 - 5.0 g/dL   Magnesium   Result Value Ref Range    Magnesium 1.94 1.60 - 2.40 mg/dL   POCT GLUCOSE   Result Value Ref Range    POCT Glucose 265 (H) 74 - 99 mg/dL   Lactate   Result Value Ref Range    Lactate 1.3 0.4 - 2.0 mmol/L   POCT GLUCOSE   Result Value Ref Range    POCT Glucose 180 (H) 74 - 99 mg/dL   POCT GLUCOSE   Result Value Ref Range    POCT Glucose 184 (H) 74 - 99 mg/dL   POCT GLUCOSE   Result Value Ref Range    POCT Glucose 157 (H) 74 - 99 mg/dL   POCT GLUCOSE   Result Value Ref Range    POCT  Glucose 156 (H) 74 - 99 mg/dL   Renal function panel   Result Value Ref Range    Glucose 161 (H) 74 - 99 mg/dL    Sodium 137 136 - 145 mmol/L    Potassium 3.6 3.5 - 5.3 mmol/L    Chloride 113 (H) 98 - 107 mmol/L    Bicarbonate 14 (L) 21 - 32 mmol/L    Anion Gap 14 10 - 20 mmol/L    Urea Nitrogen 16 6 - 23 mg/dL    Creatinine 0.93 0.50 - 1.30 mg/dL    eGFR >90 >60 mL/min/1.73m*2    Calcium 7.8 (L) 8.6 - 10.3 mg/dL    Phosphorus 1.8 (L) 2.5 - 4.9 mg/dL    Albumin 3.5 3.4 - 5.0 g/dL   Magnesium   Result Value Ref Range    Magnesium 1.79 1.60 - 2.40 mg/dL   BLOOD GAS VENOUS   Result Value Ref Range    POCT pH, Venous 7.28 (L) 7.33 - 7.43 pH    POCT pCO2, Venous 29 (L) 41 - 51 mm Hg    POCT pO2, Venous 85 (H) 35 - 45 mm Hg    POCT SO2, Venous 98 (H) 45 - 75 %    POCT Oxy Hemoglobin, Venous 96.0 (H) 45.0 - 75.0 %    POCT Base Excess, Venous -11.7 (L) -2.0 - 3.0 mmol/L    POCT HCO3 Calculated, Venous 13.6 (L) 22.0 - 26.0 mmol/L    Patient Temperature 37.0 degrees Celsius    FiO2 21 %   Lactate   Result Value Ref Range    Lactate 0.9 0.4 - 2.0 mmol/L   POCT GLUCOSE   Result Value Ref Range    POCT Glucose 176 (H) 74 - 99 mg/dL   POCT GLUCOSE   Result Value Ref Range    POCT Glucose 141 (H) 74 - 99 mg/dL   POCT GLUCOSE   Result Value Ref Range    POCT Glucose 122 (H) 74 - 99 mg/dL   Renal function panel   Result Value Ref Range    Glucose 96 74 - 99 mg/dL    Sodium 137 136 - 145 mmol/L    Potassium 3.4 (L) 3.5 - 5.3 mmol/L    Chloride 111 (H) 98 - 107 mmol/L    Bicarbonate 19 (L) 21 - 32 mmol/L    Anion Gap 10 10 - 20 mmol/L    Urea Nitrogen 16 6 - 23 mg/dL    Creatinine 0.79 0.50 - 1.30 mg/dL    eGFR >90 >60 mL/min/1.73m*2    Calcium 7.8 (L) 8.6 - 10.3 mg/dL    Phosphorus 3.0 2.5 - 4.9 mg/dL    Albumin 3.4 3.4 - 5.0 g/dL   Magnesium   Result Value Ref Range    Magnesium 1.63 1.60 - 2.40 mg/dL   BLOOD GAS VENOUS   Result Value Ref Range    POCT pH, Venous 7.29 (L) 7.33 - 7.43 pH    POCT pCO2, Venous 36 (L) 41 - 51 mm Hg     POCT pO2, Venous 73 (H) 35 - 45 mm Hg    POCT SO2, Venous 97 (H) 45 - 75 %    POCT Oxy Hemoglobin, Venous 94.9 (H) 45.0 - 75.0 %    POCT Base Excess, Venous -8.5 (L) -2.0 - 3.0 mmol/L    POCT HCO3 Calculated, Venous 17.3 (L) 22.0 - 26.0 mmol/L    Patient Temperature 37.0 degrees Celsius    FiO2 21 %   Lactate   Result Value Ref Range    Lactate 1.1 0.4 - 2.0 mmol/L   POCT GLUCOSE   Result Value Ref Range    POCT Glucose 104 (H) 74 - 99 mg/dL   POCT GLUCOSE   Result Value Ref Range    POCT Glucose 96 74 - 99 mg/dL   POCT GLUCOSE   Result Value Ref Range    POCT Glucose 115 (H) 74 - 99 mg/dL   POCT GLUCOSE   Result Value Ref Range    POCT Glucose 154 (H) 74 - 99 mg/dL   POCT GLUCOSE   Result Value Ref Range    POCT Glucose 202 (H) 74 - 99 mg/dL   Renal function panel   Result Value Ref Range    Glucose 220 (H) 74 - 99 mg/dL    Sodium 133 (L) 136 - 145 mmol/L    Potassium 3.6 3.5 - 5.3 mmol/L    Chloride 107 98 - 107 mmol/L    Bicarbonate 20 (L) 21 - 32 mmol/L    Anion Gap 10 10 - 20 mmol/L    Urea Nitrogen 15 6 - 23 mg/dL    Creatinine 0.78 0.50 - 1.30 mg/dL    eGFR >90 >60 mL/min/1.73m*2    Calcium 7.7 (L) 8.6 - 10.3 mg/dL    Phosphorus 3.6 2.5 - 4.9 mg/dL    Albumin 3.3 (L) 3.4 - 5.0 g/dL   Magnesium   Result Value Ref Range    Magnesium 1.66 1.60 - 2.40 mg/dL   CBC   Result Value Ref Range    WBC 7.9 4.4 - 11.3 x10*3/uL    nRBC 0.0 0.0 - 0.0 /100 WBCs    RBC 4.34 (L) 4.50 - 5.90 x10*6/uL    Hemoglobin 13.8 13.5 - 17.5 g/dL    Hematocrit 40.9 (L) 41.0 - 52.0 %    MCV 94 80 - 100 fL    MCH 31.8 26.0 - 34.0 pg    MCHC 33.7 32.0 - 36.0 g/dL    RDW 12.5 11.5 - 14.5 %    Platelets 155 150 - 450 x10*3/uL   Renal function panel   Result Value Ref Range    Glucose 239 (H) 74 - 99 mg/dL    Sodium 137 136 - 145 mmol/L    Potassium 3.8 3.5 - 5.3 mmol/L    Chloride 107 98 - 107 mmol/L    Bicarbonate 19 (L) 21 - 32 mmol/L    Anion Gap 15 10 - 20 mmol/L    Urea Nitrogen 15 6 - 23 mg/dL    Creatinine 0.78 0.50 - 1.30 mg/dL    eGFR  >90 >60 mL/min/1.73m*2    Calcium 8.2 (L) 8.6 - 10.3 mg/dL    Phosphorus 3.1 2.5 - 4.9 mg/dL    Albumin 3.2 (L) 3.4 - 5.0 g/dL   Magnesium   Result Value Ref Range    Magnesium 1.76 1.60 - 2.40 mg/dL   POCT GLUCOSE   Result Value Ref Range    POCT Glucose 205 (H) 74 - 99 mg/dL     *Note: Due to a large number of results and/or encounters for the requested time period, some results have not been displayed. A complete set of results can be found in Results Review.      ECG 12 lead    Result Date: 1/15/2025  Sinus tachycardia Prolonged QT interval    XR chest 1 view    Result Date: 1/15/2025  Interpreted By:  Santi De Paz, STUDY: XR CHEST 1 VIEW;  1/15/2025 2:00 am   INDICATION: Signs/Symptoms:cough.   COMPARISON: 11/7/2024   ACCESSION NUMBER(S): [id number]   ORDERING CLINICIAN: ROSITA REYNOLDS   FINDINGS: The cardiac silhouette is normal in size. No focal airspace consolidation or pleural effusion. No pneumothorax.       No airspace consolidation or pleural effusion.   MACRO: None   Signed by: Santi De Paz 1/15/2025 2:28 AM Dictation workstation:   [id number]        Assessment/Plan   Assessment & Plan  Diabetic ketoacidosis without coma associated with type 1 diabetes mellitus (Multi)    Hyperkalemia    Vomiting    Metabolic acidosis with increased anion gap and accumulation of organic acids    IMPRESSION:  DKA  POORLY CONTROLLED TYPE I DIABETES MELLITUS  A1C of 13.2% as of November 2024     RECOMMENDATIONS:  To continue NPH 22 units subcutaneous twice daily   To continue Humalog 5 units TID AC  To continue insulin correction scale TID AC in 1 unit increments   Diabetic diet as tolerated   Accu-Cheks ACHS once off the insulin drip   Hypoglycemic protocol   To continue the use of your CGM for the intensive glucose monitoring due to the dynamic nature of insulin requirements, the narrow therapeutic index of insulin and the potentially fatal consequences of treatment  Patient may be discharged today   Will continue to  follow       Lori Packer MD

## 2025-01-16 NOTE — ASSESSMENT & PLAN NOTE
DKA:  Resolved  Continue NPH 22 units twice daily  Insulin lispro 5 units 3 times daily   Endocrinology on board and recommends - Continued use of CGM for intensive glucose monitoring due to dynamic nature of patient's insulin     Diet: Carb-controlled  DVT Prophylaxis: None needed per guidelines  Code Status: Full code  Additional Sources Reviewed: Past admissions.     Anticipated Length of Stay (LOS): Patient will require 1-2 midnights for treatment of DKA.

## 2025-01-16 NOTE — CARE PLAN
The patient's goals for the shift include        Problem: Nutrition  Goal: Less than 5 days NPO/clear liquids  Outcome: Progressing  Goal: Oral intake greater than 50%  Outcome: Progressing  Goal: Oral intake greater 75%  Outcome: Progressing  Goal: Adequate PO fluid intake  Outcome: Progressing  Goal: Nutrition support goals are met within 48 hrs  Outcome: Progressing  Goal: Nutrition support is meeting 75% of nutrient needs  Outcome: Progressing  Goal: BG  mg/dL  Outcome: Progressing  Goal: Lab values WNL  Outcome: Progressing  Goal: Electrolytes WNL  Outcome: Progressing     Problem: Pain - Adult  Goal: Verbalizes/displays adequate comfort level or baseline comfort level  Outcome: Progressing     Problem: Safety - Adult  Goal: Free from fall injury  Outcome: Progressing     Problem: Discharge Planning  Goal: Discharge to home or other facility with appropriate resources  Outcome: Progressing     Problem: Chronic Conditions and Co-morbidities  Goal: Patient's chronic conditions and co-morbidity symptoms are monitored and maintained or improved  Outcome: Progressing     Problem: Diabetes  Goal: Achieve decreasing blood glucose levels by end of shift  Outcome: Progressing  Goal: Increase stability of blood glucose readings by end of shift  Outcome: Progressing  Goal: Decrease in ketones present in urine by end of shift  Outcome: Progressing  Goal: Maintain electrolyte levels within acceptable range throughout shift  Outcome: Progressing  Goal: Maintain glucose levels >70mg/dl to <250mg/dl throughout shift  Outcome: Progressing  Goal: No changes in neurological exam by end of shift  Outcome: Progressing  Goal: Learn about and adhere to nutrition recommendations by end of shift  Outcome: Progressing  Goal: Vital signs within normal range for age by end of shift  Outcome: Progressing  Goal: Increase self care and/or family involovement by end of shift  Outcome: Progressing  Goal: Receive DSME education by end of  shift  Outcome: Progressing     Problem: Fall/Injury  Goal: Not fall by end of shift  Outcome: Progressing  Goal: Be free from injury by end of the shift  Outcome: Progressing  Goal: Verbalize understanding of personal risk factors for fall in the hospital  Outcome: Progressing  Goal: Verbalize understanding of risk factor reduction measures to prevent injury from fall in the home  Outcome: Progressing  Goal: Use assistive devices by end of the shift  Outcome: Progressing  Goal: Pace activities to prevent fatigue by end of the shift  Outcome: Progressing

## 2025-01-16 NOTE — PROGRESS NOTES
Ed Sanon is a 34 y.o. male on day 1 of admission presenting with Diabetic ketoacidosis without coma associated with type 1 diabetes mellitus (Multi).      Subjective   Ed Sanon is a 34 y.o. male with PMHx s/f IDDM 1 presenting with DKA. He has a history of recurrent admissions for DKA, including 5 over the past month. He says over the past day he has had nausea, vomiting, weakness, abdominal and chest pain.  No inciting factors noted; he was at his baseline up until then with no recent sick contacts or exposures. No appetite, has not been able to eat anything. He does say he has been urinating more than usual. He has a continuous glucose monitor. He says he has not missed any doses of his insulin, although he told the medical student that he did. No shortness of breath, cough, congestion, fever, chills, lightheadedness, leg swelling, bowel issues or other symptoms. Last time he was admitted he refused to take SSI if his blood sugar was less than 300.     ED Course (Summary - please note all labs, imaging studies, and interventions noted below have been personally reviewed and/or interpreted on day of admission):   Vitals on presentation: 98.1 F, 120 bpm, 16 rr, 105/62, 98% on RA  Labs: CMP glu 467, , K 5.9, bicarb 7, anion gap 33  Phos 4.8  Mag 2.07  Lipase 13  Beta-hydroxybutyrate 10.99  CBC WBC 10.0, absolute neutrophils 16.17  VBG pH 6.98, pCO2 29, pO2 48, HCO3 6.8  Imaging: CXR - No airspace consolidation or pleural effusion.   Interventions: 1.5 L NS bolus, Zofran 4 mg. Pt admitted to ICU for DKA treatment   1/15/2025: Patient was seen and examined.  Bicarb levels is improving but still at 14.  Continue insulin drip and trend BMP Q4 hourly.  Endocrinologist consulted.  Intensivist on board and recommendations also appreciated.  1/16/2025: Patient was seen and examined.  Anion gap has closed.  Patient bicarb level however still low at 19 today.  Will encourage liberal fluid intake and  repeat in a.m.  Now transferred to Medr unit.  Endocrinologist recommendations appreciated.  Objective     Last Recorded Vitals  BP (!) 131/91 (BP Location: Left arm, Patient Position: Lying) Comment: RN notified  Pulse 106   Temp 36.8 °C (98.2 °F) (Temporal)   Resp 18   Wt 73.1 kg (161 lb 2.5 oz)   SpO2 100%   Intake/Output last 3 Shifts:    Intake/Output Summary (Last 24 hours) at 1/16/2025 1552  Last data filed at 1/16/2025 1307  Gross per 24 hour   Intake 900 ml   Output --   Net 900 ml       Admission Weight  Weight: 77.1 kg (170 lb) (01/15/25 0019)    Daily Weight  01/15/25 : 73.1 kg (161 lb 2.5 oz)    Image Results  ECG 12 lead  Sinus tachycardia  Prolonged QT interval  XR chest 1 view  Narrative: Interpreted By:  Santi De Paz,   STUDY:  XR CHEST 1 VIEW;  1/15/2025 2:00 am      INDICATION:  Signs/Symptoms:cough.      COMPARISON:  11/7/2024      ACCESSION NUMBER(S):  [id number]      ORDERING CLINICIAN:  ROSITA REYNOLDS      FINDINGS:  The cardiac silhouette is normal in size. No focal airspace  consolidation or pleural effusion. No pneumothorax.      Impression: No airspace consolidation or pleural effusion.      MACRO:  None      Signed by: Santi De Paz 1/15/2025 2:28 AM  Dictation workstation:   [id number]      Physical Exam  Vitals:    01/16/25 1307   BP: (!) 131/91   Pulse: 106   Resp: 18   Temp: 36.8 °C (98.2 °F)   SpO2: 100%     Constitutional: Pleasant and cooperative. Laying in bed in no acute distress. Conversant. Somewhat fatigued/lethargic  Skin: Warm and dry; no obvious lesions, rashes, pallor, or jaundice.   Eyes: EOMI. Anicteric sclera.   ENT: Mucous membranes moist; no obvious injury or deformity appreciated.   Head and Neck: Normocephalic, atraumatic. ROM preserved. Trachea midline. No appreciable JVD.   Respiratory: Nonlabored on RA. Lungs clear to auscultation bilaterally without obvious adventitious sounds. Chest rise is equal.  Cardiovascular: RRR. No gross murmur, gallop, or rub.  Extremities are warm and well-perfused with good capillary refill (< 3 seconds). No chest wall tenderness.   GI: Abdomen soft, nontender, nondistended. No obvious organomegaly appreciated. Bowel sounds are present.  : No CVA tenderness.   MSK: No gross abnormalities appreciated. No limitations to AROM/PROM appreciated.   Extremities: No cyanosis, edema, or clubbing evident. Neurovascularly intact.   Neuro: A&Ox3. CN 2-12 grossly intact. Able to respond to questions appropriately and clearly. No acute focal neurologic deficits appreciated.  Psych: Appropriate mood and behavior.  Relevant Results               Assessment/Plan                  Assessment & Plan  Diabetic ketoacidosis without coma associated with type 1 diabetes mellitus (Multi)    Hyperkalemia    Vomiting    Metabolic acidosis with increased anion gap and accumulation of organic acids  DKA:  Resolved  Continue NPH 22 units twice daily  Insulin lispro 5 units 3 times daily   Endocrinology on board and recommends - Continued use of CGM for intensive glucose monitoring due to dynamic nature of patient's insulin     Diet: Carb-controlled  DVT Prophylaxis: None needed per guidelines  Code Status: Full code  Additional Sources Reviewed: Past admissions.     Anticipated Length of Stay (LOS): Patient will require 1-2 midnights for treatment of DKA.            Spent 35 minutes in the follow-up management of this patient today    Liseth Ahn MD

## 2025-01-17 ENCOUNTER — PHARMACY VISIT (OUTPATIENT)
Dept: PHARMACY | Facility: CLINIC | Age: 35
End: 2025-01-17
Payer: COMMERCIAL

## 2025-01-17 VITALS
BODY MASS INDEX: 22.56 KG/M2 | SYSTOLIC BLOOD PRESSURE: 127 MMHG | WEIGHT: 161.16 LBS | HEART RATE: 97 BPM | HEIGHT: 71 IN | OXYGEN SATURATION: 96 % | DIASTOLIC BLOOD PRESSURE: 85 MMHG | RESPIRATION RATE: 18 BRPM | TEMPERATURE: 98.4 F

## 2025-01-17 PROBLEM — E87.5 HYPERKALEMIA: Status: RESOLVED | Noted: 2023-07-20 | Resolved: 2025-01-17

## 2025-01-17 PROBLEM — R11.10 VOMITING: Status: RESOLVED | Noted: 2023-11-15 | Resolved: 2025-01-17

## 2025-01-17 PROBLEM — E87.20 METABOLIC ACIDOSIS WITH INCREASED ANION GAP AND ACCUMULATION OF ORGANIC ACIDS: Status: RESOLVED | Noted: 2025-01-15 | Resolved: 2025-01-17

## 2025-01-17 PROBLEM — E10.10 DIABETIC KETOACIDOSIS WITHOUT COMA ASSOCIATED WITH TYPE 1 DIABETES MELLITUS (MULTI): Status: RESOLVED | Noted: 2025-01-15 | Resolved: 2025-01-17

## 2025-01-17 LAB
ANION GAP SERPL CALC-SCNC: 12 MMOL/L (ref 10–20)
BUN SERPL-MCNC: 18 MG/DL (ref 6–23)
CALCIUM SERPL-MCNC: 8 MG/DL (ref 8.6–10.3)
CHLORIDE SERPL-SCNC: 103 MMOL/L (ref 98–107)
CO2 SERPL-SCNC: 26 MMOL/L (ref 21–32)
CREAT SERPL-MCNC: 0.6 MG/DL (ref 0.5–1.3)
EGFRCR SERPLBLD CKD-EPI 2021: >90 ML/MIN/1.73M*2
GLUCOSE BLD MANUAL STRIP-MCNC: 267 MG/DL (ref 74–99)
GLUCOSE BLD MANUAL STRIP-MCNC: 292 MG/DL (ref 74–99)
GLUCOSE BLD MANUAL STRIP-MCNC: 300 MG/DL (ref 74–99)
GLUCOSE SERPL-MCNC: 301 MG/DL (ref 74–99)
POTASSIUM SERPL-SCNC: 3.5 MMOL/L (ref 3.5–5.3)
SODIUM SERPL-SCNC: 137 MMOL/L (ref 136–145)

## 2025-01-17 PROCEDURE — 36415 COLL VENOUS BLD VENIPUNCTURE: CPT | Performed by: INTERNAL MEDICINE

## 2025-01-17 PROCEDURE — 99232 SBSQ HOSP IP/OBS MODERATE 35: CPT | Performed by: INTERNAL MEDICINE

## 2025-01-17 PROCEDURE — 2500000002 HC RX 250 W HCPCS SELF ADMINISTERED DRUGS (ALT 637 FOR MEDICARE OP, ALT 636 FOR OP/ED): Performed by: INTERNAL MEDICINE

## 2025-01-17 PROCEDURE — RXMED WILLOW AMBULATORY MEDICATION CHARGE

## 2025-01-17 PROCEDURE — 99239 HOSP IP/OBS DSCHRG MGMT >30: CPT | Performed by: INTERNAL MEDICINE

## 2025-01-17 PROCEDURE — 82947 ASSAY GLUCOSE BLOOD QUANT: CPT

## 2025-01-17 PROCEDURE — 80048 BASIC METABOLIC PNL TOTAL CA: CPT | Performed by: INTERNAL MEDICINE

## 2025-01-17 RX ORDER — INSULIN LISPRO 100 [IU]/ML
0-10 INJECTION, SOLUTION INTRAVENOUS; SUBCUTANEOUS
Status: DISCONTINUED | OUTPATIENT
Start: 2025-01-17 | End: 2025-01-17 | Stop reason: HOSPADM

## 2025-01-17 RX ADMIN — INSULIN HUMAN 22 UNITS: 100 INJECTION, SUSPENSION SUBCUTANEOUS at 08:13

## 2025-01-17 RX ADMIN — INSULIN LISPRO 5 UNITS: 100 INJECTION, SOLUTION INTRAVENOUS; SUBCUTANEOUS at 08:21

## 2025-01-17 ASSESSMENT — ENCOUNTER SYMPTOMS
NAUSEA: 0
VOMITING: 0
CONFUSION: 0
ABDOMINAL PAIN: 0

## 2025-01-17 ASSESSMENT — PAIN SCALES - GENERAL: PAINLEVEL_OUTOF10: 0 - NO PAIN

## 2025-01-17 NOTE — PROGRESS NOTES
"Ed Sanon is a 34 y.o. male on day 2 of admission presenting with Diabetic ketoacidosis without coma associated with type 1 diabetes mellitus (Multi).    Subjective   Patient has no complaints.  He has already eaten most of his breakfast.       I have reviewed histories, allergies and medications have been reviewed and there are no changes       Objective   Review of Systems   Gastrointestinal:  Negative for abdominal pain, nausea and vomiting.   Psychiatric/Behavioral:  Negative for confusion.    All other systems reviewed and are negative.    Physical Exam  Vitals and nursing note reviewed.   Constitutional:       General: He is not in acute distress.     Appearance: Normal appearance. He is normal weight.   HENT:      Head: Normocephalic and atraumatic.      Nose: Nose normal.      Mouth/Throat:      Mouth: Mucous membranes are moist.   Eyes:      Extraocular Movements: Extraocular movements intact.   Pulmonary:      Effort: Pulmonary effort is normal.   Musculoskeletal:         General: Normal range of motion.   Neurological:      Mental Status: He is alert and oriented to person, place, and time.   Psychiatric:         Mood and Affect: Mood normal.         Last Recorded Vitals  Blood pressure 123/77, pulse 93, temperature 36.6 °C (97.9 °F), temperature source Temporal, resp. rate 18, height 1.791 m (5' 10.5\"), weight 73.1 kg (161 lb 2.5 oz), SpO2 98%.  Intake/Output last 3 Shifts:  I/O last 3 completed shifts:  In: 1721 (23.5 mL/kg) [P.O.:1721]  Out: - (0 mL/kg)   Weight: 73.1 kg     Relevant Results  Results from last 7 days   Lab Units 01/17/25  0630 01/17/25  0506 01/16/25  2048 01/16/25  1612 01/16/25  1102 01/16/25  0634 01/16/25  0547 01/15/25  2006 01/15/25  1558 01/15/25  1554 01/15/25  1253 01/15/25  1140   POCT GLUCOSE mg/dL 292*  --  312* 282* 234* 205*  --   --    < >  --    < >  --    GLUCOSE mg/dL  --  301*  --   --   --   --  239* 220*  --  96  --  161*    < > = values in this interval not " displayed.     Scheduled medications  insulin lispro, 0-5 Units, subcutaneous, TID AC  insulin lispro, 5 Units, subcutaneous, TID AC  insulin NPH (Isophane), 22 Units, subcutaneous, q12h SHEILA      Continuous medications     PRN medications  PRN medications: acetaminophen **OR** acetaminophen **OR** acetaminophen, bisacodyl, bisacodyl, dextrose, dextrose, glucagon, glucagon, guaiFENesin, melatonin, ondansetron **OR** ondansetron    Results for orders placed or performed during the hospital encounter of 01/15/25 (from the past 24 hours)   POCT GLUCOSE   Result Value Ref Range    POCT Glucose 282 (H) 74 - 99 mg/dL   POCT GLUCOSE   Result Value Ref Range    POCT Glucose 312 (H) 74 - 99 mg/dL   Basic Metabolic Panel   Result Value Ref Range    Glucose 301 (H) 74 - 99 mg/dL    Sodium 137 136 - 145 mmol/L    Potassium 3.5 3.5 - 5.3 mmol/L    Chloride 103 98 - 107 mmol/L    Bicarbonate 26 21 - 32 mmol/L    Anion Gap 12 10 - 20 mmol/L    Urea Nitrogen 18 6 - 23 mg/dL    Creatinine 0.60 0.50 - 1.30 mg/dL    eGFR >90 >60 mL/min/1.73m*2    Calcium 8.0 (L) 8.6 - 10.3 mg/dL   POCT GLUCOSE   Result Value Ref Range    POCT Glucose 292 (H) 74 - 99 mg/dL   POCT GLUCOSE   Result Value Ref Range    POCT Glucose 300 (H) 74 - 99 mg/dL     *Note: Due to a large number of results and/or encounters for the requested time period, some results have not been displayed. A complete set of results can be found in Results Review.      ECG 12 lead    Result Date: 1/15/2025  Sinus tachycardia Prolonged QT interval    XR chest 1 view    Result Date: 1/15/2025  Interpreted By:  Santi De Paz, STUDY: XR CHEST 1 VIEW;  1/15/2025 2:00 am   INDICATION: Signs/Symptoms:cough.   COMPARISON: 11/7/2024   ACCESSION NUMBER(S): [id number]   ORDERING CLINICIAN: ROSITA REYNOLDS   FINDINGS: The cardiac silhouette is normal in size. No focal airspace consolidation or pleural effusion. No pneumothorax.       No airspace consolidation or pleural effusion.   MACRO: None    Signed by: Santi De Paz 1/15/2025 2:28 AM Dictation workstation:   [id number]           Assessment/Plan   Assessment & Plan  Diabetic ketoacidosis without coma associated with type 1 diabetes mellitus (Multi)    Hyperkalemia    Vomiting    Metabolic acidosis with increased anion gap and accumulation of organic acids    IMPRESSION:  DKA - resolved   POORLY CONTROLLED TYPE I DIABETES MELLITUS  A1C of 13.2% as of November 2024     RECOMMENDATIONS:  To continue NPH 22 units subcutaneous twice daily   To continue Humalog 5 units TID AC  To change insulin correction scale TID AC to 2 unit increments   Diabetic diet as tolerated   Accu-Cheks ACHS  Hypoglycemic protocol   To continue the use of your CGM for the intensive glucose monitoring due to the dynamic nature of insulin requirements, the narrow therapeutic index of insulin and the potentially fatal consequences of treatment  Patient may be discharged today   Will continue to follow    Lori Packer MD

## 2025-01-17 NOTE — PROGRESS NOTES
01/17/25 0943   Intensity of Service   Intensity of Service 0-30 min     Chart reviewed. Plan remains home when medically stable with blood sugars. No needs at discharge.

## 2025-01-17 NOTE — CARE PLAN
Problem: Nutrition  Goal: Less than 5 days NPO/clear liquids  Outcome: Progressing  Goal: Oral intake greater than 50%  Outcome: Progressing  Goal: Oral intake greater 75%  Outcome: Progressing  Goal: Adequate PO fluid intake  Outcome: Progressing  Goal: Nutrition support goals are met within 48 hrs  Outcome: Progressing  Goal: Nutrition support is meeting 75% of nutrient needs  Outcome: Progressing  Goal: BG  mg/dL  Outcome: Progressing  Goal: Lab values WNL  Outcome: Progressing  Goal: Electrolytes WNL  Outcome: Progressing     Problem: Pain - Adult  Goal: Verbalizes/displays adequate comfort level or baseline comfort level  Outcome: Progressing  Flowsheets (Taken 1/16/2025 6135)  Verbalizes/displays adequate comfort level or baseline comfort level:   Encourage patient to monitor pain and request assistance   Assess pain using appropriate pain scale   Administer analgesics based on type and severity of pain and evaluate response   Implement non-pharmacological measures as appropriate and evaluate response     Problem: Safety - Adult  Goal: Free from fall injury  Outcome: Progressing     Problem: Discharge Planning  Goal: Discharge to home or other facility with appropriate resources  Outcome: Progressing     Problem: Chronic Conditions and Co-morbidities  Goal: Patient's chronic conditions and co-morbidity symptoms are monitored and maintained or improved  Outcome: Progressing     Problem: Diabetes  Goal: Achieve decreasing blood glucose levels by end of shift  Outcome: Progressing  Goal: Increase stability of blood glucose readings by end of shift  Outcome: Progressing  Goal: Decrease in ketones present in urine by end of shift  Outcome: Progressing  Goal: Maintain electrolyte levels within acceptable range throughout shift  Outcome: Progressing  Goal: Maintain glucose levels >70mg/dl to <250mg/dl throughout shift  Outcome: Progressing  Goal: No changes in neurological exam by end of shift  Outcome:  Progressing  Goal: Learn about and adhere to nutrition recommendations by end of shift  Outcome: Progressing  Goal: Vital signs within normal range for age by end of shift  Outcome: Progressing  Goal: Increase self care and/or family involovement by end of shift  Outcome: Progressing  Goal: Receive DSME education by end of shift  Outcome: Progressing     Problem: Fall/Injury  Goal: Not fall by end of shift  Outcome: Progressing  Goal: Be free from injury by end of the shift  Outcome: Progressing  Goal: Verbalize understanding of personal risk factors for fall in the hospital  Outcome: Progressing  Goal: Verbalize understanding of risk factor reduction measures to prevent injury from fall in the home  Outcome: Progressing  Goal: Use assistive devices by end of the shift  Outcome: Progressing  Goal: Pace activities to prevent fatigue by end of the shift  Outcome: Progressing   The patient's goals for the shift include      The clinical goals for the shift include free from falls and injuries, tolerate insulin regimen, vitals stable

## 2025-01-17 NOTE — DISCHARGE SUMMARY
"Discharge Diagnosis  Diabetic ketoacidosis without coma associated with type 1 diabetes mellitus (Multi)      Issues Requiring Follow-Up      Discharge Meds     Medication List      CHANGE how you take these medications     * insulin regular 100 unit/mL injection; Commonly known as: HumuLIN   R,NovoLIN R; Inject 0-10 Units under the skin 3 times a day before meals.    0 unit(s) if Blood glucose is between  2 unit(s) if Blood glucose is   between 151-200 4 unit(s) if Blood glucose is between 201-250 6 unit(s) if   Blood glucose is between 251-300 8 unit(s) if Blood glucose is between   301-350 10 unit(s) if Blood glucose is between 351-400; What changed:   Another medication with the same name was changed. Make sure you   understand how and when to take each.   * insulin regular 100 unit/mL injection; Commonly known as: HumuLIN   R,NovoLIN R; Inject 5 Units under the skin 3 times a day before meals.   Take as directed per insulin instructions.; What changed: how much to take  * This list has 2 medication(s) that are the same as other medications   prescribed for you. Read the directions carefully, and ask your doctor or   other care provider to review them with you.     CONTINUE taking these medications     Guardian 4 Glucose Sensor device; Generic drug: blood-glucose sensor;   Change insulin sensor every 7 days as directed, linked to Minimed insulin   pump, check with endocrinology for updated regimen   insulin NPH (Isophane) 100 unit/mL (3 mL) injection; Commonly known as:   HumuLIN N,NovoLIN N; Inject 22 Units under the skin 2 times a day before   meals. Take as directed per insulin instructions.   lancets misc   pen needle, diabetic 32 gauge x 5/32\" needle     STOP taking these medications     lisinopril 5 mg tablet       Test Results Pending At Discharge  Pending Labs       No current pending labs.            Hospital Course  Ed Sanon is a 34 y.o. male with PMHx s/f IDDM 1 presenting with DKA. He " has a history of recurrent admissions for DKA, including 5 over the past month. He says over the past day he has had nausea, vomiting, weakness, abdominal and chest pain.  No inciting factors noted; he was at his baseline up until then with no recent sick contacts or exposures. No appetite, has not been able to eat anything. He does say he has been urinating more than usual. He has a continuous glucose monitor. He says he has not missed any doses of his insulin, although he told the medical student that he did. No shortness of breath, cough, congestion, fever, chills, lightheadedness, leg swelling, bowel issues or other symptoms. Last time he was admitted he refused to take SSI if his blood sugar was less than 300.     ED Course (Summary - please note all labs, imaging studies, and interventions noted below have been personally reviewed and/or interpreted on day of admission):   Vitals on presentation: 98.1 F, 120 bpm, 16 rr, 105/62, 98% on RA  Labs: CMP glu 467, , K 5.9, bicarb 7, anion gap 33  Phos 4.8  Mag 2.07  Lipase 13  Beta-hydroxybutyrate 10.99  CBC WBC 10.0, absolute neutrophils 16.17  VBG pH 6.98, pCO2 29, pO2 48, HCO3 6.8  Imaging: CXR - No airspace consolidation or pleural effusion.   Interventions: 1.5 L NS bolus, Zofran 4 mg. Pt admitted to ICU for DKA treatment     1/15/2025: Patient was seen and examined.  Bicarb levels is improving but still at 14.  Continue insulin drip and trend BMP Q4 hourly.  Endocrinologist consulted.  Intensivist on board and recommendations also appreciated.  1/16/2025: Patient was seen and examined.  Anion gap has closed.  Patient bicarb level however still low at 19 today.  Will encourage liberal fluid intake and repeat in a.m.  Now transferred to MedSur unit.  Endocrinologist recommendations appreciated.  1/17/2025: Patient was seen and examined.  Patient is hemodynamically stable and bicarb level is improved today.  He was discharged in stable condition to  follow-up as outpatient with primary care physician within 1 week of discharge and with endocrinologist as scheduled.      The discharge process took about 35 minutes.    Pertinent Physical Exam At Time of Discharge  Physical Exam  Vitals:    01/17/25 0908   BP: 127/85   Pulse: 97   Resp: 18   Temp: 36.9 °C (98.4 °F)   SpO2: 96%     Constitutional: Pleasant and cooperative. Laying in bed in no acute distress. Conversant. Somewhat fatigued/lethargic  Skin: Warm and dry; no obvious lesions, rashes, pallor, or jaundice.   Eyes: EOMI. Anicteric sclera.   ENT: Mucous membranes moist; no obvious injury or deformity appreciated.   Head and Neck: Normocephalic, atraumatic. ROM preserved. Trachea midline. No appreciable JVD.   Respiratory: Nonlabored on RA. Lungs clear to auscultation bilaterally without obvious adventitious sounds. Chest rise is equal.  Cardiovascular: RRR. No gross murmur, gallop, or rub. Extremities are warm and well-perfused with good capillary refill (< 3 seconds). No chest wall tenderness.   GI: Abdomen soft, nontender, nondistended. No obvious organomegaly appreciated. Bowel sounds are present.  : No CVA tenderness.   MSK: No gross abnormalities appreciated. No limitations to AROM/PROM appreciated.   Extremities: No cyanosis, edema, or clubbing evident. Neurovascularly intact.   Neuro: A&Ox3. CN 2-12 grossly intact. Able to respond to questions appropriately and clearly. No acute focal neurologic deficits appreciated.  Psych: Appropriate mood and behavior.  Outpatient Follow-Up  Future Appointments   Date Time Provider Department Center   3/26/2025 11:00 AM Henri Brumfield MD [id number] [facility]         Liseth Ahn MD

## 2025-01-20 ENCOUNTER — PATIENT OUTREACH (OUTPATIENT)
Dept: PRIMARY CARE | Facility: CLINIC | Age: 35
End: 2025-01-20
Payer: MEDICAID

## 2025-01-21 ENCOUNTER — TELEPHONE (OUTPATIENT)
Dept: PRIMARY CARE | Facility: CLINIC | Age: 35
End: 2025-01-21
Payer: MEDICAID

## 2025-01-21 NOTE — TELEPHONE ENCOUNTER
Pt is needing a letter stating the Dr Brumfield is pt's doctor and under his care.  Pt is needing it due to being out of work (in and out of hospital for Diabetic ketoacidosis).  Pt has been in the hospital on 01/15/25, 12/04/24, 11/14/24, 11/07/24, 09/28/24, 09/18/24, 08/11/24, 07/05/24, and 03/19/24.  Pt does have MyChart.  Pt would like letter emailed to: [email or web address] -  with [facility]. Lori Taylor phone number is [phone] -  Fax [phone].

## 2025-01-22 ENCOUNTER — TELEPHONE (OUTPATIENT)
Dept: PRIMARY CARE | Facility: CLINIC | Age: 35
End: 2025-01-22

## 2025-01-22 ENCOUNTER — TELEMEDICINE (OUTPATIENT)
Dept: PRIMARY CARE | Facility: CLINIC | Age: 35
End: 2025-01-22
Payer: MEDICAID

## 2025-01-22 VITALS
DIASTOLIC BLOOD PRESSURE: 85 MMHG | HEIGHT: 71 IN | SYSTOLIC BLOOD PRESSURE: 127 MMHG | WEIGHT: 161 LBS | BODY MASS INDEX: 22.54 KG/M2

## 2025-01-22 DIAGNOSIS — E11.65 POORLY CONTROLLED DIABETES MELLITUS (MULTI): Primary | ICD-10-CM

## 2025-01-22 PROCEDURE — 3074F SYST BP LT 130 MM HG: CPT

## 2025-01-22 PROCEDURE — 99213 OFFICE O/P EST LOW 20 MIN: CPT

## 2025-01-22 PROCEDURE — 3008F BODY MASS INDEX DOCD: CPT

## 2025-01-22 PROCEDURE — 1036F TOBACCO NON-USER: CPT

## 2025-01-22 PROCEDURE — 3079F DIAST BP 80-89 MM HG: CPT

## 2025-01-22 RX ORDER — BLOOD SUGAR DIAGNOSTIC
STRIP MISCELLANEOUS
COMMUNITY
Start: 2025-01-13

## 2025-01-22 RX ORDER — LANCETS 30 GAUGE
EACH MISCELLANEOUS
COMMUNITY
Start: 2024-10-25

## 2025-01-22 SDOH — ECONOMIC STABILITY: FOOD INSECURITY: WITHIN THE PAST 12 MONTHS, THE FOOD YOU BOUGHT JUST DIDN'T LAST AND YOU DIDN'T HAVE MONEY TO GET MORE.: NEVER TRUE

## 2025-01-22 SDOH — ECONOMIC STABILITY: FOOD INSECURITY: WITHIN THE PAST 12 MONTHS, YOU WORRIED THAT YOUR FOOD WOULD RUN OUT BEFORE YOU GOT MONEY TO BUY MORE.: NEVER TRUE

## 2025-01-22 ASSESSMENT — PAIN SCALES - GENERAL: PAINLEVEL_OUTOF10: 0-NO PAIN

## 2025-01-22 ASSESSMENT — ANXIETY QUESTIONNAIRES
5. BEING SO RESTLESS THAT IT IS HARD TO SIT STILL: NOT AT ALL
6. BECOMING EASILY ANNOYED OR IRRITABLE: NOT AT ALL
2. NOT BEING ABLE TO STOP OR CONTROL WORRYING: NOT AT ALL
4. TROUBLE RELAXING: NOT AT ALL
IF YOU CHECKED OFF ANY PROBLEMS ON THIS QUESTIONNAIRE, HOW DIFFICULT HAVE THESE PROBLEMS MADE IT FOR YOU TO DO YOUR WORK, TAKE CARE OF THINGS AT HOME, OR GET ALONG WITH OTHER PEOPLE: NOT DIFFICULT AT ALL
1. FEELING NERVOUS, ANXIOUS, OR ON EDGE: NOT AT ALL
3. WORRYING TOO MUCH ABOUT DIFFERENT THINGS: NOT AT ALL

## 2025-01-22 ASSESSMENT — ENCOUNTER SYMPTOMS
CARDIOVASCULAR NEGATIVE: 1
OCCASIONAL FEELINGS OF UNSTEADINESS: 0
GASTROINTESTINAL NEGATIVE: 1
HEMATOLOGIC/LYMPHATIC NEGATIVE: 1
RESPIRATORY NEGATIVE: 1
LOSS OF SENSATION IN FEET: 0
DEPRESSION: 0
PSYCHIATRIC NEGATIVE: 1
CONSTITUTIONAL NEGATIVE: 1
ENDOCRINE NEGATIVE: 1
MUSCULOSKELETAL NEGATIVE: 1

## 2025-01-22 ASSESSMENT — LIFESTYLE VARIABLES
HOW MANY STANDARD DRINKS CONTAINING ALCOHOL DO YOU HAVE ON A TYPICAL DAY: PATIENT DOES NOT DRINK
SKIP TO QUESTIONS 9-10: 1
AUDIT-C TOTAL SCORE: 0
HOW OFTEN DO YOU HAVE SIX OR MORE DRINKS ON ONE OCCASION: NEVER
HOW OFTEN DO YOU HAVE A DRINK CONTAINING ALCOHOL: NEVER

## 2025-01-22 NOTE — TELEPHONE ENCOUNTER
Patient will need virtual visit to have letter drafted. Amira agrees to see him for this. Left Vm message for him to return call. If he calls back, please have him schedule virtual with Amira and help him to get set up on [employer].

## 2025-01-22 NOTE — PROGRESS NOTES
"Subjective   Patient ID: Ed Sanon is a 34 y.o. male who presents for Follow-up (Letter for child support and currently isn't working (applied for disability).  Needing a letter stating under Dr Octaviano underwood.  Pt is in and out of hospital for Diabetic Ketoacidosis. Numerous stay.  See telephone note from 01/21/25).    HPI     Virtual or Telephone Consent    An interactive audio and video telecommunication system which permits real time communications between the patient (at the originating site) and provider (at the distant site) was utilized to provide this telehealth service.   Verbal consent was requested and obtained from Ed Sanon on this date, 01/22/25 for a telehealth visit.     Ed presents virtually for a hospital follow up and to discuss a letter that he needs.  Ed has been hospitalized multiple times throughout the last year for poorly controlled blood sugars due to non-compliance with medication. All ED admission noted reviewed since March 2024. Last office note with ELBA states that he feels \"fine\" with blood sugars at 350 and rarely takes short acting insulin. During today's visit, he states that his blood sugars routinely run between 250-400s and he states that he is checking regularly. Last [id number] from 11/14/2024 noted to be 13.2- ranging all the way up to 16.3 from 7/1/2023.  Ed states that he needs a letter stating that he cannot work due to his condition so he does not have to pay child support. Letter written to be printed for him to  stating the dates he has been hospitalized over the last year for his diabetes, and that he is under routine care with his PCP. He also has an appointment scheduled with endocrinology for the near future.   Ed declined needing any refills prior to his next appointment with Dr. SON at the end of March. Referral placed to Clinical Pharmacy to assist with diabetes management.     Review of Systems   Constitutional: Negative.  " "  HENT: Negative.     Respiratory: Negative.     Cardiovascular: Negative.    Gastrointestinal: Negative.    Endocrine: Negative.    Genitourinary: Negative.    Musculoskeletal: Negative.    Skin: Negative.    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    Psychiatric/Behavioral: Negative.         Objective   /85 (BP Location: Left arm, Patient Position: Sitting, BP Cuff Size: Adult) Comment: per pt Comment (BP Location): per pt Comment (Patient Position): per pt Comment (BP Cuff Size): per pt  Ht 1.791 m (5' 10.5\") Comment: per pt  Wt 73 kg (161 lb) Comment: per pt  BMI 22.77 kg/m²     Physical Exam    Ed is alert and oriented during virtual exam. He is speaking in full sentences and appears in no acute distress- he is appropriately groomed and appears well-nourished.     Assessment/Plan   Problem List Items Addressed This Visit             ICD-10-CM    Poorly controlled diabetes mellitus (Multi) - Primary E11.65    Relevant Orders    Referral to Clinical Pharmacy          "

## 2025-01-22 NOTE — LETTER
01/22/25   Ed Sanon  YOB: 1990    To Whom It May Concern:    Ed Sanon was seen virtually by me on 1/22/2025 at 1:30 pm to follow up on his chronic health condition. 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 Henri Brumfield.    Please do not hesitate to reach out to my office with any questions or concerns. The office phone number is [phone]       Sincerely,       CHRISTIANO Mack-CNP

## 2025-01-22 NOTE — TELEPHONE ENCOUNTER
Patient was agreeable with virtual appt. Please call him to schedule 1:30 virtual appt with Amira. He may need help with MyChart.

## 2025-01-25 NOTE — DOCUMENTATION CLARIFICATION NOTE
"    PATIENT:               ALESHA LUNDY  ACCT #:                  [id number]  MRN:                       [id number]  :                       1990  ADMIT DATE:       1/15/2025 1:04 AM  DISCH DATE:        2025 1:18 PM  RESPONDING PROVIDER #:        [id number]          PROVIDER RESPONSE TEXT:    Non-infectious SIRS with acute organ dysfunction of MAYA    CDI QUERY TEXT:    Clarification    Instruction:    Based on your assessment of the patient and the clinical information, please provide the requested documentation by clicking on the appropriate radio button and enter any additional information if prompted.    Question: Please further clarify if there is a diagnosis related to the clinical information    When answering this query, please exercise your independent professional judgment. The fact that a question is being asked, does not imply that any particular answer is desired or expected.    The patient's clinical indicators include:  Clinical Information: Admit with DKA    Clinical Indicators:    WBC 20.0 on admit.    Creat 1.24 on admit, 0.6 on DC    VS range, first 8 hrs from admit, temp 36.7-38.0, hr 118-131, rr 16-30, bp 98//79    Critical care consult note 1/15/25 states \"MAYA\" and \"Creatinine 1.24, baseline creatinine   0.8\" and \"Suspect likely prerenal in setting of DKA\" and \"Leukocytosis\" \"Suspect likely reactionary in setting of DKA and dehydration\" and \"Will hold off on antibiotic therapy given no grossly evident etiology of infection\"    DC summary states \"Diabetic ketoacidosis without coma associated with type 1 diabetes mellitus\"    Treatment: IVF bolus x 2.5L and continuous, insulin drip    Risk Factors: DKA with fever, tachycardia, tachypnea and MAYA  Options provided:  -- Non-infectious SIRS with acute organ dysfunction of MAYA  -- Non-infectious SIRS without acute organ dysfunction  -- Other - I will add my own diagnosis  -- Refer to Clinical Documentation Reviewer    Query created by: " Storm Jones on 1/23/2025 9:19 AM      Electronically signed by:  LORNA SPRAGUE MD 1/24/2025 7:56 PM

## 2025-01-30 ENCOUNTER — HOSPITAL ENCOUNTER (INPATIENT)
Facility: HOSPITAL | Age: 35
LOS: 1 days | Discharge: HOME | End: 2025-01-31
Attending: EMERGENCY MEDICINE | Admitting: INTERNAL MEDICINE
Payer: MEDICAID

## 2025-01-30 DIAGNOSIS — E10.10 DIABETIC KETOACIDOSIS WITHOUT COMA ASSOCIATED WITH TYPE 1 DIABETES MELLITUS (MULTI): Primary | ICD-10-CM

## 2025-01-30 LAB
ALBUMIN SERPL BCP-MCNC: 3.7 G/DL (ref 3.4–5)
ALP SERPL-CCNC: 68 U/L (ref 33–120)
ALT SERPL W P-5'-P-CCNC: 8 U/L (ref 10–52)
ANION GAP BLDV CALCULATED.4IONS-SCNC: 18 MMOL/L (ref 10–25)
ANION GAP SERPL CALC-SCNC: 25 MMOL/L (ref 10–20)
AST SERPL W P-5'-P-CCNC: 11 U/L (ref 9–39)
B-OH-BUTYR SERPL-SCNC: 7.4 MMOL/L (ref 0.02–0.27)
BASE EXCESS BLDV CALC-SCNC: -7.6 MMOL/L (ref -2–3)
BASOPHILS # BLD AUTO: 0.01 X10*3/UL (ref 0–0.1)
BASOPHILS NFR BLD AUTO: 0.1 %
BILIRUB SERPL-MCNC: 0.9 MG/DL (ref 0–1.2)
BODY TEMPERATURE: 37 DEGREES CELSIUS
BUN SERPL-MCNC: 15 MG/DL (ref 6–23)
CA-I BLDV-SCNC: 1.11 MMOL/L (ref 1.1–1.33)
CALCIUM SERPL-MCNC: 8.1 MG/DL (ref 8.6–10.3)
CHLORIDE BLDV-SCNC: 100 MMOL/L (ref 98–107)
CHLORIDE SERPL-SCNC: 101 MMOL/L (ref 98–107)
CO2 SERPL-SCNC: 17 MMOL/L (ref 21–32)
CREAT SERPL-MCNC: 0.8 MG/DL (ref 0.5–1.3)
EGFRCR SERPLBLD CKD-EPI 2021: >90 ML/MIN/1.73M*2
EOSINOPHIL # BLD AUTO: 0.04 X10*3/UL (ref 0–0.7)
EOSINOPHIL NFR BLD AUTO: 0.6 %
ERYTHROCYTE [DISTWIDTH] IN BLOOD BY AUTOMATED COUNT: 12.9 % (ref 11.5–14.5)
GLUCOSE BLD MANUAL STRIP-MCNC: 283 MG/DL (ref 74–99)
GLUCOSE BLD MANUAL STRIP-MCNC: 307 MG/DL (ref 74–99)
GLUCOSE BLDV-MCNC: 339 MG/DL (ref 74–99)
GLUCOSE SERPL-MCNC: 343 MG/DL (ref 74–99)
HCO3 BLDV-SCNC: 18.3 MMOL/L (ref 22–26)
HCT VFR BLD AUTO: 49.7 % (ref 41–52)
HCT VFR BLD EST: 50 % (ref 41–52)
HGB BLD-MCNC: 16.7 G/DL (ref 13.5–17.5)
HGB BLDV-MCNC: 16.5 G/DL (ref 13.5–17.5)
IMM GRANULOCYTES # BLD AUTO: 0.02 X10*3/UL (ref 0–0.7)
IMM GRANULOCYTES NFR BLD AUTO: 0.3 % (ref 0–0.9)
INHALED O2 CONCENTRATION: 21 %
LACTATE BLDV-SCNC: 1.2 MMOL/L (ref 0.4–2)
LYMPHOCYTES # BLD AUTO: 0.39 X10*3/UL (ref 1.2–4.8)
LYMPHOCYTES NFR BLD AUTO: 5.7 %
MAGNESIUM SERPL-MCNC: 1.57 MG/DL (ref 1.6–2.4)
MCH RBC QN AUTO: 31.6 PG (ref 26–34)
MCHC RBC AUTO-ENTMCNC: 33.6 G/DL (ref 32–36)
MCV RBC AUTO: 94 FL (ref 80–100)
MONOCYTES # BLD AUTO: 0.34 X10*3/UL (ref 0.1–1)
MONOCYTES NFR BLD AUTO: 5 %
NEUTROPHILS # BLD AUTO: 6 X10*3/UL (ref 1.2–7.7)
NEUTROPHILS NFR BLD AUTO: 88.3 %
NRBC BLD-RTO: 0 /100 WBCS (ref 0–0)
OXYHGB MFR BLDV: 48 % (ref 45–75)
PCO2 BLDV: 38 MM HG (ref 41–51)
PH BLDV: 7.29 PH (ref 7.33–7.43)
PHOSPHATE SERPL-MCNC: 2.8 MG/DL (ref 2.5–4.9)
PLATELET # BLD AUTO: 138 X10*3/UL (ref 150–450)
PO2 BLDV: 34 MM HG (ref 35–45)
POTASSIUM BLDV-SCNC: 4.7 MMOL/L (ref 3.5–5.3)
POTASSIUM SERPL-SCNC: 4.7 MMOL/L (ref 3.5–5.3)
PROT SERPL-MCNC: 6.1 G/DL (ref 6.4–8.2)
RBC # BLD AUTO: 5.28 X10*6/UL (ref 4.5–5.9)
SAO2 % BLDV: 49 % (ref 45–75)
SODIUM BLDV-SCNC: 132 MMOL/L (ref 136–145)
SODIUM SERPL-SCNC: 138 MMOL/L (ref 136–145)
WBC # BLD AUTO: 6.8 X10*3/UL (ref 4.4–11.3)

## 2025-01-30 PROCEDURE — 83735 ASSAY OF MAGNESIUM: CPT

## 2025-01-30 PROCEDURE — 2500000004 HC RX 250 GENERAL PHARMACY W/ HCPCS (ALT 636 FOR OP/ED)

## 2025-01-30 PROCEDURE — 36415 COLL VENOUS BLD VENIPUNCTURE: CPT

## 2025-01-30 PROCEDURE — 96365 THER/PROPH/DIAG IV INF INIT: CPT

## 2025-01-30 PROCEDURE — 99291 CRITICAL CARE FIRST HOUR: CPT | Mod: 25 | Performed by: EMERGENCY MEDICINE

## 2025-01-30 PROCEDURE — 84100 ASSAY OF PHOSPHORUS: CPT

## 2025-01-30 PROCEDURE — 84132 ASSAY OF SERUM POTASSIUM: CPT

## 2025-01-30 PROCEDURE — 85025 COMPLETE CBC W/AUTO DIFF WBC: CPT

## 2025-01-30 PROCEDURE — 84132 ASSAY OF SERUM POTASSIUM: CPT | Mod: 59

## 2025-01-30 PROCEDURE — 82947 ASSAY GLUCOSE BLOOD QUANT: CPT

## 2025-01-30 PROCEDURE — 82947 ASSAY GLUCOSE BLOOD QUANT: CPT | Mod: 59

## 2025-01-30 PROCEDURE — 82010 KETONE BODYS QUAN: CPT

## 2025-01-30 RX ORDER — ONDANSETRON HYDROCHLORIDE 2 MG/ML
4 INJECTION, SOLUTION INTRAVENOUS ONCE
Status: COMPLETED | OUTPATIENT
Start: 2025-01-30 | End: 2025-01-31

## 2025-01-30 RX ORDER — SODIUM CHLORIDE, SODIUM LACTATE, POTASSIUM CHLORIDE, CALCIUM CHLORIDE 600; 310; 30; 20 MG/100ML; MG/100ML; MG/100ML; MG/100ML
250 INJECTION, SOLUTION INTRAVENOUS CONTINUOUS
Status: DISCONTINUED | OUTPATIENT
Start: 2025-01-30 | End: 2025-01-31

## 2025-01-30 RX ADMIN — SODIUM CHLORIDE, POTASSIUM CHLORIDE, SODIUM LACTATE AND CALCIUM CHLORIDE 1000 ML: 600; 310; 30; 20 INJECTION, SOLUTION INTRAVENOUS at 23:39

## 2025-01-30 RX ADMIN — SODIUM CHLORIDE, POTASSIUM CHLORIDE, SODIUM LACTATE AND CALCIUM CHLORIDE 250 ML/HR: 600; 310; 30; 20 INJECTION, SOLUTION INTRAVENOUS at 23:41

## 2025-01-30 RX ADMIN — INSULIN HUMAN 9.89 UNITS/HR: 1 INJECTION, SOLUTION INTRAVENOUS at 23:37

## 2025-01-30 ASSESSMENT — PAIN SCALES - GENERAL: PAINLEVEL_OUTOF10: 0 - NO PAIN

## 2025-01-30 ASSESSMENT — LIFESTYLE VARIABLES
EVER HAD A DRINK FIRST THING IN THE MORNING TO STEADY YOUR NERVES TO GET RID OF A HANGOVER: NO
EVER FELT BAD OR GUILTY ABOUT YOUR DRINKING: NO
HAVE PEOPLE ANNOYED YOU BY CRITICIZING YOUR DRINKING: NO
TOTAL SCORE: 0
HAVE YOU EVER FELT YOU SHOULD CUT DOWN ON YOUR DRINKING: NO

## 2025-01-30 ASSESSMENT — PAIN - FUNCTIONAL ASSESSMENT: PAIN_FUNCTIONAL_ASSESSMENT: 0-10

## 2025-01-30 ASSESSMENT — COLUMBIA-SUICIDE SEVERITY RATING SCALE - C-SSRS
2. HAVE YOU ACTUALLY HAD ANY THOUGHTS OF KILLING YOURSELF?: NO
1. IN THE PAST MONTH, HAVE YOU WISHED YOU WERE DEAD OR WISHED YOU COULD GO TO SLEEP AND NOT WAKE UP?: NO
6. HAVE YOU EVER DONE ANYTHING, STARTED TO DO ANYTHING, OR PREPARED TO DO ANYTHING TO END YOUR LIFE?: NO

## 2025-01-31 VITALS
RESPIRATION RATE: 18 BRPM | HEIGHT: 70 IN | TEMPERATURE: 98.9 F | BODY MASS INDEX: 22.31 KG/M2 | OXYGEN SATURATION: 95 % | HEART RATE: 111 BPM | WEIGHT: 155.87 LBS | DIASTOLIC BLOOD PRESSURE: 80 MMHG | SYSTOLIC BLOOD PRESSURE: 131 MMHG

## 2025-01-31 PROBLEM — E10.10 DIABETIC KETOACIDOSIS WITHOUT COMA ASSOCIATED WITH TYPE 1 DIABETES MELLITUS (MULTI): Status: RESOLVED | Noted: 2025-01-30 | Resolved: 2025-01-31

## 2025-01-31 LAB
ANION GAP SERPL CALC-SCNC: 11 MMOL/L (ref 10–20)
ANION GAP SERPL CALC-SCNC: 14 MMOL/L (ref 10–20)
BUN SERPL-MCNC: 13 MG/DL (ref 6–23)
BUN SERPL-MCNC: 15 MG/DL (ref 6–23)
CALCIUM SERPL-MCNC: 7.5 MG/DL (ref 8.6–10.3)
CALCIUM SERPL-MCNC: 7.8 MG/DL (ref 8.6–10.3)
CHLORIDE SERPL-SCNC: 105 MMOL/L (ref 98–107)
CHLORIDE SERPL-SCNC: 106 MMOL/L (ref 98–107)
CO2 SERPL-SCNC: 20 MMOL/L (ref 21–32)
CO2 SERPL-SCNC: 24 MMOL/L (ref 21–32)
CREAT SERPL-MCNC: 0.66 MG/DL (ref 0.5–1.3)
CREAT SERPL-MCNC: 0.67 MG/DL (ref 0.5–1.3)
EGFRCR SERPLBLD CKD-EPI 2021: >90 ML/MIN/1.73M*2
EGFRCR SERPLBLD CKD-EPI 2021: >90 ML/MIN/1.73M*2
ERYTHROCYTE [DISTWIDTH] IN BLOOD BY AUTOMATED COUNT: 13 % (ref 11.5–14.5)
EST. AVERAGE GLUCOSE BLD GHB EST-MCNC: 289 MG/DL
FLUAV RNA RESP QL NAA+PROBE: NOT DETECTED
FLUBV RNA RESP QL NAA+PROBE: NOT DETECTED
GLUCOSE BLD MANUAL STRIP-MCNC: 154 MG/DL (ref 74–99)
GLUCOSE BLD MANUAL STRIP-MCNC: 187 MG/DL (ref 74–99)
GLUCOSE BLD MANUAL STRIP-MCNC: 207 MG/DL (ref 74–99)
GLUCOSE BLD MANUAL STRIP-MCNC: 219 MG/DL (ref 74–99)
GLUCOSE BLD MANUAL STRIP-MCNC: 220 MG/DL (ref 74–99)
GLUCOSE BLD MANUAL STRIP-MCNC: 220 MG/DL (ref 74–99)
GLUCOSE BLD MANUAL STRIP-MCNC: 224 MG/DL (ref 74–99)
GLUCOSE BLD MANUAL STRIP-MCNC: 251 MG/DL (ref 74–99)
GLUCOSE SERPL-MCNC: 195 MG/DL (ref 74–99)
GLUCOSE SERPL-MCNC: 239 MG/DL (ref 74–99)
HBA1C MFR BLD: 11.7 %
HCT VFR BLD AUTO: 42.6 % (ref 41–52)
HGB BLD-MCNC: 14.4 G/DL (ref 13.5–17.5)
MAGNESIUM SERPL-MCNC: 1.82 MG/DL (ref 1.6–2.4)
MCH RBC QN AUTO: 31.6 PG (ref 26–34)
MCHC RBC AUTO-ENTMCNC: 33.8 G/DL (ref 32–36)
MCV RBC AUTO: 94 FL (ref 80–100)
NRBC BLD-RTO: 0 /100 WBCS (ref 0–0)
PHOSPHATE SERPL-MCNC: 3.4 MG/DL (ref 2.5–4.9)
PLATELET # BLD AUTO: 129 X10*3/UL (ref 150–450)
POTASSIUM SERPL-SCNC: 3.8 MMOL/L (ref 3.5–5.3)
POTASSIUM SERPL-SCNC: 3.9 MMOL/L (ref 3.5–5.3)
RBC # BLD AUTO: 4.55 X10*6/UL (ref 4.5–5.9)
SARS-COV-2 RNA RESP QL NAA+PROBE: NOT DETECTED
SODIUM SERPL-SCNC: 136 MMOL/L (ref 136–145)
SODIUM SERPL-SCNC: 136 MMOL/L (ref 136–145)
WBC # BLD AUTO: 5.7 X10*3/UL (ref 4.4–11.3)

## 2025-01-31 PROCEDURE — 36415 COLL VENOUS BLD VENIPUNCTURE: CPT

## 2025-01-31 PROCEDURE — 99291 CRITICAL CARE FIRST HOUR: CPT

## 2025-01-31 PROCEDURE — 36415 COLL VENOUS BLD VENIPUNCTURE: CPT | Performed by: INTERNAL MEDICINE

## 2025-01-31 PROCEDURE — 96368 THER/DIAG CONCURRENT INF: CPT

## 2025-01-31 PROCEDURE — 80048 BASIC METABOLIC PNL TOTAL CA: CPT | Performed by: INTERNAL MEDICINE

## 2025-01-31 PROCEDURE — 80048 BASIC METABOLIC PNL TOTAL CA: CPT

## 2025-01-31 PROCEDURE — 84100 ASSAY OF PHOSPHORUS: CPT

## 2025-01-31 PROCEDURE — 99223 1ST HOSP IP/OBS HIGH 75: CPT | Performed by: INTERNAL MEDICINE

## 2025-01-31 PROCEDURE — 85027 COMPLETE CBC AUTOMATED: CPT

## 2025-01-31 PROCEDURE — 82947 ASSAY GLUCOSE BLOOD QUANT: CPT

## 2025-01-31 PROCEDURE — G0378 HOSPITAL OBSERVATION PER HR: HCPCS

## 2025-01-31 PROCEDURE — 99254 IP/OBS CNSLTJ NEW/EST MOD 60: CPT | Performed by: INTERNAL MEDICINE

## 2025-01-31 PROCEDURE — 2500000004 HC RX 250 GENERAL PHARMACY W/ HCPCS (ALT 636 FOR OP/ED): Performed by: INTERNAL MEDICINE

## 2025-01-31 PROCEDURE — 2500000004 HC RX 250 GENERAL PHARMACY W/ HCPCS (ALT 636 FOR OP/ED)

## 2025-01-31 PROCEDURE — 83036 HEMOGLOBIN GLYCOSYLATED A1C: CPT | Mod: PORLAB | Performed by: INTERNAL MEDICINE

## 2025-01-31 PROCEDURE — 96361 HYDRATE IV INFUSION ADD-ON: CPT

## 2025-01-31 PROCEDURE — 87636 SARSCOV2 & INF A&B AMP PRB: CPT | Performed by: INTERNAL MEDICINE

## 2025-01-31 PROCEDURE — 82947 ASSAY GLUCOSE BLOOD QUANT: CPT | Mod: 59

## 2025-01-31 PROCEDURE — 96366 THER/PROPH/DIAG IV INF ADDON: CPT

## 2025-01-31 PROCEDURE — 96374 THER/PROPH/DIAG INJ IV PUSH: CPT | Mod: 59

## 2025-01-31 PROCEDURE — 2500000002 HC RX 250 W HCPCS SELF ADMINISTERED DRUGS (ALT 637 FOR MEDICARE OP, ALT 636 FOR OP/ED)

## 2025-01-31 PROCEDURE — 1100000001 HC PRIVATE ROOM DAILY

## 2025-01-31 PROCEDURE — 83735 ASSAY OF MAGNESIUM: CPT

## 2025-01-31 PROCEDURE — 99239 HOSP IP/OBS DSCHRG MGMT >30: CPT | Performed by: INTERNAL MEDICINE

## 2025-01-31 RX ORDER — INSULIN LISPRO 100 [IU]/ML
0-5 INJECTION, SOLUTION INTRAVENOUS; SUBCUTANEOUS
Status: DISCONTINUED | OUTPATIENT
Start: 2025-01-31 | End: 2025-01-31 | Stop reason: HOSPADM

## 2025-01-31 RX ORDER — DEXTROSE 50 % IN WATER (D50W) INTRAVENOUS SYRINGE
12.5
Status: DISCONTINUED | OUTPATIENT
Start: 2025-01-31 | End: 2025-01-31 | Stop reason: HOSPADM

## 2025-01-31 RX ORDER — DEXTROSE MONOHYDRATE AND SODIUM CHLORIDE 5; .45 G/100ML; G/100ML
150 INJECTION, SOLUTION INTRAVENOUS CONTINUOUS PRN
Status: DISCONTINUED | OUTPATIENT
Start: 2025-01-31 | End: 2025-01-31

## 2025-01-31 RX ORDER — MAGNESIUM SULFATE HEPTAHYDRATE 40 MG/ML
2 INJECTION, SOLUTION INTRAVENOUS ONCE
Status: COMPLETED | OUTPATIENT
Start: 2025-01-31 | End: 2025-01-31

## 2025-01-31 RX ORDER — DEXTROSE MONOHYDRATE 100 MG/ML
150 INJECTION, SOLUTION INTRAVENOUS CONTINUOUS PRN
Status: DISCONTINUED | OUTPATIENT
Start: 2025-01-31 | End: 2025-01-31

## 2025-01-31 RX ORDER — INSULIN LISPRO 100 [IU]/ML
5 INJECTION, SOLUTION INTRAVENOUS; SUBCUTANEOUS
Status: DISCONTINUED | OUTPATIENT
Start: 2025-01-31 | End: 2025-01-31 | Stop reason: HOSPADM

## 2025-01-31 RX ORDER — DEXTROSE 50 % IN WATER (D50W) INTRAVENOUS SYRINGE
50
Status: DISCONTINUED | OUTPATIENT
Start: 2025-01-31 | End: 2025-01-31

## 2025-01-31 RX ORDER — DEXTROSE 50 % IN WATER (D50W) INTRAVENOUS SYRINGE
25
Status: DISCONTINUED | OUTPATIENT
Start: 2025-01-31 | End: 2025-01-31 | Stop reason: HOSPADM

## 2025-01-31 RX ORDER — SODIUM CHLORIDE 450 MG/100ML
250 INJECTION, SOLUTION INTRAVENOUS CONTINUOUS
Status: DISCONTINUED | OUTPATIENT
Start: 2025-01-31 | End: 2025-01-31

## 2025-01-31 RX ADMIN — INSULIN HUMAN 9.89 UNITS/HR: 1 INJECTION, SOLUTION INTRAVENOUS at 00:30

## 2025-01-31 RX ADMIN — DEXTROSE AND SODIUM CHLORIDE 150 ML/HR: 5; 450 INJECTION, SOLUTION INTRAVENOUS at 01:55

## 2025-01-31 RX ADMIN — SODIUM CHLORIDE 250 ML/HR: 4.5 INJECTION, SOLUTION INTRAVENOUS at 00:28

## 2025-01-31 RX ADMIN — MAGNESIUM SULFATE HEPTAHYDRATE 2 G: 40 INJECTION, SOLUTION INTRAVENOUS at 00:32

## 2025-01-31 RX ADMIN — ONDANSETRON 4 MG: 2 INJECTION INTRAMUSCULAR; INTRAVENOUS at 00:11

## 2025-01-31 RX ADMIN — INSULIN LISPRO 5 UNITS: 100 INJECTION, SOLUTION INTRAVENOUS; SUBCUTANEOUS at 12:24

## 2025-01-31 RX ADMIN — INSULIN LISPRO 5 UNITS: 100 INJECTION, SOLUTION INTRAVENOUS; SUBCUTANEOUS at 07:13

## 2025-01-31 RX ADMIN — INSULIN HUMAN 22 UNITS: 100 INJECTION, SUSPENSION SUBCUTANEOUS at 02:50

## 2025-01-31 RX ADMIN — INSULIN LISPRO 2 UNITS: 100 INJECTION, SOLUTION INTRAVENOUS; SUBCUTANEOUS at 12:24

## 2025-01-31 RX ADMIN — INSULIN LISPRO 2 UNITS: 100 INJECTION, SOLUTION INTRAVENOUS; SUBCUTANEOUS at 07:12

## 2025-01-31 SDOH — ECONOMIC STABILITY: INCOME INSECURITY: IN THE PAST 12 MONTHS HAS THE ELECTRIC, GAS, OIL, OR WATER COMPANY THREATENED TO SHUT OFF SERVICES IN YOUR HOME?: NO

## 2025-01-31 SDOH — SOCIAL STABILITY: SOCIAL INSECURITY: WITHIN THE LAST YEAR, HAVE YOU BEEN AFRAID OF YOUR PARTNER OR EX-PARTNER?: NO

## 2025-01-31 SDOH — SOCIAL STABILITY: SOCIAL INSECURITY: ARE THERE ANY APPARENT SIGNS OF INJURIES/BEHAVIORS THAT COULD BE RELATED TO ABUSE/NEGLECT?: NO

## 2025-01-31 SDOH — SOCIAL STABILITY: SOCIAL INSECURITY: ABUSE: ADULT

## 2025-01-31 SDOH — ECONOMIC STABILITY: FOOD INSECURITY: WITHIN THE PAST 12 MONTHS, YOU WORRIED THAT YOUR FOOD WOULD RUN OUT BEFORE YOU GOT THE MONEY TO BUY MORE.: NEVER TRUE

## 2025-01-31 SDOH — SOCIAL STABILITY: SOCIAL INSECURITY: DO YOU FEEL ANYONE HAS EXPLOITED OR TAKEN ADVANTAGE OF YOU FINANCIALLY OR OF YOUR PERSONAL PROPERTY?: NO

## 2025-01-31 SDOH — SOCIAL STABILITY: SOCIAL INSECURITY: WITHIN THE LAST YEAR, HAVE YOU BEEN HUMILIATED OR EMOTIONALLY ABUSED IN OTHER WAYS BY YOUR PARTNER OR EX-PARTNER?: NO

## 2025-01-31 SDOH — SOCIAL STABILITY: SOCIAL INSECURITY: ARE YOU OR HAVE YOU BEEN THREATENED OR ABUSED PHYSICALLY, EMOTIONALLY, OR SEXUALLY BY ANYONE?: NO

## 2025-01-31 SDOH — ECONOMIC STABILITY: FOOD INSECURITY: WITHIN THE PAST 12 MONTHS, THE FOOD YOU BOUGHT JUST DIDN'T LAST AND YOU DIDN'T HAVE MONEY TO GET MORE.: NEVER TRUE

## 2025-01-31 SDOH — SOCIAL STABILITY: SOCIAL INSECURITY: HAVE YOU HAD ANY THOUGHTS OF HARMING ANYONE ELSE?: NO

## 2025-01-31 SDOH — SOCIAL STABILITY: SOCIAL INSECURITY: WERE YOU ABLE TO COMPLETE ALL THE BEHAVIORAL HEALTH SCREENINGS?: YES

## 2025-01-31 SDOH — SOCIAL STABILITY: SOCIAL INSECURITY: HAVE YOU HAD THOUGHTS OF HARMING ANYONE ELSE?: NO

## 2025-01-31 SDOH — SOCIAL STABILITY: SOCIAL INSECURITY: DOES ANYONE TRY TO KEEP YOU FROM HAVING/CONTACTING OTHER FRIENDS OR DOING THINGS OUTSIDE YOUR HOME?: NO

## 2025-01-31 SDOH — SOCIAL STABILITY: SOCIAL INSECURITY: HAS ANYONE EVER THREATENED TO HURT YOUR FAMILY OR YOUR PETS?: NO

## 2025-01-31 SDOH — SOCIAL STABILITY: SOCIAL INSECURITY: DO YOU FEEL UNSAFE GOING BACK TO THE PLACE WHERE YOU ARE LIVING?: NO

## 2025-01-31 ASSESSMENT — COGNITIVE AND FUNCTIONAL STATUS - GENERAL
MOBILITY SCORE: 24
PATIENT BASELINE BEDBOUND: NO
DAILY ACTIVITIY SCORE: 24

## 2025-01-31 ASSESSMENT — LIFESTYLE VARIABLES
HOW OFTEN DO YOU HAVE A DRINK CONTAINING ALCOHOL: NEVER
HOW OFTEN DO YOU HAVE 6 OR MORE DRINKS ON ONE OCCASION: NEVER
HOW MANY STANDARD DRINKS CONTAINING ALCOHOL DO YOU HAVE ON A TYPICAL DAY: PATIENT DOES NOT DRINK
AUDIT-C TOTAL SCORE: 0
AUDIT-C TOTAL SCORE: 0
SKIP TO QUESTIONS 9-10: 1

## 2025-01-31 ASSESSMENT — ACTIVITIES OF DAILY LIVING (ADL)
TOILETING: INDEPENDENT
DRESSING YOURSELF: INDEPENDENT
JUDGMENT_ADEQUATE_SAFELY_COMPLETE_DAILY_ACTIVITIES: YES
LACK_OF_TRANSPORTATION: NO
HEARING - LEFT EAR: FUNCTIONAL
HEARING - RIGHT EAR: FUNCTIONAL
WALKS IN HOME: INDEPENDENT
BATHING: INDEPENDENT
ADEQUATE_TO_COMPLETE_ADL: YES
PATIENT'S MEMORY ADEQUATE TO SAFELY COMPLETE DAILY ACTIVITIES?: YES
GROOMING: INDEPENDENT
FEEDING YOURSELF: INDEPENDENT

## 2025-01-31 ASSESSMENT — ENCOUNTER SYMPTOMS
NAUSEA: 1
ABDOMINAL PAIN: 1
VOMITING: 1

## 2025-01-31 ASSESSMENT — PAIN SCALES - GENERAL: PAINLEVEL_OUTOF10: 0 - NO PAIN

## 2025-01-31 NOTE — CARE PLAN
The patient's goals for the shift include      The clinical goals for the shift include Paients BS will remain WDL throughout this shift      Problem: Pain - Adult  Goal: Verbalizes/displays adequate comfort level or baseline comfort level  Outcome: Progressing     Problem: Safety - Adult  Goal: Free from fall injury  Outcome: Progressing     Problem: Discharge Planning  Goal: Discharge to home or other facility with appropriate resources  Outcome: Progressing     Problem: Chronic Conditions and Co-morbidities  Goal: Patient's chronic conditions and co-morbidity symptoms are monitored and maintained or improved  Outcome: Progressing     Problem: Nutrition  Goal: Nutrient intake appropriate for maintaining nutritional needs  Outcome: Progressing     Problem: Diabetes  Goal: Achieve decreasing blood glucose levels by end of shift  Outcome: Progressing  Goal: Increase stability of blood glucose readings by end of shift  Outcome: Progressing  Goal: Decrease in ketones present in urine by end of shift  Outcome: Progressing  Goal: Maintain electrolyte levels within acceptable range throughout shift  Outcome: Progressing  Goal: Maintain glucose levels >70mg/dl to <250mg/dl throughout shift  Outcome: Progressing  Goal: No changes in neurological exam by end of shift  Outcome: Progressing  Goal: Learn about and adhere to nutrition recommendations by end of shift  Outcome: Progressing  Goal: Vital signs within normal range for age by end of shift  Outcome: Progressing  Goal: Increase self care and/or family involovement by end of shift  Outcome: Progressing  Goal: Receive DSME education by end of shift  Outcome: Progressing

## 2025-01-31 NOTE — CONSULTS
Inpatient consult to Endocrinology  Consult performed by: Lori Packer MD  Consult ordered by: Livia Wolf, APRN-CNP  Reason for consult: DKA, diabetes mellitus type 1 management          Reason For Consult  DKA, diabetes mellitus type 1 management     History Of Present Illness  Ed Sanon is a 34 y.o. male presenting with ***.     Past Medical History  He has a past medical history of Abdominal pain (04/21/2023), Anxiety and depression (08/08/2021), Contact with and (suspected) exposure to covid-19 (04/21/2023), COVID-19 virus infection (07/11/2023), Diabetes mellitus (Multi), Diabetic acetonemia (Multi) (11/15/2023), DKA (diabetic ketoacidosis) (Multi) (07/11/2023), DKA, type 1, not at goal (04/21/2023), DM2 (diabetes mellitus, type 2) (Multi) (11/15/2023), Elevated blood sugar (11/15/2023), Elevated blood-pressure reading, without diagnosis of hypertension (09/09/2020), Fatigue (07/11/2023), Generalized anxiety disorder (07/11/2023), Microalbuminuria due to type 1 diabetes mellitus (Multi) (07/11/2023), Personal history of COVID-19 (04/02/2023), Poorly controlled diabetes mellitus (Multi) (07/11/2023), Type 1 diabetes mellitus (07/11/2023), Type 1 diabetes mellitus with hyperglycemia (Multi) (02/25/2019), Vitamin B12 deficiency (07/11/2023), Vitamin D deficiency (07/11/2023), and Vomiting (11/15/2023).    Surgical History  He has no past surgical history on file.     Social History  He reports that he has never smoked. He has never been exposed to tobacco smoke. He has never used smokeless tobacco. He reports that he does not drink alcohol and does not use drugs.    Family History  Family History   Problem Relation Name Age of Onset    No Known Problems Mother      No Known Problems Father      Hypertension Other      Coronary artery disease Other      Diabetes Other      Heart failure Other          Allergies  Patient has no known allergies.    Review of Systems     Physical  "Exam  Vitals and nursing note reviewed.   Constitutional:       General: He is not in acute distress.     Appearance: Normal appearance. He is normal weight.   HENT:      Head: Normocephalic and atraumatic.      Nose: Nose normal.      Mouth/Throat:      Mouth: Mucous membranes are moist.   Eyes:      Extraocular Movements: Extraocular movements intact.   Cardiovascular:      Rate and Rhythm: Normal rate and regular rhythm.   Pulmonary:      Effort: Pulmonary effort is normal.      Breath sounds: Normal breath sounds.   Musculoskeletal:         General: Normal range of motion.   Skin:     General: Skin is warm.   Neurological:      Mental Status: He is alert and oriented to person, place, and time.   Psychiatric:         Mood and Affect: Mood normal.          Last Recorded Vitals  Blood pressure 112/67, pulse (!) 107, temperature 37.6 °C (99.6 °F), resp. rate 16, height 1.778 m (5' 10\"), weight 70.7 kg (155 lb 13.8 oz), SpO2 97%.    Relevant Results  Scheduled medications  insulin lispro, 0-5 Units, subcutaneous, TID AC  insulin lispro, 5 Units, subcutaneous, TID AC  insulin NPH (Isophane), 22 Units, subcutaneous, BID AC      Continuous medications     PRN medications  PRN medications: dextrose, dextrose, glucagon, glucagon    Results for orders placed or performed during the hospital encounter of 01/30/25 (from the past 96 hours)   POCT GLUCOSE   Result Value Ref Range    POCT Glucose 283 (H) 74 - 99 mg/dL   CBC and Auto Differential   Result Value Ref Range    WBC 6.8 4.4 - 11.3 x10*3/uL    nRBC 0.0 0.0 - 0.0 /100 WBCs    RBC 5.28 4.50 - 5.90 x10*6/uL    Hemoglobin 16.7 13.5 - 17.5 g/dL    Hematocrit 49.7 41.0 - 52.0 %    MCV 94 80 - 100 fL    MCH 31.6 26.0 - 34.0 pg    MCHC 33.6 32.0 - 36.0 g/dL    RDW 12.9 11.5 - 14.5 %    Platelets 138 (L) 150 - 450 x10*3/uL    Neutrophils % 88.3 40.0 - 80.0 %    Immature Granulocytes %, Automated 0.3 0.0 - 0.9 %    Lymphocytes % 5.7 13.0 - 44.0 %    Monocytes % 5.0 2.0 - 10.0 " %    Eosinophils % 0.6 0.0 - 6.0 %    Basophils % 0.1 0.0 - 2.0 %    Neutrophils Absolute 6.00 1.20 - 7.70 x10*3/uL    Immature Granulocytes Absolute, Automated 0.02 0.00 - 0.70 x10*3/uL    Lymphocytes Absolute 0.39 (L) 1.20 - 4.80 x10*3/uL    Monocytes Absolute 0.34 0.10 - 1.00 x10*3/uL    Eosinophils Absolute 0.04 0.00 - 0.70 x10*3/uL    Basophils Absolute 0.01 0.00 - 0.10 x10*3/uL   Beta Hydroxybutyrate   Result Value Ref Range    Beta-Hydroxybutyrate 7.40 (H) 0.02 - 0.27 mmol/L   Blood Gas Venous Full Panel   Result Value Ref Range    POCT pH, Venous 7.29 (L) 7.33 - 7.43 pH    POCT pCO2, Venous 38 (L) 41 - 51 mm Hg    POCT pO2, Venous 34 (L) 35 - 45 mm Hg    POCT SO2, Venous 49 45 - 75 %    POCT Oxy Hemoglobin, Venous 48.0 45.0 - 75.0 %    POCT Hematocrit Calculated, Venous 50.0 41.0 - 52.0 %    POCT Sodium, Venous 132 (L) 136 - 145 mmol/L    POCT Potassium, Venous 4.7 3.5 - 5.3 mmol/L    POCT Chloride, Venous 100 98 - 107 mmol/L    POCT Ionized Calicum, Venous 1.11 1.10 - 1.33 mmol/L    POCT Glucose, Venous 339 (H) 74 - 99 mg/dL    POCT Lactate, Venous 1.2 0.4 - 2.0 mmol/L    POCT Base Excess, Venous -7.6 (L) -2.0 - 3.0 mmol/L    POCT HCO3 Calculated, Venous 18.3 (L) 22.0 - 26.0 mmol/L    POCT Hemoglobin, Venous 16.5 13.5 - 17.5 g/dL    POCT Anion Gap, Venous 18.0 10.0 - 25.0 mmol/L    Patient Temperature 37.0 degrees Celsius    FiO2 21 %   Comprehensive metabolic panel   Result Value Ref Range    Glucose 343 (H) 74 - 99 mg/dL    Sodium 138 136 - 145 mmol/L    Potassium 4.7 3.5 - 5.3 mmol/L    Chloride 101 98 - 107 mmol/L    Bicarbonate 17 (L) 21 - 32 mmol/L    Anion Gap 25 (H) 10 - 20 mmol/L    Urea Nitrogen 15 6 - 23 mg/dL    Creatinine 0.80 0.50 - 1.30 mg/dL    eGFR >90 >60 mL/min/1.73m*2    Calcium 8.1 (L) 8.6 - 10.3 mg/dL    Albumin 3.7 3.4 - 5.0 g/dL    Alkaline Phosphatase 68 33 - 120 U/L    Total Protein 6.1 (L) 6.4 - 8.2 g/dL    AST 11 9 - 39 U/L    Bilirubin, Total 0.9 0.0 - 1.2 mg/dL    ALT 8 (L) 10  - 52 U/L   Magnesium   Result Value Ref Range    Magnesium 1.57 (L) 1.60 - 2.40 mg/dL   Phosphorus   Result Value Ref Range    Phosphorus 2.8 2.5 - 4.9 mg/dL   POCT GLUCOSE   Result Value Ref Range    POCT Glucose 307 (H) 74 - 99 mg/dL   Sars-CoV-2 and Influenza A/B PCR   Result Value Ref Range    Flu A Result Not Detected Not Detected    Flu B Result Not Detected Not Detected    Coronavirus 2019, PCR Not Detected Not Detected   POCT GLUCOSE   Result Value Ref Range    POCT Glucose 251 (H) 74 - 99 mg/dL   Basic metabolic panel   Result Value Ref Range    Glucose 195 (H) 74 - 99 mg/dL    Sodium 136 136 - 145 mmol/L    Potassium 3.8 3.5 - 5.3 mmol/L    Chloride 106 98 - 107 mmol/L    Bicarbonate 20 (L) 21 - 32 mmol/L    Anion Gap 14 10 - 20 mmol/L    Urea Nitrogen 13 6 - 23 mg/dL    Creatinine 0.66 0.50 - 1.30 mg/dL    eGFR >90 >60 mL/min/1.73m*2    Calcium 7.8 (L) 8.6 - 10.3 mg/dL   POCT GLUCOSE   Result Value Ref Range    POCT Glucose 187 (H) 74 - 99 mg/dL   POCT GLUCOSE   Result Value Ref Range    POCT Glucose 154 (H) 74 - 99 mg/dL   POCT GLUCOSE   Result Value Ref Range    POCT Glucose 219 (H) 74 - 99 mg/dL   POCT GLUCOSE   Result Value Ref Range    POCT Glucose 207 (H) 74 - 99 mg/dL   POCT GLUCOSE   Result Value Ref Range    POCT Glucose 220 (H) 74 - 99 mg/dL   POCT GLUCOSE   Result Value Ref Range    POCT Glucose 224 (H) 74 - 99 mg/dL   Magnesium   Result Value Ref Range    Magnesium 1.82 1.60 - 2.40 mg/dL   Phosphorus   Result Value Ref Range    Phosphorus 3.4 2.5 - 4.9 mg/dL   Basic metabolic panel   Result Value Ref Range    Glucose 239 (H) 74 - 99 mg/dL    Sodium 136 136 - 145 mmol/L    Potassium 3.9 3.5 - 5.3 mmol/L    Chloride 105 98 - 107 mmol/L    Bicarbonate 24 21 - 32 mmol/L    Anion Gap 11 10 - 20 mmol/L    Urea Nitrogen 15 6 - 23 mg/dL    Creatinine 0.67 0.50 - 1.30 mg/dL    eGFR >90 >60 mL/min/1.73m*2    Calcium 7.5 (L) 8.6 - 10.3 mg/dL   CBC   Result Value Ref Range    WBC 5.7 4.4 - 11.3 x10*3/uL     nRBC 0.0 0.0 - 0.0 /100 WBCs    RBC 4.55 4.50 - 5.90 x10*6/uL    Hemoglobin 14.4 13.5 - 17.5 g/dL    Hematocrit 42.6 41.0 - 52.0 %    MCV 94 80 - 100 fL    MCH 31.6 26.0 - 34.0 pg    MCHC 33.8 32.0 - 36.0 g/dL    RDW 13.0 11.5 - 14.5 %    Platelets 129 (L) 150 - 450 x10*3/uL     *Note: Due to a large number of results and/or encounters for the requested time period, some results have not been displayed. A complete set of results can be found in Results Review.      ECG 12 lead    Result Date: 1/15/2025  Sinus tachycardia Prolonged QT interval    XR chest 1 view    Result Date: 1/15/2025  Interpreted By:  Santi De Paz, STUDY: XR CHEST 1 VIEW;  1/15/2025 2:00 am   INDICATION: Signs/Symptoms:cough.   COMPARISON: 11/7/2024   ACCESSION NUMBER(S): [id number]   ORDERING CLINICIAN: ROSITA REYNOLDS   FINDINGS: The cardiac silhouette is normal in size. No focal airspace consolidation or pleural effusion. No pneumothorax.       No airspace consolidation or pleural effusion.   MACRO: None   Signed by: Santi De Paz 1/15/2025 2:28 AM Dictation workstation:   [id number]       Assessment/Plan            diet as tolerated   Accu-Cheks ACHS once off the insulin drip   Hypoglycemic protocol   Will continue to follow     Thank you for the courtesy of this consult     Lori Packer MD

## 2025-01-31 NOTE — ED PROCEDURE NOTE
Procedure  Critical Care    Performed by: Santi Ortiz MD  Authorized by: Santi Ortiz MD    Critical care provider statement:     Critical care time (minutes):  30    Critical care time was exclusive of:  Separately billable procedures and treating other patients and teaching time    Critical care was necessary to treat or prevent imminent or life-threatening deterioration of the following conditions:  Metabolic crisis and endocrine crisis (DKA)    Critical care was time spent personally by me on the following activities:  Ordering and performing treatments and interventions, ordering and review of laboratory studies, re-evaluation of patient's condition, examination of patient, evaluation of patient's response to treatment, discussions with consultants, obtaining history from patient or surrogate and review of old charts    Care discussed with: admitting provider               Santi Ortiz MD  01/30/25 [id number]

## 2025-01-31 NOTE — DISCHARGE SUMMARY
DISCHARGE SUMMARY     Discharge Diagnosis  Diabetic ketoacidosis without coma associated with type 1 diabetes mellitus (Multi)    This discharge took greater than 35 minutes.    Test Results Pending At Discharge  Pending Labs       No current pending labs.            Hospital Course   Ed Sanon is a 34 y.o. with history of IDDM Type I with frequent admissions for diabetic ketoacidosis presents with fatigue and generalized weakness.  Patient has frequent DKA events due to noncompliance with short acting insulin. Doesn't take short-acting insulin because he doesn't feel good when glucose is trending down.  Says he gets chills and a bloating sensation when his glucose is less than 300.  Glucose normally runs 300-350.  Still taking his NPH morning and night.  Has some minor back pain as well.  Denies cough or shortness of breath.  Denies sick contacts.  In the ED, VSS.  Initial glucose of 343.  Anion gap of 25.  pH 7.29.  Ketones 7.4.  Started on insulin drip and admitted to medicine.     Diabetic Ketoacidosis, Recurrent  -Presents with fatigue and generalized weakness, initial pH 7.02 AG 35  -Has frequent DKA events, doesn't take short-acting insulin because he doesn't feel good when glucose is trending down  -Glucose typically runs >300 at home  -No evidence of infection, denies drug use  -gap closed, now back on basal bolus insulin. He wishes to DC today despite not yet having good BG control.     Pertinent Physical Exam At Time of Discharge  General: Not Ill appearing, cooperative with exam, in NAD.  HEENT: Atraumatic. No erythema in posterior pharynx.  Lymph: No cervical or inguinal lymphadenopathy.  Cardiac: RRR. No murmurs.  Lungs: CTAB. Nl WOB.  Abd: Non-tender. No rebound or gaurding. Nl bowel sounds.  Ext: No edema. 2+ pulses.  Skin: No rashes, abrasions, or contusions.  Psych: A&Ox3. Nl affect.  Neuro: 5/5 strength. Sensation intact.    Home Medications     Medication List      CONTINUE taking these  "medications     Guardian 4 Glucose Sensor device; Generic drug: blood-glucose sensor;   Change insulin sensor every 7 days as directed, linked to Minimed insulin   pump, check with endocrinology for updated regimen   insulin NPH (Isophane) 100 unit/mL (3 mL) injection; Commonly known as:   HumuLIN N,NovoLIN N; Inject 22 Units under the skin 2 times a day before   meals. Take as directed per insulin instructions.   * insulin regular 100 unit/mL injection; Commonly known as: HumuLIN   R,NovoLIN R; Inject 0-10 Units under the skin 3 times a day before meals.    0 unit(s) if Blood glucose is between  2 unit(s) if Blood glucose is   between 151-200 4 unit(s) if Blood glucose is between 201-250 6 unit(s) if   Blood glucose is between 251-300 8 unit(s) if Blood glucose is between   301-350 10 unit(s) if Blood glucose is between 351-400   * HumuLIN R Regular U-100 Insuln 100 unit/mL injection; Generic drug:   insulin regular; Inject 5 Units under the skin 3 times a day before meals.   Take as directed per insulin instructions. Discard vial 31 days after 1st   opening   lancets misc   OneTouch Ultra Test strip; Generic drug: blood sugar diagnostic   OneTouch Ultra2 Meter misc; Generic drug: blood-glucose meter   pen needle, diabetic 32 gauge x 5/32\" needle  * This list has 2 medication(s) that are the same as other medications   prescribed for you. Read the directions carefully, and ask your doctor or   other care provider to review them with you.       Outpatient Follow-Up  No follow-ups on file.     Edgard Ayala MD PhD  1/31/2025  12:19 PM    "

## 2025-01-31 NOTE — PROGRESS NOTES
Ed Sanon was referred to the Clinical Pharmacy Team to complete a Transitions of Care encounter for discharge medication optimization. The patient was referred for their diabetes. The primary goal of this encounter is to ensure the patient's medications are both clinically appropriate and financially feasible for the patient. Pharmacy clinical interventions were provided as noted below.  ______________________________________________________________________  PHARMACY ASSESSMENT     Meds to beds? Yes / no  Home Pharmacy: [id number] - [place], [place]  Allergies Reviewed? yes - NKDA     Affordability/Accessibility: N/A  Adherence/Organization: repeated hospitalizations for DKA. Patient reported non-compliance with insulin lispro.   Adverse Effects: N/A     No issues reported in regards to accessibility, affordability, adverse effects, or organization.    Patient struggles taking medication. The Humulin R tends to cause reactions that he does not enjoy. Due to this, sometimes he does not take his short acting insulin. He told us that all insulins tend to upset his stomach. He tolerated basaglar the best. Humalog was the worst. Our assessment at this point is he needs close following in order to help guide him when his sugars are high and encourage him to take his insulin regularly. He is missing too many doses.   _______________________________________________________________________  DIABETES ASSESSMENT     A1C: 13.2% (11/15/24)  eGFR: >90 (1/31/25)     CURRENT PHARMACOTHERAPY  - Insulin  units/mL: 22 units twice daily  - Insulin regular 100 units/mL: 5 units TID with meals     HISTORICAL PHARMACOTHERAPY  - Insulin lispro 100 units/mL: 6 units TID with meals     BLOOD SUGAR TESTING AT HOME  -Frequency: QID  -Meter: Onetouch Ultra  -CGM: Guardian 4 or Freestyle Elmer 3     SECONDARY PREVENTION  - Statin? No   - ACE-I/ARB? No (Lisinopril 5 mg stopped last hospitalization)  - Aspirin?  No  _______________________________________________________________________  PATIENT EDUCATION/GOALS  - Fasting B - 130 mg/dL  - Postprandial BG: less than 180 mg/dL  - A1c: less than 7%  - LDL Goal: < 70mg/dL  - Answered all patient questions and concerns  - Your blood pressure goal is less than 130/80 mmHg  - Continue to integrate exercise into your weekly routine. The recommended exercise regimen is 30-40 minutes of moderate-intensity exercise (such as jogging, biking, swimming, etc.) for 5 days a week.  - Try/Continue eating healthy, balanced, appropriately portioned meals 3 times a day. The DASH diet is the recommended diet plan for patients with high blood pressure.  - Limit salt intake and avoid foods high in sodium such as processed meats, salty snacks, and fast food. The recommended daily maximum sodium intake is less than 2,300mg (2 teaspoons) per day.  - Limit cream/sugar additions to coffee and avoid unhealthy caffeinated drinks such as energy drinks or soda.  - Drink alcohol in moderation and avoid tobacco products. The recommended alcohol intake for a man is 2 or less drinks per day.  _______________________________________________________________________  RECOMMENDATIONS/PLAN  Please send prescriptions to  [facility] for assistance on insurance prior authorization and copay. Prescriptions will be delivered to the patient's bedside prior to discharge with the Meds to [facility] program.  Recommend:  Remaining adherent to prescribed insulin regimen  Utilizing CGMs and glucometer to track blood sugars as recommended  Patient is aware and welcoming of follow up call with ambulatory pharmacist after discharge  Continuation of care will be provided by the outpatient clinical pharmacy team in collaboration with the patient's  Primary Care Provider     Verbal consent to manage patient's drug therapy was obtained from the patient. They were informed they may decline to participate or withdraw from  participation in pharmacy services at any time.

## 2025-01-31 NOTE — NURSING NOTE
Patient's discharge order is in and complete. Paperwork gone over with patient and medications at bedside. IV is removed and all questions are answered. Patient is packed and awaiting their ride.

## 2025-01-31 NOTE — CONSULTS
Critical Care Medicine Consult      Reason For Consult  DKA     History Of Present Illness  Ed Sanon is a 34 y.o. male with past medical history of of diabetes mellitus type 1 with poor insulin compliance, anxiety, depression, medical noncompliance, and recurrent admissions for DKA presented to [facility] ED with complaints if nausea, vomiting, abdominal pain, and concerns for DKA. Upon ED workup, which are 37.6 °C, heart rate 122, respiratory rate 18, blood pressure 127/84, and SpO2 100% on room air.  ED labs revealed blood glucose 343, bicarbonate 17, anion gap 25, calcium 8.1, ALT 8, total protein 6.1, magnesium 1.57, beta hydroxybutyrate 7.40, blood count 138, venous pH 7.29, pCO2 38, pO2 34, HCO3 calculated 18.3, and remainder of chemistry profile and CBC unremarkable.  Influenza A/B and COVID-19 PCR negative.  ED interventions included 1 L bolus lactated Ringer's, magnesium sulfate 2 g IV, Zofran, and initiation of insulin infusion per DKA protocol.  Patient admitted to ICU and critical care medicine was consulted.    Patient seen and examined in ED bed 14.  Patient alert and oriented x 4 and in no acute distress.  Patient reported that he started feeling unwell around 3 PM with nausea, vomiting, and abdominal pain. Patient reported that he has been taking 22 units of NPH twice daily at home and has not been taking his short-acting insulin since he discharged from his most recent hospitalization for DKA from 1/15/25-1/17/25. Patient stated that his sister just had a baby and he doesn't like to take his other insulin if nobody is home because he is afraid to go too low.  He reports that he has been eating/drinking ok until about 3pm when he began feeling unwell. He denies any recent sick contacts or exposures. He denies any other complaints.     Past Medical History:   Diagnosis Date    Abdominal pain 04/21/2023    Anxiety and depression 08/08/2021    Contact with and (suspected) exposure to covid-19  04/21/2023    COVID-19 virus infection 07/11/2023    Diabetes mellitus (Multi)     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    DIABETIC KETO ACIDOSIS    DKA (diabetic ketoacidosis) (Multi) 07/11/2023    DKA, type 1, not at goal 04/21/2023    DM2 (diabetes mellitus, type 2) (Multi) 11/15/2023    DIABETES    Elevated blood sugar 11/15/2023    ELEVATED BLOOD SUGAR/ 375 LAST CHECK    Elevated blood-pressure reading, without diagnosis of hypertension 09/09/2020    Elevated blood pressure reading    Fatigue 07/11/2023    Generalized anxiety disorder 07/11/2023    Microalbuminuria due to type 1 diabetes mellitus (Multi) 07/11/2023    Personal history of COVID-19 04/02/2023    Poorly controlled diabetes mellitus (Multi) 07/11/2023    Type 1 diabetes mellitus 07/11/2023    Type 1 diabetes mellitus with hyperglycemia (Multi) 02/25/2019    Vitamin B12 deficiency 07/11/2023    Vitamin D deficiency 07/11/2023    Vomiting 11/15/2023    VOMITING HEADACHE BACK ACHE     History reviewed. No pertinent surgical history.  (Not in a hospital admission)    Patient has no known allergies.  Social History     Tobacco Use    Smoking status: Never     Passive exposure: Never    Smokeless tobacco: Never   Vaping Use    Vaping status: Never Used   Substance Use Topics    Alcohol use: Never    Drug use: Never     Family History   Problem Relation Name Age of Onset    No Known Problems Mother      No Known Problems Father      Hypertension Other      Coronary artery disease Other      Diabetes Other      Heart failure Other         Scheduled Medications:   magnesium sulfate, 2 g, intravenous, Once         Continuous Medications:   dextrose 10 % in water (D10W), 150 mL/hr  dextrose 10 % in water (D10W), 150 mL/hr  dextrose 5%-0.45 % sodium chloride, 150 mL/hr, Last Rate: 150 mL/hr (01/31/25 0155)  insulin regular, 0-50 Units/hr, Last Rate: 7.4 Units/hr (01/31/25 0157)  sodium chloride, 250 mL/hr, Last Rate: Stopped (01/31/25 0157)         PRN  Medications:   PRN medications: dextrose 10 % in water (D10W), dextrose 10 % in water (D10W), dextrose 5%-0.45 % sodium chloride, dextrose    Review of Systems:  Review of Systems   Gastrointestinal:  Positive for abdominal pain, nausea and vomiting.        Objective   Vitals:  Most Recent:  Vitals:    01/31/25 0200   BP: 111/62   Pulse: (!) 117   Resp: 18   Temp:    SpO2: 95%       24hr Min/Max:  Temp  Min: 37.6 °C (99.6 °F)  Max: 37.6 °C (99.6 °F)  Pulse  Min: 113  Max: 125  BP  Min: 111/62  Max: 153/129  Resp  Min: 16  Max: 20  SpO2  Min: 95 %  Max: 100 %    LDA:          Vent settings:       Hemodynamic parameters for last 24 hours:       No intake or output data in the 24 hours ending 01/31/25 0202        Physical exam:    Physical Exam  Constitutional:       General: He is not in acute distress.     Appearance: Normal appearance.   HENT:      Head: Normocephalic.      Nose: Nose normal.      Mouth/Throat:      Mouth: Mucous membranes are moist.   Eyes:      Extraocular Movements: Extraocular movements intact.      Conjunctiva/sclera: Conjunctivae normal.      Pupils: Pupils are equal, round, and reactive to light.   Cardiovascular:      Rate and Rhythm: Regular rhythm. Tachycardia present.      Pulses: Normal pulses.      Heart sounds: Normal heart sounds.   Pulmonary:      Effort: Pulmonary effort is normal. No respiratory distress.      Breath sounds: Normal breath sounds.   Abdominal:      General: Bowel sounds are normal. There is no distension.      Palpations: Abdomen is soft.      Tenderness: There is no abdominal tenderness. There is no guarding.   Musculoskeletal:         General: Normal range of motion.      Cervical back: Normal range of motion.   Skin:     General: Skin is warm and dry.      Capillary Refill: Capillary refill takes less than 2 seconds.   Neurological:      General: No focal deficit present.      Mental Status: He is alert and oriented to person, place, and time. Mental status is at  baseline.   Psychiatric:         Attention and Perception: Attention normal.         Mood and Affect: Mood normal. Affect is flat.         Behavior: Behavior normal. Behavior is cooperative.         Thought Content: Thought content normal.         Judgment: Judgment normal.          Lab/Radiology/Diagnostic Review:  Results for orders placed or performed during the hospital encounter of 01/30/25 (from the past 24 hours)   POCT GLUCOSE   Result Value Ref Range    POCT Glucose 283 (H) 74 - 99 mg/dL   CBC and Auto Differential   Result Value Ref Range    WBC 6.8 4.4 - 11.3 x10*3/uL    nRBC 0.0 0.0 - 0.0 /100 WBCs    RBC 5.28 4.50 - 5.90 x10*6/uL    Hemoglobin 16.7 13.5 - 17.5 g/dL    Hematocrit 49.7 41.0 - 52.0 %    MCV 94 80 - 100 fL    MCH 31.6 26.0 - 34.0 pg    MCHC 33.6 32.0 - 36.0 g/dL    RDW 12.9 11.5 - 14.5 %    Platelets 138 (L) 150 - 450 x10*3/uL    Neutrophils % 88.3 40.0 - 80.0 %    Immature Granulocytes %, Automated 0.3 0.0 - 0.9 %    Lymphocytes % 5.7 13.0 - 44.0 %    Monocytes % 5.0 2.0 - 10.0 %    Eosinophils % 0.6 0.0 - 6.0 %    Basophils % 0.1 0.0 - 2.0 %    Neutrophils Absolute 6.00 1.20 - 7.70 x10*3/uL    Immature Granulocytes Absolute, Automated 0.02 0.00 - 0.70 x10*3/uL    Lymphocytes Absolute 0.39 (L) 1.20 - 4.80 x10*3/uL    Monocytes Absolute 0.34 0.10 - 1.00 x10*3/uL    Eosinophils Absolute 0.04 0.00 - 0.70 x10*3/uL    Basophils Absolute 0.01 0.00 - 0.10 x10*3/uL   Beta Hydroxybutyrate   Result Value Ref Range    Beta-Hydroxybutyrate 7.40 (H) 0.02 - 0.27 mmol/L   Blood Gas Venous Full Panel   Result Value Ref Range    POCT pH, Venous 7.29 (L) 7.33 - 7.43 pH    POCT pCO2, Venous 38 (L) 41 - 51 mm Hg    POCT pO2, Venous 34 (L) 35 - 45 mm Hg    POCT SO2, Venous 49 45 - 75 %    POCT Oxy Hemoglobin, Venous 48.0 45.0 - 75.0 %    POCT Hematocrit Calculated, Venous 50.0 41.0 - 52.0 %    POCT Sodium, Venous 132 (L) 136 - 145 mmol/L    POCT Potassium, Venous 4.7 3.5 - 5.3 mmol/L    POCT Chloride, Venous  100 98 - 107 mmol/L    POCT Ionized Calicum, Venous 1.11 1.10 - 1.33 mmol/L    POCT Glucose, Venous 339 (H) 74 - 99 mg/dL    POCT Lactate, Venous 1.2 0.4 - 2.0 mmol/L    POCT Base Excess, Venous -7.6 (L) -2.0 - 3.0 mmol/L    POCT HCO3 Calculated, Venous 18.3 (L) 22.0 - 26.0 mmol/L    POCT Hemoglobin, Venous 16.5 13.5 - 17.5 g/dL    POCT Anion Gap, Venous 18.0 10.0 - 25.0 mmol/L    Patient Temperature 37.0 degrees Celsius    FiO2 21 %   Comprehensive metabolic panel   Result Value Ref Range    Glucose 343 (H) 74 - 99 mg/dL    Sodium 138 136 - 145 mmol/L    Potassium 4.7 3.5 - 5.3 mmol/L    Chloride 101 98 - 107 mmol/L    Bicarbonate 17 (L) 21 - 32 mmol/L    Anion Gap 25 (H) 10 - 20 mmol/L    Urea Nitrogen 15 6 - 23 mg/dL    Creatinine 0.80 0.50 - 1.30 mg/dL    eGFR >90 >60 mL/min/1.73m*2    Calcium 8.1 (L) 8.6 - 10.3 mg/dL    Albumin 3.7 3.4 - 5.0 g/dL    Alkaline Phosphatase 68 33 - 120 U/L    Total Protein 6.1 (L) 6.4 - 8.2 g/dL    AST 11 9 - 39 U/L    Bilirubin, Total 0.9 0.0 - 1.2 mg/dL    ALT 8 (L) 10 - 52 U/L   Magnesium   Result Value Ref Range    Magnesium 1.57 (L) 1.60 - 2.40 mg/dL   Phosphorus   Result Value Ref Range    Phosphorus 2.8 2.5 - 4.9 mg/dL   POCT GLUCOSE   Result Value Ref Range    POCT Glucose 307 (H) 74 - 99 mg/dL   Sars-CoV-2 and Influenza A/B PCR   Result Value Ref Range    Flu A Result Not Detected Not Detected    Flu B Result Not Detected Not Detected    Coronavirus 2019, PCR Not Detected Not Detected   POCT GLUCOSE   Result Value Ref Range    POCT Glucose 251 (H) 74 - 99 mg/dL   POCT GLUCOSE   Result Value Ref Range    POCT Glucose 187 (H) 74 - 99 mg/dL     ECG 12 lead    Result Date: 1/15/2025  Sinus tachycardia Prolonged QT interval    XR chest 1 view    Result Date: 1/15/2025  Interpreted By:  Santi De Paz, STUDY: XR CHEST 1 VIEW;  1/15/2025 2:00 am   INDICATION: Signs/Symptoms:cough.   COMPARISON: 11/7/2024   ACCESSION NUMBER(S): [id number]   ORDERING CLINICIAN: ROSITA REYNOLDS    FINDINGS: The cardiac silhouette is normal in size. No focal airspace consolidation or pleural effusion. No pneumothorax.       No airspace consolidation or pleural effusion.   MACRO: None   Signed by: Santi De Paz 1/15/2025 2:28 AM Dictation workstation:   [id number]      Assessment/Plan   Assessment & Plan  Diabetic ketoacidosis without coma associated with type 1 diabetes mellitus (Multi)    DKA with type 1 diabetes mellitus with poor compliance  -Presented with nausea, vomiting, and abdominal pain  - Patient reported that he has been taking 22 units of NPH but has not been taking his other insulin since recent discharge from hospital on 1/17/25.   -Blood glucose 343 on presentation  -Bicarbonate 17  -Anion gap 25  -Beta hydroxybutyrate 7.4  -VBG: pH 7.29, pCO2 38, HCO3 calculated 18.3  -Received 1L LR IV fluid bolus in ED  -IV fluids per DKA protocol:  1/2 normal saline at 250ml/hr until blood glucose less than 250, then switch to D5 1/2 NS at 150ml/hr.  -Insulin infusion per DKA protocol, titrate per order  -POCT glucose checks hourly while on insulin infusion  -Follow BMP, magnesium, and phosphorus every 4 hours while on insulin infusion  -Replete electrolytes as necessary  -Hypoglycemia protocol as needed  -NPO while on insulin infusion  -Monitor intake and output  - Will bridge off insulin infusion once anion gap less than 15 and bicarbonate greater than 15  - Plan to bridge with NPH 22 units once anion gap closed.   -60 gm/meal carb controlled diet once bridged off insulin infusion   -Endocrinology consult, input susan HANSEN  -Presented with nausea, vomiting, and abdominal pain  - Patient reported that he has been taking 22 units of NPH but has not been taking his other insulin since recent discharge from hospital on 1/17/25.   -Blood glucose 343 on presentation  -Bicarbonate 17  -Anion gap 25  -Beta hydroxybutyrate 7.4  -VBG: pH 7.29, pCO2 38, HCO3 calculated 18.3  -Received 1L LR IV fluid  bolus in ED  -IV fluids per DKA protocol:  1/2 normal saline at 250ml/hr until blood glucose less than 250, then switch to D5 1/2 NS at 150ml/hr.  -Insulin infusion per DKA protocol, titrate per order  -POCT glucose checks hourly while on insulin infusion  -Follow BMP, magnesium, and phosphorus every 4 hours while on insulin infusion  -Replete electrolytes as necessary  -Hypoglycemia protocol as needed  -NPO while on insulin infusion  -Monitor intake and output  - Will bridge off insulin infusion once anion gap less than 15 and bicarbonate greater than 15  - Plan to bridge with NPH 22 units once anion gap closed.   -60 gm/meal carb controlled diet once bridged off insulin infusion   -Endocrinology consult, input appreciated     -Critical care medicine will sign off once DKA resolved and patient bridged off of  insulin infusion.     I spent 35 minutes of cumulative critical care time with the patient.  Greater than 50% of that time was spent in the direct collaboration and or coordination of care of the patient.     Dragon dictation software was used to dictate this note and thus there may be minor errors in translation/transcription including garbled speech or misspellings. Please contact for clarification if needed.

## 2025-01-31 NOTE — ED PROVIDER NOTES
HPI   Chief Complaint   Patient presents with    Hyperglycemia       Patient is a 34-year-old male with past medical history of insulin-dependent type 1 diabetes presenting with concern for nausea and vomiting.  Reports a week of symptoms without clear provocation.  Reports that he was eating okay up until today and was taking his NPH 2 times a day 22 units up until a missed dose tonight.  Reports he did not take it because he not been able to eat tonight.  Denies fevers, chills, abdominal pain, diarrhea, dysuria, urgency, frequency or other infectious symptoms.  Denies sick contacts.  Denies alcohol and marijuana use.            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    DIABETIC KETO ACIDOSIS    DKA (diabetic ketoacidosis) (Multi) 07/11/2023    DKA, type 1, not at goal 04/21/2023    DM2 (diabetes mellitus, type 2) (Multi) 11/15/2023    DIABETES    Elevated blood sugar 11/15/2023    ELEVATED BLOOD SUGAR/ 375 LAST CHECK    Elevated blood-pressure reading, without diagnosis of hypertension 09/09/2020    Elevated blood pressure reading    Fatigue 07/11/2023    Generalized anxiety disorder 07/11/2023    Microalbuminuria due to type 1 diabetes mellitus (Multi) 07/11/2023    Personal history of COVID-19 04/02/2023    Poorly controlled diabetes mellitus (Multi) 07/11/2023    Type 1 diabetes mellitus 07/11/2023    Type 1 diabetes mellitus with hyperglycemia (Multi) 02/25/2019    Vitamin B12 deficiency 07/11/2023    Vitamin D deficiency 07/11/2023    Vomiting 11/15/2023    VOMITING HEADACHE BACK ACHE     History reviewed. No pertinent surgical history.  Family History   Problem Relation Name Age of Onset    No Known Problems Mother      No Known Problems Father       Hypertension Other      Coronary artery disease Other      Diabetes Other      Heart failure Other       Social History     Tobacco Use    Smoking status: Never     Passive exposure: Never    Smokeless tobacco: Never   Vaping Use    Vaping status: Never Used   Substance Use Topics    Alcohol use: Never    Drug use: Never       Physical Exam   ED Triage Vitals [01/30/25 2231]   Temperature Heart Rate Respirations BP   37.6 °C (99.6 °F) (!) 122 18 127/84      Pulse Ox Temp src Heart Rate Source Patient Position   100 % -- -- --      BP Location FiO2 (%)     -- --       Physical Exam  Constitutional:       Appearance: Normal appearance.   HENT:      Head: Normocephalic and atraumatic.   Eyes:      Extraocular Movements: Extraocular movements intact.   Cardiovascular:      Rate and Rhythm: Normal rate.   Pulmonary:      Effort: Pulmonary effort is normal.   Abdominal:      Comments: Soft, nondistended, no tenderness to palpation in any quadrant.   Musculoskeletal:         General: Normal range of motion.      Cervical back: Normal range of motion.   Skin:     General: Skin is warm and dry.   Neurological:      General: No focal deficit present.      Mental Status: He is alert and oriented to person, place, and time.   Psychiatric:         Mood and Affect: Mood normal.         Behavior: Behavior normal.           ED Course & MDM   ED Course as of 01/31/25 0101   Thu Jan 30, 2025   [id number] I saw and evaluated the patient. I personally obtained the key and critical portions of the history and physical exam or was physically present for key and critical portions performed by the student/resident/fellow. I reviewed the  documentation and discussed the patient with the student/resident/fellow. I agree with the medical decision making as documented in the note.    PHYSICAL EXAM  No acute distress, alert, unlabored breathing, tachycardic    MDM  CBC shows no leukocytosis.  Metabolic panel shows hyperglycemia of 343, anion gap of  25.  Blood gas shows metabolic acidemia of 7.29.  Beta hydroxybutyrate is elevated at 7.4.  Patient given fluids and started on insulin.  Patient be admitted to the ICU. [JH]      ED Course User Index  [JH] Santi Ortiz MD         Diagnoses as of 01/31/25 0101   Diabetic ketoacidosis without coma associated with type 1 diabetes mellitus (Multi)                 No data recorded     Alyce Coma Scale Score: 15 (01/30/25 2232 : Sukhjinder Hook RN)                           Medical Decision Making  Patient is a 34-year-old male with past medical history of insulin-dependent type 1 diabetes presenting with concern for nausea and vomiting.  History and labs consistent with DKA.  Patient otherwise with no significant infectious symptoms other than those attributable to DKA, afebrile, benign abdominal exam, and CT abdomen/pelvis deferred at this time.  Patient does report compliance with medications but per chart review has long history of poor compliance and suspicion that this may be contributing to recurrent DKA.  Patient in milder DKA than recent hospitalizations but given BHB 7.4, pH is 7.29 and anion gap 25, started on DKA pathway with IV fluids, insulin drip.  Case discussed with ICU provider and hospitalist and patient admitted to ICU for further management.    Patient seen and discussed with Dr. Angel Villafana MD, PhD  Emergency Medicine PGY3          Procedure  Procedures     Rob Villafana MD  Resident  01/31/25 0103

## 2025-01-31 NOTE — ED TRIAGE NOTES
Patient arrived via ems for concern of DKA. In squad patient bgl 284. Took morning dose of inuslin did not take dose tonight due to vomiting since 1500 says when he gets like this he gets concerned but thought it may be food poisoning. Bgl upon arrival 283. Patient alert and oriented. Given 500ml NaCl in squad, HR elevated at 117

## 2025-01-31 NOTE — H&P
[facility]    Hospital Medicine History & Physical     Assessment & Plan     ASSESSMENT     34M with history of IDDM Type I with frequent admissions for diabetic ketoacidosis presents with fatigue and generalized weakness and found to be in DKA.     PLAN     Diabetic Ketoacidosis, Recurrent  -Presents with fatigue and generalized weakness, initial pH 7.02 AG 35  -Has frequent DKA events, doesn't take short-acting insulin because he doesn't feel good when glucose is trending down  -Glucose typically runs >300 at home  -No evidence of infection, denies drug use but will obtain routine viral testing  -DKA protocol with insulin infusion, IVF, and serial labs     DVT Prophy  -Heparin     Disposition  -ICU        Azeem Pack MD    History of Present Illness     Ed Sanon is a 34 y.o. with history of IDDM Type I with frequent admissions for diabetic ketoacidosis presents with fatigue and generalized weakness.  Patient has frequent DKA events due to noncompliance with short acting insulin. Doesn't take short-acting insulin because he doesn't feel good when glucose is trending down.  Says he gets chills and a bloating sensation when his glucose is less than 300.  Glucose normally runs 300-350.  Still taking his NPH morning and night.  Has some minor back pain as well.  Denies cough or shortness of breath.  Denies sick contacts.  In the ED, VSS.  Initial glucose of 343.  Anion gap of 25.  pH 7.29.  Ketones 7.4.  Started on insulin drip and admitted to medicine.    Review of Systems     A Comprehensive greater than 10 system review of systems was carried out.  Pertinent positives and negatives are noted above.  Otherwise negative for contributory information.     Past Medical History     Past Medical History:   Diagnosis Date    Abdominal pain 04/21/2023    Anxiety and depression 08/08/2021    Contact with and (suspected) exposure to covid-19 04/21/2023    COVID-19 virus  infection 07/11/2023    Diabetes mellitus (Multi)     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    DIABETIC KETO ACIDOSIS    DKA (diabetic ketoacidosis) (Multi) 07/11/2023    DKA, type 1, not at goal 04/21/2023    DM2 (diabetes mellitus, type 2) (Multi) 11/15/2023    DIABETES    Elevated blood sugar 11/15/2023    ELEVATED BLOOD SUGAR/ 375 LAST CHECK    Elevated blood-pressure reading, without diagnosis of hypertension 09/09/2020    Elevated blood pressure reading    Fatigue 07/11/2023    Generalized anxiety disorder 07/11/2023    Microalbuminuria due to type 1 diabetes mellitus (Multi) 07/11/2023    Personal history of COVID-19 04/02/2023    Poorly controlled diabetes mellitus (Multi) 07/11/2023    Type 1 diabetes mellitus 07/11/2023    Type 1 diabetes mellitus with hyperglycemia (Multi) 02/25/2019    Vitamin B12 deficiency 07/11/2023    Vitamin D deficiency 07/11/2023    Vomiting 11/15/2023    VOMITING HEADACHE BACK ACHE     Medications     No current facility-administered medications on file prior to encounter.     Current Outpatient Medications on File Prior to Encounter   Medication Sig Dispense Refill    Guardian 4 Glucose Sensor device Change insulin sensor every 7 days as directed, linked to Minimed insulin pump, check with endocrinology for updated regimen (Patient not taking: Reported on 1/22/2025) 13 each 2    insulin NPH, Isophane, (HumuLIN N,NovoLIN N) 100 unit/mL (3 mL) injection Inject 22 Units under the skin 2 times a day before meals. Take as directed per insulin instructions.      insulin regular (HumuLIN R,NovoLIN R) 100 unit/mL injection Inject 0-10 Units under the skin 3 times a day before meals.   0 unit(s) if Blood glucose is between   2 unit(s) if Blood glucose is between 151-200  4 unit(s) if Blood glucose is between 201-250  6 unit(s) if Blood glucose is between 251-300  8 unit(s) if Blood glucose is between 301-350  10 unit(s) if Blood glucose is between 351-400 (Patient not taking:  "Reported on 12/18/2024) 9 mL 0    insulin regular (HumuLIN R,NovoLIN R) 100 unit/mL injection Inject 5 Units under the skin 3 times a day before meals. Take as directed per insulin instructions. Discard vial 31 days after 1st opening 10 mL 0    lancets misc Inject 1 Units under the skin 2 times a day. Sliding scale      OneTouch Ultra Test strip USE 1 STRIP TO CHECK GLUCOSE 4 TIMES DAILY AS DIRECTED      OneTouch Ultra2 Meter misc use as directed      pen needle, diabetic 32 gauge x 5/32\" needle Use as instructed 4 times daily        Past Surgical History   History reviewed. No pertinent surgical history.     Family History   Reviewed and non-contributory to presenting complaints    Allergies   No Known Allergies    Social History     Social History     Tobacco Use    Smoking status: Never     Passive exposure: Never    Smokeless tobacco: Never   Substance Use Topics    Alcohol use: Never     Physical Exam   Blood pressure (!) 142/93, pulse (!) 113, temperature 37.6 °C (99.6 °F), resp. rate 20, height 1.778 m (5' 10\"), weight 70.7 kg (155 lb 13.8 oz), SpO2 98%.    General: Ill appearing, cooperative with exam, in NAD.  HEENT: Atraumatic. No erythema in posterior pharynx.  Lymph: No cervical or inguinal lymphadenopathy.  Cardiac: RRR. No murmurs.  Lungs: CTAB. Nl WOB.  Abd: Non-tender. No rebound or gaurding. Nl bowel sounds.  Ext: No edema. 2+ pulses.  Skin: No rashes, abrasions, or contusions.  Psych: A&Ox3. Nl affect.  Neuro: 5/5 strength. Sensation intact.    Labs & Imaging     Reviewed and Pertinent results discussed in assessment and plan.      "

## 2025-02-03 ENCOUNTER — PATIENT OUTREACH (OUTPATIENT)
Dept: PRIMARY CARE | Facility: CLINIC | Age: 35
End: 2025-02-03
Payer: MEDICAID

## 2025-02-03 ASSESSMENT — ENCOUNTER SYMPTOMS
ABDOMINAL PAIN: 0
VOMITING: 0
NAUSEA: 0

## 2025-02-03 NOTE — PROGRESS NOTES
30 Day Readmission  Discharge Facility: [facility]   Discharge Diagnosis: Diabetic ketoacidosis without coma associated with type 1 diabetes mellitus (Multi)   Admission Date: 1/31/25  Discharge Date: 1/31/25    PCP Appointment Date: no appointment, message to office  Specialist Appointment Date: 2/17/25 endocrinology  Hospital Encounter and Summary Linked: Yes  ED to Hosp-Admission (Discharged) with Yaron Pack MD; Santi Ortiz MD (01/30/2025)   Two attempts were made to reach patient within two business days after discharge. Voicemail left with contact information for patient to call back with any non-emergent questions or concerns.

## 2025-02-09 LAB
ATRIAL RATE: 117 BPM
P AXIS: 84 DEGREES
PR INTERVAL: 142 MS
Q ONSET: 252 MS
QRS COUNT: 19 BEATS
QRS DURATION: 98 MS
QT INTERVAL: 362 MS
QTC CALCULATION(BAZETT): 505 MS
QTC FREDERICIA: 452 MS
R AXIS: 68 DEGREES
T AXIS: 23 DEGREES
T OFFSET: 433 MS
VENTRICULAR RATE: 117 BPM

## 2025-02-12 ENCOUNTER — APPOINTMENT (OUTPATIENT)
Dept: RADIOLOGY | Facility: HOSPITAL | Age: 35
End: 2025-02-12
Payer: MEDICAID

## 2025-02-12 ENCOUNTER — HOSPITAL ENCOUNTER (INPATIENT)
Facility: HOSPITAL | Age: 35
LOS: 1 days | Discharge: HOME | End: 2025-02-13
Attending: EMERGENCY MEDICINE | Admitting: INTERNAL MEDICINE
Payer: MEDICAID

## 2025-02-12 ENCOUNTER — APPOINTMENT (OUTPATIENT)
Dept: PRIMARY CARE | Facility: CLINIC | Age: 35
End: 2025-02-12
Payer: MEDICAID

## 2025-02-12 ENCOUNTER — APPOINTMENT (OUTPATIENT)
Dept: CARDIOLOGY | Facility: HOSPITAL | Age: 35
End: 2025-02-12
Payer: MEDICAID

## 2025-02-12 VITALS
WEIGHT: 159 LBS | HEART RATE: 129 BPM | TEMPERATURE: 96.9 F | DIASTOLIC BLOOD PRESSURE: 71 MMHG | BODY MASS INDEX: 22.76 KG/M2 | OXYGEN SATURATION: 98 % | RESPIRATION RATE: 15 BRPM | HEIGHT: 70 IN | SYSTOLIC BLOOD PRESSURE: 99 MMHG

## 2025-02-12 DIAGNOSIS — E11.65 POORLY CONTROLLED DIABETES MELLITUS (MULTI): Primary | ICD-10-CM

## 2025-02-12 DIAGNOSIS — E10.10 DIABETIC KETOACIDOSIS WITHOUT COMA ASSOCIATED WITH TYPE 1 DIABETES MELLITUS (MULTI): Primary | ICD-10-CM

## 2025-02-12 PROBLEM — F31.9 BIPOLAR AFFECTIVE DISORDER, REMISSION STATUS UNSPECIFIED (MULTI): Status: RESOLVED | Noted: 2023-11-20 | Resolved: 2025-02-12

## 2025-02-12 PROBLEM — U07.1 COVID-19 VIRUS INFECTION: Status: RESOLVED | Noted: 2023-07-11 | Resolved: 2025-02-12

## 2025-02-12 LAB
ALBUMIN SERPL BCP-MCNC: 4.8 G/DL (ref 3.4–5)
ALP SERPL-CCNC: 119 U/L (ref 33–120)
ALT SERPL W P-5'-P-CCNC: 20 U/L (ref 10–52)
ANION GAP BLDV CALCULATED.4IONS-SCNC: 24 MMOL/L (ref 10–25)
ANION GAP BLDV CALCULATED.4IONS-SCNC: 30 MMOL/L (ref 10–25)
ANION GAP SERPL CALC-SCNC: 25 MMOL/L (ref 10–20)
ANION GAP SERPL CALC-SCNC: 39 MMOL/L (ref 10–20)
ANION GAP SERPL CALC-SCNC: 39 MMOL/L (ref 10–20)
APPARATUS: ABNORMAL
AST SERPL W P-5'-P-CCNC: 18 U/L (ref 9–39)
BASE EXCESS BLDV CALC-SCNC: -20.7 MMOL/L (ref -2–3)
BASE EXCESS BLDV CALC-SCNC: -24.8 MMOL/L (ref -2–3)
BASOPHILS # BLD AUTO: 0.11 X10*3/UL (ref 0–0.1)
BASOPHILS NFR BLD AUTO: 0.5 %
BILIRUB SERPL-MCNC: 0.4 MG/DL (ref 0–1.2)
BODY TEMPERATURE: 37 DEGREES CELSIUS
BODY TEMPERATURE: 37 DEGREES CELSIUS
BUN SERPL-MCNC: 26 MG/DL (ref 6–23)
BUN SERPL-MCNC: 31 MG/DL (ref 6–23)
BUN SERPL-MCNC: 31 MG/DL (ref 6–23)
CA-I BLDV-SCNC: 1.24 MMOL/L (ref 1.1–1.33)
CA-I BLDV-SCNC: 1.35 MMOL/L (ref 1.1–1.33)
CALCIUM SERPL-MCNC: 8.1 MG/DL (ref 8.6–10.3)
CALCIUM SERPL-MCNC: 9.5 MG/DL (ref 8.6–10.3)
CALCIUM SERPL-MCNC: 9.5 MG/DL (ref 8.6–10.3)
CHLORIDE BLDV-SCNC: 107 MMOL/L (ref 98–107)
CHLORIDE BLDV-SCNC: 96 MMOL/L (ref 98–107)
CHLORIDE SERPL-SCNC: 113 MMOL/L (ref 98–107)
CHLORIDE SERPL-SCNC: 94 MMOL/L (ref 98–107)
CHLORIDE SERPL-SCNC: 94 MMOL/L (ref 98–107)
CO2 SERPL-SCNC: 6 MMOL/L (ref 21–32)
CO2 SERPL-SCNC: 6 MMOL/L (ref 21–32)
CO2 SERPL-SCNC: 7 MMOL/L (ref 21–32)
CREAT SERPL-MCNC: 1.13 MG/DL (ref 0.5–1.3)
CREAT SERPL-MCNC: 1.48 MG/DL (ref 0.5–1.3)
CREAT SERPL-MCNC: 1.48 MG/DL (ref 0.5–1.3)
EGFRCR SERPLBLD CKD-EPI 2021: 62 ML/MIN/1.73M*2
EGFRCR SERPLBLD CKD-EPI 2021: 62 ML/MIN/1.73M*2
EGFRCR SERPLBLD CKD-EPI 2021: 86 ML/MIN/1.73M*2
EOSINOPHIL # BLD AUTO: 0 X10*3/UL (ref 0–0.7)
EOSINOPHIL NFR BLD AUTO: 0 %
ERYTHROCYTE [DISTWIDTH] IN BLOOD BY AUTOMATED COUNT: 12.9 % (ref 11.5–14.5)
FLUAV RNA RESP QL NAA+PROBE: NOT DETECTED
FLUBV RNA RESP QL NAA+PROBE: NOT DETECTED
GIANT PLATELETS BLD QL SMEAR: NORMAL
GLUCOSE BLD MANUAL STRIP-MCNC: 169 MG/DL (ref 74–99)
GLUCOSE BLD MANUAL STRIP-MCNC: 182 MG/DL (ref 74–99)
GLUCOSE BLD MANUAL STRIP-MCNC: 265 MG/DL (ref 74–99)
GLUCOSE BLD MANUAL STRIP-MCNC: 275 MG/DL (ref 74–99)
GLUCOSE BLD MANUAL STRIP-MCNC: 423 MG/DL (ref 74–99)
GLUCOSE BLD MANUAL STRIP-MCNC: 440 MG/DL (ref 74–99)
GLUCOSE BLD MANUAL STRIP-MCNC: 563 MG/DL (ref 74–99)
GLUCOSE BLD MANUAL STRIP-MCNC: 591 MG/DL (ref 74–99)
GLUCOSE BLDV-MCNC: 301 MG/DL (ref 74–99)
GLUCOSE BLDV-MCNC: >685 MG/DL (ref 74–99)
GLUCOSE SERPL-MCNC: 282 MG/DL (ref 74–99)
GLUCOSE SERPL-MCNC: 558 MG/DL (ref 74–99)
GLUCOSE SERPL-MCNC: 558 MG/DL (ref 74–99)
HCO3 BLDV-SCNC: 5.7 MMOL/L (ref 22–26)
HCO3 BLDV-SCNC: 8.1 MMOL/L (ref 22–26)
HCT VFR BLD AUTO: 56.5 % (ref 41–52)
HCT VFR BLD EST: 55 % (ref 41–52)
HCT VFR BLD EST: 56 % (ref 41–52)
HGB BLD-MCNC: 18.6 G/DL (ref 13.5–17.5)
HGB BLDV-MCNC: 18.2 G/DL (ref 13.5–17.5)
HGB BLDV-MCNC: 18.5 G/DL (ref 13.5–17.5)
IMM GRANULOCYTES # BLD AUTO: 0.34 X10*3/UL (ref 0–0.7)
IMM GRANULOCYTES NFR BLD AUTO: 1.6 % (ref 0–0.9)
INHALED O2 CONCENTRATION: 21 %
INHALED O2 CONCENTRATION: 21 %
LACTATE BLDV-SCNC: 2.8 MMOL/L (ref 0.4–2)
LACTATE BLDV-SCNC: 4 MMOL/L (ref 0.4–2)
LIPASE SERPL-CCNC: 18 U/L (ref 9–82)
LYMPHOCYTES # BLD AUTO: 0.89 X10*3/UL (ref 1.2–4.8)
LYMPHOCYTES NFR BLD AUTO: 4.2 %
MAGNESIUM SERPL-MCNC: 1.89 MG/DL (ref 1.6–2.4)
MAGNESIUM SERPL-MCNC: 2.15 MG/DL (ref 1.6–2.4)
MCH RBC QN AUTO: 31.4 PG (ref 26–34)
MCHC RBC AUTO-ENTMCNC: 32.9 G/DL (ref 32–36)
MCV RBC AUTO: 95 FL (ref 80–100)
MONOCYTES # BLD AUTO: 0.99 X10*3/UL (ref 0.1–1)
MONOCYTES NFR BLD AUTO: 4.7 %
NEUTROPHILS # BLD AUTO: 18.79 X10*3/UL (ref 1.2–7.7)
NEUTROPHILS NFR BLD AUTO: 89 %
NRBC BLD-RTO: 0 /100 WBCS (ref 0–0)
OXYHGB MFR BLDV: 61.2 % (ref 45–75)
OXYHGB MFR BLDV: 84.2 % (ref 45–75)
PCO2 BLDV: 24 MM HG (ref 41–51)
PCO2 BLDV: 28 MM HG (ref 41–51)
PH BLDV: 6.98 PH (ref 7.33–7.43)
PH BLDV: 7.07 PH (ref 7.33–7.43)
PHOSPHATE SERPL-MCNC: 3.6 MG/DL (ref 2.5–4.9)
PHOSPHATE SERPL-MCNC: 6.1 MG/DL (ref 2.5–4.9)
PLATELET # BLD AUTO: 230 X10*3/UL (ref 150–450)
PO2 BLDV: 40 MM HG (ref 35–45)
PO2 BLDV: 62 MM HG (ref 35–45)
POTASSIUM BLDV-SCNC: 4 MMOL/L (ref 3.5–5.3)
POTASSIUM BLDV-SCNC: 6.9 MMOL/L (ref 3.5–5.3)
POTASSIUM SERPL-SCNC: 4.3 MMOL/L (ref 3.5–5.3)
POTASSIUM SERPL-SCNC: 6.3 MMOL/L (ref 3.5–5.3)
POTASSIUM SERPL-SCNC: 6.3 MMOL/L (ref 3.5–5.3)
PROT SERPL-MCNC: 7.8 G/DL (ref 6.4–8.2)
RBC # BLD AUTO: 5.92 X10*6/UL (ref 4.5–5.9)
RBC MORPH BLD: NORMAL
SAO2 % BLDV: 62 % (ref 45–75)
SAO2 % BLDV: 86 % (ref 45–75)
SARS-COV-2 RNA RESP QL NAA+PROBE: NOT DETECTED
SODIUM BLDV-SCNC: 125 MMOL/L (ref 136–145)
SODIUM BLDV-SCNC: 135 MMOL/L (ref 136–145)
SODIUM SERPL-SCNC: 133 MMOL/L (ref 136–145)
SODIUM SERPL-SCNC: 133 MMOL/L (ref 136–145)
SODIUM SERPL-SCNC: 141 MMOL/L (ref 136–145)
WBC # BLD AUTO: 21.1 X10*3/UL (ref 4.4–11.3)

## 2025-02-12 PROCEDURE — 85025 COMPLETE CBC W/AUTO DIFF WBC: CPT | Performed by: PHYSICIAN ASSISTANT

## 2025-02-12 PROCEDURE — 96376 TX/PRO/DX INJ SAME DRUG ADON: CPT

## 2025-02-12 PROCEDURE — 36415 COLL VENOUS BLD VENIPUNCTURE: CPT | Performed by: EMERGENCY MEDICINE

## 2025-02-12 PROCEDURE — 2020000001 HC ICU ROOM DAILY

## 2025-02-12 PROCEDURE — 83690 ASSAY OF LIPASE: CPT | Performed by: PHYSICIAN ASSISTANT

## 2025-02-12 PROCEDURE — 84132 ASSAY OF SERUM POTASSIUM: CPT | Performed by: EMERGENCY MEDICINE

## 2025-02-12 PROCEDURE — 82947 ASSAY GLUCOSE BLOOD QUANT: CPT | Mod: 59

## 2025-02-12 PROCEDURE — 84132 ASSAY OF SERUM POTASSIUM: CPT | Performed by: INTERNAL MEDICINE

## 2025-02-12 PROCEDURE — 1036F TOBACCO NON-USER: CPT

## 2025-02-12 PROCEDURE — 83735 ASSAY OF MAGNESIUM: CPT

## 2025-02-12 PROCEDURE — 84132 ASSAY OF SERUM POTASSIUM: CPT

## 2025-02-12 PROCEDURE — 3046F HEMOGLOBIN A1C LEVEL >9.0%: CPT

## 2025-02-12 PROCEDURE — 3078F DIAST BP <80 MM HG: CPT

## 2025-02-12 PROCEDURE — 83605 ASSAY OF LACTIC ACID: CPT

## 2025-02-12 PROCEDURE — 82947 ASSAY GLUCOSE BLOOD QUANT: CPT

## 2025-02-12 PROCEDURE — 84100 ASSAY OF PHOSPHORUS: CPT

## 2025-02-12 PROCEDURE — 84100 ASSAY OF PHOSPHORUS: CPT | Performed by: EMERGENCY MEDICINE

## 2025-02-12 PROCEDURE — 96365 THER/PROPH/DIAG IV INF INIT: CPT

## 2025-02-12 PROCEDURE — 99285 EMERGENCY DEPT VISIT HI MDM: CPT | Mod: 25 | Performed by: EMERGENCY MEDICINE

## 2025-02-12 PROCEDURE — 93005 ELECTROCARDIOGRAM TRACING: CPT

## 2025-02-12 PROCEDURE — 3074F SYST BP LT 130 MM HG: CPT

## 2025-02-12 PROCEDURE — G0378 HOSPITAL OBSERVATION PER HR: HCPCS

## 2025-02-12 PROCEDURE — 2500000004 HC RX 250 GENERAL PHARMACY W/ HCPCS (ALT 636 FOR OP/ED): Performed by: INTERNAL MEDICINE

## 2025-02-12 PROCEDURE — 82435 ASSAY OF BLOOD CHLORIDE: CPT | Performed by: EMERGENCY MEDICINE

## 2025-02-12 PROCEDURE — 96361 HYDRATE IV INFUSION ADD-ON: CPT

## 2025-02-12 PROCEDURE — 96375 TX/PRO/DX INJ NEW DRUG ADDON: CPT

## 2025-02-12 PROCEDURE — 2500000004 HC RX 250 GENERAL PHARMACY W/ HCPCS (ALT 636 FOR OP/ED): Performed by: NURSE PRACTITIONER

## 2025-02-12 PROCEDURE — 3008F BODY MASS INDEX DOCD: CPT

## 2025-02-12 PROCEDURE — 71045 X-RAY EXAM CHEST 1 VIEW: CPT

## 2025-02-12 PROCEDURE — 99222 1ST HOSP IP/OBS MODERATE 55: CPT | Performed by: NURSE PRACTITIONER

## 2025-02-12 PROCEDURE — 2500000004 HC RX 250 GENERAL PHARMACY W/ HCPCS (ALT 636 FOR OP/ED): Performed by: EMERGENCY MEDICINE

## 2025-02-12 PROCEDURE — 87636 SARSCOV2 & INF A&B AMP PRB: CPT | Performed by: EMERGENCY MEDICINE

## 2025-02-12 PROCEDURE — 71045 X-RAY EXAM CHEST 1 VIEW: CPT | Performed by: RADIOLOGY

## 2025-02-12 PROCEDURE — 2500000004 HC RX 250 GENERAL PHARMACY W/ HCPCS (ALT 636 FOR OP/ED): Performed by: PHYSICIAN ASSISTANT

## 2025-02-12 PROCEDURE — 99495 TRANSJ CARE MGMT MOD F2F 14D: CPT

## 2025-02-12 PROCEDURE — 96366 THER/PROPH/DIAG IV INF ADDON: CPT

## 2025-02-12 PROCEDURE — 99291 CRITICAL CARE FIRST HOUR: CPT | Performed by: PHYSICIAN ASSISTANT

## 2025-02-12 PROCEDURE — 36415 COLL VENOUS BLD VENIPUNCTURE: CPT | Performed by: PHYSICIAN ASSISTANT

## 2025-02-12 PROCEDURE — 83735 ASSAY OF MAGNESIUM: CPT | Performed by: EMERGENCY MEDICINE

## 2025-02-12 PROCEDURE — 99253 IP/OBS CNSLTJ NEW/EST LOW 45: CPT | Performed by: INTERNAL MEDICINE

## 2025-02-12 PROCEDURE — 84132 ASSAY OF SERUM POTASSIUM: CPT | Performed by: PHYSICIAN ASSISTANT

## 2025-02-12 RX ORDER — CALCIUM GLUCONATE 20 MG/ML
2 INJECTION, SOLUTION INTRAVENOUS ONCE
Status: COMPLETED | OUTPATIENT
Start: 2025-02-12 | End: 2025-02-12

## 2025-02-12 RX ORDER — LISINOPRIL 10 MG/1
5 TABLET ORAL DAILY
Status: DISCONTINUED | OUTPATIENT
Start: 2025-02-12 | End: 2025-02-12

## 2025-02-12 RX ORDER — DEXTROSE 50 % IN WATER (D50W) INTRAVENOUS SYRINGE
25 ONCE
Status: DISCONTINUED | OUTPATIENT
Start: 2025-02-12 | End: 2025-02-13

## 2025-02-12 RX ORDER — ONDANSETRON HYDROCHLORIDE 2 MG/ML
4 INJECTION, SOLUTION INTRAVENOUS EVERY 4 HOURS PRN
Status: DISCONTINUED | OUTPATIENT
Start: 2025-02-12 | End: 2025-02-13 | Stop reason: HOSPADM

## 2025-02-12 RX ORDER — SODIUM BICARBONATE 1 MEQ/ML
50 SYRINGE (ML) INTRAVENOUS ONCE
Status: DISCONTINUED | OUTPATIENT
Start: 2025-02-12 | End: 2025-02-13

## 2025-02-12 RX ORDER — GLUCAGON INJECTION, SOLUTION 1 MG/.2ML
1 INJECTION, SOLUTION SUBCUTANEOUS ONCE
Qty: 0.4 ML | Refills: 3 | Status: SHIPPED | OUTPATIENT
Start: 2025-02-12 | End: 2025-02-12

## 2025-02-12 RX ORDER — DEXTROSE 50 % IN WATER (D50W) INTRAVENOUS SYRINGE
50
Status: DISCONTINUED | OUTPATIENT
Start: 2025-02-12 | End: 2025-02-13

## 2025-02-12 RX ORDER — ONDANSETRON HYDROCHLORIDE 2 MG/ML
4 INJECTION, SOLUTION INTRAVENOUS ONCE
Status: COMPLETED | OUTPATIENT
Start: 2025-02-12 | End: 2025-02-12

## 2025-02-12 RX ORDER — SODIUM CHLORIDE 450 MG/100ML
250 INJECTION, SOLUTION INTRAVENOUS CONTINUOUS
Status: DISCONTINUED | OUTPATIENT
Start: 2025-02-12 | End: 2025-02-13

## 2025-02-12 RX ORDER — DEXTROSE MONOHYDRATE 100 MG/ML
150 INJECTION, SOLUTION INTRAVENOUS CONTINUOUS PRN
Status: DISCONTINUED | OUTPATIENT
Start: 2025-02-12 | End: 2025-02-13

## 2025-02-12 RX ORDER — DEXTROSE MONOHYDRATE AND SODIUM CHLORIDE 5; .45 G/100ML; G/100ML
150 INJECTION, SOLUTION INTRAVENOUS CONTINUOUS PRN
Status: DISCONTINUED | OUTPATIENT
Start: 2025-02-12 | End: 2025-02-13

## 2025-02-12 RX ORDER — SODIUM CHLORIDE 9 MG/ML
250 INJECTION, SOLUTION INTRAVENOUS CONTINUOUS
Status: DISCONTINUED | OUTPATIENT
Start: 2025-02-12 | End: 2025-02-12

## 2025-02-12 RX ORDER — ACETAMINOPHEN 325 MG/1
650 TABLET ORAL EVERY 4 HOURS PRN
Status: DISCONTINUED | OUTPATIENT
Start: 2025-02-12 | End: 2025-02-13 | Stop reason: HOSPADM

## 2025-02-12 RX ADMIN — CALCIUM GLUCONATE 2 G: 20 INJECTION, SOLUTION INTRAVENOUS at 17:54

## 2025-02-12 RX ADMIN — INSULIN HUMAN 10 UNITS/HR: 1 INJECTION, SOLUTION INTRAVENOUS at 16:19

## 2025-02-12 RX ADMIN — SODIUM CHLORIDE 250 ML/HR: 9 INJECTION, SOLUTION INTRAVENOUS at 17:00

## 2025-02-12 RX ADMIN — SODIUM CHLORIDE 1000 ML: 9 INJECTION, SOLUTION INTRAVENOUS at 17:09

## 2025-02-12 RX ADMIN — SODIUM CHLORIDE 250 ML/HR: 4.5 INJECTION, SOLUTION INTRAVENOUS at 21:02

## 2025-02-12 RX ADMIN — INSULIN HUMAN 14.5 UNITS/HR: 1 INJECTION, SOLUTION INTRAVENOUS at 22:56

## 2025-02-12 RX ADMIN — ONDANSETRON 4 MG: 2 INJECTION INTRAMUSCULAR; INTRAVENOUS at 15:30

## 2025-02-12 RX ADMIN — DEXTROSE AND SODIUM CHLORIDE 150 ML/HR: 5; 450 INJECTION, SOLUTION INTRAVENOUS at 22:03

## 2025-02-12 RX ADMIN — SODIUM CHLORIDE 1000 ML: 9 INJECTION, SOLUTION INTRAVENOUS at 15:30

## 2025-02-12 RX ADMIN — ONDANSETRON 4 MG: 2 INJECTION INTRAMUSCULAR; INTRAVENOUS at 22:47

## 2025-02-12 SDOH — SOCIAL STABILITY: SOCIAL INSECURITY: HAVE YOU HAD THOUGHTS OF HARMING ANYONE ELSE?: NO

## 2025-02-12 SDOH — ECONOMIC STABILITY: FOOD INSECURITY: WITHIN THE PAST 12 MONTHS, YOU WORRIED THAT YOUR FOOD WOULD RUN OUT BEFORE YOU GOT MONEY TO BUY MORE.: NEVER TRUE

## 2025-02-12 SDOH — SOCIAL STABILITY: SOCIAL INSECURITY: WERE YOU ABLE TO COMPLETE ALL THE BEHAVIORAL HEALTH SCREENINGS?: YES

## 2025-02-12 SDOH — ECONOMIC STABILITY: FOOD INSECURITY: WITHIN THE PAST 12 MONTHS, THE FOOD YOU BOUGHT JUST DIDN'T LAST AND YOU DIDN'T HAVE MONEY TO GET MORE.: NEVER TRUE

## 2025-02-12 ASSESSMENT — LIFESTYLE VARIABLES
SKIP TO QUESTIONS 9-10: 1
HOW OFTEN DO YOU HAVE A DRINK CONTAINING ALCOHOL: NEVER
HOW OFTEN DO YOU HAVE SIX OR MORE DRINKS ON ONE OCCASION: NEVER
PRESCIPTION_ABUSE_PAST_12_MONTHS: NO
AUDIT-C TOTAL SCORE: 0
SKIP TO QUESTIONS 9-10: 1
HOW OFTEN DO YOU HAVE 6 OR MORE DRINKS ON ONE OCCASION: NEVER
AUDIT-C TOTAL SCORE: 0
SUBSTANCE_ABUSE_PAST_12_MONTHS: NO
HOW OFTEN DO YOU HAVE A DRINK CONTAINING ALCOHOL: NEVER
AUDIT-C TOTAL SCORE: 0
HOW MANY STANDARD DRINKS CONTAINING ALCOHOL DO YOU HAVE ON A TYPICAL DAY: PATIENT DOES NOT DRINK
HOW MANY STANDARD DRINKS CONTAINING ALCOHOL DO YOU HAVE ON A TYPICAL DAY: PATIENT DOES NOT DRINK

## 2025-02-12 ASSESSMENT — PAIN DESCRIPTION - PROGRESSION: CLINICAL_PROGRESSION: NOT CHANGED

## 2025-02-12 ASSESSMENT — COLUMBIA-SUICIDE SEVERITY RATING SCALE - C-SSRS
1. IN THE PAST MONTH, HAVE YOU WISHED YOU WERE DEAD OR WISHED YOU COULD GO TO SLEEP AND NOT WAKE UP?: NO
6. HAVE YOU EVER DONE ANYTHING, STARTED TO DO ANYTHING, OR PREPARED TO DO ANYTHING TO END YOUR LIFE?: NO
2. HAVE YOU ACTUALLY HAD ANY THOUGHTS OF KILLING YOURSELF?: NO

## 2025-02-12 ASSESSMENT — PATIENT HEALTH QUESTIONNAIRE - PHQ9
SUM OF ALL RESPONSES TO PHQ9 QUESTIONS 1 & 2: 0
SUM OF ALL RESPONSES TO PHQ9 QUESTIONS 1 & 2: 0
1. LITTLE INTEREST OR PLEASURE IN DOING THINGS: NOT AT ALL
2. FEELING DOWN, DEPRESSED OR HOPELESS: NOT AT ALL
2. FEELING DOWN, DEPRESSED OR HOPELESS: NOT AT ALL
1. LITTLE INTEREST OR PLEASURE IN DOING THINGS: NOT AT ALL

## 2025-02-12 ASSESSMENT — ENCOUNTER SYMPTOMS
CONSTITUTIONAL NEGATIVE: 1
NEUROLOGICAL NEGATIVE: 1
GASTROINTESTINAL NEGATIVE: 1
PSYCHIATRIC NEGATIVE: 1
HEMATOLOGIC/LYMPHATIC NEGATIVE: 1
ENDOCRINE NEGATIVE: 1
EYES NEGATIVE: 1
CARDIOVASCULAR NEGATIVE: 1
RESPIRATORY NEGATIVE: 1
MUSCULOSKELETAL NEGATIVE: 1

## 2025-02-12 ASSESSMENT — PAIN SCALES - GENERAL
PAINLEVEL_OUTOF10: 0 - NO PAIN
PAINLEVEL_OUTOF10: 0 - NO PAIN
PAINLEVEL_OUTOF10: 0-NO PAIN
PAINLEVEL_OUTOF10: 0 - NO PAIN

## 2025-02-12 ASSESSMENT — COGNITIVE AND FUNCTIONAL STATUS - GENERAL
MOBILITY SCORE: 24
MOBILITY SCORE: 24
DAILY ACTIVITIY SCORE: 24
DAILY ACTIVITIY SCORE: 24
PATIENT BASELINE BEDBOUND: NO

## 2025-02-12 ASSESSMENT — ACTIVITIES OF DAILY LIVING (ADL)
BATHING: INDEPENDENT
TOILETING: INDEPENDENT
HEARING - LEFT EAR: FUNCTIONAL
PATIENT'S MEMORY ADEQUATE TO SAFELY COMPLETE DAILY ACTIVITIES?: YES
DRESSING YOURSELF: INDEPENDENT
WALKS IN HOME: INDEPENDENT
HEARING - RIGHT EAR: FUNCTIONAL
GROOMING: INDEPENDENT
LACK_OF_TRANSPORTATION: NO
FEEDING YOURSELF: INDEPENDENT
JUDGMENT_ADEQUATE_SAFELY_COMPLETE_DAILY_ACTIVITIES: YES
ADEQUATE_TO_COMPLETE_ADL: YES

## 2025-02-12 ASSESSMENT — ANXIETY QUESTIONNAIRES
7. FEELING AFRAID AS IF SOMETHING AWFUL MIGHT HAPPEN: NOT AT ALL
GAD7 TOTAL SCORE: 0
5. BEING SO RESTLESS THAT IT IS HARD TO SIT STILL: NOT AT ALL
2. NOT BEING ABLE TO STOP OR CONTROL WORRYING: NOT AT ALL
1. FEELING NERVOUS, ANXIOUS, OR ON EDGE: NOT AT ALL
3. WORRYING TOO MUCH ABOUT DIFFERENT THINGS: NOT AT ALL
IF YOU CHECKED OFF ANY PROBLEMS ON THIS QUESTIONNAIRE, HOW DIFFICULT HAVE THESE PROBLEMS MADE IT FOR YOU TO DO YOUR WORK, TAKE CARE OF THINGS AT HOME, OR GET ALONG WITH OTHER PEOPLE: NOT DIFFICULT AT ALL
6. BECOMING EASILY ANNOYED OR IRRITABLE: NOT AT ALL
4. TROUBLE RELAXING: NOT AT ALL

## 2025-02-12 ASSESSMENT — PAIN - FUNCTIONAL ASSESSMENT
PAIN_FUNCTIONAL_ASSESSMENT: 0-10

## 2025-02-12 NOTE — PATIENT INSTRUCTIONS
Thank you for coming to see me today.  If you have any questions or concerns following our visit, please contact the office.  Phone: [phone]    Follow up with Dr. Brumfield as previously scheduled    1)  START Gvoke pen, use if unconscious or unable to eat/drink safely from very low blood sugar    2) I am referring you to clinical pharmacy. You can expect a call from our clinical pharmacist Nia or someone on her team in the next few days     3) I am referring you to ED for recurrent DKA

## 2025-02-12 NOTE — H&P
[facility] MEDICINE HISTORY AND PHYSICAL    History Of Present Illness     Ed Sanon is a 34 y.o. male with PMHx of type I diabetes with recurrent DKA and HTN who presented with symptomatic hyperglycemia. He started to feel unwell yesterday, with weakness, polyuria and vomiting. He is only taking his long-acting NPH and has not been compliant with his short acting insulin. When he takes his short acting insulin he says he gets hypoglycemic and feels funny. He saw his PCP today who sent him to the ED. A1c on 25 was 11.7%. He used to follow with his endocrinologist Dr. Villar at [facility] but she left and he hasn't seen anyone since. He says his brother  from diabetes at age 29 of hypoglycemia. His PCP is asking his pharmacy to help get his mother set up with his CGM readings so she can assist if his blood sugars go too low. He was prescribed a Gvoke pen today in the event of accidental insulin overdose. He was advised to schedule an appointment with psychology to discuss possible psychological issues related to his insulin use. ED labwork is pending at this time but the pt has been accepted to the ICU on an insulin gtt for DKA.  Except for  bpm VSS.  Remainder of ROS reviewed and negative except as indicated in HPI.     Objective     Past Medical History  He has a past medical history of DKA (diabetic ketoacidosis) (Multi), HTN (hypertension), and Type I diabetes mellitus (Multi).    Surgical History  He has no past surgical history on file.    Social History     Tobacco Use    Smoking status: Never     Passive exposure: Never    Smokeless tobacco: Never   Vaping Use    Vaping status: Never Used   Substance Use Topics    Alcohol use: Never    Drug use: Never       Family History  Family History   Problem Relation Name Age of Onset    No Known Problems Mother      No Known Problems Father      Hypertension Other      Coronary artery disease Other      Diabetes Other      Heart failure  Other         Allergies  Patient has no known allergies.    Vitals:    02/12/25 1503   BP: 137/70   Pulse: (!) 130   Resp: 19   Temp: 37.3 °C (99.2 °F)   SpO2: 100%       Vitals:    02/12/25 1503   Weight: 72.1 kg (159 lb)       Scheduled medications  calcium gluconate, 2 g, intravenous, Once  dextrose, 25 g, intravenous, Once  lisinopril, 5 mg, oral, Daily  sodium bicarbonate, 50 mEq, intravenous, Once  sodium chloride, 1,000 mL, intravenous, Once  sodium chloride, 1,000 mL, intravenous, Once      Continuous medications  dextrose 10 % in water (D10W), 150 mL/hr  dextrose 10 % in water (D10W), 150 mL/hr  dextrose 5%-0.45 % sodium chloride, 150 mL/hr  insulin regular, 0-50 Units/hr  sodium chloride, 250 mL/hr      PRN medications  PRN medications: acetaminophen, dextrose 10 % in water (D10W), dextrose 10 % in water (D10W), dextrose 5%-0.45 % sodium chloride, dextrose, insulin regular    Results for orders placed or performed during the hospital encounter of 02/12/25 (from the past 24 hours)   POCT GLUCOSE   Result Value Ref Range    POCT Glucose 591 (H) 74 - 99 mg/dL   Blood Gas Venous Full Panel   Result Value Ref Range    POCT pH, Venous 6.98 (LL) 7.33 - 7.43 pH    POCT pCO2, Venous 24 (L) 41 - 51 mm Hg    POCT pO2, Venous 62 (H) 35 - 45 mm Hg    POCT SO2, Venous 86 (H) 45 - 75 %    POCT Oxy Hemoglobin, Venous 84.2 (H) 45.0 - 75.0 %    POCT Hematocrit Calculated, Venous 56.0 (H) 41.0 - 52.0 %    POCT Sodium, Venous 125 (L) 136 - 145 mmol/L    POCT Potassium, Venous 6.9 (HH) 3.5 - 5.3 mmol/L    POCT Chloride, Venous 96 (L) 98 - 107 mmol/L    POCT Ionized Calicum, Venous 1.24 1.10 - 1.33 mmol/L    POCT Glucose, Venous >685 (HH) 74 - 99 mg/dL    POCT Lactate, Venous 4.0 (HH) 0.4 - 2.0 mmol/L    POCT Base Excess, Venous -24.8 (L) -2.0 - 3.0 mmol/L    POCT HCO3 Calculated, Venous 5.7 (L) 22.0 - 26.0 mmol/L    POCT Hemoglobin, Venous 18.5 (H) 13.5 - 17.5 g/dL    POCT Anion Gap, Venous 30.0 (H) 10.0 - 25.0 mmol/L     Patient Temperature 37.0 degrees Celsius    FiO2 21 %         I personally reviewed all pertinent labwork, imaging and vital signs, as well as medications, nursing, therapy, discharge planning and consult notes.     Constitutional: Well developed, awake, alert, calm, oriented x4, in mild distress 2/2 nausea, cooperative   Eyes: EOMI, clear sclerae   ENMT: mucous membranes moist, no lesions seen   Head/Neck: Neck supple, no apparent injury, head atraumatic   Respiratory/Thorax: CTAB, good chest expansion, respirations even and mildly labored   Cardiovascular: Regular rate and rhythm,  on tele, no murmurs/rubs/gallops, normal S1 and S 2   Gastrointestinal: Abdomen nondistended, soft, nontender, +BS, no bruits   Musculoskeletal: ROM intact, no joint swelling, normal  strength   Extremities: no cyanosis, contusions or clubbing, or edema   Neurological: no focal deficit, pt alert and oriented x4   Psychological: Appropriate affect and behavior, pleasant   Skin: Warm and dry, no lesions, no rashes       Assessment/Plan     Type I diabetes with recurrent DKA   Chronic noncompliance with short-acting insulin, pt sts it drops his sugar too low  Another possibility is fear of death as his brother  of hypoglycemia and he was referred for a mental health eval by his PCP  A1c on 25 was 11.7%, will consult endocrinology and intensivist as he is being evaluated in the ICU on an insulin gtt  He used to follow with his endocrinologist Dr. Villar at [facility] but she left and he hasn't seen anyone since  He was prescribed a Gvoke pen today by his PCP in the event of accidental insulin overdose    HTN  BP controlled on lisinopril    DVT ppx: not indicated    Discharge disposition  Home when stable      Elizabeth Nur CNP  [facility] [place] [facility]

## 2025-02-12 NOTE — PROGRESS NOTES
"Patient: Ed Sanon  : 1990  PCP: Henri Brumfield MD  MRN: [id number]  Program: Transitional Care Management  Status: Enrolled  Effective Dates: 2/3/2025 - present  Responsible Staff: Zuleyka Small RN  Social Drivers to be Addressed: Physical Activity, Social Connections, Stress         Ed Sanon is a 34 y.o. male presenting today for follow-up after being discharged from the hospital 12 days ago. The main problem requiring admission was DKA in DM1. The discharge summary and/or Transitional Care Management documentation was reviewed. Medication reconciliation was performed as indicated via the \"Spencer as Reviewed\" timestamp.     Ed Sanon was contacted by Transitional Care Management services two days after his discharge. This encounter and supporting documentation was reviewed.    Reports he has an appointment scheduled with an endocrinogist but doesn't know when or who he's going to see.     AM glucose in 230s and only goes up from there.     Has had long standing chest pain that comes and goes but mostly always there.     Mother feels he's in DKA right now. Glucose this morning was 330. Threw up this morning on the way to hospital. States that one unit of lispro drops him 250 points. Currently on Humalin which doesn't work as fast. Was only using long acting insulin for the past week and a half. Reports his brother  from diabetes at age 29 of hypoglycema. Living with his mother who recently lost her . She doesn't have his     Review of Systems   Constitutional: Negative.    HENT: Negative.     Eyes: Negative.    Respiratory: Negative.     Cardiovascular: Negative.    Gastrointestinal: Negative.    Endocrine: Negative.    Genitourinary: Negative.    Musculoskeletal: Negative.    Skin: Negative.    Neurological: Negative.    Hematological: Negative.    Psychiatric/Behavioral: Negative.         BP 99/71 (BP Location: Left arm, Patient Position: Sitting, BP Cuff Size: Adult) " "  Pulse (!) 129   Temp 36.1 °C (96.9 °F) (Temporal)   Resp 15   Ht 1.778 m (5' 10\")   Wt 72.1 kg (159 lb)   SpO2 98%   BMI 22.81 kg/m²     Physical Exam  Constitutional:       Appearance: Normal appearance.      Comments: Appears older than stated age   HENT:      Head: Normocephalic and atraumatic.   Eyes:      Extraocular Movements: Extraocular movements intact.      Pupils: Pupils are equal, round, and reactive to light.   Cardiovascular:      Rate and Rhythm: Normal rate and regular rhythm.   Pulmonary:      Effort: Pulmonary effort is normal.      Breath sounds: Normal breath sounds.   Abdominal:      General: Abdomen is flat. Bowel sounds are normal.      Palpations: Abdomen is soft.   Musculoskeletal:         General: Normal range of motion.   Skin:     General: Skin is warm and dry.      Capillary Refill: Capillary refill takes less than 2 seconds.   Neurological:      General: No focal deficit present.      Mental Status: He is alert and oriented to person, place, and time.   Psychiatric:         Mood and Affect: Mood normal.         Behavior: Behavior normal.         The complexity of medical decision making for this patient's transitional care is moderate.    Assessment/Plan   Problem List Items Addressed This Visit             ICD-10-CM    Poorly controlled diabetes mellitus (Multi) - Primary E11.65     A1c from 2025 elevated at 11.7%  Concerned he's in DKA again- started vomiting this morning and reports polyuria, polydipsia and polyphagia. Borderline hypotensive in office.  Mother reports that his brother  from inadvertant insulin overdose at age 29, she is concerned this may be part of his issues with being adherent to insulin    Refer to ED for DKA  Refer to clinical pharmacy- will ask to help get his mother set up with his CGM readings so she can assist if his blood sugars go too low  Gvoke pen prescribed today in event of accidental insulin overdose  Advised to schedule appt with " psychology to discuss possible psychological issues related to insulin use         Relevant Medications    Gvoke HypoPen 1-Pack 1 mg/0.2 mL auto-injector    Other Relevant Orders    Referral to Clinical Pharmacy     Follow up with Dr. Brumfield as scheduled in March 2025    Joyce ALVARADO-CNS

## 2025-02-12 NOTE — ASSESSMENT & PLAN NOTE
A1c from 2025 elevated at 11.7%  Concerned he's in DKA again- started vomiting this morning and reports polyuria, polydipsia and polyphagia. Borderline hypotensive in office.  Mother reports that his brother  from inadvertant insulin overdose at age 29, she is concerned this may be part of his issues with being adherent to insulin    Refer to ED for DKA  Refer to clinical pharmacy- will ask to help get his mother set up with his CGM readings so she can assist if his blood sugars go too low  Gvoke pen prescribed today in event of accidental insulin overdose  Advised to schedule appt with psychology to discuss possible psychological issues related to insulin use

## 2025-02-12 NOTE — ED PROVIDER NOTES
EMERGENCY MEDICINE EVALUATION NOTE    History of Present Illness     Chief Complaint:   Chief Complaint   Patient presents with    Hyperglycemia     Patient states he started to feel weak yesterday and noticed he was urinating a lot. Today this AM he started feeling nauseous and vomiting. Patient states he has been in DKA previously and this episode feels similar. He states he took his long acting insulin dose at 0900 this AM.        HPI: Ed Sanon is a 34 y.o. male presents with a chief complaint of hyperglycemia.  Patient reports that he started to feel unwell yesterday.  He states that he states he started feel weak and started urinating a lot.  He states that this morning started migrainosus with vomiting.  States reports that he has had frequent episodes of DKA.  He says it sounds very similar to previous episodes.  He states that he is only been taking his long-acting NPH and has not been compliant with his short acting insulin.  He states he felt this way for about a week and feeling unwell x 5 years.  He reports that anytime he takes his short acting insulin it makes him feel funny because his blood sugar gets too low.  Patient unsure of his last blood sugar reading.  Patient reports he currently feels nauseous and vomiting as well as weakness.    Previous History     Past Medical History:   Diagnosis Date    Abdominal pain 04/21/2023    Anxiety and depression 08/08/2021    Contact with and (suspected) exposure to covid-19 04/21/2023    COVID-19 virus infection 07/11/2023    Diabetes mellitus (Multi)     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    DIABETIC KETO ACIDOSIS    DKA (diabetic ketoacidosis) (Multi) 07/11/2023    DKA, type 1, not at goal 04/21/2023    DM2 (diabetes mellitus, type 2) (Multi) 11/15/2023    DIABETES    Elevated blood sugar 11/15/2023    ELEVATED BLOOD SUGAR/ 375 LAST CHECK    Elevated blood-pressure reading, without diagnosis of hypertension 09/09/2020    Elevated blood pressure  reading    Fatigue 07/11/2023    Generalized anxiety disorder 07/11/2023    Microalbuminuria due to type 1 diabetes mellitus (Multi) 07/11/2023    Personal history of COVID-19 04/02/2023    Poorly controlled diabetes mellitus (Multi) 07/11/2023    Type 1 diabetes mellitus 07/11/2023    Type 1 diabetes mellitus with hyperglycemia (Multi) 02/25/2019    Vitamin B12 deficiency 07/11/2023    Vitamin D deficiency 07/11/2023    Vomiting 11/15/2023    VOMITING HEADACHE BACK ACHE     No past surgical history on file.  Social History     Tobacco Use    Smoking status: Never     Passive exposure: Never    Smokeless tobacco: Never   Vaping Use    Vaping status: Never Used   Substance Use Topics    Alcohol use: Never    Drug use: Never     Family History   Problem Relation Name Age of Onset    No Known Problems Mother      No Known Problems Father      Hypertension Other      Coronary artery disease Other      Diabetes Other      Heart failure Other       No Known Allergies  Current Outpatient Medications   Medication Instructions    Guardian 4 Glucose Sensor device Change insulin sensor every 7 days as directed, linked to Minimed insulin pump, check with endocrinology for updated regimen    Gvoke HypoPen 1-Pack 1 mg, subcutaneous, Once    HumuLIN R Regular U-100 Insuln 5 Units, subcutaneous, 3 times daily before meals, Take as directed per insulin instructions. Discard vial 31 days after 1st opening    insulin NPH (Isophane) (HUMULIN N,NOVOLIN N) 22 Units, subcutaneous, 2 times daily before meals, Take as directed per insulin instructions.    insulin regular (HUMULIN R,NOVOLIN R) 0-10 Units, subcutaneous, 3 times daily before meals, 0 unit(s) if Blood glucose is between   2 unit(s) if Blood glucose is between 151-200  4 unit(s) if Blood glucose is between 201-250  6 unit(s) if Blood glucose is between 251-300  8 unit(s) if Blood glucose is between 301-350  10 unit(s) if Blood glucose is between 351-400    lancets misc 1  "Units, 3 times daily after meals and nightly    OneTouch Ultra Test strip USE 1 STRIP TO CHECK GLUCOSE 4 TIMES DAILY AS DIRECTED    OneTouch Ultra2 Meter misc use as directed    pen needle, diabetic 32 gauge x 5/32\" needle Use as instructed 4 times daily       Physical Exam     Appearance: Alert, oriented , cooperative     Skin: Intact,  dry skin, no lesions, rash, petechiae or purpura.      Eyes: PERRLA, EOMs intact,  Conjunctiva pink     ENT: Hearing grossly intact. Pharynx clear.  Dry mucous membranes.     Neck: Supple. Trachea at midline.      Pulmonary: Clear bilaterally. No rales, rhonchi or wheezing. No accessory muscle use or stridor.     Cardiac: Tachycardic.     Abdomen: Soft, nontender, active bowel sounds.     Musculoskeletal: Full range of motion.      Neurological:Cranial nerves II through XII are grossly intact, normal sensation, no weakness, no focal findings identified.     Results     Labs Reviewed   BLOOD GAS VENOUS FULL PANEL - Abnormal       Result Value    POCT pH, Venous 6.98 (*)     POCT pCO2, Venous 24 (*)     POCT pO2, Venous 62 (*)     POCT SO2, Venous 86 (*)     POCT Oxy Hemoglobin, Venous 84.2 (*)     POCT Hematocrit Calculated, Venous 56.0 (*)     POCT Sodium, Venous 125 (*)     POCT Potassium, Venous 6.9 (*)     POCT Chloride, Venous 96 (*)     POCT Ionized Calicum, Venous 1.24      POCT Glucose, Venous >685 (*)     POCT Lactate, Venous 4.0 (*)     POCT Base Excess, Venous -24.8 (*)     POCT HCO3 Calculated, Venous 5.7 (*)     POCT Hemoglobin, Venous 18.5 (*)     POCT Anion Gap, Venous 30.0 (*)     Patient Temperature 37.0      FiO2 21     POCT GLUCOSE - Abnormal    POCT Glucose 591 (*)    CBC WITH AUTO DIFFERENTIAL   COMPREHENSIVE METABOLIC PANEL   LIPASE   MAGNESIUM   URINALYSIS WITH REFLEX CULTURE AND MICROSCOPIC    Narrative:     The following orders were created for panel order Urinalysis with Reflex Culture and Microscopic.  Procedure                               Abnormality     " "    Status                     ---------                               -----------         ------                     Urinalysis with Reflex C...[[id number]]                                                 Extra Urine Gray Tube[[id number]]                                                         Please view results for these tests on the individual orders.   PHOSPHORUS   URINALYSIS WITH REFLEX CULTURE AND MICROSCOPIC   EXTRA URINE GRAY TUBE   BLOOD GAS LACTIC ACID, VENOUS   SARS-COV-2 AND INFLUENZA A/B PCR   BASIC METABOLIC PANEL   MAGNESIUM   PHOSPHORUS   POCT GLUCOSE METER     No orders to display         ED Course & Medical Decision Making     Medications   sodium chloride 0.9 % bolus 1,000 mL (1,000 mL intravenous New Bag 2/12/25 1530)   calcium gluconate 2 g in sodium chloride (iso)  mL (has no administration in time range)   sodium bicarbonate 8.4 % (1 mEq/mL) 50 mEq (has no administration in time range)   dextrose 50 % injection 25 g (has no administration in time range)   sodium chloride 0.9 % bolus 1,000 mL (has no administration in time range)   dextrose 10 % in water (D10W) infusion (has no administration in time range)   dextrose 50 % injection 50 mL (has no administration in time range)   dextrose 10 % in water (D10W) infusion (has no administration in time range)   dextrose 5%-0.45 % sodium chloride infusion (has no administration in time range)   insulin regular (HumuLIN, NovoLIN) bolus from bag 7 Units (has no administration in time range)   insulin regular 100 unit/100 mL (1 unit/mL) in 0.9 % NaCl infusion (has no administration in time range)   sodium chloride 0.45 % infusion (has no administration in time range)   ondansetron (Zofran) injection 4 mg (4 mg intravenous Given 2/12/25 1530)     Heart Rate:  [129-130]   Temperature:  [36.1 °C (96.9 °F)-37.3 °C (99.2 °F)]   Respirations:  [15-19]   BP: ()/(70-71)   Height:  [177.8 cm (5' 10\")]   Weight:  [72.1 kg (159 lb)]   Pulse Ox:  [98 " %-100 %]    ED Course as of 02/12/25 1609   Wed Feb 12, 2025   1550 Spoke to the on-call intensivist Dr. Gill who agreed to take patient to ICU once the remainder of workup back. [CJ]   1551 POCT Potassium, Venous(!!): 6.9  EKG is showing peaked T waves, worse than prior.  Will order hyperkalemia cocktail, no dextrose [WJ]   1556 EKG performed 2/12/2025 at 3:20 PM.  Sinus tachycardia ventricular 120 bpm, parable 120 ms, QT/QTc of 3 4 3/422 ms.  Peak T waves.  Interpreted by attending physician. [CJ]   1608 Spoke to the hospitalist GERRI who agreed to admit the patient under Dr. Pal service after my discussion with Dr. Gill. [CJ]      ED Course User Index  [CJ] Davis Ramirez PA-C  [WJ] Bridger Vaz DO         Diagnoses as of 02/12/25 1609   Diabetic ketoacidosis without coma associated with type 1 diabetes mellitus (Multi)       Procedures   Critical Care    Performed by: Davis Ramirez PA-C  Authorized by: Bridger Vaz DO    Critical care provider statement:     Critical care time (minutes):  45    Critical care time was exclusive of:  Separately billable procedures and treating other patients and teaching time    Critical care was necessary to treat or prevent imminent or life-threatening deterioration of the following conditions:  Endocrine crisis    Critical care was time spent personally by me on the following activities:  Blood draw for specimens, development of treatment plan with patient or surrogate, ordering and performing treatments and interventions, ordering and review of laboratory studies, ordering and review of radiographic studies, evaluation of patient's response to treatment, re-evaluation of patient's condition, review of old charts and discussions with consultants    Care discussed with: admitting provider        Diagnosis     1. Diabetic ketoacidosis without coma associated with type 1 diabetes mellitus (Multi)        Disposition   Admit to ICU    ED Prescriptions    None          Disclaimer: This note was dictated by speech recognition. Minor errors in transcription may be present. Please call if questions.       Davis Ramirez PA-C  02/12/25 0335

## 2025-02-12 NOTE — CONSULTS
Critical Care Medicine Consult      Reason For Consult  dka    History Of Present Illness  Ed Sanon is a 34 y.o. male longstanding history of diabetes type 1 whose had multiple recurrent admission for DKA.  Most recently discharged from the hospital on January 31, 2025.    Patient was actually at his PCP office today has been feeling unwell over the past day or so associated with nausea, vomiting some abdominal discomfort.  He is unable to keep anything down.  He still takes his NPH but does not take any short acting insulin.    He was instructed to go to the ED from his PCP office today.  In emergency department VBG shows pH of 6.9, pCO2 of 24 blood sugar of 591.    He was given 1 L of isotonic saline    On my encounter with patient he was just starting to get his insulin infusion, he feels weak denies any fevers or chills.  Hemodynamically stable sinus tachycardia saturation 100%       Past Medical History:   Diagnosis Date    DKA (diabetic ketoacidosis) (Multi)     recurrent    HTN (hypertension)     Type I diabetes mellitus (Multi)      History reviewed. No pertinent surgical history.  (Not in a hospital admission)    Patient has no known allergies.  Social History     Tobacco Use    Smoking status: Never     Passive exposure: Never    Smokeless tobacco: Never   Vaping Use    Vaping status: Never Used   Substance Use Topics    Alcohol use: Never    Drug use: Never     Family History   Problem Relation Name Age of Onset    No Known Problems Mother      No Known Problems Father      Hypertension Other      Coronary artery disease Other      Diabetes Other      Heart failure Other         Scheduled Medications:   calcium gluconate, 2 g, intravenous, Once  dextrose, 25 g, intravenous, Once  lisinopril, 5 mg, oral, Daily  sodium bicarbonate, 50 mEq, intravenous, Once  sodium chloride, 1,000 mL, intravenous, Once         Continuous Medications:   dextrose 10 % in water (D10W), 150 mL/hr  dextrose 10 % in water  (D10W), 150 mL/hr  dextrose 5%-0.45 % sodium chloride, 150 mL/hr  insulin regular, 0-50 Units/hr, Last Rate: 10 Units/hr (02/12/25 1619)  sodium chloride, 250 mL/hr         PRN Medications:   PRN medications: acetaminophen, dextrose 10 % in water (D10W), dextrose 10 % in water (D10W), dextrose 5%-0.45 % sodium chloride, dextrose, insulin regular, ondansetron    Review of Systems:  Review of Systems 12 view of systems negative other than stated above    Objective   Vitals:  Most Recent:  Vitals:    02/12/25 1503   BP: 137/70   Pulse: (!) 130   Resp: 19   Temp: 37.3 °C (99.2 °F)   SpO2: 100%       24hr Min/Max:  Temp  Min: 36.1 °C (96.9 °F)  Max: 37.3 °C (99.2 °F)  Pulse  Min: 129  Max: 130  BP  Min: 99/71  Max: 137/70  Resp  Min: 15  Max: 19  SpO2  Min: 98 %  Max: 100 %    LDA:          Vent settings:       Hemodynamic parameters for last 24 hours:       No intake or output data in the 24 hours ending 02/12/25 1636        Physical exam:    Physical Exam  Vitals and nursing note reviewed.   Constitutional:       Comments: Fatigue, but conversing following commands   HENT:      Head: Normocephalic and atraumatic.      Mouth/Throat:      Mouth: Mucous membranes are dry.   Eyes:      Pupils: Pupils are equal, round, and reactive to light.   Cardiovascular:      Rate and Rhythm: Regular rhythm. Tachycardia present.   Pulmonary:      Effort: Pulmonary effort is normal.      Breath sounds: Normal breath sounds.   Abdominal:      General: There is no distension.      Palpations: Abdomen is soft.      Tenderness: There is no abdominal tenderness.   Musculoskeletal:         General: No swelling.   Skin:     General: Skin is dry.      Capillary Refill: Capillary refill takes more than 3 seconds.   Neurological:      General: No focal deficit present.      Mental Status: He is alert and oriented to person, place, and time.   Psychiatric:         Mood and Affect: Mood normal.         Behavior: Behavior normal.           Lab/Radiology/Diagnostic Review:  Results for orders placed or performed during the hospital encounter of 02/12/25 (from the past 24 hours)   POCT GLUCOSE   Result Value Ref Range    POCT Glucose 591 (H) 74 - 99 mg/dL   Blood Gas Venous Full Panel   Result Value Ref Range    POCT pH, Venous 6.98 (LL) 7.33 - 7.43 pH    POCT pCO2, Venous 24 (L) 41 - 51 mm Hg    POCT pO2, Venous 62 (H) 35 - 45 mm Hg    POCT SO2, Venous 86 (H) 45 - 75 %    POCT Oxy Hemoglobin, Venous 84.2 (H) 45.0 - 75.0 %    POCT Hematocrit Calculated, Venous 56.0 (H) 41.0 - 52.0 %    POCT Sodium, Venous 125 (L) 136 - 145 mmol/L    POCT Potassium, Venous 6.9 (HH) 3.5 - 5.3 mmol/L    POCT Chloride, Venous 96 (L) 98 - 107 mmol/L    POCT Ionized Calicum, Venous 1.24 1.10 - 1.33 mmol/L    POCT Glucose, Venous >685 (HH) 74 - 99 mg/dL    POCT Lactate, Venous 4.0 (HH) 0.4 - 2.0 mmol/L    POCT Base Excess, Venous -24.8 (L) -2.0 - 3.0 mmol/L    POCT HCO3 Calculated, Venous 5.7 (L) 22.0 - 26.0 mmol/L    POCT Hemoglobin, Venous 18.5 (H) 13.5 - 17.5 g/dL    POCT Anion Gap, Venous 30.0 (H) 10.0 - 25.0 mmol/L    Patient Temperature 37.0 degrees Celsius    FiO2 21 %     ECG 12 lead    Result Date: 2/9/2025  Sinus tachycardia Prolonged QT interval See ED provider note for full interpretation and clinical correlation Confirmed by Destinee Bashir ([id number]) on 2/9/2025 1:12:52 PM    XR chest 1 view    Result Date: 1/15/2025  Interpreted By:  Santi De Paz, STUDY: XR CHEST 1 VIEW;  1/15/2025 2:00 am   INDICATION: Signs/Symptoms:cough.   COMPARISON: 11/7/2024   ACCESSION NUMBER(S): [id number]   ORDERING CLINICIAN: ROSITA REYNOLDS   FINDINGS: The cardiac silhouette is normal in size. No focal airspace consolidation or pleural effusion. No pneumothorax.       No airspace consolidation or pleural effusion.   MACRO: None   Signed by: Santi De Paz 1/15/2025 2:28 AM Dictation workstation:   [id number]      Assessment/Plan   Assessment & Plan  Diabetic ketoacidosis without  coma associated with type 1 diabetes mellitus (Multi)    34-year-old gentleman underlying history of type 1 diabetes who was sent to the emergency department from his PCPs office today with DKA.    Neuro   -He is alert and oriented    Respiratory   -VBG noted acute metabolic acidosis with respiratory compensation   -Viral panel is negative   -Check chest x-ray   -Nasal cannula as needed for saturation greater than 92%    Cardiac   -Sinus tachycardia due to profound acidemia volume depletion   -Blood pressure stable    Renal   -BMP pending   -Suspect underlying severe acidemia due to underlying DKA   -He has hyperkalemia this is secondary to combination of acidosis and hyperglycemia leading to cellular shift.  EKG unremarkable other than sinus tachycardia    -Suspect his hyperkalemia will improve once volume resuscitated and insulin drip initiated   -Continue every 4 BMP   -Check magnesium phosphorus    Endocrine   -Recurrent DKA    -Multiple hospitalization, no obvious source of infection, check chest x-ray urinalysis    -Patient is only taking NPH without short acting    -Continue with DKA protocol    -Ongoing aggressive volume resuscitation    -Consult endocrinology    CODE STATUS full      Alfredo Gill MD  I spent 45 minutes of cumulative critical care time with the patient.  Greater than 50% of that time was spent in the direct collaboration and or coordination of care of the patient.     Dragon dictation software was used to dictate this note and thus there may be minor errors in translation/transcription including garbled speech or misspellings. Please contact for clarification if needed.

## 2025-02-13 VITALS
SYSTOLIC BLOOD PRESSURE: 112 MMHG | DIASTOLIC BLOOD PRESSURE: 77 MMHG | BODY MASS INDEX: 22.76 KG/M2 | RESPIRATION RATE: 8 BRPM | WEIGHT: 159 LBS | HEIGHT: 70 IN | OXYGEN SATURATION: 98 % | HEART RATE: 104 BPM | TEMPERATURE: 97.5 F

## 2025-02-13 LAB
ANION GAP SERPL CALC-SCNC: 11 MMOL/L (ref 10–20)
ANION GAP SERPL CALC-SCNC: 15 MMOL/L (ref 10–20)
ANION GAP SERPL CALC-SCNC: 8 MMOL/L (ref 10–20)
ATRIAL RATE: 129 BPM
BUN SERPL-MCNC: 19 MG/DL (ref 6–23)
BUN SERPL-MCNC: 22 MG/DL (ref 6–23)
BUN SERPL-MCNC: 24 MG/DL (ref 6–23)
CALCIUM SERPL-MCNC: 8.1 MG/DL (ref 8.6–10.3)
CALCIUM SERPL-MCNC: 8.5 MG/DL (ref 8.6–10.3)
CALCIUM SERPL-MCNC: 8.6 MG/DL (ref 8.6–10.3)
CHLORIDE SERPL-SCNC: 108 MMOL/L (ref 98–107)
CHLORIDE SERPL-SCNC: 110 MMOL/L (ref 98–107)
CHLORIDE SERPL-SCNC: 114 MMOL/L (ref 98–107)
CO2 SERPL-SCNC: 16 MMOL/L (ref 21–32)
CO2 SERPL-SCNC: 20 MMOL/L (ref 21–32)
CO2 SERPL-SCNC: 21 MMOL/L (ref 21–32)
CREAT SERPL-MCNC: 0.97 MG/DL (ref 0.5–1.3)
CREAT SERPL-MCNC: 1.01 MG/DL (ref 0.5–1.3)
CREAT SERPL-MCNC: 1.13 MG/DL (ref 0.5–1.3)
EGFRCR SERPLBLD CKD-EPI 2021: 86 ML/MIN/1.73M*2
EGFRCR SERPLBLD CKD-EPI 2021: >90 ML/MIN/1.73M*2
EGFRCR SERPLBLD CKD-EPI 2021: >90 ML/MIN/1.73M*2
ERYTHROCYTE [DISTWIDTH] IN BLOOD BY AUTOMATED COUNT: 12.9 % (ref 11.5–14.5)
GLUCOSE BLD MANUAL STRIP-MCNC: 138 MG/DL (ref 74–99)
GLUCOSE BLD MANUAL STRIP-MCNC: 142 MG/DL (ref 74–99)
GLUCOSE BLD MANUAL STRIP-MCNC: 181 MG/DL (ref 74–99)
GLUCOSE BLD MANUAL STRIP-MCNC: 187 MG/DL (ref 74–99)
GLUCOSE BLD MANUAL STRIP-MCNC: 190 MG/DL (ref 74–99)
GLUCOSE BLD MANUAL STRIP-MCNC: 191 MG/DL (ref 74–99)
GLUCOSE BLD MANUAL STRIP-MCNC: 214 MG/DL (ref 74–99)
GLUCOSE BLD MANUAL STRIP-MCNC: 249 MG/DL (ref 74–99)
GLUCOSE BLD MANUAL STRIP-MCNC: 258 MG/DL (ref 74–99)
GLUCOSE SERPL-MCNC: 150 MG/DL (ref 74–99)
GLUCOSE SERPL-MCNC: 198 MG/DL (ref 74–99)
GLUCOSE SERPL-MCNC: 276 MG/DL (ref 74–99)
HCT VFR BLD AUTO: 44.7 % (ref 41–52)
HGB BLD-MCNC: 15.3 G/DL (ref 13.5–17.5)
LACTATE SERPL-SCNC: 1.7 MMOL/L (ref 0.4–2)
MAGNESIUM SERPL-MCNC: 1.59 MG/DL (ref 1.6–2.4)
MAGNESIUM SERPL-MCNC: 1.7 MG/DL (ref 1.6–2.4)
MAGNESIUM SERPL-MCNC: 1.74 MG/DL (ref 1.6–2.4)
MCH RBC QN AUTO: 31.9 PG (ref 26–34)
MCHC RBC AUTO-ENTMCNC: 34.2 G/DL (ref 32–36)
MCV RBC AUTO: 93 FL (ref 80–100)
NRBC BLD-RTO: 0 /100 WBCS (ref 0–0)
P AXIS: 76 DEGREES
PHOSPHATE SERPL-MCNC: 1.8 MG/DL (ref 2.5–4.9)
PHOSPHATE SERPL-MCNC: 2.4 MG/DL (ref 2.5–4.9)
PHOSPHATE SERPL-MCNC: 3.8 MG/DL (ref 2.5–4.9)
PLATELET # BLD AUTO: 204 X10*3/UL (ref 150–450)
POTASSIUM SERPL-SCNC: 3.7 MMOL/L (ref 3.5–5.3)
POTASSIUM SERPL-SCNC: 4 MMOL/L (ref 3.5–5.3)
POTASSIUM SERPL-SCNC: 4.3 MMOL/L (ref 3.5–5.3)
PR INTERVAL: 123 MS
Q ONSET: 249 MS
QRS COUNT: 21 BEATS
QRS DURATION: 100 MS
QT INTERVAL: 323 MS
QTC CALCULATION(BAZETT): 472 MS
QTC FREDERICIA: 416 MS
R AXIS: 65 DEGREES
RBC # BLD AUTO: 4.79 X10*6/UL (ref 4.5–5.9)
SODIUM SERPL-SCNC: 133 MMOL/L (ref 136–145)
SODIUM SERPL-SCNC: 137 MMOL/L (ref 136–145)
SODIUM SERPL-SCNC: 141 MMOL/L (ref 136–145)
T AXIS: 45 DEGREES
T OFFSET: 411 MS
VENTRICULAR RATE: 128 BPM
WBC # BLD AUTO: 14.2 X10*3/UL (ref 4.4–11.3)

## 2025-02-13 PROCEDURE — 36415 COLL VENOUS BLD VENIPUNCTURE: CPT

## 2025-02-13 PROCEDURE — 83735 ASSAY OF MAGNESIUM: CPT

## 2025-02-13 PROCEDURE — 80048 BASIC METABOLIC PNL TOTAL CA: CPT

## 2025-02-13 PROCEDURE — 99254 IP/OBS CNSLTJ NEW/EST MOD 60: CPT | Performed by: INTERNAL MEDICINE

## 2025-02-13 PROCEDURE — 2500000005 HC RX 250 GENERAL PHARMACY W/O HCPCS

## 2025-02-13 PROCEDURE — 84100 ASSAY OF PHOSPHORUS: CPT

## 2025-02-13 PROCEDURE — G0378 HOSPITAL OBSERVATION PER HR: HCPCS

## 2025-02-13 PROCEDURE — 82947 ASSAY GLUCOSE BLOOD QUANT: CPT

## 2025-02-13 PROCEDURE — 85027 COMPLETE CBC AUTOMATED: CPT | Performed by: NURSE PRACTITIONER

## 2025-02-13 PROCEDURE — 2500000004 HC RX 250 GENERAL PHARMACY W/ HCPCS (ALT 636 FOR OP/ED)

## 2025-02-13 PROCEDURE — 2500000004 HC RX 250 GENERAL PHARMACY W/ HCPCS (ALT 636 FOR OP/ED): Performed by: NURSE PRACTITIONER

## 2025-02-13 PROCEDURE — 99239 HOSP IP/OBS DSCHRG MGMT >30: CPT | Performed by: INTERNAL MEDICINE

## 2025-02-13 PROCEDURE — 96367 TX/PROPH/DG ADDL SEQ IV INF: CPT

## 2025-02-13 PROCEDURE — 96376 TX/PRO/DX INJ SAME DRUG ADON: CPT

## 2025-02-13 PROCEDURE — 96368 THER/DIAG CONCURRENT INF: CPT

## 2025-02-13 PROCEDURE — 2500000002 HC RX 250 W HCPCS SELF ADMINISTERED DRUGS (ALT 637 FOR MEDICARE OP, ALT 636 FOR OP/ED)

## 2025-02-13 PROCEDURE — 2500000004 HC RX 250 GENERAL PHARMACY W/ HCPCS (ALT 636 FOR OP/ED): Performed by: EMERGENCY MEDICINE

## 2025-02-13 RX ORDER — INSULIN LISPRO 100 [IU]/ML
0-5 INJECTION, SOLUTION INTRAVENOUS; SUBCUTANEOUS
Status: DISCONTINUED | OUTPATIENT
Start: 2025-02-13 | End: 2025-02-13 | Stop reason: HOSPADM

## 2025-02-13 RX ORDER — DEXTROSE 50 % IN WATER (D50W) INTRAVENOUS SYRINGE
25
Status: DISCONTINUED | OUTPATIENT
Start: 2025-02-13 | End: 2025-02-13 | Stop reason: HOSPADM

## 2025-02-13 RX ORDER — DEXTROSE 50 % IN WATER (D50W) INTRAVENOUS SYRINGE
12.5
Status: DISCONTINUED | OUTPATIENT
Start: 2025-02-13 | End: 2025-02-13 | Stop reason: HOSPADM

## 2025-02-13 RX ORDER — INSULIN LISPRO 100 [IU]/ML
5 INJECTION, SOLUTION INTRAVENOUS; SUBCUTANEOUS
Status: DISCONTINUED | OUTPATIENT
Start: 2025-02-13 | End: 2025-02-13 | Stop reason: HOSPADM

## 2025-02-13 RX ORDER — POTASSIUM CHLORIDE 14.9 MG/ML
20 INJECTION INTRAVENOUS ONCE
Status: COMPLETED | OUTPATIENT
Start: 2025-02-13 | End: 2025-02-13

## 2025-02-13 RX ORDER — ONDANSETRON 4 MG/1
4 TABLET, FILM COATED ORAL EVERY 8 HOURS PRN
Qty: 20 TABLET | Refills: 0 | Status: SHIPPED | OUTPATIENT
Start: 2025-02-13

## 2025-02-13 RX ADMIN — INSULIN HUMAN 22 UNITS: 100 INJECTION, SUSPENSION SUBCUTANEOUS at 05:55

## 2025-02-13 RX ADMIN — DEXTROSE MONOHYDRATE 150 ML/HR: 10 INJECTION, SOLUTION INTRAVENOUS at 00:04

## 2025-02-13 RX ADMIN — ONDANSETRON 4 MG: 2 INJECTION INTRAMUSCULAR; INTRAVENOUS at 04:26

## 2025-02-13 RX ADMIN — POTASSIUM CHLORIDE 20 MEQ: 200 INJECTION, SOLUTION INTRAVENOUS at 00:45

## 2025-02-13 RX ADMIN — POTASSIUM PHOSPHATE, MONOBASIC AND POTASSIUM PHOSPHATE, DIBASIC 21 MMOL: 224; 236 INJECTION, SOLUTION, CONCENTRATE INTRAVENOUS at 03:00

## 2025-02-13 ASSESSMENT — PAIN - FUNCTIONAL ASSESSMENT
PAIN_FUNCTIONAL_ASSESSMENT: 0-10

## 2025-02-13 ASSESSMENT — COGNITIVE AND FUNCTIONAL STATUS - GENERAL
MOBILITY SCORE: 24
DAILY ACTIVITIY SCORE: 24

## 2025-02-13 ASSESSMENT — ACTIVITIES OF DAILY LIVING (ADL): LACK_OF_TRANSPORTATION: NO

## 2025-02-13 ASSESSMENT — ENCOUNTER SYMPTOMS
NAUSEA: 0
VOMITING: 0
ABDOMINAL PAIN: 0

## 2025-02-13 ASSESSMENT — PAIN SCALES - GENERAL
PAINLEVEL_OUTOF10: 0 - NO PAIN

## 2025-02-13 NOTE — CONSULTS
Inpatient consult to Endocrinology  Consult performed by: Lori Packer MD  Consult ordered by: CHRISTIANO Sanders-CNP  Reason for consult: Recurrent DKA          Reason For Consult  Recurrent DKA     History Of Present Illness  Ed Sanon is a 34 y.o. male presenting with symptomatic hyperglycemia - weakness, polyuria and vomiting.   He was found to be tachycardic but hemodynamically stable.  Initial lab data revealed a glucose value of 558mg/dL, bicarbonate of 6 and an anion gap of 39.  He was given IVF and started on an insulin infusion with admittance to the ICU.    His home regimen consists of:  NPH 22 units subcutaneous twice daily    He states that he has been trying to use the prandial insulin.       His A1C last A1C was found to be 11.7%.      He admits to feeling better.  He ate a small sandwich at 6am and does not desire to eat again.    He is for discharge today.      Past Medical History  He has a past medical history of DKA (diabetic ketoacidosis) (Multi), HTN (hypertension), and Type I diabetes mellitus (Multi).    Surgical History  He has no past surgical history on file.     Social History  He reports that he has never smoked. He has never been exposed to tobacco smoke. He has never used smokeless tobacco. He reports that he does not drink alcohol and does not use drugs.    Family History  Family History   Problem Relation Name Age of Onset    No Known Problems Mother      No Known Problems Father      Hypertension Other      Coronary artery disease Other      Diabetes Other      Heart failure Other          Allergies  Patient has no known allergies.    Review of Systems   Gastrointestinal:  Negative for abdominal pain, nausea and vomiting.   All other systems reviewed and are negative.       Physical Exam  Vitals and nursing note reviewed.   Constitutional:       General: He is not in acute distress.     Appearance: Normal appearance. He is normal weight.   HENT:      Head:  "Normocephalic and atraumatic.      Nose: Nose normal.      Mouth/Throat:      Mouth: Mucous membranes are moist.   Eyes:      Extraocular Movements: Extraocular movements intact.   Cardiovascular:      Rate and Rhythm: Normal rate and regular rhythm.   Pulmonary:      Effort: Pulmonary effort is normal.   Musculoskeletal:         General: Normal range of motion.   Neurological:      Mental Status: He is alert and oriented to person, place, and time.   Psychiatric:         Mood and Affect: Mood normal.          Last Recorded Vitals  Blood pressure 108/76, pulse 106, temperature 36.4 °C (97.5 °F), temperature source Temporal, resp. rate 19, height 1.778 m (5' 10\"), weight 72.1 kg (159 lb), SpO2 96%.    Relevant Results  Scheduled medications  insulin lispro, 0-5 Units, subcutaneous, TID AC  insulin lispro, 5 Units, subcutaneous, TID AC  insulin NPH (Isophane), 22 Units, subcutaneous, q12h SHEILA      Continuous medications     PRN medications  PRN medications: acetaminophen, dextrose, dextrose, glucagon, glucagon, ondansetron    Results for orders placed or performed during the hospital encounter of 02/12/25 (from the past 96 hours)   POCT GLUCOSE   Result Value Ref Range    POCT Glucose 591 (H) 74 - 99 mg/dL   CBC and Auto Differential   Result Value Ref Range    WBC 21.1 (H) 4.4 - 11.3 x10*3/uL    nRBC 0.0 0.0 - 0.0 /100 WBCs    RBC 5.92 (H) 4.50 - 5.90 x10*6/uL    Hemoglobin 18.6 (H) 13.5 - 17.5 g/dL    Hematocrit 56.5 (H) 41.0 - 52.0 %    MCV 95 80 - 100 fL    MCH 31.4 26.0 - 34.0 pg    MCHC 32.9 32.0 - 36.0 g/dL    RDW 12.9 11.5 - 14.5 %    Platelets 230 150 - 450 x10*3/uL    Neutrophils % 89.0 40.0 - 80.0 %    Immature Granulocytes %, Automated 1.6 (H) 0.0 - 0.9 %    Lymphocytes % 4.2 13.0 - 44.0 %    Monocytes % 4.7 2.0 - 10.0 %    Eosinophils % 0.0 0.0 - 6.0 %    Basophils % 0.5 0.0 - 2.0 %    Neutrophils Absolute 18.79 (H) 1.20 - 7.70 x10*3/uL    Immature Granulocytes Absolute, Automated 0.34 0.00 - 0.70 " x10*3/uL    Lymphocytes Absolute 0.89 (L) 1.20 - 4.80 x10*3/uL    Monocytes Absolute 0.99 0.10 - 1.00 x10*3/uL    Eosinophils Absolute 0.00 0.00 - 0.70 x10*3/uL    Basophils Absolute 0.11 (H) 0.00 - 0.10 x10*3/uL   Lipase   Result Value Ref Range    Lipase 18 9 - 82 U/L   Blood Gas Venous Full Panel   Result Value Ref Range    POCT pH, Venous 6.98 (LL) 7.33 - 7.43 pH    POCT pCO2, Venous 24 (L) 41 - 51 mm Hg    POCT pO2, Venous 62 (H) 35 - 45 mm Hg    POCT SO2, Venous 86 (H) 45 - 75 %    POCT Oxy Hemoglobin, Venous 84.2 (H) 45.0 - 75.0 %    POCT Hematocrit Calculated, Venous 56.0 (H) 41.0 - 52.0 %    POCT Sodium, Venous 125 (L) 136 - 145 mmol/L    POCT Potassium, Venous 6.9 (HH) 3.5 - 5.3 mmol/L    POCT Chloride, Venous 96 (L) 98 - 107 mmol/L    POCT Ionized Calicum, Venous 1.24 1.10 - 1.33 mmol/L    POCT Glucose, Venous >685 (HH) 74 - 99 mg/dL    POCT Lactate, Venous 4.0 (HH) 0.4 - 2.0 mmol/L    POCT Base Excess, Venous -24.8 (L) -2.0 - 3.0 mmol/L    POCT HCO3 Calculated, Venous 5.7 (L) 22.0 - 26.0 mmol/L    POCT Hemoglobin, Venous 18.5 (H) 13.5 - 17.5 g/dL    POCT Anion Gap, Venous 30.0 (H) 10.0 - 25.0 mmol/L    Patient Temperature 37.0 degrees Celsius    FiO2 21 %   Morphology   Result Value Ref Range    RBC Morphology See Below     Giant Platelets Few    ECG 12 lead   Result Value Ref Range    Ventricular Rate 128 BPM    Atrial Rate 129 BPM    GA Interval 123 ms    QRS Duration 100 ms    QT Interval 323 ms    QTC Calculation(Bazett) 472 ms    P Axis 76 degrees    R Axis 65 degrees    T Axis 45 degrees    QRS Count 21 beats    Q Onset 249 ms    T Offset 411 ms    QTC Fredericia 416 ms   Sars-CoV-2 and Influenza A/B PCR   Result Value Ref Range    Flu A Result Not Detected Not Detected    Flu B Result Not Detected Not Detected    Coronavirus 2019, PCR Not Detected Not Detected   Basic metabolic panel   Result Value Ref Range    Glucose 558 (HH) 74 - 99 mg/dL    Sodium 133 (L) 136 - 145 mmol/L    Potassium 6.3 (HH)  3.5 - 5.3 mmol/L    Chloride 94 (L) 98 - 107 mmol/L    Bicarbonate 6 (LL) 21 - 32 mmol/L    Anion Gap 39 (H) 10 - 20 mmol/L    Urea Nitrogen 31 (H) 6 - 23 mg/dL    Creatinine 1.48 (H) 0.50 - 1.30 mg/dL    eGFR 62 >60 mL/min/1.73m*2    Calcium 9.5 8.6 - 10.3 mg/dL   Magnesium   Result Value Ref Range    Magnesium 2.15 1.60 - 2.40 mg/dL   Phosphorus   Result Value Ref Range    Phosphorus 6.1 (H) 2.5 - 4.9 mg/dL   Comprehensive metabolic panel   Result Value Ref Range    Glucose 558 (HH) 74 - 99 mg/dL    Sodium 133 (L) 136 - 145 mmol/L    Potassium 6.3 (HH) 3.5 - 5.3 mmol/L    Chloride 94 (L) 98 - 107 mmol/L    Bicarbonate 6 (LL) 21 - 32 mmol/L    Anion Gap 39 (H) 10 - 20 mmol/L    Urea Nitrogen 31 (H) 6 - 23 mg/dL    Creatinine 1.48 (H) 0.50 - 1.30 mg/dL    eGFR 62 >60 mL/min/1.73m*2    Calcium 9.5 8.6 - 10.3 mg/dL    Albumin 4.8 3.4 - 5.0 g/dL    Alkaline Phosphatase 119 33 - 120 U/L    Total Protein 7.8 6.4 - 8.2 g/dL    AST 18 9 - 39 U/L    Bilirubin, Total 0.4 0.0 - 1.2 mg/dL    ALT 20 10 - 52 U/L   POCT GLUCOSE   Result Value Ref Range    POCT Glucose 563 (H) 74 - 99 mg/dL   POCT GLUCOSE   Result Value Ref Range    POCT Glucose 423 (H) 74 - 99 mg/dL   POCT GLUCOSE   Result Value Ref Range    POCT Glucose 440 (H) 74 - 99 mg/dL   POCT GLUCOSE   Result Value Ref Range    POCT Glucose 275 (H) 74 - 99 mg/dL   Basic metabolic panel   Result Value Ref Range    Glucose 282 (H) 74 - 99 mg/dL    Sodium 141 136 - 145 mmol/L    Potassium 4.3 3.5 - 5.3 mmol/L    Chloride 113 (H) 98 - 107 mmol/L    Bicarbonate 7 (LL) 21 - 32 mmol/L    Anion Gap 25 (H) 10 - 20 mmol/L    Urea Nitrogen 26 (H) 6 - 23 mg/dL    Creatinine 1.13 0.50 - 1.30 mg/dL    eGFR 86 >60 mL/min/1.73m*2    Calcium 8.1 (L) 8.6 - 10.3 mg/dL   Magnesium   Result Value Ref Range    Magnesium 1.89 1.60 - 2.40 mg/dL   Phosphorus   Result Value Ref Range    Phosphorus 3.6 2.5 - 4.9 mg/dL   Blood Gas Venous Full Panel   Result Value Ref Range    POCT pH, Venous 7.07  (LL) 7.33 - 7.43 pH    POCT pCO2, Venous 28 (L) 41 - 51 mm Hg    POCT pO2, Venous 40 35 - 45 mm Hg    POCT SO2, Venous 62 45 - 75 %    POCT Oxy Hemoglobin, Venous 61.2 45.0 - 75.0 %    POCT Hematocrit Calculated, Venous 55.0 (H) 41.0 - 52.0 %    POCT Sodium, Venous 135 (L) 136 - 145 mmol/L    POCT Potassium, Venous 4.0 3.5 - 5.3 mmol/L    POCT Chloride, Venous 107 98 - 107 mmol/L    POCT Ionized Calicum, Venous 1.35 (H) 1.10 - 1.33 mmol/L    POCT Glucose, Venous 301 (H) 74 - 99 mg/dL    POCT Lactate, Venous 2.8 (H) 0.4 - 2.0 mmol/L    POCT Base Excess, Venous -20.7 (L) -2.0 - 3.0 mmol/L    POCT HCO3 Calculated, Venous 8.1 (L) 22.0 - 26.0 mmol/L    POCT Hemoglobin, Venous 18.2 (H) 13.5 - 17.5 g/dL    POCT Anion Gap, Venous 24.0 10.0 - 25.0 mmol/L    Patient Temperature 37.0 degrees Celsius    FiO2 21 %    Apparatus     POCT GLUCOSE   Result Value Ref Range    POCT Glucose 265 (H) 74 - 99 mg/dL   POCT GLUCOSE   Result Value Ref Range    POCT Glucose 182 (H) 74 - 99 mg/dL   POCT GLUCOSE   Result Value Ref Range    POCT Glucose 169 (H) 74 - 99 mg/dL   Lactate   Result Value Ref Range    Lactate 1.7 0.4 - 2.0 mmol/L   Basic metabolic panel   Result Value Ref Range    Glucose 150 (H) 74 - 99 mg/dL    Sodium 141 136 - 145 mmol/L    Potassium 3.7 3.5 - 5.3 mmol/L    Chloride 114 (H) 98 - 107 mmol/L    Bicarbonate 16 (L) 21 - 32 mmol/L    Anion Gap 15 10 - 20 mmol/L    Urea Nitrogen 24 (H) 6 - 23 mg/dL    Creatinine 1.13 0.50 - 1.30 mg/dL    eGFR 86 >60 mL/min/1.73m*2    Calcium 8.6 8.6 - 10.3 mg/dL   Magnesium   Result Value Ref Range    Magnesium 1.74 1.60 - 2.40 mg/dL   Phosphorus   Result Value Ref Range    Phosphorus 1.8 (L) 2.5 - 4.9 mg/dL   POCT GLUCOSE   Result Value Ref Range    POCT Glucose 138 (H) 74 - 99 mg/dL   POCT GLUCOSE   Result Value Ref Range    POCT Glucose 142 (H) 74 - 99 mg/dL   POCT GLUCOSE   Result Value Ref Range    POCT Glucose 187 (H) 74 - 99 mg/dL   POCT GLUCOSE   Result Value Ref Range    POCT  Glucose 191 (H) 74 - 99 mg/dL   CBC   Result Value Ref Range    WBC 14.2 (H) 4.4 - 11.3 x10*3/uL    nRBC 0.0 0.0 - 0.0 /100 WBCs    RBC 4.79 4.50 - 5.90 x10*6/uL    Hemoglobin 15.3 13.5 - 17.5 g/dL    Hematocrit 44.7 41.0 - 52.0 %    MCV 93 80 - 100 fL    MCH 31.9 26.0 - 34.0 pg    MCHC 34.2 32.0 - 36.0 g/dL    RDW 12.9 11.5 - 14.5 %    Platelets 204 150 - 450 x10*3/uL   Basic metabolic panel   Result Value Ref Range    Glucose 198 (H) 74 - 99 mg/dL    Sodium 137 136 - 145 mmol/L    Potassium 4.3 3.5 - 5.3 mmol/L    Chloride 110 (H) 98 - 107 mmol/L    Bicarbonate 20 (L) 21 - 32 mmol/L    Anion Gap 11 10 - 20 mmol/L    Urea Nitrogen 22 6 - 23 mg/dL    Creatinine 1.01 0.50 - 1.30 mg/dL    eGFR >90 >60 mL/min/1.73m*2    Calcium 8.5 (L) 8.6 - 10.3 mg/dL   Magnesium   Result Value Ref Range    Magnesium 1.70 1.60 - 2.40 mg/dL   Phosphorus   Result Value Ref Range    Phosphorus 2.4 (L) 2.5 - 4.9 mg/dL   POCT GLUCOSE   Result Value Ref Range    POCT Glucose 181 (H) 74 - 99 mg/dL   POCT GLUCOSE   Result Value Ref Range    POCT Glucose 190 (H) 74 - 99 mg/dL   POCT GLUCOSE   Result Value Ref Range    POCT Glucose 214 (H) 74 - 99 mg/dL   POCT GLUCOSE   Result Value Ref Range    POCT Glucose 249 (H) 74 - 99 mg/dL   Basic metabolic panel   Result Value Ref Range    Glucose 276 (H) 74 - 99 mg/dL    Sodium 133 (L) 136 - 145 mmol/L    Potassium 4.0 3.5 - 5.3 mmol/L    Chloride 108 (H) 98 - 107 mmol/L    Bicarbonate 21 21 - 32 mmol/L    Anion Gap 8 (L) 10 - 20 mmol/L    Urea Nitrogen 19 6 - 23 mg/dL    Creatinine 0.97 0.50 - 1.30 mg/dL    eGFR >90 >60 mL/min/1.73m*2    Calcium 8.1 (L) 8.6 - 10.3 mg/dL   Magnesium   Result Value Ref Range    Magnesium 1.59 (L) 1.60 - 2.40 mg/dL   Phosphorus   Result Value Ref Range    Phosphorus 3.8 2.5 - 4.9 mg/dL   POCT GLUCOSE   Result Value Ref Range    POCT Glucose 258 (H) 74 - 99 mg/dL     *Note: Due to a large number of results and/or encounters for the requested time period, some results have  not been displayed. A complete set of results can be found in Results Review.      ECG 12 lead    Result Date: 2/13/2025  Sinus tachycardia Probable left atrial enlargement RSR' in V1 or V2, probably normal variant ST elev, probable normal early repol pattern    ECG 12 lead    Result Date: 2/9/2025  Sinus tachycardia Prolonged QT interval See ED provider note for full interpretation and clinical correlation Confirmed by Destinee Bashir ([id number]) on 2/9/2025 1:12:52 PM    XR chest 1 view    Result Date: 1/15/2025  Interpreted By:  Santi De Paz, STUDY: XR CHEST 1 VIEW;  1/15/2025 2:00 am   INDICATION: Signs/Symptoms:cough.   COMPARISON: 11/7/2024   ACCESSION NUMBER(S): [id number]   ORDERING CLINICIAN: ROSITA REYNOLDS   FINDINGS: The cardiac silhouette is normal in size. No focal airspace consolidation or pleural effusion. No pneumothorax.       No airspace consolidation or pleural effusion.   MACRO: None   Signed by: Santi De Paz 1/15/2025 2:28 AM Dictation workstation:   [id number]       Assessment/Plan     IMPRESSION:  DKA - resolved   POORLY CONTROLLED TYPE I DIABETES MELLITUS  Long term insulin use with basal insulin only      RECOMMENDATIONS:  To continue NPH 22 units subcutaneous twice daily   To continue Humalog 5 units TID AC  To commence insulin correction scale TID AC in 1 unit increments   Diabetic diet as tolerated   Accu-Cheks ACHS  Hypoglycemic protocol   To continue the use of your CGM for the intensive glucose monitoring due to the dynamic nature of insulin requirements, the narrow therapeutic index of insulin and the potentially fatal consequences of treatment  For endocrinology follow up at [facility]   Will continue to follow     Thank you for the courtesy of this consult     Lori Packer MD

## 2025-02-13 NOTE — PROGRESS NOTES
02/13/25 1025   Discharge Planning   Living Arrangements Parent   Support Systems Parent   Assistance Needed none   Type of Residence Private residence   Number of Stairs to Enter Residence 5   Number of Stairs Within Residence 0   Do you have animals or pets at home? Yes   Type of Animals or Pets dog and cat   Who is requesting discharge planning? Provider   Home or Post Acute Services None   Expected Discharge Disposition Home   Does the patient need discharge transport arranged? No   Financial Resource Strain   How hard is it for you to pay for the very basics like food, housing, medical care, and heating? Not hard   Housing Stability   In the last 12 months, was there a time when you were not able to pay the mortgage or rent on time? N   In the past 12 months, how many times have you moved where you were living? 0   At any time in the past 12 months, were you homeless or living in a shelter (including now)? N   Transportation Needs   In the past 12 months, has lack of transportation kept you from medical appointments or from getting medications? no   In the past 12 months, has lack of transportation kept you from meetings, work, or from getting things needed for daily living? No   Stroke Family Assessment   Stroke Family Assessment Needed No   Intensity of Service   Intensity of Service 0-30 min     Spoke with pt explained TCC role in Care Transitions and verified address, phone number and emergency contact information. PCP is  Isak and preferred pharmacy is [facility], denies issues obtaining or affording medications and takes as ordered.  Pt is diabetic and has a CGM and he provides himself injections. He uses no DME and does not use oxygen or any agencies. Pt is independent, lives at home alone and feels safe. He has a ride home and denies needs at this time. [facility] [place] 24/24. ADOD is today. Care Transitions to follow. Apolonia Walker BSN/RN-TCC

## 2025-02-13 NOTE — CARE PLAN
Problem: Pain - Adult  Goal: Verbalizes/displays adequate comfort level or baseline comfort level  Outcome: Progressing     Problem: Safety - Adult  Goal: Free from fall injury  Outcome: Progressing     Problem: Discharge Planning  Goal: Discharge to home or other facility with appropriate resources  Outcome: Progressing     Problem: Chronic Conditions and Co-morbidities  Goal: Patient's chronic conditions and co-morbidity symptoms are monitored and maintained or improved  Outcome: Progressing     Problem: Nutrition  Goal: Nutrient intake appropriate for maintaining nutritional needs  Outcome: Progressing   The patient's goals for the shift include      The clinical goals for the shift include Patient will remain hemodynamically stable this shift

## 2025-02-13 NOTE — DISCHARGE SUMMARY
"Discharge Diagnosis  Diabetic ketoacidosis without coma associated with type 1 diabetes mellitus (Multi)    Issues Requiring Follow-Up  Follow-up with primary care provider and endocrinology as outpatient    Discharge Meds     Medication List      START taking these medications     ondansetron 4 mg tablet; Commonly known as: Zofran; Take 1 tablet (4 mg)   by mouth every 8 hours if needed for nausea or vomiting.     CONTINUE taking these medications     Guardian 4 Glucose Sensor device; Generic drug: blood-glucose sensor;   Change insulin sensor every 7 days as directed, linked to Minimed insulin   pump, check with endocrinology for updated regimen   Gvoke HypoPen 1-Pack 1 mg/0.2 mL auto-injector; Generic drug: glucagon;   Inject 1 mg under the skin 1 time for 1 dose.   insulin NPH (Isophane) 100 unit/mL (3 mL) injection; Commonly known as:   HumuLIN N,NovoLIN N; Inject 22 Units under the skin 2 times a day before   meals. Take as directed per insulin instructions.   * insulin regular 100 unit/mL injection; Commonly known as: HumuLIN   R,NovoLIN R; Inject 0-10 Units under the skin 3 times a day before meals.    0 unit(s) if Blood glucose is between  2 unit(s) if Blood glucose is   between 151-200 4 unit(s) if Blood glucose is between 201-250 6 unit(s) if   Blood glucose is between 251-300 8 unit(s) if Blood glucose is between   301-350 10 unit(s) if Blood glucose is between 351-400   * HumuLIN R Regular U-100 Insuln 100 unit/mL injection; Generic drug:   insulin regular; Inject 5 Units under the skin 3 times a day before meals.   Take as directed per insulin instructions. Discard vial 31 days after 1st   opening   lancets misc   OneTouch Ultra Test strip; Generic drug: blood sugar diagnostic   OneTouch Ultra2 Meter misc; Generic drug: blood-glucose meter   pen needle, diabetic 32 gauge x 5/32\" needle  * This list has 2 medication(s) that are the same as other medications   prescribed for you. Read the " directions carefully, and ask your doctor or   other care provider to review them with you.       Test Results Pending At Discharge  Pending Labs       No current pending labs.            Hospital Course  34-year-old male with past medical history of insulin-dependent diabetes mellitus, frequent hospitalization secondary to DKA presented with symptomatic hyperglycemia and not feeling well.  Patient had polyuria along with weakness and vomiting.  Patient was only taking his long-acting insulin at home but was not compliant with his short acting insulin.  We went over frequent hospitalization and talked about what are the challenges that he needs to overcome and states that he needs to be better compliant with his short acting insulin.  Patient resolved his DKA and tolerating diet well.  We talked about all his hospitalization and how DKA could lead to permanent complication including death and he understands and will be better compliant with his medication.  Patient is in stable condition and does not need any refills.    44 minutes spent in discharge timing        Pertinent Physical Exam At Time of Discharge  General: Not in acute distress, alert  HEENT: PERRLA, head intact and normocephalic  Neck: Normal to inspection  Lungs: Clear to auscultation, work of breathing within normal limit  Cardiac: Regular rate and rhythm  Abdomen: Soft nontender, positive bowel sounds  : Exam deferred  Skin: Intact  Hematology: No petechia or excessive ecchymosis  Musculoskeletal: Without significant trauma  Neurological: Alert awake oriented, no focal deficit, cranial nerves grossly intact  Psych: No suicidal ideation or homicidal ideation    Outpatient Follow-Up  Future Appointments   Date Time Provider Department Center   2/18/2025 11:00 AM  [id number] PHARMACIST [id number] [facility]   3/26/2025 11:00 AM Henri Brumfield MD [id number] [facility]         Joaquin Montemayor MD

## 2025-02-13 NOTE — HOSPITAL COURSE
34-year-old male with past medical history of insulin-dependent diabetes mellitus, frequent hospitalization secondary to DKA presented with symptomatic hyperglycemia and not feeling well.  Patient had polyuria along with weakness and vomiting.  Patient was only taking his long-acting insulin at home but was not compliant with his short acting insulin.  We went over frequent hospitalization and talked about what are the challenges that he needs to overcome and states that he needs to be better compliant with his short acting insulin.  Patient resolved his DKA and tolerating diet well.  We talked about all his hospitalization and how DKA could lead to permanent complication including death and he understands and will be better compliant with his medication.  Patient is in stable condition and does not need any refills.    44 minutes spent in discharge timing

## 2025-02-13 NOTE — CARE PLAN
Problem: Pain - Adult  Goal: Verbalizes/displays adequate comfort level or baseline comfort level  Outcome: Progressing     Problem: Safety - Adult  Goal: Free from fall injury  Outcome: Progressing     Problem: Discharge Planning  Goal: Discharge to home or other facility with appropriate resources  Outcome: Progressing     Problem: Chronic Conditions and Co-morbidities  Goal: Patient's chronic conditions and co-morbidity symptoms are monitored and maintained or improved  Outcome: Progressing     Problem: Nutrition  Goal: Nutrient intake appropriate for maintaining nutritional needs  Outcome: Progressing     Problem: Diabetes  Goal: Achieve decreasing blood glucose levels by end of shift  Outcome: Progressing  Goal: Increase stability of blood glucose readings by end of shift  Outcome: Progressing  Goal: Maintain glucose levels >70mg/dl to <250mg/dl throughout shift  Outcome: Progressing  Goal: Vital signs within normal range for age by end of shift  Outcome: Progressing   The patient's goals for the shift include      The clinical goals for the shift include pt will tolerate PO fluids by end of shift

## 2025-02-14 ENCOUNTER — PATIENT OUTREACH (OUTPATIENT)
Dept: PRIMARY CARE | Facility: CLINIC | Age: 35
End: 2025-02-14
Payer: MEDICAID

## 2025-02-14 NOTE — PROGRESS NOTES
Discharge Facility: [facility]  Discharge Diagnosis: Diabetic ketoacidosis without coma associated with type 1 diabetes mellitus   Admission Date: 02/12/2025  Discharge Date: 02/13/2025    PCP Appointment Date: Tasked to office, no contact made  Specialist Appointment Date: Endo 02/17/2025  Hospital Encounter and Summary Linked: Yes  ED to Hosp-Admission (Discharged) with Joaquin Montemayor MD; Bridger Vaz DO (02/12/2025)     Two attempts were made to reach patient within two business days after discharge. Voicemail left with contact information for patient to call back with any non-emergent questions or concerns.

## 2025-02-18 ENCOUNTER — APPOINTMENT (OUTPATIENT)
Dept: PRIMARY CARE | Facility: CLINIC | Age: 35
End: 2025-02-18
Payer: MEDICAID

## 2025-02-27 LAB
ATRIAL RATE: 129 BPM
P AXIS: 76 DEGREES
PR INTERVAL: 123 MS
Q ONSET: 249 MS
QRS COUNT: 21 BEATS
QRS DURATION: 100 MS
QT INTERVAL: 323 MS
QTC CALCULATION(BAZETT): 472 MS
QTC FREDERICIA: 416 MS
R AXIS: 65 DEGREES
T AXIS: 45 DEGREES
T OFFSET: 411 MS
VENTRICULAR RATE: 128 BPM

## 2025-02-28 ENCOUNTER — PATIENT OUTREACH (OUTPATIENT)
Dept: PRIMARY CARE | Facility: CLINIC | Age: 35
End: 2025-02-28
Payer: MEDICAID

## 2025-03-11 ENCOUNTER — APPOINTMENT (OUTPATIENT)
Dept: CARDIOLOGY | Facility: HOSPITAL | Age: 35
End: 2025-03-11
Payer: MEDICAID

## 2025-03-11 ENCOUNTER — APPOINTMENT (OUTPATIENT)
Dept: RADIOLOGY | Facility: HOSPITAL | Age: 35
End: 2025-03-11
Payer: MEDICAID

## 2025-03-11 ENCOUNTER — HOSPITAL ENCOUNTER (INPATIENT)
Facility: HOSPITAL | Age: 35
LOS: 1 days | Discharge: HOME | End: 2025-03-12
Attending: STUDENT IN AN ORGANIZED HEALTH CARE EDUCATION/TRAINING PROGRAM | Admitting: INTERNAL MEDICINE
Payer: MEDICAID

## 2025-03-11 DIAGNOSIS — E10.10 TYPE 1 DIABETES MELLITUS WITH KETOACIDOSIS WITHOUT COMA: ICD-10-CM

## 2025-03-11 DIAGNOSIS — E10.10 DIABETIC KETOACIDOSIS WITHOUT COMA ASSOCIATED WITH TYPE 1 DIABETES MELLITUS (MULTI): Primary | ICD-10-CM

## 2025-03-11 DIAGNOSIS — E11.10 DIABETIC KETOACIDOSIS WITHOUT COMA ASSOCIATED WITH TYPE 2 DIABETES MELLITUS (MULTI): ICD-10-CM

## 2025-03-11 LAB
ALBUMIN SERPL BCP-MCNC: 4 G/DL (ref 3.4–5)
ALP SERPL-CCNC: 77 U/L (ref 33–120)
ALT SERPL W P-5'-P-CCNC: 14 U/L (ref 10–52)
ANION GAP BLDV CALCULATED.4IONS-SCNC: 22 MMOL/L (ref 10–25)
ANION GAP SERPL CALC-SCNC: 24 MMOL/L (ref 10–20)
ANION GAP SERPL CALC-SCNC: 25 MMOL/L (ref 10–20)
AST SERPL W P-5'-P-CCNC: 9 U/L (ref 9–39)
BASE EXCESS BLDV CALC-SCNC: -17.3 MMOL/L (ref -2–3)
BASOPHILS # BLD AUTO: 0.05 X10*3/UL (ref 0–0.1)
BASOPHILS NFR BLD AUTO: 0.8 %
BILIRUB SERPL-MCNC: 0.5 MG/DL (ref 0–1.2)
BODY TEMPERATURE: 37 DEGREES CELSIUS
BUN SERPL-MCNC: 12 MG/DL (ref 6–23)
BUN SERPL-MCNC: 14 MG/DL (ref 6–23)
CA-I BLDV-SCNC: 1.22 MMOL/L (ref 1.1–1.33)
CALCIUM SERPL-MCNC: 7.8 MG/DL (ref 8.6–10.3)
CALCIUM SERPL-MCNC: 8.1 MG/DL (ref 8.6–10.3)
CARDIAC TROPONIN I PNL SERPL HS: 3 NG/L (ref 0–20)
CARDIAC TROPONIN I PNL SERPL HS: <3 NG/L (ref 0–20)
CHLORIDE BLDV-SCNC: 103 MMOL/L (ref 98–107)
CHLORIDE SERPL-SCNC: 105 MMOL/L (ref 98–107)
CHLORIDE SERPL-SCNC: 107 MMOL/L (ref 98–107)
CO2 SERPL-SCNC: 10 MMOL/L (ref 21–32)
CO2 SERPL-SCNC: 9 MMOL/L (ref 21–32)
CREAT SERPL-MCNC: 0.91 MG/DL (ref 0.5–1.3)
CREAT SERPL-MCNC: 0.96 MG/DL (ref 0.5–1.3)
EGFRCR SERPLBLD CKD-EPI 2021: >90 ML/MIN/1.73M*2
EGFRCR SERPLBLD CKD-EPI 2021: >90 ML/MIN/1.73M*2
EOSINOPHIL # BLD AUTO: 0.01 X10*3/UL (ref 0–0.7)
EOSINOPHIL NFR BLD AUTO: 0.2 %
ERYTHROCYTE [DISTWIDTH] IN BLOOD BY AUTOMATED COUNT: 13.2 % (ref 11.5–14.5)
GLUCOSE BLD MANUAL STRIP-MCNC: 234 MG/DL (ref 74–99)
GLUCOSE BLD MANUAL STRIP-MCNC: 235 MG/DL (ref 74–99)
GLUCOSE BLD MANUAL STRIP-MCNC: 242 MG/DL (ref 74–99)
GLUCOSE BLD MANUAL STRIP-MCNC: 282 MG/DL (ref 74–99)
GLUCOSE BLDV-MCNC: 323 MG/DL (ref 74–99)
GLUCOSE SERPL-MCNC: 244 MG/DL (ref 74–99)
GLUCOSE SERPL-MCNC: 273 MG/DL (ref 74–99)
HCO3 BLDV-SCNC: 11.1 MMOL/L (ref 22–26)
HCT VFR BLD AUTO: 57.3 % (ref 41–52)
HCT VFR BLD EST: 52 % (ref 41–52)
HGB BLD-MCNC: 19.2 G/DL (ref 13.5–17.5)
HGB BLDV-MCNC: 17.3 G/DL (ref 13.5–17.5)
IMM GRANULOCYTES # BLD AUTO: 0.04 X10*3/UL (ref 0–0.7)
IMM GRANULOCYTES NFR BLD AUTO: 0.6 % (ref 0–0.9)
INHALED O2 CONCENTRATION: 21 %
LACTATE BLDV-SCNC: 1.4 MMOL/L (ref 0.4–2)
LACTATE BLDV-SCNC: 2.2 MMOL/L (ref 0.4–2)
LYMPHOCYTES # BLD AUTO: 1.43 X10*3/UL (ref 1.2–4.8)
LYMPHOCYTES NFR BLD AUTO: 21.7 %
MAGNESIUM SERPL-MCNC: 1.66 MG/DL (ref 1.6–2.4)
MAGNESIUM SERPL-MCNC: 1.7 MG/DL (ref 1.6–2.4)
MCH RBC QN AUTO: 31.3 PG (ref 26–34)
MCHC RBC AUTO-ENTMCNC: 33.5 G/DL (ref 32–36)
MCV RBC AUTO: 94 FL (ref 80–100)
MONOCYTES # BLD AUTO: 0.48 X10*3/UL (ref 0.1–1)
MONOCYTES NFR BLD AUTO: 7.3 %
NEUTROPHILS # BLD AUTO: 4.58 X10*3/UL (ref 1.2–7.7)
NEUTROPHILS NFR BLD AUTO: 69.4 %
NRBC BLD-RTO: 0 /100 WBCS (ref 0–0)
OXYHGB MFR BLDV: 69 % (ref 45–75)
PCO2 BLDV: 34 MM HG (ref 41–51)
PH BLDV: 7.12 PH (ref 7.33–7.43)
PHOSPHATE SERPL-MCNC: 3.1 MG/DL (ref 2.5–4.9)
PHOSPHATE SERPL-MCNC: 3.5 MG/DL (ref 2.5–4.9)
PLATELET # BLD AUTO: 168 X10*3/UL (ref 150–450)
PO2 BLDV: 45 MM HG (ref 35–45)
POTASSIUM BLDV-SCNC: 5.4 MMOL/L (ref 3.5–5.3)
POTASSIUM SERPL-SCNC: 4.2 MMOL/L (ref 3.5–5.3)
POTASSIUM SERPL-SCNC: 5.3 MMOL/L (ref 3.5–5.3)
PROT SERPL-MCNC: 6.5 G/DL (ref 6.4–8.2)
RBC # BLD AUTO: 6.13 X10*6/UL (ref 4.5–5.9)
RBC MORPH BLD: NORMAL
SAO2 % BLDV: 71 % (ref 45–75)
SODIUM BLDV-SCNC: 131 MMOL/L (ref 136–145)
SODIUM SERPL-SCNC: 135 MMOL/L (ref 136–145)
SODIUM SERPL-SCNC: 136 MMOL/L (ref 136–145)
WBC # BLD AUTO: 6.6 X10*3/UL (ref 4.4–11.3)

## 2025-03-11 PROCEDURE — 83735 ASSAY OF MAGNESIUM: CPT

## 2025-03-11 PROCEDURE — 84075 ASSAY ALKALINE PHOSPHATASE: CPT | Performed by: NURSE PRACTITIONER

## 2025-03-11 PROCEDURE — 96374 THER/PROPH/DIAG INJ IV PUSH: CPT

## 2025-03-11 PROCEDURE — 2020000001 HC ICU ROOM DAILY

## 2025-03-11 PROCEDURE — 85025 COMPLETE CBC W/AUTO DIFF WBC: CPT | Performed by: NURSE PRACTITIONER

## 2025-03-11 PROCEDURE — 36415 COLL VENOUS BLD VENIPUNCTURE: CPT | Performed by: NURSE PRACTITIONER

## 2025-03-11 PROCEDURE — 84100 ASSAY OF PHOSPHORUS: CPT | Performed by: NURSE PRACTITIONER

## 2025-03-11 PROCEDURE — 2500000001 HC RX 250 WO HCPCS SELF ADMINISTERED DRUGS (ALT 637 FOR MEDICARE OP): Performed by: REGISTERED NURSE

## 2025-03-11 PROCEDURE — 82947 ASSAY GLUCOSE BLOOD QUANT: CPT

## 2025-03-11 PROCEDURE — 84132 ASSAY OF SERUM POTASSIUM: CPT

## 2025-03-11 PROCEDURE — 2500000004 HC RX 250 GENERAL PHARMACY W/ HCPCS (ALT 636 FOR OP/ED)

## 2025-03-11 PROCEDURE — 2500000004 HC RX 250 GENERAL PHARMACY W/ HCPCS (ALT 636 FOR OP/ED): Performed by: NURSE PRACTITIONER

## 2025-03-11 PROCEDURE — 84100 ASSAY OF PHOSPHORUS: CPT

## 2025-03-11 PROCEDURE — 84484 ASSAY OF TROPONIN QUANT: CPT | Performed by: NURSE PRACTITIONER

## 2025-03-11 PROCEDURE — 84132 ASSAY OF SERUM POTASSIUM: CPT | Performed by: NURSE PRACTITIONER

## 2025-03-11 PROCEDURE — 96361 HYDRATE IV INFUSION ADD-ON: CPT

## 2025-03-11 PROCEDURE — 71046 X-RAY EXAM CHEST 2 VIEWS: CPT

## 2025-03-11 PROCEDURE — 93005 ELECTROCARDIOGRAM TRACING: CPT

## 2025-03-11 PROCEDURE — 83605 ASSAY OF LACTIC ACID: CPT | Performed by: NURSE PRACTITIONER

## 2025-03-11 PROCEDURE — 99223 1ST HOSP IP/OBS HIGH 75: CPT

## 2025-03-11 PROCEDURE — 71046 X-RAY EXAM CHEST 2 VIEWS: CPT | Performed by: RADIOLOGY

## 2025-03-11 PROCEDURE — 83735 ASSAY OF MAGNESIUM: CPT | Performed by: NURSE PRACTITIONER

## 2025-03-11 PROCEDURE — 99291 CRITICAL CARE FIRST HOUR: CPT | Performed by: NURSE PRACTITIONER

## 2025-03-11 RX ORDER — ACETAMINOPHEN 325 MG/1
650 TABLET ORAL EVERY 6 HOURS PRN
Status: DISCONTINUED | OUTPATIENT
Start: 2025-03-11 | End: 2025-03-12 | Stop reason: HOSPADM

## 2025-03-11 RX ORDER — DEXTROSE 50 % IN WATER (D50W) INTRAVENOUS SYRINGE
50
Status: DISCONTINUED | OUTPATIENT
Start: 2025-03-11 | End: 2025-03-12

## 2025-03-11 RX ORDER — SODIUM CHLORIDE 9 MG/ML
250 INJECTION, SOLUTION INTRAVENOUS CONTINUOUS
Status: DISCONTINUED | OUTPATIENT
Start: 2025-03-11 | End: 2025-03-11

## 2025-03-11 RX ORDER — DEXTROSE MONOHYDRATE 100 MG/ML
150 INJECTION, SOLUTION INTRAVENOUS CONTINUOUS PRN
Status: DISCONTINUED | OUTPATIENT
Start: 2025-03-11 | End: 2025-03-12

## 2025-03-11 RX ORDER — SODIUM CHLORIDE 450 MG/100ML
250 INJECTION, SOLUTION INTRAVENOUS CONTINUOUS
Status: DISCONTINUED | OUTPATIENT
Start: 2025-03-11 | End: 2025-03-12

## 2025-03-11 RX ORDER — ONDANSETRON HYDROCHLORIDE 2 MG/ML
4 INJECTION, SOLUTION INTRAVENOUS EVERY 6 HOURS PRN
Status: DISCONTINUED | OUTPATIENT
Start: 2025-03-11 | End: 2025-03-11

## 2025-03-11 RX ORDER — DEXTROSE MONOHYDRATE AND SODIUM CHLORIDE 5; .45 G/100ML; G/100ML
150 INJECTION, SOLUTION INTRAVENOUS CONTINUOUS PRN
Status: DISCONTINUED | OUTPATIENT
Start: 2025-03-11 | End: 2025-03-12

## 2025-03-11 RX ORDER — ONDANSETRON HYDROCHLORIDE 2 MG/ML
4 INJECTION, SOLUTION INTRAVENOUS ONCE
Status: COMPLETED | OUTPATIENT
Start: 2025-03-11 | End: 2025-03-11

## 2025-03-11 RX ADMIN — DEXTROSE AND SODIUM CHLORIDE 150 ML/HR: 5; 450 INJECTION, SOLUTION INTRAVENOUS at 21:29

## 2025-03-11 RX ADMIN — ACETAMINOPHEN 650 MG: 325 TABLET ORAL at 22:45

## 2025-03-11 RX ADMIN — SODIUM CHLORIDE 250 ML/HR: 9 INJECTION, SOLUTION INTRAVENOUS at 20:24

## 2025-03-11 RX ADMIN — ONDANSETRON 4 MG: 2 INJECTION INTRAMUSCULAR; INTRAVENOUS at 18:24

## 2025-03-11 RX ADMIN — INSULIN HUMAN 10 UNITS/HR: 1 INJECTION, SOLUTION INTRAVENOUS at 21:45

## 2025-03-11 RX ADMIN — SODIUM CHLORIDE 1000 ML: 9 INJECTION, SOLUTION INTRAVENOUS at 20:24

## 2025-03-11 RX ADMIN — SODIUM CHLORIDE 1000 ML: 9 INJECTION, SOLUTION INTRAVENOUS at 18:25

## 2025-03-11 SDOH — SOCIAL STABILITY: SOCIAL INSECURITY: WITHIN THE LAST YEAR, HAVE YOU BEEN AFRAID OF YOUR PARTNER OR EX-PARTNER?: NO

## 2025-03-11 SDOH — SOCIAL STABILITY: SOCIAL INSECURITY: WITHIN THE LAST YEAR, HAVE YOU BEEN HUMILIATED OR EMOTIONALLY ABUSED IN OTHER WAYS BY YOUR PARTNER OR EX-PARTNER?: NO

## 2025-03-11 SDOH — SOCIAL STABILITY: SOCIAL INSECURITY: WERE YOU ABLE TO COMPLETE ALL THE BEHAVIORAL HEALTH SCREENINGS?: YES

## 2025-03-11 SDOH — ECONOMIC STABILITY: FOOD INSECURITY: WITHIN THE PAST 12 MONTHS, THE FOOD YOU BOUGHT JUST DIDN'T LAST AND YOU DIDN'T HAVE MONEY TO GET MORE.: NEVER TRUE

## 2025-03-11 SDOH — ECONOMIC STABILITY: INCOME INSECURITY: IN THE PAST 12 MONTHS HAS THE ELECTRIC, GAS, OIL, OR WATER COMPANY THREATENED TO SHUT OFF SERVICES IN YOUR HOME?: NO

## 2025-03-11 SDOH — SOCIAL STABILITY: SOCIAL INSECURITY: HAVE YOU HAD THOUGHTS OF HARMING ANYONE ELSE?: NO

## 2025-03-11 SDOH — ECONOMIC STABILITY: FOOD INSECURITY: WITHIN THE PAST 12 MONTHS, YOU WORRIED THAT YOUR FOOD WOULD RUN OUT BEFORE YOU GOT THE MONEY TO BUY MORE.: NEVER TRUE

## 2025-03-11 ASSESSMENT — ENCOUNTER SYMPTOMS
VOMITING: 1
FEVER: 0
FATIGUE: 1
NAUSEA: 1
WHEEZING: 0
TREMORS: 0
WEAKNESS: 0
HEADACHES: 0
ACTIVITY CHANGE: 1
JOINT SWELLING: 0
ABDOMINAL PAIN: 0
HEMATURIA: 0
FLANK PAIN: 0
DIARRHEA: 1
CONSTIPATION: 0
WOUND: 0
CHEST TIGHTNESS: 0
CHILLS: 0
SHORTNESS OF BREATH: 0
COUGH: 0
ROS GI COMMENTS: ABDOMINAL DISCOMFORT
PALPITATIONS: 0
BACK PAIN: 0

## 2025-03-11 ASSESSMENT — COGNITIVE AND FUNCTIONAL STATUS - GENERAL
PATIENT BASELINE BEDBOUND: NO
MOBILITY SCORE: 24
DAILY ACTIVITIY SCORE: 24

## 2025-03-11 ASSESSMENT — ACTIVITIES OF DAILY LIVING (ADL)
WALKS IN HOME: INDEPENDENT
PATIENT'S MEMORY ADEQUATE TO SAFELY COMPLETE DAILY ACTIVITIES?: YES
HEARING - LEFT EAR: FUNCTIONAL
GROOMING: INDEPENDENT
HEARING - RIGHT EAR: FUNCTIONAL
ADEQUATE_TO_COMPLETE_ADL: YES
DRESSING YOURSELF: INDEPENDENT
LACK_OF_TRANSPORTATION: NO
JUDGMENT_ADEQUATE_SAFELY_COMPLETE_DAILY_ACTIVITIES: YES
TOILETING: INDEPENDENT
FEEDING YOURSELF: INDEPENDENT
LACK_OF_TRANSPORTATION: NO
BATHING: INDEPENDENT

## 2025-03-11 ASSESSMENT — PAIN SCALES - GENERAL
PAINLEVEL_OUTOF10: 5 - MODERATE PAIN
PAINLEVEL_OUTOF10: 0 - NO PAIN
PAINLEVEL_OUTOF10: 2
PAINLEVEL_OUTOF10: 0 - NO PAIN

## 2025-03-11 ASSESSMENT — PAIN DESCRIPTION - LOCATION: LOCATION: HEAD

## 2025-03-11 ASSESSMENT — HEART SCORE
RISK FACTORS: 1-2 RISK FACTORS
HEART SCORE: 1
TROPONIN: LESS THAN OR EQUAL TO NORMAL LIMIT
HISTORY: SLIGHTLY SUSPICIOUS
AGE: <45
ECG: NORMAL

## 2025-03-11 ASSESSMENT — LIFESTYLE VARIABLES
HOW OFTEN DO YOU HAVE 6 OR MORE DRINKS ON ONE OCCASION: NEVER
SKIP TO QUESTIONS 9-10: 1
AUDIT-C TOTAL SCORE: 0
SUBSTANCE_ABUSE_PAST_12_MONTHS: NO
HOW OFTEN DO YOU HAVE A DRINK CONTAINING ALCOHOL: NEVER
AUDIT-C TOTAL SCORE: 0
HOW MANY STANDARD DRINKS CONTAINING ALCOHOL DO YOU HAVE ON A TYPICAL DAY: PATIENT DOES NOT DRINK
PRESCIPTION_ABUSE_PAST_12_MONTHS: NO

## 2025-03-11 ASSESSMENT — PAIN - FUNCTIONAL ASSESSMENT
PAIN_FUNCTIONAL_ASSESSMENT: 0-10

## 2025-03-11 ASSESSMENT — PAIN DESCRIPTION - DESCRIPTORS: DESCRIPTORS: HEADACHE

## 2025-03-11 NOTE — ED TRIAGE NOTES
Patient presents to the ED due to concern for DKA.  Patient stated he started to feel off yesterday.

## 2025-03-11 NOTE — ED PROVIDER NOTES
Chief Complaint   Patient presents with    Hyperglycemia     DKA        HPI       34 year old male presents to the Emergency Department today complaining of feeling generally weak with nausea and multiple episodes of vomiting and diarrhea since last evening. Denies any associated fever, chills, headache, neck pain, chest pain, shortness of breath, abdominal pain, constipation, hematemesis, hematochezia, melena, or urinary symptoms. States that this feels just like his prior episodes of DKA.       History provided by:  Patient             Patient History   Past Medical History:   Diagnosis Date    DKA (diabetic ketoacidosis) (Multi)     recurrent    HTN (hypertension)     Type I diabetes mellitus (Multi)      History reviewed. No pertinent surgical history.  Family History   Problem Relation Name Age of Onset    No Known Problems Mother      No Known Problems Father      Hypertension Other      Coronary artery disease Other      Diabetes Other      Heart failure Other       Social History     Tobacco Use    Smoking status: Never     Passive exposure: Never    Smokeless tobacco: Never   Vaping Use    Vaping status: Never Used   Substance Use Topics    Alcohol use: Never    Drug use: Never           Physical Exam  Constitutional:       Appearance: Normal appearance.   HENT:      Head: Normocephalic.      Right Ear: Ear canal and external ear normal.      Left Ear: External ear normal.      Nose: Nose normal.      Mouth/Throat:      Mouth: Mucous membranes are dry.      Pharynx: Oropharynx is clear. No oropharyngeal exudate or posterior oropharyngeal erythema.   Eyes:      Conjunctiva/sclera: Conjunctivae normal.      Pupils: Pupils are equal, round, and reactive to light.   Cardiovascular:      Rate and Rhythm: Normal rate and regular rhythm.      Pulses:           Radial pulses are 3+ on the right side and 3+ on the left side.        Dorsalis pedis pulses are 3+ on the right side and 3+ on the left side.      Heart  sounds: Normal heart sounds. No murmur heard.     No friction rub. No gallop.   Pulmonary:      Effort: Pulmonary effort is normal. No respiratory distress.      Breath sounds: Normal breath sounds. No wheezing, rhonchi or rales.   Abdominal:      General: Abdomen is flat. Bowel sounds are normal.      Palpations: Abdomen is soft.      Tenderness: There is no abdominal tenderness. There is no right CVA tenderness, left CVA tenderness, guarding or rebound. Negative signs include Holder's sign and McBurney's sign.   Musculoskeletal:         General: No swelling or deformity.      Cervical back: Full passive range of motion without pain.      Right lower leg: No edema.      Left lower leg: No edema.   Lymphadenopathy:      Cervical: No cervical adenopathy.   Skin:     Capillary Refill: Capillary refill takes less than 2 seconds.      Coloration: Skin is not jaundiced.      Findings: No rash.   Neurological:      General: No focal deficit present.      Mental Status: He is alert and oriented to person, place, and time. Mental status is at baseline.      Gait: Gait is intact.   Psychiatric:         Mood and Affect: Mood normal.         Behavior: Behavior is cooperative.         Labs Reviewed   CBC WITH AUTO DIFFERENTIAL - Abnormal       Result Value    WBC 6.6      nRBC 0.0      RBC 6.13 (*)     Hemoglobin 19.2 (*)     Hematocrit 57.3 (*)     MCV 94      MCH 31.3      MCHC 33.5      RDW 13.2      Platelets 168      Neutrophils % 69.4      Immature Granulocytes %, Automated 0.6      Lymphocytes % 21.7      Monocytes % 7.3      Eosinophils % 0.2      Basophils % 0.8      Neutrophils Absolute 4.58      Immature Granulocytes Absolute, Automated 0.04      Lymphocytes Absolute 1.43      Monocytes Absolute 0.48      Eosinophils Absolute 0.01      Basophils Absolute 0.05     COMPREHENSIVE METABOLIC PANEL - Abnormal    Glucose 273 (*)     Sodium 135 (*)     Potassium 5.3      Chloride 105      Bicarbonate 10 (*)     Anion Gap 25  (*)     Urea Nitrogen 14      Creatinine 0.96      eGFR >90      Calcium 8.1 (*)     Albumin 4.0      Alkaline Phosphatase 77      Total Protein 6.5      AST 9      Bilirubin, Total 0.5      ALT 14     BLOOD GAS VENOUS FULL PANEL - Abnormal    POCT pH, Venous 7.12 (*)     POCT pCO2, Venous 34 (*)     POCT pO2, Venous 45      POCT SO2, Venous 71      POCT Oxy Hemoglobin, Venous 69.0      POCT Hematocrit Calculated, Venous 52.0      POCT Sodium, Venous 131 (*)     POCT Potassium, Venous 5.4 (*)     POCT Chloride, Venous 103      POCT Ionized Calicum, Venous 1.22      POCT Glucose, Venous 323 (*)     POCT Lactate, Venous 2.2 (*)     POCT Base Excess, Venous -17.3 (*)     POCT HCO3 Calculated, Venous 11.1 (*)     POCT Hemoglobin, Venous 17.3      POCT Anion Gap, Venous 22.0      Patient Temperature 37.0      FiO2 21     POCT GLUCOSE - Abnormal    POCT Glucose 282 (*)    SERIAL TROPONIN-INITIAL - Normal    Troponin I, High Sensitivity <3      Narrative:     Less than 99th percentile of normal range cutoff-  Female and children under 18 years old <14 ng/L; Male <21 ng/L: Negative  Repeat testing should be performed if clinically indicated.     Female and children under 18 years old 14-50 ng/L; Male 21-50 ng/L:  Consistent with possible cardiac damage and possible increased clinical   risk. Serial measurements may help to assess extent of myocardial damage.     >50 ng/L: Consistent with cardiac damage, increased clinical risk and  myocardial infarction. Serial measurements may help assess extent of   myocardial damage.      NOTE: Children less than 1 year old may have higher baseline troponin   levels and results should be interpreted in conjunction with the overall   clinical context.     NOTE: Troponin I testing is performed using a different   testing methodology at [facility] than at other   [facility]. Direct result comparisons should only   be made within the same method.   SERIAL TROPONIN, 1 HOUR  - Normal    Troponin I, High Sensitivity 3      Narrative:     Less than 99th percentile of normal range cutoff-  Female and children under 18 years old <14 ng/L; Male <21 ng/L: Negative  Repeat testing should be performed if clinically indicated.     Female and children under 18 years old 14-50 ng/L; Male 21-50 ng/L:  Consistent with possible cardiac damage and possible increased clinical   risk. Serial measurements may help to assess extent of myocardial damage.     >50 ng/L: Consistent with cardiac damage, increased clinical risk and  myocardial infarction. Serial measurements may help assess extent of   myocardial damage.      NOTE: Children less than 1 year old may have higher baseline troponin   levels and results should be interpreted in conjunction with the overall   clinical context.     NOTE: Troponin I testing is performed using a different   testing methodology at [facility] than at other   [facility]. Direct result comparisons should only   be made within the same method.   BLOOD GAS LACTIC ACID, VENOUS - Normal    POCT Lactate, Venous 1.4     MAGNESIUM - Normal    Magnesium 1.70     PHOSPHORUS - Normal    Phosphorus 3.5     TROPONIN SERIES- (INITIAL, 1 HR)    Narrative:     The following orders were created for panel order Troponin I Series, High Sensitivity (0, 1 HR).  Procedure                               Abnormality         Status                     ---------                               -----------         ------                     Troponin I, High Sensiti...[[id number]]  Normal              Final result               Troponin, High Sensitivi...[[id number]]  Normal              Final result                 Please view results for these tests on the individual orders.   BLOOD GAS VENOUS   BLOOD GAS VENOUS   MAGNESIUM   MAGNESIUM   RENAL FUNCTION PANEL   RENAL FUNCTION PANEL   BASIC METABOLIC PANEL   MAGNESIUM   PHOSPHORUS   POCT GLUCOSE METER   POCT GLUCOSE METER   POCT  GLUCOSE METER   POCT GLUCOSE METER   POCT GLUCOSE METER   POCT GLUCOSE METER   POCT GLUCOSE METER   POCT GLUCOSE METER   MORPHOLOGY    RBC Morphology No significant RBC morphology present         XR chest 2 views   Final Result   No acute cardiopulmonary process radiographically.        MACRO:   None.        Signed by: Aura Mcculloughlinda 3/11/2025 6:42 PM   Dictation workstation:   [id number]               ED Course & MDM   ED Course as of 03/11/25 2146   Tue Mar 11, 2025   1844 Independently reviewed the x-rays and concur with radiology interpretation.  [JZ]   2012 Reviewed labs and find that he is in DKA. DKA orders placed.  [JZ]      ED Course User Index  [JZ] Bennie Velazquez, APRN-CNP         Diagnoses as of 03/11/25 2146   Diabetic ketoacidosis without coma associated with type 1 diabetes mellitus (Multi)           Medical Decision Making  EKG interpreted by  . Indication: medical clearance. Findings: NSR with a ventricular rate of 99, normal axis, normal intervals, and no acute ischemic or injury pattern. Impression: No acute pathology.       Patient was seen and evaluated by Dr. Early. Placed on a cardiac monitor which showed normal sinus rhythm without ectopy throughout ED stay. Rhythm strip obtained showed normal sinus rhythm. Impression: No acute pathology. Continuous pulse oximetry monitoring showed no signs of hypoxia. Saline lock was established with labs drawn and results as above. Given 1 Liter of normal saline wide open over 1 hour. Given Zofran as well. After receiving the medications and IV fluids, reported feeling improved. Venous blood gas shows pH of 7.12 with PCO2 of 34 and HCO3 of 11.1. Glucose of 273 with bicarbonate of 10 and anion gap of 25. Blood counts, remainder of electrolytes, kidney function, and liver function were unremarkable. Heart Score- 1 with normal EKG and troponin with delta troponin. At this time, we feel that the weakness is atypical for acute coronary syndrome. We find  "no underlying evidence of an acute infectious process or pneumothorax on CXR. Clinically, we do not feel they are exhibiting signs of pulmonary embolism or thoracic aortic dissection (no connective tissue disorder, no tachycardia, tachypnea, hypoxia, and mediastinum normal in size on CXR). Upon noting that he was in DKA, insulin drip was started and DKA protocol was initiated. Case was discussed with SHANNON Gibson PA-C, who agrees to admit the patient for further evaluation and care. Will be transferred to the ICU in stable condition. Case was discussed wit ICU CNP who agrees to evaluate the patient on consult.     Diagnostic Impression:    1. Acute DKA    2. IV meds and fluids in ED     3. Critical care time 45 minutes           Your medication list        CONTINUE taking these medications        Instructions Last Dose Given Next Dose Due   Guardian 4 Glucose Sensor device  Generic drug: blood-glucose sensor      Change insulin sensor every 7 days as directed, linked to Minimed insulin pump, check with endocrinology for updated regimen       lancets misc           OneTouch Ultra2 Meter misc  Generic drug: blood-glucose meter           pen needle, diabetic 32 gauge x 5/32\" needle                  ASK your doctor about these medications        Instructions Last Dose Given Next Dose Due   Gvoke HypoPen 1-Pack 1 mg/0.2 mL auto-injector  Generic drug: glucagon      Inject 1 mg under the skin 1 time for 1 dose.       insulin NPH (Isophane) 100 unit/mL (3 mL) pen  Commonly known as: HumuLIN N,NovoLIN N      Inject 22 Units under the skin 2 times a day before meals. Take as directed per insulin instructions.       insulin regular 100 unit/mL injection  Commonly known as: HumuLIN R,NovoLIN R      Inject 0-10 Units under the skin 3 times a day before meals.   0 unit(s) if Blood glucose is between   2 unit(s) if Blood glucose is between 151-200  4 unit(s) if Blood glucose is between 201-250  6 unit(s) if Blood glucose is " between 251-300  8 unit(s) if Blood glucose is between 301-350  10 unit(s) if Blood glucose is between 351-400       HumuLIN R Regular U-100 Insuln 100 unit/mL injection  Generic drug: insulin regular      Inject 5 Units under the skin 3 times a day before meals. Take as directed per insulin instructions. Discard vial 31 days after 1st opening       ondansetron 4 mg tablet  Commonly known as: Zofran      Take 1 tablet (4 mg) by mouth every 8 hours if needed for nausea or vomiting.       OneTouch Ultra Test strip  Generic drug: blood sugar diagnostic                      Procedure  Critical Care    Performed by: HEATHER Juarez  Authorized by: Barbie Early MD    Critical care provider statement:     Critical care time (minutes):  45    Critical care time was exclusive of:  Separately billable procedures and treating other patients    Critical care was necessary to treat or prevent imminent or life-threatening deterioration of the following conditions:  Endocrine crisis    Critical care was time spent personally by me on the following activities:  Blood draw for specimens, development of treatment plan with patient or surrogate, discussions with consultants, evaluation of patient's response to treatment, obtaining history from patient or surrogate, review of old charts, re-evaluation of patient's condition, pulse oximetry, ordering and review of radiographic studies, ordering and review of laboratory studies and ordering and performing treatments and interventions       HEATHER Juarez  03/11/25 5912

## 2025-03-12 VITALS
HEART RATE: 100 BPM | TEMPERATURE: 98 F | WEIGHT: 160.5 LBS | OXYGEN SATURATION: 99 % | DIASTOLIC BLOOD PRESSURE: 87 MMHG | HEIGHT: 70 IN | RESPIRATION RATE: 9 BRPM | BODY MASS INDEX: 22.98 KG/M2 | SYSTOLIC BLOOD PRESSURE: 122 MMHG

## 2025-03-12 LAB
ALBUMIN SERPL BCP-MCNC: 3.3 G/DL (ref 3.4–5)
ALBUMIN SERPL BCP-MCNC: 3.6 G/DL (ref 3.4–5)
ALBUMIN SERPL BCP-MCNC: 3.7 G/DL (ref 3.4–5)
ANION GAP SERPL CALC-SCNC: 11 MMOL/L (ref 10–20)
ANION GAP SERPL CALC-SCNC: 12 MMOL/L (ref 10–20)
ANION GAP SERPL CALC-SCNC: 18 MMOL/L (ref 10–20)
ATRIAL RATE: 99 BPM
BASE EXCESS BLDV CALC-SCNC: -16 MMOL/L (ref -2–3)
BASE EXCESS BLDV CALC-SCNC: -8.3 MMOL/L (ref -2–3)
BODY TEMPERATURE: 37 DEGREES CELSIUS
BODY TEMPERATURE: 37 DEGREES CELSIUS
BUN SERPL-MCNC: 11 MG/DL (ref 6–23)
BUN SERPL-MCNC: 12 MG/DL (ref 6–23)
BUN SERPL-MCNC: 12 MG/DL (ref 6–23)
CALCIUM SERPL-MCNC: 7.7 MG/DL (ref 8.6–10.3)
CALCIUM SERPL-MCNC: 7.7 MG/DL (ref 8.6–10.3)
CALCIUM SERPL-MCNC: 7.8 MG/DL (ref 8.6–10.3)
CHLORIDE SERPL-SCNC: 109 MMOL/L (ref 98–107)
CHLORIDE SERPL-SCNC: 110 MMOL/L (ref 98–107)
CHLORIDE SERPL-SCNC: 111 MMOL/L (ref 98–107)
CO2 SERPL-SCNC: 11 MMOL/L (ref 21–32)
CO2 SERPL-SCNC: 16 MMOL/L (ref 21–32)
CO2 SERPL-SCNC: 18 MMOL/L (ref 21–32)
CREAT SERPL-MCNC: 0.82 MG/DL (ref 0.5–1.3)
CREAT SERPL-MCNC: 0.85 MG/DL (ref 0.5–1.3)
CREAT SERPL-MCNC: 0.86 MG/DL (ref 0.5–1.3)
EGFRCR SERPLBLD CKD-EPI 2021: >90 ML/MIN/1.73M*2
ERYTHROCYTE [DISTWIDTH] IN BLOOD BY AUTOMATED COUNT: 12.9 % (ref 11.5–14.5)
GLUCOSE BLD MANUAL STRIP-MCNC: 137 MG/DL (ref 74–99)
GLUCOSE BLD MANUAL STRIP-MCNC: 138 MG/DL (ref 74–99)
GLUCOSE BLD MANUAL STRIP-MCNC: 158 MG/DL (ref 74–99)
GLUCOSE BLD MANUAL STRIP-MCNC: 159 MG/DL (ref 74–99)
GLUCOSE BLD MANUAL STRIP-MCNC: 191 MG/DL (ref 74–99)
GLUCOSE BLD MANUAL STRIP-MCNC: 199 MG/DL (ref 74–99)
GLUCOSE BLD MANUAL STRIP-MCNC: 209 MG/DL (ref 74–99)
GLUCOSE BLD MANUAL STRIP-MCNC: 219 MG/DL (ref 74–99)
GLUCOSE BLD MANUAL STRIP-MCNC: 259 MG/DL (ref 74–99)
GLUCOSE SERPL-MCNC: 179 MG/DL (ref 74–99)
GLUCOSE SERPL-MCNC: 204 MG/DL (ref 74–99)
GLUCOSE SERPL-MCNC: 257 MG/DL (ref 74–99)
HCO3 BLDV-SCNC: 10.7 MMOL/L (ref 22–26)
HCO3 BLDV-SCNC: 18.4 MMOL/L (ref 22–26)
HCT VFR BLD AUTO: 44.7 % (ref 41–52)
HGB BLD-MCNC: 15.2 G/DL (ref 13.5–17.5)
INHALED O2 CONCENTRATION: 21 %
INHALED O2 CONCENTRATION: 21 %
MAGNESIUM SERPL-MCNC: 1.65 MG/DL (ref 1.6–2.4)
MAGNESIUM SERPL-MCNC: 1.69 MG/DL (ref 1.6–2.4)
MAGNESIUM SERPL-MCNC: 1.69 MG/DL (ref 1.6–2.4)
MCH RBC QN AUTO: 31.5 PG (ref 26–34)
MCHC RBC AUTO-ENTMCNC: 34 G/DL (ref 32–36)
MCV RBC AUTO: 93 FL (ref 80–100)
NRBC BLD-RTO: 0 /100 WBCS (ref 0–0)
OXYHGB MFR BLDV: 90 % (ref 45–75)
OXYHGB MFR BLDV: 92.1 % (ref 45–75)
P AXIS: 77 DEGREES
PCO2 BLDV: 28 MM HG (ref 41–51)
PCO2 BLDV: 41 MM HG (ref 41–51)
PH BLDV: 7.19 PH (ref 7.33–7.43)
PH BLDV: 7.26 PH (ref 7.33–7.43)
PHOSPHATE SERPL-MCNC: 1.9 MG/DL (ref 2.5–4.9)
PHOSPHATE SERPL-MCNC: 2.7 MG/DL (ref 2.5–4.9)
PHOSPHATE SERPL-MCNC: 4.1 MG/DL (ref 2.5–4.9)
PLATELET # BLD AUTO: 159 X10*3/UL (ref 150–450)
PO2 BLDV: 58 MM HG (ref 35–45)
PO2 BLDV: 63 MM HG (ref 35–45)
POTASSIUM SERPL-SCNC: 3.4 MMOL/L (ref 3.5–5.3)
POTASSIUM SERPL-SCNC: 3.8 MMOL/L (ref 3.5–5.3)
POTASSIUM SERPL-SCNC: 4.2 MMOL/L (ref 3.5–5.3)
PR INTERVAL: 156 MS
Q ONSET: 249 MS
QRS COUNT: 16 BEATS
QRS DURATION: 96 MS
QT INTERVAL: 408 MS
QTC CALCULATION(BAZETT): 524 MS
QTC FREDERICIA: 482 MS
R AXIS: 52 DEGREES
RBC # BLD AUTO: 4.82 X10*6/UL (ref 4.5–5.9)
SAO2 % BLDV: 92 % (ref 45–75)
SAO2 % BLDV: 94 % (ref 45–75)
SODIUM SERPL-SCNC: 134 MMOL/L (ref 136–145)
SODIUM SERPL-SCNC: 135 MMOL/L (ref 136–145)
SODIUM SERPL-SCNC: 136 MMOL/L (ref 136–145)
T AXIS: 48 DEGREES
T OFFSET: 453 MS
VENTRICULAR RATE: 99 BPM
WBC # BLD AUTO: 6.3 X10*3/UL (ref 4.4–11.3)

## 2025-03-12 PROCEDURE — 36415 COLL VENOUS BLD VENIPUNCTURE: CPT

## 2025-03-12 PROCEDURE — 85027 COMPLETE CBC AUTOMATED: CPT

## 2025-03-12 PROCEDURE — 82805 BLOOD GASES W/O2 SATURATION: CPT

## 2025-03-12 PROCEDURE — 2500000005 HC RX 250 GENERAL PHARMACY W/O HCPCS

## 2025-03-12 PROCEDURE — 82947 ASSAY GLUCOSE BLOOD QUANT: CPT

## 2025-03-12 PROCEDURE — 83735 ASSAY OF MAGNESIUM: CPT

## 2025-03-12 PROCEDURE — 2500000004 HC RX 250 GENERAL PHARMACY W/ HCPCS (ALT 636 FOR OP/ED): Performed by: NURSE PRACTITIONER

## 2025-03-12 PROCEDURE — 80069 RENAL FUNCTION PANEL: CPT

## 2025-03-12 PROCEDURE — 2500000004 HC RX 250 GENERAL PHARMACY W/ HCPCS (ALT 636 FOR OP/ED)

## 2025-03-12 PROCEDURE — 2500000002 HC RX 250 W HCPCS SELF ADMINISTERED DRUGS (ALT 637 FOR MEDICARE OP, ALT 636 FOR OP/ED)

## 2025-03-12 PROCEDURE — 99239 HOSP IP/OBS DSCHRG MGMT >30: CPT | Performed by: INTERNAL MEDICINE

## 2025-03-12 PROCEDURE — 99254 IP/OBS CNSLTJ NEW/EST MOD 60: CPT | Performed by: INTERNAL MEDICINE

## 2025-03-12 RX ORDER — DEXTROSE 50 % IN WATER (D50W) INTRAVENOUS SYRINGE
25
Status: DISCONTINUED | OUTPATIENT
Start: 2025-03-12 | End: 2025-03-12 | Stop reason: HOSPADM

## 2025-03-12 RX ORDER — DEXTROSE 50 % IN WATER (D50W) INTRAVENOUS SYRINGE
12.5
Status: DISCONTINUED | OUTPATIENT
Start: 2025-03-12 | End: 2025-03-12 | Stop reason: HOSPADM

## 2025-03-12 RX ORDER — POTASSIUM CHLORIDE 14.9 MG/ML
20 INJECTION INTRAVENOUS
Status: COMPLETED | OUTPATIENT
Start: 2025-03-12 | End: 2025-03-12

## 2025-03-12 RX ORDER — INSULIN LISPRO 100 [IU]/ML
0-5 INJECTION, SOLUTION INTRAVENOUS; SUBCUTANEOUS
Status: DISCONTINUED | OUTPATIENT
Start: 2025-03-12 | End: 2025-03-12 | Stop reason: HOSPADM

## 2025-03-12 RX ORDER — INSULIN LISPRO 100 [IU]/ML
5 INJECTION, SOLUTION INTRAVENOUS; SUBCUTANEOUS
Status: DISCONTINUED | OUTPATIENT
Start: 2025-03-12 | End: 2025-03-12 | Stop reason: HOSPADM

## 2025-03-12 RX ADMIN — POTASSIUM PHOSPHATE, MONOBASIC AND POTASSIUM PHOSPHATE, DIBASIC 21 MMOL: 224; 236 INJECTION, SOLUTION, CONCENTRATE INTRAVENOUS at 03:14

## 2025-03-12 RX ADMIN — POTASSIUM CHLORIDE 20 MEQ: 200 INJECTION, SOLUTION INTRAVENOUS at 01:14

## 2025-03-12 RX ADMIN — INSULIN HUMAN 22 UNITS: 100 INJECTION, SUSPENSION SUBCUTANEOUS at 05:29

## 2025-03-12 RX ADMIN — DEXTROSE MONOHYDRATE 150 ML/HR: 100 INJECTION, SOLUTION INTRAVENOUS at 02:39

## 2025-03-12 RX ADMIN — POTASSIUM CHLORIDE 20 MEQ: 200 INJECTION, SOLUTION INTRAVENOUS at 03:16

## 2025-03-12 ASSESSMENT — COGNITIVE AND FUNCTIONAL STATUS - GENERAL
DAILY ACTIVITIY SCORE: 24
MOBILITY SCORE: 24

## 2025-03-12 ASSESSMENT — PAIN SCALES - GENERAL
PAINLEVEL_OUTOF10: 0 - NO PAIN

## 2025-03-12 ASSESSMENT — PAIN - FUNCTIONAL ASSESSMENT
PAIN_FUNCTIONAL_ASSESSMENT: 0-10

## 2025-03-12 NOTE — NURSING NOTE
Discharge order placed by MD. Discharge instructions reviewed with pt. Pt stated understanding. Ivs dc and intact. Pt packed belongings. Monitors removed and pt dressed self. Ride arrived and pt ambulated out with PCA to vehicle to go home.

## 2025-03-12 NOTE — CARE PLAN
Problem: Pain - Adult  Goal: Verbalizes/displays adequate comfort level or baseline comfort level  Outcome: Progressing     Problem: Safety - Adult  Goal: Free from fall injury  Outcome: Progressing     Problem: Discharge Planning  Goal: Discharge to home or other facility with appropriate resources  Outcome: Progressing     Problem: Chronic Conditions and Co-morbidities  Goal: Patient's chronic conditions and co-morbidity symptoms are monitored and maintained or improved  Outcome: Progressing     Problem: Nutrition  Goal: Nutrient intake appropriate for maintaining nutritional needs  Outcome: Progressing     Problem: Diabetes  Goal: Achieve decreasing blood glucose levels by end of shift  Outcome: Progressing  Goal: Increase stability of blood glucose readings by end of shift  Outcome: Progressing  Goal: Maintain electrolyte levels within acceptable range throughout shift  Outcome: Progressing  Goal: No changes in neurological exam by end of shift  Outcome: Progressing     Problem: Pain  Goal: Walks with improved pain control throughout the shift  Outcome: Progressing  Goal: Performs ADL's with improved pain control throughout shift  Outcome: Progressing   The patient's goals for the shift include      The clinical goals for the shift include pt's lab work will trend back to baseline through shift

## 2025-03-12 NOTE — DISCHARGE SUMMARY
"Discharge Diagnosis  Diabetic ketoacidosis without coma associated with type 1 diabetes mellitus (Multi)    Issues Requiring Follow-Up  Follow-up your primary care provider and endocrinologist as outpatient    Discharge Meds     Medication List      CONTINUE taking these medications     Guardian 4 Glucose Sensor device; Generic drug: blood-glucose sensor;   Change insulin sensor every 7 days as directed, linked to Minimed insulin   pump, check with endocrinology for updated regimen   Gvoke HypoPen 1-Pack 1 mg/0.2 mL auto-injector; Generic drug: glucagon;   Inject 1 mg under the skin 1 time for 1 dose.   insulin NPH (Isophane) 100 unit/mL (3 mL) pen; Commonly known as:   HumuLIN N,NovoLIN N; Inject 22 Units under the skin 2 times a day before   meals. Take as directed per insulin instructions.   * insulin regular 100 unit/mL injection; Commonly known as: HumuLIN   R,NovoLIN R; Inject 0-10 Units under the skin 3 times a day before meals.   0 unit(s) if Blood glucose is between  2 unit(s) if Blood glucose is   between 151-200 4 unit(s) if Blood glucose is between 201-250 6 unit(s) if   Blood glucose is between 251-300 8 unit(s) if Blood glucose is between   301-350 10 unit(s) if Blood glucose is between 351-400   * insulin regular 100 unit/mL injection; Commonly known as: HumuLIN   R,NovoLIN R; Inject 5 Units under the skin 3 times a day before meals.   Take as directed per insulin instructions. Discard vial 31 days after 1st   opening   lancets misc   ondansetron 4 mg tablet; Commonly known as: Zofran; Take 1 tablet (4 mg)   by mouth every 8 hours if needed for nausea or vomiting.   OneTouch Ultra Test strip; Generic drug: blood sugar diagnostic   OneTouch Ultra2 Meter misc; Generic drug: blood-glucose meter   pen needle, diabetic 32 gauge x 5/32\" needle  * This list has 2 medication(s) that are the same as other medications   prescribed for you. Read the directions carefully, and ask your doctor or   other care " provider to review them with you.       Test Results Pending At Discharge  Pending Labs       No current pending labs.            Hospital Course  34-year-old male with past medical history of insulin-dependent diabetes mellitus, frequent hospitalization secondary to DKA from noncompliance with start acting insulin presented for generalized weakness and was found to have hyperglycemia with metabolic acidosis.  Patient was admitted and started on insulin drip along with IV fluids.  He reports that he gets weird feeling when he is supposed to be taking his short acting insulin.  At that time his blood sugars are high.  We talked about potentially this being from side effect of uncontrolled diabetes causing peripheral neuropathy and autonomic dysfunction.  Patient understood that and states that he has came along way and needs to be more compliant.  He stated he did not need refills on his medication but those have been sent regardless in order to provide him with adequate supply.  Patient is in stable condition for discharge and tolerated diet without any nausea or vomiting.    34 minutes spent in discharge timing    Pertinent Physical Exam At Time of Discharge  General: Not in acute distress, alert  HEENT: PERRLA, head intact and normocephalic  Neck: Normal to inspection  Lungs: Clear to auscultation, work of breathing within normal limit  Cardiac: Regular rate and rhythm  Abdomen: Soft nontender, positive bowel sounds  : Exam deferred  Skin: Intact  Hematology: No petechia or excessive ecchymosis  Musculoskeletal: Without significant trauma  Neurological: Alert awake oriented, no focal deficit, cranial nerves grossly intact  Psych: No suicidal ideation or homicidal ideation    Outpatient Follow-Up  Future Appointments   Date Time Provider Department Center   3/26/2025 11:00 AM Henri Brumfield MD [id number] [facility]         Joaquin Montemayor MD

## 2025-03-12 NOTE — H&P
[facility] - GENERAL MEDICINE HISTORY AND PHYSICAL    History Obtained From (Primary Source): Patient  Collateral History (Secondary Sources): D/w ED provider, chart review    History Of Present Illness (HPI):  Ed Sanon is a 34 y.o. male with PMHx s/f IDDM Type I with frequent admissions for diabetic ketoacidosis presenting with generalized weakness.  Patient has frequent DKA events due to noncompliance with short acting insulin.  He does not take short acting insulin because he does not feel good when glucose is trending down.  He has a sense of bloating sensation, fatigue and warmth when his glucose is less than 300.  His glucose normally runs 300-350.  He still taking his NPH 2 times a day, last dose was at 2 AM this morning.  Has multiple bouts of nausea vomiting diarrhea prior to ED arrival. Denies short of breath, chest pain, abdominal pain.    ED Course (Summary - please note all labs, imaging studies, and interventions noted below have been personally reviewed and/or interpreted on day of admission):   Vitals on presentation: T 37 °C (98.6 °F)  HR (!) 108  /78  RR 16  O2 100 % None (Room air)  Labs: CMP-glucose 273, sodium 135, bicarb 10, anion gap 25, calcium 8.1  Mag 1.70, troponin 3-3  CBC-hemoglobin 19.2  VBG-pH 7.12, pCO2 34, pO2 45, lactate 2.2-1.4, HCO3 11.1  EKG: sinus rhythm at 99 bpm, no acute STEMI.  , QTc 524.  Imaging: XR-no acute cardiopulmonary process  Interventions: NS 2 L, Zofran 4 mg, started on insulin drip and sodium chloride 0.45 at infusion at 250 ml/hr. Added dextrose PRN infusion. Admission to ICU for further management.    12-point ROS reviewed and found to be negative aside from aforementioned positives in HPI and/or noted in dedicated ROS section below.     ED Course (From ED Provider):  ED Course as of 03/11/25 2144   Tue Mar 11, 2025   1844 Independently reviewed the x-rays and concur with radiology interpretation.  [JZ]   2012 Reviewed  labs and find that he is in DKA. DKA orders placed.  [JZ]      ED Course User Index  [JZ] Bennie Velazquez, APRN-CNP         Diagnoses as of 03/11/25 2144   Diabetic ketoacidosis without coma associated with type 1 diabetes mellitus (Multi)     Relevant Results  Results for orders placed or performed during the hospital encounter of 03/11/25 (from the past 24 hours)   POCT GLUCOSE   Result Value Ref Range    POCT Glucose 282 (H) 74 - 99 mg/dL   CBC and Auto Differential   Result Value Ref Range    WBC 6.6 4.4 - 11.3 x10*3/uL    nRBC 0.0 0.0 - 0.0 /100 WBCs    RBC 6.13 (H) 4.50 - 5.90 x10*6/uL    Hemoglobin 19.2 (H) 13.5 - 17.5 g/dL    Hematocrit 57.3 (H) 41.0 - 52.0 %    MCV 94 80 - 100 fL    MCH 31.3 26.0 - 34.0 pg    MCHC 33.5 32.0 - 36.0 g/dL    RDW 13.2 11.5 - 14.5 %    Platelets 168 150 - 450 x10*3/uL    Neutrophils % 69.4 40.0 - 80.0 %    Immature Granulocytes %, Automated 0.6 0.0 - 0.9 %    Lymphocytes % 21.7 13.0 - 44.0 %    Monocytes % 7.3 2.0 - 10.0 %    Eosinophils % 0.2 0.0 - 6.0 %    Basophils % 0.8 0.0 - 2.0 %    Neutrophils Absolute 4.58 1.20 - 7.70 x10*3/uL    Immature Granulocytes Absolute, Automated 0.04 0.00 - 0.70 x10*3/uL    Lymphocytes Absolute 1.43 1.20 - 4.80 x10*3/uL    Monocytes Absolute 0.48 0.10 - 1.00 x10*3/uL    Eosinophils Absolute 0.01 0.00 - 0.70 x10*3/uL    Basophils Absolute 0.05 0.00 - 0.10 x10*3/uL   Troponin I, High Sensitivity, Initial   Result Value Ref Range    Troponin I, High Sensitivity <3 0 - 20 ng/L   Morphology   Result Value Ref Range    RBC Morphology No significant RBC morphology present    Blood Gas Venous Full Panel   Result Value Ref Range    POCT pH, Venous 7.12 (LL) 7.33 - 7.43 pH    POCT pCO2, Venous 34 (L) 41 - 51 mm Hg    POCT pO2, Venous 45 35 - 45 mm Hg    POCT SO2, Venous 71 45 - 75 %    POCT Oxy Hemoglobin, Venous 69.0 45.0 - 75.0 %    POCT Hematocrit Calculated, Venous 52.0 41.0 - 52.0 %    POCT Sodium, Venous 131 (L) 136 - 145 mmol/L    POCT Potassium,  Venous 5.4 (H) 3.5 - 5.3 mmol/L    POCT Chloride, Venous 103 98 - 107 mmol/L    POCT Ionized Calicum, Venous 1.22 1.10 - 1.33 mmol/L    POCT Glucose, Venous 323 (H) 74 - 99 mg/dL    POCT Lactate, Venous 2.2 (H) 0.4 - 2.0 mmol/L    POCT Base Excess, Venous -17.3 (L) -2.0 - 3.0 mmol/L    POCT HCO3 Calculated, Venous 11.1 (L) 22.0 - 26.0 mmol/L    POCT Hemoglobin, Venous 17.3 13.5 - 17.5 g/dL    POCT Anion Gap, Venous 22.0 10.0 - 25.0 mmol/L    Patient Temperature 37.0 degrees Celsius    FiO2 21 %   Troponin, High Sensitivity, 1 Hour   Result Value Ref Range    Troponin I, High Sensitivity 3 0 - 20 ng/L   Comprehensive metabolic panel   Result Value Ref Range    Glucose 273 (H) 74 - 99 mg/dL    Sodium 135 (L) 136 - 145 mmol/L    Potassium 5.3 3.5 - 5.3 mmol/L    Chloride 105 98 - 107 mmol/L    Bicarbonate 10 (LL) 21 - 32 mmol/L    Anion Gap 25 (H) 10 - 20 mmol/L    Urea Nitrogen 14 6 - 23 mg/dL    Creatinine 0.96 0.50 - 1.30 mg/dL    eGFR >90 >60 mL/min/1.73m*2    Calcium 8.1 (L) 8.6 - 10.3 mg/dL    Albumin 4.0 3.4 - 5.0 g/dL    Alkaline Phosphatase 77 33 - 120 U/L    Total Protein 6.5 6.4 - 8.2 g/dL    AST 9 9 - 39 U/L    Bilirubin, Total 0.5 0.0 - 1.2 mg/dL    ALT 14 10 - 52 U/L   Magnesium   Result Value Ref Range    Magnesium 1.70 1.60 - 2.40 mg/dL   Phosphorus   Result Value Ref Range    Phosphorus 3.5 2.5 - 4.9 mg/dL   Blood Gas Lactic Acid, Venous   Result Value Ref Range    POCT Lactate, Venous 1.4 0.4 - 2.0 mmol/L     *Note: Due to a large number of results and/or encounters for the requested time period, some results have not been displayed. A complete set of results can be found in Results Review.      XR chest 2 views    Result Date: 3/11/2025  Interpreted By:  Aura Manley, STUDY: XR CHEST 2 VIEWS;  3/11/2025 6:33 pm   INDICATION: Signs/Symptoms:weakness.     COMPARISON: 02/12/2025   ACCESSION NUMBER(S): [id number]   ORDERING CLINICIAN: JENNIFER ROSARIO   FINDINGS: Artifact from overlying monitoring  leads noted.  No focal infiltrate. No evidence of pleural effusion or pneumothorax. The cardiac silhouette is normal in size. Osseous thorax is grossly intact.       No acute cardiopulmonary process radiographically.   MACRO: None.   Signed by: Aura Manley 3/11/2025 6:42 PM Dictation workstation:   [id number]    Scheduled medications:     Continuous medications:  dextrose 10 % in water (D10W), 150 mL/hr  dextrose 10 % in water (D10W), 150 mL/hr  dextrose 5%-0.45 % sodium chloride, 150 mL/hr, Last Rate: 150 mL/hr (03/11/25 2129)  insulin regular, 0-50 Units/hr  sodium chloride, 250 mL/hr      PRN medications:  PRN medications: dextrose 10 % in water (D10W), dextrose 10 % in water (D10W), dextrose 5%-0.45 % sodium chloride, dextrose, insulin regular, ondansetron     Past Medical History  He has a past medical history of DKA (diabetic ketoacidosis) (Multi), HTN (hypertension), and Type I diabetes mellitus (Multi).    Surgical History  He has no past surgical history on file.     Social History  He reports that he has never smoked. He has never been exposed to tobacco smoke. He has never used smokeless tobacco. He reports that he does not drink alcohol and does not use drugs.    Family History  Family History   Problem Relation Name Age of Onset    No Known Problems Mother      No Known Problems Father      Hypertension Other      Coronary artery disease Other      Diabetes Other      Heart failure Other         Allergies  Patient has no known allergies.    Code Status  Full Code     Review of Systems   Constitutional:  Positive for activity change and fatigue. Negative for chills and fever.   Respiratory:  Negative for cough, chest tightness, shortness of breath and wheezing.    Cardiovascular:  Negative for chest pain, palpitations and leg swelling.   Gastrointestinal:  Positive for diarrhea, nausea and vomiting. Negative for abdominal pain and constipation.        Abdominal discomfort   Genitourinary:  Negative  for flank pain and hematuria.   Musculoskeletal:  Negative for back pain and joint swelling.   Skin:  Negative for rash and wound.   Neurological:  Negative for tremors, syncope, weakness and headaches.       Last Recorded Vitals  BP (!) 131/94   Pulse (!) 105   Temp 37 °C (98.6 °F)   Resp 18   Wt 72.8 kg (160 lb 7.9 oz)   SpO2 100%      Physical Exam:  Vital signs and nursing notes reviewed.   Constitutional: Ill-appearing. Laying in bed in no acute distress. Conversant.   Skin: Warm and dry; no obvious lesions, rashes, pallor, or jaundice.   Eyes: EOMI. Anicteric sclera.   ENT: Mucous membranes moist; no obvious injury or deformity appreciated.   Head and Neck: Normocephalic, atraumatic. ROM preserved. Trachea midline. No appreciable JVD.   Respiratory: Nonlabored on RA. Lungs clear to auscultation bilaterally without obvious adventitious sounds. Chest rise is equal.  Cardiovascular: Tachycardic. No gross murmur, gallop, or rub. Extremities are warm and well-perfused with good capillary refill (< 3 seconds). No chest wall tenderness.   GI: Abdomen soft, nontender, nondistended. No obvious organomegaly appreciated. Bowel sounds are present.  : No CVA tenderness.   MSK: No gross abnormalities appreciated. No limitations to AROM/PROM appreciated.   Extremities: No cyanosis, edema, or clubbing evident. Neurovascularly intact.   Neuro: A&Ox3. CN 2-12 grossly intact. Able to respond to questions appropriately and clearly. No acute focal neurologic deficits appreciated.  Psych: Appropriate mood and behavior.    Assessment/Plan     34 y.o. male with PMHx s/f IDDM Type I with frequent admissions for diabetic ketoacidosis presenting with generalized weakness.    DKA without coma associated with poor-controlled type 1 diabetes mellitus  Metabolic acidosis  -Continue with insulin gtt and Q1H glucose checks while on gtt.   -Maintain NPO status while on gtt  -Frequent VBG/RFP/Mg   -Continue aggressive fluid resuscitation  as per DKA protocol   -Last A1c: 11.7 on 1/31/25   -Critical care and endocrinology consultations appreciated     Diet: NPO  DVT Prophylaxis: SCDs, low DVT risk score - encourage ambulation  Code Status: Full Code   Case Discussed With: ED provider  Additional Sources Reviewed: ED note day of admission; prior hospitalization notes, endocrinology    Anticipated Length of Stay (LOS): Patient will require 1-2 midnights     Art Diego PA-C    Dragon dictation software was used to dictate this note and thus there may be minor errors in translation/transcription including garbled speech or misspellings. Please contact for clarification if needed.

## 2025-03-12 NOTE — CONSULTS
Inpatient consult to Endocrinology  Consult performed by: Lori Packer MD  Consult ordered by: Art Diego PA-C  Reason for consult: DKA          Reason For Consult  DKA    History Of Present Illness  Ed Sanon is a 34 y.o. male presenting with generalized weakness, nausea, vomiting and diarrhea.  He was found to be hemodynamically stable.  Initial lab data revealed a glucose value of 273mg/dL, bicarbonate of 10, anion gap of 25, VBG-pH 7.12.  He was started on IVF fluids as well as an insulin infusion with admittance to the ICU.    His home regimen consists of:  NPH 22 units subcutaneous twice daily    No prandial insulin      His A1C last A1C was found to be 11.7% one month ago at the time of his last admission for DKA.       He admits to feeling better.  He ate at 5AM and thus is not hungry to order breakfast.      [employer] CGM DATA:   7 day average - 353mg/dL   14 day average - 358mg/dL   30 day average - 358mg/dL   90 day average - 353mg/dL      Time in [employer] Range data:  7 day - 2%  14 day - 1%  30 day - 0%  90 day - 1%     Past Medical History  He has a past medical history of DKA (diabetic ketoacidosis) (Multi), HTN (hypertension), and Type I diabetes mellitus (Multi).    Surgical History  He has no past surgical history on file.     Social History  He reports that he has never smoked. He has never been exposed to tobacco smoke. He has never used smokeless tobacco. He reports that he does not drink alcohol and does not use drugs.    Family History  Family History   Problem Relation Name Age of Onset    No Known Problems Mother      No Known Problems Father      Hypertension Other      Coronary artery disease Other      Diabetes Other      Heart failure Other          Allergies  Patient has no known allergies.    Review of Systems   All other systems reviewed and are negative.       Physical Exam  Vitals and nursing note reviewed.   Constitutional:       General: He is not in  "acute distress.     Appearance: Normal appearance. He is normal weight.   HENT:      Head: Normocephalic and atraumatic.      Nose: Nose normal.      Mouth/Throat:      Mouth: Mucous membranes are moist.   Eyes:      Extraocular Movements: Extraocular movements intact.   Cardiovascular:      Rate and Rhythm: Normal rate and regular rhythm.   Pulmonary:      Effort: Pulmonary effort is normal.   Musculoskeletal:         General: Normal range of motion.   Neurological:      Mental Status: He is alert and oriented to person, place, and time.   Psychiatric:         Mood and Affect: Mood normal.          Last Recorded Vitals  Blood pressure (!) 131/93, pulse 99, temperature 36.7 °C (98 °F), temperature source Temporal, resp. rate 10, height 1.778 m (5' 10\"), weight 72.8 kg (160 lb 7.9 oz), SpO2 99%.    Relevant Results  Scheduled medications  insulin lispro, 0-5 Units, subcutaneous, TID AC  insulin lispro, 5 Units, subcutaneous, TID AC  insulin NPH (Isophane), 22 Units, subcutaneous, q12h SHEILA  potassium phosphate, 21 mmol, intravenous, Once      Continuous medications     PRN medications  PRN medications: acetaminophen, dextrose, dextrose, glucagon, glucagon    Results for orders placed or performed during the hospital encounter of 03/11/25 (from the past 96 hours)   POCT GLUCOSE   Result Value Ref Range    POCT Glucose 282 (H) 74 - 99 mg/dL   CBC and Auto Differential   Result Value Ref Range    WBC 6.6 4.4 - 11.3 x10*3/uL    nRBC 0.0 0.0 - 0.0 /100 WBCs    RBC 6.13 (H) 4.50 - 5.90 x10*6/uL    Hemoglobin 19.2 (H) 13.5 - 17.5 g/dL    Hematocrit 57.3 (H) 41.0 - 52.0 %    MCV 94 80 - 100 fL    MCH 31.3 26.0 - 34.0 pg    MCHC 33.5 32.0 - 36.0 g/dL    RDW 13.2 11.5 - 14.5 %    Platelets 168 150 - 450 x10*3/uL    Neutrophils % 69.4 40.0 - 80.0 %    Immature Granulocytes %, Automated 0.6 0.0 - 0.9 %    Lymphocytes % 21.7 13.0 - 44.0 %    Monocytes % 7.3 2.0 - 10.0 %    Eosinophils % 0.2 0.0 - 6.0 %    Basophils % 0.8 0.0 - 2.0 " %    Neutrophils Absolute 4.58 1.20 - 7.70 x10*3/uL    Immature Granulocytes Absolute, Automated 0.04 0.00 - 0.70 x10*3/uL    Lymphocytes Absolute 1.43 1.20 - 4.80 x10*3/uL    Monocytes Absolute 0.48 0.10 - 1.00 x10*3/uL    Eosinophils Absolute 0.01 0.00 - 0.70 x10*3/uL    Basophils Absolute 0.05 0.00 - 0.10 x10*3/uL   Troponin I, High Sensitivity, Initial   Result Value Ref Range    Troponin I, High Sensitivity <3 0 - 20 ng/L   Morphology   Result Value Ref Range    RBC Morphology No significant RBC morphology present    ECG 12 lead   Result Value Ref Range    Ventricular Rate 99 BPM    Atrial Rate 99 BPM    NC Interval 156 ms    QRS Duration 96 ms    QT Interval 408 ms    QTC Calculation(Bazett) 524 ms    P Axis 77 degrees    R Axis 52 degrees    T Axis 48 degrees    QRS Count 16 beats    Q Onset 249 ms    T Offset 453 ms    QTC Fredericia 482 ms   Blood Gas Venous Full Panel   Result Value Ref Range    POCT pH, Venous 7.12 (LL) 7.33 - 7.43 pH    POCT pCO2, Venous 34 (L) 41 - 51 mm Hg    POCT pO2, Venous 45 35 - 45 mm Hg    POCT SO2, Venous 71 45 - 75 %    POCT Oxy Hemoglobin, Venous 69.0 45.0 - 75.0 %    POCT Hematocrit Calculated, Venous 52.0 41.0 - 52.0 %    POCT Sodium, Venous 131 (L) 136 - 145 mmol/L    POCT Potassium, Venous 5.4 (H) 3.5 - 5.3 mmol/L    POCT Chloride, Venous 103 98 - 107 mmol/L    POCT Ionized Calicum, Venous 1.22 1.10 - 1.33 mmol/L    POCT Glucose, Venous 323 (H) 74 - 99 mg/dL    POCT Lactate, Venous 2.2 (H) 0.4 - 2.0 mmol/L    POCT Base Excess, Venous -17.3 (L) -2.0 - 3.0 mmol/L    POCT HCO3 Calculated, Venous 11.1 (L) 22.0 - 26.0 mmol/L    POCT Hemoglobin, Venous 17.3 13.5 - 17.5 g/dL    POCT Anion Gap, Venous 22.0 10.0 - 25.0 mmol/L    Patient Temperature 37.0 degrees Celsius    FiO2 21 %   Troponin, High Sensitivity, 1 Hour   Result Value Ref Range    Troponin I, High Sensitivity 3 0 - 20 ng/L   Comprehensive metabolic panel   Result Value Ref Range    Glucose 273 (H) 74 - 99 mg/dL     Sodium 135 (L) 136 - 145 mmol/L    Potassium 5.3 3.5 - 5.3 mmol/L    Chloride 105 98 - 107 mmol/L    Bicarbonate 10 (LL) 21 - 32 mmol/L    Anion Gap 25 (H) 10 - 20 mmol/L    Urea Nitrogen 14 6 - 23 mg/dL    Creatinine 0.96 0.50 - 1.30 mg/dL    eGFR >90 >60 mL/min/1.73m*2    Calcium 8.1 (L) 8.6 - 10.3 mg/dL    Albumin 4.0 3.4 - 5.0 g/dL    Alkaline Phosphatase 77 33 - 120 U/L    Total Protein 6.5 6.4 - 8.2 g/dL    AST 9 9 - 39 U/L    Bilirubin, Total 0.5 0.0 - 1.2 mg/dL    ALT 14 10 - 52 U/L   Magnesium   Result Value Ref Range    Magnesium 1.70 1.60 - 2.40 mg/dL   Phosphorus   Result Value Ref Range    Phosphorus 3.5 2.5 - 4.9 mg/dL   Blood Gas Lactic Acid, Venous   Result Value Ref Range    POCT Lactate, Venous 1.4 0.4 - 2.0 mmol/L   POCT GLUCOSE   Result Value Ref Range    POCT Glucose 234 (H) 74 - 99 mg/dL   Basic metabolic panel   Result Value Ref Range    Glucose 244 (H) 74 - 99 mg/dL    Sodium 136 136 - 145 mmol/L    Potassium 4.2 3.5 - 5.3 mmol/L    Chloride 107 98 - 107 mmol/L    Bicarbonate 9 (LL) 21 - 32 mmol/L    Anion Gap 24 (H) 10 - 20 mmol/L    Urea Nitrogen 12 6 - 23 mg/dL    Creatinine 0.91 0.50 - 1.30 mg/dL    eGFR >90 >60 mL/min/1.73m*2    Calcium 7.8 (L) 8.6 - 10.3 mg/dL   Magnesium   Result Value Ref Range    Magnesium 1.66 1.60 - 2.40 mg/dL   Phosphorus   Result Value Ref Range    Phosphorus 3.1 2.5 - 4.9 mg/dL   POCT GLUCOSE   Result Value Ref Range    POCT Glucose 235 (H) 74 - 99 mg/dL   POCT GLUCOSE   Result Value Ref Range    POCT Glucose 242 (H) 74 - 99 mg/dL   POCT GLUCOSE   Result Value Ref Range    POCT Glucose 199 (H) 74 - 99 mg/dL   Blood Gas Venous   Result Value Ref Range    POCT pH, Venous 7.19 (LL) 7.33 - 7.43 pH    POCT pCO2, Venous 28 (L) 41 - 51 mm Hg    POCT pO2, Venous 63 (H) 35 - 45 mm Hg    POCT SO2, Venous 94 (H) 45 - 75 %    POCT Oxy Hemoglobin, Venous 92.1 (H) 45.0 - 75.0 %    POCT Base Excess, Venous -16.0 (L) -2.0 - 3.0 mmol/L    POCT HCO3 Calculated, Venous 10.7 (L)  22.0 - 26.0 mmol/L    Patient Temperature 37.0 degrees Celsius    FiO2 21 %   Magnesium   Result Value Ref Range    Magnesium 1.65 1.60 - 2.40 mg/dL   Renal Function Panel   Result Value Ref Range    Glucose 204 (H) 74 - 99 mg/dL    Sodium 136 136 - 145 mmol/L    Potassium 3.4 (L) 3.5 - 5.3 mmol/L    Chloride 110 (H) 98 - 107 mmol/L    Bicarbonate 11 (L) 21 - 32 mmol/L    Anion Gap 18 10 - 20 mmol/L    Urea Nitrogen 11 6 - 23 mg/dL    Creatinine 0.85 0.50 - 1.30 mg/dL    eGFR >90 >60 mL/min/1.73m*2    Calcium 7.7 (L) 8.6 - 10.3 mg/dL    Phosphorus 1.9 (L) 2.5 - 4.9 mg/dL    Albumin 3.7 3.4 - 5.0 g/dL   POCT GLUCOSE   Result Value Ref Range    POCT Glucose 191 (H) 74 - 99 mg/dL   POCT GLUCOSE   Result Value Ref Range    POCT Glucose 159 (H) 74 - 99 mg/dL   POCT GLUCOSE   Result Value Ref Range    POCT Glucose 138 (H) 74 - 99 mg/dL   POCT GLUCOSE   Result Value Ref Range    POCT Glucose 137 (H) 74 - 99 mg/dL   POCT GLUCOSE   Result Value Ref Range    POCT Glucose 158 (H) 74 - 99 mg/dL   Blood Gas Venous   Result Value Ref Range    POCT pH, Venous 7.26 (L) 7.33 - 7.43 pH    POCT pCO2, Venous 41 41 - 51 mm Hg    POCT pO2, Venous 58 (H) 35 - 45 mm Hg    POCT SO2, Venous 92 (H) 45 - 75 %    POCT Oxy Hemoglobin, Venous 90.0 (H) 45.0 - 75.0 %    POCT Base Excess, Venous -8.3 (L) -2.0 - 3.0 mmol/L    POCT HCO3 Calculated, Venous 18.4 (L) 22.0 - 26.0 mmol/L    Patient Temperature 37.0 degrees Celsius    FiO2 21 %   Magnesium   Result Value Ref Range    Magnesium 1.69 1.60 - 2.40 mg/dL   Renal Function Panel   Result Value Ref Range    Glucose 179 (H) 74 - 99 mg/dL    Sodium 135 (L) 136 - 145 mmol/L    Potassium 3.8 3.5 - 5.3 mmol/L    Chloride 111 (H) 98 - 107 mmol/L    Bicarbonate 16 (L) 21 - 32 mmol/L    Anion Gap 12 10 - 20 mmol/L    Urea Nitrogen 12 6 - 23 mg/dL    Creatinine 0.82 0.50 - 1.30 mg/dL    eGFR >90 >60 mL/min/1.73m*2    Calcium 7.7 (L) 8.6 - 10.3 mg/dL    Phosphorus 2.7 2.5 - 4.9 mg/dL    Albumin 3.6 3.4 - 5.0  g/dL   CBC   Result Value Ref Range    WBC 6.3 4.4 - 11.3 x10*3/uL    nRBC 0.0 0.0 - 0.0 /100 WBCs    RBC 4.82 4.50 - 5.90 x10*6/uL    Hemoglobin 15.2 13.5 - 17.5 g/dL    Hematocrit 44.7 41.0 - 52.0 %    MCV 93 80 - 100 fL    MCH 31.5 26.0 - 34.0 pg    MCHC 34.0 32.0 - 36.0 g/dL    RDW 12.9 11.5 - 14.5 %    Platelets 159 150 - 450 x10*3/uL   POCT GLUCOSE   Result Value Ref Range    POCT Glucose 209 (H) 74 - 99 mg/dL   POCT GLUCOSE   Result Value Ref Range    POCT Glucose 219 (H) 74 - 99 mg/dL   POCT GLUCOSE   Result Value Ref Range    POCT Glucose 259 (H) 74 - 99 mg/dL     *Note: Due to a large number of results and/or encounters for the requested time period, some results have not been displayed. A complete set of results can be found in Results Review.      ECG 12 lead    Result Date: 3/12/2025  Sinus rhythm Borderline ST elevation, anterior leads Prolonged QT interval    XR chest 2 views    Result Date: 3/11/2025  Interpreted By:  Aura Manley, STUDY: XR CHEST 2 VIEWS;  3/11/2025 6:33 pm   INDICATION: Signs/Symptoms:weakness.     COMPARISON: 02/12/2025   ACCESSION NUMBER(S): [id number]   ORDERING CLINICIAN: JENNIFER ROSARIO   FINDINGS: Artifact from overlying monitoring leads noted.  No focal infiltrate. No evidence of pleural effusion or pneumothorax. The cardiac silhouette is normal in size. Osseous thorax is grossly intact.       No acute cardiopulmonary process radiographically.   MACRO: None.   Signed by: Aura Manley 3/11/2025 6:42 PM Dictation workstation:   [id number]    ECG 12 lead    Result Date: 2/27/2025  Sinus tachycardia Probable left atrial enlargement RSR' in V1 or V2, probably normal variant ST elev, probable normal early repol pattern See ED provider note for full interpretation and clinical correlation Confirmed by Destinee Bashir ([id number]) on 2/27/2025 9:33:10 PM    XR chest 1 view    Result Date: 2/13/2025  Interpreted By:  Eric Tirado, STUDY: XR CHEST 1 VIEW;  2/12/2025 9:47 pm    INDICATION: Signs/Symptoms:tachypnea.     COMPARISON: 01/15/2025   ACCESSION NUMBER(S): [id number]   ORDERING CLINICIAN: ARIANE WADE   FINDINGS:         CARDIOMEDIASTINAL SILHOUETTE: Cardiomediastinal silhouette is normal in size and configuration.   LUNGS: Lungs are clear.   ABDOMEN: No remarkable upper abdominal findings.   BONES: No acute osseous changes.       1.  No evidence of acute cardiopulmonary process.       MACRO: None   Signed by: Eric Tirado 2/13/2025 4:15 PM Dictation workstation:   [id number]       Assessment/Plan     IMPRESSION:  DKA - resolved   POORLY CONTROLLED TYPE I DIABETES MELLITUS  Long term insulin use with basal insulin only      RECOMMENDATIONS:  To continue NPH 22 units subcutaneous twice daily   To continue Humalog 5 units TID AC  To continue insulin correction scale TID AC in 1 unit increments   Diabetic diet as tolerated   Accu-Cheks ACHS  Hypoglycemic protocol   He recognizes that he does need help with managing his diabetes  He has never seen a psychiatrist as previously recommended on other hospital admissions throughout 2024  He has not seen his endocrinologist at [facility] and does not know when the next scheduled appointment is  Encouraged to make the above appointments  Counseled that without prandial insulin, the glucose control will always be subpar  Will continue to follow     Thank you for the courtesy of this consult      Lori Packer MD

## 2025-03-12 NOTE — HOSPITAL COURSE
34-year-old male with past medical history of insulin-dependent diabetes mellitus, frequent hospitalization secondary to DKA from noncompliance with start acting insulin presented for generalized weakness and was found to have hyperglycemia with metabolic acidosis.  Patient was admitted and started on insulin drip along with IV fluids.  He reports that he gets weird feeling when he is supposed to be taking his short acting insulin.  At that time his blood sugars are high.  We talked about potentially this being from side effect of uncontrolled diabetes causing peripheral neuropathy and autonomic dysfunction.  Patient understood that and states that he has came along way and needs to be more compliant.  He stated he did not need refills on his medication but those have been sent regardless in order to provide him with adequate supply.  Patient is in stable condition for discharge and tolerated diet without any nausea or vomiting.    34 minutes spent in discharge timing

## 2025-03-12 NOTE — CONSULTS
Critical Care Medicine Consult      Reason For Consult  DKA    History Of Present Illness  Ed Sanon is a 34 y.o. male with past medical history of of diabetes mellitus type 1 with poor insulin compliance, anxiety, depression, medical noncompliance, and recurrent admissions for DKA presented to [facility] ED with complaints of fatigue, nausea, vomiting, and concerns for DKA. He stated that his symptoms began around 2am. Upon ED workup, temperature 37 °C, heart rate 108, blood pressure 120/78, respiratory rate 16, and SpO2 100% on room air.  ED labs revealed blood glucose 273, sodium 135, potassium 5.3, bicarbonate 10, anion gap 25, calcium 8.1, RBC 6.13, hemoglobin 19.2, hematocrit 57.3, venous pH 7.12, pCO2 34, HCO3 calculated 11.1, lactate 2.2>>1.4, and remainder of chemistry profile and CBC unremarkable. ED interventions included 2L NS bolus, 4mg Zofran, IV fluids per DKA protocol, and admission to ICU for further management.     Patient seen and examined in ICU bed 10. Patient lying in bed alert and oriented. Reported that he began having fatigue, nausea, and vomiting that started around 2am. He stated that his blood glucose levels have been around 300. He stated that he is only taking his NPH 22 units twice a day and has not been taking his short acting insulin. He denies any headache, vision changes, lightheadedness, fevers, chills, chest pain, cough, palpitations, abdominal pain, or any recent sick contacts. He has frequent readmissions for DKA events due to noncompliance with his prescribed insulin regimen. He does not take his short-acting insulin due to fear of going too low and does not feel good when his glucose is trending down.     Past Medical History:   Diagnosis Date    DKA (diabetic ketoacidosis) (Multi)     recurrent    HTN (hypertension)     Type I diabetes mellitus (Multi)      History reviewed. No pertinent surgical history.  Medications Prior to Admission   Medication Sig Dispense  "Refill Last Dose/Taking    Guardian 4 Glucose Sensor device Change insulin sensor every 7 days as directed, linked to Minimed insulin pump, check with endocrinology for updated regimen (Patient not taking: Reported on 2/12/2025) 13 each 2     Gvoke HypoPen 1-Pack 1 mg/0.2 mL auto-injector Inject 1 mg under the skin 1 time for 1 dose. 0.4 mL 3     insulin NPH, Isophane, (HumuLIN N,NovoLIN N) 100 unit/mL (3 mL) injection Inject 22 Units under the skin 2 times a day before meals. Take as directed per insulin instructions.       insulin regular (HumuLIN R,NovoLIN R) 100 unit/mL injection Inject 0-10 Units under the skin 3 times a day before meals.   0 unit(s) if Blood glucose is between   2 unit(s) if Blood glucose is between 151-200  4 unit(s) if Blood glucose is between 201-250  6 unit(s) if Blood glucose is between 251-300  8 unit(s) if Blood glucose is between 301-350  10 unit(s) if Blood glucose is between 351-400 9 mL 0     insulin regular (HumuLIN R,NovoLIN R) 100 unit/mL injection Inject 5 Units under the skin 3 times a day before meals. Take as directed per insulin instructions. Discard vial 31 days after 1st opening 10 mL 0     lancets misc Inject 1 Units under the skin 2 times a day. Sliding scale       ondansetron (Zofran) 4 mg tablet Take 1 tablet (4 mg) by mouth every 8 hours if needed for nausea or vomiting. 20 tablet 0     OneTouch Ultra Test strip USE 1 STRIP TO CHECK GLUCOSE 4 TIMES DAILY AS DIRECTED       OneTouch Ultra2 Meter misc use as directed       pen needle, diabetic 32 gauge x 5/32\" needle Use as instructed 4 times daily        Patient has no known allergies.  Social History     Tobacco Use    Smoking status: Never     Passive exposure: Never    Smokeless tobacco: Never   Vaping Use    Vaping status: Never Used   Substance Use Topics    Alcohol use: Never    Drug use: Never     Family History   Problem Relation Name Age of Onset    No Known Problems Mother      No Known Problems Father   "    Hypertension Other      Coronary artery disease Other      Diabetes Other      Heart failure Other         Scheduled Medications:         Continuous Medications:   dextrose 10 % in water (D10W), 150 mL/hr  dextrose 10 % in water (D10W), 150 mL/hr  dextrose 5%-0.45 % sodium chloride, 150 mL/hr, Last Rate: 150 mL/hr (03/11/25 2129)  insulin regular, 0-50 Units/hr, Last Rate: 10 Units/hr (03/11/25 2145)  sodium chloride, 250 mL/hr         PRN Medications:   PRN medications: dextrose 10 % in water (D10W), dextrose 10 % in water (D10W), dextrose 5%-0.45 % sodium chloride, dextrose, insulin regular    Review of Systems:  Review of Systems   Constitutional:  Positive for activity change and fatigue.   Gastrointestinal:  Positive for nausea and vomiting.        Objective   Vitals:  Most Recent:  Vitals:    03/11/25 2200   BP: 146/89   Pulse: 110   Resp: 22   Temp:    SpO2: 100%       24hr Min/Max:  Temp  Min: 37 °C (98.6 °F)  Max: 37 °C (98.6 °F)  Pulse  Min: 105  Max: 112  BP  Min: 120/78  Max: 146/89  Resp  Min: 11  Max: 22  SpO2  Min: 98 %  Max: 100 %        Intake/Output Summary (Last 24 hours) at 3/11/2025 2227  Last data filed at 3/11/2025 2124  Gross per 24 hour   Intake 1000 ml   Output --   Net 1000 ml           Physical exam:    Physical Exam  Constitutional:       General: He is not in acute distress.     Appearance: Normal appearance. He is ill-appearing.   HENT:      Head: Normocephalic.      Nose: Nose normal.      Mouth/Throat:      Mouth: Mucous membranes are moist.   Eyes:      Extraocular Movements: Extraocular movements intact.      Conjunctiva/sclera: Conjunctivae normal.      Pupils: Pupils are equal, round, and reactive to light.   Cardiovascular:      Rate and Rhythm: Regular rhythm. Tachycardia present.      Pulses: Normal pulses.      Heart sounds: Normal heart sounds. No murmur heard.  Pulmonary:      Effort: Pulmonary effort is normal.      Breath sounds: Normal breath sounds.   Abdominal:       General: Bowel sounds are normal. There is no distension.      Palpations: Abdomen is soft.      Tenderness: There is no abdominal tenderness. There is no guarding.   Musculoskeletal:         General: Normal range of motion.      Cervical back: Normal range of motion.   Skin:     General: Skin is warm and dry.      Capillary Refill: Capillary refill takes less than 2 seconds.   Neurological:      General: No focal deficit present.      Mental Status: He is alert and oriented to person, place, and time. Mental status is at baseline.      Cranial Nerves: Cranial nerves 2-12 are intact.      Sensory: Sensation is intact.      Motor: Motor function is intact.   Psychiatric:         Mood and Affect: Mood normal. Affect is flat.         Behavior: Behavior normal.         Thought Content: Thought content normal.         Judgment: Judgment normal.          Lab/Radiology/Diagnostic Review:  Results for orders placed or performed during the hospital encounter of 03/11/25 (from the past 24 hours)   POCT GLUCOSE   Result Value Ref Range    POCT Glucose 282 (H) 74 - 99 mg/dL   CBC and Auto Differential   Result Value Ref Range    WBC 6.6 4.4 - 11.3 x10*3/uL    nRBC 0.0 0.0 - 0.0 /100 WBCs    RBC 6.13 (H) 4.50 - 5.90 x10*6/uL    Hemoglobin 19.2 (H) 13.5 - 17.5 g/dL    Hematocrit 57.3 (H) 41.0 - 52.0 %    MCV 94 80 - 100 fL    MCH 31.3 26.0 - 34.0 pg    MCHC 33.5 32.0 - 36.0 g/dL    RDW 13.2 11.5 - 14.5 %    Platelets 168 150 - 450 x10*3/uL    Neutrophils % 69.4 40.0 - 80.0 %    Immature Granulocytes %, Automated 0.6 0.0 - 0.9 %    Lymphocytes % 21.7 13.0 - 44.0 %    Monocytes % 7.3 2.0 - 10.0 %    Eosinophils % 0.2 0.0 - 6.0 %    Basophils % 0.8 0.0 - 2.0 %    Neutrophils Absolute 4.58 1.20 - 7.70 x10*3/uL    Immature Granulocytes Absolute, Automated 0.04 0.00 - 0.70 x10*3/uL    Lymphocytes Absolute 1.43 1.20 - 4.80 x10*3/uL    Monocytes Absolute 0.48 0.10 - 1.00 x10*3/uL    Eosinophils Absolute 0.01 0.00 - 0.70 x10*3/uL     Basophils Absolute 0.05 0.00 - 0.10 x10*3/uL   Troponin I, High Sensitivity, Initial   Result Value Ref Range    Troponin I, High Sensitivity <3 0 - 20 ng/L   Morphology   Result Value Ref Range    RBC Morphology No significant RBC morphology present    Blood Gas Venous Full Panel   Result Value Ref Range    POCT pH, Venous 7.12 (LL) 7.33 - 7.43 pH    POCT pCO2, Venous 34 (L) 41 - 51 mm Hg    POCT pO2, Venous 45 35 - 45 mm Hg    POCT SO2, Venous 71 45 - 75 %    POCT Oxy Hemoglobin, Venous 69.0 45.0 - 75.0 %    POCT Hematocrit Calculated, Venous 52.0 41.0 - 52.0 %    POCT Sodium, Venous 131 (L) 136 - 145 mmol/L    POCT Potassium, Venous 5.4 (H) 3.5 - 5.3 mmol/L    POCT Chloride, Venous 103 98 - 107 mmol/L    POCT Ionized Calicum, Venous 1.22 1.10 - 1.33 mmol/L    POCT Glucose, Venous 323 (H) 74 - 99 mg/dL    POCT Lactate, Venous 2.2 (H) 0.4 - 2.0 mmol/L    POCT Base Excess, Venous -17.3 (L) -2.0 - 3.0 mmol/L    POCT HCO3 Calculated, Venous 11.1 (L) 22.0 - 26.0 mmol/L    POCT Hemoglobin, Venous 17.3 13.5 - 17.5 g/dL    POCT Anion Gap, Venous 22.0 10.0 - 25.0 mmol/L    Patient Temperature 37.0 degrees Celsius    FiO2 21 %   Troponin, High Sensitivity, 1 Hour   Result Value Ref Range    Troponin I, High Sensitivity 3 0 - 20 ng/L   Comprehensive metabolic panel   Result Value Ref Range    Glucose 273 (H) 74 - 99 mg/dL    Sodium 135 (L) 136 - 145 mmol/L    Potassium 5.3 3.5 - 5.3 mmol/L    Chloride 105 98 - 107 mmol/L    Bicarbonate 10 (LL) 21 - 32 mmol/L    Anion Gap 25 (H) 10 - 20 mmol/L    Urea Nitrogen 14 6 - 23 mg/dL    Creatinine 0.96 0.50 - 1.30 mg/dL    eGFR >90 >60 mL/min/1.73m*2    Calcium 8.1 (L) 8.6 - 10.3 mg/dL    Albumin 4.0 3.4 - 5.0 g/dL    Alkaline Phosphatase 77 33 - 120 U/L    Total Protein 6.5 6.4 - 8.2 g/dL    AST 9 9 - 39 U/L    Bilirubin, Total 0.5 0.0 - 1.2 mg/dL    ALT 14 10 - 52 U/L   Magnesium   Result Value Ref Range    Magnesium 1.70 1.60 - 2.40 mg/dL   Phosphorus   Result Value Ref Range     Phosphorus 3.5 2.5 - 4.9 mg/dL   Blood Gas Lactic Acid, Venous   Result Value Ref Range    POCT Lactate, Venous 1.4 0.4 - 2.0 mmol/L   POCT GLUCOSE   Result Value Ref Range    POCT Glucose 234 (H) 74 - 99 mg/dL   Basic metabolic panel   Result Value Ref Range    Glucose 244 (H) 74 - 99 mg/dL    Sodium 136 136 - 145 mmol/L    Potassium 4.2 3.5 - 5.3 mmol/L    Chloride 107 98 - 107 mmol/L    Bicarbonate 9 (LL) 21 - 32 mmol/L    Anion Gap 24 (H) 10 - 20 mmol/L    Urea Nitrogen 12 6 - 23 mg/dL    Creatinine 0.91 0.50 - 1.30 mg/dL    eGFR >90 >60 mL/min/1.73m*2    Calcium 7.8 (L) 8.6 - 10.3 mg/dL   Magnesium   Result Value Ref Range    Magnesium 1.66 1.60 - 2.40 mg/dL   Phosphorus   Result Value Ref Range    Phosphorus 3.1 2.5 - 4.9 mg/dL   POCT GLUCOSE   Result Value Ref Range    POCT Glucose 235 (H) 74 - 99 mg/dL     XR chest 2 views    Result Date: 3/11/2025  Interpreted By:  Aura Manley, STUDY: XR CHEST 2 VIEWS;  3/11/2025 6:33 pm   INDICATION: Signs/Symptoms:weakness.     COMPARISON: 02/12/2025   ACCESSION NUMBER(S): [id number]   ORDERING CLINICIAN: JENNIFER ROSARIO   FINDINGS: Artifact from overlying monitoring leads noted.  No focal infiltrate. No evidence of pleural effusion or pneumothorax. The cardiac silhouette is normal in size. Osseous thorax is grossly intact.       No acute cardiopulmonary process radiographically.   MACRO: None.   Signed by: Aura Manley 3/11/2025 6:42 PM Dictation workstation:   [id number]    ECG 12 lead    Result Date: 2/27/2025  Sinus tachycardia Probable left atrial enlargement RSR' in V1 or V2, probably normal variant ST elev, probable normal early repol pattern See ED provider note for full interpretation and clinical correlation Confirmed by Destinee Bashir ([id number]) on 2/27/2025 9:33:10 PM    XR chest 1 view    Result Date: 2/13/2025  Interpreted By:  Eric Tirado, STUDY: XR CHEST 1 VIEW;  2/12/2025 9:47 pm   INDICATION: Signs/Symptoms:tachypnea.     COMPARISON:  01/15/2025   ACCESSION NUMBER(S): [id number]   ORDERING CLINICIAN: AIRANE WADE   FINDINGS:         CARDIOMEDIASTINAL SILHOUETTE: Cardiomediastinal silhouette is normal in size and configuration.   LUNGS: Lungs are clear.   ABDOMEN: No remarkable upper abdominal findings.   BONES: No acute osseous changes.       1.  No evidence of acute cardiopulmonary process.       MACRO: None   Signed by: Eric Tirado 2/13/2025 4:15 PM Dictation workstation:   [id number]      Assessment/Plan   Assessment & Plan  Diabetic ketoacidosis without coma associated with type 1 diabetes mellitus (Multi)    DKA with type 1 diabetes mellitus with poor compliance  - Presented with complaints of nausea, vomiting, and fatigue   - Blood glucose 273  - Anion gap 25>>24  - Bicarbonate 10>>9  - VBG: pH 7.12, pCO2 34, HCO3 calculated 11.1  - Lactate 2.2>>1.4  - S/p 2L NS bolus in ED  - Continue IV fluids per DKA protocol: 1/2 normal saline at 250ml/hr until blood glucose less than 250mg/dL, then switch to D5 1/2 NS at 150ml/hr for blood glucose 150-250mg/dL, and D10 at 150ml/hr for blood glucose less than or equal to 150mg/dL and anion gap still open or blood glucose less than 70mg/dl and anion gap still open.  - Continue insulin infusion per DKA protocol, titrate per order  - POCT glucose checks hourly while on insulin infusion  - Trend RFP, magnesium, and VBG every 4 hours while on insulin infusion  - Replete electrolytes as necessary  - Hypoglycemia protocol PRN  - NPO while on insulin infusion  - Monitor intake and output  - Plan to bridge off insulin infusion once anion gap less than 15 and bicarbonate greater than 15  - Endocrinology consult, input appreciated    HAGMA, elevated lactate  - Anion gap 25>>24  - Bicarbonate 10>>9  - VBG: pH 7.12, pCO2 34, HCO3 calculated 11.1  - Lactate 2.2>>1.4  - S/p 2L NS bolus in ED  - Continue IV fluids per DKA protocol  - Continue insulin infusion per DKA protocol  - Trend RFP, magnesium, and VBG  every 4 hours while on insulin infusion  - Replete electrolytes as necessary  - NPO  - Monitor intake and output  - Endocrinology consult, input appreciated    Hyperkalemia  - Potassium 5.3>>4.2  - Secondary to combination or acidosis and hyperglycemia leading to cellular shifts  - Improved with volume resuscitation and insulin infusion  - Trend RFP every 4 hours while on insulin infusion  - Continuous telemetry monitoring  - Monitor intake and output    I spent 45 minutes of cumulative critical care time with the patient.  Greater than 50% of that time was spent in the direct collaboration and or coordination of care of the patient.     Dragon dictation software was used to dictate this note and thus there may be minor errors in translation/transcription including garbled speech or misspellings. Please contact for clarification if needed.

## 2025-03-12 NOTE — PROGRESS NOTES
03/12/25 1030   Discharge Planning   Living Arrangements Parent   Support Systems Parent   Assistance Needed None   Type of Residence Private residence   Home or Post Acute Services None   Expected Discharge Disposition Home   Does the patient need discharge transport arranged? No   Patient Choice   Patient / Family choosing to utilize agency / facility established prior to hospitalization No   Stroke Family Assessment   Stroke Family Assessment Needed No   Intensity of Service   Intensity of Service 0-30 min     Pt lives at home with parents. Pt's PCP is Isak. Pt is overall independent at home. Pt denies any discharge needs at this time. Pt is aware of Care Transitions if any needs were to arise.

## 2025-03-12 NOTE — NURSING NOTE
Pt given NHP insulin, provided with ham/cheese sandwich, crackers, pudding, and water.  Educated on need to eat & plan to discontinue insulin/fluids  at 0730.

## 2025-03-13 ENCOUNTER — PATIENT OUTREACH (OUTPATIENT)
Dept: PRIMARY CARE | Facility: CLINIC | Age: 35
End: 2025-03-13
Payer: MEDICAID

## 2025-03-13 NOTE — PROGRESS NOTES
Discharge Facility: [facility]   Discharge Diagnosis: Diabetic ketoacidosis without coma associated with type 1 diabetes mellitus (Multi)   Admission Date: 3/11/25  Discharge Date: 3/12/25    PCP Appointment Date: no appointment, message to office  Specialist Appointment Date: n/a  Hospital Encounter and Summary Linked: Yes/  ED to Hosp-Admission (Discharged) with Joaquin Montemayor MD; Barbie Early MD (03/11/2025)   Two attempts were made to reach patient within two business days after discharge. Voicemail left with contact information for patient to call back with any non-emergent questions or concerns.

## 2025-03-17 ENCOUNTER — TELEPHONE (OUTPATIENT)
Dept: PRIMARY CARE | Facility: CLINIC | Age: 35
End: 2025-03-17
Payer: MEDICAID

## 2025-03-26 ENCOUNTER — APPOINTMENT (OUTPATIENT)
Dept: PRIMARY CARE | Facility: CLINIC | Age: 35
End: 2025-03-26
Payer: MEDICAID

## 2025-03-26 VITALS
OXYGEN SATURATION: 98 % | BODY MASS INDEX: 23.66 KG/M2 | HEART RATE: 99 BPM | SYSTOLIC BLOOD PRESSURE: 117 MMHG | RESPIRATION RATE: 16 BRPM | TEMPERATURE: 97.9 F | WEIGHT: 165.3 LBS | HEIGHT: 70 IN | DIASTOLIC BLOOD PRESSURE: 82 MMHG

## 2025-03-26 DIAGNOSIS — R80.9 MICROALBUMINURIA DUE TO TYPE 1 DIABETES MELLITUS (MULTI): ICD-10-CM

## 2025-03-26 DIAGNOSIS — E10.29 MICROALBUMINURIA DUE TO TYPE 1 DIABETES MELLITUS (MULTI): ICD-10-CM

## 2025-03-26 DIAGNOSIS — E11.65 POORLY CONTROLLED DIABETES MELLITUS (MULTI): ICD-10-CM

## 2025-03-26 DIAGNOSIS — E10.10 DIABETIC KETOACIDOSIS WITHOUT COMA ASSOCIATED WITH TYPE 1 DIABETES MELLITUS: Primary | ICD-10-CM

## 2025-03-26 DIAGNOSIS — Z91.199 NONCOMPLIANCE WITH DIABETES TREATMENT: ICD-10-CM

## 2025-03-26 DIAGNOSIS — E10.65 TYPE 1 DIABETES MELLITUS WITH HYPERGLYCEMIA (MULTI): ICD-10-CM

## 2025-03-26 PROCEDURE — 3074F SYST BP LT 130 MM HG: CPT | Performed by: INTERNAL MEDICINE

## 2025-03-26 PROCEDURE — 3046F HEMOGLOBIN A1C LEVEL >9.0%: CPT | Performed by: INTERNAL MEDICINE

## 2025-03-26 PROCEDURE — 99495 TRANSJ CARE MGMT MOD F2F 14D: CPT | Performed by: INTERNAL MEDICINE

## 2025-03-26 PROCEDURE — 3008F BODY MASS INDEX DOCD: CPT | Performed by: INTERNAL MEDICINE

## 2025-03-26 PROCEDURE — 1036F TOBACCO NON-USER: CPT | Performed by: INTERNAL MEDICINE

## 2025-03-26 PROCEDURE — 3079F DIAST BP 80-89 MM HG: CPT | Performed by: INTERNAL MEDICINE

## 2025-03-26 RX ORDER — BLOOD-GLUCOSE,RECEIVER,CONT
1 EACH MISCELLANEOUS AS NEEDED
COMMUNITY

## 2025-03-26 RX ORDER — BLOOD-GLUCOSE SENSOR
1 EACH MISCELLANEOUS
COMMUNITY

## 2025-03-26 SDOH — ECONOMIC STABILITY: FOOD INSECURITY: WITHIN THE PAST 12 MONTHS, YOU WORRIED THAT YOUR FOOD WOULD RUN OUT BEFORE YOU GOT MONEY TO BUY MORE.: NEVER TRUE

## 2025-03-26 SDOH — ECONOMIC STABILITY: FOOD INSECURITY: WITHIN THE PAST 12 MONTHS, THE FOOD YOU BOUGHT JUST DIDN'T LAST AND YOU DIDN'T HAVE MONEY TO GET MORE.: NEVER TRUE

## 2025-03-26 ASSESSMENT — LIFESTYLE VARIABLES
HOW MANY STANDARD DRINKS CONTAINING ALCOHOL DO YOU HAVE ON A TYPICAL DAY: PATIENT DOES NOT DRINK
SKIP TO QUESTIONS 9-10: 1
HOW OFTEN DO YOU HAVE SIX OR MORE DRINKS ON ONE OCCASION: NEVER
HOW OFTEN DO YOU HAVE A DRINK CONTAINING ALCOHOL: NEVER
AUDIT-C TOTAL SCORE: 0

## 2025-03-26 NOTE — ASSESSMENT & PLAN NOTE
Trial of referral to Dr Jimenez in [place]. He has a follow up endocrinology appointment scheduled outside of the  system. Advise keeping this appointment also. He would benefit from use of insulin pump therapy and CGM, will give sample of Dexcom 7 sensor today.  2 Dexcom 7 sensors were given to the patient with a Dexcom 7 . Hopefully, he will try the CGM

## 2025-03-26 NOTE — PROGRESS NOTES
"Patient: Ed Sanon  : 1990  PCP: Henri Brumfield MD  MRN: [id number]  Program: Transitional Care Management  Status: Enrolled  Effective Dates: 3/13/2025 - present  Responsible Staff: Zuleyka Small RN  Social Drivers to be Addressed: Physical Activity, Social Connections, Stress         Ed Sanon is a 34 y.o. male presenting today for follow-up after being discharged from the hospital 14 days ago. The main problem requiring admission was recurrent diabetic ketoacidosis with DM type 1. The discharge summary and/or Transitional Care Management documentation was reviewed. Medication reconciliation was performed as indicated via the \"Spencer as Reviewed\" timestamp.     Today he mentioned that he did not feel very well this morning and had stomach ache he thinks it is related to his diabetic condition.     Patient states that he does have an appointment with endocrinology scheduled. Patient only takes NPH insulin. Patient has insulin pump, he has had difficulty getting supplies he states. He is not certain when endocrinology appointment is scheduled. Patient has a Elmer 3 , patient has found that the Elmer does not work as well near the end of the life of the device. Patient has tried a Dexcom sensor in the past.  Patient does not think that some of the endocrinologists that he has seen are able to manage the pump. He states that if he has questions, he is not able to get help that he needs.      HTN/HLD: /82  today. He takes no meds for BP or lipids     Anxiety issues: patient has never had anxiety in the past, patient still feels some anxiety, patient states that meds have caused panic attacks in the past     Ed Sanon was contacted by Transitional Care Management services two days after his discharge. This encounter and supporting documentation was reviewed.    Review of Systems    otherwise negative aside from what was mentioned above in HPI.     /82 (BP Location: Right " "arm, Patient Position: Sitting, BP Cuff Size: Adult)   Pulse 99   Temp 36.6 °C (97.9 °F) (Temporal)   Resp 16   Ht 1.778 m (5' 10\")   Wt 75 kg (165 lb 4.8 oz)   SpO2 98%   BMI 23.72 kg/m²     Physical Exam  CONSTITUTIONAL - well nourished, well developed, looks like stated age, in no acute distress, not ill-appearing, and not tired appearing  SKIN - normal skin color and pigmentation  HEAD - no trauma, normocephalic  EYES - normal external exam  ENT - TM's intact, no injection, no signs of infection  NECK - supple without rigidity, no neck mass was observed, no thyromegaly or thyroid nodules  CHEST - clear to auscultation, no wheezing, no crackles and no rales, good effort  CARDIAC - regular rate and regular rhythm, no skipped beats, no murmur  EXTREMITIES - no edema, no deformities  NEUROLOGICAL - normal gait, normal balance  PSYCHIATRIC - alert, pleasant and cordial, age-appropriate  IMMUNOLOGIC - no cervical lymphadenopathy     The complexity of medical decision making for this patient's transitional care is moderate.    Assessment/Plan   Problem List Items Addressed This Visit             ICD-10-CM    Diabetic ketoacidosis without coma associated with type 1 diabetes mellitus (Multi) - Primary E10.10    Relevant Orders    Referral to Endocrinology    Comprehensive Metabolic Panel    TSH with reflex to Free T4 if abnormal    Albumin-Creatinine Ratio, Urine Random    Microalbuminuria due to type 1 diabetes mellitus (Multi) E10.29, R80.9    Relevant Orders    Referral to Endocrinology    Comprehensive Metabolic Panel    TSH with reflex to Free T4 if abnormal    Noncompliance with diabetes treatment Z91.199    Relevant Orders    Lipid Panel    Comprehensive Metabolic Panel    TSH with reflex to Free T4 if abnormal    Poorly controlled diabetes mellitus (Multi) E11.65    Relevant Orders    Lipid Panel    Comprehensive Metabolic Panel    TSH with reflex to Free T4 if abnormal    Albumin-Creatinine Ratio, Urine " Random    Hemoglobin A1C    Type 1 diabetes mellitus with hyperglycemia (Multi) E10.65     Trial of referral to Dr Jimenez in [place]. He has a follow up endocrinology appointment scheduled outside of the  system. Advise keeping this appointment also. He would benefit from use of insulin pump therapy and CGM, will give sample of Dexcom 7 sensor today.  2 Dexcom 7 sensors were given to the patient with a Dexcom 7 . Hopefully, he will try the CGM         Relevant Orders    Referral to Endocrinology    Comprehensive Metabolic Panel    TSH with reflex to Free T4 if abnormal    Albumin-Creatinine Ratio, Urine Random    Hemoglobin A1C     Follow up in 3 months, 14 day TCM is completed today. Patient no longer uses short acting insulin, he is concerned about drops in glucoses

## 2025-03-28 ENCOUNTER — PATIENT OUTREACH (OUTPATIENT)
Dept: PRIMARY CARE | Facility: CLINIC | Age: 35
End: 2025-03-28
Payer: MEDICAID

## 2025-03-31 ENCOUNTER — TELEPHONE (OUTPATIENT)
Dept: PRIMARY CARE | Facility: CLINIC | Age: 35
End: 2025-03-31
Payer: MEDICAID

## 2025-03-31 NOTE — TELEPHONE ENCOUNTER
ELIUD sent paperwork to be signed stating that Ed is disabled, and unable to work because he would like to modify his child support order. To my knowledge, I don't see anything in his chart stating that he is disabled.    Printed out your last office note, attached it to paperwork and placed on your desk    Please Advise

## 2025-04-02 ENCOUNTER — HOSPITAL ENCOUNTER (INPATIENT)
Facility: HOSPITAL | Age: 35
LOS: 1 days | Discharge: HOME | End: 2025-04-03
Attending: STUDENT IN AN ORGANIZED HEALTH CARE EDUCATION/TRAINING PROGRAM | Admitting: INTERNAL MEDICINE
Payer: MEDICAID

## 2025-04-02 ENCOUNTER — APPOINTMENT (OUTPATIENT)
Dept: CARDIOLOGY | Facility: HOSPITAL | Age: 35
End: 2025-04-02
Payer: MEDICAID

## 2025-04-02 DIAGNOSIS — E11.10 DKA, TYPE 2, NOT AT GOAL: Primary | ICD-10-CM

## 2025-04-02 DIAGNOSIS — E10.10 DIABETIC KETOACIDOSIS WITHOUT COMA ASSOCIATED WITH TYPE 1 DIABETES MELLITUS: ICD-10-CM

## 2025-04-02 LAB
ALBUMIN SERPL BCP-MCNC: 4.9 G/DL (ref 3.4–5)
ALP SERPL-CCNC: 95 U/L (ref 33–120)
ALT SERPL W P-5'-P-CCNC: 12 U/L (ref 10–52)
ANION GAP BLDV CALCULATED.4IONS-SCNC: 23 MMOL/L (ref 10–25)
ANION GAP BLDV CALCULATED.4IONS-SCNC: 26 MMOL/L (ref 10–25)
ANION GAP SERPL CALC-SCNC: 15 MMOL/L (ref 10–20)
ANION GAP SERPL CALC-SCNC: 32 MMOL/L (ref 10–20)
AST SERPL W P-5'-P-CCNC: 9 U/L (ref 9–39)
B-OH-BUTYR SERPL-SCNC: 11.02 MMOL/L (ref 0.02–0.27)
BASE EXCESS BLDV CALC-SCNC: -23.3 MMOL/L (ref -2–3)
BASE EXCESS BLDV CALC-SCNC: -23.9 MMOL/L (ref -2–3)
BASOPHILS # BLD AUTO: 0.05 X10*3/UL (ref 0–0.1)
BASOPHILS NFR BLD AUTO: 0.6 %
BILIRUB SERPL-MCNC: 0.6 MG/DL (ref 0–1.2)
BODY TEMPERATURE: 37 DEGREES CELSIUS
BODY TEMPERATURE: 37 DEGREES CELSIUS
BUN SERPL-MCNC: 12 MG/DL (ref 6–23)
BUN SERPL-MCNC: 13 MG/DL (ref 6–23)
CA-I BLDV-SCNC: 1.19 MMOL/L (ref 1.1–1.33)
CA-I BLDV-SCNC: 1.24 MMOL/L (ref 1.1–1.33)
CALCIUM SERPL-MCNC: 7.8 MG/DL (ref 8.6–10.3)
CALCIUM SERPL-MCNC: 9.1 MG/DL (ref 8.6–10.3)
CHLORIDE BLDV-SCNC: 103 MMOL/L (ref 98–107)
CHLORIDE BLDV-SCNC: 107 MMOL/L (ref 98–107)
CHLORIDE SERPL-SCNC: 100 MMOL/L (ref 98–107)
CHLORIDE SERPL-SCNC: 113 MMOL/L (ref 98–107)
CO2 SERPL-SCNC: 6 MMOL/L (ref 21–32)
CO2 SERPL-SCNC: 8 MMOL/L (ref 21–32)
CREAT SERPL-MCNC: 1.01 MG/DL (ref 0.5–1.3)
CREAT SERPL-MCNC: 1.22 MG/DL (ref 0.5–1.3)
EGFRCR SERPLBLD CKD-EPI 2021: 79 ML/MIN/1.73M*2
EGFRCR SERPLBLD CKD-EPI 2021: >90 ML/MIN/1.73M*2
EOSINOPHIL # BLD AUTO: 0 X10*3/UL (ref 0–0.7)
EOSINOPHIL NFR BLD AUTO: 0 %
ERYTHROCYTE [DISTWIDTH] IN BLOOD BY AUTOMATED COUNT: 13.5 % (ref 11.5–14.5)
GLUCOSE BLD MANUAL STRIP-MCNC: 169 MG/DL (ref 74–99)
GLUCOSE BLD MANUAL STRIP-MCNC: 173 MG/DL (ref 74–99)
GLUCOSE BLD MANUAL STRIP-MCNC: 192 MG/DL (ref 74–99)
GLUCOSE BLD MANUAL STRIP-MCNC: 208 MG/DL (ref 74–99)
GLUCOSE BLD MANUAL STRIP-MCNC: 274 MG/DL (ref 74–99)
GLUCOSE BLD MANUAL STRIP-MCNC: 330 MG/DL (ref 74–99)
GLUCOSE BLD MANUAL STRIP-MCNC: 356 MG/DL (ref 74–99)
GLUCOSE BLDV-MCNC: 295 MG/DL (ref 74–99)
GLUCOSE BLDV-MCNC: 382 MG/DL (ref 74–99)
GLUCOSE SERPL-MCNC: 188 MG/DL (ref 74–99)
GLUCOSE SERPL-MCNC: 352 MG/DL (ref 74–99)
HCO3 BLDV-SCNC: 5.3 MMOL/L (ref 22–26)
HCO3 BLDV-SCNC: 5.7 MMOL/L (ref 22–26)
HCT VFR BLD AUTO: 55.2 % (ref 41–52)
HCT VFR BLD EST: 51 % (ref 41–52)
HCT VFR BLD EST: 55 % (ref 41–52)
HGB BLD-MCNC: 18.6 G/DL (ref 13.5–17.5)
HGB BLDV-MCNC: 16.9 G/DL (ref 13.5–17.5)
HGB BLDV-MCNC: 18.2 G/DL (ref 13.5–17.5)
IMM GRANULOCYTES # BLD AUTO: 0.05 X10*3/UL (ref 0–0.7)
IMM GRANULOCYTES NFR BLD AUTO: 0.6 % (ref 0–0.9)
INHALED O2 CONCENTRATION: 21 %
INHALED O2 CONCENTRATION: 21 %
LACTATE BLDV-SCNC: 1.5 MMOL/L (ref 0.4–2)
LACTATE BLDV-SCNC: 1.8 MMOL/L (ref 0.4–2)
LACTATE SERPL-SCNC: 1.4 MMOL/L (ref 0.4–2)
LYMPHOCYTES # BLD AUTO: 0.86 X10*3/UL (ref 1.2–4.8)
LYMPHOCYTES NFR BLD AUTO: 11 %
MAGNESIUM SERPL-MCNC: 1.86 MG/DL (ref 1.6–2.4)
MCH RBC QN AUTO: 30.5 PG (ref 26–34)
MCHC RBC AUTO-ENTMCNC: 33.7 G/DL (ref 32–36)
MCV RBC AUTO: 91 FL (ref 80–100)
MONOCYTES # BLD AUTO: 0.46 X10*3/UL (ref 0.1–1)
MONOCYTES NFR BLD AUTO: 5.9 %
NEUTROPHILS # BLD AUTO: 6.4 X10*3/UL (ref 1.2–7.7)
NEUTROPHILS NFR BLD AUTO: 81.9 %
NRBC BLD-RTO: 0 /100 WBCS (ref 0–0)
OXYHGB MFR BLDV: 77.2 % (ref 45–75)
OXYHGB MFR BLDV: 82.7 % (ref 45–75)
PCO2 BLDV: 20 MM HG (ref 41–51)
PCO2 BLDV: 21 MM HG (ref 41–51)
PH BLDV: 7.03 PH (ref 7.33–7.43)
PH BLDV: 7.04 PH (ref 7.33–7.43)
PLATELET # BLD AUTO: 203 X10*3/UL (ref 150–450)
PO2 BLDV: 47 MM HG (ref 35–45)
PO2 BLDV: 53 MM HG (ref 35–45)
POTASSIUM BLDV-SCNC: 4.5 MMOL/L (ref 3.5–5.3)
POTASSIUM BLDV-SCNC: 5.5 MMOL/L (ref 3.5–5.3)
POTASSIUM SERPL-SCNC: 3.3 MMOL/L (ref 3.5–5.3)
POTASSIUM SERPL-SCNC: 4.9 MMOL/L (ref 3.5–5.3)
PROT SERPL-MCNC: 8.4 G/DL (ref 6.4–8.2)
RBC # BLD AUTO: 6.09 X10*6/UL (ref 4.5–5.9)
SAO2 % BLDV: 78 % (ref 45–75)
SAO2 % BLDV: 84 % (ref 45–75)
SODIUM BLDV-SCNC: 129 MMOL/L (ref 136–145)
SODIUM BLDV-SCNC: 131 MMOL/L (ref 136–145)
SODIUM SERPL-SCNC: 133 MMOL/L (ref 136–145)
SODIUM SERPL-SCNC: 133 MMOL/L (ref 136–145)
WBC # BLD AUTO: 7.8 X10*3/UL (ref 4.4–11.3)

## 2025-04-02 PROCEDURE — 96375 TX/PRO/DX INJ NEW DRUG ADDON: CPT

## 2025-04-02 PROCEDURE — 2500000002 HC RX 250 W HCPCS SELF ADMINISTERED DRUGS (ALT 637 FOR MEDICARE OP, ALT 636 FOR OP/ED)

## 2025-04-02 PROCEDURE — 36415 COLL VENOUS BLD VENIPUNCTURE: CPT | Performed by: NURSE PRACTITIONER

## 2025-04-02 PROCEDURE — 83605 ASSAY OF LACTIC ACID: CPT | Performed by: NURSE PRACTITIONER

## 2025-04-02 PROCEDURE — 2020000001 HC ICU ROOM DAILY

## 2025-04-02 PROCEDURE — 99222 1ST HOSP IP/OBS MODERATE 55: CPT | Performed by: NURSE PRACTITIONER

## 2025-04-02 PROCEDURE — 99291 CRITICAL CARE FIRST HOUR: CPT | Performed by: NURSE PRACTITIONER

## 2025-04-02 PROCEDURE — 84132 ASSAY OF SERUM POTASSIUM: CPT | Performed by: NURSE PRACTITIONER

## 2025-04-02 PROCEDURE — 82435 ASSAY OF BLOOD CHLORIDE: CPT | Performed by: NURSE PRACTITIONER

## 2025-04-02 PROCEDURE — 80053 COMPREHEN METABOLIC PANEL: CPT | Performed by: NURSE PRACTITIONER

## 2025-04-02 PROCEDURE — 82435 ASSAY OF BLOOD CHLORIDE: CPT

## 2025-04-02 PROCEDURE — 82947 ASSAY GLUCOSE BLOOD QUANT: CPT

## 2025-04-02 PROCEDURE — 84132 ASSAY OF SERUM POTASSIUM: CPT

## 2025-04-02 PROCEDURE — 82805 BLOOD GASES W/O2 SATURATION: CPT | Performed by: NURSE PRACTITIONER

## 2025-04-02 PROCEDURE — 2500000004 HC RX 250 GENERAL PHARMACY W/ HCPCS (ALT 636 FOR OP/ED): Performed by: NURSE PRACTITIONER

## 2025-04-02 PROCEDURE — 82010 KETONE BODYS QUAN: CPT | Performed by: NURSE PRACTITIONER

## 2025-04-02 PROCEDURE — 85025 COMPLETE CBC W/AUTO DIFF WBC: CPT | Performed by: NURSE PRACTITIONER

## 2025-04-02 PROCEDURE — 96374 THER/PROPH/DIAG INJ IV PUSH: CPT

## 2025-04-02 PROCEDURE — 93005 ELECTROCARDIOGRAM TRACING: CPT

## 2025-04-02 PROCEDURE — 83735 ASSAY OF MAGNESIUM: CPT

## 2025-04-02 RX ORDER — ONDANSETRON HYDROCHLORIDE 2 MG/ML
4 INJECTION, SOLUTION INTRAVENOUS ONCE
Status: COMPLETED | OUTPATIENT
Start: 2025-04-02 | End: 2025-04-02

## 2025-04-02 RX ORDER — DEXTROSE MONOHYDRATE 100 MG/ML
150 INJECTION, SOLUTION INTRAVENOUS CONTINUOUS PRN
Status: DISCONTINUED | OUTPATIENT
Start: 2025-04-02 | End: 2025-04-03

## 2025-04-02 RX ORDER — BISACODYL 5 MG
10 TABLET, DELAYED RELEASE (ENTERIC COATED) ORAL DAILY PRN
Status: DISCONTINUED | OUTPATIENT
Start: 2025-04-02 | End: 2025-04-03 | Stop reason: HOSPADM

## 2025-04-02 RX ORDER — DEXTROSE MONOHYDRATE AND SODIUM CHLORIDE 5; .45 G/100ML; G/100ML
150 INJECTION, SOLUTION INTRAVENOUS CONTINUOUS PRN
Status: DISCONTINUED | OUTPATIENT
Start: 2025-04-02 | End: 2025-04-03

## 2025-04-02 RX ORDER — TERBINAFINE HYDROCHLORIDE 250 MG/1
250 TABLET ORAL 3 TIMES DAILY PRN
COMMUNITY

## 2025-04-02 RX ORDER — PANTOPRAZOLE SODIUM 40 MG/10ML
40 INJECTION, POWDER, LYOPHILIZED, FOR SOLUTION INTRAVENOUS
Status: DISCONTINUED | OUTPATIENT
Start: 2025-04-03 | End: 2025-04-03 | Stop reason: HOSPADM

## 2025-04-02 RX ORDER — SODIUM CHLORIDE 450 MG/100ML
250 INJECTION, SOLUTION INTRAVENOUS CONTINUOUS
Status: DISCONTINUED | OUTPATIENT
Start: 2025-04-02 | End: 2025-04-03

## 2025-04-02 RX ORDER — TALC
3 POWDER (GRAM) TOPICAL NIGHTLY PRN
Status: DISCONTINUED | OUTPATIENT
Start: 2025-04-02 | End: 2025-04-03 | Stop reason: HOSPADM

## 2025-04-02 RX ORDER — POTASSIUM CHLORIDE 20 MEQ/1
40 TABLET, EXTENDED RELEASE ORAL ONCE
Status: COMPLETED | OUTPATIENT
Start: 2025-04-02 | End: 2025-04-02

## 2025-04-02 RX ORDER — ONDANSETRON 4 MG/1
4 TABLET, FILM COATED ORAL EVERY 8 HOURS PRN
Status: DISCONTINUED | OUTPATIENT
Start: 2025-04-02 | End: 2025-04-03

## 2025-04-02 RX ORDER — DEXTROSE 50 % IN WATER (D50W) INTRAVENOUS SYRINGE
50
Status: DISCONTINUED | OUTPATIENT
Start: 2025-04-02 | End: 2025-04-03

## 2025-04-02 RX ORDER — CICLOPIROX OLAMINE 7.7 MG/G
CREAM TOPICAL
COMMUNITY

## 2025-04-02 RX ORDER — KETOROLAC TROMETHAMINE 30 MG/ML
15 INJECTION, SOLUTION INTRAMUSCULAR; INTRAVENOUS ONCE
Status: COMPLETED | OUTPATIENT
Start: 2025-04-02 | End: 2025-04-02

## 2025-04-02 RX ORDER — ONDANSETRON HYDROCHLORIDE 2 MG/ML
4 INJECTION, SOLUTION INTRAVENOUS EVERY 8 HOURS PRN
Status: DISCONTINUED | OUTPATIENT
Start: 2025-04-02 | End: 2025-04-03

## 2025-04-02 RX ORDER — LISINOPRIL 5 MG/1
5 TABLET ORAL DAILY
COMMUNITY
Start: 2025-01-05

## 2025-04-02 RX ORDER — PANTOPRAZOLE SODIUM 40 MG/1
40 TABLET, DELAYED RELEASE ORAL
Status: DISCONTINUED | OUTPATIENT
Start: 2025-04-03 | End: 2025-04-03 | Stop reason: HOSPADM

## 2025-04-02 RX ORDER — BISACODYL 10 MG/1
10 SUPPOSITORY RECTAL DAILY PRN
Status: DISCONTINUED | OUTPATIENT
Start: 2025-04-02 | End: 2025-04-03 | Stop reason: HOSPADM

## 2025-04-02 RX ADMIN — INSULIN HUMAN 10.5 UNITS/HR: 1 INJECTION, SOLUTION INTRAVENOUS at 18:24

## 2025-04-02 RX ADMIN — SODIUM CHLORIDE 250 ML/HR: 0.45 INJECTION, SOLUTION INTRAVENOUS at 18:06

## 2025-04-02 RX ADMIN — SODIUM CHLORIDE 1000 ML: 0.9 INJECTION, SOLUTION INTRAVENOUS at 16:46

## 2025-04-02 RX ADMIN — ONDANSETRON 4 MG: 2 INJECTION INTRAMUSCULAR; INTRAVENOUS at 16:59

## 2025-04-02 RX ADMIN — KETOROLAC TROMETHAMINE 15 MG: 30 INJECTION, SOLUTION INTRAMUSCULAR at 16:48

## 2025-04-02 RX ADMIN — POTASSIUM CHLORIDE 40 MEQ: 1500 TABLET, EXTENDED RELEASE ORAL at 21:40

## 2025-04-02 RX ADMIN — SODIUM CHLORIDE 1000 ML: 0.9 INJECTION, SOLUTION INTRAVENOUS at 18:05

## 2025-04-02 RX ADMIN — DEXTROSE AND SODIUM CHLORIDE 150 ML/HR: 5; 450 INJECTION, SOLUTION INTRAVENOUS at 20:05

## 2025-04-02 SDOH — ECONOMIC STABILITY: INCOME INSECURITY: IN THE PAST 12 MONTHS HAS THE ELECTRIC, GAS, OIL, OR WATER COMPANY THREATENED TO SHUT OFF SERVICES IN YOUR HOME?: NO

## 2025-04-02 SDOH — ECONOMIC STABILITY: FOOD INSECURITY: WITHIN THE PAST 12 MONTHS, YOU WORRIED THAT YOUR FOOD WOULD RUN OUT BEFORE YOU GOT THE MONEY TO BUY MORE.: NEVER TRUE

## 2025-04-02 SDOH — ECONOMIC STABILITY: FOOD INSECURITY: HOW HARD IS IT FOR YOU TO PAY FOR THE VERY BASICS LIKE FOOD, HOUSING, MEDICAL CARE, AND HEATING?: NOT HARD AT ALL

## 2025-04-02 SDOH — SOCIAL STABILITY: SOCIAL INSECURITY: WITHIN THE LAST YEAR, HAVE YOU BEEN HUMILIATED OR EMOTIONALLY ABUSED IN OTHER WAYS BY YOUR PARTNER OR EX-PARTNER?: NO

## 2025-04-02 SDOH — ECONOMIC STABILITY: TRANSPORTATION INSECURITY: IN THE PAST 12 MONTHS, HAS LACK OF TRANSPORTATION KEPT YOU FROM MEDICAL APPOINTMENTS OR FROM GETTING MEDICATIONS?: NO

## 2025-04-02 SDOH — SOCIAL STABILITY: SOCIAL INSECURITY: WITHIN THE LAST YEAR, HAVE YOU BEEN AFRAID OF YOUR PARTNER OR EX-PARTNER?: NO

## 2025-04-02 SDOH — SOCIAL STABILITY: SOCIAL INSECURITY: HAVE YOU HAD THOUGHTS OF HARMING ANYONE ELSE?: NO

## 2025-04-02 SDOH — SOCIAL STABILITY: SOCIAL INSECURITY: WERE YOU ABLE TO COMPLETE ALL THE BEHAVIORAL HEALTH SCREENINGS?: YES

## 2025-04-02 SDOH — ECONOMIC STABILITY: HOUSING INSECURITY: AT ANY TIME IN THE PAST 12 MONTHS, WERE YOU HOMELESS OR LIVING IN A SHELTER (INCLUDING NOW)?: NO

## 2025-04-02 SDOH — ECONOMIC STABILITY: HOUSING INSECURITY: IN THE PAST 12 MONTHS, HOW MANY TIMES HAVE YOU MOVED WHERE YOU WERE LIVING?: 0

## 2025-04-02 SDOH — ECONOMIC STABILITY: HOUSING INSECURITY: IN THE LAST 12 MONTHS, WAS THERE A TIME WHEN YOU WERE NOT ABLE TO PAY THE MORTGAGE OR RENT ON TIME?: NO

## 2025-04-02 SDOH — ECONOMIC STABILITY: FOOD INSECURITY: WITHIN THE PAST 12 MONTHS, THE FOOD YOU BOUGHT JUST DIDN'T LAST AND YOU DIDN'T HAVE MONEY TO GET MORE.: NEVER TRUE

## 2025-04-02 SDOH — SOCIAL STABILITY: SOCIAL INSECURITY: ABUSE: ADULT

## 2025-04-02 ASSESSMENT — ACTIVITIES OF DAILY LIVING (ADL)
JUDGMENT_ADEQUATE_SAFELY_COMPLETE_DAILY_ACTIVITIES: YES
WALKS IN HOME: INDEPENDENT
ADEQUATE_TO_COMPLETE_ADL: YES
DRESSING YOURSELF: INDEPENDENT
LACK_OF_TRANSPORTATION: NO
TOILETING: INDEPENDENT
PATIENT'S MEMORY ADEQUATE TO SAFELY COMPLETE DAILY ACTIVITIES?: YES
GROOMING: INDEPENDENT
HEARING - LEFT EAR: FUNCTIONAL
LACK_OF_TRANSPORTATION: NO
FEEDING YOURSELF: INDEPENDENT
BATHING: INDEPENDENT
HEARING - RIGHT EAR: FUNCTIONAL

## 2025-04-02 ASSESSMENT — LIFESTYLE VARIABLES
AUDIT-C TOTAL SCORE: 0
SKIP TO QUESTIONS 9-10: 1
AUDIT-C TOTAL SCORE: 0
HOW OFTEN DO YOU HAVE A DRINK CONTAINING ALCOHOL: NEVER
HOW OFTEN DO YOU HAVE 6 OR MORE DRINKS ON ONE OCCASION: NEVER
HOW MANY STANDARD DRINKS CONTAINING ALCOHOL DO YOU HAVE ON A TYPICAL DAY: PATIENT DOES NOT DRINK

## 2025-04-02 ASSESSMENT — COGNITIVE AND FUNCTIONAL STATUS - GENERAL
PATIENT BASELINE BEDBOUND: NO
MOBILITY SCORE: 24
DAILY ACTIVITIY SCORE: 24

## 2025-04-02 ASSESSMENT — ENCOUNTER SYMPTOMS
CONSTITUTIONAL NEGATIVE: 1
NAUSEA: 1
VOMITING: 1
FATIGUE: 1
PSYCHIATRIC NEGATIVE: 1
ENDOCRINE NEGATIVE: 1
NEUROLOGICAL NEGATIVE: 1
RESPIRATORY NEGATIVE: 1
WEAKNESS: 1
CARDIOVASCULAR NEGATIVE: 1
HEMATOLOGIC/LYMPHATIC NEGATIVE: 1
EYES NEGATIVE: 1
MUSCULOSKELETAL NEGATIVE: 1
ABDOMINAL PAIN: 1
ALLERGIC/IMMUNOLOGIC NEGATIVE: 1

## 2025-04-02 ASSESSMENT — PAIN SCALES - GENERAL
PAINLEVEL_OUTOF10: 0 - NO PAIN
PAINLEVEL_OUTOF10: 7

## 2025-04-02 ASSESSMENT — PAIN DESCRIPTION - PAIN TYPE: TYPE: ACUTE PAIN

## 2025-04-02 ASSESSMENT — PAIN - FUNCTIONAL ASSESSMENT
PAIN_FUNCTIONAL_ASSESSMENT: 0-10
PAIN_FUNCTIONAL_ASSESSMENT: 0-10

## 2025-04-02 ASSESSMENT — PAIN DESCRIPTION - LOCATION: LOCATION: CHEST

## 2025-04-02 NOTE — ED PROCEDURE NOTE
Procedure  Critical Care    Performed by: HEATHER Amador  Authorized by: Shyla Santamaria MD    Critical care provider statement:     Critical care time (minutes):  35    Critical care time was exclusive of:  Separately billable procedures and treating other patients    Critical care was necessary to treat or prevent imminent or life-threatening deterioration of the following conditions:  Endocrine crisis    Critical care was time spent personally by me on the following activities:  Blood draw for specimens, examination of patient, obtaining history from patient or surrogate, ordering and performing treatments and interventions, ordering and review of laboratory studies and re-evaluation of patient's condition    Care discussed with: admitting provider               HEATHER Amador  04/02/25 [id number]

## 2025-04-02 NOTE — ED PROVIDER NOTES
HPI   Chief Complaint   Patient presents with    Hyperglycemia     Pt states he has been feeling increasingly weak for the last four days, hx DKA, states  at home, pt states he takes insulin for his type 1 DM & has been taking it; pt's  in triage; pt hasn't been eating or drinking today; c/o N/V.       This is a 34-year-old  male, with a past medical history of type 1 diabetes and frequent DKA, that is presenting to the emergency room with complaints of nausea, vomiting, fatigue, and and feeling dehydrated.  Reports that his symptoms started 3 to 4 days ago.  Reports his blood sugars have been 350 or below.  States his blood sugars are usually much higher than that.  Endorses taking his insulin as directed.  Denies any fevers or cold-like symptoms.  Not having any chest pain, shortness of breath, palpitations, paresthesias, headache, abdominal pain, or alteration in his urine or bowel function.  His last episode of DKA was approximately 2 to 3 weeks ago, which required hospitalization..      History provided by:  Patient   used: No            Patient History   Past Medical History:   Diagnosis Date    DKA (diabetic ketoacidosis) (Multi)     recurrent    HTN (hypertension)     Type I diabetes mellitus (Multi)      History reviewed. No pertinent surgical history.  Family History   Problem Relation Name Age of Onset    No Known Problems Mother      No Known Problems Father      Hypertension Other      Coronary artery disease Other      Diabetes Other      Heart failure Other       Social History     Tobacco Use    Smoking status: Never     Passive exposure: Never    Smokeless tobacco: Never   Vaping Use    Vaping status: Never Used   Substance Use Topics    Alcohol use: Never    Drug use: Never       Physical Exam   ED Triage Vitals [04/02/25 1540]   Temperature Heart Rate Respirations BP   36.7 °C (98.1 °F) (!) 130 18 136/88      Pulse Ox Temp Source Heart Rate Source Patient  Position   99 % Temporal Monitor --      BP Location FiO2 (%)     -- --       Physical Exam  Vitals and nursing note reviewed.   Constitutional:       Appearance: Normal appearance.   HENT:      Head: Normocephalic and atraumatic.      Right Ear: External ear normal.      Left Ear: External ear normal.      Nose: Nose normal.      Mouth/Throat:      Pharynx: Oropharynx is clear.   Eyes:      Conjunctiva/sclera: Conjunctivae normal.   Cardiovascular:      Rate and Rhythm: Regular rhythm. Tachycardia present.      Pulses: Normal pulses.   Pulmonary:      Effort: Pulmonary effort is normal.      Breath sounds: Normal breath sounds.   Abdominal:      General: Bowel sounds are normal.      Palpations: Abdomen is soft.   Genitourinary:     Comments: No CVA tenderness or pubic pain.  Musculoskeletal:         General: Normal range of motion.   Skin:     General: Skin is warm and dry.      Capillary Refill: Capillary refill takes less than 2 seconds.   Neurological:      General: No focal deficit present.      Mental Status: He is alert.   Psychiatric:         Mood and Affect: Mood normal.           ED Course & MDM   ED Course as of 04/03/25 0042 Wed Apr 02, 2025   1605 Twelve-lead EKG interpreted by me.  Sinus tachycardia at 117.  AR interval is 152, QRS is 96, QT is 363, QTc is 507.  Normal axis.  With prolonged QTc interval.  Left ventricular hypertrophy .  No acute ischemia or injury pattern noted. [CT]      ED Course User Index  [CT] CHRISTIANO Amador-CNP         Diagnoses as of 04/03/25 0042   DKA, type 2, not at goal                 No data recorded                                 Medical Decision Making  Patient was seen and evaluated with the attending physician, Dr. Santamaria.  The patient is presenting to the emergency room with hyperglycemia and concern for DKA.  Patient was placed on cardiac monitor and continuous pulse oximetry.  An ultrasound-guided IV was initiated after multiple failed attempts by  "nursing staff.  Laboratory studies were drawn with results as noted.  The patient was administered 1 L of normal saline wide open for hydration, ketorolac 15 mg IVP, Zofran 4 mg IVP.  The patient was originally ordered 8 units of subcu insulin while awaiting his laboratory results.  The patient reported that subcu insulin does not \"work well for him\" because he does not like the drops in his blood sugar.  That is the reason why he is not compliant with his short acting insulin.  The patient's laboratory studies resulted and showed that he was in DKA.  He had a hemoglobin of 18.6.  Beta hydroxybutyrate was 11.02.  The patient had a bicarb of 6 with an anion gap of 32.  Glucose was 352  The patient had a pH of 7.04 on his venous full panel.  Patient was initiated on an insulin drip based on the DKA protocol.  IMS was consulted regarding management of the patient.  The patient is to be admitted to the ICU under Dr. Haile.  The patient required approximate 35 minutes for assessment, treatment, and reevaluation of a potentially life-threatening condition, excluding all billable conditions.        Procedure  Procedures     Veronica Argueta, CHRISTIANO-CNP  04/03/25 0046    "

## 2025-04-02 NOTE — H&P
[facility] - GENERAL MEDICINE HISTORY AND PHYSICAL    HISTORY OF PRESENT ILLNESS     History Obtained From (Primary Source): The patient  Collateral History (Secondary Sources): D/w ED    History Of Present Illness (HPI):  Ed Sanon is a 34 y.o. male with PMHx s/f  IDDM Type I with frequent admissions for diabetic ketoacidosis presenting with generalized weakness.  Patient has frequent DKA events most recently admitted 3/11/25 due to noncompliance with short acting insulin.  He does not take short acting insulin because he does not feel good when glucose is trending down.  presenting with nausea, vomiting since the weekend. Pt states glucose was running around 350. Pt evaluated in the ED, found to be in DKA, no evidence of infection. The patient was started on IV fluid boluses, given bolus of insulin and transitioned to insulin drip. The case was discussed with the ED provider, pt will be admitted to the ICU. The patient is severely acidotic and LOS is likely to exceed two midnights.     ED Course (Summary - please note all labs, imaging studies, and interventions noted below have been personally reviewed and/or interpreted on day of admission):   Vitals on presentation: T 36.7 °C (98.1 °F)  HR (!) 130  /88  RR 18  O2 99 % None (Room air)  Labs:   CBC with WBC 7.8, Hgb 18.6, Plts 203.   CMP with glucose 352, Na 133, K 4.9, BUN 13, sCr 1.22, alk phos 95, ALT 12, AST 9, bilirubin 0.6. Magnesium 1.69.   BNP No results found for requested labs within last 365 days.. Trop 3.   Lactate 1.4  EKG: per my review appears to show Sinus tachycardia, w prolonged Qtc at 507.   Imaging: None available  Interventions: 1 L normal saline bolus, 8 unit bolus of Humulin R and subsequently placed on insulin drip    12-point ROS reviewed and found to be negative aside from aforementioned positives in HPI and/or noted in dedicated ROS section below.     LABS AND IMAGING     ED Course (From ED Provider):  ED  Course as of 04/02/25 1828   Wed Apr 02, 2025   1605 Twelve-lead EKG interpreted by me.  Sinus tachycardia at 117.  ID interval is 152, QRS is 96, QT is 363, QTc is 507.  Normal axis.  With prolonged QTc interval.  Left ventricular hypertrophy .  No acute ischemia or injury pattern noted. [CT]      ED Course User Index  [CT] Veronica Argueta, APRN-CNP         Diagnoses as of 04/02/25 1828   DKA, type 2, not at goal     Relevant Results  Results for orders placed or performed during the hospital encounter of 04/02/25 (from the past 24 hours)   POCT GLUCOSE   Result Value Ref Range    POCT Glucose 330 (H) 74 - 99 mg/dL   CBC and Auto Differential   Result Value Ref Range    WBC 7.8 4.4 - 11.3 x10*3/uL    nRBC 0.0 0.0 - 0.0 /100 WBCs    RBC 6.09 (H) 4.50 - 5.90 x10*6/uL    Hemoglobin 18.6 (H) 13.5 - 17.5 g/dL    Hematocrit 55.2 (H) 41.0 - 52.0 %    MCV 91 80 - 100 fL    MCH 30.5 26.0 - 34.0 pg    MCHC 33.7 32.0 - 36.0 g/dL    RDW 13.5 11.5 - 14.5 %    Platelets 203 150 - 450 x10*3/uL    Neutrophils % 81.9 40.0 - 80.0 %    Immature Granulocytes %, Automated 0.6 0.0 - 0.9 %    Lymphocytes % 11.0 13.0 - 44.0 %    Monocytes % 5.9 2.0 - 10.0 %    Eosinophils % 0.0 0.0 - 6.0 %    Basophils % 0.6 0.0 - 2.0 %    Neutrophils Absolute 6.40 1.20 - 7.70 x10*3/uL    Immature Granulocytes Absolute, Automated 0.05 0.00 - 0.70 x10*3/uL    Lymphocytes Absolute 0.86 (L) 1.20 - 4.80 x10*3/uL    Monocytes Absolute 0.46 0.10 - 1.00 x10*3/uL    Eosinophils Absolute 0.00 0.00 - 0.70 x10*3/uL    Basophils Absolute 0.05 0.00 - 0.10 x10*3/uL   Comprehensive metabolic panel   Result Value Ref Range    Glucose 352 (H) 74 - 99 mg/dL    Sodium 133 (L) 136 - 145 mmol/L    Potassium 4.9 3.5 - 5.3 mmol/L    Chloride 100 98 - 107 mmol/L    Bicarbonate 6 (LL) 21 - 32 mmol/L    Anion Gap 32 (H) 10 - 20 mmol/L    Urea Nitrogen 13 6 - 23 mg/dL    Creatinine 1.22 0.50 - 1.30 mg/dL    eGFR 79 >60 mL/min/1.73m*2    Calcium 9.1 8.6 - 10.3 mg/dL    Albumin 4.9  3.4 - 5.0 g/dL    Alkaline Phosphatase 95 33 - 120 U/L    Total Protein 8.4 (H) 6.4 - 8.2 g/dL    AST 9 9 - 39 U/L    Bilirubin, Total 0.6 0.0 - 1.2 mg/dL    ALT 12 10 - 52 U/L   Lactate   Result Value Ref Range    Lactate 1.4 0.4 - 2.0 mmol/L   Beta Hydroxybutyrate   Result Value Ref Range    Beta-Hydroxybutyrate 11.02 (H) 0.02 - 0.27 mmol/L   Blood Gas Venous Full Panel   Result Value Ref Range    POCT pH, Venous 7.04 (LL) 7.33 - 7.43 pH    POCT pCO2, Venous 21 (L) 41 - 51 mm Hg    POCT pO2, Venous 53 (H) 35 - 45 mm Hg    POCT SO2, Venous 84 (H) 45 - 75 %    POCT Oxy Hemoglobin, Venous 82.7 (H) 45.0 - 75.0 %    POCT Hematocrit Calculated, Venous 55.0 (H) 41.0 - 52.0 %    POCT Sodium, Venous 129 (L) 136 - 145 mmol/L    POCT Potassium, Venous 5.5 (H) 3.5 - 5.3 mmol/L    POCT Chloride, Venous 103 98 - 107 mmol/L    POCT Ionized Calicum, Venous 1.24 1.10 - 1.33 mmol/L    POCT Glucose, Venous 382 (H) 74 - 99 mg/dL    POCT Lactate, Venous 1.8 0.4 - 2.0 mmol/L    POCT Base Excess, Venous -23.3 (L) -2.0 - 3.0 mmol/L    POCT HCO3 Calculated, Venous 5.7 (L) 22.0 - 26.0 mmol/L    POCT Hemoglobin, Venous 18.2 (H) 13.5 - 17.5 g/dL    POCT Anion Gap, Venous 26.0 (H) 10.0 - 25.0 mmol/L    Patient Temperature 37.0 degrees Celsius    FiO2 21 %   POCT GLUCOSE   Result Value Ref Range    POCT Glucose 356 (H) 74 - 99 mg/dL     *Note: Due to a large number of results and/or encounters for the requested time period, some results have not been displayed. A complete set of results can be found in Results Review.      Imaging  No results found.    Cardiology, Vascular, and Other Imaging  No other imaging results found for the past 2 days       PAST HISTORIES AND ALLERGIES     Past Medical History  He has a past medical history of DKA (diabetic ketoacidosis) (Multi), HTN (hypertension), and Type I diabetes mellitus (Multi).    Surgical History  He has no past surgical history on file.     Social History  He reports that he has never  smoked. He has never been exposed to tobacco smoke. He has never used smokeless tobacco. He reports that he does not drink alcohol and does not use drugs.    Family History  Family History   Problem Relation Name Age of Onset    No Known Problems Mother      No Known Problems Father      Hypertension Other      Coronary artery disease Other      Diabetes Other      Heart failure Other         Allergies  Patient has no known allergies.    MEDICATIONS     Scheduled Medications:  sodium chloride, 1,000 mL, intravenous, Once      Continuous Medications:  dextrose 10 % in water (D10W), 150 mL/hr  dextrose 10 % in water (D10W), 150 mL/hr  dextrose 5%-0.45 % sodium chloride, 150 mL/hr  insulin regular, 0-50 Units/hr, Last Rate: 10.5 Units/hr (04/02/25 1824)  sodium chloride, 250 mL/hr, Last Rate: 250 mL/hr (04/02/25 1806)      PRN Medications:  PRN medications: dextrose 10 % in water (D10W), dextrose 10 % in water (D10W), dextrose 5%-0.45 % sodium chloride, dextrose, insulin regular     REVIEW OF SYSTEMS     Review of Systems   Constitutional:  Positive for fatigue.   Gastrointestinal:  Positive for nausea and vomiting.   Neurological:  Positive for weakness.       OBJECTIVE     Last Recorded Vitals  /81   Pulse (!) 121   Temp 36.7 °C (98.1 °F) (Temporal)   Resp 17   Wt 74.8 kg (165 lb)   SpO2 100%      Physical Exam:  Vital signs and nursing notes reviewed.   Constitutional: Pleasant and cooperative. Laying in bed in no acute distress. Conversant.   Skin: Warm and dry; no obvious lesions, rashes, pallor, or jaundice.   Eyes: EOMI. Anicteric sclera.   ENT: Mucous membranes moist; no obvious injury or deformity appreciated.   Head and Neck: Normocephalic, atraumatic. ROM preserved. Trachea midline. No appreciable JVD.   Respiratory: Nonlabored on room air. Lungs clear to auscultation bilaterally without obvious adventitious sounds. Chest rise is equal.  Cardiovascular: RRR. No gross murmur, gallop, or rub.  Extremities are warm and well-perfused with good capillary refill (< 3 seconds). No chest wall tenderness.   GI: Abdomen soft, nontender, nondistended. No obvious organomegaly appreciated. Bowel sounds are present.  : No CVA tenderness.   MSK: No gross abnormalities appreciated. No limitations to AROM/PROM appreciated.   Extremities: No cyanosis, edema, or clubbing evident. Neurovascularly intact.   Neuro: A&Ox3. CN 2-12 grossly intact. Able to respond to questions appropriately and clearly. No acute focal neurologic deficits appreciated.  Psych: Appropriate mood and behavior.    ASSESSMENT AND PLAN   Assessment/Plan     34 y.o. male with PMHx s/f IDDM Type I with frequent admissions for diabetic ketoacidosis presenting with generalized weakness.  Patient has frequent DKA events most recently admitted 3/11/25 due to noncompliance with short acting insulin.  He does not take short acting insulin because he does not feel good when glucose is trending down.  presenting with nausea, vomiting since the weekend. Pt states glucose was running around 350. Pt evaluated in the ED, found to be in DKA, no evidence of infection. The patient was started on IV fluid boluses, given bolus of insulin and transitioned to insulin drip. The case was discussed with the ED provider, pt will be admitted to the ICU    DKA without coma secondary to intentional non adherence to medical therapy  Pt does not take short acting insulin as directed  Pt to remain NPO  Continue insulin drip per DKA protocol  Repeat Venous blood gas full panel at 1900  Consult to intensivist and endocrinology    VTE prophylaxis  scds             Hayden Rice, APRN-CNP    Dragon dictation software was used to dictate this note and thus there may be minor errors in translation/transcription including garbled speech or misspellings. Please contact for clarification if needed.

## 2025-04-02 NOTE — PROGRESS NOTES
"Ed Sanon is a 34 y.o. male admitted for DKA, type 2, not at goal. Pharmacy reviewed the patient's xoald-ly-pouguqoih medications and allergies for accuracy.    The list below reflects the PTA list prior to pharmacy medication history. A summary a changes to the PTA medication list has been listed below. Please review each medication in order reconciliation for additional clarification and justification.    Source of information:  PATIENT    Medications added:  CICLOPIROX 0.77% CREAM APPLY TWICE A DAY  TERBINOFINE 250MG 1 TID PRN    Medications modified:    Medications to be removed:  GVOKE HYPOPEN  ONDANSETRON 4MG TABLET    Medications of concern:      Prior to Admission Medications   Prescriptions Last Dose Informant Patient Reported? Taking?   Dexcom G7  misc   Yes No   Sig: Inject 1 Device under the skin if needed (poorly controlled type 1 diabetes). Use as instructed   Dexcom G7 Sensor device   Yes No   Si each every 10 (ten) days.   Gvoke HypoPen 1-Pack 1 mg/0.2 mL auto-injector   No No   Sig: Inject 1 mg under the skin 1 time for 1 dose.   OneTouch Ultra Test strip   Yes No   Sig: USE 1 STRIP TO CHECK GLUCOSE 4 TIMES DAILY AS DIRECTED   OneTouch Ultra2 Meter misc   Yes No   Sig: use as directed   insulin NPH, Isophane, (HumuLIN N,NovoLIN N) 100 unit/mL (3 mL) pen   No No   Sig: Inject 22 Units under the skin 2 times a day before meals. Take as directed per insulin instructions.   lancets misc   Yes No   Sig: Inject 1 Units under the skin 2 times a day. Sliding scale   ondansetron (Zofran) 4 mg tablet   No No   Sig: Take 1 tablet (4 mg) by mouth every 8 hours if needed for nausea or vomiting.   pen needle, diabetic 32 gauge x \" needle   Yes No   Sig: Use as instructed 4 times daily      Facility-Administered Medications: None       Nanette Santoro"

## 2025-04-02 NOTE — CONSULTS
Critical Care Medicine Consult      Reason For Consult  DKA    History Of Present Illness  Ed Sanon is a 34 y.o. male significant for IDDM type I with poor insulin compliance, multiple recurrent admissions for DKA secondary to noncompliance, anxiety, depression admitted to the ICU for DKA.  Patient seen and examined.  Awake and alert.  States that he had nausea, vomiting, and felt generally unwell over the past weekend.  Says his glucose levels were in the 300s.  Reports missing doses of his insulin. Denies fever, chills, chest pain, abdominal pain, shortness of breath.  Upon ED workup, vitals with temp 98.1, pulse 130, blood pressure 136/88, and oxygen saturation 99% on room air.  Labs obtained consistent with DKA with blood glucose 330, anion gap 32, bicarb 6, beta hydroxy 11.02.  VBG with pH 7.04, pCO2 21, bicarb 5.7, lactate 1.8.  Patient afebrile with CBC with no leukocytosis.  Patient received 2 L IV fluid bolus in ED was initiated on insulin infusion per DKA protocol.  Admitted to the ICU for further management.    Past Medical History:   Diagnosis Date    DKA (diabetic ketoacidosis) (Multi)     recurrent    HTN (hypertension)     Type I diabetes mellitus (Multi)      History reviewed. No pertinent surgical history.  Medications Prior to Admission   Medication Sig Dispense Refill Last Dose/Taking    insulin NPH, Isophane, (HumuLIN N,NovoLIN N) 100 unit/mL (3 mL) pen Inject 22 Units under the skin 2 times a day before meals. Take as directed per insulin instructions. 15 mL 0 4/2/2025 at 10:00 AM    ciclopirox (Loprox) 0.77 % cream Apply topically. APPLY  TWICE A DAY       Dexcom G7  misc Inject 1 Device under the skin if needed (poorly controlled type 1 diabetes). Use as instructed       Dexcom G7 Sensor device 1 each every 10 (ten) days.       Gvoke HypoPen 1-Pack 1 mg/0.2 mL auto-injector Inject 1 mg under the skin 1 time for 1 dose. (Patient not taking: Reported on 4/2/2025) 0.4 mL 3 Not  "Taking    lancets misc Inject 1 Units under the skin 2 times a day. Sliding scale       ondansetron (Zofran) 4 mg tablet Take 1 tablet (4 mg) by mouth every 8 hours if needed for nausea or vomiting. (Patient not taking: Reported on 4/2/2025) 20 tablet 0 Not Taking    OneTouch Ultra Test strip USE 1 STRIP TO CHECK GLUCOSE 4 TIMES DAILY AS DIRECTED       OneTouch Ultra2 Meter misc use as directed       pen needle, diabetic 32 gauge x 5/32\" needle Use as instructed 4 times daily       terbinafine (LamISIL) 250 mg tablet Take 1 tablet (250 mg) by mouth 3 times a day as needed.        Patient has no known allergies.  Social History     Tobacco Use    Smoking status: Never     Passive exposure: Never    Smokeless tobacco: Never   Vaping Use    Vaping status: Never Used   Substance Use Topics    Alcohol use: Never    Drug use: Never     Family History   Problem Relation Name Age of Onset    No Known Problems Mother      No Known Problems Father      Hypertension Other      Coronary artery disease Other      Diabetes Other      Heart failure Other         Scheduled Medications:   [START ON 4/3/2025] pantoprazole, 40 mg, oral, Daily before breakfast   Or  [START ON 4/3/2025] pantoprazole, 40 mg, intravenous, Daily before breakfast  sodium chloride, 1,000 mL, intravenous, Once         Continuous Medications:   dextrose 10 % in water (D10W), 150 mL/hr  dextrose 10 % in water (D10W), 150 mL/hr  dextrose 5%-0.45 % sodium chloride, 150 mL/hr  insulin regular, 0-50 Units/hr, Last Rate: 10.5 Units/hr (04/02/25 1824)  sodium chloride, 250 mL/hr, Last Rate: 250 mL/hr (04/02/25 1806)         PRN Medications:   PRN medications: bisacodyl, bisacodyl, dextrose 10 % in water (D10W), dextrose 10 % in water (D10W), dextrose 5%-0.45 % sodium chloride, dextrose, insulin regular, melatonin, ondansetron **OR** ondansetron    Review of Systems:  Review of Systems   Constitutional: Negative.    HENT: Negative.     Eyes: Negative.    Respiratory: " Negative.     Cardiovascular: Negative.    Gastrointestinal:  Positive for abdominal pain, nausea and vomiting.   Endocrine: Negative.    Genitourinary: Negative.    Musculoskeletal: Negative.    Skin: Negative.    Allergic/Immunologic: Negative.    Neurological: Negative.    Hematological: Negative.    Psychiatric/Behavioral: Negative.          Objective   Vitals:  Most Recent:  Vitals:    04/02/25 1800   BP: 132/81   Pulse: (!) 121   Resp: 17   Temp:    SpO2: 100%       24hr Min/Max:  Temp  Min: 36.7 °C (98.1 °F)  Max: 36.7 °C (98.1 °F)  Pulse  Min: 121  Max: 130  BP  Min: 132/81  Max: 136/88  Resp  Min: 17  Max: 18  SpO2  Min: 99 %  Max: 100 %    LDA:          Vent settings:       Hemodynamic parameters for last 24 hours:       No intake or output data in the 24 hours ending 04/02/25 1901        Physical exam:    Physical Exam  Constitutional:       General: He is not in acute distress.  HENT:      Mouth/Throat:      Mouth: Mucous membranes are moist.   Eyes:      Pupils: Pupils are equal, round, and reactive to light.   Cardiovascular:      Rate and Rhythm: Regular rhythm. Tachycardia present.      Pulses: Normal pulses.      Heart sounds: Normal heart sounds. No murmur heard.  Pulmonary:      Effort: Pulmonary effort is normal. No respiratory distress.      Breath sounds: Normal breath sounds. No wheezing, rhonchi or rales.   Abdominal:      General: Bowel sounds are normal. There is no distension.      Palpations: Abdomen is soft.   Musculoskeletal:      Right lower leg: No edema.      Left lower leg: No edema.   Skin:     General: Skin is warm.      Capillary Refill: Capillary refill takes less than 2 seconds.   Neurological:      General: No focal deficit present.      Mental Status: He is alert and oriented to person, place, and time.   Psychiatric:         Mood and Affect: Mood normal.         Behavior: Behavior normal.          Lab/Radiology/Diagnostic Review:  Results for orders placed or performed  during the hospital encounter of 04/02/25 (from the past 24 hours)   POCT GLUCOSE   Result Value Ref Range    POCT Glucose 330 (H) 74 - 99 mg/dL   CBC and Auto Differential   Result Value Ref Range    WBC 7.8 4.4 - 11.3 x10*3/uL    nRBC 0.0 0.0 - 0.0 /100 WBCs    RBC 6.09 (H) 4.50 - 5.90 x10*6/uL    Hemoglobin 18.6 (H) 13.5 - 17.5 g/dL    Hematocrit 55.2 (H) 41.0 - 52.0 %    MCV 91 80 - 100 fL    MCH 30.5 26.0 - 34.0 pg    MCHC 33.7 32.0 - 36.0 g/dL    RDW 13.5 11.5 - 14.5 %    Platelets 203 150 - 450 x10*3/uL    Neutrophils % 81.9 40.0 - 80.0 %    Immature Granulocytes %, Automated 0.6 0.0 - 0.9 %    Lymphocytes % 11.0 13.0 - 44.0 %    Monocytes % 5.9 2.0 - 10.0 %    Eosinophils % 0.0 0.0 - 6.0 %    Basophils % 0.6 0.0 - 2.0 %    Neutrophils Absolute 6.40 1.20 - 7.70 x10*3/uL    Immature Granulocytes Absolute, Automated 0.05 0.00 - 0.70 x10*3/uL    Lymphocytes Absolute 0.86 (L) 1.20 - 4.80 x10*3/uL    Monocytes Absolute 0.46 0.10 - 1.00 x10*3/uL    Eosinophils Absolute 0.00 0.00 - 0.70 x10*3/uL    Basophils Absolute 0.05 0.00 - 0.10 x10*3/uL   Comprehensive metabolic panel   Result Value Ref Range    Glucose 352 (H) 74 - 99 mg/dL    Sodium 133 (L) 136 - 145 mmol/L    Potassium 4.9 3.5 - 5.3 mmol/L    Chloride 100 98 - 107 mmol/L    Bicarbonate 6 (LL) 21 - 32 mmol/L    Anion Gap 32 (H) 10 - 20 mmol/L    Urea Nitrogen 13 6 - 23 mg/dL    Creatinine 1.22 0.50 - 1.30 mg/dL    eGFR 79 >60 mL/min/1.73m*2    Calcium 9.1 8.6 - 10.3 mg/dL    Albumin 4.9 3.4 - 5.0 g/dL    Alkaline Phosphatase 95 33 - 120 U/L    Total Protein 8.4 (H) 6.4 - 8.2 g/dL    AST 9 9 - 39 U/L    Bilirubin, Total 0.6 0.0 - 1.2 mg/dL    ALT 12 10 - 52 U/L   Lactate   Result Value Ref Range    Lactate 1.4 0.4 - 2.0 mmol/L   Beta Hydroxybutyrate   Result Value Ref Range    Beta-Hydroxybutyrate 11.02 (H) 0.02 - 0.27 mmol/L   Blood Gas Venous Full Panel   Result Value Ref Range    POCT pH, Venous 7.04 (LL) 7.33 - 7.43 pH    POCT pCO2, Venous 21 (L) 41 - 51  mm Hg    POCT pO2, Venous 53 (H) 35 - 45 mm Hg    POCT SO2, Venous 84 (H) 45 - 75 %    POCT Oxy Hemoglobin, Venous 82.7 (H) 45.0 - 75.0 %    POCT Hematocrit Calculated, Venous 55.0 (H) 41.0 - 52.0 %    POCT Sodium, Venous 129 (L) 136 - 145 mmol/L    POCT Potassium, Venous 5.5 (H) 3.5 - 5.3 mmol/L    POCT Chloride, Venous 103 98 - 107 mmol/L    POCT Ionized Calicum, Venous 1.24 1.10 - 1.33 mmol/L    POCT Glucose, Venous 382 (H) 74 - 99 mg/dL    POCT Lactate, Venous 1.8 0.4 - 2.0 mmol/L    POCT Base Excess, Venous -23.3 (L) -2.0 - 3.0 mmol/L    POCT HCO3 Calculated, Venous 5.7 (L) 22.0 - 26.0 mmol/L    POCT Hemoglobin, Venous 18.2 (H) 13.5 - 17.5 g/dL    POCT Anion Gap, Venous 26.0 (H) 10.0 - 25.0 mmol/L    Patient Temperature 37.0 degrees Celsius    FiO2 21 %   POCT GLUCOSE   Result Value Ref Range    POCT Glucose 356 (H) 74 - 99 mg/dL     Imaging  No results found.    Cardiology, Vascular, and Other Imaging  No other imaging results found for the past 7 days      Assessment/Plan   Assessment & Plan  DKA, type 2, not at goal    Ed Sanon is a 34 y.o. male significant for IDDM type I with poor insulin compliance, multiple recurrent admissions for DKA secondary to noncompliance, anxiety, depression admitted to the ICU for DKA.    Diabetic ketoacidosis secondary to insulin noncompliance  -Multiple recurrent admissions for DKA secondary to noncompliance  -Presented with complaints of neurolysed weakness, nausea, vomiting  -Reports blood glucose levels elevated at home with levels in the 300s  -Blood glucose 330, anion gap 32, bicarb 6, beta hydroxy 11.02  -Received 2 L IV fluid bolus in ED, initiated on insulin infusion per DKA protocol  -Afebrile, CBC with no leukocytosis  -Continue POCT glucose checks every 1 hour and check BMP, Mag every 4 hours while on insulin infusion  -Sodium normal when corrected for glucose  -Continue IV fluids with 0.45% NS @ 250ml/hr  -Switch to dextrose containing fluids once blood  glucose <250  -Transition to subcutaneous insulin when anion gap <15 and bicarb >15  -NPO  -Electrolyte replacements as indicated  -Follow CBC, BMP, Mag    Metabolic acidosis secondary to DKA  -VBG with pH 7.04, pCO2 21, bicarb 5.7, lactate 1.8  -Continue aggressive IV fluid hydration per DKA protocol  -Follow VBG    Poorly controlled type 1 diabetes  -A1c 11.7 (1/31/2025)  -Multiple prior hospitalizations for DKA secondary to noncompliance  -Counseling on importance of glycemic control  -Endocrinology consulted, appreciate recommendations      I spent 45 minutes of cumulative critical care time with the patient.  Greater than 50% of that time was spent in the direct collaboration and or coordination of care of the patient.     Dragon dictation software was used to dictate this note and thus there may be minor errors in translation/transcription including garbled speech or misspellings. Please contact for clarification if needed.

## 2025-04-02 NOTE — ED TRIAGE NOTES
Pt arrives to triage from home via private vehicle. AxOx4. Pt states he has been feeling increasingly weak for the last four days, hx DKA, states  at home, pt states he takes insulin for his type 1 DM & has been taking it; pt's  in triage; pt hasn't been eating or drinking today; c/o N/V.

## 2025-04-03 VITALS
TEMPERATURE: 98.6 F | DIASTOLIC BLOOD PRESSURE: 79 MMHG | HEIGHT: 70 IN | BODY MASS INDEX: 22.44 KG/M2 | HEART RATE: 116 BPM | SYSTOLIC BLOOD PRESSURE: 116 MMHG | WEIGHT: 156.75 LBS | RESPIRATION RATE: 10 BRPM | OXYGEN SATURATION: 95 %

## 2025-04-03 LAB
ANION GAP SERPL CALC-SCNC: 13 MMOL/L (ref 10–20)
ANION GAP SERPL CALC-SCNC: 14 MMOL/L (ref 10–20)
ANION GAP SERPL CALC-SCNC: <7 MMOL/L (ref 10–20)
ATRIAL RATE: 118 BPM
BASOPHILS # BLD AUTO: 0.02 X10*3/UL (ref 0–0.1)
BASOPHILS NFR BLD AUTO: 0.3 %
BUN SERPL-MCNC: 12 MG/DL (ref 6–23)
BUN SERPL-MCNC: 12 MG/DL (ref 6–23)
BUN SERPL-MCNC: 13 MG/DL (ref 6–23)
CALCIUM SERPL-MCNC: 7.9 MG/DL (ref 8.6–10.3)
CALCIUM SERPL-MCNC: 8.1 MG/DL (ref 8.6–10.3)
CALCIUM SERPL-MCNC: 8.6 MG/DL (ref 8.6–10.3)
CHLORIDE SERPL-SCNC: 108 MMOL/L (ref 98–107)
CHLORIDE SERPL-SCNC: 111 MMOL/L (ref 98–107)
CHLORIDE SERPL-SCNC: 115 MMOL/L (ref 98–107)
CO2 SERPL-SCNC: 13 MMOL/L (ref 21–32)
CO2 SERPL-SCNC: 16 MMOL/L (ref 21–32)
CO2 SERPL-SCNC: 17 MMOL/L (ref 21–32)
CREAT SERPL-MCNC: 0.91 MG/DL (ref 0.5–1.3)
CREAT SERPL-MCNC: 0.94 MG/DL (ref 0.5–1.3)
CREAT SERPL-MCNC: 0.97 MG/DL (ref 0.5–1.3)
EGFRCR SERPLBLD CKD-EPI 2021: >90 ML/MIN/1.73M*2
EOSINOPHIL # BLD AUTO: 0.01 X10*3/UL (ref 0–0.7)
EOSINOPHIL NFR BLD AUTO: 0.2 %
ERYTHROCYTE [DISTWIDTH] IN BLOOD BY AUTOMATED COUNT: 13.2 % (ref 11.5–14.5)
GLUCOSE BLD MANUAL STRIP-MCNC: 148 MG/DL (ref 74–99)
GLUCOSE BLD MANUAL STRIP-MCNC: 156 MG/DL (ref 74–99)
GLUCOSE BLD MANUAL STRIP-MCNC: 159 MG/DL (ref 74–99)
GLUCOSE BLD MANUAL STRIP-MCNC: 162 MG/DL (ref 74–99)
GLUCOSE BLD MANUAL STRIP-MCNC: 180 MG/DL (ref 74–99)
GLUCOSE BLD MANUAL STRIP-MCNC: 183 MG/DL (ref 74–99)
GLUCOSE BLD MANUAL STRIP-MCNC: 186 MG/DL (ref 74–99)
GLUCOSE BLD MANUAL STRIP-MCNC: 200 MG/DL (ref 74–99)
GLUCOSE BLD MANUAL STRIP-MCNC: 207 MG/DL (ref 74–99)
GLUCOSE SERPL-MCNC: 162 MG/DL (ref 74–99)
GLUCOSE SERPL-MCNC: 174 MG/DL (ref 74–99)
GLUCOSE SERPL-MCNC: 255 MG/DL (ref 74–99)
HCT VFR BLD AUTO: 43.6 % (ref 41–52)
HGB BLD-MCNC: 15.4 G/DL (ref 13.5–17.5)
IMM GRANULOCYTES # BLD AUTO: 0.03 X10*3/UL (ref 0–0.7)
IMM GRANULOCYTES NFR BLD AUTO: 0.5 % (ref 0–0.9)
LYMPHOCYTES # BLD AUTO: 1.49 X10*3/UL (ref 1.2–4.8)
LYMPHOCYTES NFR BLD AUTO: 23.8 %
MAGNESIUM SERPL-MCNC: 1.86 MG/DL (ref 1.6–2.4)
MAGNESIUM SERPL-MCNC: 1.88 MG/DL (ref 1.6–2.4)
MCH RBC QN AUTO: 31 PG (ref 26–34)
MCHC RBC AUTO-ENTMCNC: 35.3 G/DL (ref 32–36)
MCV RBC AUTO: 88 FL (ref 80–100)
MONOCYTES # BLD AUTO: 1 X10*3/UL (ref 0.1–1)
MONOCYTES NFR BLD AUTO: 15.9 %
NEUTROPHILS # BLD AUTO: 3.72 X10*3/UL (ref 1.2–7.7)
NEUTROPHILS NFR BLD AUTO: 59.3 %
NRBC BLD-RTO: 0 /100 WBCS (ref 0–0)
P AXIS: 75 DEGREES
PHOSPHATE SERPL-MCNC: 1.7 MG/DL (ref 2.5–4.9)
PLATELET # BLD AUTO: 160 X10*3/UL (ref 150–450)
POTASSIUM SERPL-SCNC: 3.3 MMOL/L (ref 3.5–5.3)
POTASSIUM SERPL-SCNC: 3.7 MMOL/L (ref 3.5–5.3)
POTASSIUM SERPL-SCNC: 3.9 MMOL/L (ref 3.5–5.3)
PR INTERVAL: 152 MS
Q ONSET: 252 MS
QRS COUNT: 19 BEATS
QRS DURATION: 96 MS
QT INTERVAL: 363 MS
QTC CALCULATION(BAZETT): 507 MS
QTC FREDERICIA: 453 MS
R AXIS: 68 DEGREES
RBC # BLD AUTO: 4.96 X10*6/UL (ref 4.5–5.9)
SODIUM SERPL-SCNC: 132 MMOL/L (ref 136–145)
SODIUM SERPL-SCNC: 134 MMOL/L (ref 136–145)
SODIUM SERPL-SCNC: 135 MMOL/L (ref 136–145)
T AXIS: 56 DEGREES
T OFFSET: 433 MS
VENTRICULAR RATE: 117 BPM
WBC # BLD AUTO: 6.3 X10*3/UL (ref 4.4–11.3)

## 2025-04-03 PROCEDURE — 82947 ASSAY GLUCOSE BLOOD QUANT: CPT

## 2025-04-03 PROCEDURE — G0425 INPT/ED TELECONSULT30: HCPCS | Performed by: PSYCHIATRY & NEUROLOGY

## 2025-04-03 PROCEDURE — 2500000001 HC RX 250 WO HCPCS SELF ADMINISTERED DRUGS (ALT 637 FOR MEDICARE OP)

## 2025-04-03 PROCEDURE — 36415 COLL VENOUS BLD VENIPUNCTURE: CPT

## 2025-04-03 PROCEDURE — 2500000002 HC RX 250 W HCPCS SELF ADMINISTERED DRUGS (ALT 637 FOR MEDICARE OP, ALT 636 FOR OP/ED)

## 2025-04-03 PROCEDURE — 84100 ASSAY OF PHOSPHORUS: CPT | Performed by: NURSE PRACTITIONER

## 2025-04-03 PROCEDURE — 82374 ASSAY BLOOD CARBON DIOXIDE: CPT | Performed by: STUDENT IN AN ORGANIZED HEALTH CARE EDUCATION/TRAINING PROGRAM

## 2025-04-03 PROCEDURE — 2500000004 HC RX 250 GENERAL PHARMACY W/ HCPCS (ALT 636 FOR OP/ED): Performed by: NURSE PRACTITIONER

## 2025-04-03 PROCEDURE — 99291 CRITICAL CARE FIRST HOUR: CPT | Performed by: INTERNAL MEDICINE

## 2025-04-03 PROCEDURE — 99254 IP/OBS CNSLTJ NEW/EST MOD 60: CPT | Performed by: INTERNAL MEDICINE

## 2025-04-03 PROCEDURE — 83735 ASSAY OF MAGNESIUM: CPT

## 2025-04-03 PROCEDURE — 2500000001 HC RX 250 WO HCPCS SELF ADMINISTERED DRUGS (ALT 637 FOR MEDICARE OP): Performed by: NURSE PRACTITIONER

## 2025-04-03 PROCEDURE — 80048 BASIC METABOLIC PNL TOTAL CA: CPT

## 2025-04-03 PROCEDURE — 85025 COMPLETE CBC W/AUTO DIFF WBC: CPT | Performed by: NURSE PRACTITIONER

## 2025-04-03 RX ORDER — ONDANSETRON 4 MG/1
4 TABLET, FILM COATED ORAL EVERY 8 HOURS PRN
Status: DISCONTINUED | OUTPATIENT
Start: 2025-04-03 | End: 2025-04-03 | Stop reason: HOSPADM

## 2025-04-03 RX ORDER — DEXTROSE 50 % IN WATER (D50W) INTRAVENOUS SYRINGE
12.5
Status: DISCONTINUED | OUTPATIENT
Start: 2025-04-03 | End: 2025-04-03 | Stop reason: HOSPADM

## 2025-04-03 RX ORDER — INSULIN LISPRO 100 [IU]/ML
5 INJECTION, SOLUTION INTRAVENOUS; SUBCUTANEOUS
Status: DISCONTINUED | OUTPATIENT
Start: 2025-04-03 | End: 2025-04-03 | Stop reason: HOSPADM

## 2025-04-03 RX ORDER — ONDANSETRON HYDROCHLORIDE 2 MG/ML
4 INJECTION, SOLUTION INTRAVENOUS EVERY 6 HOURS PRN
Status: DISCONTINUED | OUTPATIENT
Start: 2025-04-03 | End: 2025-04-03 | Stop reason: HOSPADM

## 2025-04-03 RX ORDER — INSULIN LISPRO 100 [IU]/ML
0-5 INJECTION, SOLUTION INTRAVENOUS; SUBCUTANEOUS
Status: DISCONTINUED | OUTPATIENT
Start: 2025-04-03 | End: 2025-04-03 | Stop reason: HOSPADM

## 2025-04-03 RX ORDER — POTASSIUM CHLORIDE 20 MEQ/1
40 TABLET, EXTENDED RELEASE ORAL ONCE
Status: COMPLETED | OUTPATIENT
Start: 2025-04-03 | End: 2025-04-03

## 2025-04-03 RX ORDER — DEXTROSE 50 % IN WATER (D50W) INTRAVENOUS SYRINGE
25
Status: DISCONTINUED | OUTPATIENT
Start: 2025-04-03 | End: 2025-04-03 | Stop reason: HOSPADM

## 2025-04-03 RX ORDER — INSULIN HUMAN 100 [IU]/ML
INJECTION, SUSPENSION SUBCUTANEOUS
Qty: 10 ML | Refills: 11 | Status: SHIPPED | OUTPATIENT
Start: 2025-04-03 | End: 2026-04-03

## 2025-04-03 RX ORDER — INSULIN GLARGINE 100 [IU]/ML
22 INJECTION, SOLUTION SUBCUTANEOUS 2 TIMES DAILY
Status: DISCONTINUED | OUTPATIENT
Start: 2025-04-03 | End: 2025-04-03

## 2025-04-03 RX ADMIN — Medication 250 MG: at 13:22

## 2025-04-03 RX ADMIN — PANTOPRAZOLE SODIUM 40 MG: 40 TABLET, DELAYED RELEASE ORAL at 06:03

## 2025-04-03 RX ADMIN — Medication 250 MG: at 06:03

## 2025-04-03 RX ADMIN — INSULIN LISPRO 5 UNITS: 100 INJECTION, SOLUTION INTRAVENOUS; SUBCUTANEOUS at 10:15

## 2025-04-03 RX ADMIN — ONDANSETRON 4 MG: 2 INJECTION INTRAMUSCULAR; INTRAVENOUS at 00:03

## 2025-04-03 RX ADMIN — INSULIN LISPRO 2 UNITS: 100 INJECTION, SOLUTION INTRAVENOUS; SUBCUTANEOUS at 10:12

## 2025-04-03 RX ADMIN — DEXTROSE MONOHYDRATE 150 ML/HR: 10 INJECTION, SOLUTION INTRAVENOUS at 00:12

## 2025-04-03 RX ADMIN — INSULIN GLARGINE 22 UNITS: 100 INJECTION, SOLUTION SUBCUTANEOUS at 04:50

## 2025-04-03 RX ADMIN — POTASSIUM CHLORIDE 40 MEQ: 1500 TABLET, EXTENDED RELEASE ORAL at 00:58

## 2025-04-03 ASSESSMENT — COGNITIVE AND FUNCTIONAL STATUS - GENERAL
DAILY ACTIVITIY SCORE: 24
MOBILITY SCORE: 24

## 2025-04-03 ASSESSMENT — ENCOUNTER SYMPTOMS
CONSTITUTIONAL NEGATIVE: 1
ALLERGIC/IMMUNOLOGIC NEGATIVE: 1
CARDIOVASCULAR NEGATIVE: 1
MUSCULOSKELETAL NEGATIVE: 1
RESPIRATORY NEGATIVE: 1
VOMITING: 1
WEAKNESS: 1
NAUSEA: 1
NEUROLOGICAL NEGATIVE: 1
GASTROINTESTINAL NEGATIVE: 1
HEMATOLOGIC/LYMPHATIC NEGATIVE: 1
EYES NEGATIVE: 1

## 2025-04-03 ASSESSMENT — PAIN - FUNCTIONAL ASSESSMENT
PAIN_FUNCTIONAL_ASSESSMENT: 0-10
PAIN_FUNCTIONAL_ASSESSMENT: 0-10

## 2025-04-03 ASSESSMENT — PAIN SCALES - GENERAL
PAINLEVEL_OUTOF10: 0 - NO PAIN

## 2025-04-03 ASSESSMENT — ACTIVITIES OF DAILY LIVING (ADL): LACK_OF_TRANSPORTATION: NO

## 2025-04-03 NOTE — CONSULTS
"Inpatient consult to Psychiatry  Consult performed by: Dave Gutiérrez MD  Consult ordered by: Lio Julian MD  Reason for consult: Recurrent hospital admissions secondary to significant noncompliance with insulin, has phobia about dropping blood sugars since his brother passed away with hypoglycemia      Date: 4-3-2025    Reason For Consult: \"Recurrent hospital admissions secondary to significant noncompliance with insulin, has phobia about dropping blood sugars since his brother passed away with hypoglycemia\"      Chief Complaint: \"I missed a dose of insulin.\"    History Of Present Illness  Ed Sanon is a 34 y.o. year old male patient who presented to the Emergency Department in diabetic ketoacidosis (see H&P below). On telepsychiatry evaluation (seen with consent), Ed denies a fear of taking his short acting insulin reporting that he just doesn't like the way it feels soon after he injects it. He does endorse having just a little anxiety from using it (based on the way it makes him feel afterward). He also denies he is worried about dying from low blood sugar as his brother did. No other symptoms of phobia were elicited.        Ed also denied experiencing any current or recent feelings of depression, decreased sleep, decreased appetite, decreased energy, decreased concentration, increased feelings of hopelessness or helplessness or worthlessness, or anhedonia. He also denies experiencing any recent or current suicidal thoughts or suicide plans. Ed denies experiencing prior depressive episodes, or manic symptoms, in the past. No hallucinations or paranoia were endorsed or noted.           Per Hospitalist's H&P of 4-2-2025:  Ed Sanon is a 34 y.o. male with PMHx s/f  IDDM Type I with frequent admissions for diabetic ketoacidosis presenting with generalized weakness.  Patient has frequent DKA events most recently admitted 3/11/25 due to noncompliance with short acting insulin.  He " does not take short acting insulin because he does not feel good when glucose is trending down.  presenting with nausea, vomiting since the weekend. Pt states glucose was running around 350. Pt evaluated in the ED, found to be in DKA, no evidence of infection. The patient was started on IV fluid boluses, given bolus of insulin and transitioned to insulin drip. The case was discussed with the ED provider, pt will be admitted to the ICU. The patient is severely acidotic and LOS is likely to exceed two midnights.            PSYCHIATRIC REVIEW OF SYMPTOMS  Depressive Symptoms: negative  Manic Symptoms: negative  Anxiety Symptoms:  see HPI  Psychotic Symptoms:  None  Delirium/Altered Mental Status Symptoms:  None  Other Symptoms/Concerns:  None          Past Medical History  Past Medical History:   Diagnosis Date    DKA (diabetic ketoacidosis) (Multi)     recurrent    HTN (hypertension)     Type I diabetes mellitus (Multi)         Past Psychiatric History: 1) Past Dx: None                                            2) No prior psychiatric hospitalizations                                            3) No prior suicide attempts                                            4) No prior SIB                                            5) Patient's guns at home are in a gun safe.                                            6) No prior rehab treatment programs                                            7) Mental Health Provider(s): None                                            8) Current psych meds: 1) None.      Past Psychiatric Meds: 1) None                                                Family History: 1) Sister - anxiety                             2) No known suicides in the family.      Social History  Social History     Socioeconomic History    Marital status: Single     Spouse name: Not on file    Number of children: Not on file    Years of education: Not on file    Highest education level: Not on file   Occupational  History    Not on file   Tobacco Use    Smoking status: Never     Passive exposure: Never    Smokeless tobacco: Never   Vaping Use    Vaping status: Never Used   Substance and Sexual Activity    Alcohol use: Never    Drug use: Never    Sexual activity: Not on file   Other Topics Concern    Not on file   Social History Narrative    Not on file     Social Drivers of Health     Financial Resource Strain: Low Risk  (4/3/2025)    Overall Financial Resource Strain (CARDIA)     Difficulty of Paying Living Expenses: Not hard at all   Food Insecurity: No Food Insecurity (4/2/2025)    Hunger Vital Sign     Worried About Running Out of Food in the Last Year: Never true     Ran Out of Food in the Last Year: Never true   Transportation Needs: No Transportation Needs (4/3/2025)    PRAPARE - Transportation     Lack of Transportation (Medical): No     Lack of Transportation (Non-Medical): No   Physical Activity: Inactive (12/5/2024)    Exercise Vital Sign     Days of Exercise per Week: 0 days     Minutes of Exercise per Session: 0 min   Stress: Not on file   Social Connections: Not on file   Intimate Partner Violence: Not At Risk (4/2/2025)    Humiliation, Afraid, Rape, and Kick questionnaire     Fear of Current or Ex-Partner: No     Emotionally Abused: No     Physically Abused: No     Sexually Abused: No   Housing Stability: Low Risk  (4/3/2025)    Housing Stability Vital Sign     Unable to Pay for Housing in the Last Year: No     Number of Times Moved in the Last Year: 0     Homeless in the Last Year: No        Substance Abuse History:  1) Tobacco - Denies  2) ETOH - Denies  3) Cannabis - Denies  4) Denies any illicit drug use.      The patient graduated high school, and has a technical certification. Unemployed since June, 2024. His work history includes working underground utility construction. Never . Two children (8 and 6 years-old), and has them on weekends and holidays. No significant legal history. The patient lives  "with his mother in her house.         Allergies  No Known Allergies     Scheduled medications  insulin lispro, 0-5 Units, subcutaneous, TID AC  insulin lispro, 5 Units, subcutaneous, TID AC  insulin NPH (Isophane), 22 Units, subcutaneous, q12h SHEILA  pantoprazole, 40 mg, oral, Daily before breakfast   Or  pantoprazole, 40 mg, intravenous, Daily before breakfast  sod phos di, mono-K phos mono, 250 mg, oral, 4x daily      Continuous medications     PRN medications  PRN medications: bisacodyl, bisacodyl, dextrose, dextrose, glucagon, glucagon, melatonin, ondansetron **OR** ondansetron         Review of Systems   Review of Systems   Constitutional: Negative.    HENT: Negative.     Eyes: Negative.    Respiratory: Negative.     Cardiovascular: Negative.    Gastrointestinal: Negative.    Endocrine:        1) Type I DM   Genitourinary: Negative.    Musculoskeletal: Negative.    Skin: Negative.    Allergic/Immunologic: Negative.    Neurological: Negative.    Hematological: Negative.    Psychiatric/Behavioral:          1) anxiety          Physical Exam  Mental Status Exam:   General: Appropriately groomed and dressed in hospital attire, sitting up in bed.   Appearance: Appears stated age.   Attitude: Calm, cooperative.   Behavior: Appropriate eye contact.   Motor Activity: No agitation or retardation. No EPS/TD. Normal gait and station. Normal muscle tone and bulk.   Speech: Regular rate, rhythm, volume and tone, spontaneous,  fluent. Non-pressured.   Mood: \"Okay\"   Affect: Neutral.   Thought Process: Organized, and goal directed.   Thought Content: Does not endorse suicidal ideation or any suicide plans.   Does not endorse homicidal ideation.  No overt delusions or paranoia elicited.    Thought Perception: No auditory hallucinations, visual hallucinations, tactile hallucinations, olfactory hallucinations, or gustatory hallucinations were elicited or noted. He does not appear to be responding to hallucinatory stimuli. "   Cognition: Alert, oriented x 3. No deficits noted. Adequate fund of knowledge. No deficit in recent and remote memory. No deficits in attention, concentration or language.   Insight: Fair-to-good, as patient recognizes symptoms of  illness and need for recommended treatments.    Judgment: Intact, as patient can make reasonable decisions about ordinary activities of daily living and necessary medical care recommendations.         Last Recorded Vitals  Visit Vitals  /79   Pulse 101   Temp 37 °C (98.6 °F) (Temporal)   Resp (!) 8        Relevant Results  Results for orders placed or performed during the hospital encounter of 04/02/25 (from the past 24 hours)   POCT GLUCOSE   Result Value Ref Range    POCT Glucose 330 (H) 74 - 99 mg/dL   ECG 12 lead   Result Value Ref Range    Ventricular Rate 117 BPM    Atrial Rate 118 BPM    MI Interval 152 ms    QRS Duration 96 ms    QT Interval 363 ms    QTC Calculation(Bazett) 507 ms    P Axis 75 degrees    R Axis 68 degrees    T Axis 56 degrees    QRS Count 19 beats    Q Onset 252 ms    T Offset 433 ms    QTC Fredericia 453 ms   CBC and Auto Differential   Result Value Ref Range    WBC 7.8 4.4 - 11.3 x10*3/uL    nRBC 0.0 0.0 - 0.0 /100 WBCs    RBC 6.09 (H) 4.50 - 5.90 x10*6/uL    Hemoglobin 18.6 (H) 13.5 - 17.5 g/dL    Hematocrit 55.2 (H) 41.0 - 52.0 %    MCV 91 80 - 100 fL    MCH 30.5 26.0 - 34.0 pg    MCHC 33.7 32.0 - 36.0 g/dL    RDW 13.5 11.5 - 14.5 %    Platelets 203 150 - 450 x10*3/uL    Neutrophils % 81.9 40.0 - 80.0 %    Immature Granulocytes %, Automated 0.6 0.0 - 0.9 %    Lymphocytes % 11.0 13.0 - 44.0 %    Monocytes % 5.9 2.0 - 10.0 %    Eosinophils % 0.0 0.0 - 6.0 %    Basophils % 0.6 0.0 - 2.0 %    Neutrophils Absolute 6.40 1.20 - 7.70 x10*3/uL    Immature Granulocytes Absolute, Automated 0.05 0.00 - 0.70 x10*3/uL    Lymphocytes Absolute 0.86 (L) 1.20 - 4.80 x10*3/uL    Monocytes Absolute 0.46 0.10 - 1.00 x10*3/uL    Eosinophils Absolute 0.00 0.00 - 0.70  x10*3/uL    Basophils Absolute 0.05 0.00 - 0.10 x10*3/uL   Comprehensive metabolic panel   Result Value Ref Range    Glucose 352 (H) 74 - 99 mg/dL    Sodium 133 (L) 136 - 145 mmol/L    Potassium 4.9 3.5 - 5.3 mmol/L    Chloride 100 98 - 107 mmol/L    Bicarbonate 6 (LL) 21 - 32 mmol/L    Anion Gap 32 (H) 10 - 20 mmol/L    Urea Nitrogen 13 6 - 23 mg/dL    Creatinine 1.22 0.50 - 1.30 mg/dL    eGFR 79 >60 mL/min/1.73m*2    Calcium 9.1 8.6 - 10.3 mg/dL    Albumin 4.9 3.4 - 5.0 g/dL    Alkaline Phosphatase 95 33 - 120 U/L    Total Protein 8.4 (H) 6.4 - 8.2 g/dL    AST 9 9 - 39 U/L    Bilirubin, Total 0.6 0.0 - 1.2 mg/dL    ALT 12 10 - 52 U/L   Lactate   Result Value Ref Range    Lactate 1.4 0.4 - 2.0 mmol/L   Beta Hydroxybutyrate   Result Value Ref Range    Beta-Hydroxybutyrate 11.02 (H) 0.02 - 0.27 mmol/L   Blood Gas Venous Full Panel   Result Value Ref Range    POCT pH, Venous 7.04 (LL) 7.33 - 7.43 pH    POCT pCO2, Venous 21 (L) 41 - 51 mm Hg    POCT pO2, Venous 53 (H) 35 - 45 mm Hg    POCT SO2, Venous 84 (H) 45 - 75 %    POCT Oxy Hemoglobin, Venous 82.7 (H) 45.0 - 75.0 %    POCT Hematocrit Calculated, Venous 55.0 (H) 41.0 - 52.0 %    POCT Sodium, Venous 129 (L) 136 - 145 mmol/L    POCT Potassium, Venous 5.5 (H) 3.5 - 5.3 mmol/L    POCT Chloride, Venous 103 98 - 107 mmol/L    POCT Ionized Calicum, Venous 1.24 1.10 - 1.33 mmol/L    POCT Glucose, Venous 382 (H) 74 - 99 mg/dL    POCT Lactate, Venous 1.8 0.4 - 2.0 mmol/L    POCT Base Excess, Venous -23.3 (L) -2.0 - 3.0 mmol/L    POCT HCO3 Calculated, Venous 5.7 (L) 22.0 - 26.0 mmol/L    POCT Hemoglobin, Venous 18.2 (H) 13.5 - 17.5 g/dL    POCT Anion Gap, Venous 26.0 (H) 10.0 - 25.0 mmol/L    Patient Temperature 37.0 degrees Celsius    FiO2 21 %   POCT GLUCOSE   Result Value Ref Range    POCT Glucose 356 (H) 74 - 99 mg/dL   BLOOD GAS VENOUS FULL PANEL   Result Value Ref Range    POCT pH, Venous 7.03 (LL) 7.33 - 7.43 pH    POCT pCO2, Venous 20 (L) 41 - 51 mm Hg    POCT pO2,  Venous 47 (H) 35 - 45 mm Hg    POCT SO2, Venous 78 (H) 45 - 75 %    POCT Oxy Hemoglobin, Venous 77.2 (H) 45.0 - 75.0 %    POCT Hematocrit Calculated, Venous 51.0 41.0 - 52.0 %    POCT Sodium, Venous 131 (L) 136 - 145 mmol/L    POCT Potassium, Venous 4.5 3.5 - 5.3 mmol/L    POCT Chloride, Venous 107 98 - 107 mmol/L    POCT Ionized Calicum, Venous 1.19 1.10 - 1.33 mmol/L    POCT Glucose, Venous 295 (H) 74 - 99 mg/dL    POCT Lactate, Venous 1.5 0.4 - 2.0 mmol/L    POCT Base Excess, Venous -23.9 (L) -2.0 - 3.0 mmol/L    POCT HCO3 Calculated, Venous 5.3 (L) 22.0 - 26.0 mmol/L    POCT Hemoglobin, Venous 16.9 13.5 - 17.5 g/dL    POCT Anion Gap, Venous 23.0 10.0 - 25.0 mmol/L    Patient Temperature 37.0 degrees Celsius    FiO2 21 %   POCT GLUCOSE   Result Value Ref Range    POCT Glucose 274 (H) 74 - 99 mg/dL   POCT GLUCOSE   Result Value Ref Range    POCT Glucose 208 (H) 74 - 99 mg/dL   POCT GLUCOSE   Result Value Ref Range    POCT Glucose 192 (H) 74 - 99 mg/dL   Basic Metabolic Panel   Result Value Ref Range    Glucose 188 (H) 74 - 99 mg/dL    Sodium 133 (L) 136 - 145 mmol/L    Potassium 3.3 (L) 3.5 - 5.3 mmol/L    Chloride 113 (H) 98 - 107 mmol/L    Bicarbonate 8 (LL) 21 - 32 mmol/L    Anion Gap 15 10 - 20 mmol/L    Urea Nitrogen 12 6 - 23 mg/dL    Creatinine 1.01 0.50 - 1.30 mg/dL    eGFR >90 >60 mL/min/1.73m*2    Calcium 7.8 (L) 8.6 - 10.3 mg/dL   Magnesium   Result Value Ref Range    Magnesium 1.86 1.60 - 2.40 mg/dL   POCT GLUCOSE   Result Value Ref Range    POCT Glucose 169 (H) 74 - 99 mg/dL   POCT GLUCOSE   Result Value Ref Range    POCT Glucose 173 (H) 74 - 99 mg/dL   POCT GLUCOSE   Result Value Ref Range    POCT Glucose 148 (H) 74 - 99 mg/dL   Basic Metabolic Panel   Result Value Ref Range    Glucose 162 (H) 74 - 99 mg/dL    Sodium 134 (L) 136 - 145 mmol/L    Potassium 3.3 (L) 3.5 - 5.3 mmol/L    Chloride 111 (H) 98 - 107 mmol/L    Bicarbonate 13 (L) 21 - 32 mmol/L    Anion Gap 13 10 - 20 mmol/L    Urea Nitrogen 13  6 - 23 mg/dL    Creatinine 0.97 0.50 - 1.30 mg/dL    eGFR >90 >60 mL/min/1.73m*2    Calcium 7.9 (L) 8.6 - 10.3 mg/dL   Magnesium   Result Value Ref Range    Magnesium 1.86 1.60 - 2.40 mg/dL   POCT GLUCOSE   Result Value Ref Range    POCT Glucose 183 (H) 74 - 99 mg/dL   POCT GLUCOSE   Result Value Ref Range    POCT Glucose 156 (H) 74 - 99 mg/dL   POCT GLUCOSE   Result Value Ref Range    POCT Glucose 159 (H) 74 - 99 mg/dL   POCT GLUCOSE   Result Value Ref Range    POCT Glucose 162 (H) 74 - 99 mg/dL   CBC and Auto Differential   Result Value Ref Range    WBC 6.3 4.4 - 11.3 x10*3/uL    nRBC 0.0 0.0 - 0.0 /100 WBCs    RBC 4.96 4.50 - 5.90 x10*6/uL    Hemoglobin 15.4 13.5 - 17.5 g/dL    Hematocrit 43.6 41.0 - 52.0 %    MCV 88 80 - 100 fL    MCH 31.0 26.0 - 34.0 pg    MCHC 35.3 32.0 - 36.0 g/dL    RDW 13.2 11.5 - 14.5 %    Platelets 160 150 - 450 x10*3/uL    Neutrophils % 59.3 40.0 - 80.0 %    Immature Granulocytes %, Automated 0.5 0.0 - 0.9 %    Lymphocytes % 23.8 13.0 - 44.0 %    Monocytes % 15.9 2.0 - 10.0 %    Eosinophils % 0.2 0.0 - 6.0 %    Basophils % 0.3 0.0 - 2.0 %    Neutrophils Absolute 3.72 1.20 - 7.70 x10*3/uL    Immature Granulocytes Absolute, Automated 0.03 0.00 - 0.70 x10*3/uL    Lymphocytes Absolute 1.49 1.20 - 4.80 x10*3/uL    Monocytes Absolute 1.00 0.10 - 1.00 x10*3/uL    Eosinophils Absolute 0.01 0.00 - 0.70 x10*3/uL    Basophils Absolute 0.02 0.00 - 0.10 x10*3/uL   Phosphorus   Result Value Ref Range    Phosphorus 1.7 (L) 2.5 - 4.9 mg/dL   Basic Metabolic Panel   Result Value Ref Range    Glucose 174 (H) 74 - 99 mg/dL    Sodium 132 (L) 136 - 145 mmol/L    Potassium 3.7 3.5 - 5.3 mmol/L    Chloride 115 (H) 98 - 107 mmol/L    Bicarbonate 16 (L) 21 - 32 mmol/L    Anion Gap <7 (L) 10 - 20 mmol/L    Urea Nitrogen 12 6 - 23 mg/dL    Creatinine 0.91 0.50 - 1.30 mg/dL    eGFR >90 >60 mL/min/1.73m*2    Calcium 8.1 (L) 8.6 - 10.3 mg/dL   Magnesium   Result Value Ref Range    Magnesium 1.88 1.60 - 2.40 mg/dL  "  POCT GLUCOSE   Result Value Ref Range    POCT Glucose 186 (H) 74 - 99 mg/dL   POCT GLUCOSE   Result Value Ref Range    POCT Glucose 200 (H) 74 - 99 mg/dL   POCT GLUCOSE   Result Value Ref Range    POCT Glucose 180 (H) 74 - 99 mg/dL   POCT GLUCOSE   Result Value Ref Range    POCT Glucose 207 (H) 74 - 99 mg/dL   Basic metabolic panel   Result Value Ref Range    Glucose 255 (H) 74 - 99 mg/dL    Sodium 135 (L) 136 - 145 mmol/L    Potassium 3.9 3.5 - 5.3 mmol/L    Chloride 108 (H) 98 - 107 mmol/L    Bicarbonate 17 (L) 21 - 32 mmol/L    Anion Gap 14 10 - 20 mmol/L    Urea Nitrogen 12 6 - 23 mg/dL    Creatinine 0.94 0.50 - 1.30 mg/dL    eGFR >90 >60 mL/min/1.73m*2    Calcium 8.6 8.6 - 10.3 mg/dL     *Note: Due to a large number of results and/or encounters for the requested time period, some results have not been displayed. A complete set of results can be found in Results Review.         Diagnostic Impression:  1) Other Specified Anxiety Disorder  2) Diabetic Ketoacidosis (resolved)  3) Type I Diabetes Mellitus        Recommendations:  1) Patient only reporting criteria meeting a diagnosis of Other Specified Anxiety Disorder. He has no clear wish for any mental health intervention at this time.  2) Ed is judged a minimal suicide risk due to: 1) His guns are locked in a gun safe at home, 2) Denies any prior suicide attempts, 3) Denies any current suicidal ideation or suicide plans, 4) +plans for the future: \"...I'd like to get my kids into sports... going on vacation (mom, kids, brother in low) the middle of April...,\" and 5) No current symptoms of Major Depressive Disorder elicited during the interview.   Ed is currently judged safe to be discharged from a psychiatric standpoint.          I spent 40 minutes in the professional and overall care of this patient.        Dave Gutiérrez MD   "

## 2025-04-03 NOTE — PROGRESS NOTES
Critical Care Daily Progress Notes        Subjective   Patient is a 34 y.o. male admitted on 4/2/2025  3:47 PM with the following indication(s) for ICU care: Diabetic ketoacidosis.     Interval History:   Ed Sanon is a 34 y.o. male significant for IDDM type I with poor insulin compliance, multiple recurrent admissions for DKA secondary to noncompliance, anxiety, depression admitted to the ICU for DKA.    4/3/2025: Patient anion gap is closed less than 15 and bicarbonate more than 15.  Patient was transition from IV insulin drip to NPH insulin 22 units daily along with scheduled appointments lispro 3 times a day and sliding scale. Endocrinology wants to discharge this patient on Novolin 70/30 30 units subcutaneous twice daily, instead of Lantus for better compliance.     Scheduled Medications:   insulin lispro, 0-5 Units, subcutaneous, TID AC  insulin lispro, 5 Units, subcutaneous, TID AC  insulin NPH (Isophane), 22 Units, subcutaneous, q12h SHEILA  pantoprazole, 40 mg, oral, Daily before breakfast   Or  pantoprazole, 40 mg, intravenous, Daily before breakfast  sod phos di, mono-K phos mono, 250 mg, oral, 4x daily         Continuous Medications:         PRN Medications:   PRN medications: bisacodyl, bisacodyl, dextrose, dextrose, glucagon, glucagon, melatonin, ondansetron **OR** ondansetron    Objective   Vitals:  Most Recent:  Vitals:    04/03/25 1129   BP:    Pulse:    Resp:    Temp: 37 °C (98.6 °F)   SpO2:        24hr Min/Max:  Temp  Min: 36.7 °C (98.1 °F)  Max: 37 °C (98.6 °F)  Pulse  Min: 90  Max: 130  BP  Min: 107/76  Max: 144/92  Resp  Min: 4  Max: 25  SpO2  Min: 93 %  Max: 100 %    LDA:         Vent settings:       Hemodynamic parameters for last 24 hours:       I/O:    Intake/Output Summary (Last 24 hours) at 4/3/2025 1157  Last data filed at 4/3/2025 0650  Gross per 24 hour   Intake 2183.9 ml   Output 2350 ml   Net -166.1 ml       Physical Exam:   Physical Exam  Vitals reviewed.   Constitutional:        Appearance: Normal appearance.   HENT:      Head: Normocephalic and atraumatic.   Eyes:      Conjunctiva/sclera: Conjunctivae normal.      Pupils: Pupils are equal, round, and reactive to light.   Cardiovascular:      Rate and Rhythm: Normal rate and regular rhythm.   Pulmonary:      Effort: Pulmonary effort is normal.      Breath sounds: Normal breath sounds.   Abdominal:      General: Abdomen is flat.      Palpations: Abdomen is soft.   Musculoskeletal:         General: Normal range of motion.   Skin:     General: Skin is warm and dry.   Neurological:      General: No focal deficit present.      Mental Status: He is alert and oriented to person, place, and time. Mental status is at baseline.   Psychiatric:         Mood and Affect: Mood normal.         Behavior: Behavior normal.          Lab/Radiology/Diagnostic Review:  Results for orders placed or performed during the hospital encounter of 04/02/25 (from the past 24 hours)   POCT GLUCOSE   Result Value Ref Range    POCT Glucose 330 (H) 74 - 99 mg/dL   ECG 12 lead   Result Value Ref Range    Ventricular Rate 117 BPM    Atrial Rate 118 BPM    WI Interval 152 ms    QRS Duration 96 ms    QT Interval 363 ms    QTC Calculation(Bazett) 507 ms    P Axis 75 degrees    R Axis 68 degrees    T Axis 56 degrees    QRS Count 19 beats    Q Onset 252 ms    T Offset 433 ms    QTC Fredericia 453 ms   CBC and Auto Differential   Result Value Ref Range    WBC 7.8 4.4 - 11.3 x10*3/uL    nRBC 0.0 0.0 - 0.0 /100 WBCs    RBC 6.09 (H) 4.50 - 5.90 x10*6/uL    Hemoglobin 18.6 (H) 13.5 - 17.5 g/dL    Hematocrit 55.2 (H) 41.0 - 52.0 %    MCV 91 80 - 100 fL    MCH 30.5 26.0 - 34.0 pg    MCHC 33.7 32.0 - 36.0 g/dL    RDW 13.5 11.5 - 14.5 %    Platelets 203 150 - 450 x10*3/uL    Neutrophils % 81.9 40.0 - 80.0 %    Immature Granulocytes %, Automated 0.6 0.0 - 0.9 %    Lymphocytes % 11.0 13.0 - 44.0 %    Monocytes % 5.9 2.0 - 10.0 %    Eosinophils % 0.0 0.0 - 6.0 %    Basophils % 0.6 0.0 - 2.0 %     Neutrophils Absolute 6.40 1.20 - 7.70 x10*3/uL    Immature Granulocytes Absolute, Automated 0.05 0.00 - 0.70 x10*3/uL    Lymphocytes Absolute 0.86 (L) 1.20 - 4.80 x10*3/uL    Monocytes Absolute 0.46 0.10 - 1.00 x10*3/uL    Eosinophils Absolute 0.00 0.00 - 0.70 x10*3/uL    Basophils Absolute 0.05 0.00 - 0.10 x10*3/uL   Comprehensive metabolic panel   Result Value Ref Range    Glucose 352 (H) 74 - 99 mg/dL    Sodium 133 (L) 136 - 145 mmol/L    Potassium 4.9 3.5 - 5.3 mmol/L    Chloride 100 98 - 107 mmol/L    Bicarbonate 6 (LL) 21 - 32 mmol/L    Anion Gap 32 (H) 10 - 20 mmol/L    Urea Nitrogen 13 6 - 23 mg/dL    Creatinine 1.22 0.50 - 1.30 mg/dL    eGFR 79 >60 mL/min/1.73m*2    Calcium 9.1 8.6 - 10.3 mg/dL    Albumin 4.9 3.4 - 5.0 g/dL    Alkaline Phosphatase 95 33 - 120 U/L    Total Protein 8.4 (H) 6.4 - 8.2 g/dL    AST 9 9 - 39 U/L    Bilirubin, Total 0.6 0.0 - 1.2 mg/dL    ALT 12 10 - 52 U/L   Lactate   Result Value Ref Range    Lactate 1.4 0.4 - 2.0 mmol/L   Beta Hydroxybutyrate   Result Value Ref Range    Beta-Hydroxybutyrate 11.02 (H) 0.02 - 0.27 mmol/L   Blood Gas Venous Full Panel   Result Value Ref Range    POCT pH, Venous 7.04 (LL) 7.33 - 7.43 pH    POCT pCO2, Venous 21 (L) 41 - 51 mm Hg    POCT pO2, Venous 53 (H) 35 - 45 mm Hg    POCT SO2, Venous 84 (H) 45 - 75 %    POCT Oxy Hemoglobin, Venous 82.7 (H) 45.0 - 75.0 %    POCT Hematocrit Calculated, Venous 55.0 (H) 41.0 - 52.0 %    POCT Sodium, Venous 129 (L) 136 - 145 mmol/L    POCT Potassium, Venous 5.5 (H) 3.5 - 5.3 mmol/L    POCT Chloride, Venous 103 98 - 107 mmol/L    POCT Ionized Calicum, Venous 1.24 1.10 - 1.33 mmol/L    POCT Glucose, Venous 382 (H) 74 - 99 mg/dL    POCT Lactate, Venous 1.8 0.4 - 2.0 mmol/L    POCT Base Excess, Venous -23.3 (L) -2.0 - 3.0 mmol/L    POCT HCO3 Calculated, Venous 5.7 (L) 22.0 - 26.0 mmol/L    POCT Hemoglobin, Venous 18.2 (H) 13.5 - 17.5 g/dL    POCT Anion Gap, Venous 26.0 (H) 10.0 - 25.0 mmol/L    Patient Temperature  37.0 degrees Celsius    FiO2 21 %   POCT GLUCOSE   Result Value Ref Range    POCT Glucose 356 (H) 74 - 99 mg/dL   BLOOD GAS VENOUS FULL PANEL   Result Value Ref Range    POCT pH, Venous 7.03 (LL) 7.33 - 7.43 pH    POCT pCO2, Venous 20 (L) 41 - 51 mm Hg    POCT pO2, Venous 47 (H) 35 - 45 mm Hg    POCT SO2, Venous 78 (H) 45 - 75 %    POCT Oxy Hemoglobin, Venous 77.2 (H) 45.0 - 75.0 %    POCT Hematocrit Calculated, Venous 51.0 41.0 - 52.0 %    POCT Sodium, Venous 131 (L) 136 - 145 mmol/L    POCT Potassium, Venous 4.5 3.5 - 5.3 mmol/L    POCT Chloride, Venous 107 98 - 107 mmol/L    POCT Ionized Calicum, Venous 1.19 1.10 - 1.33 mmol/L    POCT Glucose, Venous 295 (H) 74 - 99 mg/dL    POCT Lactate, Venous 1.5 0.4 - 2.0 mmol/L    POCT Base Excess, Venous -23.9 (L) -2.0 - 3.0 mmol/L    POCT HCO3 Calculated, Venous 5.3 (L) 22.0 - 26.0 mmol/L    POCT Hemoglobin, Venous 16.9 13.5 - 17.5 g/dL    POCT Anion Gap, Venous 23.0 10.0 - 25.0 mmol/L    Patient Temperature 37.0 degrees Celsius    FiO2 21 %   POCT GLUCOSE   Result Value Ref Range    POCT Glucose 274 (H) 74 - 99 mg/dL   POCT GLUCOSE   Result Value Ref Range    POCT Glucose 208 (H) 74 - 99 mg/dL   POCT GLUCOSE   Result Value Ref Range    POCT Glucose 192 (H) 74 - 99 mg/dL   Basic Metabolic Panel   Result Value Ref Range    Glucose 188 (H) 74 - 99 mg/dL    Sodium 133 (L) 136 - 145 mmol/L    Potassium 3.3 (L) 3.5 - 5.3 mmol/L    Chloride 113 (H) 98 - 107 mmol/L    Bicarbonate 8 (LL) 21 - 32 mmol/L    Anion Gap 15 10 - 20 mmol/L    Urea Nitrogen 12 6 - 23 mg/dL    Creatinine 1.01 0.50 - 1.30 mg/dL    eGFR >90 >60 mL/min/1.73m*2    Calcium 7.8 (L) 8.6 - 10.3 mg/dL   Magnesium   Result Value Ref Range    Magnesium 1.86 1.60 - 2.40 mg/dL   POCT GLUCOSE   Result Value Ref Range    POCT Glucose 169 (H) 74 - 99 mg/dL   POCT GLUCOSE   Result Value Ref Range    POCT Glucose 173 (H) 74 - 99 mg/dL   POCT GLUCOSE   Result Value Ref Range    POCT Glucose 148 (H) 74 - 99 mg/dL   Basic  Metabolic Panel   Result Value Ref Range    Glucose 162 (H) 74 - 99 mg/dL    Sodium 134 (L) 136 - 145 mmol/L    Potassium 3.3 (L) 3.5 - 5.3 mmol/L    Chloride 111 (H) 98 - 107 mmol/L    Bicarbonate 13 (L) 21 - 32 mmol/L    Anion Gap 13 10 - 20 mmol/L    Urea Nitrogen 13 6 - 23 mg/dL    Creatinine 0.97 0.50 - 1.30 mg/dL    eGFR >90 >60 mL/min/1.73m*2    Calcium 7.9 (L) 8.6 - 10.3 mg/dL   Magnesium   Result Value Ref Range    Magnesium 1.86 1.60 - 2.40 mg/dL   POCT GLUCOSE   Result Value Ref Range    POCT Glucose 183 (H) 74 - 99 mg/dL   POCT GLUCOSE   Result Value Ref Range    POCT Glucose 156 (H) 74 - 99 mg/dL   POCT GLUCOSE   Result Value Ref Range    POCT Glucose 159 (H) 74 - 99 mg/dL   POCT GLUCOSE   Result Value Ref Range    POCT Glucose 162 (H) 74 - 99 mg/dL   CBC and Auto Differential   Result Value Ref Range    WBC 6.3 4.4 - 11.3 x10*3/uL    nRBC 0.0 0.0 - 0.0 /100 WBCs    RBC 4.96 4.50 - 5.90 x10*6/uL    Hemoglobin 15.4 13.5 - 17.5 g/dL    Hematocrit 43.6 41.0 - 52.0 %    MCV 88 80 - 100 fL    MCH 31.0 26.0 - 34.0 pg    MCHC 35.3 32.0 - 36.0 g/dL    RDW 13.2 11.5 - 14.5 %    Platelets 160 150 - 450 x10*3/uL    Neutrophils % 59.3 40.0 - 80.0 %    Immature Granulocytes %, Automated 0.5 0.0 - 0.9 %    Lymphocytes % 23.8 13.0 - 44.0 %    Monocytes % 15.9 2.0 - 10.0 %    Eosinophils % 0.2 0.0 - 6.0 %    Basophils % 0.3 0.0 - 2.0 %    Neutrophils Absolute 3.72 1.20 - 7.70 x10*3/uL    Immature Granulocytes Absolute, Automated 0.03 0.00 - 0.70 x10*3/uL    Lymphocytes Absolute 1.49 1.20 - 4.80 x10*3/uL    Monocytes Absolute 1.00 0.10 - 1.00 x10*3/uL    Eosinophils Absolute 0.01 0.00 - 0.70 x10*3/uL    Basophils Absolute 0.02 0.00 - 0.10 x10*3/uL   Phosphorus   Result Value Ref Range    Phosphorus 1.7 (L) 2.5 - 4.9 mg/dL   Basic Metabolic Panel   Result Value Ref Range    Glucose 174 (H) 74 - 99 mg/dL    Sodium 132 (L) 136 - 145 mmol/L    Potassium 3.7 3.5 - 5.3 mmol/L    Chloride 115 (H) 98 - 107 mmol/L    Bicarbonate  16 (L) 21 - 32 mmol/L    Anion Gap <7 (L) 10 - 20 mmol/L    Urea Nitrogen 12 6 - 23 mg/dL    Creatinine 0.91 0.50 - 1.30 mg/dL    eGFR >90 >60 mL/min/1.73m*2    Calcium 8.1 (L) 8.6 - 10.3 mg/dL   Magnesium   Result Value Ref Range    Magnesium 1.88 1.60 - 2.40 mg/dL   POCT GLUCOSE   Result Value Ref Range    POCT Glucose 186 (H) 74 - 99 mg/dL   POCT GLUCOSE   Result Value Ref Range    POCT Glucose 200 (H) 74 - 99 mg/dL   POCT GLUCOSE   Result Value Ref Range    POCT Glucose 180 (H) 74 - 99 mg/dL   POCT GLUCOSE   Result Value Ref Range    POCT Glucose 207 (H) 74 - 99 mg/dL   Basic metabolic panel   Result Value Ref Range    Glucose 255 (H) 74 - 99 mg/dL    Sodium 135 (L) 136 - 145 mmol/L    Potassium 3.9 3.5 - 5.3 mmol/L    Chloride 108 (H) 98 - 107 mmol/L    Bicarbonate 17 (L) 21 - 32 mmol/L    Anion Gap 14 10 - 20 mmol/L    Urea Nitrogen 12 6 - 23 mg/dL    Creatinine 0.94 0.50 - 1.30 mg/dL    eGFR >90 >60 mL/min/1.73m*2    Calcium 8.6 8.6 - 10.3 mg/dL     *Note: Due to a large number of results and/or encounters for the requested time period, some results have not been displayed. A complete set of results can be found in Results Review.     Imaging  No results found.    Cardiology, Vascular, and Other Imaging  ECG 12 lead    Result Date: 4/3/2025  Sinus tachycardia Probable left atrial enlargement Left ventricular hypertrophy Prolonged QT interval                      Assessment/Plan   Assessment & Plan  DKA, type 2, not at goal    Ed Sanon is a 34 y.o. male significant for IDDM type I with poor insulin compliance, multiple recurrent admissions for DKA secondary to noncompliance, anxiety, depression admitted to the ICU for DKA.     Diabetic ketoacidosis secondary to insulin noncompliance  -Multiple recurrent admissions for DKA secondary to noncompliance  -Presented with complaints of neurolysed weakness, nausea, vomiting  -Reports blood glucose levels elevated at home with levels in the 300s  -Blood glucose  330, anion gap 32, bicarb 6, beta hydroxy 11.02  -Received 2 L IV fluid bolus in ED, initiated on insulin infusion per DKA protocol  -Afebrile, CBC with no leukocytosis  -Continue POCT glucose checks every 1 hour and check BMP, Mag every 4 hours while on insulin infusion  -Sodium normal when corrected for glucose  -Continue IV fluids with 0.45% NS @ 250ml/hr  -Switch to dextrose containing fluids once blood glucose <250  -Transition to subcutaneous insulin when anion gap <15 and bicarb >15  -NPO  -Electrolyte replacements as indicated  -Follow CBC, BMP, Mag  4/3/2025: Patient anion gap is closed less than 15 and bicarbonate more than 15.    Patient was transition from IV insulin drip to NPH insulin 22 units daily along with scheduled appointments lispro 3 times a day and sliding scale.   Endocrinology wants to discharge this patient on Novolin 70/30 30 units subcutaneous twice daily, instead of Lantus for better compliance.   Communicated the endocrinology plan to the discharging hospitalist  Extensively counseled patient regarding the need to be compliant with the insulin     Metabolic acidosis secondary to DKA  -VBG with pH 7.04, pCO2 21, bicarb 5.7, lactate 1.8  -Continue aggressive IV fluid hydration per DKA protocol  -Follow VBG     Poorly controlled type 1 diabetes  -A1c 11.7 (1/31/2025)  -Multiple prior hospitalizations for DKA secondary to noncompliance  -Counseling on importance of glycemic control  -Endocrinology consulted, appreciate recommendations    I spent 30 minutes of cumulative critical care time with the patient.  Greater than 50% of that time was spent in the direct collaboration and or coordination of care of the patient.   [facility] to [facility]  Critical care will sign off, please call us back if any questions.     Dragon dictation software was used to dictate this note and thus there may be minor errors in translation/transcription including garbled speech or misspellings. Please contact  for clarification if needed.

## 2025-04-03 NOTE — CARE PLAN
Problem: Pain  Goal: Takes deep breaths with improved pain control throughout the shift  Outcome: Met  Goal: Turns in bed with improved pain control throughout the shift  Outcome: Met  Goal: Walks with improved pain control throughout the shift  Outcome: Met  Goal: Performs ADL's with improved pain control throughout shift  Outcome: Met  Goal: Participates in PT with improved pain control throughout the shift  Outcome: Met  Goal: Free from opioid side effects throughout the shift  Outcome: Met  Goal: Free from acute confusion related to pain meds throughout the shift  Outcome: Met     Problem: Diabetes  Goal: Achieve decreasing blood glucose levels by end of shift  Outcome: Met  Goal: Increase stability of blood glucose readings by end of shift  Outcome: Met  Goal: Decrease in ketones present in urine by end of shift  Outcome: Met  Goal: Maintain electrolyte levels within acceptable range throughout shift  Outcome: Met  Goal: Maintain glucose levels >70mg/dl to <250mg/dl throughout shift  Outcome: Met  Goal: No changes in neurological exam by end of shift  Outcome: Met  Goal: Learn about and adhere to nutrition recommendations by end of shift  Outcome: Met  Goal: Vital signs within normal range for age by end of shift  Outcome: Met  Goal: Increase self care and/or family involovement by end of shift  Outcome: Met  Goal: Receive DSME education by end of shift  Outcome: Met     Problem: Pain - Adult  Goal: Verbalizes/displays adequate comfort level or baseline comfort level  Outcome: Met     Problem: Safety - Adult  Goal: Free from fall injury  Outcome: Met     Problem: Discharge Planning  Goal: Discharge to home or other facility with appropriate resources  Outcome: Met     Problem: Discharge Planning  Goal: Discharge to home or other facility with appropriate resources  Outcome: Met     Problem: Chronic Conditions and Co-morbidities  Goal: Patient's chronic conditions and co-morbidity symptoms are monitored and  maintained or improved  Outcome: Met     Problem: Nutrition  Goal: Nutrient intake appropriate for maintaining nutritional needs  Outcome: Met

## 2025-04-03 NOTE — PROGRESS NOTES
25 0948   Discharge Planning   Living Arrangements Parent   Support Systems Parent;Children   Assistance Needed independnet in bathing, dressing, cooking, cleaning, drives. Denies falls or use of assistive devices.   Number of Stairs to Enter Residence 5   Number of Stairs Within Residence 0   Do you have animals or pets at home? Yes   Type of Animals or Pets dog and cat   Who is requesting discharge planning? Provider   Home or Post Acute Services None   Expected Discharge Disposition Home   Does the patient need discharge transport arranged? No   Financial Resource Strain   How hard is it for you to pay for the very basics like food, housing, medical care, and heating? Not hard   Housing Stability   In the last 12 months, was there a time when you were not able to pay the mortgage or rent on time? N   In the past 12 months, how many times have you moved where you were living? 0   At any time in the past 12 months, were you homeless or living in a shelter (including now)? N   Transportation Needs   In the past 12 months, has lack of transportation kept you from medical appointments or from getting medications? no   In the past 12 months, has lack of transportation kept you from meetings, work, or from getting things needed for daily living? No   Stroke Family Assessment   Stroke Family Assessment Needed No   Intensity of Service   Intensity of Service 0-30 min     Spoke with pt, role of TCC explained, demographics confirmed. PCP-  with a visit 3/26. Pharmacy- [facility]- reports that he takes his intermediate insulin as ordered but does not always take his short acting as he does not like the way it makes him feel when it drops. Lives with parent, has support, independent in care, no falls, no use of assistive devices. There is a care coordination note on this pt noting that there may be a psychological component to same as pt brother  from hypoglycemia. Brought same up to pt asking if there  were any resources he would need to work through same. Pt denies need stating that its more of said type of insulin being new and he not liking how it makes him feel. Plan- home no needs. CT will follow.     7447-  ([employer]) Crissy and her number is [phone]. Called left VM updating on pt discharge plan. Mentioned Care coordination note as mentioned above and asked if pt ever had counseling for possible link between pt frequent admissions with DKA and brother passing from hypoglycemia. Awaiting return call. CT will follow.

## 2025-04-03 NOTE — DISCHARGE SUMMARY
"Discharge Diagnosis  IDDM  DKA    Issues Requiring Follow-Up  Endocrinology for the above-mentioned issues    Discharge Meds     Medication List      START taking these medications     HumuLIN 70/30 U-100 Insulin 100 unit/mL (70-30) injection; Generic drug:   insulin NPH and regular human; 30 units twice  daily     CONTINUE taking these medications     ciclopirox 0.77 % cream; Commonly known as: Loprox   Dexcom G7  misc; Generic drug: blood-glucose,,cont   Dexcom G7 Sensor device; Generic drug: blood-glucose sensor   Gvoke HypoPen 1-Pack 1 mg/0.2 mL auto-injector; Generic drug: glucagon;   Inject 1 mg under the skin 1 time for 1 dose.   lancets misc   lisinopril 5 mg tablet   OneTouch Ultra Test; Generic drug: blood sugar diagnostic   OneTouch Ultra2 Meter misc; Generic drug: blood-glucose meter   pen needle, diabetic 32 gauge x 5/32\" needle   terbinafine 250 mg tablet; Commonly known as: LamISIL     STOP taking these medications     insulin NPH (Isophane) 100 unit/mL (3 mL) pen; Commonly known as:   HumuLIN N,NovoLIN N     ASK your doctor about these medications     ondansetron 4 mg tablet; Commonly known as: Zofran; Take 1 tablet (4 mg)   by mouth every 8 hours if needed for nausea or vomiting.       Test Results Pending At Discharge  Pending Labs       No current pending labs.            Hospital Course   Patient was found to have DKA on admission.  Managed in the ICU with IV fluid and insulin infusion per DKA protocol.   The following morning patient significantly improved clinically as well as acidosis and hyperglycemia improved.  He is able to tolerate oral diet.   Endocrinology cleared the patient for discharge home today.  Endocrinologist changed home NPH to Novolin 70/30, 30 units twice daily.   Patient will need to follow-up with PCP and endocrinology outpatient 1 week after discharge.        Pertinent Physical Exam At Time of Discharge  Physical Exam    Constitutional: Alert oriented x 3,  " no acute respiratory distress  Head and Neck: Normocephalic, atraumatic.   Respiratory: Clear bilateral breath sounds, no wheezes or rales  cardiovascular: S1-S2, regular  GI: Abdomen soft, nontender, bowel sound preserved  Extremities: No LE edema  Neuro: No neurological deficit    Outpatient Follow-Up  Future Appointments   Date Time Provider Department Center   7/2/2025  9:45 AM Henri Brumfield MD [id number] [facility]     Time spent more than 30 minutes    Regina Greenberg MD

## 2025-04-03 NOTE — CONSULTS
Inpatient consult to Endocrinology  Consult performed by: Lori Packer MD  Consult ordered by: Hayden Rice, APRN-CNP  Reason for consult: DKA          Reason For Consult  DKA    History Of Present Illness  Ed Sanon is a 34 y.o. male presenting with generalized weakness, nausea, and vomiting.  He was found to be hemodynamically stable but tachycardic.  Initial lab data revealed a glucose value of 352mg/dL, bicarbonate of 6, anion gap of 32, and beta hydroxybutyrate of 11.02.  He was started on IVF and an insulin infusion with admittance to the ICU.      His home regimen consists of:  NPH 22 units subcutaneous twice daily    No prandial insulin      His A1C last A1C was found to be 11.7% two months ago.    He admits to wearing his CGM.  Average glucose values are still well aboe 300mg/dL.    He has not followed up with his outpatient endocrinologist but states that he can easily call to schedule.        Past Medical History  He has a past medical history of DKA (diabetic ketoacidosis) (Multi), HTN (hypertension), and Type I diabetes mellitus (Multi).    Surgical History  He has no past surgical history on file.     Social History  He reports that he has never smoked. He has never been exposed to tobacco smoke. He has never used smokeless tobacco. He reports that he does not drink alcohol and does not use drugs.    Family History  Family History   Problem Relation Name Age of Onset    No Known Problems Mother      No Known Problems Father      Hypertension Other      Coronary artery disease Other      Diabetes Other      Heart failure Other          Allergies  Patient has no known allergies.    Review of Systems   Gastrointestinal:  Positive for nausea and vomiting.   Neurological:  Positive for weakness.   All other systems reviewed and are negative.       Physical Exam  Vitals and nursing note reviewed.   Constitutional:       General: He is not in acute distress.     Appearance: Normal  "appearance. He is normal weight.   HENT:      Head: Normocephalic and atraumatic.      Nose: Nose normal.      Mouth/Throat:      Mouth: Mucous membranes are moist.   Eyes:      Extraocular Movements: Extraocular movements intact.   Cardiovascular:      Rate and Rhythm: Normal rate and regular rhythm.   Pulmonary:      Effort: Pulmonary effort is normal.   Musculoskeletal:         General: Normal range of motion.   Neurological:      Mental Status: He is alert and oriented to person, place, and time.   Psychiatric:         Mood and Affect: Mood normal.          Last Recorded Vitals  Blood pressure 126/87, pulse 93, temperature 36.9 °C (98.4 °F), temperature source Temporal, resp. rate 17, height 1.778 m (5' 10\"), weight 71.1 kg (156 lb 12 oz), SpO2 99%.    Relevant Results  Scheduled medications  insulin glargine, 22 Units, subcutaneous, BID  insulin lispro, 0-5 Units, subcutaneous, TID AC  insulin lispro, 5 Units, subcutaneous, TID AC  pantoprazole, 40 mg, oral, Daily before breakfast   Or  pantoprazole, 40 mg, intravenous, Daily before breakfast  sod phos di, mono-K phos mono, 250 mg, oral, 4x daily      Continuous medications     PRN medications  PRN medications: bisacodyl, bisacodyl, dextrose, melatonin, ondansetron **OR** ondansetron    Results for orders placed or performed during the hospital encounter of 04/02/25 (from the past 96 hours)   POCT GLUCOSE   Result Value Ref Range    POCT Glucose 330 (H) 74 - 99 mg/dL   ECG 12 lead   Result Value Ref Range    Ventricular Rate 117 BPM    Atrial Rate 118 BPM    MI Interval 152 ms    QRS Duration 96 ms    QT Interval 363 ms    QTC Calculation(Bazett) 507 ms    P Axis 75 degrees    R Axis 68 degrees    T Axis 56 degrees    QRS Count 19 beats    Q Onset 252 ms    T Offset 433 ms    QTC Fredericia 453 ms   CBC and Auto Differential   Result Value Ref Range    WBC 7.8 4.4 - 11.3 x10*3/uL    nRBC 0.0 0.0 - 0.0 /100 WBCs    RBC 6.09 (H) 4.50 - 5.90 x10*6/uL    Hemoglobin " 18.6 (H) 13.5 - 17.5 g/dL    Hematocrit 55.2 (H) 41.0 - 52.0 %    MCV 91 80 - 100 fL    MCH 30.5 26.0 - 34.0 pg    MCHC 33.7 32.0 - 36.0 g/dL    RDW 13.5 11.5 - 14.5 %    Platelets 203 150 - 450 x10*3/uL    Neutrophils % 81.9 40.0 - 80.0 %    Immature Granulocytes %, Automated 0.6 0.0 - 0.9 %    Lymphocytes % 11.0 13.0 - 44.0 %    Monocytes % 5.9 2.0 - 10.0 %    Eosinophils % 0.0 0.0 - 6.0 %    Basophils % 0.6 0.0 - 2.0 %    Neutrophils Absolute 6.40 1.20 - 7.70 x10*3/uL    Immature Granulocytes Absolute, Automated 0.05 0.00 - 0.70 x10*3/uL    Lymphocytes Absolute 0.86 (L) 1.20 - 4.80 x10*3/uL    Monocytes Absolute 0.46 0.10 - 1.00 x10*3/uL    Eosinophils Absolute 0.00 0.00 - 0.70 x10*3/uL    Basophils Absolute 0.05 0.00 - 0.10 x10*3/uL   Comprehensive metabolic panel   Result Value Ref Range    Glucose 352 (H) 74 - 99 mg/dL    Sodium 133 (L) 136 - 145 mmol/L    Potassium 4.9 3.5 - 5.3 mmol/L    Chloride 100 98 - 107 mmol/L    Bicarbonate 6 (LL) 21 - 32 mmol/L    Anion Gap 32 (H) 10 - 20 mmol/L    Urea Nitrogen 13 6 - 23 mg/dL    Creatinine 1.22 0.50 - 1.30 mg/dL    eGFR 79 >60 mL/min/1.73m*2    Calcium 9.1 8.6 - 10.3 mg/dL    Albumin 4.9 3.4 - 5.0 g/dL    Alkaline Phosphatase 95 33 - 120 U/L    Total Protein 8.4 (H) 6.4 - 8.2 g/dL    AST 9 9 - 39 U/L    Bilirubin, Total 0.6 0.0 - 1.2 mg/dL    ALT 12 10 - 52 U/L   Lactate   Result Value Ref Range    Lactate 1.4 0.4 - 2.0 mmol/L   Beta Hydroxybutyrate   Result Value Ref Range    Beta-Hydroxybutyrate 11.02 (H) 0.02 - 0.27 mmol/L   Blood Gas Venous Full Panel   Result Value Ref Range    POCT pH, Venous 7.04 (LL) 7.33 - 7.43 pH    POCT pCO2, Venous 21 (L) 41 - 51 mm Hg    POCT pO2, Venous 53 (H) 35 - 45 mm Hg    POCT SO2, Venous 84 (H) 45 - 75 %    POCT Oxy Hemoglobin, Venous 82.7 (H) 45.0 - 75.0 %    POCT Hematocrit Calculated, Venous 55.0 (H) 41.0 - 52.0 %    POCT Sodium, Venous 129 (L) 136 - 145 mmol/L    POCT Potassium, Venous 5.5 (H) 3.5 - 5.3 mmol/L    POCT  Chloride, Venous 103 98 - 107 mmol/L    POCT Ionized Calicum, Venous 1.24 1.10 - 1.33 mmol/L    POCT Glucose, Venous 382 (H) 74 - 99 mg/dL    POCT Lactate, Venous 1.8 0.4 - 2.0 mmol/L    POCT Base Excess, Venous -23.3 (L) -2.0 - 3.0 mmol/L    POCT HCO3 Calculated, Venous 5.7 (L) 22.0 - 26.0 mmol/L    POCT Hemoglobin, Venous 18.2 (H) 13.5 - 17.5 g/dL    POCT Anion Gap, Venous 26.0 (H) 10.0 - 25.0 mmol/L    Patient Temperature 37.0 degrees Celsius    FiO2 21 %   POCT GLUCOSE   Result Value Ref Range    POCT Glucose 356 (H) 74 - 99 mg/dL   BLOOD GAS VENOUS FULL PANEL   Result Value Ref Range    POCT pH, Venous 7.03 (LL) 7.33 - 7.43 pH    POCT pCO2, Venous 20 (L) 41 - 51 mm Hg    POCT pO2, Venous 47 (H) 35 - 45 mm Hg    POCT SO2, Venous 78 (H) 45 - 75 %    POCT Oxy Hemoglobin, Venous 77.2 (H) 45.0 - 75.0 %    POCT Hematocrit Calculated, Venous 51.0 41.0 - 52.0 %    POCT Sodium, Venous 131 (L) 136 - 145 mmol/L    POCT Potassium, Venous 4.5 3.5 - 5.3 mmol/L    POCT Chloride, Venous 107 98 - 107 mmol/L    POCT Ionized Calicum, Venous 1.19 1.10 - 1.33 mmol/L    POCT Glucose, Venous 295 (H) 74 - 99 mg/dL    POCT Lactate, Venous 1.5 0.4 - 2.0 mmol/L    POCT Base Excess, Venous -23.9 (L) -2.0 - 3.0 mmol/L    POCT HCO3 Calculated, Venous 5.3 (L) 22.0 - 26.0 mmol/L    POCT Hemoglobin, Venous 16.9 13.5 - 17.5 g/dL    POCT Anion Gap, Venous 23.0 10.0 - 25.0 mmol/L    Patient Temperature 37.0 degrees Celsius    FiO2 21 %   POCT GLUCOSE   Result Value Ref Range    POCT Glucose 274 (H) 74 - 99 mg/dL   POCT GLUCOSE   Result Value Ref Range    POCT Glucose 208 (H) 74 - 99 mg/dL   POCT GLUCOSE   Result Value Ref Range    POCT Glucose 192 (H) 74 - 99 mg/dL   Basic Metabolic Panel   Result Value Ref Range    Glucose 188 (H) 74 - 99 mg/dL    Sodium 133 (L) 136 - 145 mmol/L    Potassium 3.3 (L) 3.5 - 5.3 mmol/L    Chloride 113 (H) 98 - 107 mmol/L    Bicarbonate 8 (LL) 21 - 32 mmol/L    Anion Gap 15 10 - 20 mmol/L    Urea Nitrogen 12 6 - 23  mg/dL    Creatinine 1.01 0.50 - 1.30 mg/dL    eGFR >90 >60 mL/min/1.73m*2    Calcium 7.8 (L) 8.6 - 10.3 mg/dL   Magnesium   Result Value Ref Range    Magnesium 1.86 1.60 - 2.40 mg/dL   POCT GLUCOSE   Result Value Ref Range    POCT Glucose 169 (H) 74 - 99 mg/dL   POCT GLUCOSE   Result Value Ref Range    POCT Glucose 173 (H) 74 - 99 mg/dL   POCT GLUCOSE   Result Value Ref Range    POCT Glucose 148 (H) 74 - 99 mg/dL   Basic Metabolic Panel   Result Value Ref Range    Glucose 162 (H) 74 - 99 mg/dL    Sodium 134 (L) 136 - 145 mmol/L    Potassium 3.3 (L) 3.5 - 5.3 mmol/L    Chloride 111 (H) 98 - 107 mmol/L    Bicarbonate 13 (L) 21 - 32 mmol/L    Anion Gap 13 10 - 20 mmol/L    Urea Nitrogen 13 6 - 23 mg/dL    Creatinine 0.97 0.50 - 1.30 mg/dL    eGFR >90 >60 mL/min/1.73m*2    Calcium 7.9 (L) 8.6 - 10.3 mg/dL   Magnesium   Result Value Ref Range    Magnesium 1.86 1.60 - 2.40 mg/dL   POCT GLUCOSE   Result Value Ref Range    POCT Glucose 183 (H) 74 - 99 mg/dL   POCT GLUCOSE   Result Value Ref Range    POCT Glucose 156 (H) 74 - 99 mg/dL   POCT GLUCOSE   Result Value Ref Range    POCT Glucose 159 (H) 74 - 99 mg/dL   POCT GLUCOSE   Result Value Ref Range    POCT Glucose 162 (H) 74 - 99 mg/dL   CBC and Auto Differential   Result Value Ref Range    WBC 6.3 4.4 - 11.3 x10*3/uL    nRBC 0.0 0.0 - 0.0 /100 WBCs    RBC 4.96 4.50 - 5.90 x10*6/uL    Hemoglobin 15.4 13.5 - 17.5 g/dL    Hematocrit 43.6 41.0 - 52.0 %    MCV 88 80 - 100 fL    MCH 31.0 26.0 - 34.0 pg    MCHC 35.3 32.0 - 36.0 g/dL    RDW 13.2 11.5 - 14.5 %    Platelets 160 150 - 450 x10*3/uL    Neutrophils % 59.3 40.0 - 80.0 %    Immature Granulocytes %, Automated 0.5 0.0 - 0.9 %    Lymphocytes % 23.8 13.0 - 44.0 %    Monocytes % 15.9 2.0 - 10.0 %    Eosinophils % 0.2 0.0 - 6.0 %    Basophils % 0.3 0.0 - 2.0 %    Neutrophils Absolute 3.72 1.20 - 7.70 x10*3/uL    Immature Granulocytes Absolute, Automated 0.03 0.00 - 0.70 x10*3/uL    Lymphocytes Absolute 1.49 1.20 - 4.80  x10*3/uL    Monocytes Absolute 1.00 0.10 - 1.00 x10*3/uL    Eosinophils Absolute 0.01 0.00 - 0.70 x10*3/uL    Basophils Absolute 0.02 0.00 - 0.10 x10*3/uL   Phosphorus   Result Value Ref Range    Phosphorus 1.7 (L) 2.5 - 4.9 mg/dL   Basic Metabolic Panel   Result Value Ref Range    Glucose 174 (H) 74 - 99 mg/dL    Sodium 132 (L) 136 - 145 mmol/L    Potassium 3.7 3.5 - 5.3 mmol/L    Chloride 115 (H) 98 - 107 mmol/L    Bicarbonate 16 (L) 21 - 32 mmol/L    Anion Gap <7 (L) 10 - 20 mmol/L    Urea Nitrogen 12 6 - 23 mg/dL    Creatinine 0.91 0.50 - 1.30 mg/dL    eGFR >90 >60 mL/min/1.73m*2    Calcium 8.1 (L) 8.6 - 10.3 mg/dL   Magnesium   Result Value Ref Range    Magnesium 1.88 1.60 - 2.40 mg/dL   POCT GLUCOSE   Result Value Ref Range    POCT Glucose 186 (H) 74 - 99 mg/dL   POCT GLUCOSE   Result Value Ref Range    POCT Glucose 200 (H) 74 - 99 mg/dL   POCT GLUCOSE   Result Value Ref Range    POCT Glucose 180 (H) 74 - 99 mg/dL     *Note: Due to a large number of results and/or encounters for the requested time period, some results have not been displayed. A complete set of results can be found in Results Review.      ECG 12 lead    Result Date: 4/3/2025  Sinus tachycardia Probable left atrial enlargement Left ventricular hypertrophy Prolonged QT interval    ECG 12 lead    Result Date: 3/12/2025  Sinus rhythm Borderline ST elevation, anterior leads Prolonged QT interval See ED provider note for full interpretation and clinical correlation Confirmed by Destinee Giron ([id number]) on 3/12/2025 10:30:00 AM    XR chest 2 views    Result Date: 3/11/2025  Interpreted By:  Aura Manley, STUDY: XR CHEST 2 VIEWS;  3/11/2025 6:33 pm   INDICATION: Signs/Symptoms:weakness.     COMPARISON: 02/12/2025   ACCESSION NUMBER(S): [id number]   ORDERING CLINICIAN: JENNIFER ROSARIO   FINDINGS: Artifact from overlying monitoring leads noted.  No focal infiltrate. No evidence of pleural effusion or pneumothorax. The cardiac silhouette is normal  in size. Osseous thorax is grossly intact.       No acute cardiopulmonary process radiographically.   MACRO: None.   Signed by: Aura Manley 3/11/2025 6:42 PM Dictation workstation:   [id number]       Assessment/Plan     IMPRESSION:  DKA - resolved   POORLY CONTROLLED TYPE I DIABETES MELLITUS  Long term insulin use with basal insulin only    RECOMMENDATIONS:  To discontinue Lantus 22 units subcutaneous twice daily   To continue Humalog 5 units TID AC  Continue insulin correction scale TID AC   Diabetic diet as tolerated   Accu-Cheks ACHS    Hypoglycemic protocol   Will continue to follow    DISCHARGE RECS WHICH HE IS OPEN TO:  Novolin 70/30 30 units subcutaneous twice daily   Counseled regarding the mechanism of action for mixed insulin   To continue the use of your CGM for the intensive glucose monitoring due to the dynamic nature of insulin requirements, the narrow therapeutic index of insulin and the potentially fatal consequences of treatment    Thank you for the courtesy of this consult     Lori Packer MD

## 2025-04-03 NOTE — PROGRESS NOTES
Social work consult placed for discharge planning. SW reviewed pt's chart and communicated with TCC. No SW needs foreseen at this time. SW signing off; available upon request.    Bandar Tavera, MSW, LSW ([id number])   Care Transitions

## 2025-04-04 ENCOUNTER — PATIENT OUTREACH (OUTPATIENT)
Dept: PRIMARY CARE | Facility: CLINIC | Age: 35
End: 2025-04-04
Payer: MEDICAID

## 2025-04-04 NOTE — PROGRESS NOTES
30 Day Readmission  Discharge Facility: [facility]   Discharge Diagnosis: DKA type 2, not at goal  Admission Date: 4/2/25  Discharge Date: 4/3/25    PCP Appointment Date: no appointment, message to office  Specialist Appointment Date: needs endocrinology  Hospital Encounter and Summary Linked: Yes  ED to Hosp-Admission (Discharged) with Regina Greenberg MD; Shyla Santamaria MD (04/02/2025)     Two attempts were made to reach patient within two business days after discharge. Voicemail left with contact information for patient to call back with any non-emergent questions or concerns.

## 2025-04-09 ENCOUNTER — APPOINTMENT (OUTPATIENT)
Dept: CARDIOLOGY | Facility: HOSPITAL | Age: 35
End: 2025-04-09
Payer: MEDICAID

## 2025-04-09 ENCOUNTER — HOSPITAL ENCOUNTER (INPATIENT)
Facility: HOSPITAL | Age: 35
LOS: 1 days | Discharge: HOME | End: 2025-04-10
Attending: STUDENT IN AN ORGANIZED HEALTH CARE EDUCATION/TRAINING PROGRAM | Admitting: INTERNAL MEDICINE
Payer: MEDICAID

## 2025-04-09 DIAGNOSIS — E10.10 TYPE 1 DIABETES MELLITUS WITH KETOACIDOSIS WITHOUT COMA: Primary | ICD-10-CM

## 2025-04-09 PROBLEM — E10.65 UNCONTROLLED TYPE 1 DIABETES MELLITUS WITH HYPERGLYCEMIA, WITH LONG-TERM CURRENT USE OF INSULIN: Status: ACTIVE | Noted: 2023-07-11

## 2025-04-09 PROBLEM — I10 ESSENTIAL HYPERTENSION: Status: ACTIVE | Noted: 2025-04-09

## 2025-04-09 LAB
ALBUMIN SERPL BCP-MCNC: 4.1 G/DL (ref 3.4–5)
ALP SERPL-CCNC: 128 U/L (ref 33–120)
ALT SERPL W P-5'-P-CCNC: 9 U/L (ref 10–52)
ANION GAP BLDV CALCULATED.4IONS-SCNC: 21 MMOL/L (ref 10–25)
ANION GAP SERPL CALC-SCNC: 22 MMOL/L (ref 10–20)
APPEARANCE UR: CLEAR
AST SERPL W P-5'-P-CCNC: 10 U/L (ref 9–39)
B-OH-BUTYR SERPL-SCNC: 7.16 MMOL/L (ref 0.02–0.27)
BASE EXCESS BLDV CALC-SCNC: -9.5 MMOL/L (ref -2–3)
BASOPHILS # BLD AUTO: 0.03 X10*3/UL (ref 0–0.1)
BASOPHILS NFR BLD AUTO: 0.6 %
BILIRUB SERPL-MCNC: 0.4 MG/DL (ref 0–1.2)
BILIRUB UR STRIP.AUTO-MCNC: NEGATIVE MG/DL
BODY TEMPERATURE: 37 DEGREES CELSIUS
BUN SERPL-MCNC: 17 MG/DL (ref 6–23)
CA-I BLDV-SCNC: 1.24 MMOL/L (ref 1.1–1.33)
CALCIUM SERPL-MCNC: 8.5 MG/DL (ref 8.6–10.3)
CHLORIDE BLDV-SCNC: 98 MMOL/L (ref 98–107)
CHLORIDE SERPL-SCNC: 100 MMOL/L (ref 98–107)
CO2 SERPL-SCNC: 15 MMOL/L (ref 21–32)
COLOR UR: COLORLESS
CREAT SERPL-MCNC: 1.06 MG/DL (ref 0.5–1.3)
EGFRCR SERPLBLD CKD-EPI 2021: >90 ML/MIN/1.73M*2
EOSINOPHIL # BLD AUTO: 0.04 X10*3/UL (ref 0–0.7)
EOSINOPHIL NFR BLD AUTO: 0.8 %
ERYTHROCYTE [DISTWIDTH] IN BLOOD BY AUTOMATED COUNT: 13.2 % (ref 11.5–14.5)
GLUCOSE BLD MANUAL STRIP-MCNC: 132 MG/DL (ref 74–99)
GLUCOSE BLD MANUAL STRIP-MCNC: 142 MG/DL (ref 74–99)
GLUCOSE BLD MANUAL STRIP-MCNC: 210 MG/DL (ref 74–99)
GLUCOSE BLD MANUAL STRIP-MCNC: 323 MG/DL (ref 74–99)
GLUCOSE BLD MANUAL STRIP-MCNC: 410 MG/DL (ref 74–99)
GLUCOSE BLD MANUAL STRIP-MCNC: 444 MG/DL (ref 74–99)
GLUCOSE BLDV-MCNC: 521 MG/DL (ref 74–99)
GLUCOSE SERPL-MCNC: 473 MG/DL (ref 74–99)
GLUCOSE UR STRIP.AUTO-MCNC: ABNORMAL MG/DL
HCO3 BLDV-SCNC: 16.5 MMOL/L (ref 22–26)
HCT VFR BLD AUTO: 46.4 % (ref 41–52)
HCT VFR BLD EST: 50 % (ref 41–52)
HGB BLD-MCNC: 16 G/DL (ref 13.5–17.5)
HGB BLDV-MCNC: 16.6 G/DL (ref 13.5–17.5)
IMM GRANULOCYTES # BLD AUTO: 0.03 X10*3/UL (ref 0–0.7)
IMM GRANULOCYTES NFR BLD AUTO: 0.6 % (ref 0–0.9)
INHALED O2 CONCENTRATION: 21 %
KETONES UR STRIP.AUTO-MCNC: ABNORMAL MG/DL
LACTATE BLDV-SCNC: 1 MMOL/L (ref 0.4–2)
LEUKOCYTE ESTERASE UR QL STRIP.AUTO: NEGATIVE
LIPASE SERPL-CCNC: 54 U/L (ref 9–82)
LYMPHOCYTES # BLD AUTO: 1.5 X10*3/UL (ref 1.2–4.8)
LYMPHOCYTES NFR BLD AUTO: 28.4 %
MAGNESIUM SERPL-MCNC: 2.03 MG/DL (ref 1.6–2.4)
MCH RBC QN AUTO: 31.3 PG (ref 26–34)
MCHC RBC AUTO-ENTMCNC: 34.5 G/DL (ref 32–36)
MCV RBC AUTO: 91 FL (ref 80–100)
MONOCYTES # BLD AUTO: 0.53 X10*3/UL (ref 0.1–1)
MONOCYTES NFR BLD AUTO: 10 %
NEUTROPHILS # BLD AUTO: 3.15 X10*3/UL (ref 1.2–7.7)
NEUTROPHILS NFR BLD AUTO: 59.6 %
NITRITE UR QL STRIP.AUTO: NEGATIVE
NRBC BLD-RTO: 0 /100 WBCS (ref 0–0)
OXYHGB MFR BLDV: 71.8 % (ref 45–75)
PCO2 BLDV: 36 MM HG (ref 41–51)
PH BLDV: 7.27 PH (ref 7.33–7.43)
PH UR STRIP.AUTO: 5 [PH]
PHOSPHATE SERPL-MCNC: 3.6 MG/DL (ref 2.5–4.9)
PLATELET # BLD AUTO: 188 X10*3/UL (ref 150–450)
PO2 BLDV: 44 MM HG (ref 35–45)
POTASSIUM BLDV-SCNC: 4.5 MMOL/L (ref 3.5–5.3)
POTASSIUM SERPL-SCNC: 4.5 MMOL/L (ref 3.5–5.3)
PROT SERPL-MCNC: 6.7 G/DL (ref 6.4–8.2)
PROT UR STRIP.AUTO-MCNC: NEGATIVE MG/DL
RBC # BLD AUTO: 5.12 X10*6/UL (ref 4.5–5.9)
RBC # UR STRIP.AUTO: NEGATIVE MG/DL
SAO2 % BLDV: 73 % (ref 45–75)
SODIUM BLDV-SCNC: 131 MMOL/L (ref 136–145)
SODIUM SERPL-SCNC: 132 MMOL/L (ref 136–145)
SP GR UR STRIP.AUTO: 1.03
UROBILINOGEN UR STRIP.AUTO-MCNC: NORMAL MG/DL
WBC # BLD AUTO: 5.3 X10*3/UL (ref 4.4–11.3)

## 2025-04-09 PROCEDURE — 82010 KETONE BODYS QUAN: CPT | Performed by: STUDENT IN AN ORGANIZED HEALTH CARE EDUCATION/TRAINING PROGRAM

## 2025-04-09 PROCEDURE — 83735 ASSAY OF MAGNESIUM: CPT | Performed by: INTERNAL MEDICINE

## 2025-04-09 PROCEDURE — 84100 ASSAY OF PHOSPHORUS: CPT | Performed by: STUDENT IN AN ORGANIZED HEALTH CARE EDUCATION/TRAINING PROGRAM

## 2025-04-09 PROCEDURE — 96361 HYDRATE IV INFUSION ADD-ON: CPT

## 2025-04-09 PROCEDURE — 83735 ASSAY OF MAGNESIUM: CPT | Performed by: STUDENT IN AN ORGANIZED HEALTH CARE EDUCATION/TRAINING PROGRAM

## 2025-04-09 PROCEDURE — 84132 ASSAY OF SERUM POTASSIUM: CPT | Performed by: STUDENT IN AN ORGANIZED HEALTH CARE EDUCATION/TRAINING PROGRAM

## 2025-04-09 PROCEDURE — 2500000004 HC RX 250 GENERAL PHARMACY W/ HCPCS (ALT 636 FOR OP/ED)

## 2025-04-09 PROCEDURE — 81003 URINALYSIS AUTO W/O SCOPE: CPT | Performed by: STUDENT IN AN ORGANIZED HEALTH CARE EDUCATION/TRAINING PROGRAM

## 2025-04-09 PROCEDURE — 2500000004 HC RX 250 GENERAL PHARMACY W/ HCPCS (ALT 636 FOR OP/ED): Performed by: INTERNAL MEDICINE

## 2025-04-09 PROCEDURE — 82947 ASSAY GLUCOSE BLOOD QUANT: CPT

## 2025-04-09 PROCEDURE — 99285 EMERGENCY DEPT VISIT HI MDM: CPT | Performed by: STUDENT IN AN ORGANIZED HEALTH CARE EDUCATION/TRAINING PROGRAM

## 2025-04-09 PROCEDURE — 84132 ASSAY OF SERUM POTASSIUM: CPT | Performed by: INTERNAL MEDICINE

## 2025-04-09 PROCEDURE — 2500000004 HC RX 250 GENERAL PHARMACY W/ HCPCS (ALT 636 FOR OP/ED): Performed by: STUDENT IN AN ORGANIZED HEALTH CARE EDUCATION/TRAINING PROGRAM

## 2025-04-09 PROCEDURE — 2020000001 HC ICU ROOM DAILY

## 2025-04-09 PROCEDURE — 84100 ASSAY OF PHOSPHORUS: CPT | Performed by: INTERNAL MEDICINE

## 2025-04-09 PROCEDURE — 96374 THER/PROPH/DIAG INJ IV PUSH: CPT

## 2025-04-09 PROCEDURE — 93005 ELECTROCARDIOGRAM TRACING: CPT

## 2025-04-09 PROCEDURE — 99223 1ST HOSP IP/OBS HIGH 75: CPT | Performed by: INTERNAL MEDICINE

## 2025-04-09 PROCEDURE — 36415 COLL VENOUS BLD VENIPUNCTURE: CPT | Performed by: STUDENT IN AN ORGANIZED HEALTH CARE EDUCATION/TRAINING PROGRAM

## 2025-04-09 PROCEDURE — 83690 ASSAY OF LIPASE: CPT | Performed by: STUDENT IN AN ORGANIZED HEALTH CARE EDUCATION/TRAINING PROGRAM

## 2025-04-09 PROCEDURE — 85025 COMPLETE CBC W/AUTO DIFF WBC: CPT | Performed by: STUDENT IN AN ORGANIZED HEALTH CARE EDUCATION/TRAINING PROGRAM

## 2025-04-09 RX ORDER — DEXTROSE MONOHYDRATE 100 MG/ML
150 INJECTION, SOLUTION INTRAVENOUS CONTINUOUS PRN
Status: DISCONTINUED | OUTPATIENT
Start: 2025-04-09 | End: 2025-04-10

## 2025-04-09 RX ORDER — DEXTROSE 50 % IN WATER (D50W) INTRAVENOUS SYRINGE
50
Status: DISCONTINUED | OUTPATIENT
Start: 2025-04-09 | End: 2025-04-10 | Stop reason: HOSPADM

## 2025-04-09 RX ORDER — ENOXAPARIN SODIUM 100 MG/ML
40 INJECTION SUBCUTANEOUS EVERY 24 HOURS
Status: DISCONTINUED | OUTPATIENT
Start: 2025-04-09 | End: 2025-04-10 | Stop reason: HOSPADM

## 2025-04-09 RX ORDER — POLYETHYLENE GLYCOL 3350 17 G/17G
17 POWDER, FOR SOLUTION ORAL DAILY
Status: DISCONTINUED | OUTPATIENT
Start: 2025-04-10 | End: 2025-04-10 | Stop reason: HOSPADM

## 2025-04-09 RX ORDER — DEXTROSE MONOHYDRATE AND SODIUM CHLORIDE 5; .45 G/100ML; G/100ML
150 INJECTION, SOLUTION INTRAVENOUS CONTINUOUS PRN
Status: DISCONTINUED | OUTPATIENT
Start: 2025-04-09 | End: 2025-04-09

## 2025-04-09 RX ORDER — PANTOPRAZOLE SODIUM 40 MG/10ML
40 INJECTION, POWDER, LYOPHILIZED, FOR SOLUTION INTRAVENOUS DAILY
Status: DISCONTINUED | OUTPATIENT
Start: 2025-04-09 | End: 2025-04-10 | Stop reason: HOSPADM

## 2025-04-09 RX ORDER — BISACODYL 10 MG/1
10 SUPPOSITORY RECTAL DAILY PRN
Status: DISCONTINUED | OUTPATIENT
Start: 2025-04-09 | End: 2025-04-10 | Stop reason: HOSPADM

## 2025-04-09 RX ORDER — BISACODYL 5 MG
10 TABLET, DELAYED RELEASE (ENTERIC COATED) ORAL DAILY PRN
Status: DISCONTINUED | OUTPATIENT
Start: 2025-04-09 | End: 2025-04-10 | Stop reason: HOSPADM

## 2025-04-09 RX ORDER — GUAIFENESIN 600 MG/1
600 TABLET, EXTENDED RELEASE ORAL EVERY 12 HOURS PRN
Status: DISCONTINUED | OUTPATIENT
Start: 2025-04-09 | End: 2025-04-10 | Stop reason: HOSPADM

## 2025-04-09 RX ORDER — SODIUM CHLORIDE AND POTASSIUM CHLORIDE 150; 900 MG/100ML; MG/100ML
250 INJECTION, SOLUTION INTRAVENOUS CONTINUOUS
Status: DISCONTINUED | OUTPATIENT
Start: 2025-04-09 | End: 2025-04-09

## 2025-04-09 RX ORDER — ACETAMINOPHEN 500 MG
5 TABLET ORAL NIGHTLY PRN
Status: DISCONTINUED | OUTPATIENT
Start: 2025-04-09 | End: 2025-04-10 | Stop reason: HOSPADM

## 2025-04-09 RX ORDER — DEXTROSE MONOHYDRATE 100 MG/ML
150 INJECTION, SOLUTION INTRAVENOUS CONTINUOUS PRN
Status: DISCONTINUED | OUTPATIENT
Start: 2025-04-09 | End: 2025-04-09

## 2025-04-09 RX ORDER — METOCLOPRAMIDE HYDROCHLORIDE 5 MG/ML
5 INJECTION INTRAMUSCULAR; INTRAVENOUS EVERY 6 HOURS PRN
Status: DISCONTINUED | OUTPATIENT
Start: 2025-04-09 | End: 2025-04-10 | Stop reason: HOSPADM

## 2025-04-09 RX ORDER — LISINOPRIL 5 MG/1
5 TABLET ORAL DAILY
Status: DISCONTINUED | OUTPATIENT
Start: 2025-04-10 | End: 2025-04-10 | Stop reason: HOSPADM

## 2025-04-09 RX ORDER — SODIUM CHLORIDE 450 MG/100ML
250 INJECTION, SOLUTION INTRAVENOUS CONTINUOUS
Status: DISCONTINUED | OUTPATIENT
Start: 2025-04-09 | End: 2025-04-09

## 2025-04-09 RX ORDER — DEXTROSE MONOHYDRATE, SODIUM CHLORIDE, AND POTASSIUM CHLORIDE 50; 1.49; 4.5 G/1000ML; G/1000ML; G/1000ML
150 INJECTION, SOLUTION INTRAVENOUS CONTINUOUS
Status: DISCONTINUED | OUTPATIENT
Start: 2025-04-09 | End: 2025-04-10

## 2025-04-09 RX ADMIN — SODIUM CHLORIDE, SODIUM LACTATE, POTASSIUM CHLORIDE, AND CALCIUM CHLORIDE 1000 ML: .6; .31; .03; .02 INJECTION, SOLUTION INTRAVENOUS at 18:24

## 2025-04-09 RX ADMIN — INSULIN HUMAN 10 UNITS/HR: 1 INJECTION, SOLUTION INTRAVENOUS at 20:25

## 2025-04-09 RX ADMIN — DEXTROSE, SODIUM CHLORIDE, AND POTASSIUM CHLORIDE 150 ML/HR: 5; .45; .15 INJECTION INTRAVENOUS at 22:34

## 2025-04-09 RX ADMIN — DEXTROSE 150 ML/HR: 10 SOLUTION INTRAVENOUS at 23:02

## 2025-04-09 RX ADMIN — SODIUM CHLORIDE AND POTASSIUM CHLORIDE 250 ML/HR: .9; .15 SOLUTION INTRAVENOUS at 22:14

## 2025-04-09 RX ADMIN — PANTOPRAZOLE SODIUM 40 MG: 40 INJECTION, POWDER, FOR SOLUTION INTRAVENOUS at 22:09

## 2025-04-09 RX ADMIN — SODIUM CHLORIDE, SODIUM LACTATE, POTASSIUM CHLORIDE, AND CALCIUM CHLORIDE 1000 ML: .6; .31; .03; .02 INJECTION, SOLUTION INTRAVENOUS at 20:16

## 2025-04-09 SDOH — SOCIAL STABILITY: SOCIAL INSECURITY: WITHIN THE LAST YEAR, HAVE YOU BEEN HUMILIATED OR EMOTIONALLY ABUSED IN OTHER WAYS BY YOUR PARTNER OR EX-PARTNER?: NO

## 2025-04-09 SDOH — ECONOMIC STABILITY: INCOME INSECURITY: IN THE PAST 12 MONTHS HAS THE ELECTRIC, GAS, OIL, OR WATER COMPANY THREATENED TO SHUT OFF SERVICES IN YOUR HOME?: NO

## 2025-04-09 SDOH — SOCIAL STABILITY: SOCIAL INSECURITY: WITHIN THE LAST YEAR, HAVE YOU BEEN AFRAID OF YOUR PARTNER OR EX-PARTNER?: NO

## 2025-04-09 SDOH — ECONOMIC STABILITY: FOOD INSECURITY: WITHIN THE PAST 12 MONTHS, YOU WORRIED THAT YOUR FOOD WOULD RUN OUT BEFORE YOU GOT THE MONEY TO BUY MORE.: NEVER TRUE

## 2025-04-09 SDOH — ECONOMIC STABILITY: FOOD INSECURITY: WITHIN THE PAST 12 MONTHS, THE FOOD YOU BOUGHT JUST DIDN'T LAST AND YOU DIDN'T HAVE MONEY TO GET MORE.: NEVER TRUE

## 2025-04-09 SDOH — SOCIAL STABILITY: SOCIAL INSECURITY: WERE YOU ABLE TO COMPLETE ALL THE BEHAVIORAL HEALTH SCREENINGS?: YES

## 2025-04-09 SDOH — SOCIAL STABILITY: SOCIAL INSECURITY: HAVE YOU HAD THOUGHTS OF HARMING ANYONE ELSE?: NO

## 2025-04-09 ASSESSMENT — ACTIVITIES OF DAILY LIVING (ADL)
JUDGMENT_ADEQUATE_SAFELY_COMPLETE_DAILY_ACTIVITIES: YES
FEEDING YOURSELF: INDEPENDENT
TOILETING: INDEPENDENT
ADEQUATE_TO_COMPLETE_ADL: YES
BATHING: INDEPENDENT
HEARING - LEFT EAR: FUNCTIONAL
WALKS IN HOME: INDEPENDENT
HEARING - RIGHT EAR: FUNCTIONAL
DRESSING YOURSELF: INDEPENDENT
GROOMING: INDEPENDENT
LACK_OF_TRANSPORTATION: NO
PATIENT'S MEMORY ADEQUATE TO SAFELY COMPLETE DAILY ACTIVITIES?: YES
LACK_OF_TRANSPORTATION: NO

## 2025-04-09 ASSESSMENT — PAIN DESCRIPTION - LOCATION: LOCATION: ABDOMEN

## 2025-04-09 ASSESSMENT — PAIN - FUNCTIONAL ASSESSMENT
PAIN_FUNCTIONAL_ASSESSMENT: 0-10

## 2025-04-09 ASSESSMENT — COGNITIVE AND FUNCTIONAL STATUS - GENERAL
MOBILITY SCORE: 24
DAILY ACTIVITIY SCORE: 24
PATIENT BASELINE BEDBOUND: NO

## 2025-04-09 ASSESSMENT — ENCOUNTER SYMPTOMS
VOMITING: 1
ABDOMINAL PAIN: 1
FATIGUE: 1
NAUSEA: 1

## 2025-04-09 ASSESSMENT — PAIN SCALES - GENERAL
PAINLEVEL_OUTOF10: 4
PAINLEVEL_OUTOF10: 0 - NO PAIN
PAINLEVEL_OUTOF10: 0 - NO PAIN

## 2025-04-09 ASSESSMENT — LIFESTYLE VARIABLES
PRESCIPTION_ABUSE_PAST_12_MONTHS: NO
HOW OFTEN DO YOU HAVE A DRINK CONTAINING ALCOHOL: NEVER
SUBSTANCE_ABUSE_PAST_12_MONTHS: NO
HOW OFTEN DO YOU HAVE 6 OR MORE DRINKS ON ONE OCCASION: NEVER
AUDIT-C TOTAL SCORE: 0
HAVE YOU EVER FELT YOU SHOULD CUT DOWN ON YOUR DRINKING: NO
HAVE PEOPLE ANNOYED YOU BY CRITICIZING YOUR DRINKING: NO
HOW MANY STANDARD DRINKS CONTAINING ALCOHOL DO YOU HAVE ON A TYPICAL DAY: PATIENT DOES NOT DRINK
TOTAL SCORE: 0
AUDIT-C TOTAL SCORE: 0
EVER HAD A DRINK FIRST THING IN THE MORNING TO STEADY YOUR NERVES TO GET RID OF A HANGOVER: NO
EVER FELT BAD OR GUILTY ABOUT YOUR DRINKING: NO
SKIP TO QUESTIONS 9-10: 1

## 2025-04-09 NOTE — ED PROVIDER NOTES
HPI   Chief Complaint   Patient presents with    Abdominal Pain     Since yesterday        34-year-old male past medical history of type 1 diabetes has been in DKA multiple times presents emergency department for feeling unwell he states he took 2 x 22 units of insulin this morning concerns of high glucose with glucometer.  He is going to [place] so he wanted it checked out no other issues otherwise.                          Alyce Coma Scale Score: 15                  Patient History   Past Medical History:   Diagnosis Date    DKA (diabetic ketoacidosis) (Multi)     recurrent    HTN (hypertension)     Type I diabetes mellitus (Multi)      No past surgical history on file.  Family History   Problem Relation Name Age of Onset    No Known Problems Mother      No Known Problems Father      Hypertension Other      Coronary artery disease Other      Diabetes Other      Heart failure Other       Social History     Tobacco Use    Smoking status: Never     Passive exposure: Never    Smokeless tobacco: Never   Vaping Use    Vaping status: Never Used   Substance Use Topics    Alcohol use: Never    Drug use: Never       Physical Exam   ED Triage Vitals [04/09/25 1726]   Temperature Heart Rate Respirations BP   36 °C (96.8 °F) (!) 103 16 (!) 143/91      Pulse Ox Temp src Heart Rate Source Patient Position   97 % -- -- --      BP Location FiO2 (%)     -- --       Physical Exam  Constitutional:       General: He is not in acute distress.  HENT:      Head: Normocephalic and atraumatic.      Right Ear: Tympanic membrane normal.      Left Ear: Tympanic membrane normal.      Mouth/Throat:      Mouth: Mucous membranes are moist.   Eyes:      Extraocular Movements: Extraocular movements intact.      Conjunctiva/sclera: Conjunctivae normal.      Pupils: Pupils are equal, round, and reactive to light.   Cardiovascular:      Rate and Rhythm: Normal rate and regular rhythm.      Heart sounds: No murmur heard.  Pulmonary:      Effort:  Pulmonary effort is normal. No respiratory distress.      Breath sounds: Normal breath sounds. No stridor. No wheezing or rales.   Abdominal:      General: Bowel sounds are normal. There is no distension.      Tenderness: There is no abdominal tenderness. There is no guarding or rebound.   Musculoskeletal:         General: No swelling, tenderness or deformity. Normal range of motion.   Skin:     General: Skin is warm and dry.      Coloration: Skin is not jaundiced.      Findings: No bruising or lesion.   Neurological:      General: No focal deficit present.      Mental Status: He is alert and oriented to person, place, and time. Mental status is at baseline.      Cranial Nerves: No cranial nerve deficit.      Motor: No weakness.   Psychiatric:         Mood and Affect: Mood normal.       Labs Reviewed - No data to display  No orders to display       ED Course & MDM   ED Course as of 04/09/25 1946 Wed Apr 09, 2025 1913 EKG on my read shows sinus rhythm at a rate 93 bpm no ST changes or elevations.  Normal intervals. [CF]      ED Course User Index  [CF] Barbie Early MD         Diagnoses as of 04/09/25 1946   Type 1 diabetes mellitus with ketoacidosis without coma       Medical Decision Making  This is a 34-year-old male with past ministry of type 1 diabetes who presents emerged part for concerns of DKA.  Here he has no other complaints.  He is acute during the year has been hydroxybutyrate 7.16 he is meeting criteria for mild DKA.  His abdomen is soft nontender.  He started insulin drip admitted to the ICU.        Procedure  Critical Care    Performed by: Barbie Early MD  Authorized by: Barbie Early MD    Critical care provider statement:     Critical care time (minutes):  20    Critical care time was exclusive of:  Separately billable procedures and treating other patients and teaching time    Critical care was time spent personally by me on the following activities:  Development of treatment  plan with patient or surrogate, blood draw for specimens, discussions with consultants, evaluation of patient's response to treatment, ordering and performing treatments and interventions, ordering and review of radiographic studies, ordering and review of laboratory studies, pulse oximetry, re-evaluation of patient's condition, review of old charts and examination of patient    Care discussed with: admitting provider         Barbie Early MD  04/09/25 [id number]

## 2025-04-10 VITALS
SYSTOLIC BLOOD PRESSURE: 143 MMHG | DIASTOLIC BLOOD PRESSURE: 96 MMHG | HEART RATE: 94 BPM | OXYGEN SATURATION: 78 % | WEIGHT: 170.19 LBS | HEIGHT: 71 IN | BODY MASS INDEX: 23.83 KG/M2 | TEMPERATURE: 97.9 F | RESPIRATION RATE: 10 BRPM

## 2025-04-10 PROBLEM — E10.10 DIABETIC KETOACIDOSIS WITHOUT COMA ASSOCIATED WITH TYPE 1 DIABETES MELLITUS: Status: RESOLVED | Noted: 2025-02-12 | Resolved: 2025-04-10

## 2025-04-10 LAB
ANION GAP SERPL CALC-SCNC: 11 MMOL/L (ref 10–20)
ANION GAP SERPL CALC-SCNC: 12 MMOL/L (ref 10–20)
ATRIAL RATE: 93 BPM
BUN SERPL-MCNC: 16 MG/DL (ref 6–23)
BUN SERPL-MCNC: 16 MG/DL (ref 6–23)
CALCIUM SERPL-MCNC: 7.7 MG/DL (ref 8.6–10.3)
CALCIUM SERPL-MCNC: 7.8 MG/DL (ref 8.6–10.3)
CHLORIDE SERPL-SCNC: 106 MMOL/L (ref 98–107)
CHLORIDE SERPL-SCNC: 107 MMOL/L (ref 98–107)
CO2 SERPL-SCNC: 22 MMOL/L (ref 21–32)
CO2 SERPL-SCNC: 25 MMOL/L (ref 21–32)
CREAT SERPL-MCNC: 0.73 MG/DL (ref 0.5–1.3)
CREAT SERPL-MCNC: 0.79 MG/DL (ref 0.5–1.3)
EGFRCR SERPLBLD CKD-EPI 2021: >90 ML/MIN/1.73M*2
EGFRCR SERPLBLD CKD-EPI 2021: >90 ML/MIN/1.73M*2
ERYTHROCYTE [DISTWIDTH] IN BLOOD BY AUTOMATED COUNT: 13.2 % (ref 11.5–14.5)
GLUCOSE BLD MANUAL STRIP-MCNC: 151 MG/DL (ref 74–99)
GLUCOSE BLD MANUAL STRIP-MCNC: 191 MG/DL (ref 74–99)
GLUCOSE BLD MANUAL STRIP-MCNC: 218 MG/DL (ref 74–99)
GLUCOSE BLD MANUAL STRIP-MCNC: 224 MG/DL (ref 74–99)
GLUCOSE BLD MANUAL STRIP-MCNC: 265 MG/DL (ref 74–99)
GLUCOSE SERPL-MCNC: 140 MG/DL (ref 74–99)
GLUCOSE SERPL-MCNC: 208 MG/DL (ref 74–99)
HCT VFR BLD AUTO: 40.6 % (ref 41–52)
HGB BLD-MCNC: 14.1 G/DL (ref 13.5–17.5)
HOLD SPECIMEN: NORMAL
MAGNESIUM SERPL-MCNC: 1.72 MG/DL (ref 1.6–2.4)
MAGNESIUM SERPL-MCNC: 1.77 MG/DL (ref 1.6–2.4)
MCH RBC QN AUTO: 31.9 PG (ref 26–34)
MCHC RBC AUTO-ENTMCNC: 34.7 G/DL (ref 32–36)
MCV RBC AUTO: 92 FL (ref 80–100)
NRBC BLD-RTO: 0 /100 WBCS (ref 0–0)
P AXIS: 73 DEGREES
PHOSPHATE SERPL-MCNC: 2.2 MG/DL (ref 2.5–4.9)
PHOSPHATE SERPL-MCNC: 3.7 MG/DL (ref 2.5–4.9)
PLATELET # BLD AUTO: 167 X10*3/UL (ref 150–450)
POTASSIUM SERPL-SCNC: 3.4 MMOL/L (ref 3.5–5.3)
POTASSIUM SERPL-SCNC: 3.6 MMOL/L (ref 3.5–5.3)
PR INTERVAL: 153 MS
Q ONSET: 249 MS
QRS COUNT: 15 BEATS
QRS DURATION: 90 MS
QT INTERVAL: 360 MS
QTC CALCULATION(BAZETT): 448 MS
QTC FREDERICIA: 417 MS
R AXIS: 51 DEGREES
RBC # BLD AUTO: 4.42 X10*6/UL (ref 4.5–5.9)
SODIUM SERPL-SCNC: 138 MMOL/L (ref 136–145)
SODIUM SERPL-SCNC: 138 MMOL/L (ref 136–145)
T AXIS: 54 DEGREES
T OFFSET: 429 MS
VENTRICULAR RATE: 93 BPM
WBC # BLD AUTO: 4.9 X10*3/UL (ref 4.4–11.3)

## 2025-04-10 PROCEDURE — 2500000002 HC RX 250 W HCPCS SELF ADMINISTERED DRUGS (ALT 637 FOR MEDICARE OP, ALT 636 FOR OP/ED)

## 2025-04-10 PROCEDURE — 2500000004 HC RX 250 GENERAL PHARMACY W/ HCPCS (ALT 636 FOR OP/ED): Performed by: INTERNAL MEDICINE

## 2025-04-10 PROCEDURE — 85027 COMPLETE CBC AUTOMATED: CPT

## 2025-04-10 PROCEDURE — 99239 HOSP IP/OBS DSCHRG MGMT >30: CPT | Performed by: FAMILY MEDICINE

## 2025-04-10 PROCEDURE — 84100 ASSAY OF PHOSPHORUS: CPT

## 2025-04-10 PROCEDURE — 80048 BASIC METABOLIC PNL TOTAL CA: CPT

## 2025-04-10 PROCEDURE — 36415 COLL VENOUS BLD VENIPUNCTURE: CPT

## 2025-04-10 PROCEDURE — 83735 ASSAY OF MAGNESIUM: CPT

## 2025-04-10 PROCEDURE — 82947 ASSAY GLUCOSE BLOOD QUANT: CPT

## 2025-04-10 PROCEDURE — 99254 IP/OBS CNSLTJ NEW/EST MOD 60: CPT | Performed by: INTERNAL MEDICINE

## 2025-04-10 RX ORDER — DEXTROSE 50 % IN WATER (D50W) INTRAVENOUS SYRINGE
25
Status: DISCONTINUED | OUTPATIENT
Start: 2025-04-10 | End: 2025-04-10 | Stop reason: HOSPADM

## 2025-04-10 RX ORDER — POTASSIUM CHLORIDE 20 MEQ/1
40 TABLET, EXTENDED RELEASE ORAL ONCE
Status: COMPLETED | OUTPATIENT
Start: 2025-04-10 | End: 2025-04-10

## 2025-04-10 RX ORDER — INSULIN LISPRO 100 [IU]/ML
5 INJECTION, SOLUTION INTRAVENOUS; SUBCUTANEOUS
Status: DISCONTINUED | OUTPATIENT
Start: 2025-04-10 | End: 2025-04-10

## 2025-04-10 RX ORDER — DEXTROSE 50 % IN WATER (D50W) INTRAVENOUS SYRINGE
12.5
Status: DISCONTINUED | OUTPATIENT
Start: 2025-04-10 | End: 2025-04-10 | Stop reason: HOSPADM

## 2025-04-10 RX ORDER — INSULIN LISPRO 100 [IU]/ML
0-5 INJECTION, SOLUTION INTRAVENOUS; SUBCUTANEOUS
Status: DISCONTINUED | OUTPATIENT
Start: 2025-04-10 | End: 2025-04-10 | Stop reason: HOSPADM

## 2025-04-10 RX ORDER — INSULIN LISPRO 100 [IU]/ML
5 INJECTION, SOLUTION INTRAVENOUS; SUBCUTANEOUS
Status: DISCONTINUED | OUTPATIENT
Start: 2025-04-10 | End: 2025-04-10 | Stop reason: HOSPADM

## 2025-04-10 RX ADMIN — INSULIN LISPRO 5 UNITS: 100 INJECTION, SOLUTION INTRAVENOUS; SUBCUTANEOUS at 09:36

## 2025-04-10 RX ADMIN — INSULIN LISPRO 5 UNITS: 100 INJECTION, SOLUTION INTRAVENOUS; SUBCUTANEOUS at 13:21

## 2025-04-10 RX ADMIN — INSULIN LISPRO 3 UNITS: 100 INJECTION, SOLUTION INTRAVENOUS; SUBCUTANEOUS at 09:37

## 2025-04-10 RX ADMIN — INSULIN HUMAN 22 UNITS: 100 INJECTION, SUSPENSION SUBCUTANEOUS at 01:11

## 2025-04-10 RX ADMIN — PANTOPRAZOLE SODIUM 40 MG: 40 INJECTION, POWDER, FOR SOLUTION INTRAVENOUS at 09:38

## 2025-04-10 RX ADMIN — POTASSIUM CHLORIDE 40 MEQ: 1500 TABLET, EXTENDED RELEASE ORAL at 01:11

## 2025-04-10 RX ADMIN — INSULIN LISPRO 1 UNITS: 100 INJECTION, SOLUTION INTRAVENOUS; SUBCUTANEOUS at 13:22

## 2025-04-10 ASSESSMENT — PAIN - FUNCTIONAL ASSESSMENT
PAIN_FUNCTIONAL_ASSESSMENT: 0-10

## 2025-04-10 ASSESSMENT — ENCOUNTER SYMPTOMS
VOMITING: 0
ABDOMINAL PAIN: 0
NAUSEA: 0

## 2025-04-10 ASSESSMENT — ACTIVITIES OF DAILY LIVING (ADL): LACK_OF_TRANSPORTATION: NO

## 2025-04-10 ASSESSMENT — PAIN SCALES - GENERAL
PAINLEVEL_OUTOF10: 0 - NO PAIN

## 2025-04-10 NOTE — PROGRESS NOTES
"Ed Sanon is a 34 y.o. male admitted for Diabetic ketoacidosis without coma associated with type 1 diabetes mellitus. Pharmacy reviewed the patient's [id number] medications and allergies for accuracy.    The list below reflects the PTA list prior to pharmacy medication history. A summary a changes to the PTA medication list has been listed below. Please review each medication in order reconciliation for additional clarification and justification.    Source of information:  DISCHARGE NOTES   SURE SCRIPTS    Medications added:    Medications modified:    Medications to be removed:    Medications of concern:  NO CHANGES      Prior to Admission Medications   Prescriptions Last Dose Informant Patient Reported? Taking?   Dexcom G7  misc Unknown  Yes No   Sig: Inject 1 Device under the skin if needed (poorly controlled type 1 diabetes). Use as instructed   Dexcom G7 Sensor device Unknown  Yes No   Si each every 10 (ten) days.   Gvoke HypoPen 1-Pack 1 mg/0.2 mL auto-injector   No No   Sig: Inject 1 mg under the skin 1 time for 1 dose.   Patient not taking: Reported on 2025   OneTouch Ultra Test strip   Yes No   Sig: USE 1 STRIP TO CHECK GLUCOSE 4 TIMES DAILY AS DIRECTED   OneTouch Ultra2 Meter misc   Yes No   Sig: use as directed   ciclopirox (Loprox) 0.77 % cream Unknown  Yes No   Sig: Apply topically. APPLY  TWICE A DAY   insulin NPH and regular human (HumuLIN 70/30 U-100 Insulin) 100 unit/mL (70-30) injection   No No   Si units twice  daily   lancets misc   Yes No   Sig: Inject 1 Units under the skin 2 times a day. Sliding scale   lisinopril 5 mg tablet   Yes No   Sig: Take 1 tablet (5 mg) by mouth once daily.   ondansetron (Zofran) 4 mg tablet   No No   Sig: Take 1 tablet (4 mg) by mouth every 8 hours if needed for nausea or vomiting.   Patient not taking: Reported on 2025   pen needle, diabetic 32 gauge x 5/32\" needle   Yes No   Sig: Use as instructed 4 times daily   terbinafine " (LamISIL) 250 mg tablet   Yes No   Sig: Take 1 tablet (250 mg) by mouth 3 times a day as needed.      Facility-Administered Medications: None       Nanette Santoro

## 2025-04-10 NOTE — DISCHARGE SUMMARY
Discharge Diagnosis  Diabetic ketoacidosis without coma associated with type 1 diabetes mellitus    Issues Requiring Follow-Up  Diabetic ketoacidosis, poorly controlled DM1    This discharge took greater than 35 minutes.    Test Results Pending At Discharge  Pending Labs       No current pending labs.            Hospital Course   34-year-old gentleman whose past medical history is remarkable for hypertension and uncontrolled type 1 diabetes mellitus (A1c was 11.7 on 1/3/2025, but as high as 16.3 on 7/1/2023). Patient denies cigarette smoking, alcohol abuse or illicit drug use. He admits to erratic compliance with his short acting insulin claiming he feels off once his sugars are low. He sought medical attention at this facility's ED this evening suspecting he was going into DKA again. He has been hospitalized approximately every 4 weeks at this facility with DKA in the past few months. He came in this evening complaining of polyuria, polydipsia, feeling very dehydrated, vague periumbilical abdominal pain, nausea and bilious emesis. No recent fever, chills, malaise, headache, neck stiffness, acute confusion, skin rash, cough, shortness of breath, chest pain, palpitations, diarrhea, dysuria or urinary frequency. Vitals upon ED arrival included /91, , temperature 96.8 °F. Pertinent ED labs included serum sodium 132, potassium 4.5,-ism 2.03, BUN 17, creatinine 1.06, glucose 473, serum bicarb 15, WBC 5.3, hemoglobin 16.0, platelet count 188, venous pH 7.27, urinalysis positive for glucose and ketones but negative for pyuria. Patient has been commenced on an insulin drip at the ED, given 2 L IV fluid bolus       10/4:                Today patient seen in the ICU.  He is sitting on the edge of the bed.  Voices no specific complaints and no acute events overnight.  Anion gap has closed and patient is insisting on discharge home today.  Endocrinology recommending Novolin 70/30 at 30 units twice daily and  discussed with patient.  He states he does have this insulin available at home and does not require refill.  Otherwise denies interval new symptoms or complaints including chest pain palpitations pleuritic type pain unusual shortness of breath cough sputum nausea abdominal pain flank pain dysuria diarrhea headache visual disturbances fever or chills.    Diabetic ketoacidosis without coma associated with type 1 diabetes mellitus  Admit patient to the ICU for hourly Accu-Cheks while on insulin drip.  Continue aggressive IV fluid resuscitation with as needed aggressive IV serum electrolyte replenishment.  N.p.o. for now & IV PPI for GI protection.  Resume basal & prandial insulin, hopefully in a.m., with roughly 4-hour overlap prior to discontinuing insulin drip, once DKA completely resolved.  4/10: Anion gap closed.  Patient feels improved.  Desires discharge home.  Endocrinology recommends Novolin 70/30 at 30 units twice daily.  Patient states he has this at home and does not require refill.  Strongly recommended close follow-up with endocrinology.    Hyponatremia  Pseudohyponatremia, as well as hypovolemia from marked dehydration, all due to DKA.  Uncontrolled type 1 diabetes mellitus with hyperglycemia, with long-term current use of insulin  I emphasized to the patient the importance of compliance with his prescribed medications.  Essential hypertension  Resume low-dose ACE inhibitor as tolerated.    Pertinent Physical Exam At Time of Discharge  Physical Exam  Constitutional: Well-nourished, in no overt distress  HEENT: No pallor or jaundice, oral mucosa dry  Neck: Supple, no JVD  Respiratory: Clear lungs  Cardiovascular, S1-S2 audible regular rate and rhythm  GI: Soft nondistended nontender abdomen, bowel sounds present  Musculoskeletal: No pedal edema  Skin: No suspicious rash  Psych: Mildly flat affect but appropriately oriented in all spheres  Neuro: Awake, alert, verbally responsive and follows commands, moves  "all extremities equally, essentially nonfocal  Home Medications     Medication List      CONTINUE taking these medications     ciclopirox 0.77 % cream; Commonly known as: Loprox   Dexcom G7  misc; Generic drug: blood-glucose,,cont   Dexcom G7 Sensor device; Generic drug: blood-glucose sensor   HumuLIN 70/30 U-100 Insulin 100 unit/mL (70-30) injection; Generic drug:   insulin NPH and regular human; 30 units twice  daily   lancets misc   lisinopril 5 mg tablet   OneTouch Ultra Test; Generic drug: blood sugar diagnostic   OneTouch Ultra2 Meter misc; Generic drug: blood-glucose meter   pen needle, diabetic 32 gauge x 5/32\" needle   terbinafine 250 mg tablet; Commonly known as: LamISIL     STOP taking these medications     Gvoke HypoPen 1-Pack 1 mg/0.2 mL auto-injector; Generic drug: glucagon   ondansetron 4 mg tablet; Commonly known as: Zofran       Outpatient Follow-Up  PCP, endocrinologist    Cuate Wynn MD  "

## 2025-04-10 NOTE — H&P
History Of Present Illness  Ed Sanon is a 34 y.o. male presenting with symptoms of another DKA.    34-year-old gentleman whose past medical history is remarkable for hypertension and uncontrolled type 1 diabetes mellitus (A1c was 11.7 on 1/3/2025, but as high as 16.3 on 7/1/2023).  Patient denies cigarette smoking, alcohol abuse or illicit drug use.  He admits to erratic compliance with his short acting insulin claiming he feels off once his sugars are low.  He sought medical attention at this facility's ED this evening suspecting he was going into DKA again.  He has been hospitalized approximately every 4 weeks at this facility with DKA in the past few months.  He came in this evening complaining of polyuria, polydipsia, feeling very dehydrated, vague periumbilical abdominal pain, nausea and bilious emesis.  No recent fever, chills, malaise, headache, neck stiffness, acute confusion, skin rash, cough, shortness of breath, chest pain, palpitations, diarrhea, dysuria or urinary frequency.  Vitals upon ED arrival included /91, , temperature 96.8 °F.  Pertinent ED labs included serum sodium 132, potassium 4.5,-ism 2.03, BUN 17, creatinine 1.06, glucose 473, serum bicarb 15, WBC 5.3, hemoglobin 16.0, platelet count 188, venous pH 7.27, urinalysis positive for glucose and ketones but negative for pyuria.  Patient has been commenced on an insulin drip at the ED, given 2 L IV fluid bolus, with his hospitalization at the ICU for further management of DKA resolved.    A 10+ point systems review was obtained and is as above, otherwise negative.     Past Medical History  Past Medical History:   Diagnosis Date    DKA (diabetic ketoacidosis) (Multi)     recurrent    HTN (hypertension)     Type I diabetes mellitus (Multi)        Surgical History  History reviewed. No pertinent surgical history.     Social History  He reports that he has never smoked. He has never been exposed to tobacco smoke. He has never  "used smokeless tobacco. He reports that he does not drink alcohol and does not use drugs.    Family History  Family History   Problem Relation Name Age of Onset    No Known Problems Mother      No Known Problems Father      Hypertension Other      Coronary artery disease Other      Diabetes Other      Heart failure Other          Allergies  Patient has no known allergies.    Review of Systems  See HPI     Physical Exam  Constitutional: Well-nourished, in no overt distress  HEENT: No pallor or jaundice, oral mucosa dry  Neck: Supple, no JVD  Respiratory: Clear lungs  Cardiovascular, S1-S2 audible tachycardic  GI: Soft nondistended nontender abdomen, bowel sounds present  Musculoskeletal: No pedal edema  Skin: No suspicious rash  Psych: Blunted affect but appropriately oriented in all spheres  Neuro: Awake, alert, verbally responsive and follows commands, moves all extremities equally, essentially nonfocal      Last Recorded Vitals  Blood pressure (!) 132/93, pulse 92, temperature 36.9 °C (98.5 °F), temperature source Temporal, resp. rate 16, height 1.803 m (5' 11\"), weight 77.2 kg (170 lb 3.1 oz), SpO2 98%.    Relevant Results  See HPI     Assessment/Plan   Assessment & Plan  Diabetic ketoacidosis without coma associated with type 1 diabetes mellitus  Admit patient to the ICU for hourly Accu-Cheks while on insulin drip.  Continue aggressive IV fluid resuscitation with as needed aggressive IV serum electrolyte replenishment.  N.p.o. for now & IV PPI for GI protection.  Resume basal & prandial insulin, hopefully in a.m., with roughly 4-hour overlap prior to discontinuing insulin drip, once DKA completely resolved.  Hyponatremia  Pseudohyponatremia, as well as hypovolemia from marked dehydration, all due to DKA.  Uncontrolled type 1 diabetes mellitus with hyperglycemia, with long-term current use of insulin  I emphasized to the patient the importance of compliance with his prescribed medications.  Essential " hypertension  Resume low-dose ACE inhibitor as tolerated.           I spent 75 minutes in the professional and overall care of this patient.      Claus Morales MD

## 2025-04-10 NOTE — CONSULTS
"Critical Care Medicine Consult      Reason For Consult  DKA    History Of Present Illness  Ed Sanon is a 34 y.o. male  with past medical history of of diabetes mellitus type 1 with poor insulin compliance, anxiety, depression, medical noncompliance, and recurrent admissions for DKA presented to [facility] ED with complaints of nausea, vomiting, fatigue, abdominal pain and concerns for DKA. Upon ED workup, which are 36 °C, heart rate 103, respiratory rate 16, blood pressure 143/91, and SpO2 97% on room air.  ED labs revealed glucose 473, sodium 132, bicarbonate 15, anion gap 22, calcium 8.5, alkaline phosphatase 128, ALT 9, AST 10, beta hydroxybutyrate 7.16, venous pH 7.27, pCO2 36, HCO3 calculated 16.5, and CBC unremarkable.  UA with reflex microscopic positive for 4+ glucose and 4+ ketones.  ED interventions included 1 L LR IV fluid bolus x2, initiation of insulin infusion per DKA protocol, and admission to ICU for further management.    Patient seen and examined in ED bed 15. Patient alert and oriented and in no acute distress. He reported that he came to the hospital because he felt like \"crud\". He stated that his symptoms began yesterday and continued through this morning but actually felt like he was perking up later in the day today and decided to come in. He took his NPH around 1230 and has not been taking his short-acting insulin. He stated that he has been taking 22 units of NPH twice daily but cut back to 20 units for a few days after discharge due to having to eat a lot and blood sugars dropping. He stated that this blood sugars have been in the 350s since discharge. He reported that he didn't want to come in but knew he needed to get better before he leaves for [place] on Friday for vacation. He denies any fevers, chills, lightheadedness, dizziness, chest pain, cough, palpitations, or any recent sick contacts.    Past Medical History:   Diagnosis Date    DKA (diabetic ketoacidosis) (Multi)     " recurrent    HTN (hypertension)     Type I diabetes mellitus (Multi)      No past surgical history on file.  (Not in a hospital admission)    Patient has no known allergies.  Social History     Tobacco Use    Smoking status: Never     Passive exposure: Never    Smokeless tobacco: Never   Vaping Use    Vaping status: Never Used   Substance Use Topics    Alcohol use: Never    Drug use: Never     Family History   Problem Relation Name Age of Onset    No Known Problems Mother      No Known Problems Father      Hypertension Other      Coronary artery disease Other      Diabetes Other      Heart failure Other         Scheduled Medications:   lactated Ringer's, 1,000 mL, intravenous, Once         Continuous Medications:   insulin regular, 0-50 Units/hr         PRN Medications:   PRN medications: insulin regular    Review of Systems:  Review of Systems   Constitutional:  Positive for fatigue.   Gastrointestinal:  Positive for abdominal pain, nausea and vomiting.        Objective   Vitals:  Most Recent:  Vitals:    04/09/25 2000   BP: (!) 142/93   Pulse: 100   Resp: 14   Temp:    SpO2: 99%       24hr Min/Max:  Temp  Min: 36 °C (96.8 °F)  Max: 36 °C (96.8 °F)  Pulse  Min: 100  Max: 103  BP  Min: 142/93  Max: 143/91  Resp  Min: 14  Max: 16  SpO2  Min: 97 %  Max: 99 %    LDA:          Vent settings:       Hemodynamic parameters for last 24 hours:       No intake or output data in the 24 hours ending 04/09/25 2014        Physical exam:    Physical Exam  Constitutional:       General: He is not in acute distress.     Appearance: Normal appearance.   HENT:      Head: Normocephalic.      Nose: Nose normal.      Mouth/Throat:      Mouth: Mucous membranes are moist.   Eyes:      Extraocular Movements: Extraocular movements intact.      Conjunctiva/sclera: Conjunctivae normal.      Pupils: Pupils are equal, round, and reactive to light.   Cardiovascular:      Rate and Rhythm: Normal rate and regular rhythm.      Pulses: Normal pulses.       Heart sounds: Normal heart sounds.   Pulmonary:      Effort: Pulmonary effort is normal. No respiratory distress.      Breath sounds: Normal breath sounds.   Abdominal:      General: Bowel sounds are normal. There is no distension.      Palpations: Abdomen is soft.      Tenderness: There is no abdominal tenderness. There is no guarding.   Musculoskeletal:         General: Normal range of motion.      Cervical back: Normal range of motion.   Skin:     General: Skin is warm and dry.      Capillary Refill: Capillary refill takes less than 2 seconds.   Neurological:      General: No focal deficit present.      Mental Status: He is alert and oriented to person, place, and time. Mental status is at baseline.      Cranial Nerves: Cranial nerves 2-12 are intact.      Sensory: Sensation is intact.      Motor: Motor function is intact.      Coordination: Coordination is intact.   Psychiatric:         Attention and Perception: Attention normal.         Mood and Affect: Mood normal. Affect is flat.         Behavior: Behavior normal. Behavior is cooperative.         Thought Content: Thought content normal.         Judgment: Judgment normal.          Lab/Radiology/Diagnostic Review:  Results for orders placed or performed during the hospital encounter of 04/09/25 (from the past 24 hours)   CBC and Auto Differential   Result Value Ref Range    WBC 5.3 4.4 - 11.3 x10*3/uL    nRBC 0.0 0.0 - 0.0 /100 WBCs    RBC 5.12 4.50 - 5.90 x10*6/uL    Hemoglobin 16.0 13.5 - 17.5 g/dL    Hematocrit 46.4 41.0 - 52.0 %    MCV 91 80 - 100 fL    MCH 31.3 26.0 - 34.0 pg    MCHC 34.5 32.0 - 36.0 g/dL    RDW 13.2 11.5 - 14.5 %    Platelets 188 150 - 450 x10*3/uL    Neutrophils % 59.6 40.0 - 80.0 %    Immature Granulocytes %, Automated 0.6 0.0 - 0.9 %    Lymphocytes % 28.4 13.0 - 44.0 %    Monocytes % 10.0 2.0 - 10.0 %    Eosinophils % 0.8 0.0 - 6.0 %    Basophils % 0.6 0.0 - 2.0 %    Neutrophils Absolute 3.15 1.20 - 7.70 x10*3/uL    Immature  Granulocytes Absolute, Automated 0.03 0.00 - 0.70 x10*3/uL    Lymphocytes Absolute 1.50 1.20 - 4.80 x10*3/uL    Monocytes Absolute 0.53 0.10 - 1.00 x10*3/uL    Eosinophils Absolute 0.04 0.00 - 0.70 x10*3/uL    Basophils Absolute 0.03 0.00 - 0.10 x10*3/uL   Comprehensive metabolic panel   Result Value Ref Range    Glucose 473 (HH) 74 - 99 mg/dL    Sodium 132 (L) 136 - 145 mmol/L    Potassium 4.5 3.5 - 5.3 mmol/L    Chloride 100 98 - 107 mmol/L    Bicarbonate 15 (L) 21 - 32 mmol/L    Anion Gap 22 (H) 10 - 20 mmol/L    Urea Nitrogen 17 6 - 23 mg/dL    Creatinine 1.06 0.50 - 1.30 mg/dL    eGFR >90 >60 mL/min/1.73m*2    Calcium 8.5 (L) 8.6 - 10.3 mg/dL    Albumin 4.1 3.4 - 5.0 g/dL    Alkaline Phosphatase 128 (H) 33 - 120 U/L    Total Protein 6.7 6.4 - 8.2 g/dL    AST 10 9 - 39 U/L    Bilirubin, Total 0.4 0.0 - 1.2 mg/dL    ALT 9 (L) 10 - 52 U/L   Magnesium   Result Value Ref Range    Magnesium 2.03 1.60 - 2.40 mg/dL   Lipase   Result Value Ref Range    Lipase 54 9 - 82 U/L   Beta Hydroxybutyrate   Result Value Ref Range    Beta-Hydroxybutyrate 7.16 (H) 0.02 - 0.27 mmol/L   Blood Gas Venous Full Panel   Result Value Ref Range    POCT pH, Venous 7.27 (L) 7.33 - 7.43 pH    POCT pCO2, Venous 36 (L) 41 - 51 mm Hg    POCT pO2, Venous 44 35 - 45 mm Hg    POCT SO2, Venous 73 45 - 75 %    POCT Oxy Hemoglobin, Venous 71.8 45.0 - 75.0 %    POCT Hematocrit Calculated, Venous 50.0 41.0 - 52.0 %    POCT Sodium, Venous 131 (L) 136 - 145 mmol/L    POCT Potassium, Venous 4.5 3.5 - 5.3 mmol/L    POCT Chloride, Venous 98 98 - 107 mmol/L    POCT Ionized Calicum, Venous 1.24 1.10 - 1.33 mmol/L    POCT Glucose, Venous 521 (HH) 74 - 99 mg/dL    POCT Lactate, Venous 1.0 0.4 - 2.0 mmol/L    POCT Base Excess, Venous -9.5 (L) -2.0 - 3.0 mmol/L    POCT HCO3 Calculated, Venous 16.5 (L) 22.0 - 26.0 mmol/L    POCT Hemoglobin, Venous 16.6 13.5 - 17.5 g/dL    POCT Anion Gap, Venous 21.0 10.0 - 25.0 mmol/L    Patient Temperature 37.0 degrees Celsius     FiO2 21 %   POCT GLUCOSE   Result Value Ref Range    POCT Glucose 444 (H) 74 - 99 mg/dL   Urinalysis with Reflex Culture and Microscopic   Result Value Ref Range    Color, Urine Colorless (N) Light-Yellow, Yellow, Dark-Yellow    Appearance, Urine Clear Clear    Specific Gravity, Urine 1.034 1.005 - 1.035    pH, Urine 5.0 5.0, 5.5, 6.0, 6.5, 7.0, 7.5, 8.0    Protein, Urine NEGATIVE NEGATIVE, 10 (TRACE), 20 (TRACE) mg/dL    Glucose, Urine OVER (4+) (A) Normal mg/dL    Blood, Urine NEGATIVE NEGATIVE mg/dL    Ketones, Urine 150 (4+) (A) NEGATIVE mg/dL    Bilirubin, Urine NEGATIVE NEGATIVE mg/dL    Urobilinogen, Urine Normal Normal mg/dL    Nitrite, Urine NEGATIVE NEGATIVE    Leukocyte Esterase, Urine NEGATIVE NEGATIVE     *Note: Due to a large number of results and/or encounters for the requested time period, some results have not been displayed. A complete set of results can be found in Results Review.     Imaging  No results found.    Cardiology, Vascular, and Other Imaging  No other imaging results found for the past 7 days      Assessment/Plan   Assessment & Plan  Type 1 diabetes mellitus with ketoacidosis without coma    Mild DKA in diabetes mellitus type 1 with poor compliance  - Presented with complaints of nausea, abdominal pain  - Blood glucose 473  - Anion gap 22  - Bicarbonate 15  - VBG: pH 7.27, pCO2 36, HCO2 calculated 16.5  - BHB: 7.16  - S/p 1L LR bolus  - Currently receiving 2nd 1L LR bolus  - IV fluids per DKA protocol: 1/2 NS at 250ml/hr until blood glucose less than 250mg/dL, D5 1/2 NS at 150ml/hr for blood glucose 150-250mg/dL, D10 at 150ml/hr for blood glucose less than or equal to 150mg/dL and anion gap still open, and D10 at 150ml/hr for blood glucose less than 70mg/dL and anion gap still open  - Continue insulin infusion per DKA protocol, titrate per protocol  - POCT glucose checks hourly while on insulin infusion  - Trend BMP, magnesium, and phosphorus every 4 hours while on insulin infusion  -  Replete electrolytes as necessary  - Hypoglycemia protocol PRN  - NPO while on insulin infusion  - Monitor intake and output  - Plan to bridge off insulin infusion once anion gap less than 15 and bicarbonate greater than 15  - Endocrinology consult, input appreciated.     High anion gap metabolic acidosis 2/2 DKA  - Anion gap 22  - Bicarbonate 15  - Blood glucose 473  - VBG: pH 7.27, pCO2 36, HCO2 calculated 16.5  - BHB: 7.16  - S/p 1L LR bolus  - Currently receiving 2nd 1L LR bolus  - IV fluids and insulin infusion per DKA protocol  - POCT glucose checks hourly while on insulin infusion  - Trend BMP, magnesium, and phosphorus every 4 hours while on insulin infusion  - Replete electrolytes as necessary  - Monitor intake and output    Pseudohyponatremia 2/2 above  - Sodium level 132, corrected sodium for glucose is 139  - Blood glucose 473  - S/p 1L LR bolus  - Currently receiving 2nd 1L LR bolus  - IV fluids and insulin infusion per DKA protocol  - Trend BMP, magnesium, and phosphorus every 4 hours while on insulin infusion  - Monitor intake and output    I spent 40 minutes of cumulative critical care time with the patient.  Greater than 50% of that time was spent in the direct collaboration and or coordination of care of the patient.     Dragon dictation software was used to dictate this note and thus there may be   minor errors in translation/transcription including garbled speech or misspellings. Please contact for clarification if needed.     ADDENDUM 4/10/25 at 0443: Anion gap closed. Patient bridged off of insulin infusion with NPH and tolerating oral intake. Humalog 5 units TID before meals plus sliding scale and NPH 22 units twice daily ordered. Patient stable to transfer out of ICU to general medicine floor. Critical care medicine will sign off at this time.

## 2025-04-10 NOTE — CONSULTS
Consults    Reason For Consult  DKA    History Of Present Illness  Ed Sanon is a 34 y.o. male presenting with polyuria, polydipsia, abdominal pain, nausea and vomiting.  He was found to be tachycardic but hemodynamically stable.  Initial lab data revealed a glucose value of 473mg/dL, bicarbonate of 15, anion gap of 22, beta hydroxybutyrate of 7.16, pH 7.27.   He was started on IVF and an insulin infusion with admittance to the ICU.       His home regimen consists of:  NPH 22 units subcutaneous twice daily    No prandial insulin     He never bothered to try the mixed insulin which was recommended upon his last discharge.  He states that he previously took this insulin 8 units 3 times daily and he was scared that it will cause hypoglycemia.     His A1C last A1C was found to be 11.7% two months ago.     He has been bridged and has ordered breakfast.     Past Medical History  He has a past medical history of DKA (diabetic ketoacidosis) (Multi), HTN (hypertension), and Type I diabetes mellitus (Multi).    Surgical History  He has no past surgical history on file.     Social History  He reports that he has never smoked. He has never been exposed to tobacco smoke. He has never used smokeless tobacco. He reports that he does not drink alcohol and does not use drugs.    Family History  Family History   Problem Relation Name Age of Onset    No Known Problems Mother      No Known Problems Father      Hypertension Other      Coronary artery disease Other      Diabetes Other      Heart failure Other          Allergies  Patient has no known allergies.    Review of Systems   Gastrointestinal:  Negative for abdominal pain, nausea and vomiting.   All other systems reviewed and are negative.       Physical Exam  Vitals and nursing note reviewed.   Constitutional:       General: He is not in acute distress.     Appearance: Normal appearance. He is normal weight.   HENT:      Head: Normocephalic and atraumatic.      Nose: Nose  "normal.      Mouth/Throat:      Mouth: Mucous membranes are moist.   Eyes:      Extraocular Movements: Extraocular movements intact.   Cardiovascular:      Rate and Rhythm: Normal rate and regular rhythm.   Pulmonary:      Effort: Pulmonary effort is normal.   Musculoskeletal:         General: Normal range of motion.   Neurological:      Mental Status: He is alert and oriented to person, place, and time.   Psychiatric:         Mood and Affect: Mood normal.          Last Recorded Vitals  Blood pressure 113/84, pulse 85, temperature 36.6 °C (97.8 °F), temperature source Temporal, resp. rate 14, height 1.803 m (5' 11\"), weight 77.2 kg (170 lb 3.1 oz), SpO2 97%.    Relevant Results  Scheduled medications  enoxaparin, 40 mg, subcutaneous, q24h  insulin lispro, 0-5 Units, subcutaneous, TID AC  insulin lispro, 5 Units, subcutaneous, TID AC  insulin NPH (Isophane), 22 Units, subcutaneous, q12h SHEILA  lisinopril, 5 mg, oral, Daily  pantoprazole, 40 mg, intravenous, Daily  polyethylene glycol, 17 g, oral, Daily      Continuous medications     PRN medications  PRN medications: bisacodyl, bisacodyl, dextrose, dextrose, dextrose, glucagon, glucagon, guaiFENesin, melatonin, metoclopramide    Results for orders placed or performed during the hospital encounter of 04/09/25 (from the past 96 hours)   CBC and Auto Differential   Result Value Ref Range    WBC 5.3 4.4 - 11.3 x10*3/uL    nRBC 0.0 0.0 - 0.0 /100 WBCs    RBC 5.12 4.50 - 5.90 x10*6/uL    Hemoglobin 16.0 13.5 - 17.5 g/dL    Hematocrit 46.4 41.0 - 52.0 %    MCV 91 80 - 100 fL    MCH 31.3 26.0 - 34.0 pg    MCHC 34.5 32.0 - 36.0 g/dL    RDW 13.2 11.5 - 14.5 %    Platelets 188 150 - 450 x10*3/uL    Neutrophils % 59.6 40.0 - 80.0 %    Immature Granulocytes %, Automated 0.6 0.0 - 0.9 %    Lymphocytes % 28.4 13.0 - 44.0 %    Monocytes % 10.0 2.0 - 10.0 %    Eosinophils % 0.8 0.0 - 6.0 %    Basophils % 0.6 0.0 - 2.0 %    Neutrophils Absolute 3.15 1.20 - 7.70 x10*3/uL    Immature " Granulocytes Absolute, Automated 0.03 0.00 - 0.70 x10*3/uL    Lymphocytes Absolute 1.50 1.20 - 4.80 x10*3/uL    Monocytes Absolute 0.53 0.10 - 1.00 x10*3/uL    Eosinophils Absolute 0.04 0.00 - 0.70 x10*3/uL    Basophils Absolute 0.03 0.00 - 0.10 x10*3/uL   Comprehensive metabolic panel   Result Value Ref Range    Glucose 473 (HH) 74 - 99 mg/dL    Sodium 132 (L) 136 - 145 mmol/L    Potassium 4.5 3.5 - 5.3 mmol/L    Chloride 100 98 - 107 mmol/L    Bicarbonate 15 (L) 21 - 32 mmol/L    Anion Gap 22 (H) 10 - 20 mmol/L    Urea Nitrogen 17 6 - 23 mg/dL    Creatinine 1.06 0.50 - 1.30 mg/dL    eGFR >90 >60 mL/min/1.73m*2    Calcium 8.5 (L) 8.6 - 10.3 mg/dL    Albumin 4.1 3.4 - 5.0 g/dL    Alkaline Phosphatase 128 (H) 33 - 120 U/L    Total Protein 6.7 6.4 - 8.2 g/dL    AST 10 9 - 39 U/L    Bilirubin, Total 0.4 0.0 - 1.2 mg/dL    ALT 9 (L) 10 - 52 U/L   Magnesium   Result Value Ref Range    Magnesium 2.03 1.60 - 2.40 mg/dL   Lipase   Result Value Ref Range    Lipase 54 9 - 82 U/L   Beta Hydroxybutyrate   Result Value Ref Range    Beta-Hydroxybutyrate 7.16 (H) 0.02 - 0.27 mmol/L   Blood Gas Venous Full Panel   Result Value Ref Range    POCT pH, Venous 7.27 (L) 7.33 - 7.43 pH    POCT pCO2, Venous 36 (L) 41 - 51 mm Hg    POCT pO2, Venous 44 35 - 45 mm Hg    POCT SO2, Venous 73 45 - 75 %    POCT Oxy Hemoglobin, Venous 71.8 45.0 - 75.0 %    POCT Hematocrit Calculated, Venous 50.0 41.0 - 52.0 %    POCT Sodium, Venous 131 (L) 136 - 145 mmol/L    POCT Potassium, Venous 4.5 3.5 - 5.3 mmol/L    POCT Chloride, Venous 98 98 - 107 mmol/L    POCT Ionized Calicum, Venous 1.24 1.10 - 1.33 mmol/L    POCT Glucose, Venous 521 (HH) 74 - 99 mg/dL    POCT Lactate, Venous 1.0 0.4 - 2.0 mmol/L    POCT Base Excess, Venous -9.5 (L) -2.0 - 3.0 mmol/L    POCT HCO3 Calculated, Venous 16.5 (L) 22.0 - 26.0 mmol/L    POCT Hemoglobin, Venous 16.6 13.5 - 17.5 g/dL    POCT Anion Gap, Venous 21.0 10.0 - 25.0 mmol/L    Patient Temperature 37.0 degrees Celsius     FiO2 21 %   POCT GLUCOSE   Result Value Ref Range    POCT Glucose 444 (H) 74 - 99 mg/dL   Urinalysis with Reflex Culture and Microscopic   Result Value Ref Range    Color, Urine Colorless (N) Light-Yellow, Yellow, Dark-Yellow    Appearance, Urine Clear Clear    Specific Gravity, Urine 1.034 1.005 - 1.035    pH, Urine 5.0 5.0, 5.5, 6.0, 6.5, 7.0, 7.5, 8.0    Protein, Urine NEGATIVE NEGATIVE, 10 (TRACE), 20 (TRACE) mg/dL    Glucose, Urine OVER (4+) (A) Normal mg/dL    Blood, Urine NEGATIVE NEGATIVE mg/dL    Ketones, Urine 150 (4+) (A) NEGATIVE mg/dL    Bilirubin, Urine NEGATIVE NEGATIVE mg/dL    Urobilinogen, Urine Normal Normal mg/dL    Nitrite, Urine NEGATIVE NEGATIVE    Leukocyte Esterase, Urine NEGATIVE NEGATIVE   Extra Urine Gray Tube   Result Value Ref Range    Extra Tube Hold for add-ons.    POCT GLUCOSE   Result Value Ref Range    POCT Glucose 410 (H) 74 - 99 mg/dL   Phosphorus   Result Value Ref Range    Phosphorus 3.6 2.5 - 4.9 mg/dL   POCT GLUCOSE   Result Value Ref Range    POCT Glucose 323 (H) 74 - 99 mg/dL   POCT GLUCOSE   Result Value Ref Range    POCT Glucose 210 (H) 74 - 99 mg/dL   POCT GLUCOSE   Result Value Ref Range    POCT Glucose 132 (H) 74 - 99 mg/dL   Basic Metabolic Panel   Result Value Ref Range    Glucose 140 (H) 74 - 99 mg/dL    Sodium 138 136 - 145 mmol/L    Potassium 3.4 (L) 3.5 - 5.3 mmol/L    Chloride 107 98 - 107 mmol/L    Bicarbonate 22 21 - 32 mmol/L    Anion Gap 12 10 - 20 mmol/L    Urea Nitrogen 16 6 - 23 mg/dL    Creatinine 0.79 0.50 - 1.30 mg/dL    eGFR >90 >60 mL/min/1.73m*2    Calcium 7.7 (L) 8.6 - 10.3 mg/dL   Magnesium   Result Value Ref Range    Magnesium 1.72 1.60 - 2.40 mg/dL   Phosphorus   Result Value Ref Range    Phosphorus 2.2 (L) 2.5 - 4.9 mg/dL   POCT GLUCOSE   Result Value Ref Range    POCT Glucose 142 (H) 74 - 99 mg/dL   POCT GLUCOSE   Result Value Ref Range    POCT Glucose 151 (H) 74 - 99 mg/dL   POCT GLUCOSE   Result Value Ref Range    POCT Glucose 218 (H) 74 -  99 mg/dL   POCT GLUCOSE   Result Value Ref Range    POCT Glucose 224 (H) 74 - 99 mg/dL   Basic Metabolic Panel   Result Value Ref Range    Glucose 208 (H) 74 - 99 mg/dL    Sodium 138 136 - 145 mmol/L    Potassium 3.6 3.5 - 5.3 mmol/L    Chloride 106 98 - 107 mmol/L    Bicarbonate 25 21 - 32 mmol/L    Anion Gap 11 10 - 20 mmol/L    Urea Nitrogen 16 6 - 23 mg/dL    Creatinine 0.73 0.50 - 1.30 mg/dL    eGFR >90 >60 mL/min/1.73m*2    Calcium 7.8 (L) 8.6 - 10.3 mg/dL   Magnesium   Result Value Ref Range    Magnesium 1.77 1.60 - 2.40 mg/dL   Phosphorus   Result Value Ref Range    Phosphorus 3.7 2.5 - 4.9 mg/dL   CBC   Result Value Ref Range    WBC 4.9 4.4 - 11.3 x10*3/uL    nRBC 0.0 0.0 - 0.0 /100 WBCs    RBC 4.42 (L) 4.50 - 5.90 x10*6/uL    Hemoglobin 14.1 13.5 - 17.5 g/dL    Hematocrit 40.6 (L) 41.0 - 52.0 %    MCV 92 80 - 100 fL    MCH 31.9 26.0 - 34.0 pg    MCHC 34.7 32.0 - 36.0 g/dL    RDW 13.2 11.5 - 14.5 %    Platelets 167 150 - 450 x10*3/uL     *Note: Due to a large number of results and/or encounters for the requested time period, some results have not been displayed. A complete set of results can be found in Results Review.      ECG 12 lead    Result Date: 4/3/2025  Sinus tachycardia Probable left atrial enlargement Left ventricular hypertrophy Prolonged QT interval See ED provider note for full interpretation and clinical correlation Confirmed by Nata Hancock ([id number]) on 4/3/2025 4:40:36 PM    ECG 12 lead    Result Date: 3/12/2025  Sinus rhythm Borderline ST elevation, anterior leads Prolonged QT interval See ED provider note for full interpretation and clinical correlation Confirmed by Destinee Giron ([id number]) on 3/12/2025 10:30:00 AM    XR chest 2 views    Result Date: 3/11/2025  Interpreted By:  Aura Manley, STUDY: XR CHEST 2 VIEWS;  3/11/2025 6:33 pm   INDICATION: Signs/Symptoms:weakness.     COMPARISON: 02/12/2025   ACCESSION NUMBER(S): [id number]   ORDERING CLINICIAN: JENNIFER ROSARIO   FINDINGS:  Artifact from overlying monitoring leads noted.  No focal infiltrate. No evidence of pleural effusion or pneumothorax. The cardiac silhouette is normal in size. Osseous thorax is grossly intact.       No acute cardiopulmonary process radiographically.   MACRO: None.   Signed by: Aura Manley 3/11/2025 6:42 PM Dictation workstation:   [id number]       Assessment/Plan     IMPRESSION:  DKA - resolved   POORLY CONTROLLED TYPE I DIABETES MELLITUS  Long term insulin use with basal insulin only     RECOMMENDATIONS:  To continue NPH 22 units subcutaneous twice daily   To continue Humalog 5 units TID AC  Continue insulin correction scale TID AC   Diabetic diet as tolerated   Accu-Cheks ACHS    Hypoglycemic protocol   Will continue to follow     DISCHARGE RECS WHICH HE IS OPEN TO:  Novolin 70/30 30 units subcutaneous twice daily   Counseled regarding the mechanism of action for mixed insulin   Counseled that 30 units of mixed insulin equates to 21 units subcutaneous twice daily the NPH component  Counseled that mixed insulin is typically not dosed three times daily   Counseled that he is currently taking a total daily dose of 44 units daily which is far above his previous 8 units three times daily and sugar values are still sub optimal   To continue the use of your CGM for the intensive glucose monitoring due to the dynamic nature of insulin requirements, the narrow therapeutic index of insulin and the potentially fatal consequences of treatment     Thank you for the courtesy of this consult     Lori Packer MD

## 2025-04-10 NOTE — PROGRESS NOTES
04/10/25 1006   Discharge Planning   Living Arrangements Parent   Support Systems Parent   Assistance Needed Independent   Type of Residence Private residence   Home or Post Acute Services None   Expected Discharge Disposition Home   Does the patient need discharge transport arranged? No   Financial Resource Strain   How hard is it for you to pay for the very basics like food, housing, medical care, and heating? Not hard   Housing Stability   In the last 12 months, was there a time when you were not able to pay the mortgage or rent on time? N   At any time in the past 12 months, were you homeless or living in a shelter (including now)? N   Transportation Needs   In the past 12 months, has lack of transportation kept you from medical appointments or from getting medications? no   In the past 12 months, has lack of transportation kept you from meetings, work, or from getting things needed for daily living? No     PCP is Henri Brumfield MD. Patient is from home with his mother. Well known to [facility] for his DKA admissions. Patient is independent with ambulation, self care, driving, shopping, and meals.  Patient prefers to return home with no needs upon discharge. TCC will continue to follow for needs if they arise.

## 2025-04-10 NOTE — ASSESSMENT & PLAN NOTE
Admit patient to the ICU for hourly Accu-Cheks while on insulin drip.  Continue aggressive IV fluid resuscitation with as needed aggressive IV serum electrolyte replenishment.  N.p.o. for now & IV PPI for GI protection.  Resume basal & prandial insulin, hopefully in a.m., with roughly 4-hour overlap prior to discontinuing insulin drip, once DKA completely resolved.

## 2025-04-11 ENCOUNTER — PATIENT OUTREACH (OUTPATIENT)
Dept: PRIMARY CARE | Facility: CLINIC | Age: 35
End: 2025-04-11
Payer: MEDICAID

## 2025-04-11 NOTE — PROGRESS NOTES
30 Day Readmission  Discharge Facility: [facility]   Discharge Diagnosis: Diabetic ketoacidosis without coma associated with type 1 diabetes mellitus  Admission Date: 4/9/25  Discharge Date: 4/10/25     PCP Appointment Date: no appointment, message to office  Specialist Appointment Date: needs endocrinology  Hospital Encounter and Summary Linked: Yes  ED to Hosp-Admission (Discharged) with Cuate Wynn MD; Barbie Early MD (04/09/2025)      Two attempts were made to reach patient within two business days after discharge. Voicemail left with contact information for patient to call back with any non-emergent questions or concerns.

## 2025-04-24 ENCOUNTER — HOSPITAL ENCOUNTER (INPATIENT)
Facility: HOSPITAL | Age: 35
LOS: 1 days | Discharge: HOME | End: 2025-04-25
Admitting: STUDENT IN AN ORGANIZED HEALTH CARE EDUCATION/TRAINING PROGRAM
Payer: MEDICAID

## 2025-04-24 DIAGNOSIS — E10.10 TYPE 1 DIABETES MELLITUS WITH KETOACIDOSIS WITHOUT COMA: ICD-10-CM

## 2025-04-24 DIAGNOSIS — E10.10 DIABETIC KETOACIDOSIS WITHOUT COMA ASSOCIATED WITH TYPE 1 DIABETES MELLITUS: Primary | ICD-10-CM

## 2025-04-24 LAB
ALBUMIN SERPL BCP-MCNC: 4.1 G/DL (ref 3.4–5)
ALP SERPL-CCNC: 122 U/L (ref 33–120)
ALT SERPL W P-5'-P-CCNC: 14 U/L (ref 10–52)
ANION GAP BLDV CALCULATED.4IONS-SCNC: 28 MMOL/L (ref 10–25)
ANION GAP SERPL CALC-SCNC: 27 MMOL/L (ref 10–20)
APPEARANCE UR: CLEAR
AST SERPL W P-5'-P-CCNC: 12 U/L (ref 9–39)
B-OH-BUTYR SERPL-SCNC: 10 MMOL/L (ref 0.02–0.27)
BASE EXCESS BLDV CALC-SCNC: -13.2 MMOL/L (ref -2–3)
BASE EXCESS BLDV CALC-SCNC: -20.2 MMOL/L (ref -2–3)
BASOPHILS # BLD AUTO: 0.04 X10*3/UL (ref 0–0.1)
BASOPHILS NFR BLD AUTO: 0.7 %
BILIRUB SERPL-MCNC: 0.4 MG/DL (ref 0–1.2)
BILIRUB UR STRIP.AUTO-MCNC: NEGATIVE MG/DL
BODY TEMPERATURE: 37 DEGREES CELSIUS
BODY TEMPERATURE: 37 DEGREES CELSIUS
BUN SERPL-MCNC: 13 MG/DL (ref 6–23)
CA-I BLDV-SCNC: 1.23 MMOL/L (ref 1.1–1.33)
CALCIUM SERPL-MCNC: 8.5 MG/DL (ref 8.6–10.3)
CHLORIDE BLDV-SCNC: 99 MMOL/L (ref 98–107)
CHLORIDE SERPL-SCNC: 102 MMOL/L (ref 98–107)
CO2 SERPL-SCNC: 7 MMOL/L (ref 21–32)
COLOR UR: COLORLESS
CREAT SERPL-MCNC: 1.02 MG/DL (ref 0.5–1.3)
EGFRCR SERPLBLD CKD-EPI 2021: >90 ML/MIN/1.73M*2
EOSINOPHIL # BLD AUTO: 0.05 X10*3/UL (ref 0–0.7)
EOSINOPHIL NFR BLD AUTO: 0.8 %
ERYTHROCYTE [DISTWIDTH] IN BLOOD BY AUTOMATED COUNT: 13.4 % (ref 11.5–14.5)
GLUCOSE BLD MANUAL STRIP-MCNC: 183 MG/DL (ref 74–99)
GLUCOSE BLD MANUAL STRIP-MCNC: 201 MG/DL (ref 74–99)
GLUCOSE BLD MANUAL STRIP-MCNC: 222 MG/DL (ref 74–99)
GLUCOSE BLD MANUAL STRIP-MCNC: 273 MG/DL (ref 74–99)
GLUCOSE BLD MANUAL STRIP-MCNC: 345 MG/DL (ref 74–99)
GLUCOSE BLD MANUAL STRIP-MCNC: 346 MG/DL (ref 74–99)
GLUCOSE BLDV-MCNC: 418 MG/DL (ref 74–99)
GLUCOSE SERPL-MCNC: 366 MG/DL (ref 74–99)
GLUCOSE UR STRIP.AUTO-MCNC: ABNORMAL MG/DL
HCO3 BLDV-SCNC: 12.9 MMOL/L (ref 22–26)
HCO3 BLDV-SCNC: 7.8 MMOL/L (ref 22–26)
HCT VFR BLD AUTO: 52.9 % (ref 41–52)
HCT VFR BLD EST: 54 % (ref 41–52)
HGB BLD-MCNC: 17.5 G/DL (ref 13.5–17.5)
HGB BLDV-MCNC: 18 G/DL (ref 13.5–17.5)
IMM GRANULOCYTES # BLD AUTO: 0.04 X10*3/UL (ref 0–0.7)
IMM GRANULOCYTES NFR BLD AUTO: 0.7 % (ref 0–0.9)
INHALED O2 CONCENTRATION: 21 %
INHALED O2 CONCENTRATION: 21 %
KETONES UR STRIP.AUTO-MCNC: ABNORMAL MG/DL
LACTATE BLDV-SCNC: 1.2 MMOL/L (ref 0.4–2)
LEUKOCYTE ESTERASE UR QL STRIP.AUTO: NEGATIVE
LYMPHOCYTES # BLD AUTO: 1.95 X10*3/UL (ref 1.2–4.8)
LYMPHOCYTES NFR BLD AUTO: 32.5 %
MAGNESIUM SERPL-MCNC: 1.71 MG/DL (ref 1.6–2.4)
MCH RBC QN AUTO: 31.1 PG (ref 26–34)
MCHC RBC AUTO-ENTMCNC: 33.1 G/DL (ref 32–36)
MCV RBC AUTO: 94 FL (ref 80–100)
MONOCYTES # BLD AUTO: 0.64 X10*3/UL (ref 0.1–1)
MONOCYTES NFR BLD AUTO: 10.7 %
NEUTROPHILS # BLD AUTO: 3.28 X10*3/UL (ref 1.2–7.7)
NEUTROPHILS NFR BLD AUTO: 54.6 %
NITRITE UR QL STRIP.AUTO: NEGATIVE
NRBC BLD-RTO: 0 /100 WBCS (ref 0–0)
OXYHGB MFR BLDV: 77.8 % (ref 45–75)
OXYHGB MFR BLDV: 93.2 % (ref 45–75)
PCO2 BLDV: 25 MM HG (ref 41–51)
PCO2 BLDV: 30 MM HG (ref 41–51)
PH BLDV: 7.1 PH (ref 7.33–7.43)
PH BLDV: 7.24 PH (ref 7.33–7.43)
PH UR STRIP.AUTO: 5 [PH]
PHOSPHATE SERPL-MCNC: 3 MG/DL (ref 2.5–4.9)
PLATELET # BLD AUTO: 200 X10*3/UL (ref 150–450)
PO2 BLDV: 47 MM HG (ref 35–45)
PO2 BLDV: 66 MM HG (ref 35–45)
POTASSIUM BLDV-SCNC: 5.3 MMOL/L (ref 3.5–5.3)
POTASSIUM SERPL-SCNC: 4.4 MMOL/L (ref 3.5–5.3)
PROT SERPL-MCNC: 7.2 G/DL (ref 6.4–8.2)
PROT UR STRIP.AUTO-MCNC: ABNORMAL MG/DL
RBC # BLD AUTO: 5.62 X10*6/UL (ref 4.5–5.9)
RBC # UR STRIP.AUTO: NEGATIVE MG/DL
RBC #/AREA URNS AUTO: NORMAL /HPF
SAO2 % BLDV: 79 % (ref 45–75)
SAO2 % BLDV: 95 % (ref 45–75)
SODIUM BLDV-SCNC: 129 MMOL/L (ref 136–145)
SODIUM SERPL-SCNC: 132 MMOL/L (ref 136–145)
SP GR UR STRIP.AUTO: 1.02
UROBILINOGEN UR STRIP.AUTO-MCNC: NORMAL MG/DL
WBC # BLD AUTO: 6 X10*3/UL (ref 4.4–11.3)
WBC #/AREA URNS AUTO: NORMAL /HPF

## 2025-04-24 PROCEDURE — 82947 ASSAY GLUCOSE BLOOD QUANT: CPT

## 2025-04-24 PROCEDURE — 96374 THER/PROPH/DIAG INJ IV PUSH: CPT

## 2025-04-24 PROCEDURE — 85025 COMPLETE CBC W/AUTO DIFF WBC: CPT

## 2025-04-24 PROCEDURE — 82810 BLOOD GASES O2 SAT ONLY: CPT

## 2025-04-24 PROCEDURE — 96361 HYDRATE IV INFUSION ADD-ON: CPT

## 2025-04-24 PROCEDURE — 82435 ASSAY OF BLOOD CHLORIDE: CPT

## 2025-04-24 PROCEDURE — 83735 ASSAY OF MAGNESIUM: CPT

## 2025-04-24 PROCEDURE — 2500000004 HC RX 250 GENERAL PHARMACY W/ HCPCS (ALT 636 FOR OP/ED): Mod: JZ

## 2025-04-24 PROCEDURE — 82805 BLOOD GASES W/O2 SATURATION: CPT

## 2025-04-24 PROCEDURE — 36415 COLL VENOUS BLD VENIPUNCTURE: CPT

## 2025-04-24 PROCEDURE — 99291 CRITICAL CARE FIRST HOUR: CPT | Performed by: STUDENT IN AN ORGANIZED HEALTH CARE EDUCATION/TRAINING PROGRAM

## 2025-04-24 PROCEDURE — 81001 URINALYSIS AUTO W/SCOPE: CPT

## 2025-04-24 PROCEDURE — 84075 ASSAY ALKALINE PHOSPHATASE: CPT

## 2025-04-24 PROCEDURE — 99291 CRITICAL CARE FIRST HOUR: CPT | Mod: 25

## 2025-04-24 PROCEDURE — 99285 EMERGENCY DEPT VISIT HI MDM: CPT

## 2025-04-24 PROCEDURE — 82010 KETONE BODYS QUAN: CPT

## 2025-04-24 PROCEDURE — 84100 ASSAY OF PHOSPHORUS: CPT

## 2025-04-24 PROCEDURE — 2020000001 HC ICU ROOM DAILY

## 2025-04-24 RX ORDER — ONDANSETRON HYDROCHLORIDE 2 MG/ML
4 INJECTION, SOLUTION INTRAVENOUS EVERY 8 HOURS PRN
Status: DISCONTINUED | OUTPATIENT
Start: 2025-04-24 | End: 2025-04-25 | Stop reason: HOSPADM

## 2025-04-24 RX ORDER — LISINOPRIL 5 MG/1
5 TABLET ORAL DAILY
Status: DISCONTINUED | OUTPATIENT
Start: 2025-04-24 | End: 2025-04-24

## 2025-04-24 RX ORDER — GUAIFENESIN 600 MG/1
600 TABLET, EXTENDED RELEASE ORAL EVERY 12 HOURS PRN
Status: DISCONTINUED | OUTPATIENT
Start: 2025-04-24 | End: 2025-04-25 | Stop reason: HOSPADM

## 2025-04-24 RX ORDER — DEXTROSE MONOHYDRATE 100 MG/ML
150 INJECTION, SOLUTION INTRAVENOUS CONTINUOUS PRN
Status: DISCONTINUED | OUTPATIENT
Start: 2025-04-24 | End: 2025-04-25 | Stop reason: HOSPADM

## 2025-04-24 RX ORDER — ONDANSETRON 4 MG/1
4 TABLET, FILM COATED ORAL EVERY 8 HOURS PRN
Status: DISCONTINUED | OUTPATIENT
Start: 2025-04-24 | End: 2025-04-25 | Stop reason: HOSPADM

## 2025-04-24 RX ORDER — SODIUM CHLORIDE 450 MG/100ML
250 INJECTION, SOLUTION INTRAVENOUS CONTINUOUS
Status: DISCONTINUED | OUTPATIENT
Start: 2025-04-24 | End: 2025-04-25 | Stop reason: HOSPADM

## 2025-04-24 RX ORDER — PANTOPRAZOLE SODIUM 40 MG/1
40 TABLET, DELAYED RELEASE ORAL
Status: DISCONTINUED | OUTPATIENT
Start: 2025-04-25 | End: 2025-04-25 | Stop reason: HOSPADM

## 2025-04-24 RX ORDER — TALC
3 POWDER (GRAM) TOPICAL NIGHTLY PRN
Status: DISCONTINUED | OUTPATIENT
Start: 2025-04-24 | End: 2025-04-25 | Stop reason: HOSPADM

## 2025-04-24 RX ORDER — PANTOPRAZOLE SODIUM 40 MG/10ML
40 INJECTION, POWDER, LYOPHILIZED, FOR SOLUTION INTRAVENOUS
Status: DISCONTINUED | OUTPATIENT
Start: 2025-04-25 | End: 2025-04-25 | Stop reason: HOSPADM

## 2025-04-24 RX ORDER — DEXTROSE MONOHYDRATE AND SODIUM CHLORIDE 5; .45 G/100ML; G/100ML
150 INJECTION, SOLUTION INTRAVENOUS CONTINUOUS PRN
Status: DISCONTINUED | OUTPATIENT
Start: 2025-04-24 | End: 2025-04-25 | Stop reason: HOSPADM

## 2025-04-24 RX ORDER — DEXTROSE 50 % IN WATER (D50W) INTRAVENOUS SYRINGE
50
Status: DISCONTINUED | OUTPATIENT
Start: 2025-04-24 | End: 2025-04-25 | Stop reason: HOSPADM

## 2025-04-24 RX ADMIN — INSULIN HUMAN 0.14 UNITS/HR: 1 INJECTION, SOLUTION INTRAVENOUS at 19:35

## 2025-04-24 RX ADMIN — SODIUM CHLORIDE 250 ML/HR: 0.45 INJECTION, SOLUTION INTRAVENOUS at 19:35

## 2025-04-24 RX ADMIN — DEXTROSE AND SODIUM CHLORIDE 150 ML/HR: 5; .45 INJECTION, SOLUTION INTRAVENOUS at 21:59

## 2025-04-24 RX ADMIN — SODIUM CHLORIDE, SODIUM LACTATE, POTASSIUM CHLORIDE, AND CALCIUM CHLORIDE 1000 ML: .6; .31; .03; .02 INJECTION, SOLUTION INTRAVENOUS at 17:59

## 2025-04-24 SDOH — SOCIAL STABILITY: SOCIAL INSECURITY: WITHIN THE LAST YEAR, HAVE YOU BEEN AFRAID OF YOUR PARTNER OR EX-PARTNER?: NO

## 2025-04-24 SDOH — ECONOMIC STABILITY: HOUSING INSECURITY: AT ANY TIME IN THE PAST 12 MONTHS, WERE YOU HOMELESS OR LIVING IN A SHELTER (INCLUDING NOW)?: NO

## 2025-04-24 SDOH — ECONOMIC STABILITY: HOUSING INSECURITY: IN THE LAST 12 MONTHS, WAS THERE A TIME WHEN YOU WERE NOT ABLE TO PAY THE MORTGAGE OR RENT ON TIME?: NO

## 2025-04-24 SDOH — ECONOMIC STABILITY: FOOD INSECURITY: WITHIN THE PAST 12 MONTHS, YOU WORRIED THAT YOUR FOOD WOULD RUN OUT BEFORE YOU GOT THE MONEY TO BUY MORE.: NEVER TRUE

## 2025-04-24 SDOH — ECONOMIC STABILITY: INCOME INSECURITY: IN THE PAST 12 MONTHS HAS THE ELECTRIC, GAS, OIL, OR WATER COMPANY THREATENED TO SHUT OFF SERVICES IN YOUR HOME?: NO

## 2025-04-24 SDOH — ECONOMIC STABILITY: FOOD INSECURITY: WITHIN THE PAST 12 MONTHS, THE FOOD YOU BOUGHT JUST DIDN'T LAST AND YOU DIDN'T HAVE MONEY TO GET MORE.: NEVER TRUE

## 2025-04-24 SDOH — SOCIAL STABILITY: SOCIAL INSECURITY: WITHIN THE LAST YEAR, HAVE YOU BEEN HUMILIATED OR EMOTIONALLY ABUSED IN OTHER WAYS BY YOUR PARTNER OR EX-PARTNER?: NO

## 2025-04-24 SDOH — SOCIAL STABILITY: SOCIAL INSECURITY: WERE YOU ABLE TO COMPLETE ALL THE BEHAVIORAL HEALTH SCREENINGS?: YES

## 2025-04-24 SDOH — ECONOMIC STABILITY: FOOD INSECURITY: HOW HARD IS IT FOR YOU TO PAY FOR THE VERY BASICS LIKE FOOD, HOUSING, MEDICAL CARE, AND HEATING?: NOT HARD AT ALL

## 2025-04-24 SDOH — SOCIAL STABILITY: SOCIAL INSECURITY: ABUSE: ADULT

## 2025-04-24 SDOH — SOCIAL STABILITY: SOCIAL INSECURITY: HAVE YOU HAD THOUGHTS OF HARMING ANYONE ELSE?: NO

## 2025-04-24 SDOH — ECONOMIC STABILITY: HOUSING INSECURITY: IN THE PAST 12 MONTHS, HOW MANY TIMES HAVE YOU MOVED WHERE YOU WERE LIVING?: 0

## 2025-04-24 SDOH — ECONOMIC STABILITY: TRANSPORTATION INSECURITY: IN THE PAST 12 MONTHS, HAS LACK OF TRANSPORTATION KEPT YOU FROM MEDICAL APPOINTMENTS OR FROM GETTING MEDICATIONS?: NO

## 2025-04-24 ASSESSMENT — ACTIVITIES OF DAILY LIVING (ADL)
WALKS IN HOME: INDEPENDENT
HEARING - RIGHT EAR: FUNCTIONAL
PATIENT'S MEMORY ADEQUATE TO SAFELY COMPLETE DAILY ACTIVITIES?: YES
LACK_OF_TRANSPORTATION: NO
ADEQUATE_TO_COMPLETE_ADL: YES
BATHING: INDEPENDENT
GROOMING: INDEPENDENT
FEEDING YOURSELF: INDEPENDENT
HEARING - LEFT EAR: FUNCTIONAL
JUDGMENT_ADEQUATE_SAFELY_COMPLETE_DAILY_ACTIVITIES: YES
DRESSING YOURSELF: INDEPENDENT
TOILETING: INDEPENDENT

## 2025-04-24 ASSESSMENT — LIFESTYLE VARIABLES
HAVE PEOPLE ANNOYED YOU BY CRITICIZING YOUR DRINKING: NO
TOTAL SCORE: 0
HOW MANY STANDARD DRINKS CONTAINING ALCOHOL DO YOU HAVE ON A TYPICAL DAY: PATIENT DOES NOT DRINK
AUDIT-C TOTAL SCORE: 0
EVER HAD A DRINK FIRST THING IN THE MORNING TO STEADY YOUR NERVES TO GET RID OF A HANGOVER: NO
SKIP TO QUESTIONS 9-10: 1
AUDIT-C TOTAL SCORE: 0
HOW OFTEN DO YOU HAVE 6 OR MORE DRINKS ON ONE OCCASION: NEVER
EVER FELT BAD OR GUILTY ABOUT YOUR DRINKING: NO
HOW OFTEN DO YOU HAVE A DRINK CONTAINING ALCOHOL: NEVER
HAVE YOU EVER FELT YOU SHOULD CUT DOWN ON YOUR DRINKING: NO

## 2025-04-24 ASSESSMENT — ENCOUNTER SYMPTOMS
WHEEZING: 0
APNEA: 0
SINUS PAIN: 0
CONFUSION: 0
CHEST TIGHTNESS: 0
APPETITE CHANGE: 0
DIAPHORESIS: 0
VOMITING: 1
COUGH: 0
WEAKNESS: 1
NUMBNESS: 0
NAUSEA: 1
SORE THROAT: 0
RHINORRHEA: 0
BACK PAIN: 0
JOINT SWELLING: 0
STRIDOR: 0
DIFFICULTY URINATING: 0
HEMATURIA: 0
ARTHRALGIAS: 0
ABDOMINAL DISTENTION: 0
ACTIVITY CHANGE: 0
DYSURIA: 0
HEADACHES: 0
NERVOUS/ANXIOUS: 0
LIGHT-HEADEDNESS: 0
SHORTNESS OF BREATH: 1
MYALGIAS: 0
FREQUENCY: 0
WOUND: 0
FLANK PAIN: 0
FEVER: 0
DIARRHEA: 0
ABDOMINAL PAIN: 1
FATIGUE: 0
DECREASED CONCENTRATION: 0
CONSTIPATION: 0
PALPITATIONS: 0
CHILLS: 0
COLOR CHANGE: 0
DIZZINESS: 0
AGITATION: 0

## 2025-04-24 ASSESSMENT — COGNITIVE AND FUNCTIONAL STATUS - GENERAL
PATIENT BASELINE BEDBOUND: NO
MOBILITY SCORE: 24
DAILY ACTIVITIY SCORE: 24
DAILY ACTIVITIY SCORE: 24
MOBILITY SCORE: 24

## 2025-04-24 ASSESSMENT — PAIN - FUNCTIONAL ASSESSMENT
PAIN_FUNCTIONAL_ASSESSMENT: 0-10
PAIN_FUNCTIONAL_ASSESSMENT: 0-10

## 2025-04-24 ASSESSMENT — PAIN SCALES - GENERAL
PAINLEVEL_OUTOF10: 3
PAINLEVEL_OUTOF10: 0 - NO PAIN
PAINLEVEL_OUTOF10: 0 - NO PAIN

## 2025-04-24 ASSESSMENT — PAIN DESCRIPTION - LOCATION: LOCATION: ABDOMEN

## 2025-04-24 NOTE — ED PROVIDER NOTES
"HPI   Chief Complaint   Patient presents with    Diabetic Ketoacidosis     Started 4 days ago       HPI  Patient is a 34-year-old male who presents emergency department for concern for DKA.  Patient type I diabetic.  Patient states for the past 3 days he has been feeling weak and \"foggy\".  Endorses nausea, nonbilious nonbloody vomiting, and decreased appetite.  States he has not been using his short acting insulin.  His blood glucose at home have been in the 300s.  Patient is concerned that he is in DKA.  Endorses generalized abdominal pain.  Denies fevers, chills, cough, recent illness, chest pain.      Patient History   Medical History[1]  Surgical History[2]  Family History[3]  Social History[4]    Physical Exam   ED Triage Vitals   Temperature Heart Rate Respirations BP   04/24/25 1733 04/24/25 1733 04/24/25 1733 04/24/25 1733   37 °C (98.6 °F) (!) 124 16 138/89      Pulse Ox Temp src Heart Rate Source Patient Position   04/24/25 1733 -- 04/24/25 1733 04/24/25 1930   98 %  Monitor Lying      BP Location FiO2 (%)     04/24/25 1930 --     Right arm        Physical Exam  General: Resting comfortably in bed. No acute distress. Non-toxic.   Head: Atraumatic, normocephalic.   Eyes: Sclera anicteric.  ENT: Tacky mucous membranes  Heart: Tachycardic.  Regular rhythm.  Lungs: Clear to auscultation bilaterally.  Normal respiratory pattern without conversational dyspnea or respiratory distress.   Abdomen: Soft, non-tender, non-distended, no guarding or rebound.   Neurologic: Awake and alert, normal speech and mental status. Moves all extremities equally well. No focal deficits or lateralizing signs.   Skin: Warm and dry  Musculoskeletal: No peripheral edema.       ED Course & MDM   ED Course as of 04/24/25 1956   u Apr 24, 2025 1911 POCT pH, Venous(!!): 7.10 [NEISHA]   1912 POCT Lactate, Venous: 1.2 [NEISHA]   1912 Beta-Hydroxybutyrate(!): 10.00 [NEISHA]   1912 CBC and Auto Differential(!)  No leukocytosis or anemia [NEISHA]   1912 " GLUCOSE(!): 366 [NEISHA]   1912 SODIUM(!): 132 [NEISHA]   1913 Bicarbonate(!!): 7 [NEISHA]   1913 Anion Gap(!): 27 [NEISHA]   1955 Patient in DKA, insulin drip ordered [NEISHA]      ED Course User Index  [NEISHA] Jamie Iglesias MD         Diagnoses as of 04/24/25 1956   Diabetic ketoacidosis without coma associated with type 1 diabetes mellitus                 No data recorded     Alyce Coma Scale Score: 15 (04/24/25 1733 : Rachel Tavera RN)                           Medical Decision Making  Patient is a 34 y.o. male who presents to the emergency department with chief complaint of concern for DKA. History of present illness as above. Chronic conditions impacting care include diabetes. Patient remained afebrile and hemodynamically stable while in the emergency department. They saturated well on room air. The differential diagnosis associated with this patient's presentation includes, but are not limited too, DKA, electrolyte abnormalities, MAYA, dehydration. Our workup consisted of ordering/reviewing CBC, CMP, BHB, VBG, lactate.  1 L LR ordered.    External records reviewed:  Care everywhere reviewed for current medications and medical history.     Diagnostics interpreted by me include:  None      ED Course as of 04/24/25 1957   Thu Apr 24, 2025 1911 POCT pH, Venous(!!): 7.10 [NEISHA]   1912 POCT Lactate, Venous: 1.2 [NEISHA]   1912 Beta-Hydroxybutyrate(!): 10.00 [NEISHA]   1912 CBC and Auto Differential(!)  No leukocytosis or anemia [NEISHA]   1912 GLUCOSE(!): 366 [NEISHA]   1912 SODIUM(!): 132 [NEISHA]   1913 Bicarbonate(!!): 7 [NEISHA]   1913 Anion Gap(!): 27 [NEISHA]   1955 Patient in DKA, insulin drip ordered [NEISHA]      ED Course User Index  [NEISHA] Jamie Iglesias MD         Diagnoses as of 04/24/25 1957   Diabetic ketoacidosis without coma associated with type 1 diabetes mellitus     Patient discussed with Dr. Mcconnell who is agreeable excepting the patient to the ICU.    Social determinants of health affecting care:  Medication non-compliance    ED  Medications managed:  Medications   dextrose 10 % in water (D10W) infusion (has no administration in time range)   dextrose 50 % injection 50 mL (has no administration in time range)   dextrose 10 % in water (D10W) infusion (has no administration in time range)   sodium chloride 0.45 % infusion (250 mL/hr intravenous New Bag 4/24/25 1935)   dextrose 5%-0.45 % sodium chloride infusion (has no administration in time range)   insulin regular 100 unit/100 mL (1 unit/mL) in 0.9 % NaCl infusion (0.14 Units/hr intravenous New Bag 4/24/25 1935)   lactated Ringer's bolus 1,000 mL (0 mL intravenous Stopped 4/24/25 1915)         Procedure  Critical Care    Performed by: Jamie Iglesias MD  Authorized by: Jamie Iglesias MD    Critical care provider statement:     Critical care time (minutes):  31    Critical care time was exclusive of:  Separately billable procedures and treating other patients and teaching time    Critical care was necessary to treat or prevent imminent or life-threatening deterioration of the following conditions:  Endocrine crisis    Critical care was time spent personally by me on the following activities:  Development of treatment plan with patient or surrogate, evaluation of patient's response to treatment, examination of patient, obtaining history from patient or surrogate, ordering and review of laboratory studies, ordering and review of radiographic studies, re-evaluation of patient's condition, review of old charts and pulse oximetry    Care discussed with: admitting provider             [1]   Past Medical History:  Diagnosis Date    DKA (diabetic ketoacidosis) (Multi)     recurrent    HTN (hypertension)     Type I diabetes mellitus (Multi)    [2] No past surgical history on file.  [3]   Family History  Problem Relation Name Age of Onset    No Known Problems Mother      No Known Problems Father      Hypertension Other      Coronary artery disease Other      Diabetes Other      Heart failure  Other     [4]   Social History  Tobacco Use    Smoking status: Never     Passive exposure: Never    Smokeless tobacco: Never   Vaping Use    Vaping status: Never Used   Substance Use Topics    Alcohol use: Never    Drug use: Never        Jamie Iglesias MD  04/25/25 0242

## 2025-04-25 ENCOUNTER — PHARMACY VISIT (OUTPATIENT)
Dept: PHARMACY | Facility: CLINIC | Age: 35
End: 2025-04-25
Payer: MEDICAID

## 2025-04-25 VITALS
HEART RATE: 114 BPM | BODY MASS INDEX: 22.76 KG/M2 | RESPIRATION RATE: 11 BRPM | SYSTOLIC BLOOD PRESSURE: 122 MMHG | DIASTOLIC BLOOD PRESSURE: 85 MMHG | HEIGHT: 70 IN | TEMPERATURE: 96.8 F | OXYGEN SATURATION: 98 % | WEIGHT: 158.95 LBS

## 2025-04-25 PROBLEM — E10.10 DIABETIC KETOACIDOSIS WITHOUT COMA ASSOCIATED WITH TYPE 1 DIABETES MELLITUS: Status: RESOLVED | Noted: 2025-04-24 | Resolved: 2025-04-25

## 2025-04-25 LAB
ALBUMIN SERPL BCP-MCNC: 3.2 G/DL (ref 3.4–5)
ALBUMIN SERPL BCP-MCNC: 3.3 G/DL (ref 3.4–5)
ALBUMIN SERPL BCP-MCNC: 3.4 G/DL (ref 3.4–5)
ANION GAP SERPL CALC-SCNC: 11 MMOL/L (ref 10–20)
ANION GAP SERPL CALC-SCNC: 13 MMOL/L (ref 10–20)
ANION GAP SERPL CALC-SCNC: 15 MMOL/L (ref 10–20)
ANION GAP SERPL CALC-SCNC: 17 MMOL/L (ref 10–20)
BUN SERPL-MCNC: 10 MG/DL (ref 6–23)
BUN SERPL-MCNC: 11 MG/DL (ref 6–23)
BUN SERPL-MCNC: 12 MG/DL (ref 6–23)
BUN SERPL-MCNC: 12 MG/DL (ref 6–23)
CA-I BLD-SCNC: 1.07 MMOL/L (ref 1.1–1.33)
CALCIUM SERPL-MCNC: 7.3 MG/DL (ref 8.6–10.3)
CALCIUM SERPL-MCNC: 7.4 MG/DL (ref 8.6–10.3)
CHLORIDE SERPL-SCNC: 106 MMOL/L (ref 98–107)
CHLORIDE SERPL-SCNC: 108 MMOL/L (ref 98–107)
CHLORIDE SERPL-SCNC: 109 MMOL/L (ref 98–107)
CHLORIDE SERPL-SCNC: 111 MMOL/L (ref 98–107)
CO2 SERPL-SCNC: 12 MMOL/L (ref 21–32)
CO2 SERPL-SCNC: 14 MMOL/L (ref 21–32)
CO2 SERPL-SCNC: 18 MMOL/L (ref 21–32)
CO2 SERPL-SCNC: 21 MMOL/L (ref 21–32)
CREAT SERPL-MCNC: 0.71 MG/DL (ref 0.5–1.3)
CREAT SERPL-MCNC: 0.75 MG/DL (ref 0.5–1.3)
CREAT SERPL-MCNC: 0.77 MG/DL (ref 0.5–1.3)
CREAT SERPL-MCNC: 0.79 MG/DL (ref 0.5–1.3)
EGFRCR SERPLBLD CKD-EPI 2021: >90 ML/MIN/1.73M*2
ERYTHROCYTE [DISTWIDTH] IN BLOOD BY AUTOMATED COUNT: 13.2 % (ref 11.5–14.5)
EST. AVERAGE GLUCOSE BLD GHB EST-MCNC: 289 MG/DL
GLUCOSE BLD MANUAL STRIP-MCNC: 147 MG/DL (ref 74–99)
GLUCOSE BLD MANUAL STRIP-MCNC: 167 MG/DL (ref 74–99)
GLUCOSE BLD MANUAL STRIP-MCNC: 177 MG/DL (ref 74–99)
GLUCOSE BLD MANUAL STRIP-MCNC: 177 MG/DL (ref 74–99)
GLUCOSE BLD MANUAL STRIP-MCNC: 178 MG/DL (ref 74–99)
GLUCOSE BLD MANUAL STRIP-MCNC: 190 MG/DL (ref 74–99)
GLUCOSE BLD MANUAL STRIP-MCNC: 201 MG/DL (ref 74–99)
GLUCOSE BLD MANUAL STRIP-MCNC: 201 MG/DL (ref 74–99)
GLUCOSE BLD MANUAL STRIP-MCNC: 222 MG/DL (ref 74–99)
GLUCOSE BLD MANUAL STRIP-MCNC: 227 MG/DL (ref 74–99)
GLUCOSE BLD MANUAL STRIP-MCNC: 240 MG/DL (ref 74–99)
GLUCOSE BLD MANUAL STRIP-MCNC: 241 MG/DL (ref 74–99)
GLUCOSE BLD MANUAL STRIP-MCNC: 283 MG/DL (ref 74–99)
GLUCOSE SERPL-MCNC: 186 MG/DL (ref 74–99)
GLUCOSE SERPL-MCNC: 195 MG/DL (ref 74–99)
GLUCOSE SERPL-MCNC: 220 MG/DL (ref 74–99)
GLUCOSE SERPL-MCNC: 276 MG/DL (ref 74–99)
HBA1C MFR BLD: 11.7 % (ref ?–5.7)
HCT VFR BLD AUTO: 44 % (ref 41–52)
HGB BLD-MCNC: 15.3 G/DL (ref 13.5–17.5)
HOLD SPECIMEN: NORMAL
MAGNESIUM SERPL-MCNC: 1.6 MG/DL (ref 1.6–2.4)
MAGNESIUM SERPL-MCNC: 1.76 MG/DL (ref 1.6–2.4)
MAGNESIUM SERPL-MCNC: 2.16 MG/DL (ref 1.6–2.4)
MCH RBC QN AUTO: 32 PG (ref 26–34)
MCHC RBC AUTO-ENTMCNC: 34.8 G/DL (ref 32–36)
MCV RBC AUTO: 92 FL (ref 80–100)
NRBC BLD-RTO: 0 /100 WBCS (ref 0–0)
PHOSPHATE SERPL-MCNC: 1.7 MG/DL (ref 2.5–4.9)
PHOSPHATE SERPL-MCNC: 2.9 MG/DL (ref 2.5–4.9)
PHOSPHATE SERPL-MCNC: 3.8 MG/DL (ref 2.5–4.9)
PLATELET # BLD AUTO: 165 X10*3/UL (ref 150–450)
POTASSIUM SERPL-SCNC: 3.1 MMOL/L (ref 3.5–5.3)
POTASSIUM SERPL-SCNC: 3.6 MMOL/L (ref 3.5–5.3)
POTASSIUM SERPL-SCNC: 4.3 MMOL/L (ref 3.5–5.3)
POTASSIUM SERPL-SCNC: 4.4 MMOL/L (ref 3.5–5.3)
RBC # BLD AUTO: 4.78 X10*6/UL (ref 4.5–5.9)
SODIUM SERPL-SCNC: 134 MMOL/L (ref 136–145)
SODIUM SERPL-SCNC: 135 MMOL/L (ref 136–145)
SODIUM SERPL-SCNC: 135 MMOL/L (ref 136–145)
SODIUM SERPL-SCNC: 136 MMOL/L (ref 136–145)
WBC # BLD AUTO: 4.1 X10*3/UL (ref 4.4–11.3)

## 2025-04-25 PROCEDURE — 83735 ASSAY OF MAGNESIUM: CPT

## 2025-04-25 PROCEDURE — 83036 HEMOGLOBIN GLYCOSYLATED A1C: CPT | Mod: PORLAB | Performed by: INTERNAL MEDICINE

## 2025-04-25 PROCEDURE — 99255 IP/OBS CONSLTJ NEW/EST HI 80: CPT | Performed by: INTERNAL MEDICINE

## 2025-04-25 PROCEDURE — 2500000004 HC RX 250 GENERAL PHARMACY W/ HCPCS (ALT 636 FOR OP/ED): Mod: JZ

## 2025-04-25 PROCEDURE — 80069 RENAL FUNCTION PANEL: CPT

## 2025-04-25 PROCEDURE — 82947 ASSAY GLUCOSE BLOOD QUANT: CPT

## 2025-04-25 PROCEDURE — 82374 ASSAY BLOOD CARBON DIOXIDE: CPT | Performed by: INTERNAL MEDICINE

## 2025-04-25 PROCEDURE — 85027 COMPLETE CBC AUTOMATED: CPT

## 2025-04-25 PROCEDURE — RXMED WILLOW AMBULATORY MEDICATION CHARGE

## 2025-04-25 PROCEDURE — 99233 SBSQ HOSP IP/OBS HIGH 50: CPT | Performed by: INTERNAL MEDICINE

## 2025-04-25 PROCEDURE — 82330 ASSAY OF CALCIUM: CPT

## 2025-04-25 PROCEDURE — 36415 COLL VENOUS BLD VENIPUNCTURE: CPT

## 2025-04-25 PROCEDURE — 2500000002 HC RX 250 W HCPCS SELF ADMINISTERED DRUGS (ALT 637 FOR MEDICARE OP, ALT 636 FOR OP/ED): Performed by: INTERNAL MEDICINE

## 2025-04-25 PROCEDURE — 2500000001 HC RX 250 WO HCPCS SELF ADMINISTERED DRUGS (ALT 637 FOR MEDICARE OP): Performed by: STUDENT IN AN ORGANIZED HEALTH CARE EDUCATION/TRAINING PROGRAM

## 2025-04-25 PROCEDURE — 2500000005 HC RX 250 GENERAL PHARMACY W/O HCPCS

## 2025-04-25 PROCEDURE — 99239 HOSP IP/OBS DSCHRG MGMT >30: CPT | Performed by: INTERNAL MEDICINE

## 2025-04-25 RX ORDER — INSULIN LISPRO 100 [IU]/ML
INJECTION, SOLUTION INTRAVENOUS; SUBCUTANEOUS
Qty: 15 ML | Refills: 11 | Status: SHIPPED | OUTPATIENT
Start: 2025-04-25

## 2025-04-25 RX ORDER — DEXTROSE MONOHYDRATE, SODIUM CHLORIDE, AND POTASSIUM CHLORIDE 50; 1.49; 4.5 G/1000ML; G/1000ML; G/1000ML
150 INJECTION, SOLUTION INTRAVENOUS CONTINUOUS
Status: CANCELLED | OUTPATIENT
Start: 2025-04-25

## 2025-04-25 RX ORDER — MAGNESIUM SULFATE HEPTAHYDRATE 40 MG/ML
2 INJECTION, SOLUTION INTRAVENOUS ONCE
Status: COMPLETED | OUTPATIENT
Start: 2025-04-25 | End: 2025-04-25

## 2025-04-25 RX ORDER — POTASSIUM CHLORIDE 14.9 MG/ML
20 INJECTION INTRAVENOUS
Status: COMPLETED | OUTPATIENT
Start: 2025-04-25 | End: 2025-04-25

## 2025-04-25 RX ORDER — CALCIUM GLUCONATE 20 MG/ML
1 INJECTION, SOLUTION INTRAVENOUS ONCE
Status: COMPLETED | OUTPATIENT
Start: 2025-04-25 | End: 2025-04-25

## 2025-04-25 RX ORDER — DEXTROSE 50 % IN WATER (D50W) INTRAVENOUS SYRINGE
25
Status: DISCONTINUED | OUTPATIENT
Start: 2025-04-25 | End: 2025-04-25 | Stop reason: HOSPADM

## 2025-04-25 RX ORDER — PEN NEEDLE, DIABETIC 30 GX3/16"
NEEDLE, DISPOSABLE MISCELLANEOUS
Qty: 100 EACH | Refills: 0 | OUTPATIENT
Start: 2025-04-25

## 2025-04-25 RX ORDER — DEXTROSE 50 % IN WATER (D50W) INTRAVENOUS SYRINGE
12.5
Status: DISCONTINUED | OUTPATIENT
Start: 2025-04-25 | End: 2025-04-25 | Stop reason: HOSPADM

## 2025-04-25 RX ORDER — INSULIN LISPRO 100 [IU]/ML
5 INJECTION, SOLUTION INTRAVENOUS; SUBCUTANEOUS
Qty: 15 ML | Refills: 11 | Status: SHIPPED | OUTPATIENT
Start: 2025-04-25

## 2025-04-25 RX ORDER — INSULIN LISPRO 100 [IU]/ML
5 INJECTION, SOLUTION INTRAVENOUS; SUBCUTANEOUS
Status: DISCONTINUED | OUTPATIENT
Start: 2025-04-25 | End: 2025-04-25 | Stop reason: HOSPADM

## 2025-04-25 RX ORDER — INSULIN LISPRO 100 [IU]/ML
0-5 INJECTION, SOLUTION INTRAVENOUS; SUBCUTANEOUS
Status: DISCONTINUED | OUTPATIENT
Start: 2025-04-25 | End: 2025-04-25 | Stop reason: HOSPADM

## 2025-04-25 RX ADMIN — POTASSIUM PHOSPHATE, MONOBASIC AND POTASSIUM PHOSPHATE, DIBASIC 21 MMOL: 224; 236 INJECTION, SOLUTION, CONCENTRATE INTRAVENOUS at 01:25

## 2025-04-25 RX ADMIN — INSULIN LISPRO 3 UNITS: 100 INJECTION, SOLUTION INTRAVENOUS; SUBCUTANEOUS at 13:26

## 2025-04-25 RX ADMIN — INSULIN LISPRO 5 UNITS: 100 INJECTION, SOLUTION INTRAVENOUS; SUBCUTANEOUS at 13:21

## 2025-04-25 RX ADMIN — POTASSIUM CHLORIDE 20 MEQ: 14.9 INJECTION, SOLUTION INTRAVENOUS at 00:46

## 2025-04-25 RX ADMIN — CALCIUM GLUCONATE 1 G: 20 INJECTION, SOLUTION INTRAVENOUS at 05:47

## 2025-04-25 RX ADMIN — MAGNESIUM SULFATE HEPTAHYDRATE 2 G: 40 INJECTION, SOLUTION INTRAVENOUS at 00:46

## 2025-04-25 RX ADMIN — POTASSIUM CHLORIDE 20 MEQ: 14.9 INJECTION, SOLUTION INTRAVENOUS at 02:43

## 2025-04-25 RX ADMIN — INSULIN HUMAN 22 UNITS: 100 INJECTION, SUSPENSION SUBCUTANEOUS at 09:33

## 2025-04-25 RX ADMIN — PANTOPRAZOLE SODIUM 40 MG: 40 TABLET, DELAYED RELEASE ORAL at 06:14

## 2025-04-25 RX ADMIN — DEXTROSE AND SODIUM CHLORIDE 150 ML/HR: 5; .45 INJECTION, SOLUTION INTRAVENOUS at 05:05

## 2025-04-25 ASSESSMENT — COGNITIVE AND FUNCTIONAL STATUS - GENERAL
DAILY ACTIVITIY SCORE: 24
MOBILITY SCORE: 24

## 2025-04-25 ASSESSMENT — PAIN SCALES - GENERAL
PAINLEVEL_OUTOF10: 0 - NO PAIN

## 2025-04-25 ASSESSMENT — ENCOUNTER SYMPTOMS
NAUSEA: 0
ABDOMINAL PAIN: 0
VOMITING: 0
CONFUSION: 0

## 2025-04-25 ASSESSMENT — PAIN - FUNCTIONAL ASSESSMENT
PAIN_FUNCTIONAL_ASSESSMENT: 0-10

## 2025-04-25 NOTE — PROGRESS NOTES
"Ed Sanon is a 34 y.o. male on day 1 of admission presenting with Diabetic ketoacidosis without coma associated with type 1 diabetes mellitus.    Subjective   He is laying comfortably in bed. He has no respiratory distress and is no RA. He denies SOB, cough, CP, nausea, vomiting or diarrhea at this time. He feels he is hungry now.     States he had stomach flu 2 days prior with vomiting episodes and could not have oral intake because of which he held his insulin regimen. Denies nausea or vomiting at this time.    Objective     Physical Exam  Vitals and nursing note reviewed.   Constitutional:       Appearance: Normal appearance.   HENT:      Head: Normocephalic.      Nose: Nose normal.      Mouth/Throat:      Pharynx: Oropharynx is clear.   Eyes:      Extraocular Movements: Extraocular movements intact.      Conjunctiva/sclera: Conjunctivae normal.      Pupils: Pupils are equal, round, and reactive to light.   Cardiovascular:      Rate and Rhythm: Normal rate and regular rhythm.      Pulses: Normal pulses.      Heart sounds: Normal heart sounds.   Pulmonary:      Effort: Pulmonary effort is normal.      Breath sounds: Normal breath sounds.   Abdominal:      General: Bowel sounds are normal.      Palpations: Abdomen is soft.   Musculoskeletal:         General: Normal range of motion.      Cervical back: Normal range of motion.   Skin:     General: Skin is warm.   Neurological:      General: No focal deficit present.      Mental Status: He is alert and oriented to person, place, and time. Mental status is at baseline.   Psychiatric:         Mood and Affect: Mood normal.         Behavior: Behavior normal.         Last Recorded Vitals  Blood pressure 118/83, pulse 98, temperature 36 °C (96.8 °F), resp. rate (!) 8, height 1.778 m (5' 10\"), weight 72.1 kg (158 lb 15.2 oz), SpO2 99%.  Intake/Output last 3 Shifts:  I/O last 3 completed shifts:  In: 2149.4 (29.8 mL/kg) [I.V.:1899.4 (26.3 mL/kg); IV " Piggyback:250]  Out: 1050 (14.6 mL/kg) [Urine:1050 (0.4 mL/kg/hr)]  Weight: 72.1 kg     Relevant Results      Assessment & Plan  Diabetic ketoacidosis without coma associated with type 1 diabetes mellitus    Type 1 diabetes mellitus with hyperglycemia (Multi)    Primary hypertension    DKA  AGMA  Pseudohyponatremia  Hypokalemia    Recommendations:  Pt responding to current DKA protocol with IVF and insulin gtt. Blood sugars have been better controlled and anion gap is closing. Appreciate endocrinology recommendations. Potassium phosphate has been repleted. Start oral diabetic diet. DC IVF once oral diet is tolerated.     Counseled pt for adherence to treatment and advised to follow up closely with endocrinology as outpatient.     He is much improved from critical care standpoint. I will defer further management to endocrinology and medicine service. Patient could be transferred out later today to SDU if continues to show good progress. Critical care team will sign off. Please contact with any questions.       Rambo Thomson MD

## 2025-04-25 NOTE — PROGRESS NOTES
"Ed Sanon is a 34 y.o. male admitted for Diabetic ketoacidosis without coma associated with type 1 diabetes mellitus. Pharmacy reviewed the patient's althf-of-oyschpfyq medications and allergies for accuracy.    The list below reflects the PTA list prior to pharmacy medication history. A summary a changes to the PTA medication list has been listed below. Please review each medication in order reconciliation for additional clarification and justification.    Source of information: [facility] [employer]     Medications added:    Medications modified:  Insulin NPH (Humulin 70/30 U-100 insulin- 30 units bid --> 22 units bid     Medications to be removed:  Ciclopirox 0.77% cream    Medications of concern:      Prior to Admission Medications   Prescriptions Last Dose Informant Patient Reported? Taking?   Dexcom G7  misc   Yes No   Sig: Inject 1 Device under the skin if needed (poorly controlled type 1 diabetes). Use as instructed   Dexcom G7 Sensor device   Yes No   Si each every 10 (ten) days.   OneTouch Ultra Test strip   Yes No   Sig: USE 1 STRIP TO CHECK GLUCOSE 4 TIMES DAILY AS DIRECTED   OneTouch Ultra2 Meter misc   Yes No   Sig: use as directed   ciclopirox (Loprox) 0.77 % cream   Yes No   Sig: Apply topically. APPLY  TWICE A DAY   insulin NPH and regular human (HumuLIN 70/30 U-100 Insulin) 100 unit/mL (70-30) injection   No No   Si units twice  daily   lancets misc   Yes No   Sig: Inject 1 Units under the skin 2 times a day. Sliding scale   lisinopril 5 mg tablet   Yes No   Sig: Take 1 tablet (5 mg) by mouth once daily.   pen needle, diabetic 32 gauge x 5/32\" needle   Yes No   Sig: Use as instructed 4 times daily   terbinafine (LamISIL) 250 mg tablet   Yes No   Sig: Take 1 tablet (250 mg) by mouth 3 times a day as needed.      Facility-Administered Medications: None       DAYANA CROWE       "

## 2025-04-25 NOTE — H&P
"[facility] - GENERAL MEDICINE HISTORY AND PHYSICAL    HISTORY OF PRESENT ILLNESS     History Obtained From (Primary Source): Patient  Collateral History (Secondary Sources): D/w ED physician    History Of Present Illness (HPI):  Ed Sanon is a 34 y.o. male with PMHx s/f uncontrolled Dm1, HTN presenting with DKA. Pt has had recurrent admission for DKA including 6 this year already. Pt admits to only taking his short-acting insulin because he doesn't want his blood glucose to go too low. He recently came back from [place].  He has been feeling weak and \"foggy\"/altered for the past three days. He had some nausea and vomiting a few days ago, but this has since resolved. PO intake has been normal. He endorses some generalized abdominal pain and \"deep breathing\" at times. No fever, chills, chest pain, cough, sputum production. He presents today with his usual complaints of polyuria, polydipsia as well. No chest pain, shortness of breath, fever, chills, lightheadedness, syncope, diarrhea, or other complaints.    ED Course:   Vitals on presentation: T 37 °C (98.6 °F)  HR (!) 124 -> 103 bpm  /89  RR 16  O2 98 % None (Room air)  Labs:   CBC with WBC 6.0, Hgb 17.5, Plts 200.   CMP with glucose 366, Na 132, K 4.4, BUN 13, sCr 1.02, alk phos 122, ALT 14, AST 12, bilirubin 0.4. Magnesium 1.71.   Trop 3.   Lactate 1.4  Phosphorus 3.0  Beta-hydroxybutyrate 10.00  VBG pH 7.10, pCO2 25, pO2 47, lactate 1.2, HCO3 7.8  UA 4+ glucose, 4+ ketones  Interventions: LR 1 L bolus, Pt admitted for further care.    12-point ROS reviewed and found to be negative aside from aforementioned positives in HPI and/or noted in dedicated ROS section below.     Decision made to admit the patient to the hospitalist service after evaluation of the patient, review of the above, and discussion with ED provider.     LABS AND IMAGING     I have personally reviewed the following labs on 04/24/25: CBC, CMP, Mag, Troponin, UA, Beta " hydroxybutyrate , and VBG  I have personally reviewed the patient's vitals on presentation to the ED and any/all changes through to time of admission (on 04/24/25).     ED Course (From ED Provider):  ED Course as of 04/24/25 2022   Thu Apr 24, 2025 1911 POCT pH, Venous(!!): 7.10 [NEISHA]   1912 POCT Lactate, Venous: 1.2 [NEISHA]   1912 Beta-Hydroxybutyrate(!): 10.00 [NEISHA]   1912 CBC and Auto Differential(!)  No leukocytosis or anemia [NEISHA]   1912 GLUCOSE(!): 366 [NEISHA]   1912 SODIUM(!): 132 [NEISHA]   1913 Bicarbonate(!!): 7 [NEISHA]   1913 Anion Gap(!): 27 [NEISHA]   1955 Patient in DKA, insulin drip ordered [NEISHA]      ED Course User Index  [NEISHA] Jamie Iglesias MD         Diagnoses as of 04/24/25 2022   Diabetic ketoacidosis without coma associated with type 1 diabetes mellitus     Relevant Results  Results for orders placed or performed during the hospital encounter of 04/24/25 (from the past 24 hours)   POCT GLUCOSE   Result Value Ref Range    POCT Glucose 345 (H) 74 - 99 mg/dL   CBC and Auto Differential   Result Value Ref Range    WBC 6.0 4.4 - 11.3 x10*3/uL    nRBC 0.0 0.0 - 0.0 /100 WBCs    RBC 5.62 4.50 - 5.90 x10*6/uL    Hemoglobin 17.5 13.5 - 17.5 g/dL    Hematocrit 52.9 (H) 41.0 - 52.0 %    MCV 94 80 - 100 fL    MCH 31.1 26.0 - 34.0 pg    MCHC 33.1 32.0 - 36.0 g/dL    RDW 13.4 11.5 - 14.5 %    Platelets 200 150 - 450 x10*3/uL    Neutrophils % 54.6 40.0 - 80.0 %    Immature Granulocytes %, Automated 0.7 0.0 - 0.9 %    Lymphocytes % 32.5 13.0 - 44.0 %    Monocytes % 10.7 2.0 - 10.0 %    Eosinophils % 0.8 0.0 - 6.0 %    Basophils % 0.7 0.0 - 2.0 %    Neutrophils Absolute 3.28 1.20 - 7.70 x10*3/uL    Immature Granulocytes Absolute, Automated 0.04 0.00 - 0.70 x10*3/uL    Lymphocytes Absolute 1.95 1.20 - 4.80 x10*3/uL    Monocytes Absolute 0.64 0.10 - 1.00 x10*3/uL    Eosinophils Absolute 0.05 0.00 - 0.70 x10*3/uL    Basophils Absolute 0.04 0.00 - 0.10 x10*3/uL   Comprehensive metabolic panel   Result Value Ref Range    Glucose  366 (H) 74 - 99 mg/dL    Sodium 132 (L) 136 - 145 mmol/L    Potassium 4.4 3.5 - 5.3 mmol/L    Chloride 102 98 - 107 mmol/L    Bicarbonate 7 (LL) 21 - 32 mmol/L    Anion Gap 27 (H) 10 - 20 mmol/L    Urea Nitrogen 13 6 - 23 mg/dL    Creatinine 1.02 0.50 - 1.30 mg/dL    eGFR >90 >60 mL/min/1.73m*2    Calcium 8.5 (L) 8.6 - 10.3 mg/dL    Albumin 4.1 3.4 - 5.0 g/dL    Alkaline Phosphatase 122 (H) 33 - 120 U/L    Total Protein 7.2 6.4 - 8.2 g/dL    AST 12 9 - 39 U/L    Bilirubin, Total 0.4 0.0 - 1.2 mg/dL    ALT 14 10 - 52 U/L   Beta Hydroxybutyrate   Result Value Ref Range    Beta-Hydroxybutyrate 10.00 (H) 0.02 - 0.27 mmol/L   BLOOD GAS VENOUS FULL PANEL   Result Value Ref Range    POCT pH, Venous 7.10 (LL) 7.33 - 7.43 pH    POCT pCO2, Venous 25 (L) 41 - 51 mm Hg    POCT pO2, Venous 47 (H) 35 - 45 mm Hg    POCT SO2, Venous 79 (H) 45 - 75 %    POCT Oxy Hemoglobin, Venous 77.8 (H) 45.0 - 75.0 %    POCT Hematocrit Calculated, Venous 54.0 (H) 41.0 - 52.0 %    POCT Sodium, Venous 129 (L) 136 - 145 mmol/L    POCT Potassium, Venous 5.3 3.5 - 5.3 mmol/L    POCT Chloride, Venous 99 98 - 107 mmol/L    POCT Ionized Calicum, Venous 1.23 1.10 - 1.33 mmol/L    POCT Glucose, Venous 418 (H) 74 - 99 mg/dL    POCT Lactate, Venous 1.2 0.4 - 2.0 mmol/L    POCT Base Excess, Venous -20.2 (L) -2.0 - 3.0 mmol/L    POCT HCO3 Calculated, Venous 7.8 (L) 22.0 - 26.0 mmol/L    POCT Hemoglobin, Venous 18.0 (H) 13.5 - 17.5 g/dL    POCT Anion Gap, Venous 28.0 (H) 10.0 - 25.0 mmol/L    Patient Temperature 37.0 degrees Celsius    FiO2 21 %   Phosphorus   Result Value Ref Range    Phosphorus 3.0 2.5 - 4.9 mg/dL   Magnesium   Result Value Ref Range    Magnesium 1.71 1.60 - 2.40 mg/dL   Urinalysis with Reflex Culture and Microscopic   Result Value Ref Range    Color, Urine Colorless (N) Light-Yellow, Yellow, Dark-Yellow    Appearance, Urine Clear Clear    Specific Gravity, Urine 1.025 1.005 - 1.035    pH, Urine 5.0 5.0, 5.5, 6.0, 6.5, 7.0, 7.5, 8.0     Protein, Urine 10 (TRACE) NEGATIVE, 10 (TRACE), 20 (TRACE) mg/dL    Glucose, Urine OVER (4+) (A) Normal mg/dL    Blood, Urine NEGATIVE NEGATIVE mg/dL    Ketones, Urine OVER (4+) (A) NEGATIVE mg/dL    Bilirubin, Urine NEGATIVE NEGATIVE mg/dL    Urobilinogen, Urine Normal Normal mg/dL    Nitrite, Urine NEGATIVE NEGATIVE    Leukocyte Esterase, Urine NEGATIVE NEGATIVE   Urinalysis Microscopic   Result Value Ref Range    WBC, Urine NONE 1-5, NONE /HPF    RBC, Urine NONE NONE, 1-2, 3-5 /HPF   POCT GLUCOSE   Result Value Ref Range    POCT Glucose 346 (H) 74 - 99 mg/dL      Imaging  No results found.    Cardiology, Vascular, and Other Imaging  No other imaging results found for the past 2 days       PAST HISTORIES AND ALLERGIES     Past Medical History  He has a past medical history of DKA (diabetic ketoacidosis) (Multi), HTN (hypertension), and Type I diabetes mellitus (Multi).    Surgical History  He has no past surgical history on file.     Social History  He reports that he has never smoked. He has never been exposed to tobacco smoke. He has never used smokeless tobacco. He reports that he does not drink alcohol and does not use drugs.    Family History  Family History[1]    Allergies  Patient has no known allergies.    MEDICATIONS     Scheduled Medications:  Scheduled Medications[2]  Continuous Medications:  Continuous Medications[3]  PRN Medications:  PRN Medications[4]     REVIEW OF SYSTEMS     Review of Systems   Constitutional:  Negative for activity change, appetite change, chills, diaphoresis, fatigue and fever.   HENT:  Negative for congestion, ear pain, rhinorrhea, sinus pain and sore throat.    Eyes:  Positive for visual disturbance.   Respiratory:  Positive for shortness of breath. Negative for apnea, cough, chest tightness, wheezing and stridor.    Cardiovascular:  Negative for chest pain, palpitations and leg swelling.   Gastrointestinal:  Positive for abdominal pain, nausea and vomiting. Negative for  abdominal distention, constipation and diarrhea.   Genitourinary:  Negative for difficulty urinating, dysuria, flank pain, frequency, hematuria and urgency.   Musculoskeletal:  Negative for arthralgias, back pain, gait problem, joint swelling and myalgias.   Skin:  Negative for color change, pallor, rash and wound.   Neurological:  Positive for weakness. Negative for dizziness, syncope, light-headedness, numbness and headaches.   Psychiatric/Behavioral:  Negative for agitation, behavioral problems, confusion and decreased concentration. The patient is not nervous/anxious.    All other systems reviewed and are negative.      OBJECTIVE     Last Recorded Vitals  /84 (BP Location: Right arm, Patient Position: Lying)   Pulse (!) 103   Temp 37 °C (98.6 °F)   Resp 20   Wt 74.8 kg (165 lb)   SpO2 99%      Physical Exam:  Vital signs and nursing notes reviewed.   Constitutional: Pleasant and cooperative. Laying in bed in mild acute distress. Conversant. Lethargic  Skin: Warm and dry; no obvious lesions, rashes, pallor, or jaundice.   Eyes: EOMI. Anicteric sclera.   ENT: Mucous membranes moist; no obvious injury or deformity appreciated.   Head and Neck: Normocephalic, atraumatic. ROM preserved. Trachea midline. No appreciable JVD.   Respiratory: Nonlabored on RA. Lungs clear to auscultation bilaterally without obvious adventitious sounds. Chest rise is equal.  Cardiovascular: RRR. No gross murmur, gallop, or rub. Extremities are warm and well-perfused with good capillary refill (< 3 seconds). No chest wall tenderness.   GI: Abdomen soft, nontender, nondistended. No obvious organomegaly appreciated. Bowel sounds are present.  : No CVA tenderness.   MSK: No gross abnormalities appreciated. No limitations to AROM/PROM appreciated.   Extremities: No cyanosis, edema, or clubbing evident. Neurovascularly intact.   Neuro: A&Ox3. CN 2-12 grossly intact. Able to respond to questions appropriately and clearly. No acute  focal neurologic deficits appreciated.  Psych: Appropriate mood and behavior.    ASSESSMENT AND PLAN   Assessment/Plan     34 y.o. male with PMHx s/f uncontrolled Dm1, HTN presenting with DKA.    Plan:  Admit to ICU    DKA, DM1, insulin noncompliance:  Labs show glucose 366, bicarb 7, anion gap of 27, beta-hydroxybutyrate of 10, and venous pH of 7.10.  Insulin drip started. Titrate per order set.  Fluids per order set. Currently on 1/2 NS at 250 ml/hr.  RFP and magnesium q4h. Repeat VBG at midnight.  Glucose checks q1h,  Endocrinology consulted.    HTN:  Continue home lisinopril.    Diet: NPO  DVT Prophylaxis: None needed per guidelines.  Code Status: Full Code   Case Discussed With: ED provider  Additional Sources Reviewed: ED note day of admission; past admission notes    Anticipated Length of Stay (LOS): Patient will require two-plus midnight stay for further evaluation and management of the above.     45 minutes critical care time spent on this pt encounter.     Bari Mcconnell DO    Dragon dictation software was used to dictate this note and thus there may be minor errors in translation/transcription including garbled speech or misspellings. Please contact for clarification if needed.       [1]   Family History  Problem Relation Name Age of Onset    No Known Problems Mother      No Known Problems Father      Hypertension Other      Coronary artery disease Other      Diabetes Other      Heart failure Other     [2] lisinopril, 5 mg, oral, Daily  [START ON 4/25/2025] pantoprazole, 40 mg, oral, Daily before breakfast   Or  [START ON 4/25/2025] pantoprazole, 40 mg, intravenous, Daily before breakfast    [3] dextrose 10 % in water (D10W), 150 mL/hr  dextrose 10 % in water (D10W), 150 mL/hr  dextrose 5%-0.45 % sodium chloride, 150 mL/hr  insulin regular, 0-50 Units/hr, Last Rate: 10.472 Units/hr (04/24/25 2015)  sodium chloride, 250 mL/hr, Last Rate: 250 mL/hr (04/24/25 1935)    [4] PRN medications: dextrose 10 % in  water (D10W), dextrose 10 % in water (D10W), dextrose 5%-0.45 % sodium chloride, dextrose, guaiFENesin, melatonin, ondansetron **OR** ondansetron

## 2025-04-25 NOTE — PROGRESS NOTES
04/25/25 0955   Discharge Planning   Living Arrangements Parent   Support Systems Parent   Assistance Needed Independent   Type of Residence Private residence   Home or Post Acute Services None   Expected Discharge Disposition Home   Does the patient need discharge transport arranged? No     PCP is Henri Brumfield MD. Patient is from home with his mother. Well known to [facility] for his DKA admissions. Patient is independent with ambulation, self care, driving, shopping, and meals.  Patient prefers to return home with no needs upon discharge. TCC will continue to follow for needs if they arise.

## 2025-04-25 NOTE — CONSULTS
Inpatient consult to Endocrinology  Consult performed by: Lori Packer MD  Consult ordered by: Bari Mcconnell DO  Reason for consult: DKA, noncompliance with insulin          Reason For Consult  DKA, noncompliance with insulin     History Of Present Illness  Ed Sanon is a 34 y.o. male presenting with feeling weakness, nausea and vomiting for three days prior to admission. He was found to be hemodynamically stable and tachycardia.  Initial lab data revealed a glucose value of 366mg/dL, beta-hydroxybutyrate 10.00, anion gap of 27, bicarbonate of 7 and VBG pH of 7.10.  He was started on an insulin infusion with admittance to the ICU.    His home regimen consists of:  NPH 22 units subcutaneous twice daily      He states that he tried the mixed insulin but this Dexcom had down arrows and thus he stopped using it.     DEXCOM  DATA:  3 day average - 360mg/dL   7 day average - 386mg/dL   14 day average - 412mg/dL   30 day average - 412mg/dL   90 day average - 412mg/dL     Time in [employer] rage  3 day - 11%  7 day - 4%   14 day - 2%   30 day - 2%   90 day - 2%    His A1C last A1C was found to be 11.7% two months ago.     He states that the last time he was here he received two prandial doses for two different meals.  HE states that he then became hot and so he feels that he can't take three prandial doses in a given day.  He denied that his Dexcom had hypoglycemia at that time.    He states that he does not believe that the Dexcom is accurate.  His glucose check throughout consultation was 177mg/dL; his dexcom had a simultaneous value of 170mg/dL     He is currently on an insulin infusion at 3.7 units/hr and IVF D51/2NS at 150mLs/hr.    He has an outpatient appointment with his new endocrinologist at [facility] on 5/4.      Past Medical History  He has a past medical history of DKA (diabetic ketoacidosis) (Multi), HTN (hypertension), and Type I diabetes mellitus (Multi).    Surgical History  He has no  "past surgical history on file.     Social History  He reports that he has never smoked. He has never been exposed to tobacco smoke. He has never used smokeless tobacco. He reports that he does not drink alcohol and does not use drugs.    Family History  Family History[1]     Allergies  Patient has no known allergies.    Review of Systems   Gastrointestinal:  Negative for abdominal pain, nausea and vomiting.   Psychiatric/Behavioral:  Negative for confusion.    All other systems reviewed and are negative.       Physical Exam  Vitals and nursing note reviewed.   Constitutional:       General: He is not in acute distress.     Appearance: Normal appearance. He is normal weight.   HENT:      Head: Normocephalic and atraumatic.      Nose: Nose normal.      Mouth/Throat:      Mouth: Mucous membranes are moist.   Eyes:      Extraocular Movements: Extraocular movements intact.   Cardiovascular:      Rate and Rhythm: Normal rate and regular rhythm.   Pulmonary:      Effort: Pulmonary effort is normal.   Musculoskeletal:         General: Normal range of motion.   Neurological:      Mental Status: He is alert and oriented to person, place, and time.   Psychiatric:         Mood and Affect: Mood normal.          Last Recorded Vitals  Blood pressure 124/86, pulse 98, temperature 36 °C (96.8 °F), resp. rate 12, height 1.778 m (5' 10\"), weight 72.1 kg (158 lb 15.2 oz), SpO2 99%.    Relevant Results  Scheduled medications  pantoprazole, 40 mg, oral, Daily before breakfast   Or  pantoprazole, 40 mg, intravenous, Daily before breakfast      Continuous medications  dextrose 10 % in water (D10W), 150 mL/hr  dextrose 10 % in water (D10W), 150 mL/hr  dextrose 5%-0.45 % sodium chloride, 150 mL/hr, Last Rate: 150 mL/hr (04/25/25 2045)  insulin regular, 0-50 Units/hr, Last Rate: 3.7 Units/hr (04/25/25 8403)  sodium chloride, 250 mL/hr, Last Rate: Stopped (04/24/25 4954)      PRN medications  PRN medications: dextrose 10 % in water (D10W), " dextrose 10 % in water (D10W), dextrose 5%-0.45 % sodium chloride, dextrose, guaiFENesin, melatonin, ondansetron **OR** ondansetron      Assessment/Plan     IMPRESSION:  DKA  POORLY CONTROLLED TYPE I DIABETES MELLITUS  Long term insulin use with basal insulin only     RECOMMENDATIONS:  For an insulin infusion at 3.7 units per hour and titrate as per protocol  Accuchecks every 1 hour  Awaiting current BMP   NPO  Hypoglycemic protocol   To continue IVF D51/2 NS at 150mls/Hr  Please hold insulin for serum potassium <3.3  Will plan to bridge with NPH 22 units subcutaneous twice daily when the anion Gap is less than 15 and the Bicarbonate is more than 15     8:40Am  For NPH 22 units subcutaneous twice daily  For Humalog 5 units TID AC   For insulin correction scale TID AC in 1 unit increments  given fear of hypoglycemia   For diabetic diet   Accu-Cheks ACHS once hte insulin dirp has been disocntinued  Insulin drip and IVF D5 1/2NS can be discontinued two hours following the administration of NPH     Hypoglycemic protocol   Outpatient follow up with endocrinologist strongly encouraged given failure of all recommendations from multiple hospital discharges - basal/bolus insulin, mixed insulin, insulin pump   Counseled that therapy with NPH 22 units subcutaneous twice daily is not improving glycemic control given recurrent bouts of DKA, elevated A1C and Dexcom data  Counseled that the Dexcom CGM is the most accurate of CGMs and pointed to the fact that it was within 7mg/dL of his fingerstick value   Will continue to follow     Thank you for the courtesy of this consult      Lori Packer MD               [1]   Family History  Problem Relation Name Age of Onset    No Known Problems Mother      No Known Problems Father      Hypertension Other      Coronary artery disease Other      Diabetes Other      Heart failure Other

## 2025-04-25 NOTE — NURSING NOTE
Discharge instructions reviewed with pt's mother and pt. Pt verbalizes understanding of medication changes as well as follow up appts. Meds delivered to pt from pharmacy. Pt notified to  additional prescription that was sent to [facility]. All IV's removed prior to discharge.

## 2025-04-25 NOTE — CONSULTS
"Critical Care Medicine Consult      Reason For Consult  DKA    History Of Present Illness  Ed Sanon is a 34 y.o. male with past medical history of diabetes mellitus type 1 with poor insulin compliance, anxiety, depression, medical noncompliance, and recurrent admissions for DKA presented to [facility] ED with complaints of nausea, vomiting, brain fogginess, and concern for DKA.  Upon ED workup, temperature 37 °C, heart rate 124, respiratory rate 16, blood pressure 138/89, and SpO2 98% on room air.  ED labs revealed blood glucose 366, sodium 132, potassium 4.4 (mild hemolysis), chloride 102, bicarbonate 7, anion gap 27, BUN 13, creatinine 1.02, calcium 8.5, alkaline phosphatase 122, beta hydroxybutyrate 10.0, WBC 6.0, RBC 5.62, hemoglobin 17.5, hematocrit 52.9, and platelets 200.  VBG full panel revealed pH 7.10, pCO2 25, pO2 47, SO2 79, sodium 129, potassium 5.3, lactate 1.2, HCO3 calculated 7.8.  UA with reflex microscopic and culture negative for UTI and positive for 4+ glucose and 4+ ketones.  ED interventions included 1 L LR IV fluid bolus, initiation of insulin infusion per DKA protocol, initiation of half-normal saline per DKA protocol, and admission to ICU for further management.    Patient seen and examined in ED bed 3.  Patient alert and oriented x 4 and in no acute distress.  Patient reported that he began feeling weak, \"foggy\", nauseated, and vomiting x 3 days.  He reports that he has been able to eat and drink as normal but has still been vomiting.  He reports that he is only been taking his NPH 22 units twice daily and not taking his short acting insulin because he does not want his blood sugar to go too low. He has not been taking the 70/30 insulin that was recommended at discharge from his last admission from 4/9-4/10/2025.  His blood sugars have been in the 300s. He denies any dizziness, lightheadedness, vision changes, syncope, seizures, chest pain, palpitations, shortness of breath, cough, " congestion, abdominal pain, diarrhea, dysuria, hematuria, fevers, chills, night sweats, paresthesias, or any other complaints.  He denies any sick contacts.    Medical History[1]  Surgical History[2]  Prescriptions Prior to Admission[3]  Patient has no known allergies.  Social History[4]  Family History[5]    Scheduled Medications:   Scheduled Medications[6]     Continuous Medications:   Continuous Medications[7]     PRN Medications:   PRN Medications[8]    Review of Systems:  Review of Systems   Gastrointestinal:  Positive for nausea and vomiting.   Neurological:  Positive for weakness.        Objective   Vitals:  Most Recent:  Vitals:    04/24/25 2014   BP: 133/84   Pulse: (!) 103   Resp: 20   Temp:    SpO2: 99%       24hr Min/Max:  Temp  Min: 37 °C (98.6 °F)  Max: 37 °C (98.6 °F)  Pulse  Min: 103  Max: 124  BP  Min: 119/82  Max: 138/89  Resp  Min: 16  Max: 22  SpO2  Min: 98 %  Max: 100 %    LDA:          Vent settings:       Hemodynamic parameters for last 24 hours:       No intake or output data in the 24 hours ending 04/24/25 2041        Physical exam:    Physical Exam  Constitutional:       General: He is not in acute distress.     Appearance: Normal appearance.   HENT:      Head: Normocephalic.      Nose: Nose normal.      Mouth/Throat:      Mouth: Mucous membranes are dry.   Eyes:      Extraocular Movements: Extraocular movements intact.      Conjunctiva/sclera: Conjunctivae normal.      Pupils: Pupils are equal, round, and reactive to light.   Cardiovascular:      Rate and Rhythm: Regular rhythm. Tachycardia present.      Pulses: Normal pulses.      Heart sounds: Normal heart sounds. No murmur heard.     Comments: Sinus tachycardia on telemetry  Pulmonary:      Effort: Pulmonary effort is normal. No respiratory distress.      Breath sounds: Normal breath sounds and air entry. No wheezing, rhonchi or rales.   Chest:      Chest wall: No tenderness.   Abdominal:      General: Bowel sounds are normal. There is  no distension.      Palpations: Abdomen is soft.      Tenderness: There is no abdominal tenderness. There is no guarding.   Musculoskeletal:         General: Normal range of motion.      Cervical back: Normal range of motion.      Right lower leg: No edema.      Left lower leg: No edema.   Skin:     General: Skin is warm and dry.      Capillary Refill: Capillary refill takes less than 2 seconds.   Neurological:      General: No focal deficit present.      Mental Status: He is alert and oriented to person, place, and time. Mental status is at baseline.      Cranial Nerves: Cranial nerves 2-12 are intact.      Sensory: Sensation is intact.      Motor: Motor function is intact.      Coordination: Coordination is intact.   Psychiatric:         Mood and Affect: Mood normal. Affect is flat.         Behavior: Behavior normal. Behavior is cooperative.         Thought Content: Thought content normal.         Judgment: Judgment normal.          Lab/Radiology/Diagnostic Review:  Results for orders placed or performed during the hospital encounter of 04/24/25 (from the past 24 hours)   POCT GLUCOSE   Result Value Ref Range    POCT Glucose 345 (H) 74 - 99 mg/dL   CBC and Auto Differential   Result Value Ref Range    WBC 6.0 4.4 - 11.3 x10*3/uL    nRBC 0.0 0.0 - 0.0 /100 WBCs    RBC 5.62 4.50 - 5.90 x10*6/uL    Hemoglobin 17.5 13.5 - 17.5 g/dL    Hematocrit 52.9 (H) 41.0 - 52.0 %    MCV 94 80 - 100 fL    MCH 31.1 26.0 - 34.0 pg    MCHC 33.1 32.0 - 36.0 g/dL    RDW 13.4 11.5 - 14.5 %    Platelets 200 150 - 450 x10*3/uL    Neutrophils % 54.6 40.0 - 80.0 %    Immature Granulocytes %, Automated 0.7 0.0 - 0.9 %    Lymphocytes % 32.5 13.0 - 44.0 %    Monocytes % 10.7 2.0 - 10.0 %    Eosinophils % 0.8 0.0 - 6.0 %    Basophils % 0.7 0.0 - 2.0 %    Neutrophils Absolute 3.28 1.20 - 7.70 x10*3/uL    Immature Granulocytes Absolute, Automated 0.04 0.00 - 0.70 x10*3/uL    Lymphocytes Absolute 1.95 1.20 - 4.80 x10*3/uL    Monocytes Absolute  0.64 0.10 - 1.00 x10*3/uL    Eosinophils Absolute 0.05 0.00 - 0.70 x10*3/uL    Basophils Absolute 0.04 0.00 - 0.10 x10*3/uL   Comprehensive metabolic panel   Result Value Ref Range    Glucose 366 (H) 74 - 99 mg/dL    Sodium 132 (L) 136 - 145 mmol/L    Potassium 4.4 3.5 - 5.3 mmol/L    Chloride 102 98 - 107 mmol/L    Bicarbonate 7 (LL) 21 - 32 mmol/L    Anion Gap 27 (H) 10 - 20 mmol/L    Urea Nitrogen 13 6 - 23 mg/dL    Creatinine 1.02 0.50 - 1.30 mg/dL    eGFR >90 >60 mL/min/1.73m*2    Calcium 8.5 (L) 8.6 - 10.3 mg/dL    Albumin 4.1 3.4 - 5.0 g/dL    Alkaline Phosphatase 122 (H) 33 - 120 U/L    Total Protein 7.2 6.4 - 8.2 g/dL    AST 12 9 - 39 U/L    Bilirubin, Total 0.4 0.0 - 1.2 mg/dL    ALT 14 10 - 52 U/L   Beta Hydroxybutyrate   Result Value Ref Range    Beta-Hydroxybutyrate 10.00 (H) 0.02 - 0.27 mmol/L   BLOOD GAS VENOUS FULL PANEL   Result Value Ref Range    POCT pH, Venous 7.10 (LL) 7.33 - 7.43 pH    POCT pCO2, Venous 25 (L) 41 - 51 mm Hg    POCT pO2, Venous 47 (H) 35 - 45 mm Hg    POCT SO2, Venous 79 (H) 45 - 75 %    POCT Oxy Hemoglobin, Venous 77.8 (H) 45.0 - 75.0 %    POCT Hematocrit Calculated, Venous 54.0 (H) 41.0 - 52.0 %    POCT Sodium, Venous 129 (L) 136 - 145 mmol/L    POCT Potassium, Venous 5.3 3.5 - 5.3 mmol/L    POCT Chloride, Venous 99 98 - 107 mmol/L    POCT Ionized Calicum, Venous 1.23 1.10 - 1.33 mmol/L    POCT Glucose, Venous 418 (H) 74 - 99 mg/dL    POCT Lactate, Venous 1.2 0.4 - 2.0 mmol/L    POCT Base Excess, Venous -20.2 (L) -2.0 - 3.0 mmol/L    POCT HCO3 Calculated, Venous 7.8 (L) 22.0 - 26.0 mmol/L    POCT Hemoglobin, Venous 18.0 (H) 13.5 - 17.5 g/dL    POCT Anion Gap, Venous 28.0 (H) 10.0 - 25.0 mmol/L    Patient Temperature 37.0 degrees Celsius    FiO2 21 %   Phosphorus   Result Value Ref Range    Phosphorus 3.0 2.5 - 4.9 mg/dL   Magnesium   Result Value Ref Range    Magnesium 1.71 1.60 - 2.40 mg/dL   Urinalysis with Reflex Culture and Microscopic   Result Value Ref Range    Color,  "Urine Colorless (N) Light-Yellow, Yellow, Dark-Yellow    Appearance, Urine Clear Clear    Specific Gravity, Urine 1.025 1.005 - 1.035    pH, Urine 5.0 5.0, 5.5, 6.0, 6.5, 7.0, 7.5, 8.0    Protein, Urine 10 (TRACE) NEGATIVE, 10 (TRACE), 20 (TRACE) mg/dL    Glucose, Urine OVER (4+) (A) Normal mg/dL    Blood, Urine NEGATIVE NEGATIVE mg/dL    Ketones, Urine OVER (4+) (A) NEGATIVE mg/dL    Bilirubin, Urine NEGATIVE NEGATIVE mg/dL    Urobilinogen, Urine Normal Normal mg/dL    Nitrite, Urine NEGATIVE NEGATIVE    Leukocyte Esterase, Urine NEGATIVE NEGATIVE   Urinalysis Microscopic   Result Value Ref Range    WBC, Urine NONE 1-5, NONE /HPF    RBC, Urine NONE NONE, 1-2, 3-5 /HPF   POCT GLUCOSE   Result Value Ref Range    POCT Glucose 346 (H) 74 - 99 mg/dL     Imaging  No results found.    Cardiology, Vascular, and Other Imaging  No other imaging results found for the past 7 days      Assessment/Plan   Assessment & Plan  Diabetic ketoacidosis without coma associated with type 1 diabetes mellitus    DKA without coma in diabetes mellitus type 1 with poor compliance  -Presented with complaints of nausea, vomiting, weakness, and feeling \"foggy\"  - Blood glucose 366 on presentation  - Anion gap 27  - Bicarbonate 7  -Beta hydroxybutyrate 10.00  - VBG: pH 7.1, pCO2 25, HCO3 calculated 7.8  - UA with reflex microscopic negative for UTI and positive for 4+ glucose and 4+ ketones  -S/p 1 L LR bolus  - Continue IV fluids per DKA protocol: 1/2 NS at 250 mL/h until blood glucose less than 250 mg/dL, D5 1/2 NS at 150 mL/h for blood glucose 150 to 250 mg/dL, D10 at 150 mL/h for blood glucose less than or equal to 150 mg/dL and anion gap still open, and D10 at 150 mL/h for blood glucose less than 70 mg/dL and anion gap still open.  -Continue insulin infusion per DKA protocol, titrate per protocol  -N.p.o. while on insulin infusion  -POCT glucose checks hourly while on insulin infusion  -Trend RFP and magnesium every 4 hours while on insulin " infusion  -Replete electrolytes as necessary  -Repeat VBG at midnight  -Hypoglycemia protocol as needed  -Monitor intake and output  -Plan to bridge off insulin infusion once anion gap less than 15 and HCO3 greater than 15  -Endocrinology consult, input appreciated    High anion gap metabolic acidosis 2/2 DKA  - Anion gap 27  - Bicarbonate 7  - Blood glucose 366  - Beta hydroxybutyrate 10.00  - VBG: pH 7.1, pCO2 25, HCO3 calculated 7.8  - S/p 1 L LR bolus  -IV fluids and insulin infusion per DKA protocol  -Trend RFP and magnesium every 4 hours while on insulin infusion  -Replete electrolytes as necessary  -Repeat VBG at midnight  -Monitor intake and output  -N.p.o. while on insulin infusion  -Continuous telemetry monitoring  -Endocrinology consult, input appreciated    Pseudohyponatremia secondary to DKA  - Sodium 132, corrected sodium for glucose is 137  -Blood glucose 366  -S/p 1 L LR bolus  -IV fluids and insulin infusion per DKA protocol  - Trend RFP and magnesium every 4 hours while on insulin infusion  -Monitor intake and output    I spent 45 minutes of cumulative critical care time with the patient.  Greater than 50% of that time was spent in the direct collaboration and or coordination of care of the patient.     Dragon dictation software was used to dictate this note and thus there may be minor errors in translation/transcription including garbled speech or misspellings. Please contact for clarification if needed.          [1]   Past Medical History:  Diagnosis Date    DKA (diabetic ketoacidosis) (Multi)     recurrent    HTN (hypertension)     Type I diabetes mellitus (Multi)    [2] No past surgical history on file.  [3] (Not in a hospital admission)  [4]   Social History  Tobacco Use    Smoking status: Never     Passive exposure: Never    Smokeless tobacco: Never   Vaping Use    Vaping status: Never Used   Substance Use Topics    Alcohol use: Never    Drug use: Never   [5]   Family History  Problem  Relation Name Age of Onset    No Known Problems Mother      No Known Problems Father      Hypertension Other      Coronary artery disease Other      Diabetes Other      Heart failure Other     [6] lisinopril, 5 mg, oral, Daily  [START ON 4/25/2025] pantoprazole, 40 mg, oral, Daily before breakfast   Or  [START ON 4/25/2025] pantoprazole, 40 mg, intravenous, Daily before breakfast  [7] dextrose 10 % in water (D10W), 150 mL/hr  dextrose 10 % in water (D10W), 150 mL/hr  dextrose 5%-0.45 % sodium chloride, 150 mL/hr  insulin regular, 0-50 Units/hr, Last Rate: 10.472 Units/hr (04/24/25 2015)  sodium chloride, 250 mL/hr, Last Rate: 250 mL/hr (04/24/25 1935)  [8] PRN medications: dextrose 10 % in water (D10W), dextrose 10 % in water (D10W), dextrose 5%-0.45 % sodium chloride, dextrose, guaiFENesin, melatonin, ondansetron **OR** ondansetron

## 2025-04-25 NOTE — DISCHARGE SUMMARY
"Discharge Diagnosis  Diabetic ketoacidosis without coma associated with type 1 diabetes mellitus   Hypertension        Issues Requiring Follow-Up      Discharge Meds     Medication List      START taking these medications     * insulin lispro 100 unit/mL pen; Commonly known as: HumaLOG; Inject 5   Units under the skin 3 times a day before meals. Inject 3 times per day as   directed.   * insulin lispro 100 unit/mL pen; Commonly known as: HumaLOG; Inject   under skin 3 times per day before meals as directed. Hypoglycemia protocol   Call LIP unit(s) if Blood Glucose is between 0 - 70 mg/dL   0 unit(s) if   Blood glucose is between   1 unit(s) if Blood glucose is between   151-200  2 unit(s) if Blood glucose is between 201-250  3 unit(s) if Blood   glucose is between 251-300  4 unit(s) if Blood glucose is between 301-350    5 unit(s) if Blood glucose is between 351-400  If blood glucose is   greater than 400 mg/dL, give max insulin per sliding scale AND then   contact provider.   insulin NPH (Isophane) 100 unit/mL injection; Commonly known as: HumuLIN   N,NovoLIN N; Inject 22 Units under the skin every 12 hours. Take as   directed per insulin instructions.  * This list has 2 medication(s) that are the same as other medications   prescribed for you. Read the directions carefully, and ask your doctor or   other care provider to review them with you.     CONTINUE taking these medications     Dexcom G7  misc; Generic drug: blood-glucose,,cont   Dexcom G7 Sensor device; Generic drug: blood-glucose sensor   lancets misc   lisinopril 5 mg tablet   OneTouch Ultra Test; Generic drug: blood sugar diagnostic   OneTouch Ultra2 Meter misc; Generic drug: blood-glucose meter   pen needle, diabetic 32 gauge x 5/32\" needle     STOP taking these medications     HumuLIN 70/30 U-100 Insulin 100 unit/mL (70-30) injection; Generic drug:   insulin NPH and regular human       Test Results Pending At Discharge  Pending " "Labs       Order Current Status    Hemoglobin A1c In process            Hospital Course  Ed Sanon is a 34 y.o. male with PMHx s/f uncontrolled Dm1, HTN presenting with DKA. Pt has had recurrent admission for DKA including 6 this year already. Pt admits to only taking his short-acting insulin because he doesn't want his blood glucose to go too low. He recently came back from [place].  He has been feeling weak and \"foggy\"/altered for the past three days. He had some nausea and vomiting a few days ago, but this has since resolved. PO intake has been normal. He endorses some generalized abdominal pain and \"deep breathing\" at times. No fever, chills, chest pain, cough, sputum production. He presents today with his usual complaints of polyuria, polydipsia as well. No chest pain, shortness of breath, fever, chills, lightheadedness, syncope, diarrhea, or other complaints.     ED Course:   Vitals on presentation: T 37 °C (98.6 °F)  HR (!) 124 -> 103 bpm  /89  RR 16  O2 98 % None (Room air)  Labs:   CBC with WBC 6.0, Hgb 17.5, Plts 200.   CMP with glucose 366, Na 132, K 4.4, BUN 13, sCr 1.02, alk phos 122, ALT 14, AST 12, bilirubin 0.4. Magnesium 1.71.   Trop 3.   Lactate 1.4  Phosphorus 3.0  Beta-hydroxybutyrate 10.00  VBG pH 7.10, pCO2 25, pO2 47, lactate 1.2, HCO3 7.8  UA 4+ glucose, 4+ ketones  Interventions: LR 1 L bolus, Pt admitted for further care.  4/25: Patient was seen and examined.  He is hemodynamically stable I repeated his BMP this afternoon which showed anion gap remains closed and sodium bicarbonate within normal limits.  Extensive counseling given by endocrinologist at bedside with encouragement to follow-up outpatient.  Patient was discharged in stable condition on 3 times daily NPH twice daily.  5 units lispro 3 times daily plus sliding scale.  He was discharged in stable condition to follow-up as outpatient with primary care physician within 1 week of discharge with endocrinologist as " scheduled.      Discharge process, 35 minutes.    Pertinent Physical Exam At Time of Discharge  Physical Exam  Vitals:    04/25/25 1500   BP:    Pulse: 98   Resp: 10   Temp:    SpO2: 99%     Constitutional: Pleasant and cooperative. Laying in bed in mild acute distress. Conversant. Lethargic  Skin: Warm and dry; no obvious lesions, rashes, pallor, or jaundice.   Eyes: EOMI. Anicteric sclera.   ENT: Mucous membranes moist; no obvious injury or deformity appreciated.   Head and Neck: Normocephalic, atraumatic. ROM preserved. Trachea midline. No appreciable JVD.   Respiratory: Nonlabored on RA. Lungs clear to auscultation bilaterally without obvious adventitious sounds. Chest rise is equal.  Cardiovascular: RRR. No gross murmur, gallop, or rub. Extremities are warm and well-perfused with good capillary refill (< 3 seconds). No chest wall tenderness.   GI: Abdomen soft, nontender, nondistended. No obvious organomegaly appreciated. Bowel sounds are present.  : No CVA tenderness.   MSK: No gross abnormalities appreciated. No limitations to AROM/PROM appreciated.   Extremities: No cyanosis, edema, or clubbing evident. Neurovascularly intact.   Neuro: A&Ox3. CN 2-12 grossly intact. Able to respond to questions appropriately and clearly. No acute focal neurologic deficits appreciated.  Psych: Appropriate mood and behavior.  Outpatient Follow-Up  Future Appointments   Date Time Provider Department Center   7/2/2025  9:45 AM Henri Brumfield MD [id number] [facility]         Liseth Ahn MD

## 2025-04-29 ENCOUNTER — PATIENT OUTREACH (OUTPATIENT)
Dept: PRIMARY CARE | Facility: CLINIC | Age: 35
End: 2025-04-29
Payer: MEDICAID

## 2025-04-29 NOTE — PROGRESS NOTES
Discharge Facility: [facility]   Discharge Diagnosis: Diabetic ketoacidosis without coma associated with type 1 diabetes mellitus, Hypertension  Admission Date: 4/24/25  Discharge Date: 4/25/25    PCP Appointment Date: no appointment, message to office  Specialist Appointment Date: 5/5/25 endocrinology  Hospital Encounter and Summary Linked: Yes  ED to Hosp-Admission (Discharged) with Liseth Ahn MD; Jamie Iglesias MD (04/24/2025)   At least two attempts were made to reach patient within two business days after discharge. If available, left voicemail with contact information for patient to call back with any non-emergent questions or concerns.

## 2025-05-06 ENCOUNTER — OFFICE VISIT (OUTPATIENT)
Dept: PRIMARY CARE | Facility: CLINIC | Age: 35
End: 2025-05-06
Payer: MEDICAID

## 2025-05-06 VITALS
DIASTOLIC BLOOD PRESSURE: 88 MMHG | TEMPERATURE: 96.8 F | RESPIRATION RATE: 16 BRPM | HEART RATE: 99 BPM | BODY MASS INDEX: 23.19 KG/M2 | WEIGHT: 162 LBS | OXYGEN SATURATION: 98 % | HEIGHT: 70 IN | SYSTOLIC BLOOD PRESSURE: 124 MMHG

## 2025-05-06 DIAGNOSIS — E10.65 TYPE 1 DIABETES MELLITUS WITH HYPERGLYCEMIA (MULTI): ICD-10-CM

## 2025-05-06 DIAGNOSIS — Z09 HOSPITAL DISCHARGE FOLLOW-UP: Primary | ICD-10-CM

## 2025-05-06 DIAGNOSIS — Z91.148 POOR COMPLIANCE WITH MEDICATION: ICD-10-CM

## 2025-05-06 DIAGNOSIS — Z86.39 HISTORY OF DIABETIC KETOACIDOSIS: ICD-10-CM

## 2025-05-06 DIAGNOSIS — E10.65 POORLY CONTROLLED TYPE 1 DIABETES MELLITUS: ICD-10-CM

## 2025-05-06 PROCEDURE — 3074F SYST BP LT 130 MM HG: CPT | Performed by: STUDENT IN AN ORGANIZED HEALTH CARE EDUCATION/TRAINING PROGRAM

## 2025-05-06 PROCEDURE — 99495 TRANSJ CARE MGMT MOD F2F 14D: CPT | Performed by: STUDENT IN AN ORGANIZED HEALTH CARE EDUCATION/TRAINING PROGRAM

## 2025-05-06 PROCEDURE — 1036F TOBACCO NON-USER: CPT | Performed by: STUDENT IN AN ORGANIZED HEALTH CARE EDUCATION/TRAINING PROGRAM

## 2025-05-06 PROCEDURE — 3046F HEMOGLOBIN A1C LEVEL >9.0%: CPT | Performed by: STUDENT IN AN ORGANIZED HEALTH CARE EDUCATION/TRAINING PROGRAM

## 2025-05-06 PROCEDURE — 3079F DIAST BP 80-89 MM HG: CPT | Performed by: STUDENT IN AN ORGANIZED HEALTH CARE EDUCATION/TRAINING PROGRAM

## 2025-05-06 PROCEDURE — 3008F BODY MASS INDEX DOCD: CPT | Performed by: STUDENT IN AN ORGANIZED HEALTH CARE EDUCATION/TRAINING PROGRAM

## 2025-05-06 RX ORDER — PEN NEEDLE, DIABETIC 29 G X1/2"
NEEDLE, DISPOSABLE MISCELLANEOUS
COMMUNITY
Start: 2025-05-05

## 2025-05-06 RX ORDER — ISOPROPYL ALCOHOL 70 ML/100ML
SWAB TOPICAL
COMMUNITY
Start: 2025-05-05

## 2025-05-06 RX ORDER — GLUCAGON INJECTION, SOLUTION 1 MG/.2ML
INJECTION, SOLUTION SUBCUTANEOUS
COMMUNITY
Start: 2025-05-05

## 2025-05-06 RX ORDER — INSULIN ASPART 100 [IU]/ML
5 INJECTION, SOLUTION INTRAVENOUS; SUBCUTANEOUS
COMMUNITY
Start: 2025-05-05 | End: 2026-05-05

## 2025-05-06 SDOH — ECONOMIC STABILITY: FOOD INSECURITY: WITHIN THE PAST 12 MONTHS, YOU WORRIED THAT YOUR FOOD WOULD RUN OUT BEFORE YOU GOT MONEY TO BUY MORE.: NEVER TRUE

## 2025-05-06 SDOH — ECONOMIC STABILITY: FOOD INSECURITY: WITHIN THE PAST 12 MONTHS, THE FOOD YOU BOUGHT JUST DIDN'T LAST AND YOU DIDN'T HAVE MONEY TO GET MORE.: NEVER TRUE

## 2025-05-06 ASSESSMENT — ENCOUNTER SYMPTOMS
MUSCULOSKELETAL NEGATIVE: 1
NAUSEA: 0
WHEEZING: 0
VOMITING: 0
COLOR CHANGE: 0
FATIGUE: 0
DIZZINESS: 0
SHORTNESS OF BREATH: 0
COUGH: 0
DIARRHEA: 0
HEADACHES: 0
UNEXPECTED WEIGHT CHANGE: 0
CONSTIPATION: 0
FEVER: 0
ABDOMINAL PAIN: 0
PALPITATIONS: 0
CONFUSION: 0
CHILLS: 0

## 2025-05-06 ASSESSMENT — LIFESTYLE VARIABLES
HOW MANY STANDARD DRINKS CONTAINING ALCOHOL DO YOU HAVE ON A TYPICAL DAY: PATIENT DOES NOT DRINK
HOW OFTEN DO YOU HAVE A DRINK CONTAINING ALCOHOL: NEVER
HOW OFTEN DO YOU HAVE SIX OR MORE DRINKS ON ONE OCCASION: NEVER
SKIP TO QUESTIONS 9-10: 1
AUDIT-C TOTAL SCORE: 0

## 2025-05-06 ASSESSMENT — PATIENT HEALTH QUESTIONNAIRE - PHQ9
SUM OF ALL RESPONSES TO PHQ9 QUESTIONS 1 & 2: 0
1. LITTLE INTEREST OR PLEASURE IN DOING THINGS: NOT AT ALL
2. FEELING DOWN, DEPRESSED OR HOPELESS: NOT AT ALL

## 2025-05-06 NOTE — PROGRESS NOTES
"Subjective   Patient ID: Ed Sanon is a 34 y.o. male who presents for Hospital Follow-up (Admitted 2025 - 2025 diabetic ketoacidosis.  Pt saw endo ).    Patient: Ed Sanon  : 1990  PCP: Henri Brumfield MD  MRN: [id number]  Program: Transitional Care Management  Status: Enrolled  Effective Dates: 2025 - present  Responsible Staff: Zuleyka Small RN  Social Drivers to be Addressed: Physical Activity, Social Connections, Stress      Ed Sanon is a 34 y.o. male presenting today for follow-up after being discharged from the hospital 11 days ago. The main problem requiring admission was Diabetic ketoacidosis without coma associated with type 1 diabetes mellitus. The discharge summary and/or Transitional Care Management documentation was reviewed. Medication reconciliation was performed as indicated via the \"Spencer as Reviewed\" timestamp.     Ed Sanon was contacted by Transitional Care Management services two days after his discharge. This encounter and supporting documentation was reviewed.  He is here for hosp discharge follow up. He was in the hosp 25-25 for DKA with type 1 DM and HTN. Reports he is doing better, reports his sx as stomach ache, HA, and other resolved; he is also following with endo at [facility] and saw them yesterday. Reports endo who is new to him thinks he is getting release both short & long acting at the same time and insulin has been adjusted per chart notes.  He was unable to fill the insulin yet.  Also he is wandering if we have more dexcom samples here in the office.    Chart reviewed, shows he has been in and out of the hospital multiple times in the last few months, 3 times just in April alone.  He was admitted from 25-4/3/25 then again from 2020 5/10/25 and recently 2025 to 2025 all the visits was secondary to DKA associated with type 1 diabetes.     Patient reports he has been using all prescribed insulin daily " "and discussed with russ during yesterday's visit. He has upcoming appt with endo next mo, 06/12/25.     Review of Systems   Constitutional:  Negative for chills, fatigue, fever and unexpected weight change.   HENT: Negative.     Respiratory:  Negative for cough, shortness of breath and wheezing.    Cardiovascular:  Negative for chest pain, palpitations and leg swelling.   Gastrointestinal:  Negative for abdominal pain, constipation, diarrhea, nausea and vomiting.   Musculoskeletal: Negative.    Skin:  Negative for color change and rash.   Neurological:  Negative for dizziness and headaches.   Psychiatric/Behavioral:  Negative for behavioral problems and confusion.        /88 (BP Location: Right arm, Patient Position: Sitting, BP Cuff Size: Adult)   Pulse 99   Temp 36 °C (96.8 °F) (Temporal)   Resp 16   Ht 1.778 m (5' 10\")   Wt 73.5 kg (162 lb)   SpO2 98%   BMI 23.24 kg/m²     Physical Exam  Vitals and nursing note reviewed.   Constitutional:       Appearance: Normal appearance.   Cardiovascular:      Rate and Rhythm: Normal rate and regular rhythm.      Pulses: Normal pulses.      Heart sounds: Normal heart sounds.   Pulmonary:      Effort: Pulmonary effort is normal.      Breath sounds: Normal breath sounds.   Abdominal:      General: Abdomen is flat. Bowel sounds are normal.      Palpations: Abdomen is soft.   Musculoskeletal:         General: Normal range of motion.   Neurological:      General: No focal deficit present.      Mental Status: He is alert.   Psychiatric:         Mood and Affect: Mood normal.         Behavior: Behavior normal.         The complexity of medical decision making for this patient's transitional care is moderate.    Assessment/Plan   EFJ pt here for hospital discharge follow-up.  Appears patient has poor compliance with meds/insulin usage and follow-up appointment.  Patient has been in and out of the hospital multiple times likely secondary to poor compliance with meds. Also " noted to have poorly controlled type 1 diabetes with A1c of 11.7 (4/25/25).    Highly encouraged to adhere with all insulin as prescribed by Endo and as adjusted during yesterday's visit.  Encouraged to use CGM as Dexcom daily to monitor blood sugar and follow calorie count and adjusting insulin as recommended by Endo.  Highly encouraged to follow low-carb/diabetic diet.  Patient was educated to call Endo for any issues with insulin immediately.  May consider referral to / as indicated in future. He is otherwise clinically and vitally stable.      Problem List Items Addressed This Visit           ICD-10-CM    Type 1 diabetes mellitus with hyperglycemia (Multi) E10.65     Other Visit Diagnoses         Codes      Hospital discharge follow-up    -  Primary Z09      History of diabetic ketoacidosis     Z86.39      Poorly controlled type 1 diabetes mellitus     E10.65      Poor compliance with medication     Z91.148

## 2025-05-07 ENCOUNTER — HOSPITAL ENCOUNTER (INPATIENT)
Facility: HOSPITAL | Age: 35
LOS: 1 days | Discharge: HOME | End: 2025-05-08
Attending: STUDENT IN AN ORGANIZED HEALTH CARE EDUCATION/TRAINING PROGRAM | Admitting: INTERNAL MEDICINE
Payer: MEDICAID

## 2025-05-07 ENCOUNTER — APPOINTMENT (OUTPATIENT)
Dept: RADIOLOGY | Facility: HOSPITAL | Age: 35
End: 2025-05-07
Payer: MEDICAID

## 2025-05-07 DIAGNOSIS — E53.8 VITAMIN B12 DEFICIENCY: Primary | ICD-10-CM

## 2025-05-07 DIAGNOSIS — N17.9 ACUTE KIDNEY INJURY: ICD-10-CM

## 2025-05-07 DIAGNOSIS — E10.10 TYPE 1 DIABETES MELLITUS WITH KETOACIDOSIS WITHOUT COMA: ICD-10-CM

## 2025-05-07 DIAGNOSIS — Z91.148 NONCOMPLIANCE WITH DIET AND MEDICATION REGIMEN: ICD-10-CM

## 2025-05-07 DIAGNOSIS — E10.10 DKA, TYPE 1, NOT AT GOAL: ICD-10-CM

## 2025-05-07 DIAGNOSIS — A41.9 SEPSIS WITHOUT ACUTE ORGAN DYSFUNCTION, DUE TO UNSPECIFIED ORGANISM (MULTI): ICD-10-CM

## 2025-05-07 DIAGNOSIS — E10.65 TYPE 1 DIABETES MELLITUS WITH HYPERGLYCEMIA (MULTI): ICD-10-CM

## 2025-05-07 DIAGNOSIS — Z91.199 NONCOMPLIANCE WITH DIET AND MEDICATION REGIMEN: ICD-10-CM

## 2025-05-07 DIAGNOSIS — E10.10 DIABETIC KETOACIDOSIS WITHOUT COMA ASSOCIATED WITH TYPE 1 DIABETES MELLITUS: ICD-10-CM

## 2025-05-07 LAB
ALBUMIN SERPL BCP-MCNC: 4.8 G/DL (ref 3.4–5)
ALP SERPL-CCNC: 110 U/L (ref 33–120)
ALT SERPL W P-5'-P-CCNC: 13 U/L (ref 10–52)
ANION GAP BLDV CALCULATED.4IONS-SCNC: 21 MMOL/L (ref 10–25)
ANION GAP BLDV CALCULATED.4IONS-SCNC: 21 MMOL/L (ref 10–25)
ANION GAP BLDV CALCULATED.4IONS-SCNC: 28 MMOL/L (ref 10–25)
ANION GAP BLDV CALCULATED.4IONS-SCNC: 31 MMOL/L (ref 10–25)
ANION GAP SERPL CALC-SCNC: 16 MMOL/L (ref 10–20)
ANION GAP SERPL CALC-SCNC: 20 MMOL/L (ref 10–20)
ANION GAP SERPL CALC-SCNC: 24 MMOL/L (ref 10–20)
ANION GAP SERPL CALC-SCNC: 35 MMOL/L (ref 10–20)
APPEARANCE UR: CLEAR
AST SERPL W P-5'-P-CCNC: 10 U/L (ref 9–39)
B-OH-BUTYR SERPL-SCNC: 12.76 MMOL/L (ref 0.02–0.27)
BASE EXCESS BLDV CALC-SCNC: -13.4 MMOL/L (ref -2–3)
BASE EXCESS BLDV CALC-SCNC: -16.5 MMOL/L (ref -2–3)
BASE EXCESS BLDV CALC-SCNC: -21.6 MMOL/L (ref -2–3)
BASE EXCESS BLDV CALC-SCNC: -26.3 MMOL/L (ref -2–3)
BASOPHILS # BLD AUTO: 0.13 X10*3/UL (ref 0–0.1)
BASOPHILS NFR BLD AUTO: 0.6 %
BILIRUB SERPL-MCNC: 0.4 MG/DL (ref 0–1.2)
BILIRUB UR STRIP.AUTO-MCNC: NEGATIVE MG/DL
BODY TEMPERATURE: 37 DEGREES CELSIUS
BUN SERPL-MCNC: 17 MG/DL (ref 6–23)
BUN SERPL-MCNC: 18 MG/DL (ref 6–23)
BUN SERPL-MCNC: 19 MG/DL (ref 6–23)
BUN SERPL-MCNC: 23 MG/DL (ref 6–23)
CA-I BLDV-SCNC: 1.22 MMOL/L (ref 1.1–1.33)
CA-I BLDV-SCNC: 1.27 MMOL/L (ref 1.1–1.33)
CA-I BLDV-SCNC: 1.28 MMOL/L (ref 1.1–1.33)
CA-I BLDV-SCNC: 1.28 MMOL/L (ref 1.1–1.33)
CALCIUM SERPL-MCNC: 8.3 MG/DL (ref 8.6–10.3)
CALCIUM SERPL-MCNC: 8.4 MG/DL (ref 8.6–10.3)
CALCIUM SERPL-MCNC: 8.4 MG/DL (ref 8.6–10.3)
CALCIUM SERPL-MCNC: 9.7 MG/DL (ref 8.6–10.3)
CHLORIDE BLDV-SCNC: 101 MMOL/L (ref 98–107)
CHLORIDE BLDV-SCNC: 107 MMOL/L (ref 98–107)
CHLORIDE BLDV-SCNC: 108 MMOL/L (ref 98–107)
CHLORIDE BLDV-SCNC: 108 MMOL/L (ref 98–107)
CHLORIDE SERPL-SCNC: 101 MMOL/L (ref 98–107)
CHLORIDE SERPL-SCNC: 112 MMOL/L (ref 98–107)
CO2 SERPL-SCNC: 11 MMOL/L (ref 21–32)
CO2 SERPL-SCNC: 14 MMOL/L (ref 21–32)
CO2 SERPL-SCNC: 7 MMOL/L (ref 21–32)
CO2 SERPL-SCNC: 9 MMOL/L (ref 21–32)
COLOR UR: COLORLESS
CREAT SERPL-MCNC: 0.83 MG/DL (ref 0.5–1.3)
CREAT SERPL-MCNC: 0.94 MG/DL (ref 0.5–1.3)
CREAT SERPL-MCNC: 1.09 MG/DL (ref 0.5–1.3)
CREAT SERPL-MCNC: 1.38 MG/DL (ref 0.5–1.3)
EGFRCR SERPLBLD CKD-EPI 2021: 68 ML/MIN/1.73M*2
EGFRCR SERPLBLD CKD-EPI 2021: 90 ML/MIN/1.73M*2
EGFRCR SERPLBLD CKD-EPI 2021: >90 ML/MIN/1.73M*2
EGFRCR SERPLBLD CKD-EPI 2021: >90 ML/MIN/1.73M*2
EOSINOPHIL # BLD AUTO: 0 X10*3/UL (ref 0–0.7)
EOSINOPHIL NFR BLD AUTO: 0 %
ERYTHROCYTE [DISTWIDTH] IN BLOOD BY AUTOMATED COUNT: 13.4 % (ref 11.5–14.5)
GLUCOSE BLD MANUAL STRIP-MCNC: 146 MG/DL (ref 74–99)
GLUCOSE BLD MANUAL STRIP-MCNC: 151 MG/DL (ref 74–99)
GLUCOSE BLD MANUAL STRIP-MCNC: 157 MG/DL (ref 74–99)
GLUCOSE BLD MANUAL STRIP-MCNC: 157 MG/DL (ref 74–99)
GLUCOSE BLD MANUAL STRIP-MCNC: 160 MG/DL (ref 74–99)
GLUCOSE BLD MANUAL STRIP-MCNC: 166 MG/DL (ref 74–99)
GLUCOSE BLD MANUAL STRIP-MCNC: 175 MG/DL (ref 74–99)
GLUCOSE BLD MANUAL STRIP-MCNC: 190 MG/DL (ref 74–99)
GLUCOSE BLD MANUAL STRIP-MCNC: 229 MG/DL (ref 74–99)
GLUCOSE BLD MANUAL STRIP-MCNC: 271 MG/DL (ref 74–99)
GLUCOSE BLD MANUAL STRIP-MCNC: 313 MG/DL (ref 74–99)
GLUCOSE BLD MANUAL STRIP-MCNC: 376 MG/DL (ref 74–99)
GLUCOSE BLD MANUAL STRIP-MCNC: 429 MG/DL (ref 74–99)
GLUCOSE BLD MANUAL STRIP-MCNC: 438 MG/DL (ref 74–99)
GLUCOSE BLDV-MCNC: 157 MG/DL (ref 74–99)
GLUCOSE BLDV-MCNC: 175 MG/DL (ref 74–99)
GLUCOSE BLDV-MCNC: 178 MG/DL (ref 74–99)
GLUCOSE BLDV-MCNC: 508 MG/DL (ref 74–99)
GLUCOSE SERPL-MCNC: 140 MG/DL (ref 74–99)
GLUCOSE SERPL-MCNC: 164 MG/DL (ref 74–99)
GLUCOSE SERPL-MCNC: 166 MG/DL (ref 74–99)
GLUCOSE SERPL-MCNC: 438 MG/DL (ref 74–99)
GLUCOSE UR STRIP.AUTO-MCNC: ABNORMAL MG/DL
HCO3 BLDV-SCNC: 10.6 MMOL/L (ref 22–26)
HCO3 BLDV-SCNC: 13.7 MMOL/L (ref 22–26)
HCO3 BLDV-SCNC: 5.7 MMOL/L (ref 22–26)
HCO3 BLDV-SCNC: 7 MMOL/L (ref 22–26)
HCT VFR BLD AUTO: 54.6 % (ref 41–52)
HCT VFR BLD EST: 47 % (ref 41–52)
HCT VFR BLD EST: 48 % (ref 41–52)
HCT VFR BLD EST: 50 % (ref 41–52)
HCT VFR BLD EST: 55 % (ref 41–52)
HGB BLD-MCNC: 17.8 G/DL (ref 13.5–17.5)
HGB BLDV-MCNC: 15.6 G/DL (ref 13.5–17.5)
HGB BLDV-MCNC: 16.1 G/DL (ref 13.5–17.5)
HGB BLDV-MCNC: 16.5 G/DL (ref 13.5–17.5)
HGB BLDV-MCNC: 18.4 G/DL (ref 13.5–17.5)
HOLD SPECIMEN: 293
HYALINE CASTS #/AREA URNS AUTO: ABNORMAL /LPF
IMM GRANULOCYTES # BLD AUTO: 0.23 X10*3/UL (ref 0–0.7)
IMM GRANULOCYTES NFR BLD AUTO: 1.1 % (ref 0–0.9)
INHALED O2 CONCENTRATION: 21 %
KETONES UR STRIP.AUTO-MCNC: ABNORMAL MG/DL
LACTATE BLDV-SCNC: 1.6 MMOL/L (ref 0.4–2)
LACTATE BLDV-SCNC: 1.7 MMOL/L (ref 0.4–2)
LACTATE BLDV-SCNC: 2.8 MMOL/L (ref 0.4–2)
LACTATE BLDV-SCNC: 3.7 MMOL/L (ref 0.4–2)
LACTATE BLDV-SCNC: 4.4 MMOL/L (ref 0.4–2)
LEUKOCYTE ESTERASE UR QL STRIP.AUTO: NEGATIVE
LYMPHOCYTES # BLD AUTO: 1.57 X10*3/UL (ref 1.2–4.8)
LYMPHOCYTES NFR BLD AUTO: 7.4 %
MAGNESIUM SERPL-MCNC: 1.79 MG/DL (ref 1.6–2.4)
MAGNESIUM SERPL-MCNC: 2.23 MG/DL (ref 1.6–2.4)
MCH RBC QN AUTO: 31.3 PG (ref 26–34)
MCHC RBC AUTO-ENTMCNC: 32.6 G/DL (ref 32–36)
MCV RBC AUTO: 96 FL (ref 80–100)
MONOCYTES # BLD AUTO: 1.18 X10*3/UL (ref 0.1–1)
MONOCYTES NFR BLD AUTO: 5.5 %
MUCOUS THREADS #/AREA URNS AUTO: ABNORMAL /LPF
NEUTROPHILS # BLD AUTO: 18.2 X10*3/UL (ref 1.2–7.7)
NEUTROPHILS NFR BLD AUTO: 85.4 %
NITRITE UR QL STRIP.AUTO: NEGATIVE
NRBC BLD-RTO: 0 /100 WBCS (ref 0–0)
OSMOLALITY SERPL: 347 MOSM/KG (ref 280–300)
OXYHGB MFR BLDV: 70.8 % (ref 45–75)
OXYHGB MFR BLDV: 76.2 % (ref 45–75)
OXYHGB MFR BLDV: 76.3 % (ref 45–75)
OXYHGB MFR BLDV: 84 % (ref 45–75)
PCO2 BLDV: 24 MM HG (ref 41–51)
PCO2 BLDV: 28 MM HG (ref 41–51)
PCO2 BLDV: 29 MM HG (ref 41–51)
PCO2 BLDV: 35 MM HG (ref 41–51)
PH BLDV: 6.92 PH (ref 7.33–7.43)
PH BLDV: 7.07 PH (ref 7.33–7.43)
PH BLDV: 7.17 PH (ref 7.33–7.43)
PH BLDV: 7.2 PH (ref 7.33–7.43)
PH UR STRIP.AUTO: 5.5 [PH]
PHOSPHATE SERPL-MCNC: 2 MG/DL (ref 2.5–4.9)
PHOSPHATE SERPL-MCNC: 4.6 MG/DL (ref 2.5–4.9)
PLATELET # BLD AUTO: 246 X10*3/UL (ref 150–450)
PO2 BLDV: 40 MM HG (ref 35–45)
PO2 BLDV: 42 MM HG (ref 35–45)
PO2 BLDV: 50 MM HG (ref 35–45)
PO2 BLDV: 53 MM HG (ref 35–45)
POTASSIUM BLDV-SCNC: 3.9 MMOL/L (ref 3.5–5.3)
POTASSIUM BLDV-SCNC: 4.2 MMOL/L (ref 3.5–5.3)
POTASSIUM BLDV-SCNC: 4.2 MMOL/L (ref 3.5–5.3)
POTASSIUM BLDV-SCNC: 5.1 MMOL/L (ref 3.5–5.3)
POTASSIUM SERPL-SCNC: 3.7 MMOL/L (ref 3.5–5.3)
POTASSIUM SERPL-SCNC: 3.9 MMOL/L (ref 3.5–5.3)
POTASSIUM SERPL-SCNC: 4 MMOL/L (ref 3.5–5.3)
POTASSIUM SERPL-SCNC: 5 MMOL/L (ref 3.5–5.3)
PROT SERPL-MCNC: 8 G/DL (ref 6.4–8.2)
PROT UR STRIP.AUTO-MCNC: ABNORMAL MG/DL
RBC # BLD AUTO: 5.69 X10*6/UL (ref 4.5–5.9)
RBC # UR STRIP.AUTO: NEGATIVE MG/DL
RBC #/AREA URNS AUTO: ABNORMAL /HPF
SAO2 % BLDV: 72 % (ref 45–75)
SAO2 % BLDV: 77 % (ref 45–75)
SAO2 % BLDV: 78 % (ref 45–75)
SAO2 % BLDV: 85 % (ref 45–75)
SODIUM BLDV-SCNC: 133 MMOL/L (ref 136–145)
SODIUM BLDV-SCNC: 135 MMOL/L (ref 136–145)
SODIUM BLDV-SCNC: 137 MMOL/L (ref 136–145)
SODIUM BLDV-SCNC: 139 MMOL/L (ref 136–145)
SODIUM SERPL-SCNC: 138 MMOL/L (ref 136–145)
SODIUM SERPL-SCNC: 138 MMOL/L (ref 136–145)
SODIUM SERPL-SCNC: 139 MMOL/L (ref 136–145)
SODIUM SERPL-SCNC: 141 MMOL/L (ref 136–145)
SP GR UR STRIP.AUTO: >1.03
UROBILINOGEN UR STRIP.AUTO-MCNC: NORMAL MG/DL
WBC # BLD AUTO: 21.3 X10*3/UL (ref 4.4–11.3)
WBC #/AREA URNS AUTO: ABNORMAL /HPF

## 2025-05-07 PROCEDURE — 2020000001 HC ICU ROOM DAILY

## 2025-05-07 PROCEDURE — 84100 ASSAY OF PHOSPHORUS: CPT | Performed by: INTERNAL MEDICINE

## 2025-05-07 PROCEDURE — 84100 ASSAY OF PHOSPHORUS: CPT | Performed by: STUDENT IN AN ORGANIZED HEALTH CARE EDUCATION/TRAINING PROGRAM

## 2025-05-07 PROCEDURE — 81003 URINALYSIS AUTO W/O SCOPE: CPT | Performed by: STUDENT IN AN ORGANIZED HEALTH CARE EDUCATION/TRAINING PROGRAM

## 2025-05-07 PROCEDURE — 80048 BASIC METABOLIC PNL TOTAL CA: CPT | Mod: CCI | Performed by: STUDENT IN AN ORGANIZED HEALTH CARE EDUCATION/TRAINING PROGRAM

## 2025-05-07 PROCEDURE — 82947 ASSAY GLUCOSE BLOOD QUANT: CPT

## 2025-05-07 PROCEDURE — 84295 ASSAY OF SERUM SODIUM: CPT | Performed by: INTERNAL MEDICINE

## 2025-05-07 PROCEDURE — 2500000004 HC RX 250 GENERAL PHARMACY W/ HCPCS (ALT 636 FOR OP/ED): Mod: JZ

## 2025-05-07 PROCEDURE — 2500000004 HC RX 250 GENERAL PHARMACY W/ HCPCS (ALT 636 FOR OP/ED): Mod: JZ | Performed by: STUDENT IN AN ORGANIZED HEALTH CARE EDUCATION/TRAINING PROGRAM

## 2025-05-07 PROCEDURE — 96361 HYDRATE IV INFUSION ADD-ON: CPT

## 2025-05-07 PROCEDURE — 85018 HEMOGLOBIN: CPT | Performed by: STUDENT IN AN ORGANIZED HEALTH CARE EDUCATION/TRAINING PROGRAM

## 2025-05-07 PROCEDURE — 2500000004 HC RX 250 GENERAL PHARMACY W/ HCPCS (ALT 636 FOR OP/ED): Mod: JZ | Performed by: INTERNAL MEDICINE

## 2025-05-07 PROCEDURE — 96365 THER/PROPH/DIAG IV INF INIT: CPT

## 2025-05-07 PROCEDURE — 74176 CT ABD & PELVIS W/O CONTRAST: CPT

## 2025-05-07 PROCEDURE — 87040 BLOOD CULTURE FOR BACTERIA: CPT | Mod: PORLAB | Performed by: STUDENT IN AN ORGANIZED HEALTH CARE EDUCATION/TRAINING PROGRAM

## 2025-05-07 PROCEDURE — 74176 CT ABD & PELVIS W/O CONTRAST: CPT | Performed by: RADIOLOGY

## 2025-05-07 PROCEDURE — 83605 ASSAY OF LACTIC ACID: CPT | Performed by: STUDENT IN AN ORGANIZED HEALTH CARE EDUCATION/TRAINING PROGRAM

## 2025-05-07 PROCEDURE — 82435 ASSAY OF BLOOD CHLORIDE: CPT | Performed by: INTERNAL MEDICINE

## 2025-05-07 PROCEDURE — 2500000005 HC RX 250 GENERAL PHARMACY W/O HCPCS

## 2025-05-07 PROCEDURE — 71250 CT THORAX DX C-: CPT | Performed by: RADIOLOGY

## 2025-05-07 PROCEDURE — 99223 1ST HOSP IP/OBS HIGH 75: CPT | Performed by: INTERNAL MEDICINE

## 2025-05-07 PROCEDURE — 83735 ASSAY OF MAGNESIUM: CPT | Performed by: STUDENT IN AN ORGANIZED HEALTH CARE EDUCATION/TRAINING PROGRAM

## 2025-05-07 PROCEDURE — 99291 CRITICAL CARE FIRST HOUR: CPT | Mod: 25 | Performed by: STUDENT IN AN ORGANIZED HEALTH CARE EDUCATION/TRAINING PROGRAM

## 2025-05-07 PROCEDURE — 96375 TX/PRO/DX INJ NEW DRUG ADDON: CPT

## 2025-05-07 PROCEDURE — 83735 ASSAY OF MAGNESIUM: CPT

## 2025-05-07 PROCEDURE — 82010 KETONE BODYS QUAN: CPT | Performed by: STUDENT IN AN ORGANIZED HEALTH CARE EDUCATION/TRAINING PROGRAM

## 2025-05-07 PROCEDURE — 99253 IP/OBS CNSLTJ NEW/EST LOW 45: CPT | Performed by: INTERNAL MEDICINE

## 2025-05-07 PROCEDURE — 83930 ASSAY OF BLOOD OSMOLALITY: CPT | Mod: PORLAB | Performed by: STUDENT IN AN ORGANIZED HEALTH CARE EDUCATION/TRAINING PROGRAM

## 2025-05-07 PROCEDURE — 36415 COLL VENOUS BLD VENIPUNCTURE: CPT | Performed by: STUDENT IN AN ORGANIZED HEALTH CARE EDUCATION/TRAINING PROGRAM

## 2025-05-07 PROCEDURE — 85025 COMPLETE CBC W/AUTO DIFF WBC: CPT | Performed by: STUDENT IN AN ORGANIZED HEALTH CARE EDUCATION/TRAINING PROGRAM

## 2025-05-07 RX ORDER — ONDANSETRON HYDROCHLORIDE 2 MG/ML
4 INJECTION, SOLUTION INTRAVENOUS EVERY 8 HOURS PRN
Status: DISCONTINUED | OUTPATIENT
Start: 2025-05-07 | End: 2025-05-08 | Stop reason: HOSPADM

## 2025-05-07 RX ORDER — DEXTROSE MONOHYDRATE AND SODIUM CHLORIDE 5; .45 G/100ML; G/100ML
150 INJECTION, SOLUTION INTRAVENOUS CONTINUOUS PRN
Status: DISCONTINUED | OUTPATIENT
Start: 2025-05-07 | End: 2025-05-08

## 2025-05-07 RX ORDER — GUAIFENESIN 600 MG/1
600 TABLET, EXTENDED RELEASE ORAL EVERY 12 HOURS PRN
Status: DISCONTINUED | OUTPATIENT
Start: 2025-05-07 | End: 2025-05-08 | Stop reason: HOSPADM

## 2025-05-07 RX ORDER — PROCHLORPERAZINE EDISYLATE 5 MG/ML
10 INJECTION INTRAMUSCULAR; INTRAVENOUS EVERY 6 HOURS PRN
Status: DISCONTINUED | OUTPATIENT
Start: 2025-05-07 | End: 2025-05-08 | Stop reason: HOSPADM

## 2025-05-07 RX ORDER — PROCHLORPERAZINE MALEATE 10 MG
10 TABLET ORAL EVERY 6 HOURS PRN
Status: DISCONTINUED | OUTPATIENT
Start: 2025-05-07 | End: 2025-05-08 | Stop reason: HOSPADM

## 2025-05-07 RX ORDER — ACETAMINOPHEN 650 MG/1
650 SUPPOSITORY RECTAL EVERY 6 HOURS PRN
Status: DISCONTINUED | OUTPATIENT
Start: 2025-05-07 | End: 2025-05-08 | Stop reason: HOSPADM

## 2025-05-07 RX ORDER — DEXTROSE MONOHYDRATE 100 MG/ML
150 INJECTION, SOLUTION INTRAVENOUS CONTINUOUS PRN
Status: DISCONTINUED | OUTPATIENT
Start: 2025-05-07 | End: 2025-05-08

## 2025-05-07 RX ORDER — DEXTROSE 50 % IN WATER (D50W) INTRAVENOUS SYRINGE
50
Status: DISCONTINUED | OUTPATIENT
Start: 2025-05-07 | End: 2025-05-08

## 2025-05-07 RX ORDER — ONDANSETRON 4 MG/1
4 TABLET, FILM COATED ORAL EVERY 8 HOURS PRN
Status: DISCONTINUED | OUTPATIENT
Start: 2025-05-07 | End: 2025-05-08 | Stop reason: HOSPADM

## 2025-05-07 RX ORDER — ACETAMINOPHEN 325 MG/1
650 TABLET ORAL EVERY 6 HOURS PRN
Status: DISCONTINUED | OUTPATIENT
Start: 2025-05-07 | End: 2025-05-08 | Stop reason: HOSPADM

## 2025-05-07 RX ORDER — PROCHLORPERAZINE 25 MG/1
25 SUPPOSITORY RECTAL EVERY 12 HOURS PRN
Status: DISCONTINUED | OUTPATIENT
Start: 2025-05-07 | End: 2025-05-08 | Stop reason: HOSPADM

## 2025-05-07 RX ORDER — SODIUM CHLORIDE 450 MG/100ML
250 INJECTION, SOLUTION INTRAVENOUS CONTINUOUS
Status: DISCONTINUED | OUTPATIENT
Start: 2025-05-07 | End: 2025-05-08

## 2025-05-07 RX ORDER — ACETAMINOPHEN 160 MG/5ML
650 SUSPENSION ORAL EVERY 6 HOURS PRN
Status: DISCONTINUED | OUTPATIENT
Start: 2025-05-07 | End: 2025-05-08 | Stop reason: HOSPADM

## 2025-05-07 RX ORDER — POLYETHYLENE GLYCOL 3350 17 G/17G
17 POWDER, FOR SOLUTION ORAL DAILY PRN
Status: DISCONTINUED | OUTPATIENT
Start: 2025-05-07 | End: 2025-05-08 | Stop reason: HOSPADM

## 2025-05-07 RX ADMIN — INSULIN HUMAN 2.7 UNITS/HR: 1 INJECTION, SOLUTION INTRAVENOUS at 23:07

## 2025-05-07 RX ADMIN — DEXTROSE 150 ML/HR: 10 SOLUTION INTRAVENOUS at 16:29

## 2025-05-07 RX ADMIN — ONDANSETRON 4 MG: 2 INJECTION INTRAMUSCULAR; INTRAVENOUS at 18:11

## 2025-05-07 RX ADMIN — POTASSIUM PHOSPHATE, MONOBASIC AND POTASSIUM PHOSPHATE, DIBASIC 15 MMOL: 224; 236 INJECTION, SOLUTION, CONCENTRATE INTRAVENOUS at 21:25

## 2025-05-07 RX ADMIN — DEXTROSE AND SODIUM CHLORIDE 150 ML/HR: 5; 450 INJECTION, SOLUTION INTRAVENOUS at 15:48

## 2025-05-07 RX ADMIN — SODIUM CHLORIDE 250 ML/HR: 0.45 INJECTION, SOLUTION INTRAVENOUS at 11:58

## 2025-05-07 RX ADMIN — INSULIN HUMAN 10.6 UNITS/HR: 1 INJECTION, SOLUTION INTRAVENOUS at 11:17

## 2025-05-07 RX ADMIN — SODIUM CHLORIDE, SODIUM LACTATE, POTASSIUM CHLORIDE, AND CALCIUM CHLORIDE 2000 ML: .6; .31; .03; .02 INJECTION, SOLUTION INTRAVENOUS at 10:16

## 2025-05-07 RX ADMIN — PIPERACILLIN SODIUM AND TAZOBACTAM SODIUM 4.5 G: 4; .5 INJECTION, SOLUTION INTRAVENOUS at 11:04

## 2025-05-07 SDOH — ECONOMIC STABILITY: HOUSING INSECURITY: IN THE LAST 12 MONTHS, WAS THERE A TIME WHEN YOU WERE NOT ABLE TO PAY THE MORTGAGE OR RENT ON TIME?: NO

## 2025-05-07 SDOH — ECONOMIC STABILITY: HOUSING INSECURITY: AT ANY TIME IN THE PAST 12 MONTHS, WERE YOU HOMELESS OR LIVING IN A SHELTER (INCLUDING NOW)?: NO

## 2025-05-07 SDOH — ECONOMIC STABILITY: FOOD INSECURITY: HOW HARD IS IT FOR YOU TO PAY FOR THE VERY BASICS LIKE FOOD, HOUSING, MEDICAL CARE, AND HEATING?: NOT HARD AT ALL

## 2025-05-07 SDOH — SOCIAL STABILITY: SOCIAL INSECURITY: WITHIN THE LAST YEAR, HAVE YOU BEEN HUMILIATED OR EMOTIONALLY ABUSED IN OTHER WAYS BY YOUR PARTNER OR EX-PARTNER?: NO

## 2025-05-07 SDOH — SOCIAL STABILITY: SOCIAL INSECURITY: WITHIN THE LAST YEAR, HAVE YOU BEEN AFRAID OF YOUR PARTNER OR EX-PARTNER?: NO

## 2025-05-07 SDOH — HEALTH STABILITY: PHYSICAL HEALTH: ON AVERAGE, HOW MANY MINUTES DO YOU ENGAGE IN EXERCISE AT THIS LEVEL?: 0 MIN

## 2025-05-07 SDOH — ECONOMIC STABILITY: FOOD INSECURITY: WITHIN THE PAST 12 MONTHS, YOU WORRIED THAT YOUR FOOD WOULD RUN OUT BEFORE YOU GOT THE MONEY TO BUY MORE.: NEVER TRUE

## 2025-05-07 SDOH — ECONOMIC STABILITY: FOOD INSECURITY: WITHIN THE PAST 12 MONTHS, THE FOOD YOU BOUGHT JUST DIDN'T LAST AND YOU DIDN'T HAVE MONEY TO GET MORE.: NEVER TRUE

## 2025-05-07 SDOH — SOCIAL STABILITY: SOCIAL INSECURITY: HAVE YOU HAD THOUGHTS OF HARMING ANYONE ELSE?: NO

## 2025-05-07 SDOH — HEALTH STABILITY: PHYSICAL HEALTH: ON AVERAGE, HOW MANY DAYS PER WEEK DO YOU ENGAGE IN MODERATE TO STRENUOUS EXERCISE (LIKE A BRISK WALK)?: 0 DAYS

## 2025-05-07 SDOH — HEALTH STABILITY: MENTAL HEALTH: HOW OFTEN DO YOU HAVE A DRINK CONTAINING ALCOHOL?: NEVER

## 2025-05-07 SDOH — HEALTH STABILITY: MENTAL HEALTH: HOW MANY DRINKS CONTAINING ALCOHOL DO YOU HAVE ON A TYPICAL DAY WHEN YOU ARE DRINKING?: PATIENT DOES NOT DRINK

## 2025-05-07 SDOH — ECONOMIC STABILITY: INCOME INSECURITY: IN THE PAST 12 MONTHS HAS THE ELECTRIC, GAS, OIL, OR WATER COMPANY THREATENED TO SHUT OFF SERVICES IN YOUR HOME?: NO

## 2025-05-07 SDOH — HEALTH STABILITY: MENTAL HEALTH: HOW OFTEN DO YOU HAVE SIX OR MORE DRINKS ON ONE OCCASION?: NEVER

## 2025-05-07 SDOH — ECONOMIC STABILITY: TRANSPORTATION INSECURITY: IN THE PAST 12 MONTHS, HAS LACK OF TRANSPORTATION KEPT YOU FROM MEDICAL APPOINTMENTS OR FROM GETTING MEDICATIONS?: NO

## 2025-05-07 SDOH — ECONOMIC STABILITY: HOUSING INSECURITY: IN THE PAST 12 MONTHS, HOW MANY TIMES HAVE YOU MOVED WHERE YOU WERE LIVING?: 0

## 2025-05-07 SDOH — SOCIAL STABILITY: SOCIAL INSECURITY: WERE YOU ABLE TO COMPLETE ALL THE BEHAVIORAL HEALTH SCREENINGS?: YES

## 2025-05-07 ASSESSMENT — ACTIVITIES OF DAILY LIVING (ADL)
JUDGMENT_ADEQUATE_SAFELY_COMPLETE_DAILY_ACTIVITIES: YES
DRESSING YOURSELF: INDEPENDENT
ADEQUATE_TO_COMPLETE_ADL: YES
LACK_OF_TRANSPORTATION: NO
GROOMING: INDEPENDENT
LACK_OF_TRANSPORTATION: NO
TOILETING: INDEPENDENT
BATHING: INDEPENDENT
FEEDING YOURSELF: INDEPENDENT
HEARING - RIGHT EAR: FUNCTIONAL
HEARING - LEFT EAR: FUNCTIONAL
PATIENT'S MEMORY ADEQUATE TO SAFELY COMPLETE DAILY ACTIVITIES?: YES
WALKS IN HOME: INDEPENDENT

## 2025-05-07 ASSESSMENT — PAIN DESCRIPTION - DIRECTION: RADIATING_TOWARDS: ALL OVER

## 2025-05-07 ASSESSMENT — COGNITIVE AND FUNCTIONAL STATUS - GENERAL
PATIENT BASELINE BEDBOUND: NO
DAILY ACTIVITIY SCORE: 24
MOBILITY SCORE: 24

## 2025-05-07 ASSESSMENT — LIFESTYLE VARIABLES
HOW OFTEN DO YOU HAVE 6 OR MORE DRINKS ON ONE OCCASION: NEVER
SUBSTANCE_ABUSE_PAST_12_MONTHS: NO
PRESCIPTION_ABUSE_PAST_12_MONTHS: NO
SKIP TO QUESTIONS 9-10: 1
AUDIT-C TOTAL SCORE: 0
AUDIT-C TOTAL SCORE: 0
SKIP TO QUESTIONS 9-10: 1
EVER FELT BAD OR GUILTY ABOUT YOUR DRINKING: NO
HAVE YOU EVER FELT YOU SHOULD CUT DOWN ON YOUR DRINKING: NO
HAVE PEOPLE ANNOYED YOU BY CRITICIZING YOUR DRINKING: NO
AUDIT-C TOTAL SCORE: 0
TOTAL SCORE: 0
HOW OFTEN DO YOU HAVE A DRINK CONTAINING ALCOHOL: NEVER
EVER HAD A DRINK FIRST THING IN THE MORNING TO STEADY YOUR NERVES TO GET RID OF A HANGOVER: NO
HOW MANY STANDARD DRINKS CONTAINING ALCOHOL DO YOU HAVE ON A TYPICAL DAY: PATIENT DOES NOT DRINK

## 2025-05-07 ASSESSMENT — PAIN SCALES - GENERAL
PAINLEVEL_OUTOF10: 7
PAINLEVEL_OUTOF10: 0 - NO PAIN
PAINLEVEL_OUTOF10: 0 - NO PAIN

## 2025-05-07 ASSESSMENT — PAIN - FUNCTIONAL ASSESSMENT
PAIN_FUNCTIONAL_ASSESSMENT: 0-10

## 2025-05-07 NOTE — CARE PLAN
Problem: Pain - Adult  Goal: Verbalizes/displays adequate comfort level or baseline comfort level  Outcome: Progressing   The patient's goals for the shift include      The clinical goals for the shift include Patients anion gap will close prior to end of shift    Over the shift, the patient did not make progress toward the following goals. Barriers to progression include patient gap still open. Patient still requiring insulin.

## 2025-05-07 NOTE — PROGRESS NOTES
"Ed Sanon is a 34 y.o. male admitted for Diabetic ketoacidosis without coma associated with type 1 diabetes mellitus. Pharmacy reviewed the patient's [id number] medications and allergies for accuracy.    The list below reflects the PTA list prior to pharmacy medication history. A summary a changes to the PTA medication list has been listed below. Please review each medication in order reconciliation for additional clarification and justification.    Source of information:  T2P  AFTER VISIT SUMMARY    Medications added:    Medications modified:      Medications to be removed:    Medications of concern:  NO CHANGES      Prior to Admission Medications   Prescriptions Last Dose Informant Patient Reported? Taking?   BD Insulin Syringe Ultra-Fine 0.5 mL 31 gauge x 5/16\" syringe   Yes No   Dexcom G7  misc   Yes No   Sig: Inject 1 Device under the skin if needed (poorly controlled type 1 diabetes). Use as instructed   Dexcom G7 Sensor device   Yes No   Si each every 10 (ten) days.   Gvoke HypoPen 1-Pack 1 mg/0.2 mL auto-injector   Yes No   OneTouch Ultra Test strip   Yes No   Sig: USE 1 STRIP TO CHECK GLUCOSE 4 TIMES DAILY AS DIRECTED   OneTouch Ultra2 Meter misc   Yes No   Sig: use as directed   alcohol swabs   Yes No   insulin NPH, Isophane, (HumuLIN N,NovoLIN N) 100 unit/mL injection   No No   Sig: Inject 22 Units under the skin every 12 hours. Take as directed per insulin instructions.   insulin aspart (NovoLOG) 100 unit/mL injection   Yes No   Sig: Inject 6 Units under the skin.   insulin lispro (HumaLOG) 100 unit/mL pen   No No   Sig: Inject 5 Units under the skin 3 times a day before meals. Inject 3 times per day as directed.   insulin lispro (HumaLOG) 100 unit/mL pen   No No   Sig: Inject 5 Units under the skin 3 times a day before meals with sliding scale as directed.  Hypoglycemia protocol Call LIP unit(s) if Blood Glucose is between 0 - 70 mg/dL     0 unit(s) if Blood glucose is between " "   1 unit(s) if Blood glucose is between 151-200   2 unit(s) if Blood glucose is between 201-250   3 unit(s) if Blood glucose is between 251-300   4 unit(s) if Blood glucose is between 301-350   5 unit(s) if Blood glucose is between 351-400    If blood glucose is greater than 400 mg/dL, give max insulin per sliding scale AND then contact provider.   lancets misc   Yes No   Sig: Inject 1 Units under the skin 2 times a day. Sliding scale   pen needle, diabetic (BD Ultra-Fine Claire Pen Needle) 32 gauge x 5/32\" needle   No No   Sig: Use with insulin pens 4 times daily.   pen needle, diabetic 32 gauge x 5/32\" needle   Yes No   Sig: Use as instructed 4 times daily      Facility-Administered Medications: None       Nanette Santoro       "

## 2025-05-07 NOTE — ASSESSMENT & PLAN NOTE
Diabetic ketoacidosis secondary to medication noncompliance  Admit under inpatient level of care as anticipate that patient will require greater than 2 midnight stay  DKA power plan initiated by ER will be continued  Monitor BMP until gap is closed  Aggressive IV fluid resuscitation  Again importance of medication compliance has been discussed with patient

## 2025-05-07 NOTE — CONSULTS
"Critical Care Medicine Consult      Reason For Consult  dka    History Of Present Illness  Ed Sanon is a 34 y.o. male longstanding history of diabetes type 1 whose had multiple recurrent admission for DKA.  Most recently discharged from the hospital on 4/25/2025.    Pt did see endocrinology at [facility] on 5/5/25, was supposed to be on insulin apart 6 units tid with meals, insulin degludec 15 units nightly.    However he states there needs to be prior auth and due to insurance issues only has been taking his NPH 22 units bid.    He came to ED complaining of n/v since 3AM.  In ED found to be in dka and transfer to ICU for further management.    Past Medical History:   Diagnosis Date    DKA (diabetic ketoacidosis) (Multi)     recurrent    HTN (hypertension)     Type I diabetes mellitus (Multi)      No past surgical history on file.  Medications Prior to Admission   Medication Sig Dispense Refill Last Dose/Taking    alcohol swabs    Unknown    BD Insulin Syringe Ultra-Fine 0.5 mL 31 gauge x 5/16\" syringe    Unknown    Dexcom G7  misc Inject 1 Device under the skin if needed (poorly controlled type 1 diabetes). Use as instructed   Unknown    Dexcom G7 Sensor device 1 each every 10 (ten) days.   Unknown    Gvoke HypoPen 1-Pack 1 mg/0.2 mL auto-injector    Unknown    insulin aspart (NovoLOG) 100 unit/mL injection Inject 5 Units under the skin.       insulin lispro (HumaLOG) 100 unit/mL pen Inject 5 Units under the skin 3 times a day before meals. Inject 3 times per day as directed. 15 mL 11     insulin lispro (HumaLOG) 100 unit/mL pen Inject 5 Units under the skin 3 times a day before meals with sliding scale as directed.  Hypoglycemia protocol Call LIP unit(s) if Blood Glucose is between 0 - 70 mg/dL     0 unit(s) if Blood glucose is between    1 unit(s) if Blood glucose is between 151-200   2 unit(s) if Blood glucose is between 201-250   3 unit(s) if Blood glucose is between 251-300   4 unit(s) if " "Blood glucose is between 301-350   5 unit(s) if Blood glucose is between 351-400    If blood glucose is greater than 400 mg/dL, give max insulin per sliding scale AND then contact provider. 15 mL 11 Unknown    insulin NPH, Isophane, (HumuLIN N,NovoLIN N) 100 unit/mL injection Inject 22 Units under the skin every 12 hours. Take as directed per insulin instructions. 13.2 mL 11     lancets misc Inject 1 Units under the skin 2 times a day. Sliding scale   Unknown    OneTouch Ultra Test strip USE 1 STRIP TO CHECK GLUCOSE 4 TIMES DAILY AS DIRECTED       OneTouch Ultra2 Meter misc use as directed       pen needle, diabetic (BD Ultra-Fine Claire Pen Needle) 32 gauge x 5/32\" needle Use with insulin pens 4 times daily. 100 each 0     pen needle, diabetic 32 gauge x 5/32\" needle Use as instructed 4 times daily        Patient has no known allergies.  Social History     Tobacco Use    Smoking status: Never     Passive exposure: Never    Smokeless tobacco: Never   Vaping Use    Vaping status: Never Used   Substance Use Topics    Alcohol use: Never    Drug use: Never     Family History   Problem Relation Name Age of Onset    No Known Problems Mother      No Known Problems Father      Hypertension Other      Coronary artery disease Other      Diabetes Other      Heart failure Other         Scheduled Medications:           Continuous Medications:   dextrose 10 % in water (D10W), 150 mL/hr  dextrose 10 % in water (D10W), 150 mL/hr  dextrose 5%-0.45 % sodium chloride, 150 mL/hr  insulin regular, 0-50 Units/hr, Last Rate: 10.6 Units/hr (05/07/25 1517)  sodium chloride, 250 mL/hr, Last Rate: 250 mL/hr (05/07/25 1411)         PRN Medications:   PRN medications: acetaminophen **OR** acetaminophen **OR** acetaminophen, benzocaine-menthol, dextrose 10 % in water (D10W), dextrose 10 % in water (D10W), dextrose 5%-0.45 % sodium chloride, dextrose, guaiFENesin, ondansetron **OR** ondansetron, polyethylene glycol, prochlorperazine **OR** " prochlorperazine **OR** prochlorperazine    Review of Systems:  Review of Systems 12 view of systems negative other than stated above    Objective   Vitals:  Most Recent:  Vitals:    05/07/25 1533   BP:    Pulse:    Resp:    Temp: 36.6 °C (97.9 °F)   SpO2:        24hr Min/Max:  Temp  Min: 36 °C (96.8 °F)  Max: 36.7 °C (98 °F)  Pulse  Min: 99  Max: 130  BP  Min: 116/92  Max: 154/79  Resp  Min: 15  Max: 30  SpO2  Min: 97 %  Max: 100 %    LDA:          Vent settings:       Hemodynamic parameters for last 24 hours:       No intake or output data in the 24 hours ending 05/07/25 1544        Physical exam:    Physical Exam  Vitals and nursing note reviewed.   Constitutional:       Comments: Fatigue, but conversing following commands   HENT:      Head: Normocephalic and atraumatic.      Mouth/Throat:      Mouth: Mucous membranes are dry.   Eyes:      Pupils: Pupils are equal, round, and reactive to light.   Cardiovascular:      Rate and Rhythm: Regular rhythm. Tachycardia present.   Pulmonary:      Effort: Pulmonary effort is normal.      Breath sounds: Normal breath sounds.   Abdominal:      General: There is no distension.      Palpations: Abdomen is soft.      Tenderness: There is no abdominal tenderness.   Musculoskeletal:         General: No swelling.   Skin:     General: Skin is dry.      Capillary Refill: Capillary refill takes more than 3 seconds.   Neurological:      General: No focal deficit present.      Mental Status: He is alert and oriented to person, place, and time.   Psychiatric:         Mood and Affect: Mood normal.         Behavior: Behavior normal.          Lab/Radiology/Diagnostic Review:  Results for orders placed or performed during the hospital encounter of 05/07/25 (from the past 24 hours)   POCT GLUCOSE   Result Value Ref Range    POCT Glucose 429 (H) 74 - 99 mg/dL   CBC and Auto Differential   Result Value Ref Range    WBC 21.3 (H) 4.4 - 11.3 x10*3/uL    nRBC 0.0 0.0 - 0.0 /100 WBCs    RBC 5.69  4.50 - 5.90 x10*6/uL    Hemoglobin 17.8 (H) 13.5 - 17.5 g/dL    Hematocrit 54.6 (H) 41.0 - 52.0 %    MCV 96 80 - 100 fL    MCH 31.3 26.0 - 34.0 pg    MCHC 32.6 32.0 - 36.0 g/dL    RDW 13.4 11.5 - 14.5 %    Platelets 246 150 - 450 x10*3/uL    Neutrophils % 85.4 40.0 - 80.0 %    Immature Granulocytes %, Automated 1.1 (H) 0.0 - 0.9 %    Lymphocytes % 7.4 13.0 - 44.0 %    Monocytes % 5.5 2.0 - 10.0 %    Eosinophils % 0.0 0.0 - 6.0 %    Basophils % 0.6 0.0 - 2.0 %    Neutrophils Absolute 18.20 (H) 1.20 - 7.70 x10*3/uL    Immature Granulocytes Absolute, Automated 0.23 0.00 - 0.70 x10*3/uL    Lymphocytes Absolute 1.57 1.20 - 4.80 x10*3/uL    Monocytes Absolute 1.18 (H) 0.10 - 1.00 x10*3/uL    Eosinophils Absolute 0.00 0.00 - 0.70 x10*3/uL    Basophils Absolute 0.13 (H) 0.00 - 0.10 x10*3/uL   Comprehensive metabolic panel   Result Value Ref Range    Glucose 438 (H) 74 - 99 mg/dL    Sodium 138 136 - 145 mmol/L    Potassium 5.0 3.5 - 5.3 mmol/L    Chloride 101 98 - 107 mmol/L    Bicarbonate 7 (LL) 21 - 32 mmol/L    Anion Gap 35 (H) 10 - 20 mmol/L    Urea Nitrogen 23 6 - 23 mg/dL    Creatinine 1.38 (H) 0.50 - 1.30 mg/dL    eGFR 68 >60 mL/min/1.73m*2    Calcium 9.7 8.6 - 10.3 mg/dL    Albumin 4.8 3.4 - 5.0 g/dL    Alkaline Phosphatase 110 33 - 120 U/L    Total Protein 8.0 6.4 - 8.2 g/dL    AST 10 9 - 39 U/L    Bilirubin, Total 0.4 0.0 - 1.2 mg/dL    ALT 13 10 - 52 U/L   Phosphorus   Result Value Ref Range    Phosphorus 4.6 2.5 - 4.9 mg/dL   Magnesium   Result Value Ref Range    Magnesium 2.23 1.60 - 2.40 mg/dL   Blood Gas Venous Full Panel   Result Value Ref Range    POCT pH, Venous 6.92 (LL) 7.33 - 7.43 pH    POCT pCO2, Venous 28 (L) 41 - 51 mm Hg    POCT pO2, Venous 53 (H) 35 - 45 mm Hg    POCT SO2, Venous 77 (H) 45 - 75 %    POCT Oxy Hemoglobin, Venous 76.2 (H) 45.0 - 75.0 %    POCT Hematocrit Calculated, Venous 55.0 (H) 41.0 - 52.0 %    POCT Sodium, Venous 133 (L) 136 - 145 mmol/L    POCT Potassium, Venous 5.1 3.5 - 5.3  mmol/L    POCT Chloride, Venous 101 98 - 107 mmol/L    POCT Ionized Calicum, Venous 1.28 1.10 - 1.33 mmol/L    POCT Glucose, Venous 508 (HH) 74 - 99 mg/dL    POCT Lactate, Venous 3.7 (H) 0.4 - 2.0 mmol/L    POCT Base Excess, Venous -26.3 (L) -2.0 - 3.0 mmol/L    POCT HCO3 Calculated, Venous 5.7 (L) 22.0 - 26.0 mmol/L    POCT Hemoglobin, Venous 18.4 (H) 13.5 - 17.5 g/dL    POCT Anion Gap, Venous 31.0 (H) 10.0 - 25.0 mmol/L    Patient Temperature 37.0 degrees Celsius    FiO2 21 %   Beta Hydroxybutyrate   Result Value Ref Range    Beta-Hydroxybutyrate 12.76 (H) 0.02 - 0.27 mmol/L   Blood Gas Lactic Acid, Venous   Result Value Ref Range    POCT Lactate, Venous 4.4 (HH) 0.4 - 2.0 mmol/L   POCT GLUCOSE   Result Value Ref Range    POCT Glucose 438 (H) 74 - 99 mg/dL   Urinalysis with Reflex Culture and Microscopic   Result Value Ref Range    Color, Urine Colorless (N) Light-Yellow, Yellow, Dark-Yellow    Appearance, Urine Clear Clear    Specific Gravity, Urine >1.030 (N) 1.005 - 1.035    pH, Urine 5.5 5.0, 5.5, 6.0, 6.5, 7.0, 7.5, 8.0    Protein, Urine 20 (TRACE) NEGATIVE, 10 (TRACE), 20 (TRACE) mg/dL    Glucose, Urine OVER (4+) (A) Normal mg/dL    Blood, Urine NEGATIVE NEGATIVE mg/dL    Ketones, Urine OVER (4+) (A) NEGATIVE mg/dL    Bilirubin, Urine NEGATIVE NEGATIVE mg/dL    Urobilinogen, Urine Normal Normal mg/dL    Nitrite, Urine NEGATIVE NEGATIVE    Leukocyte Esterase, Urine NEGATIVE NEGATIVE   Extra Urine Gray Tube   Result Value Ref Range    Extra Tube 293    Urinalysis Microscopic   Result Value Ref Range    WBC, Urine NONE 1-5, NONE /HPF    RBC, Urine NONE NONE, 1-2, 3-5 /HPF    Mucus, Urine FEW Reference range not established. /LPF    Hyaline Casts, Urine 2+ (A) NONE /LPF   POCT GLUCOSE   Result Value Ref Range    POCT Glucose 376 (H) 74 - 99 mg/dL   POCT GLUCOSE   Result Value Ref Range    POCT Glucose 313 (H) 74 - 99 mg/dL   POCT GLUCOSE   Result Value Ref Range    POCT Glucose 271 (H) 74 - 99 mg/dL   POCT  GLUCOSE   Result Value Ref Range    POCT Glucose 229 (H) 74 - 99 mg/dL     ECG 12 lead    Result Date: 2/9/2025  Sinus tachycardia Prolonged QT interval See ED provider note for full interpretation and clinical correlation Confirmed by Destinee Bashir ([id number]) on 2/9/2025 1:12:52 PM    XR chest 1 view    Result Date: 1/15/2025  Interpreted By:  Santi De Paz, STUDY: XR CHEST 1 VIEW;  1/15/2025 2:00 am   INDICATION: Signs/Symptoms:cough.   COMPARISON: 11/7/2024   ACCESSION NUMBER(S): [id number]   ORDERING CLINICIAN: ROSITA REYNOLDS   FINDINGS: The cardiac silhouette is normal in size. No focal airspace consolidation or pleural effusion. No pneumothorax.       No airspace consolidation or pleural effusion.   MACRO: None   Signed by: Santi De Paz 1/15/2025 2:28 AM Dictation workstation:   [id number]      Assessment/Plan   Assessment & Plan  Diabetic ketoacidosis without coma associated with type 1 diabetes mellitus (Multi)    34-year-old gentleman underlying history of type 1 diabetes who was sent to the emergency department with n/v found to be in DKA.    Neuro   -He is alert and oriented    Respiratory   -VBG in ED 6.9/28   -serum bicarb of 7   -nc as needed    Cardiac   -Sinus tachycardia due to profound acidemia volume depletion   -Blood pressure stable    Renal   -monitor bmp q4h   -arsh scr 1.09mg/dL from baseline of 0.79mg/dL   -acute insult secondary to volume depletion  Endocrine   -Recurrent DKA    -Multiple hospitalization, no obvious source of infection, check chest x-ray urinalysis    -Patient is only taking NPH without short acting    -Continue with DKA protocol    -Ongoing aggressive volume resuscitation    -Consult endocrinology    -bmp, vbg q4h    CODE STATUS full      Alfredo Gill MD  I spent 45 minutes of cumulative critical care time with the patient.  Greater than 50% of that time was spent in the direct collaboration and or coordination of care of the patient.     Dragon dictation software  was used to dictate this note and thus there may be minor errors in translation/transcription including garbled speech or misspellings. Please contact for clarification if needed.

## 2025-05-07 NOTE — H&P
History Of Present Illness  Ed Sanon is a 34 y.o. male with past medical history of insulin-dependent diabetes mellitus, frequent hospitalization secondary to DKA with ongoing noncompliance to short acting insulin presented due to nausea and vomiting that started around 3 AM today.  Patient states that out of the blue he started vomiting around 3 AM and had nausea and vomiting with some abdominal discomfort.  He denies missing any medication but mom at bedside confirms that he has not been taking his short acting insulin as prescribed.  Recently saw endocrinologist as outpatient and was prescribed Tresiba which needs prior authorization as per patient.  He denies any fevers, chills, diarrhea or any other localizing infection symptoms.  Decision was made to admit patient for diabetic ketoacidosis to ICU after discussion with ER doctor.     Past Medical History  He has a past medical history of DKA (diabetic ketoacidosis) (Multi), HTN (hypertension), and Type I diabetes mellitus (Multi).    Surgical History  He has no past surgical history on file.     Social History  He reports that he has never smoked. He has never been exposed to tobacco smoke. He has never used smokeless tobacco. He reports that he does not drink alcohol and does not use drugs.    Family History  Family History[1]     Allergies  Patient has no known allergies.    Scheduled medications  Scheduled Medications[2]  Continuous medications  Continuous Medications[3]  PRN medications  PRN Medications[4]      Physical Exam:  General: Alert, mild distress, tachypneic with dry mucous membrane on room air  HEENT: PERRLA, head intact and normocephalic.  Dry mucous membrane  Neck: Normal to inspection  Lungs: Clear to auscultation, work of breathing within normal limit  Cardiac: Regular rate and rhythm  Abdomen: Soft nontender, positive bowel sounds  : Exam deferred  Skin: Intact  Hematology: No petechia or excessive ecchymosis  Musculoskeletal:  Without significant trauma  Neurological: Alert awake oriented, no focal deficit, cranial nerves grossly intact  Psych: No suicidal ideation or homicidal ideation     Last Recorded Vitals  /79   Pulse (!) 121   Temp 36.7 °C (98 °F)   Resp (!) 21   Wt 75.8 kg (167 lb)   SpO2 100%     Relevant Results  Imaging  CT chest abdomen pelvis wo IV contrast  Result Date: 5/7/2025  CHEST 1. No confluent airspace disease or acute process within the chest.   ABDOMEN-PELVIS 1. Mild bladder wall thickening seen diffusely without associated inflammatory changes. Cystitis not excluded radiographically. 2. No acute process otherwise identified within the abdomen or pelvis.     Signed by: Gerardo Cameron 5/7/2025 12:06 PM Dictation workstation:   [id number]      Cardiology, Vascular, and Other Imaging  No other imaging results found for the past 7 days     Results for orders placed or performed during the hospital encounter of 05/07/25 (from the past 24 hours)   POCT GLUCOSE   Result Value Ref Range    POCT Glucose 429 (H) 74 - 99 mg/dL   CBC and Auto Differential   Result Value Ref Range    WBC 21.3 (H) 4.4 - 11.3 x10*3/uL    nRBC 0.0 0.0 - 0.0 /100 WBCs    RBC 5.69 4.50 - 5.90 x10*6/uL    Hemoglobin 17.8 (H) 13.5 - 17.5 g/dL    Hematocrit 54.6 (H) 41.0 - 52.0 %    MCV 96 80 - 100 fL    MCH 31.3 26.0 - 34.0 pg    MCHC 32.6 32.0 - 36.0 g/dL    RDW 13.4 11.5 - 14.5 %    Platelets 246 150 - 450 x10*3/uL    Neutrophils % 85.4 40.0 - 80.0 %    Immature Granulocytes %, Automated 1.1 (H) 0.0 - 0.9 %    Lymphocytes % 7.4 13.0 - 44.0 %    Monocytes % 5.5 2.0 - 10.0 %    Eosinophils % 0.0 0.0 - 6.0 %    Basophils % 0.6 0.0 - 2.0 %    Neutrophils Absolute 18.20 (H) 1.20 - 7.70 x10*3/uL    Immature Granulocytes Absolute, Automated 0.23 0.00 - 0.70 x10*3/uL    Lymphocytes Absolute 1.57 1.20 - 4.80 x10*3/uL    Monocytes Absolute 1.18 (H) 0.10 - 1.00 x10*3/uL    Eosinophils Absolute 0.00 0.00 - 0.70 x10*3/uL    Basophils Absolute 0.13 (H)  0.00 - 0.10 x10*3/uL   Comprehensive metabolic panel   Result Value Ref Range    Glucose 438 (H) 74 - 99 mg/dL    Sodium 138 136 - 145 mmol/L    Potassium 5.0 3.5 - 5.3 mmol/L    Chloride 101 98 - 107 mmol/L    Bicarbonate 7 (LL) 21 - 32 mmol/L    Anion Gap 35 (H) 10 - 20 mmol/L    Urea Nitrogen 23 6 - 23 mg/dL    Creatinine 1.38 (H) 0.50 - 1.30 mg/dL    eGFR 68 >60 mL/min/1.73m*2    Calcium 9.7 8.6 - 10.3 mg/dL    Albumin 4.8 3.4 - 5.0 g/dL    Alkaline Phosphatase 110 33 - 120 U/L    Total Protein 8.0 6.4 - 8.2 g/dL    AST 10 9 - 39 U/L    Bilirubin, Total 0.4 0.0 - 1.2 mg/dL    ALT 13 10 - 52 U/L   Phosphorus   Result Value Ref Range    Phosphorus 4.6 2.5 - 4.9 mg/dL   Magnesium   Result Value Ref Range    Magnesium 2.23 1.60 - 2.40 mg/dL   Blood Gas Venous Full Panel   Result Value Ref Range    POCT pH, Venous 6.92 (LL) 7.33 - 7.43 pH    POCT pCO2, Venous 28 (L) 41 - 51 mm Hg    POCT pO2, Venous 53 (H) 35 - 45 mm Hg    POCT SO2, Venous 77 (H) 45 - 75 %    POCT Oxy Hemoglobin, Venous 76.2 (H) 45.0 - 75.0 %    POCT Hematocrit Calculated, Venous 55.0 (H) 41.0 - 52.0 %    POCT Sodium, Venous 133 (L) 136 - 145 mmol/L    POCT Potassium, Venous 5.1 3.5 - 5.3 mmol/L    POCT Chloride, Venous 101 98 - 107 mmol/L    POCT Ionized Calicum, Venous 1.28 1.10 - 1.33 mmol/L    POCT Glucose, Venous 508 (HH) 74 - 99 mg/dL    POCT Lactate, Venous 3.7 (H) 0.4 - 2.0 mmol/L    POCT Base Excess, Venous -26.3 (L) -2.0 - 3.0 mmol/L    POCT HCO3 Calculated, Venous 5.7 (L) 22.0 - 26.0 mmol/L    POCT Hemoglobin, Venous 18.4 (H) 13.5 - 17.5 g/dL    POCT Anion Gap, Venous 31.0 (H) 10.0 - 25.0 mmol/L    Patient Temperature 37.0 degrees Celsius    FiO2 21 %   Beta Hydroxybutyrate   Result Value Ref Range    Beta-Hydroxybutyrate 12.76 (H) 0.02 - 0.27 mmol/L   Blood Gas Lactic Acid, Venous   Result Value Ref Range    POCT Lactate, Venous 4.4 (HH) 0.4 - 2.0 mmol/L   POCT GLUCOSE   Result Value Ref Range    POCT Glucose 438 (H) 74 - 99 mg/dL    Urinalysis with Reflex Culture and Microscopic   Result Value Ref Range    Color, Urine Colorless (N) Light-Yellow, Yellow, Dark-Yellow    Appearance, Urine Clear Clear    Specific Gravity, Urine >1.030 (N) 1.005 - 1.035    pH, Urine 5.5 5.0, 5.5, 6.0, 6.5, 7.0, 7.5, 8.0    Protein, Urine 20 (TRACE) NEGATIVE, 10 (TRACE), 20 (TRACE) mg/dL    Glucose, Urine OVER (4+) (A) Normal mg/dL    Blood, Urine NEGATIVE NEGATIVE mg/dL    Ketones, Urine OVER (4+) (A) NEGATIVE mg/dL    Bilirubin, Urine NEGATIVE NEGATIVE mg/dL    Urobilinogen, Urine Normal Normal mg/dL    Nitrite, Urine NEGATIVE NEGATIVE    Leukocyte Esterase, Urine NEGATIVE NEGATIVE   Urinalysis Microscopic   Result Value Ref Range    WBC, Urine NONE 1-5, NONE /HPF    RBC, Urine NONE NONE, 1-2, 3-5 /HPF    Mucus, Urine FEW Reference range not established. /LPF    Hyaline Casts, Urine 2+ (A) NONE /LPF   POCT GLUCOSE   Result Value Ref Range    POCT Glucose 376 (H) 74 - 99 mg/dL        Assessment/Plan   Ed Sanon is a 34 y.o. male with past medical history of insulin-dependent diabetes mellitus, frequent hospitalization secondary to DKA with ongoing noncompliance to short acting insulin presented due to nausea and vomiting that started around 3 AM today.    Assessment & Plan  Diabetic ketoacidosis without coma associated with type 1 diabetes mellitus  Diabetic ketoacidosis secondary to medication noncompliance  Admit under inpatient level of care as anticipate that patient will require greater than 2 midnight stay  DKA power plan initiated by ER will be continued  Monitor BMP until gap is closed  Aggressive IV fluid resuscitation  Again importance of medication compliance has been discussed with patient  Leukocytosis  Likely reactive  No need for antibiotics  Will continue to monitor and trend count  Noncompliance with diet and medication regimen  Patient is still not taking his short acting insulin  I have seen personally this patient multiple times and  educated him on this  Mom confirmed that he is still not taking this medication as prescribed  Again explained the importance of short acting insulin to patient       Plan discussed with patient and mom at bedside in ER bed 9.  Spoke with intensivist regarding the patient  Full code  High level of MDM based on above issue and discussing plan    This note is created using voice recognition software. All efforts are made to minimize errors, if there are errors there due to transcription.    Joaquin Montemayor  Hospitalist           [1]   Family History  Problem Relation Name Age of Onset    No Known Problems Mother      No Known Problems Father      Hypertension Other      Coronary artery disease Other      Diabetes Other      Heart failure Other     [2]    [3] dextrose 10 % in water (D10W), 150 mL/hr  dextrose 10 % in water (D10W), 150 mL/hr  dextrose 5%-0.45 % sodium chloride, 150 mL/hr  insulin regular, 0-50 Units/hr, Last Rate: 10.6 Units/hr (05/07/25 1217)  sodium chloride, 250 mL/hr, Last Rate: 250 mL/hr (05/07/25 1158)    [4] PRN medications: acetaminophen **OR** acetaminophen **OR** acetaminophen, benzocaine-menthol, dextrose 10 % in water (D10W), dextrose 10 % in water (D10W), dextrose 5%-0.45 % sodium chloride, dextrose, guaiFENesin, ondansetron **OR** ondansetron, polyethylene glycol, prochlorperazine **OR** prochlorperazine **OR** prochlorperazine

## 2025-05-07 NOTE — ASSESSMENT & PLAN NOTE
Patient is still not taking his short acting insulin  I have seen personally this patient multiple times and educated him on this  Mom confirmed that he is still not taking this medication as prescribed  Again explained the importance of short acting insulin to patient

## 2025-05-07 NOTE — ED PROVIDER NOTES
"    HPI   Chief Complaint   Patient presents with    Diabetic Ketoacidosis     Dka since 3am       HPI: Patient is a 34-year-old male, history of uncontrolled type 1 diabetes and hypertension, presenting to the emergency department for concern for DKA.  Ports that yesterday he developed nausea of vomiting, and decrease in appetite.  He did not take his insulin yesterday or today.  Denies any abdominal pain, no diarrhea, does report a mild cough.  Does report a decrease in urination rather than an increase.  Reports feeling very tired and rundown      ROS: Complete 12 point review of systems performed, otherwise negative except as noted in the history of present illness    PMH: Reviewed, documented below in note. Pertinents in HPI  PSH: Reviewed and documented below in note. Pertinents in HPI  SH: No illicits.  Not homeless  Fam: Reviewed, noncontributory to patients current complaint  MEDS: Reviewed and documented below in note. Pertinents in HPI  ALLERGIES: Reviewed and documented below in note.                                    [place] Coma Scale Score: 14                  Patient History   Medical History[1]  Surgical History[2]  Family History[3]  Social History[4]    Physical Exam   Visit Vitals  /80   Pulse (!) 116   Temp 36.7 °C (98 °F)   Resp 18   Ht 1.778 m (5' 10\")   Wt 75.8 kg (167 lb)   SpO2 97%   BMI 23.96 kg/m²   Smoking Status Never   BSA 1.93 m²      Physical Exam  Vitals and nursing note reviewed.   Constitutional:       General: He is in acute distress.      Appearance: Normal appearance.   HENT:      Head: Normocephalic and atraumatic.      Mouth/Throat:      Mouth: Mucous membranes are dry.      Pharynx: Oropharynx is clear.   Neck:      Vascular: No carotid bruit.   Cardiovascular:      Rate and Rhythm: Regular rhythm. Tachycardia present.      Pulses: Normal pulses.      Heart sounds: Normal heart sounds.   Pulmonary:      Breath sounds: Normal breath sounds.      Comments: Increase " WOB  Abdominal:      General: There is no distension.      Palpations: Abdomen is soft.      Tenderness: There is no abdominal tenderness. There is no guarding or rebound.   Musculoskeletal:         General: No tenderness, deformity or signs of injury.      Cervical back: Normal range of motion. No rigidity.   Skin:     General: Skin is warm and dry.      Capillary Refill: Capillary refill takes less than 2 seconds.   Neurological:      General: No focal deficit present.      Mental Status: He is alert.      Sensory: No sensory deficit.      Motor: Weakness present.      Comments: Lethargic on exam but answers questions appropriately and follows commands   Psychiatric:         Mood and Affect: Mood normal.         Behavior: Behavior normal.         CT chest abdomen pelvis wo IV contrast    (Results Pending)       Labs Reviewed   CBC WITH AUTO DIFFERENTIAL - Abnormal       Result Value    WBC 21.3 (*)     nRBC 0.0      RBC 5.69      Hemoglobin 17.8 (*)     Hematocrit 54.6 (*)     MCV 96      MCH 31.3      MCHC 32.6      RDW 13.4      Platelets 246      Neutrophils % 85.4      Immature Granulocytes %, Automated 1.1 (*)     Lymphocytes % 7.4      Monocytes % 5.5      Eosinophils % 0.0      Basophils % 0.6      Neutrophils Absolute 18.20 (*)     Immature Granulocytes Absolute, Automated 0.23      Lymphocytes Absolute 1.57      Monocytes Absolute 1.18 (*)     Eosinophils Absolute 0.00      Basophils Absolute 0.13 (*)    COMPREHENSIVE METABOLIC PANEL - Abnormal    Glucose 438 (*)     Sodium 138      Potassium 5.0      Chloride 101      Bicarbonate 7 (*)     Anion Gap 35 (*)     Urea Nitrogen 23      Creatinine 1.38 (*)     eGFR 68      Calcium 9.7      Albumin 4.8      Alkaline Phosphatase 110      Total Protein 8.0      AST 10      Bilirubin, Total 0.4      ALT 13     BLOOD GAS VENOUS FULL PANEL - Abnormal    POCT pH, Venous 6.92 (*)     POCT pCO2, Venous 28 (*)     POCT pO2, Venous 53 (*)     POCT SO2, Venous 77 (*)      POCT Oxy Hemoglobin, Venous 76.2 (*)     POCT Hematocrit Calculated, Venous 55.0 (*)     POCT Sodium, Venous 133 (*)     POCT Potassium, Venous 5.1      POCT Chloride, Venous 101      POCT Ionized Calicum, Venous 1.28      POCT Glucose, Venous 508 (*)     POCT Lactate, Venous 3.7 (*)     POCT Base Excess, Venous -26.3 (*)     POCT HCO3 Calculated, Venous 5.7 (*)     POCT Hemoglobin, Venous 18.4 (*)     POCT Anion Gap, Venous 31.0 (*)     Patient Temperature 37.0      FiO2 21     BETA HYDROXYBUTYRATE - Abnormal    Beta-Hydroxybutyrate 12.76 (*)     Narrative:     The beta-hydroxybutyrate test performance characteristics have been validated by  [facility]. This test has not been approved by the FDA; however,such approval is not necessary.     POCT GLUCOSE - Abnormal    POCT Glucose 429 (*)    POCT GLUCOSE - Abnormal    POCT Glucose 438 (*)    PHOSPHORUS - Normal    Phosphorus 4.6     MAGNESIUM - Normal    Magnesium 2.23     BLOOD CULTURE   BLOOD CULTURE   URINALYSIS WITH REFLEX CULTURE AND MICROSCOPIC    Narrative:     The following orders were created for panel order Urinalysis with Reflex Culture and Microscopic.  Procedure                               Abnormality         Status                     ---------                               -----------         ------                     Urinalysis with Reflex C...[[id number]]                                                 Extra Urine Gray Tube[[id number]]                                                         Please view results for these tests on the individual orders.   OSMOLALITY   URINALYSIS WITH REFLEX CULTURE AND MICROSCOPIC   EXTRA URINE GRAY TUBE   BLOOD GAS LACTIC ACID, VENOUS   POCT GLUCOSE METER         ED Course & MDM   Diagnoses as of 05/07/25 1120   Diabetic ketoacidosis without coma associated with type 1 diabetes mellitus   Sepsis without acute organ dysfunction, due to unspecified organism (Multi)   Acute  kidney injury           Medical Decision Making  All mentioned lab results, ECGs, and imaging were independently reviewed by myself  - Patient evaluated. Patient is presenting to the emergency department for concern for DKA.  I reviewed his past medical history and he does have a pretty consistent presentation to the ER with admission to the hospital for diabetic related emergencies.  He is little more lethargic on examination today and a little bit more weak than I have seen him in the past concerning for potential dehydration or infection or metabolic encephalopathy.  Patient had an IV established was started on fluids and basic labs were obtained.  Found to be in DKA with a metabolic acidosis, pH of 6.92, glucose of 508, lactate of 3.7.  Bicarb is 7, anion gap 35, he does have a new acute kidney injury as well with creatinine of 1.38.  Patient's labs are also concerning for a new leukocytosis of 21.3 with a bandemia of 1.1%.  Beta hydroxybutyrate elevated as expected.  In addition to the fluids, maintenance IV fluids in addition to an insulin drip were ordered.  Cultures were added once the white count resulted and the patient was started on Zosyn for concern for an infection.  Unknown source at this time.  Pan CT imaging was ordered for the patient.  I spoke with the ICU attending about admission to the unit for continued treatment.  Patient was admitted otherwise improving but guarded condition    - Monitored for any changes in stability or symptomatology. Patient remained stable.   - Counseled regarding labs, imaging, diagnosis, and plan. Patient was agreeable. All questions were answered. The patient was receptive and agreeable to the plan of care.       *Disclaimer: This note was dictated by speech recognition. Minor errors in transcription may be present. Please call with questions.    Av Godinez MD             Your medication list        CONTINUE taking these medications        Instructions Last  "Dose Given Next Dose Due   alcohol swabs           BD Insulin Syringe Ultra-Fine 0.5 mL 31 gauge x 5/16\" syringe  Generic drug: insulin syringe-needle U-100           Dexcom G7  misc  Generic drug: blood-glucose,,cont           Dexcom G7 Sensor device  Generic drug: blood-glucose sensor           lancets misc           OneTouch Ultra2 Meter misc  Generic drug: blood-glucose meter           pen needle, diabetic 32 gauge x 5/32\" needle           BD Ultra-Fine Claire Pen Needle 32 gauge x 5/32\" needle  Generic drug: pen needle, diabetic      Use with insulin pens 4 times daily.              ASK your doctor about these medications        Instructions Last Dose Given Next Dose Due   Gvoke HypoPen 1-Pack 1 mg/0.2 mL auto-injector  Generic drug: glucagon           insulin aspart 100 unit/mL injection  Commonly known as: NovoLOG           insulin lispro 100 unit/mL pen  Commonly known as: HumaLOG      Inject 5 Units under the skin 3 times a day before meals. Inject 3 times per day as directed.       HumaLOG KwikPen Insulin 100 unit/mL pen  Generic drug: insulin lispro      Inject 5 Units under the skin 3 times a day before meals with sliding scale as directed.  Hypoglycemia protocol Call LIP unit(s) if Blood Glucose is between 0 - 70 mg/dL     0 unit(s) if Blood glucose is between    1 unit(s) if Blood glucose is between 151-200   2 unit(s) if Blood glucose is between 201-250   3 unit(s) if Blood glucose is between 251-300   4 unit(s) if Blood glucose is between 301-350   5 unit(s) if Blood glucose is between 351-400    If blood glucose is greater than 400 mg/dL, give max insulin per sliding scale AND then contact provider.       insulin NPH (Isophane) 100 unit/mL injection  Commonly known as: HumuLIN N,NovoLIN N      Inject 22 Units under the skin every 12 hours. Take as directed per insulin instructions.       OneTouch Ultra Test  Generic drug: blood sugar diagnostic              "       Procedure  Critical Care    Performed by: Americo Godinez MD  Authorized by: Americo Godinez MD    Critical care provider statement:     Critical care time (minutes):  40    Critical care time was exclusive of:  Separately billable procedures and treating other patients    Critical care was necessary to treat or prevent imminent or life-threatening deterioration of the following conditions:  Metabolic crisis and sepsis    Critical care was time spent personally by me on the following activities:  Development of treatment plan with patient or surrogate, discussions with consultants, evaluation of patient's response to treatment, examination of patient, obtaining history from patient or surrogate, ordering and performing treatments and interventions, ordering and review of laboratory studies, ordering and review of radiographic studies and re-evaluation of patient's condition    Care discussed with comment:  ICU and IMS teams       *This report was transcribed using voice recognition software.  Every effort was made to ensure accuracy; however, inadvertent computerized transcription errors may be present.*  Americo Godinez MD  05/07/25           [1]   Past Medical History:  Diagnosis Date    DKA (diabetic ketoacidosis) (Multi)     recurrent    HTN (hypertension)     Type I diabetes mellitus (Multi)    [2] No past surgical history on file.  [3]   Family History  Problem Relation Name Age of Onset    No Known Problems Mother      No Known Problems Father      Hypertension Other      Coronary artery disease Other      Diabetes Other      Heart failure Other     [4]   Social History  Tobacco Use    Smoking status: Never     Passive exposure: Never    Smokeless tobacco: Never   Vaping Use    Vaping status: Never Used   Substance Use Topics    Alcohol use: Never    Drug use: Never        Americo Godinez MD  05/07/25 1124

## 2025-05-08 VITALS
DIASTOLIC BLOOD PRESSURE: 91 MMHG | HEART RATE: 120 BPM | RESPIRATION RATE: 32 BRPM | TEMPERATURE: 96.8 F | WEIGHT: 154.76 LBS | HEIGHT: 70 IN | SYSTOLIC BLOOD PRESSURE: 128 MMHG | OXYGEN SATURATION: 96 % | BODY MASS INDEX: 22.16 KG/M2

## 2025-05-08 LAB
ANION GAP BLDV CALCULATED.4IONS-SCNC: 12 MMOL/L (ref 10–25)
ANION GAP SERPL CALC-SCNC: 11 MMOL/L (ref 10–20)
ANION GAP SERPL CALC-SCNC: 9 MMOL/L (ref 10–20)
BASE EXCESS BLDV CALC-SCNC: -8.8 MMOL/L (ref -2–3)
BASOPHILS # BLD AUTO: 0.03 X10*3/UL (ref 0–0.1)
BASOPHILS NFR BLD AUTO: 0.3 %
BODY TEMPERATURE: 37 DEGREES CELSIUS
BUN SERPL-MCNC: 17 MG/DL (ref 6–23)
BUN SERPL-MCNC: 18 MG/DL (ref 6–23)
CA-I BLDV-SCNC: 1.2 MMOL/L (ref 1.1–1.33)
CALCIUM SERPL-MCNC: 7.9 MG/DL (ref 8.6–10.3)
CALCIUM SERPL-MCNC: 8 MG/DL (ref 8.6–10.3)
CHLORIDE BLDV-SCNC: 109 MMOL/L (ref 98–107)
CHLORIDE SERPL-SCNC: 110 MMOL/L (ref 98–107)
CHLORIDE SERPL-SCNC: 111 MMOL/L (ref 98–107)
CO2 SERPL-SCNC: 18 MMOL/L (ref 21–32)
CO2 SERPL-SCNC: 19 MMOL/L (ref 21–32)
CREAT SERPL-MCNC: 0.64 MG/DL (ref 0.5–1.3)
CREAT SERPL-MCNC: 0.97 MG/DL (ref 0.5–1.3)
EGFRCR SERPLBLD CKD-EPI 2021: >90 ML/MIN/1.73M*2
EGFRCR SERPLBLD CKD-EPI 2021: >90 ML/MIN/1.73M*2
EOSINOPHIL # BLD AUTO: 0.02 X10*3/UL (ref 0–0.7)
EOSINOPHIL NFR BLD AUTO: 0.2 %
ERYTHROCYTE [DISTWIDTH] IN BLOOD BY AUTOMATED COUNT: 13.1 % (ref 11.5–14.5)
GLUCOSE BLD MANUAL STRIP-MCNC: 149 MG/DL (ref 74–99)
GLUCOSE BLD MANUAL STRIP-MCNC: 158 MG/DL (ref 74–99)
GLUCOSE BLD MANUAL STRIP-MCNC: 216 MG/DL (ref 74–99)
GLUCOSE BLD MANUAL STRIP-MCNC: 228 MG/DL (ref 74–99)
GLUCOSE BLD MANUAL STRIP-MCNC: 262 MG/DL (ref 74–99)
GLUCOSE BLDV-MCNC: 161 MG/DL (ref 74–99)
GLUCOSE SERPL-MCNC: 153 MG/DL (ref 74–99)
GLUCOSE SERPL-MCNC: 265 MG/DL (ref 74–99)
HCO3 BLDV-SCNC: 17.4 MMOL/L (ref 22–26)
HCT VFR BLD AUTO: 41.5 % (ref 41–52)
HCT VFR BLD EST: 45 % (ref 41–52)
HGB BLD-MCNC: 14.3 G/DL (ref 13.5–17.5)
HGB BLDV-MCNC: 15.1 G/DL (ref 13.5–17.5)
IMM GRANULOCYTES # BLD AUTO: 0.04 X10*3/UL (ref 0–0.7)
IMM GRANULOCYTES NFR BLD AUTO: 0.4 % (ref 0–0.9)
INHALED O2 CONCENTRATION: 21 %
LACTATE BLDV-SCNC: 0.9 MMOL/L (ref 0.4–2)
LYMPHOCYTES # BLD AUTO: 1.67 X10*3/UL (ref 1.2–4.8)
LYMPHOCYTES NFR BLD AUTO: 18 %
MAGNESIUM SERPL-MCNC: 1.68 MG/DL (ref 1.6–2.4)
MAGNESIUM SERPL-MCNC: 1.75 MG/DL (ref 1.6–2.4)
MCH RBC QN AUTO: 31.2 PG (ref 26–34)
MCHC RBC AUTO-ENTMCNC: 34.5 G/DL (ref 32–36)
MCV RBC AUTO: 90 FL (ref 80–100)
MONOCYTES # BLD AUTO: 1.23 X10*3/UL (ref 0.1–1)
MONOCYTES NFR BLD AUTO: 13.3 %
NEUTROPHILS # BLD AUTO: 6.27 X10*3/UL (ref 1.2–7.7)
NEUTROPHILS NFR BLD AUTO: 67.8 %
NRBC BLD-RTO: 0 /100 WBCS (ref 0–0)
OXYHGB MFR BLDV: 82.8 % (ref 45–75)
PCO2 BLDV: 38 MM HG (ref 41–51)
PH BLDV: 7.27 PH (ref 7.33–7.43)
PHOSPHATE SERPL-MCNC: 3 MG/DL (ref 2.5–4.9)
PLATELET # BLD AUTO: 172 X10*3/UL (ref 150–450)
PO2 BLDV: 47 MM HG (ref 35–45)
POTASSIUM BLDV-SCNC: 3.9 MMOL/L (ref 3.5–5.3)
POTASSIUM SERPL-SCNC: 3.7 MMOL/L (ref 3.5–5.3)
POTASSIUM SERPL-SCNC: 3.7 MMOL/L (ref 3.5–5.3)
RBC # BLD AUTO: 4.59 X10*6/UL (ref 4.5–5.9)
SAO2 % BLDV: 85 % (ref 45–75)
SODIUM BLDV-SCNC: 134 MMOL/L (ref 136–145)
SODIUM SERPL-SCNC: 134 MMOL/L (ref 136–145)
SODIUM SERPL-SCNC: 136 MMOL/L (ref 136–145)
WBC # BLD AUTO: 9.3 X10*3/UL (ref 4.4–11.3)

## 2025-05-08 PROCEDURE — 85025 COMPLETE CBC W/AUTO DIFF WBC: CPT | Performed by: INTERNAL MEDICINE

## 2025-05-08 PROCEDURE — 82947 ASSAY GLUCOSE BLOOD QUANT: CPT

## 2025-05-08 PROCEDURE — 99239 HOSP IP/OBS DSCHRG MGMT >30: CPT | Performed by: INTERNAL MEDICINE

## 2025-05-08 PROCEDURE — 83735 ASSAY OF MAGNESIUM: CPT

## 2025-05-08 PROCEDURE — 36415 COLL VENOUS BLD VENIPUNCTURE: CPT | Performed by: INTERNAL MEDICINE

## 2025-05-08 PROCEDURE — 99254 IP/OBS CNSLTJ NEW/EST MOD 60: CPT | Performed by: INTERNAL MEDICINE

## 2025-05-08 PROCEDURE — 83735 ASSAY OF MAGNESIUM: CPT | Performed by: INTERNAL MEDICINE

## 2025-05-08 PROCEDURE — 99233 SBSQ HOSP IP/OBS HIGH 50: CPT | Performed by: INTERNAL MEDICINE

## 2025-05-08 PROCEDURE — 82435 ASSAY OF BLOOD CHLORIDE: CPT | Performed by: INTERNAL MEDICINE

## 2025-05-08 PROCEDURE — 36415 COLL VENOUS BLD VENIPUNCTURE: CPT

## 2025-05-08 PROCEDURE — 84100 ASSAY OF PHOSPHORUS: CPT

## 2025-05-08 PROCEDURE — 80048 BASIC METABOLIC PNL TOTAL CA: CPT

## 2025-05-08 PROCEDURE — 2500000002 HC RX 250 W HCPCS SELF ADMINISTERED DRUGS (ALT 637 FOR MEDICARE OP, ALT 636 FOR OP/ED)

## 2025-05-08 RX ORDER — INSULIN LISPRO 100 [IU]/ML
0-5 INJECTION, SOLUTION INTRAVENOUS; SUBCUTANEOUS
Status: DISCONTINUED | OUTPATIENT
Start: 2025-05-08 | End: 2025-05-08 | Stop reason: HOSPADM

## 2025-05-08 RX ORDER — INSULIN DEGLUDEC 100 U/ML
15 INJECTION, SOLUTION SUBCUTANEOUS NIGHTLY
Qty: 6 ML | Refills: 11 | Status: SHIPPED | OUTPATIENT
Start: 2025-05-08

## 2025-05-08 RX ORDER — DEXTROSE 50 % IN WATER (D50W) INTRAVENOUS SYRINGE
12.5
Status: DISCONTINUED | OUTPATIENT
Start: 2025-05-08 | End: 2025-05-08 | Stop reason: HOSPADM

## 2025-05-08 RX ORDER — INSULIN LISPRO 100 [IU]/ML
5 INJECTION, SOLUTION INTRAVENOUS; SUBCUTANEOUS
Status: DISCONTINUED | OUTPATIENT
Start: 2025-05-08 | End: 2025-05-08 | Stop reason: HOSPADM

## 2025-05-08 RX ORDER — INSULIN LISPRO 100 [IU]/ML
6 INJECTION, SOLUTION INTRAVENOUS; SUBCUTANEOUS
Qty: 5.4 ML | Refills: 0 | Status: SHIPPED | OUTPATIENT
Start: 2025-05-08 | End: 2025-06-07

## 2025-05-08 RX ORDER — INSULIN DEGLUDEC 100 U/ML
15 INJECTION, SOLUTION SUBCUTANEOUS NIGHTLY
Qty: 4.5 ML | Refills: 0 | Status: SHIPPED | OUTPATIENT
Start: 2025-05-08 | End: 2025-05-08

## 2025-05-08 RX ORDER — DEXTROSE 50 % IN WATER (D50W) INTRAVENOUS SYRINGE
25
Status: DISCONTINUED | OUTPATIENT
Start: 2025-05-08 | End: 2025-05-08 | Stop reason: HOSPADM

## 2025-05-08 RX ADMIN — INSULIN HUMAN 22 UNITS: 100 INJECTION, SUSPENSION SUBCUTANEOUS at 02:21

## 2025-05-08 RX ADMIN — INSULIN LISPRO 2 UNITS: 100 INJECTION, SOLUTION INTRAVENOUS; SUBCUTANEOUS at 08:30

## 2025-05-08 RX ADMIN — INSULIN LISPRO 5 UNITS: 100 INJECTION, SOLUTION INTRAVENOUS; SUBCUTANEOUS at 08:30

## 2025-05-08 ASSESSMENT — ENCOUNTER SYMPTOMS
VOMITING: 0
ABDOMINAL PAIN: 0
NAUSEA: 0
CONFUSION: 0

## 2025-05-08 ASSESSMENT — PAIN SCALES - GENERAL: PAINLEVEL_OUTOF10: 0 - NO PAIN

## 2025-05-08 NOTE — HOSPITAL COURSE
34-year-old male with past medical history of type 1 diabetes mellitus, history of recurrent hospitalization secondary to DKA from medication noncompliance presented due to nausea and vomiting since 3 AM on day of admission.  Patient was found to have DKA causing his symptoms.  Patient was seen and evaluated in the emergency department and admitted to ICU for DKA.  Patient Recently saw endocrinologist at [facility] who recommended 6 units of aspart with each meals as well as Tresiba 15 units nightly.  Unfortunately Tresiba requires prior authorization and he has not picked up.  Patient and mom did admit that he is not taking the short acting insulin as prescribed.  Patient has been provided recurrent education on medication compliance.  Until he gets his Tresiba he should continue to take his NPH 22 units as well as short acting insulin 5 units plus sliding scale insulin with each meal.  Patient was explained complication again of long-term uncontrolled diabetes and recurrent DKA.  He expressed verbal understanding.  I offered any refills on medications and patient states he has all of them.  He is currently in stable condition for discharge.    39 minutes spent in discharge timing

## 2025-05-08 NOTE — NURSING NOTE
1015:  Discharge order in place. All discharge instructions printed and reviewed with patient including medications, follow up appointment, diet, and activity. IV and tele removed. Medications delivered. No questions at this time. Patient discharged home with family at this time.

## 2025-05-08 NOTE — CARE PLAN
The patient's goals for the shift include  stay informed    The clinical goals for the shift include patient sugars will be well controlled throughout the shift    Over the shift, the patient did make progress toward the following goals. Barriers to progression include understanding. Recommendations to address these barriers include education.

## 2025-05-08 NOTE — DISCHARGE SUMMARY
"Discharge Diagnosis  Diabetic ketoacidosis without coma associated with type 1 diabetes mellitus           Issues Requiring Follow-Up  Follow-up with primary care provider and your endocrinologist  Follow-up to see if you get approval for your Tresiba    Discharge Meds     Medication List      CONTINUE taking these medications     alcohol swabs   BD Insulin Syringe Ultra-Fine 0.5 mL 31 gauge x 5/16\" syringe; Generic   drug: insulin syringe-needle U-100   Dexcom G7  misc; Generic drug: blood-glucose,,cont   Dexcom G7 Sensor device; Generic drug: blood-glucose sensor   Gvoke HypoPen 1-Pack 1 mg/0.2 mL auto-injector; Generic drug: glucagon   insulin aspart 100 unit/mL injection; Commonly known as: NovoLOG   * insulin lispro 100 unit/mL pen; Commonly known as: HumaLOG; Inject 5   Units under the skin 3 times a day before meals. Inject 3 times per day as   directed.   * HumaLOG KwikPen Insulin 100 unit/mL pen; Generic drug: insulin lispro;   Inject 5 Units under the skin 3 times a day before meals with sliding   scale as directed. Hypoglycemia protocol Call LIP unit(s) if Blood Glucose   is between 0 - 70 mg/dL   0 unit(s) if Blood glucose is between   1   unit(s) if Blood glucose is between 151-200  2 unit(s) if Blood glucose is   between 201-250  3 unit(s) if Blood glucose is between 251-300  4 unit(s)   if Blood glucose is between 301-350  5 unit(s) if Blood glucose is between   351-400  If blood glucose is greater than 400 mg/dL, give max insulin per   sliding scale AND then contact provider.   insulin NPH (Isophane) 100 unit/mL injection; Commonly known as: HumuLIN   N,NovoLIN N; Inject 22 Units under the skin every 12 hours. Take as   directed per insulin instructions.   lancets misc   OneTouch Ultra Test; Generic drug: blood sugar diagnostic   OneTouch Ultra2 Meter misc; Generic drug: blood-glucose meter   * pen needle, diabetic 32 gauge x 5/32\" needle   * BD Ultra-Fine Claire Pen Needle 32 " "gauge x 5/32\" needle; Generic drug:   pen needle, diabetic; Use with insulin pens 4 times daily.  * This list has 4 medication(s) that are the same as other medications   prescribed for you. Read the directions carefully, and ask your doctor or   other care provider to review them with you.       Test Results Pending At Discharge  Pending Labs       Order Current Status    Blood Culture Preliminary result    Blood Culture Preliminary result            Hospital Course  34-year-old male with past medical history of type 1 diabetes mellitus, history of recurrent hospitalization secondary to DKA from medication noncompliance presented due to nausea and vomiting since 3 AM on day of admission.  Patient was found to have DKA causing his symptoms.  Patient was seen and evaluated in the emergency department and admitted to ICU for DKA.  Patient Recently saw endocrinologist at [facility] who recommended 6 units of aspart with each meals as well as Tresiba 15 units nightly.  Unfortunately Tresiba requires prior authorization and he has not picked up.  Patient and mom did admit that he is not taking the short acting insulin as prescribed.  Patient has been provided recurrent education on medication compliance.  Until he gets his Tresiba he should continue to take his NPH 22 units as well as short acting insulin 5 units plus sliding scale insulin with each meal.  Patient was explained complication again of long-term uncontrolled diabetes and recurrent DKA.  He expressed verbal understanding.  I offered any refills on medications and patient states he has all of them.  He is currently in stable condition for discharge.    39 minutes spent in discharge timing    General: Not in acute distress, alert  HEENT: PERRLA, head intact and normocephalic  Neck: Normal to inspection  Lungs: Clear to auscultation, work of breathing within normal limit  Cardiac: Regular rate and rhythm  Abdomen: Soft nontender, positive bowel sounds  : Exam " deferred  Skin: Intact  Hematology: No petechia or excessive ecchymosis  Musculoskeletal: Without significant trauma  Neurological: Alert awake oriented, no focal deficit, cranial nerves grossly intact  Psych: No suicidal ideation or homicidal ideation    Outpatient Follow-Up  Future Appointments   Date Time Provider Department Center   7/2/2025  9:45 AM Henri Brumfield MD [id number] Ruddy Montemayor MD

## 2025-05-08 NOTE — PROGRESS NOTES
Critical Care Daily Progress Notes        Subjective   Patient is a 34 y.o. male admitted on 5/7/2025  9:38 AM with the following indication(s) for ICU care: DKA.     Interval History:     No acute events overnight  Feels better  Off insulin drip bridge to NPH and insulin lispro  Tolerating p.o. intake    Scheduled Medications:   Scheduled Medications[1]     Continuous Medications:   Continuous Medications[2]     PRN Medications:   PRN Medications[3]    Objective   Vitals:  Most Recent:  Vitals:    05/08/25 0800   BP: 126/85   Pulse: 101   Resp: 25   Temp:    SpO2: 99%       24hr Min/Max:  Temp  Min: 36 °C (96.8 °F)  Max: 37.1 °C (98.7 °F)  Pulse  Min: 93  Max: 130  BP  Min: 101/56  Max: 154/79  Resp  Min: 11  Max: 30  SpO2  Min: 96 %  Max: 100 %    LDA:         Vent settings:       Hemodynamic parameters for last 24 hours:       I/O:    Intake/Output Summary (Last 24 hours) at 5/8/2025 0826  Last data filed at 5/8/2025 0800  Gross per 24 hour   Intake 2960.75 ml   Output 1100 ml   Net 1860.75 ml       Physical Exam:   Physical Exam  Vitals and nursing note reviewed.   Constitutional:       Appearance: Normal appearance.   HENT:      Mouth/Throat:      Mouth: Mucous membranes are moist.   Cardiovascular:      Rate and Rhythm: Normal rate and regular rhythm.   Pulmonary:      Effort: Pulmonary effort is normal.      Breath sounds: Normal breath sounds.   Abdominal:      General: There is no distension.      Palpations: Abdomen is soft.   Musculoskeletal:         General: No swelling.   Skin:     General: Skin is dry.      Capillary Refill: Capillary refill takes less than 2 seconds.   Neurological:      General: No focal deficit present.      Mental Status: He is alert and oriented to person, place, and time.   Psychiatric:         Behavior: Behavior normal.      Comments: Somewhat flat affect          Lab/Radiology/Diagnostic Review:  Results for orders placed or performed during the hospital encounter of 05/07/25  (from the past 24 hours)   POCT GLUCOSE   Result Value Ref Range    POCT Glucose 429 (H) 74 - 99 mg/dL   CBC and Auto Differential   Result Value Ref Range    WBC 21.3 (H) 4.4 - 11.3 x10*3/uL    nRBC 0.0 0.0 - 0.0 /100 WBCs    RBC 5.69 4.50 - 5.90 x10*6/uL    Hemoglobin 17.8 (H) 13.5 - 17.5 g/dL    Hematocrit 54.6 (H) 41.0 - 52.0 %    MCV 96 80 - 100 fL    MCH 31.3 26.0 - 34.0 pg    MCHC 32.6 32.0 - 36.0 g/dL    RDW 13.4 11.5 - 14.5 %    Platelets 246 150 - 450 x10*3/uL    Neutrophils % 85.4 40.0 - 80.0 %    Immature Granulocytes %, Automated 1.1 (H) 0.0 - 0.9 %    Lymphocytes % 7.4 13.0 - 44.0 %    Monocytes % 5.5 2.0 - 10.0 %    Eosinophils % 0.0 0.0 - 6.0 %    Basophils % 0.6 0.0 - 2.0 %    Neutrophils Absolute 18.20 (H) 1.20 - 7.70 x10*3/uL    Immature Granulocytes Absolute, Automated 0.23 0.00 - 0.70 x10*3/uL    Lymphocytes Absolute 1.57 1.20 - 4.80 x10*3/uL    Monocytes Absolute 1.18 (H) 0.10 - 1.00 x10*3/uL    Eosinophils Absolute 0.00 0.00 - 0.70 x10*3/uL    Basophils Absolute 0.13 (H) 0.00 - 0.10 x10*3/uL   Comprehensive metabolic panel   Result Value Ref Range    Glucose 438 (H) 74 - 99 mg/dL    Sodium 138 136 - 145 mmol/L    Potassium 5.0 3.5 - 5.3 mmol/L    Chloride 101 98 - 107 mmol/L    Bicarbonate 7 (LL) 21 - 32 mmol/L    Anion Gap 35 (H) 10 - 20 mmol/L    Urea Nitrogen 23 6 - 23 mg/dL    Creatinine 1.38 (H) 0.50 - 1.30 mg/dL    eGFR 68 >60 mL/min/1.73m*2    Calcium 9.7 8.6 - 10.3 mg/dL    Albumin 4.8 3.4 - 5.0 g/dL    Alkaline Phosphatase 110 33 - 120 U/L    Total Protein 8.0 6.4 - 8.2 g/dL    AST 10 9 - 39 U/L    Bilirubin, Total 0.4 0.0 - 1.2 mg/dL    ALT 13 10 - 52 U/L   Phosphorus   Result Value Ref Range    Phosphorus 4.6 2.5 - 4.9 mg/dL   Magnesium   Result Value Ref Range    Magnesium 2.23 1.60 - 2.40 mg/dL   Blood Gas Venous Full Panel   Result Value Ref Range    POCT pH, Venous 6.92 (LL) 7.33 - 7.43 pH    POCT pCO2, Venous 28 (L) 41 - 51 mm Hg    POCT pO2, Venous 53 (H) 35 - 45 mm Hg    POCT  SO2, Venous 77 (H) 45 - 75 %    POCT Oxy Hemoglobin, Venous 76.2 (H) 45.0 - 75.0 %    POCT Hematocrit Calculated, Venous 55.0 (H) 41.0 - 52.0 %    POCT Sodium, Venous 133 (L) 136 - 145 mmol/L    POCT Potassium, Venous 5.1 3.5 - 5.3 mmol/L    POCT Chloride, Venous 101 98 - 107 mmol/L    POCT Ionized Calicum, Venous 1.28 1.10 - 1.33 mmol/L    POCT Glucose, Venous 508 (HH) 74 - 99 mg/dL    POCT Lactate, Venous 3.7 (H) 0.4 - 2.0 mmol/L    POCT Base Excess, Venous -26.3 (L) -2.0 - 3.0 mmol/L    POCT HCO3 Calculated, Venous 5.7 (L) 22.0 - 26.0 mmol/L    POCT Hemoglobin, Venous 18.4 (H) 13.5 - 17.5 g/dL    POCT Anion Gap, Venous 31.0 (H) 10.0 - 25.0 mmol/L    Patient Temperature 37.0 degrees Celsius    FiO2 21 %   Beta Hydroxybutyrate   Result Value Ref Range    Beta-Hydroxybutyrate 12.76 (H) 0.02 - 0.27 mmol/L   Osmolality   Result Value Ref Range    Osmolality, Serum 347 (H) 280 - 300 mOsm/kg   Blood Culture    Specimen: Peripheral Venipuncture; Blood culture   Result Value Ref Range    Blood Culture Loaded on Instrument - Culture in progress    Blood Culture    Specimen: Peripheral Venipuncture; Blood culture   Result Value Ref Range    Blood Culture Loaded on Instrument - Culture in progress    Blood Gas Lactic Acid, Venous   Result Value Ref Range    POCT Lactate, Venous 4.4 (HH) 0.4 - 2.0 mmol/L   POCT GLUCOSE   Result Value Ref Range    POCT Glucose 438 (H) 74 - 99 mg/dL   Urinalysis with Reflex Culture and Microscopic   Result Value Ref Range    Color, Urine Colorless (N) Light-Yellow, Yellow, Dark-Yellow    Appearance, Urine Clear Clear    Specific Gravity, Urine >1.030 (N) 1.005 - 1.035    pH, Urine 5.5 5.0, 5.5, 6.0, 6.5, 7.0, 7.5, 8.0    Protein, Urine 20 (TRACE) NEGATIVE, 10 (TRACE), 20 (TRACE) mg/dL    Glucose, Urine OVER (4+) (A) Normal mg/dL    Blood, Urine NEGATIVE NEGATIVE mg/dL    Ketones, Urine OVER (4+) (A) NEGATIVE mg/dL    Bilirubin, Urine NEGATIVE NEGATIVE mg/dL    Urobilinogen, Urine Normal Normal  mg/dL    Nitrite, Urine NEGATIVE NEGATIVE    Leukocyte Esterase, Urine NEGATIVE NEGATIVE   Extra Urine Gray Tube   Result Value Ref Range    Extra Tube 293    Urinalysis Microscopic   Result Value Ref Range    WBC, Urine NONE 1-5, NONE /HPF    RBC, Urine NONE NONE, 1-2, 3-5 /HPF    Mucus, Urine FEW Reference range not established. /LPF    Hyaline Casts, Urine 2+ (A) NONE /LPF   POCT GLUCOSE   Result Value Ref Range    POCT Glucose 376 (H) 74 - 99 mg/dL   POCT GLUCOSE   Result Value Ref Range    POCT Glucose 313 (H) 74 - 99 mg/dL   POCT GLUCOSE   Result Value Ref Range    POCT Glucose 271 (H) 74 - 99 mg/dL   POCT GLUCOSE   Result Value Ref Range    POCT Glucose 229 (H) 74 - 99 mg/dL   Basic metabolic panel   Result Value Ref Range    Glucose 140 (H) 74 - 99 mg/dL    Sodium 141 136 - 145 mmol/L    Potassium 3.7 3.5 - 5.3 mmol/L    Chloride 112 (H) 98 - 107 mmol/L    Bicarbonate 9 (LL) 21 - 32 mmol/L    Anion Gap 24 (H) 10 - 20 mmol/L    Urea Nitrogen 19 6 - 23 mg/dL    Creatinine 1.09 0.50 - 1.30 mg/dL    eGFR 90 >60 mL/min/1.73m*2    Calcium 8.4 (L) 8.6 - 10.3 mg/dL   Blood Gas Venous Full Panel   Result Value Ref Range    POCT pH, Venous 7.07 (LL) 7.33 - 7.43 pH    POCT pCO2, Venous 24 (L) 41 - 51 mm Hg    POCT pO2, Venous 40 35 - 45 mm Hg    POCT SO2, Venous 72 45 - 75 %    POCT Oxy Hemoglobin, Venous 70.8 45.0 - 75.0 %    POCT Hematocrit Calculated, Venous 50.0 41.0 - 52.0 %    POCT Sodium, Venous 139 136 - 145 mmol/L    POCT Potassium, Venous 3.9 3.5 - 5.3 mmol/L    POCT Chloride, Venous 108 (H) 98 - 107 mmol/L    POCT Ionized Calicum, Venous 1.27 1.10 - 1.33 mmol/L    POCT Glucose, Venous 157 (H) 74 - 99 mg/dL    POCT Lactate, Venous 2.8 (H) 0.4 - 2.0 mmol/L    POCT Base Excess, Venous -21.6 (L) -2.0 - 3.0 mmol/L    POCT HCO3 Calculated, Venous 7.0 (L) 22.0 - 26.0 mmol/L    POCT Hemoglobin, Venous 16.5 13.5 - 17.5 g/dL    POCT Anion Gap, Venous 28.0 (H) 10.0 - 25.0 mmol/L    Patient Temperature 37.0 degrees  Celsius    FiO2 21 %   POCT GLUCOSE   Result Value Ref Range    POCT Glucose 151 (H) 74 - 99 mg/dL   POCT GLUCOSE   Result Value Ref Range    POCT Glucose 160 (H) 74 - 99 mg/dL   POCT GLUCOSE   Result Value Ref Range    POCT Glucose 157 (H) 74 - 99 mg/dL   Basic metabolic panel   Result Value Ref Range    Glucose 166 (H) 74 - 99 mg/dL    Sodium 139 136 - 145 mmol/L    Potassium 3.9 3.5 - 5.3 mmol/L    Chloride 112 (H) 98 - 107 mmol/L    Bicarbonate 11 (L) 21 - 32 mmol/L    Anion Gap 20 10 - 20 mmol/L    Urea Nitrogen 18 6 - 23 mg/dL    Creatinine 0.94 0.50 - 1.30 mg/dL    eGFR >90 >60 mL/min/1.73m*2    Calcium 8.4 (L) 8.6 - 10.3 mg/dL   Phosphorus   Result Value Ref Range    Phosphorus 2.0 (L) 2.5 - 4.9 mg/dL   Blood Gas Venous Full Panel   Result Value Ref Range    POCT pH, Venous 7.17 (LL) 7.33 - 7.43 pH    POCT pCO2, Venous 29 (L) 41 - 51 mm Hg    POCT pO2, Venous 50 (H) 35 - 45 mm Hg    POCT SO2, Venous 85 (H) 45 - 75 %    POCT Oxy Hemoglobin, Venous 84.0 (H) 45.0 - 75.0 %    POCT Hematocrit Calculated, Venous 48.0 41.0 - 52.0 %    POCT Sodium, Venous 135 (L) 136 - 145 mmol/L    POCT Potassium, Venous 4.2 3.5 - 5.3 mmol/L    POCT Chloride, Venous 108 (H) 98 - 107 mmol/L    POCT Ionized Calicum, Venous 1.28 1.10 - 1.33 mmol/L    POCT Glucose, Venous 178 (H) 74 - 99 mg/dL    POCT Lactate, Venous 1.7 0.4 - 2.0 mmol/L    POCT Base Excess, Venous -16.5 (L) -2.0 - 3.0 mmol/L    POCT HCO3 Calculated, Venous 10.6 (L) 22.0 - 26.0 mmol/L    POCT Hemoglobin, Venous 16.1 13.5 - 17.5 g/dL    POCT Anion Gap, Venous 21.0 10.0 - 25.0 mmol/L    Patient Temperature 37.0 degrees Celsius    FiO2 21 %   POCT GLUCOSE   Result Value Ref Range    POCT Glucose 190 (H) 74 - 99 mg/dL   POCT GLUCOSE   Result Value Ref Range    POCT Glucose 175 (H) 74 - 99 mg/dL   Basic metabolic panel   Result Value Ref Range    Glucose 164 (H) 74 - 99 mg/dL    Sodium 138 136 - 145 mmol/L    Potassium 4.0 3.5 - 5.3 mmol/L    Chloride 112 (H) 98 - 107  mmol/L    Bicarbonate 14 (L) 21 - 32 mmol/L    Anion Gap 16 10 - 20 mmol/L    Urea Nitrogen 17 6 - 23 mg/dL    Creatinine 0.83 0.50 - 1.30 mg/dL    eGFR >90 >60 mL/min/1.73m*2    Calcium 8.3 (L) 8.6 - 10.3 mg/dL   Blood Gas Venous Full Panel   Result Value Ref Range    POCT pH, Venous 7.20 (LL) 7.33 - 7.43 pH    POCT pCO2, Venous 35 (L) 41 - 51 mm Hg    POCT pO2, Venous 42 35 - 45 mm Hg    POCT SO2, Venous 78 (H) 45 - 75 %    POCT Oxy Hemoglobin, Venous 76.3 (H) 45.0 - 75.0 %    POCT Hematocrit Calculated, Venous 47.0 41.0 - 52.0 %    POCT Sodium, Venous 137 136 - 145 mmol/L    POCT Potassium, Venous 4.2 3.5 - 5.3 mmol/L    POCT Chloride, Venous 107 98 - 107 mmol/L    POCT Ionized Calicum, Venous 1.22 1.10 - 1.33 mmol/L    POCT Glucose, Venous 175 (H) 74 - 99 mg/dL    POCT Lactate, Venous 1.6 0.4 - 2.0 mmol/L    POCT Base Excess, Venous -13.4 (L) -2.0 - 3.0 mmol/L    POCT HCO3 Calculated, Venous 13.7 (L) 22.0 - 26.0 mmol/L    POCT Hemoglobin, Venous 15.6 13.5 - 17.5 g/dL    POCT Anion Gap, Venous 21.0 10.0 - 25.0 mmol/L    Patient Temperature 37.0 degrees Celsius    FiO2 21 %   Magnesium   Result Value Ref Range    Magnesium 1.79 1.60 - 2.40 mg/dL   POCT GLUCOSE   Result Value Ref Range    POCT Glucose 157 (H) 74 - 99 mg/dL   POCT GLUCOSE   Result Value Ref Range    POCT Glucose 166 (H) 74 - 99 mg/dL   POCT GLUCOSE   Result Value Ref Range    POCT Glucose 146 (H) 74 - 99 mg/dL   Blood Gas Venous Full Panel   Result Value Ref Range    POCT pH, Venous 7.27 (L) 7.33 - 7.43 pH    POCT pCO2, Venous 38 (L) 41 - 51 mm Hg    POCT pO2, Venous 47 (H) 35 - 45 mm Hg    POCT SO2, Venous 85 (H) 45 - 75 %    POCT Oxy Hemoglobin, Venous 82.8 (H) 45.0 - 75.0 %    POCT Hematocrit Calculated, Venous 45.0 41.0 - 52.0 %    POCT Sodium, Venous 134 (L) 136 - 145 mmol/L    POCT Potassium, Venous 3.9 3.5 - 5.3 mmol/L    POCT Chloride, Venous 109 (H) 98 - 107 mmol/L    POCT Ionized Calicum, Venous 1.20 1.10 - 1.33 mmol/L    POCT Glucose,  Venous 161 (H) 74 - 99 mg/dL    POCT Lactate, Venous 0.9 0.4 - 2.0 mmol/L    POCT Base Excess, Venous -8.8 (L) -2.0 - 3.0 mmol/L    POCT HCO3 Calculated, Venous 17.4 (L) 22.0 - 26.0 mmol/L    POCT Hemoglobin, Venous 15.1 13.5 - 17.5 g/dL    POCT Anion Gap, Venous 12.0 10.0 - 25.0 mmol/L    Patient Temperature 37.0 degrees Celsius    FiO2 21 %   Magnesium   Result Value Ref Range    Magnesium 1.68 1.60 - 2.40 mg/dL   Basic metabolic panel   Result Value Ref Range    Glucose 153 (H) 74 - 99 mg/dL    Sodium 136 136 - 145 mmol/L    Potassium 3.7 3.5 - 5.3 mmol/L    Chloride 111 (H) 98 - 107 mmol/L    Bicarbonate 18 (L) 21 - 32 mmol/L    Anion Gap 11 10 - 20 mmol/L    Urea Nitrogen 17 6 - 23 mg/dL    Creatinine 0.64 0.50 - 1.30 mg/dL    eGFR >90 >60 mL/min/1.73m*2    Calcium 8.0 (L) 8.6 - 10.3 mg/dL   POCT GLUCOSE   Result Value Ref Range    POCT Glucose 158 (H) 74 - 99 mg/dL   POCT GLUCOSE   Result Value Ref Range    POCT Glucose 149 (H) 74 - 99 mg/dL   POCT GLUCOSE   Result Value Ref Range    POCT Glucose 228 (H) 74 - 99 mg/dL   POCT GLUCOSE   Result Value Ref Range    POCT Glucose 262 (H) 74 - 99 mg/dL   CBC and Auto Differential   Result Value Ref Range    WBC 9.3 4.4 - 11.3 x10*3/uL    nRBC 0.0 0.0 - 0.0 /100 WBCs    RBC 4.59 4.50 - 5.90 x10*6/uL    Hemoglobin 14.3 13.5 - 17.5 g/dL    Hematocrit 41.5 41.0 - 52.0 %    MCV 90 80 - 100 fL    MCH 31.2 26.0 - 34.0 pg    MCHC 34.5 32.0 - 36.0 g/dL    RDW 13.1 11.5 - 14.5 %    Platelets 172 150 - 450 x10*3/uL    Neutrophils % 67.8 40.0 - 80.0 %    Immature Granulocytes %, Automated 0.4 0.0 - 0.9 %    Lymphocytes % 18.0 13.0 - 44.0 %    Monocytes % 13.3 2.0 - 10.0 %    Eosinophils % 0.2 0.0 - 6.0 %    Basophils % 0.3 0.0 - 2.0 %    Neutrophils Absolute 6.27 1.20 - 7.70 x10*3/uL    Immature Granulocytes Absolute, Automated 0.04 0.00 - 0.70 x10*3/uL    Lymphocytes Absolute 1.67 1.20 - 4.80 x10*3/uL    Monocytes Absolute 1.23 (H) 0.10 - 1.00 x10*3/uL    Eosinophils Absolute  0.02 0.00 - 0.70 x10*3/uL    Basophils Absolute 0.03 0.00 - 0.10 x10*3/uL   Magnesium   Result Value Ref Range    Magnesium 1.75 1.60 - 2.40 mg/dL   Basic metabolic panel   Result Value Ref Range    Glucose 265 (H) 74 - 99 mg/dL    Sodium 134 (L) 136 - 145 mmol/L    Potassium 3.7 3.5 - 5.3 mmol/L    Chloride 110 (H) 98 - 107 mmol/L    Bicarbonate 19 (L) 21 - 32 mmol/L    Anion Gap 9 (L) 10 - 20 mmol/L    Urea Nitrogen 18 6 - 23 mg/dL    Creatinine 0.97 0.50 - 1.30 mg/dL    eGFR >90 >60 mL/min/1.73m*2    Calcium 7.9 (L) 8.6 - 10.3 mg/dL   Phosphorus   Result Value Ref Range    Phosphorus 3.0 2.5 - 4.9 mg/dL   POCT GLUCOSE   Result Value Ref Range    POCT Glucose 216 (H) 74 - 99 mg/dL     Imaging  CT chest abdomen pelvis wo IV contrast  Result Date: 5/7/2025  CHEST 1. No confluent airspace disease or acute process within the chest.   ABDOMEN-PELVIS 1. Mild bladder wall thickening seen diffusely without associated inflammatory changes. Cystitis not excluded radiographically. 2. No acute process otherwise identified within the abdomen or pelvis.     Signed by: Gerardo Cameron 5/7/2025 12:06 PM Dictation workstation:   [id number]      Cardiology, Vascular, and Other Imaging  No other imaging results found for the past 7 days                     Assessment/Plan   Assessment & Plan  Diabetic ketoacidosis without coma associated with type 1 diabetes mellitus    Leukocytosis    Noncompliance with diet and medication regimen    34-year-old gentleman underlying history of type 1 diabetes who was sent to the emergency department with n/v found to be in DKA.     Neuro              -He is alert and oriented x 4     Respiratory              -VBG in ED 6.9/28, improved to 7.27/38              -Currently on room air saturating at 98%     Cardiac              -Sinus tachycardia due to profound acidemia volume depletion               -This is now resolved, blood pressure stable     Renal              -arsh scr 1.09mg/dL from baseline of  0.79mg/dL              -acute insult secondary to volume depletion    - Improved with IV fluids currently 0.97 mg/dL   - Hypophosphatemia status post replacement    Endocrine              -Recurrent DKA                          -Multiple hospitalization, no obvious source of infection     - Leukocytosis resolved                          -Patient is only taking NPH without short acting                          -Tolerated DKA protocol well already bridge off insulin drip    - Needs to follow outpatient endocrine team.  Discussed with endocrinology     Disposition: Discussed with primary team and endocrine team likely discharge today  CODE STATUS full    Alfredo Gill MD         [1] insulin lispro, 0-5 Units, subcutaneous, TID AC  insulin lispro, 5 Units, subcutaneous, TID AC  insulin NPH (Isophane), 22 Units, subcutaneous, BID  [2]    [3] PRN medications: acetaminophen **OR** acetaminophen **OR** acetaminophen, benzocaine-menthol, dextrose, dextrose, glucagon, glucagon, guaiFENesin, ondansetron **OR** ondansetron, polyethylene glycol, prochlorperazine **OR** prochlorperazine **OR** prochlorperazine

## 2025-05-08 NOTE — CONSULTS
Consults    Reason For Consult  DKA    History Of Present Illness  Ed Sanon is a 34 y.o. male presenting with on the account of nausea and vomiting.  Symptoms began at 3 AM yesterday. He finally saw an endocrinologist as outpatient and was prescribed Tresiba but he has not yet gotten it as it needs a prior authorization.  He has not been utilizing prandial insulin.  Initial lab data revealed a glucose value of 438mg/dL, bicarbonate of 7, anion gap of 35 and beta hydroxybutyrate of 12.76. He was started on an insulin infusion with admittance to the ICU.    His home regimen consists of:  NPH 22 units subcutaneous twice daily         His A1C last A1C was found to be 11.7% as of April 2025.      He has ordered breakfast.      Past Medical History  He has a past medical history of DKA (diabetic ketoacidosis) (Multi), HTN (hypertension), and Type I diabetes mellitus (Multi).    Surgical History  He has no past surgical history on file.     Social History  He reports that he has never smoked. He has never been exposed to tobacco smoke. He has never used smokeless tobacco. He reports that he does not drink alcohol and does not use drugs.    Family History  Family History[1]     Allergies  Patient has no known allergies.    Review of Systems   Gastrointestinal:  Negative for abdominal pain, nausea and vomiting.   Psychiatric/Behavioral:  Negative for confusion.    All other systems reviewed and are negative.       Physical Exam  Vitals and nursing note reviewed.   Constitutional:       General: He is not in acute distress.     Appearance: Normal appearance. He is normal weight.   HENT:      Head: Normocephalic and atraumatic.      Nose: Nose normal.      Mouth/Throat:      Mouth: Mucous membranes are moist.   Eyes:      Extraocular Movements: Extraocular movements intact.   Cardiovascular:      Rate and Rhythm: Normal rate and regular rhythm.   Pulmonary:      Effort: Pulmonary effort is normal.   Musculoskeletal:     "     General: Normal range of motion.   Neurological:      Mental Status: He is alert and oriented to person, place, and time.   Psychiatric:         Mood and Affect: Mood normal.          Last Recorded Vitals  Blood pressure 126/85, pulse 101, temperature 36 °C (96.8 °F), temperature source Temporal, resp. rate 25, height 1.778 m (5' 10\"), weight 70.2 kg (154 lb 12.2 oz), SpO2 99%.    Relevant Results  Scheduled medications  Scheduled Medications[2]  Continuous medications  Continuous Medications[3]  PRN medications  PRN Medications[4]    Results for orders placed or performed during the hospital encounter of 05/07/25 (from the past 96 hours)   POCT GLUCOSE   Result Value Ref Range    POCT Glucose 429 (H) 74 - 99 mg/dL   CBC and Auto Differential   Result Value Ref Range    WBC 21.3 (H) 4.4 - 11.3 x10*3/uL    nRBC 0.0 0.0 - 0.0 /100 WBCs    RBC 5.69 4.50 - 5.90 x10*6/uL    Hemoglobin 17.8 (H) 13.5 - 17.5 g/dL    Hematocrit 54.6 (H) 41.0 - 52.0 %    MCV 96 80 - 100 fL    MCH 31.3 26.0 - 34.0 pg    MCHC 32.6 32.0 - 36.0 g/dL    RDW 13.4 11.5 - 14.5 %    Platelets 246 150 - 450 x10*3/uL    Neutrophils % 85.4 40.0 - 80.0 %    Immature Granulocytes %, Automated 1.1 (H) 0.0 - 0.9 %    Lymphocytes % 7.4 13.0 - 44.0 %    Monocytes % 5.5 2.0 - 10.0 %    Eosinophils % 0.0 0.0 - 6.0 %    Basophils % 0.6 0.0 - 2.0 %    Neutrophils Absolute 18.20 (H) 1.20 - 7.70 x10*3/uL    Immature Granulocytes Absolute, Automated 0.23 0.00 - 0.70 x10*3/uL    Lymphocytes Absolute 1.57 1.20 - 4.80 x10*3/uL    Monocytes Absolute 1.18 (H) 0.10 - 1.00 x10*3/uL    Eosinophils Absolute 0.00 0.00 - 0.70 x10*3/uL    Basophils Absolute 0.13 (H) 0.00 - 0.10 x10*3/uL   Comprehensive metabolic panel   Result Value Ref Range    Glucose 438 (H) 74 - 99 mg/dL    Sodium 138 136 - 145 mmol/L    Potassium 5.0 3.5 - 5.3 mmol/L    Chloride 101 98 - 107 mmol/L    Bicarbonate 7 (LL) 21 - 32 mmol/L    Anion Gap 35 (H) 10 - 20 mmol/L    Urea Nitrogen 23 6 - 23 mg/dL "    Creatinine 1.38 (H) 0.50 - 1.30 mg/dL    eGFR 68 >60 mL/min/1.73m*2    Calcium 9.7 8.6 - 10.3 mg/dL    Albumin 4.8 3.4 - 5.0 g/dL    Alkaline Phosphatase 110 33 - 120 U/L    Total Protein 8.0 6.4 - 8.2 g/dL    AST 10 9 - 39 U/L    Bilirubin, Total 0.4 0.0 - 1.2 mg/dL    ALT 13 10 - 52 U/L   Phosphorus   Result Value Ref Range    Phosphorus 4.6 2.5 - 4.9 mg/dL   Magnesium   Result Value Ref Range    Magnesium 2.23 1.60 - 2.40 mg/dL   Blood Gas Venous Full Panel   Result Value Ref Range    POCT pH, Venous 6.92 (LL) 7.33 - 7.43 pH    POCT pCO2, Venous 28 (L) 41 - 51 mm Hg    POCT pO2, Venous 53 (H) 35 - 45 mm Hg    POCT SO2, Venous 77 (H) 45 - 75 %    POCT Oxy Hemoglobin, Venous 76.2 (H) 45.0 - 75.0 %    POCT Hematocrit Calculated, Venous 55.0 (H) 41.0 - 52.0 %    POCT Sodium, Venous 133 (L) 136 - 145 mmol/L    POCT Potassium, Venous 5.1 3.5 - 5.3 mmol/L    POCT Chloride, Venous 101 98 - 107 mmol/L    POCT Ionized Calicum, Venous 1.28 1.10 - 1.33 mmol/L    POCT Glucose, Venous 508 (HH) 74 - 99 mg/dL    POCT Lactate, Venous 3.7 (H) 0.4 - 2.0 mmol/L    POCT Base Excess, Venous -26.3 (L) -2.0 - 3.0 mmol/L    POCT HCO3 Calculated, Venous 5.7 (L) 22.0 - 26.0 mmol/L    POCT Hemoglobin, Venous 18.4 (H) 13.5 - 17.5 g/dL    POCT Anion Gap, Venous 31.0 (H) 10.0 - 25.0 mmol/L    Patient Temperature 37.0 degrees Celsius    FiO2 21 %   Beta Hydroxybutyrate   Result Value Ref Range    Beta-Hydroxybutyrate 12.76 (H) 0.02 - 0.27 mmol/L   Osmolality   Result Value Ref Range    Osmolality, Serum 347 (H) 280 - 300 mOsm/kg   Blood Culture    Specimen: Peripheral Venipuncture; Blood culture   Result Value Ref Range    Blood Culture Loaded on Instrument - Culture in progress    Blood Culture    Specimen: Peripheral Venipuncture; Blood culture   Result Value Ref Range    Blood Culture Loaded on Instrument - Culture in progress    Blood Gas Lactic Acid, Venous   Result Value Ref Range    POCT Lactate, Venous 4.4 (HH) 0.4 - 2.0 mmol/L    POCT GLUCOSE   Result Value Ref Range    POCT Glucose 438 (H) 74 - 99 mg/dL   Urinalysis with Reflex Culture and Microscopic   Result Value Ref Range    Color, Urine Colorless (N) Light-Yellow, Yellow, Dark-Yellow    Appearance, Urine Clear Clear    Specific Gravity, Urine >1.030 (N) 1.005 - 1.035    pH, Urine 5.5 5.0, 5.5, 6.0, 6.5, 7.0, 7.5, 8.0    Protein, Urine 20 (TRACE) NEGATIVE, 10 (TRACE), 20 (TRACE) mg/dL    Glucose, Urine OVER (4+) (A) Normal mg/dL    Blood, Urine NEGATIVE NEGATIVE mg/dL    Ketones, Urine OVER (4+) (A) NEGATIVE mg/dL    Bilirubin, Urine NEGATIVE NEGATIVE mg/dL    Urobilinogen, Urine Normal Normal mg/dL    Nitrite, Urine NEGATIVE NEGATIVE    Leukocyte Esterase, Urine NEGATIVE NEGATIVE   Extra Urine Gray Tube   Result Value Ref Range    Extra Tube 293    Urinalysis Microscopic   Result Value Ref Range    WBC, Urine NONE 1-5, NONE /HPF    RBC, Urine NONE NONE, 1-2, 3-5 /HPF    Mucus, Urine FEW Reference range not established. /LPF    Hyaline Casts, Urine 2+ (A) NONE /LPF   POCT GLUCOSE   Result Value Ref Range    POCT Glucose 376 (H) 74 - 99 mg/dL   POCT GLUCOSE   Result Value Ref Range    POCT Glucose 313 (H) 74 - 99 mg/dL   POCT GLUCOSE   Result Value Ref Range    POCT Glucose 271 (H) 74 - 99 mg/dL   POCT GLUCOSE   Result Value Ref Range    POCT Glucose 229 (H) 74 - 99 mg/dL   Basic metabolic panel   Result Value Ref Range    Glucose 140 (H) 74 - 99 mg/dL    Sodium 141 136 - 145 mmol/L    Potassium 3.7 3.5 - 5.3 mmol/L    Chloride 112 (H) 98 - 107 mmol/L    Bicarbonate 9 (LL) 21 - 32 mmol/L    Anion Gap 24 (H) 10 - 20 mmol/L    Urea Nitrogen 19 6 - 23 mg/dL    Creatinine 1.09 0.50 - 1.30 mg/dL    eGFR 90 >60 mL/min/1.73m*2    Calcium 8.4 (L) 8.6 - 10.3 mg/dL   Blood Gas Venous Full Panel   Result Value Ref Range    POCT pH, Venous 7.07 (LL) 7.33 - 7.43 pH    POCT pCO2, Venous 24 (L) 41 - 51 mm Hg    POCT pO2, Venous 40 35 - 45 mm Hg    POCT SO2, Venous 72 45 - 75 %    POCT Oxy Hemoglobin,  Venous 70.8 45.0 - 75.0 %    POCT Hematocrit Calculated, Venous 50.0 41.0 - 52.0 %    POCT Sodium, Venous 139 136 - 145 mmol/L    POCT Potassium, Venous 3.9 3.5 - 5.3 mmol/L    POCT Chloride, Venous 108 (H) 98 - 107 mmol/L    POCT Ionized Calicum, Venous 1.27 1.10 - 1.33 mmol/L    POCT Glucose, Venous 157 (H) 74 - 99 mg/dL    POCT Lactate, Venous 2.8 (H) 0.4 - 2.0 mmol/L    POCT Base Excess, Venous -21.6 (L) -2.0 - 3.0 mmol/L    POCT HCO3 Calculated, Venous 7.0 (L) 22.0 - 26.0 mmol/L    POCT Hemoglobin, Venous 16.5 13.5 - 17.5 g/dL    POCT Anion Gap, Venous 28.0 (H) 10.0 - 25.0 mmol/L    Patient Temperature 37.0 degrees Celsius    FiO2 21 %   POCT GLUCOSE   Result Value Ref Range    POCT Glucose 151 (H) 74 - 99 mg/dL   POCT GLUCOSE   Result Value Ref Range    POCT Glucose 160 (H) 74 - 99 mg/dL   POCT GLUCOSE   Result Value Ref Range    POCT Glucose 157 (H) 74 - 99 mg/dL   Basic metabolic panel   Result Value Ref Range    Glucose 166 (H) 74 - 99 mg/dL    Sodium 139 136 - 145 mmol/L    Potassium 3.9 3.5 - 5.3 mmol/L    Chloride 112 (H) 98 - 107 mmol/L    Bicarbonate 11 (L) 21 - 32 mmol/L    Anion Gap 20 10 - 20 mmol/L    Urea Nitrogen 18 6 - 23 mg/dL    Creatinine 0.94 0.50 - 1.30 mg/dL    eGFR >90 >60 mL/min/1.73m*2    Calcium 8.4 (L) 8.6 - 10.3 mg/dL   Phosphorus   Result Value Ref Range    Phosphorus 2.0 (L) 2.5 - 4.9 mg/dL   Blood Gas Venous Full Panel   Result Value Ref Range    POCT pH, Venous 7.17 (LL) 7.33 - 7.43 pH    POCT pCO2, Venous 29 (L) 41 - 51 mm Hg    POCT pO2, Venous 50 (H) 35 - 45 mm Hg    POCT SO2, Venous 85 (H) 45 - 75 %    POCT Oxy Hemoglobin, Venous 84.0 (H) 45.0 - 75.0 %    POCT Hematocrit Calculated, Venous 48.0 41.0 - 52.0 %    POCT Sodium, Venous 135 (L) 136 - 145 mmol/L    POCT Potassium, Venous 4.2 3.5 - 5.3 mmol/L    POCT Chloride, Venous 108 (H) 98 - 107 mmol/L    POCT Ionized Calicum, Venous 1.28 1.10 - 1.33 mmol/L    POCT Glucose, Venous 178 (H) 74 - 99 mg/dL    POCT Lactate, Venous  1.7 0.4 - 2.0 mmol/L    POCT Base Excess, Venous -16.5 (L) -2.0 - 3.0 mmol/L    POCT HCO3 Calculated, Venous 10.6 (L) 22.0 - 26.0 mmol/L    POCT Hemoglobin, Venous 16.1 13.5 - 17.5 g/dL    POCT Anion Gap, Venous 21.0 10.0 - 25.0 mmol/L    Patient Temperature 37.0 degrees Celsius    FiO2 21 %   POCT GLUCOSE   Result Value Ref Range    POCT Glucose 190 (H) 74 - 99 mg/dL   POCT GLUCOSE   Result Value Ref Range    POCT Glucose 175 (H) 74 - 99 mg/dL   Basic metabolic panel   Result Value Ref Range    Glucose 164 (H) 74 - 99 mg/dL    Sodium 138 136 - 145 mmol/L    Potassium 4.0 3.5 - 5.3 mmol/L    Chloride 112 (H) 98 - 107 mmol/L    Bicarbonate 14 (L) 21 - 32 mmol/L    Anion Gap 16 10 - 20 mmol/L    Urea Nitrogen 17 6 - 23 mg/dL    Creatinine 0.83 0.50 - 1.30 mg/dL    eGFR >90 >60 mL/min/1.73m*2    Calcium 8.3 (L) 8.6 - 10.3 mg/dL   Blood Gas Venous Full Panel   Result Value Ref Range    POCT pH, Venous 7.20 (LL) 7.33 - 7.43 pH    POCT pCO2, Venous 35 (L) 41 - 51 mm Hg    POCT pO2, Venous 42 35 - 45 mm Hg    POCT SO2, Venous 78 (H) 45 - 75 %    POCT Oxy Hemoglobin, Venous 76.3 (H) 45.0 - 75.0 %    POCT Hematocrit Calculated, Venous 47.0 41.0 - 52.0 %    POCT Sodium, Venous 137 136 - 145 mmol/L    POCT Potassium, Venous 4.2 3.5 - 5.3 mmol/L    POCT Chloride, Venous 107 98 - 107 mmol/L    POCT Ionized Calicum, Venous 1.22 1.10 - 1.33 mmol/L    POCT Glucose, Venous 175 (H) 74 - 99 mg/dL    POCT Lactate, Venous 1.6 0.4 - 2.0 mmol/L    POCT Base Excess, Venous -13.4 (L) -2.0 - 3.0 mmol/L    POCT HCO3 Calculated, Venous 13.7 (L) 22.0 - 26.0 mmol/L    POCT Hemoglobin, Venous 15.6 13.5 - 17.5 g/dL    POCT Anion Gap, Venous 21.0 10.0 - 25.0 mmol/L    Patient Temperature 37.0 degrees Celsius    FiO2 21 %   Magnesium   Result Value Ref Range    Magnesium 1.79 1.60 - 2.40 mg/dL   POCT GLUCOSE   Result Value Ref Range    POCT Glucose 157 (H) 74 - 99 mg/dL   POCT GLUCOSE   Result Value Ref Range    POCT Glucose 166 (H) 74 - 99 mg/dL    POCT GLUCOSE   Result Value Ref Range    POCT Glucose 146 (H) 74 - 99 mg/dL   Blood Gas Venous Full Panel   Result Value Ref Range    POCT pH, Venous 7.27 (L) 7.33 - 7.43 pH    POCT pCO2, Venous 38 (L) 41 - 51 mm Hg    POCT pO2, Venous 47 (H) 35 - 45 mm Hg    POCT SO2, Venous 85 (H) 45 - 75 %    POCT Oxy Hemoglobin, Venous 82.8 (H) 45.0 - 75.0 %    POCT Hematocrit Calculated, Venous 45.0 41.0 - 52.0 %    POCT Sodium, Venous 134 (L) 136 - 145 mmol/L    POCT Potassium, Venous 3.9 3.5 - 5.3 mmol/L    POCT Chloride, Venous 109 (H) 98 - 107 mmol/L    POCT Ionized Calicum, Venous 1.20 1.10 - 1.33 mmol/L    POCT Glucose, Venous 161 (H) 74 - 99 mg/dL    POCT Lactate, Venous 0.9 0.4 - 2.0 mmol/L    POCT Base Excess, Venous -8.8 (L) -2.0 - 3.0 mmol/L    POCT HCO3 Calculated, Venous 17.4 (L) 22.0 - 26.0 mmol/L    POCT Hemoglobin, Venous 15.1 13.5 - 17.5 g/dL    POCT Anion Gap, Venous 12.0 10.0 - 25.0 mmol/L    Patient Temperature 37.0 degrees Celsius    FiO2 21 %   Magnesium   Result Value Ref Range    Magnesium 1.68 1.60 - 2.40 mg/dL   Basic metabolic panel   Result Value Ref Range    Glucose 153 (H) 74 - 99 mg/dL    Sodium 136 136 - 145 mmol/L    Potassium 3.7 3.5 - 5.3 mmol/L    Chloride 111 (H) 98 - 107 mmol/L    Bicarbonate 18 (L) 21 - 32 mmol/L    Anion Gap 11 10 - 20 mmol/L    Urea Nitrogen 17 6 - 23 mg/dL    Creatinine 0.64 0.50 - 1.30 mg/dL    eGFR >90 >60 mL/min/1.73m*2    Calcium 8.0 (L) 8.6 - 10.3 mg/dL   POCT GLUCOSE   Result Value Ref Range    POCT Glucose 158 (H) 74 - 99 mg/dL   POCT GLUCOSE   Result Value Ref Range    POCT Glucose 149 (H) 74 - 99 mg/dL   POCT GLUCOSE   Result Value Ref Range    POCT Glucose 228 (H) 74 - 99 mg/dL   POCT GLUCOSE   Result Value Ref Range    POCT Glucose 262 (H) 74 - 99 mg/dL   CBC and Auto Differential   Result Value Ref Range    WBC 9.3 4.4 - 11.3 x10*3/uL    nRBC 0.0 0.0 - 0.0 /100 WBCs    RBC 4.59 4.50 - 5.90 x10*6/uL    Hemoglobin 14.3 13.5 - 17.5 g/dL    Hematocrit 41.5  41.0 - 52.0 %    MCV 90 80 - 100 fL    MCH 31.2 26.0 - 34.0 pg    MCHC 34.5 32.0 - 36.0 g/dL    RDW 13.1 11.5 - 14.5 %    Platelets 172 150 - 450 x10*3/uL    Neutrophils % 67.8 40.0 - 80.0 %    Immature Granulocytes %, Automated 0.4 0.0 - 0.9 %    Lymphocytes % 18.0 13.0 - 44.0 %    Monocytes % 13.3 2.0 - 10.0 %    Eosinophils % 0.2 0.0 - 6.0 %    Basophils % 0.3 0.0 - 2.0 %    Neutrophils Absolute 6.27 1.20 - 7.70 x10*3/uL    Immature Granulocytes Absolute, Automated 0.04 0.00 - 0.70 x10*3/uL    Lymphocytes Absolute 1.67 1.20 - 4.80 x10*3/uL    Monocytes Absolute 1.23 (H) 0.10 - 1.00 x10*3/uL    Eosinophils Absolute 0.02 0.00 - 0.70 x10*3/uL    Basophils Absolute 0.03 0.00 - 0.10 x10*3/uL   Magnesium   Result Value Ref Range    Magnesium 1.75 1.60 - 2.40 mg/dL   Basic metabolic panel   Result Value Ref Range    Glucose 265 (H) 74 - 99 mg/dL    Sodium 134 (L) 136 - 145 mmol/L    Potassium 3.7 3.5 - 5.3 mmol/L    Chloride 110 (H) 98 - 107 mmol/L    Bicarbonate 19 (L) 21 - 32 mmol/L    Anion Gap 9 (L) 10 - 20 mmol/L    Urea Nitrogen 18 6 - 23 mg/dL    Creatinine 0.97 0.50 - 1.30 mg/dL    eGFR >90 >60 mL/min/1.73m*2    Calcium 7.9 (L) 8.6 - 10.3 mg/dL   Phosphorus   Result Value Ref Range    Phosphorus 3.0 2.5 - 4.9 mg/dL      CT chest abdomen pelvis wo IV contrast  Result Date: 5/7/2025  Interpreted By:  Gerardo Cameron, STUDY: CT CHEST ABDOMEN PELVIS WO CONTRAST; 5/7/2025 11:56 am   INDICATION: Signs/Symptoms:sepsis, N/V, cough.   COMPARISON: None.   ACCESSION NUMBER(S): [id number]   ORDERING CLINICIAN: NAIF CLIFFORD   TECHNIQUE: Axial CT of the chest, abdomen, and pelvis was performed. Coronal and sagittal reconstructions were performed. No intravenous or oral contrast agents were administered.   FINDINGS: Please note that the study is limited without intravenous contrast.   CHEST:     LUNG/PLEURA/LARGE AIRWAYS: The trachea and central airways are patent.   No pleural effusion, consolidation or pneumothorax.   VESSELS:  No thoracic aortic aneurysm.   HEART: No pericardial effusion.  The heart is not  enlarged.   MEDIASTINUM AND JERROD: The evaluation for adenopathy suboptimal in the absence of intravenous contrast.  No obvious pathologically enlarged lymph nodes at the mediastinum.   CHEST WALL AND LOWER NECK: The soft tissues of the chest wall are within normal limits. The visualized thyroid gland is unremarkable.     ABDOMEN: Motion artifact throughout the abdomen and pelvis obscures fine detail.   LIVER: Unremarkable unenhanced appearance.   BILE DUCTS: No obvious dilation.   GALLBLADDER: Present.   PANCREAS: No peripancreatic inflammatory changes.   SPLEEN: Not enlarged.   ADRENAL GLANDS: Unremarkable.   KIDNEYS AND URETERS: No hydronephrosis or hydroureter.  No renal or ureteral calculi are identified.   PELVIS:   BLADDER: Mild wall thickening seen diffusely. No associated inflammatory changes.   REPRODUCTIVE ORGANS: No pelvic mass.   BOWEL: No dilated bowel loops. No inflammatory changes.  The appendix is normal.   VESSELS: No abdominal aortic aneurysm.   PERITONEUM/RETROPERITONEUM/LYMPH NODES: No free fluid or free air.  No retroperitoneal hemorrhage.  No pathologically enlarged lymph nodes are identified.   ABDOMINAL WALL: Within normal limits.   BONES: No acute osseous findings.  Degenerative changes in the spine.       CHEST 1. No confluent airspace disease or acute process within the chest.   ABDOMEN-PELVIS 1. Mild bladder wall thickening seen diffusely without associated inflammatory changes. Cystitis not excluded radiographically. 2. No acute process otherwise identified within the abdomen or pelvis.     Signed by: Gerardo Cameron 5/7/2025 12:06 PM Dictation workstation:   [id number]    ECG 12 lead  Result Date: 4/10/2025  Atrial fibrillation ST elev, probable normal early repol pattern See ED provider note for full interpretation and clinical correlation Confirmed by Destinee Giron ([id number]) on 4/10/2025 1:05:07 PM         Assessment/Plan     IMPRESSION:  DKA - resolved   POORLY CONTROLLED TYPE I DIABETES MELLITUS  Long term insulin use with basal insulin only  A1C of 11.7%     RECOMMENDATIONS:  To continue NPH 22 units subcutaneous twice daily  To continue Humalog 5 units TID AC   To continue an insulin correction scale TID AC   For diabetic diet   Accu-Cheks ACHS   Hypoglycemic protocol   Will continue to follow     Thank you for the courtesy of this consult      Lori Packer MD                    [1]   Family History  Problem Relation Name Age of Onset    No Known Problems Mother      No Known Problems Father      Hypertension Other      Coronary artery disease Other      Diabetes Other      Heart failure Other     [2] insulin lispro, 0-5 Units, subcutaneous, TID AC  insulin lispro, 5 Units, subcutaneous, TID AC  insulin NPH (Isophane), 22 Units, subcutaneous, BID  [3]    [4] PRN medications: acetaminophen **OR** acetaminophen **OR** acetaminophen, benzocaine-menthol, dextrose, dextrose, glucagon, glucagon, guaiFENesin, ondansetron **OR** ondansetron, polyethylene glycol, prochlorperazine **OR** prochlorperazine **OR** prochlorperazine

## 2025-05-09 ENCOUNTER — PATIENT OUTREACH (OUTPATIENT)
Dept: PRIMARY CARE | Facility: CLINIC | Age: 35
End: 2025-05-09
Payer: MEDICAID

## 2025-05-09 LAB — GLUCOSE BLD MANUAL STRIP-MCNC: 210 MG/DL (ref 74–99)

## 2025-05-09 NOTE — PROGRESS NOTES
30 Day Readmission  Discharge Facility: [facility]   Discharge Diagnosis: Diabetic ketoacidosis without coma associated with type 1 diabetes mellitus   Admission Date: 5/7/25  Discharge Date: 5/8/25    PCP Appointment Date: no appointment, message to office  Specialist Appointment Date: 6/12/25 endocrinology  Hospital Encounter and Summary Linked: Yes  ED to Hosp-Admission (Discharged) with Joaquin Montemayor MD (05/07/2025)   Two attempts were made to reach patient within two business days after discharge. Left voicemail with contact information for patient to call back with any non-emergent questions or concerns.

## 2025-05-11 LAB
BACTERIA BLD CULT: NORMAL
BACTERIA BLD CULT: NORMAL

## 2025-05-13 NOTE — DOCUMENTATION CLARIFICATION NOTE
"    PATIENT:               ALESHA LUNDY  ACCT #:                  [id number]  MRN:                       [id number]  :                       1990  ADMIT DATE:       2025 9:38 AM  DISCH DATE:        2025 10:19 AM  RESPONDING PROVIDER #:        [id number]          PROVIDER RESPONSE TEXT:    Metabolic encephalopathy 2/2 DKA and MAYA    CDI QUERY TEXT:    Clarification    Instruction:    Based on your assessment of the patient and the clinical information, please provide the requested documentation by clicking on the appropriate radio button and enter any additional information if prompted.    Question: Please further clarify the most likely etiology of the lethargy as    When answering this query, please exercise your independent professional judgment. The fact that a question is being asked, does not imply that any particular answer is desired or expected.    The patient's clinical indicators include:  Clinical Information: Admit in DKA    Clinical Indicators:    ED note states \" He is little more lethargic on examination today and a little bit more weak than I have seen him in the past concerning for potential dehydration or infection or metabolic encephalopathy.\" and \" Found to be in DKA with a metabolic acidosis, pH of 6.92, glucose of 508, lactate of 3.7.  Bicarb is 7, anion gap 35, he does have a new acute kidney injury as well with creatinine of 1.38.\" and \"Lethargic on exam but answers questions appropriately and follows commands\"    H&P states \"Alert awake oriented, no focal deficit, cranial nerves grossly intact\"    Treatment: lab monitoring, neuro assessment, 2L IVF bolus on admit and continuous at 250ml/hr    Risk Factors: lethargy with resolution in setting of DKA, MAYA  Options provided:  -- Metabolic encephalopathy 2/2 DKA and MAYA  -- Other - I will add my own diagnosis  -- Refer to Clinical Documentation Reviewer    Query created by: Storm Jones on 2025 9:34 AM      Electronically " signed by:  NAIF CLIFFORD MD 5/13/2025 9:38 AM

## 2025-05-13 NOTE — DOCUMENTATION CLARIFICATION NOTE
"    PATIENT:               ALESHA LUNDY  ACCT #:                  [id number]  MRN:                       [id number]  :                       1990  ADMIT DATE:       2025 9:38 AM  DISCH DATE:        2025 10:19 AM  RESPONDING PROVIDER #:        [id number]          PROVIDER RESPONSE TEXT:    Non-infectious SIRS with acute organ dysfunction of MAYA    CDI QUERY TEXT:    Clarification    Instruction:    Based on your assessment of the patient and the clinical information, please provide the requested documentation by clicking on the appropriate radio button and enter any additional information if prompted.    Question: Please further clarify if there is a diagnosis related to the clinical information    When answering this query, please exercise your independent professional judgment. The fact that a question is being asked, does not imply that any particular answer is desired or expected.    The patient's clinical indicators include:  Clinical Information: Admit with DKA    Clinical Indicators:    ED note states \"  Found to be in DKA with a metabolic acidosis, pH of 6.92, glucose of 508, lactate of 3.7.  Bicarb is 7, anion gap 35, he does have a new acute kidney injury as well with creatinine of 1.38.  Patient's labs are also concerning for a new leukocytosis of 21.3 with a bandemia of 1.1%.\"    H&P states \"Leukocytosis Likely reactive No need for antibiotics\"    Critical care note 25 states \"Sinus tachycardia due to profound acidemia volume depletion\" and \"maya scr 1.09mg/dL from baseline of 0.79mg/dL\"    WBC 21.3 on admit.    Creatinine range from admit, 1.38-0.64    VS range, first 6 hrs from admit, temp 36.7, hr 108-128, rr 16-30, bp 118//79    Treatment: lab monitoring, IVF bolus x2L and continuous at 250ml/hr, insulin drip    Risk Factors: leukocytosis, tachycardia, tachypnea without infection in setting of DKA with MAYA  Options provided:  -- Non-infectious SIRS with acute organ dysfunction of " MAYA  -- Non-infectious SIRS without acute organ dysfunction  -- Other - I will add my own diagnosis  -- Refer to Clinical Documentation Reviewer    Query created by: Storm Jones on 5/13/2025 9:40 AM      Electronically signed by:  NAIF CLIFFORD MD 5/13/2025 9:43 AM

## 2025-05-23 ENCOUNTER — HOSPITAL ENCOUNTER (INPATIENT)
Facility: HOSPITAL | Age: 35
LOS: 1 days | Discharge: HOME | End: 2025-05-24
Attending: STUDENT IN AN ORGANIZED HEALTH CARE EDUCATION/TRAINING PROGRAM | Admitting: INTERNAL MEDICINE
Payer: MEDICAID

## 2025-05-23 ENCOUNTER — PATIENT OUTREACH (OUTPATIENT)
Dept: PRIMARY CARE | Facility: CLINIC | Age: 35
End: 2025-05-23
Payer: MEDICAID

## 2025-05-23 DIAGNOSIS — E10.10 DIABETIC KETOACIDOSIS WITHOUT COMA ASSOCIATED WITH TYPE 1 DIABETES MELLITUS: Primary | ICD-10-CM

## 2025-05-23 LAB
ALBUMIN SERPL BCP-MCNC: 3.8 G/DL (ref 3.4–5)
ALBUMIN SERPL BCP-MCNC: 4.7 G/DL (ref 3.4–5)
ALP SERPL-CCNC: 95 U/L (ref 33–120)
ALT SERPL W P-5'-P-CCNC: 11 U/L (ref 10–52)
ANION GAP BLDV CALCULATED.4IONS-SCNC: 27 MMOL/L (ref 10–25)
ANION GAP SERPL CALC-SCNC: 15 MMOL/L (ref 10–20)
ANION GAP SERPL CALC-SCNC: 31 MMOL/L (ref 10–20)
APPEARANCE UR: CLEAR
AST SERPL W P-5'-P-CCNC: 9 U/L (ref 9–39)
B-OH-BUTYR SERPL-SCNC: 10.25 MMOL/L (ref 0.02–0.27)
BASE EXCESS BLDV CALC-SCNC: -21.7 MMOL/L (ref -2–3)
BASOPHILS # BLD AUTO: 0.03 X10*3/UL (ref 0–0.1)
BASOPHILS NFR BLD AUTO: 0.4 %
BILIRUB SERPL-MCNC: 0.5 MG/DL (ref 0–1.2)
BILIRUB UR STRIP.AUTO-MCNC: NEGATIVE MG/DL
BODY TEMPERATURE: 37 DEGREES CELSIUS
BUN SERPL-MCNC: 14 MG/DL (ref 6–23)
BUN SERPL-MCNC: 17 MG/DL (ref 6–23)
CA-I BLDV-SCNC: 1.23 MMOL/L (ref 1.1–1.33)
CALCIUM SERPL-MCNC: 7.6 MG/DL (ref 8.6–10.3)
CALCIUM SERPL-MCNC: 8.7 MG/DL (ref 8.6–10.3)
CHLORIDE BLDV-SCNC: 102 MMOL/L (ref 98–107)
CHLORIDE SERPL-SCNC: 102 MMOL/L (ref 98–107)
CHLORIDE SERPL-SCNC: 111 MMOL/L (ref 98–107)
CO2 SERPL-SCNC: 14 MMOL/L (ref 21–32)
CO2 SERPL-SCNC: 7 MMOL/L (ref 21–32)
COLOR UR: COLORLESS
CREAT SERPL-MCNC: 0.99 MG/DL (ref 0.5–1.3)
CREAT SERPL-MCNC: 1.23 MG/DL (ref 0.5–1.3)
EGFRCR SERPLBLD CKD-EPI 2021: 78 ML/MIN/1.73M*2
EGFRCR SERPLBLD CKD-EPI 2021: >90 ML/MIN/1.73M*2
EOSINOPHIL # BLD AUTO: 0 X10*3/UL (ref 0–0.7)
EOSINOPHIL NFR BLD AUTO: 0 %
ERYTHROCYTE [DISTWIDTH] IN BLOOD BY AUTOMATED COUNT: 13.7 % (ref 11.5–14.5)
GLUCOSE BLD MANUAL STRIP-MCNC: 126 MG/DL (ref 74–99)
GLUCOSE BLD MANUAL STRIP-MCNC: 130 MG/DL (ref 74–99)
GLUCOSE BLD MANUAL STRIP-MCNC: 136 MG/DL (ref 74–99)
GLUCOSE BLD MANUAL STRIP-MCNC: 139 MG/DL (ref 74–99)
GLUCOSE BLD MANUAL STRIP-MCNC: 170 MG/DL (ref 74–99)
GLUCOSE BLD MANUAL STRIP-MCNC: 173 MG/DL (ref 74–99)
GLUCOSE BLD MANUAL STRIP-MCNC: 195 MG/DL (ref 74–99)
GLUCOSE BLD MANUAL STRIP-MCNC: 211 MG/DL (ref 74–99)
GLUCOSE BLD MANUAL STRIP-MCNC: 276 MG/DL (ref 74–99)
GLUCOSE BLD MANUAL STRIP-MCNC: 305 MG/DL (ref 74–99)
GLUCOSE BLDV-MCNC: 353 MG/DL (ref 74–99)
GLUCOSE SERPL-MCNC: 154 MG/DL (ref 74–99)
GLUCOSE SERPL-MCNC: 313 MG/DL (ref 74–99)
GLUCOSE UR STRIP.AUTO-MCNC: ABNORMAL MG/DL
HCO3 BLDV-SCNC: 7.1 MMOL/L (ref 22–26)
HCT VFR BLD AUTO: 53.4 % (ref 41–52)
HCT VFR BLD EST: 54 % (ref 41–52)
HGB BLD-MCNC: 17 G/DL (ref 13.5–17.5)
HGB BLDV-MCNC: 17.9 G/DL (ref 13.5–17.5)
IMM GRANULOCYTES # BLD AUTO: 0.05 X10*3/UL (ref 0–0.7)
IMM GRANULOCYTES NFR BLD AUTO: 0.7 % (ref 0–0.9)
INHALED O2 CONCENTRATION: 21 %
KETONES UR STRIP.AUTO-MCNC: ABNORMAL MG/DL
LACTATE BLDV-SCNC: 1.7 MMOL/L (ref 0.4–2)
LEUKOCYTE ESTERASE UR QL STRIP.AUTO: NEGATIVE
LYMPHOCYTES # BLD AUTO: 0.82 X10*3/UL (ref 1.2–4.8)
LYMPHOCYTES NFR BLD AUTO: 12.2 %
MAGNESIUM SERPL-MCNC: 1.82 MG/DL (ref 1.6–2.4)
MAGNESIUM SERPL-MCNC: 1.91 MG/DL (ref 1.6–2.4)
MCH RBC QN AUTO: 31.5 PG (ref 26–34)
MCHC RBC AUTO-ENTMCNC: 31.8 G/DL (ref 32–36)
MCV RBC AUTO: 99 FL (ref 80–100)
MONOCYTES # BLD AUTO: 0.41 X10*3/UL (ref 0.1–1)
MONOCYTES NFR BLD AUTO: 6.1 %
MUCOUS THREADS #/AREA URNS AUTO: NORMAL /LPF
NEUTROPHILS # BLD AUTO: 5.39 X10*3/UL (ref 1.2–7.7)
NEUTROPHILS NFR BLD AUTO: 80.6 %
NITRITE UR QL STRIP.AUTO: NEGATIVE
NRBC BLD-RTO: 0 /100 WBCS (ref 0–0)
OXYHGB MFR BLDV: 67.2 % (ref 45–75)
PCO2 BLDV: 25 MM HG (ref 41–51)
PH BLDV: 7.06 PH (ref 7.33–7.43)
PH UR STRIP.AUTO: 5.5 [PH]
PHOSPHATE SERPL-MCNC: 1.9 MG/DL (ref 2.5–4.9)
PHOSPHATE SERPL-MCNC: 3.5 MG/DL (ref 2.5–4.9)
PLATELET # BLD AUTO: 215 X10*3/UL (ref 150–450)
PO2 BLDV: 39 MM HG (ref 35–45)
POC FINGERSTICK BLOOD GLUCOSE: ABNORMAL
POTASSIUM BLDV-SCNC: 5.8 MMOL/L (ref 3.5–5.3)
POTASSIUM SERPL-SCNC: 4 MMOL/L (ref 3.5–5.3)
POTASSIUM SERPL-SCNC: 5.5 MMOL/L (ref 3.5–5.3)
PROT SERPL-MCNC: 7.9 G/DL (ref 6.4–8.2)
PROT UR STRIP.AUTO-MCNC: ABNORMAL MG/DL
RBC # BLD AUTO: 5.4 X10*6/UL (ref 4.5–5.9)
RBC # UR STRIP.AUTO: NEGATIVE MG/DL
RBC #/AREA URNS AUTO: NORMAL /HPF
SAO2 % BLDV: 68 % (ref 45–75)
SODIUM BLDV-SCNC: 130 MMOL/L (ref 136–145)
SODIUM SERPL-SCNC: 134 MMOL/L (ref 136–145)
SODIUM SERPL-SCNC: 136 MMOL/L (ref 136–145)
SP GR UR STRIP.AUTO: >1.03
UROBILINOGEN UR STRIP.AUTO-MCNC: NORMAL MG/DL
WBC # BLD AUTO: 6.7 X10*3/UL (ref 4.4–11.3)
WBC #/AREA URNS AUTO: NORMAL /HPF

## 2025-05-23 PROCEDURE — 99223 1ST HOSP IP/OBS HIGH 75: CPT | Performed by: NURSE PRACTITIONER

## 2025-05-23 PROCEDURE — 85025 COMPLETE CBC W/AUTO DIFF WBC: CPT | Performed by: PHYSICIAN ASSISTANT

## 2025-05-23 PROCEDURE — 99285 EMERGENCY DEPT VISIT HI MDM: CPT | Performed by: STUDENT IN AN ORGANIZED HEALTH CARE EDUCATION/TRAINING PROGRAM

## 2025-05-23 PROCEDURE — 2500000004 HC RX 250 GENERAL PHARMACY W/ HCPCS (ALT 636 FOR OP/ED): Mod: JZ | Performed by: NURSE PRACTITIONER

## 2025-05-23 PROCEDURE — 84132 ASSAY OF SERUM POTASSIUM: CPT | Performed by: PHYSICIAN ASSISTANT

## 2025-05-23 PROCEDURE — 83735 ASSAY OF MAGNESIUM: CPT

## 2025-05-23 PROCEDURE — 82947 ASSAY GLUCOSE BLOOD QUANT: CPT

## 2025-05-23 PROCEDURE — 36415 COLL VENOUS BLD VENIPUNCTURE: CPT | Performed by: PHYSICIAN ASSISTANT

## 2025-05-23 PROCEDURE — 99291 CRITICAL CARE FIRST HOUR: CPT | Mod: 25

## 2025-05-23 PROCEDURE — 36415 COLL VENOUS BLD VENIPUNCTURE: CPT

## 2025-05-23 PROCEDURE — 82435 ASSAY OF BLOOD CHLORIDE: CPT | Performed by: PHYSICIAN ASSISTANT

## 2025-05-23 PROCEDURE — 99291 CRITICAL CARE FIRST HOUR: CPT

## 2025-05-23 PROCEDURE — 82010 KETONE BODYS QUAN: CPT | Performed by: STUDENT IN AN ORGANIZED HEALTH CARE EDUCATION/TRAINING PROGRAM

## 2025-05-23 PROCEDURE — 84132 ASSAY OF SERUM POTASSIUM: CPT

## 2025-05-23 PROCEDURE — 2500000004 HC RX 250 GENERAL PHARMACY W/ HCPCS (ALT 636 FOR OP/ED): Mod: JZ

## 2025-05-23 PROCEDURE — 96375 TX/PRO/DX INJ NEW DRUG ADDON: CPT

## 2025-05-23 PROCEDURE — 81001 URINALYSIS AUTO W/SCOPE: CPT | Performed by: PHYSICIAN ASSISTANT

## 2025-05-23 PROCEDURE — 84100 ASSAY OF PHOSPHORUS: CPT | Performed by: STUDENT IN AN ORGANIZED HEALTH CARE EDUCATION/TRAINING PROGRAM

## 2025-05-23 PROCEDURE — 96361 HYDRATE IV INFUSION ADD-ON: CPT

## 2025-05-23 PROCEDURE — 2020000001 HC ICU ROOM DAILY

## 2025-05-23 PROCEDURE — 2500000004 HC RX 250 GENERAL PHARMACY W/ HCPCS (ALT 636 FOR OP/ED): Mod: JZ | Performed by: PHYSICIAN ASSISTANT

## 2025-05-23 PROCEDURE — 96374 THER/PROPH/DIAG INJ IV PUSH: CPT

## 2025-05-23 PROCEDURE — 82810 BLOOD GASES O2 SAT ONLY: CPT | Performed by: PHYSICIAN ASSISTANT

## 2025-05-23 PROCEDURE — 2500000005 HC RX 250 GENERAL PHARMACY W/O HCPCS

## 2025-05-23 PROCEDURE — 2500000004 HC RX 250 GENERAL PHARMACY W/ HCPCS (ALT 636 FOR OP/ED): Mod: JZ | Performed by: STUDENT IN AN ORGANIZED HEALTH CARE EDUCATION/TRAINING PROGRAM

## 2025-05-23 PROCEDURE — 83735 ASSAY OF MAGNESIUM: CPT | Performed by: PHYSICIAN ASSISTANT

## 2025-05-23 RX ORDER — DEXTROSE 50 % IN WATER (D50W) INTRAVENOUS SYRINGE
12.5
Status: DISCONTINUED | OUTPATIENT
Start: 2025-05-23 | End: 2025-05-24 | Stop reason: HOSPADM

## 2025-05-23 RX ORDER — DEXTROSE MONOHYDRATE 100 MG/ML
150 INJECTION, SOLUTION INTRAVENOUS CONTINUOUS PRN
Status: DISCONTINUED | OUTPATIENT
Start: 2025-05-23 | End: 2025-05-24

## 2025-05-23 RX ORDER — DEXTROSE 50 % IN WATER (D50W) INTRAVENOUS SYRINGE
25
Status: DISCONTINUED | OUTPATIENT
Start: 2025-05-23 | End: 2025-05-24 | Stop reason: HOSPADM

## 2025-05-23 RX ORDER — SODIUM CHLORIDE 9 MG/ML
250 INJECTION, SOLUTION INTRAVENOUS CONTINUOUS
Status: DISCONTINUED | OUTPATIENT
Start: 2025-05-23 | End: 2025-05-24

## 2025-05-23 RX ORDER — ENOXAPARIN SODIUM 100 MG/ML
40 INJECTION SUBCUTANEOUS DAILY
Status: DISCONTINUED | OUTPATIENT
Start: 2025-05-23 | End: 2025-05-24 | Stop reason: HOSPADM

## 2025-05-23 RX ORDER — ONDANSETRON HYDROCHLORIDE 2 MG/ML
4 INJECTION, SOLUTION INTRAVENOUS ONCE
Status: COMPLETED | OUTPATIENT
Start: 2025-05-23 | End: 2025-05-23

## 2025-05-23 RX ORDER — DEXTROSE 50 % IN WATER (D50W) INTRAVENOUS SYRINGE
50
Status: DISCONTINUED | OUTPATIENT
Start: 2025-05-23 | End: 2025-05-24

## 2025-05-23 RX ORDER — DEXTROSE MONOHYDRATE AND SODIUM CHLORIDE 5; .45 G/100ML; G/100ML
150 INJECTION, SOLUTION INTRAVENOUS CONTINUOUS PRN
Status: DISCONTINUED | OUTPATIENT
Start: 2025-05-23 | End: 2025-05-24

## 2025-05-23 RX ADMIN — ENOXAPARIN SODIUM 40 MG: 40 INJECTION SUBCUTANEOUS at 17:11

## 2025-05-23 RX ADMIN — SODIUM CHLORIDE, POTASSIUM CHLORIDE, SODIUM LACTATE AND CALCIUM CHLORIDE 2000 ML: 600; 310; 30; 20 INJECTION, SOLUTION INTRAVENOUS at 14:18

## 2025-05-23 RX ADMIN — POTASSIUM PHOSPHATE, MONOBASIC AND POTASSIUM PHOSPHATE, DIBASIC 15 MMOL: 224; 236 INJECTION, SOLUTION, CONCENTRATE INTRAVENOUS at 23:20

## 2025-05-23 RX ADMIN — INSULIN HUMAN 10.6 UNITS/HR: 1 INJECTION, SOLUTION INTRAVENOUS at 15:36

## 2025-05-23 RX ADMIN — ONDANSETRON 4 MG: 2 INJECTION, SOLUTION INTRAMUSCULAR; INTRAVENOUS at 14:16

## 2025-05-23 RX ADMIN — DEXTROSE MONOHYDRATE 150 ML/HR: 10 INJECTION, SOLUTION INTRAVENOUS at 21:11

## 2025-05-23 RX ADMIN — DEXTROSE AND SODIUM CHLORIDE 150 ML/HR: 5; 450 INJECTION, SOLUTION INTRAVENOUS at 17:11

## 2025-05-23 RX ADMIN — SODIUM CHLORIDE 250 ML/HR: 0.9 INJECTION, SOLUTION INTRAVENOUS at 15:27

## 2025-05-23 SDOH — SOCIAL STABILITY: SOCIAL INSECURITY: WERE YOU ABLE TO COMPLETE ALL THE BEHAVIORAL HEALTH SCREENINGS?: YES

## 2025-05-23 SDOH — SOCIAL STABILITY: SOCIAL INSECURITY: HAVE YOU HAD THOUGHTS OF HARMING ANYONE ELSE?: NO

## 2025-05-23 ASSESSMENT — COGNITIVE AND FUNCTIONAL STATUS - GENERAL
PATIENT BASELINE BEDBOUND: NO
MOBILITY SCORE: 24
DAILY ACTIVITIY SCORE: 24
MOBILITY SCORE: 24
DAILY ACTIVITIY SCORE: 24

## 2025-05-23 ASSESSMENT — PAIN SCALES - GENERAL
PAINLEVEL_OUTOF10: 6
PAINLEVEL_OUTOF10: 0 - NO PAIN
PAINLEVEL_OUTOF10: 0 - NO PAIN

## 2025-05-23 ASSESSMENT — ENCOUNTER SYMPTOMS
NAUSEA: 1
VOMITING: 1

## 2025-05-23 ASSESSMENT — ACTIVITIES OF DAILY LIVING (ADL)
LACK_OF_TRANSPORTATION: NO
HEARING - RIGHT EAR: FUNCTIONAL
DRESSING YOURSELF: INDEPENDENT
PATIENT'S MEMORY ADEQUATE TO SAFELY COMPLETE DAILY ACTIVITIES?: YES
HEARING - LEFT EAR: FUNCTIONAL
WALKS IN HOME: INDEPENDENT
ADEQUATE_TO_COMPLETE_ADL: YES
JUDGMENT_ADEQUATE_SAFELY_COMPLETE_DAILY_ACTIVITIES: YES
BATHING: INDEPENDENT
TOILETING: INDEPENDENT
GROOMING: INDEPENDENT
FEEDING YOURSELF: INDEPENDENT

## 2025-05-23 ASSESSMENT — PAIN DESCRIPTION - FREQUENCY: FREQUENCY: CONSTANT/CONTINUOUS

## 2025-05-23 ASSESSMENT — LIFESTYLE VARIABLES
SKIP TO QUESTIONS 9-10: 1
AUDIT-C TOTAL SCORE: 0
SUBSTANCE_ABUSE_PAST_12_MONTHS: NO
HOW MANY STANDARD DRINKS CONTAINING ALCOHOL DO YOU HAVE ON A TYPICAL DAY: PATIENT DOES NOT DRINK
HOW OFTEN DO YOU HAVE A DRINK CONTAINING ALCOHOL: NEVER
HOW OFTEN DO YOU HAVE 6 OR MORE DRINKS ON ONE OCCASION: NEVER
AUDIT-C TOTAL SCORE: 0
PRESCIPTION_ABUSE_PAST_12_MONTHS: NO

## 2025-05-23 ASSESSMENT — PAIN DESCRIPTION - DESCRIPTORS: DESCRIPTORS: ACHING

## 2025-05-23 ASSESSMENT — PAIN DESCRIPTION - ORIENTATION: ORIENTATION: LOWER

## 2025-05-23 ASSESSMENT — PAIN DESCRIPTION - LOCATION: LOCATION: BACK

## 2025-05-23 ASSESSMENT — PAIN - FUNCTIONAL ASSESSMENT
PAIN_FUNCTIONAL_ASSESSMENT: 0-10
PAIN_FUNCTIONAL_ASSESSMENT: 0-10

## 2025-05-23 ASSESSMENT — PAIN DESCRIPTION - PAIN TYPE: TYPE: ACUTE PAIN

## 2025-05-23 NOTE — ED PROVIDER NOTES
"EMERGENCY MEDICINE EVALUATION NOTE    History of Present Illness     Chief Complaint:   Chief Complaint   Patient presents with    Hyperglycemia     Thinks he is in DKA again       HPI: Ed Sanon is a 34 y.o. male presents with a chief complaint of being in DKA.  Patient reports that he is here frequently for this and is concerned that he is back in DKA again.  Patient reports that he has a similar symptoms as previously with a headache over the flank pain nausea but no vomiting currently.  Patient reports that he is taking his insulin.  He states that he is taking 22 units of NPH every 12 hours but that is all he is taking.  Patient denies any chest pain shortness of breath.  Denies any fevers or chills.    Previous History   Medical History[1]  Surgical History[2]  Social History[3]  Family History[4]  Allergies[5]  Current Outpatient Medications   Medication Instructions    alcohol swabs     BD Insulin Syringe Ultra-Fine 0.5 mL 31 gauge x 5/16\" syringe     Dexcom G7  misc 1 Device, As needed    Dexcom G7 Sensor device 1 each, Every 10 days    Gvoke HypoPen 1-Pack 1 mg/0.2 mL auto-injector     insulin aspart (NOVOLOG) 5 Units, subcutaneous    insulin degludec (TRESIBA FLEXTOUCH U-100) 15 Units, subcutaneous, Nightly, Take as directed per insulin instructions.    insulin lispro (HumaLOG) 100 unit/mL pen Inject 5 Units under the skin 3 times a day before meals with sliding scale as directed.  Hypoglycemia protocol Call LIP unit(s) if Blood Glucose is between 0 - 70 mg/dL     0 unit(s) if Blood glucose is between    1 unit(s) if Blood glucose is between 151-200   2 unit(s) if Blood glucose is between 201-250   3 unit(s) if Blood glucose is between 251-300   4 unit(s) if Blood glucose is between 301-350   5 unit(s) if Blood glucose is between 351-400    If blood glucose is greater than 400 mg/dL, give max insulin per sliding scale AND then contact provider.    insulin lispro (HUMALOG) 6 Units, " "subcutaneous, 3 times daily before meals, Inject 3 times per day as directed.    lancets misc 1 Units, 3 times daily after meals and nightly    OneTouch Ultra Test strip USE 1 STRIP TO CHECK GLUCOSE 4 TIMES DAILY AS DIRECTED    OneTouch Ultra2 Meter misc use as directed    pen needle, diabetic (BD Ultra-Fine Claire Pen Needle) 32 gauge x 5/32\" needle Use with insulin pens 4 times daily.    pen needle, diabetic 32 gauge x 5/32\" needle Use as instructed 4 times daily       Physical Exam     Appearance: Alert, oriented , cooperative     Skin: Intact,  dry skin, no lesions, rash, petechiae or purpura.      Eyes: PERRLA, EOMs intact,  Conjunctiva      ENT: Hearing grossly intact. Pharynx clear, uvula midline.  Dry mucous membranes.     Neck: Supple. Trachea at midline.      Pulmonary: Clear bilaterally. No rales, rhonchi or wheezing. No accessory muscle use or stridor.     Cardiac: Tachycardic     Abdomen: Soft, nontender, active bowel sounds.     Musculoskeletal: Full range of motion.      Neurological:Cranial nerves II through XII are grossly intact, normal sensation, no weakness, no focal findings identified.     Results     Labs Reviewed   CBC WITH AUTO DIFFERENTIAL - Abnormal       Result Value    WBC 6.7      nRBC 0.0      RBC 5.40      Hemoglobin 17.0      Hematocrit 53.4 (*)     MCV 99      MCH 31.5      MCHC 31.8 (*)     RDW 13.7      Platelets 215      Neutrophils % 80.6      Immature Granulocytes %, Automated 0.7      Lymphocytes % 12.2      Monocytes % 6.1      Eosinophils % 0.0      Basophils % 0.4      Neutrophils Absolute 5.39      Immature Granulocytes Absolute, Automated 0.05      Lymphocytes Absolute 0.82 (*)     Monocytes Absolute 0.41      Eosinophils Absolute 0.00      Basophils Absolute 0.03     BLOOD GAS VENOUS FULL PANEL - Abnormal    POCT pH, Venous 7.06 (*)     POCT pCO2, Venous 25 (*)     POCT pO2, Venous 39      POCT SO2, Venous 68      POCT Oxy Hemoglobin, Venous 67.2      POCT Hematocrit " "Calculated, Venous 54.0 (*)     POCT Sodium, Venous 130 (*)     POCT Potassium, Venous 5.8 (*)     POCT Chloride, Venous 102      POCT Ionized Calicum, Venous 1.23      POCT Glucose, Venous 353 (*)     POCT Lactate, Venous 1.7      POCT Base Excess, Venous -21.7 (*)     POCT HCO3 Calculated, Venous 7.1 (*)     POCT Hemoglobin, Venous 17.9 (*)     POCT Anion Gap, Venous 27.0 (*)     Patient Temperature 37.0      FiO2 21     POCT GLUCOSE - Abnormal    POCT Glucose 305 (*)    COMPREHENSIVE METABOLIC PANEL   MAGNESIUM   URINALYSIS WITH REFLEX CULTURE AND MICROSCOPIC    Narrative:     The following orders were created for panel order Urinalysis with Reflex Culture and Microscopic.  Procedure                               Abnormality         Status                     ---------                               -----------         ------                     Urinalysis with Reflex C...[[id number]]                      In process                 Extra Urine Gray Tube[[id number]]                            In process                   Please view results for these tests on the individual orders.   URINALYSIS WITH REFLEX CULTURE AND MICROSCOPIC   EXTRA URINE GRAY TUBE     No orders to display         ED Course & Medical Decision Making     Medications   lactated Ringer's bolus 2,000 mL (2,000 mL intravenous New Bag 5/23/25 1418)   ondansetron (Zofran) injection 4 mg (4 mg intravenous Given 5/23/25 1416)     Heart Rate:  [114-120]   Temperature:  [36.2 °C (97.1 °F)]   Respirations:  [14-18]   BP: (116)/(77)   Height:  [177.8 cm (5' 10\")]   Weight:  [75.8 kg (167 lb)]   Pulse Ox:  [99 %]    ED Course as of 05/23/25 1443   Fri May 23, 2025   1443 Care was endorsed to attending physician at shift change pending definitive management and admission. [CJ]      ED Course User Index  [CJ] Davis Ramirez PA-C         Diagnoses as of 05/23/25 1443   Diabetic ketoacidosis without coma associated with type 1 diabetes mellitus       Procedures "   Critical Care    Performed by: Davis Ramirez PA-C  Authorized by: Americo Godinez MD    Critical care provider statement:     Critical care time (minutes):  40    Critical care time was exclusive of:  Separately billable procedures and treating other patients and teaching time    Critical care was necessary to treat or prevent imminent or life-threatening deterioration of the following conditions:  Endocrine crisis (DKA)    Critical care was time spent personally by me on the following activities:  Blood draw for specimens, development of treatment plan with patient or surrogate, ordering and performing treatments and interventions, ordering and review of laboratory studies, ordering and review of radiographic studies, evaluation of patient's response to treatment, re-evaluation of patient's condition, review of old charts and pulse oximetry      Diagnosis     1. Diabetic ketoacidosis without coma associated with type 1 diabetes mellitus        Disposition   Endorsed to attending physician with intent to admit    ED Prescriptions    None         Disclaimer: This note was dictated by speech recognition. Minor errors in transcription may be present. Please call if questions.         [1]   Past Medical History:  Diagnosis Date    DKA (diabetic ketoacidosis) (Multi)     recurrent    HTN (hypertension)     Type I diabetes mellitus (Multi)    [2] History reviewed. No pertinent surgical history.  [3]   Social History  Tobacco Use    Smoking status: Never     Passive exposure: Never    Smokeless tobacco: Never   Vaping Use    Vaping status: Never Used   Substance Use Topics    Alcohol use: Never    Drug use: Never   [4]   Family History  Problem Relation Name Age of Onset    No Known Problems Mother      No Known Problems Father      Hypertension Other      Coronary artery disease Other      Diabetes Other      Heart failure Other     [5] No Known Allergies       Davis Ramirez PA-C  05/23/25 1640

## 2025-05-23 NOTE — PROGRESS NOTES
"Ed Sanon is a 34 y.o. male admitted for Diabetic ketoacidosis without coma associated with type 1 diabetes mellitus. Pharmacy reviewed the patient's [id number] medications and allergies for accuracy.    The list below reflects the PTA list prior to pharmacy medication history. A summary a changes to the [facility] medication list has been listed below. Please review each medication in order reconciliation for additional clarification and justification.    Source of information:  Discharge paperwork 25  No changes!  Medications added:    Medications modified:    Medications to be removed:    Medications of concern:      Prior to Admission Medications   Prescriptions Last Dose Informant Patient Reported? Taking?   BD Insulin Syringe Ultra-Fine 0.5 mL 31 gauge x 5/16\" syringe   Yes No   Dexcom G7  misc   Yes No   Sig: Inject 1 Device under the skin if needed (poorly controlled type 1 diabetes). Use as instructed   Dexcom G7 Sensor device   Yes No   Si each every 10 (ten) days.   Gvoke HypoPen 1-Pack 1 mg/0.2 mL auto-injector   Yes No   OneTouch Ultra Test strip   Yes No   Sig: USE 1 STRIP TO CHECK GLUCOSE 4 TIMES DAILY AS DIRECTED   OneTouch Ultra2 Meter misc   Yes No   Sig: use as directed   alcohol swabs   Yes No   insulin aspart (NovoLOG) 100 unit/mL injection   Yes No   Sig: Inject 5 Units under the skin.   insulin degludec (Tresiba FlexTouch U-100) 100 unit/mL (3 mL) pen   No No   Sig: Inject 15 Units under the skin once daily at bedtime. Take as directed per insulin instructions.   insulin lispro (HumaLOG) 100 unit/mL pen   No No   Sig: Inject 5 Units under the skin 3 times a day before meals with sliding scale as directed.  Hypoglycemia protocol Call LIP unit(s) if Blood Glucose is between 0 - 70 mg/dL     0 unit(s) if Blood glucose is between    1 unit(s) if Blood glucose is between 151-200   2 unit(s) if Blood glucose is between 201-250   3 unit(s) if Blood glucose is between " "251-300   4 unit(s) if Blood glucose is between 301-350   5 unit(s) if Blood glucose is between 351-400    If blood glucose is greater than 400 mg/dL, give max insulin per sliding scale AND then contact provider.   insulin lispro (HumaLOG) 100 unit/mL pen   No No   Sig: Inject 6 Units under the skin 3 times a day before meals. Inject 3 times per day as directed.   lancets misc   Yes No   Sig: Inject 1 Units under the skin 2 times a day. Sliding scale   pen needle, diabetic (BD Ultra-Fine Claire Pen Needle) 32 gauge x 5/32\" needle   No No   Sig: Use with insulin pens 4 times daily.   pen needle, diabetic 32 gauge x 5/32\" needle   Yes No   Sig: Use as instructed 4 times daily      Facility-Administered Medications: None       Lucero Riley        "

## 2025-05-23 NOTE — CONSULTS
Critical Care Medicine Consult      Reason For Consult  DKA    History Of Present Illness  Ed Sanon is a 34 y.o. male with past medical history of diabetes mellitus type 1 with poor insulin compliance, anxiety, depression, medical noncompliance, and recurrent admissions for DKA presented to [facility] ED with complaints of being in DKA.  Upon ED workup, temperature 36.2 °C, heart rate 120, respiratory rate 18, blood pressure 116/77, and SpO2 99% on room air.  ED labs revealed blood glucose 313, sodium 134, potassium 5.5, chloride 102, bicarbonate 7, anion gap 31, BUN 17, creatinine 1.23, phosphorus 8.5, magnesium 1.91, beta hydroxybutyrate 10.25, WBC 6.7, RBC 5.4, hemoglobin 17, hematocrit 53.4, platelets 215, venous pH 7.06, PCO2 25, PO2 39, , and HCO3 calculated 7.1.  UA with reflex microscopic and culture negative for UTI and revealed >1.030 specific urine gravity, 4+ glucose, and 4+ ketones.  ED interventions fluid 2 L LR bolus, Zofran 4 mg IV x 1, initiation of insulin infusion per DKA protocol, IV fluids per DKA protocol, and admission to ICU for further management.    Patient seen and examined in ICU bed 5.  Patient alert and oriented x 4 and in no acute distress.  Patient reported that he came to the ER because he felt like he was in DKA.  He began having weakness around 10:00 last night along with occasional nausea and vomiting.  He endorses slight abdominal pain.  He stated that he began having increased urination 1 day prior to not feeling well.  He stated that he has been taking 22 units of NPH every 12 hours and his last dose was at 9:30 PM last night and did not take any today. His blood sugars have been in 280-350 range for the last few days. He denies any vision changes, dizziness, lightheadedness, syncope, chest pain, palpitations, shortness of breath, cough, congestion, diarrhea, dysuria, hematuria, fevers, chills, night sweats, paresthesias, or any other complaints.  He denies any  sick contacts.He states that he want to get on a better regimen so he isn't in the hospital all of the time and recently started seeing HEATHER Guan at [facility] but has been thinking about going back to Dr. Packer for diabetes management. He reports that he was supposed to start on Tresiba but there was issues with a prior authorization and it got denied so he has been using NPH. Educated patient on the importance of taking his insulin as ordered including his short-acting insulin.     Medical History[1]  Surgical History[2]  Prescriptions Prior to Admission[3]  Patient has no known allergies.  Social History[4]  Family History[5]    Scheduled Medications:   Scheduled Medications[6]     Continuous Medications:   Continuous Medications[7]     PRN Medications:   PRN Medications[8]    Review of Systems:  Review of Systems   Gastrointestinal:  Positive for nausea and vomiting.   Genitourinary:         Polyuria        Objective   Vitals:  Most Recent:  Vitals:    05/23/25 1900   BP: 117/70   Pulse: 107   Resp: (!) 28   Temp: 37.6 °C (99.7 °F)   SpO2: 100%       24hr Min/Max:  Temp  Min: 36.2 °C (97.1 °F)  Max: 37.6 °C (99.7 °F)  Pulse  Min: 107  Max: 121  BP  Min: 104/64  Max: 126/70  Resp  Min: 14  Max: 45  SpO2  Min: 61 %  Max: 100 %        Intake/Output Summary (Last 24 hours) at 5/23/2025 1948  Last data filed at 5/23/2025 1807  Gross per 24 hour   Intake 2133.35 ml   Output --   Net 2133.35 ml           Physical exam:    Physical Exam  Constitutional:       General: He is not in acute distress.     Appearance: Normal appearance.   HENT:      Head: Normocephalic.      Nose: Nose normal.      Mouth/Throat:      Mouth: Mucous membranes are dry.      Comments: Acetone breath  Eyes:      Extraocular Movements: Extraocular movements intact.      Conjunctiva/sclera: Conjunctivae normal.      Pupils: Pupils are equal, round, and reactive to light.   Cardiovascular:      Rate and Rhythm: Regular  rhythm. Tachycardia present.      Pulses: Normal pulses.      Heart sounds: Normal heart sounds. No murmur heard.     Comments: Sinus tachycardia on telemetry  Pulmonary:      Effort: Pulmonary effort is normal. No respiratory distress.      Breath sounds: Normal breath sounds. No wheezing, rhonchi or rales.   Chest:      Chest wall: No tenderness.   Abdominal:      General: Bowel sounds are normal. There is no distension.      Palpations: Abdomen is soft.      Tenderness: There is no abdominal tenderness. There is no guarding.   Musculoskeletal:         General: Normal range of motion.      Cervical back: Normal range of motion.      Right lower leg: No edema.      Left lower leg: No edema.   Skin:     General: Skin is warm and dry.      Capillary Refill: Capillary refill takes less than 2 seconds.   Neurological:      General: No focal deficit present.      Mental Status: He is alert and oriented to person, place, and time. Mental status is at baseline.      Cranial Nerves: Cranial nerves 2-12 are intact.      Sensory: Sensation is intact.      Motor: Motor function is intact.      Coordination: Coordination is intact.   Psychiatric:         Attention and Perception: Attention normal.         Mood and Affect: Mood normal. Affect is flat.         Speech: Speech normal.         Behavior: Behavior normal.         Thought Content: Thought content normal.         Judgment: Judgment normal.          Lab/Radiology/Diagnostic Review:  Results for orders placed or performed during the hospital encounter of 05/23/25 (from the past 24 hours)   POCT GLUCOSE   Result Value Ref Range    POCT Glucose 305 (H) 74 - 99 mg/dL   CBC and Auto Differential   Result Value Ref Range    WBC 6.7 4.4 - 11.3 x10*3/uL    nRBC 0.0 0.0 - 0.0 /100 WBCs    RBC 5.40 4.50 - 5.90 x10*6/uL    Hemoglobin 17.0 13.5 - 17.5 g/dL    Hematocrit 53.4 (H) 41.0 - 52.0 %    MCV 99 80 - 100 fL    MCH 31.5 26.0 - 34.0 pg    MCHC 31.8 (L) 32.0 - 36.0 g/dL    RDW  13.7 11.5 - 14.5 %    Platelets 215 150 - 450 x10*3/uL    Neutrophils % 80.6 40.0 - 80.0 %    Immature Granulocytes %, Automated 0.7 0.0 - 0.9 %    Lymphocytes % 12.2 13.0 - 44.0 %    Monocytes % 6.1 2.0 - 10.0 %    Eosinophils % 0.0 0.0 - 6.0 %    Basophils % 0.4 0.0 - 2.0 %    Neutrophils Absolute 5.39 1.20 - 7.70 x10*3/uL    Immature Granulocytes Absolute, Automated 0.05 0.00 - 0.70 x10*3/uL    Lymphocytes Absolute 0.82 (L) 1.20 - 4.80 x10*3/uL    Monocytes Absolute 0.41 0.10 - 1.00 x10*3/uL    Eosinophils Absolute 0.00 0.00 - 0.70 x10*3/uL    Basophils Absolute 0.03 0.00 - 0.10 x10*3/uL   Comprehensive metabolic panel   Result Value Ref Range    Glucose 313 (H) 74 - 99 mg/dL    Sodium 134 (L) 136 - 145 mmol/L    Potassium 5.5 (H) 3.5 - 5.3 mmol/L    Chloride 102 98 - 107 mmol/L    Bicarbonate 7 (LL) 21 - 32 mmol/L    Anion Gap 31 (H) 10 - 20 mmol/L    Urea Nitrogen 17 6 - 23 mg/dL    Creatinine 1.23 0.50 - 1.30 mg/dL    eGFR 78 >60 mL/min/1.73m*2    Calcium 8.7 8.6 - 10.3 mg/dL    Albumin 4.7 3.4 - 5.0 g/dL    Alkaline Phosphatase 95 33 - 120 U/L    Total Protein 7.9 6.4 - 8.2 g/dL    AST 9 9 - 39 U/L    Bilirubin, Total 0.5 0.0 - 1.2 mg/dL    ALT 11 10 - 52 U/L   Magnesium   Result Value Ref Range    Magnesium 1.91 1.60 - 2.40 mg/dL   Blood Gas Venous Full Panel   Result Value Ref Range    POCT pH, Venous 7.06 (LL) 7.33 - 7.43 pH    POCT pCO2, Venous 25 (L) 41 - 51 mm Hg    POCT pO2, Venous 39 35 - 45 mm Hg    POCT SO2, Venous 68 45 - 75 %    POCT Oxy Hemoglobin, Venous 67.2 45.0 - 75.0 %    POCT Hematocrit Calculated, Venous 54.0 (H) 41.0 - 52.0 %    POCT Sodium, Venous 130 (L) 136 - 145 mmol/L    POCT Potassium, Venous 5.8 (H) 3.5 - 5.3 mmol/L    POCT Chloride, Venous 102 98 - 107 mmol/L    POCT Ionized Calicum, Venous 1.23 1.10 - 1.33 mmol/L    POCT Glucose, Venous 353 (H) 74 - 99 mg/dL    POCT Lactate, Venous 1.7 0.4 - 2.0 mmol/L    POCT Base Excess, Venous -21.7 (L) -2.0 - 3.0 mmol/L    POCT HCO3  Calculated, Venous 7.1 (L) 22.0 - 26.0 mmol/L    POCT Hemoglobin, Venous 17.9 (H) 13.5 - 17.5 g/dL    POCT Anion Gap, Venous 27.0 (H) 10.0 - 25.0 mmol/L    Patient Temperature 37.0 degrees Celsius    FiO2 21 %   Beta Hydroxybutyrate   Result Value Ref Range    Beta-Hydroxybutyrate 10.25 (H) 0.02 - 0.27 mmol/L   Phosphorus   Result Value Ref Range    Phosphorus 3.5 2.5 - 4.9 mg/dL   Urinalysis with Reflex Culture and Microscopic   Result Value Ref Range    Color, Urine Colorless (N) Light-Yellow, Yellow, Dark-Yellow    Appearance, Urine Clear Clear    Specific Gravity, Urine >1.030 (N) 1.005 - 1.035    pH, Urine 5.5 5.0, 5.5, 6.0, 6.5, 7.0, 7.5, 8.0    Protein, Urine 20 (TRACE) NEGATIVE, 10 (TRACE), 20 (TRACE) mg/dL    Glucose, Urine OVER (4+) (A) Normal mg/dL    Blood, Urine NEGATIVE NEGATIVE mg/dL    Ketones, Urine OVER (4+) (A) NEGATIVE mg/dL    Bilirubin, Urine NEGATIVE NEGATIVE mg/dL    Urobilinogen, Urine Normal Normal mg/dL    Nitrite, Urine NEGATIVE NEGATIVE    Leukocyte Esterase, Urine NEGATIVE NEGATIVE   Urinalysis Microscopic   Result Value Ref Range    WBC, Urine NONE 1-5, NONE /HPF    RBC, Urine NONE NONE, 1-2, 3-5 /HPF    Mucus, Urine FEW Reference range not established. /LPF   POCT GLUCOSE   Result Value Ref Range    POCT Glucose 276 (H) 74 - 99 mg/dL   POCT GLUCOSE   Result Value Ref Range    POCT Glucose 211 (H) 74 - 99 mg/dL   POCT GLUCOSE   Result Value Ref Range    POCT Glucose 195 (H) 74 - 99 mg/dL   POCT GLUCOSE   Result Value Ref Range    POCT Glucose 170 (H) 74 - 99 mg/dL     Imaging  No results found.    Cardiology, Vascular, and Other Imaging  No other imaging results found for the past 7 days      Assessment/Plan   Assessment & Plan  Diabetic ketoacidosis without coma associated with type 1 diabetes mellitus      Ed Sanon is a 34 y.o. male with past medical history of diabetes mellitus type 1 with poor insulin compliance, anxiety, depression, medical noncompliance, and recurrent  admissions for DKA presented to [facility] ED with complaints of being in DKA.    DKA without coma in diabetes mellitus type 1 with poor compliance  -Presented with complaints of nausea, vomiting, weakness, polyuria  - Blood glucose on presentation 313  - Anion gap 31  - Bicarbonate 7  - Beta hydroxybutyrate 10.25  - VBG: pH 7.06, pCO2 25, HCO3 calculated 7.1  - UA with reflex microscopic and culture negative for UTI and positive for 4+ glucose and 4+ ketones, specific gravity >1.030  - S/p 2 L LR bolus  - Continue IV fluids per DKA protocol: NS at 250 mL/h until blood glucose less than 250 mg/dL, D5 1/2 NS at 150 mL/h for blood glucose 150 to 250 mg/dL, D10 at 150 mL/h for blood glucose less than or equal to 150 mg/dL and anion gap still open, and D10 at 150 mL/h for blood glucose less than 70 mg/dL and anion gap still open.  - Continue insulin infusion per DKA protocol, titrate per protocol  - POCT glucose checks hourly while on insulin infusion  - N.p.o. while on insulin infusion  - Trend RFP and magnesium every 4 hours on insulin infusion  - Replete electrolytes as necessary  - Hold insulin infusion for potassium level less than 3.3 and resume after repletion  - Hypoglycemia protocol as needed  - Monitor intake and output  - Plan to bridge off insulin infusion with 22 units NPH once anion gap is less than 15 and HCO3 is greater than 15  - Endocrinology consult, input appreciated    HAGMA 2/2 DKA  - Blood glucose on presentation 313  - Anion gap 31  - Bicarbonate 7  - Potassium 5.5  -Beta hydroxybutyrate 10.25  - VBG: pH 7.06, pCO2 25, HCO3 calculated 7.1  -S/p 2 L LR bolus  - Continue insulin infusion and IV fluids per DKA protocol  - Trend RFP and mag every 4 hours while on insulin infusion  - Replete electrolytes as necessary  - Monitor intake and output  - N.p.o. while on insulin infusion  - Continuous telemetry monitoring  - Endocrinology consult, input appreciated    Pseudohyponatremia 2/2 DKA  - Sodium  "level 138, corrected sodium for glucose 139  - Blood glucose 313 on presentation  - s/p 2L LR bolus  - Continue insulin infusion and IV fluids per DKA protocol  - Trend RFP every 4 hours while on insulin infusion.   - Monitor intake and output    Hyperkalemia  - Potassium level 5.5  - Secondary to combination of acidosis and hyperglycemia leading to cellular shift  - S/p 2L LR bolus  - Continue insulin infusion and IV fluids per DKA protocol  - Trend RFP every 4 hours while on insulin infusion  - Continuous telemetry monitoring    Medical noncompliance  - Recurrent admission for DKA due to noncompliance with insulin regimen  - Patient expressed want for better regimen   - Counseled patient on the importance of following insulin regimen that is ordered including use of short-acting insulin for improved glycemic control and decrease bouts of DKA.      I spent 47 minutes of cumulative critical care time with the patient.  Greater than 50% of that time was spent in the direct collaboration and or coordination of care of the patient.     Dragon dictation software was used to dictate this note and thus there may be minor errors in translation/transcription including garbled speech or misspellings. Please contact for clarification if needed.          [1]   Past Medical History:  Diagnosis Date    DKA (diabetic ketoacidosis) (Multi)     recurrent    HTN (hypertension)     Noncompliance     Type I diabetes mellitus (Multi)    [2] History reviewed. No pertinent surgical history.  [3]   Medications Prior to Admission   Medication Sig Dispense Refill Last Dose/Taking    alcohol swabs        BD Insulin Syringe Ultra-Fine 0.5 mL 31 gauge x 5/16\" syringe        Dexcom G7  misc Inject 1 Device under the skin if needed (poorly controlled type 1 diabetes). Use as instructed       Dexcom G7 Sensor device 1 each every 10 (ten) days.       Gvoke HypoPen 1-Pack 1 mg/0.2 mL auto-injector        insulin degludec (Tresiba FlexTouch " "U-100) 100 unit/mL (3 mL) pen Inject 15 Units under the skin once daily at bedtime. Take as directed per insulin instructions. (Patient not taking: Reported on 5/23/2025) 6 mL 11 Not Taking    insulin lispro (HumaLOG) 100 unit/mL pen Inject 5 Units under the skin 3 times a day before meals with sliding scale as directed.  Hypoglycemia protocol Call LIP unit(s) if Blood Glucose is between 0 - 70 mg/dL     0 unit(s) if Blood glucose is between    1 unit(s) if Blood glucose is between 151-200   2 unit(s) if Blood glucose is between 201-250   3 unit(s) if Blood glucose is between 251-300   4 unit(s) if Blood glucose is between 301-350   5 unit(s) if Blood glucose is between 351-400    If blood glucose is greater than 400 mg/dL, give max insulin per sliding scale AND then contact provider. 15 mL 11     insulin lispro (HumaLOG) 100 unit/mL pen Inject 6 Units under the skin 3 times a day before meals. Inject 3 times per day as directed. 5.4 mL 0     insulin NPH, Isophane, (HumuLIN N,NovoLIN N) 100 unit/mL injection Inject 22 Units under the skin 2 times a day before meals. Take as directed per insulin instructions.       lancets misc Inject 1 Units under the skin 2 times a day. Sliding scale       OneTouch Ultra Test strip USE 1 STRIP TO CHECK GLUCOSE 4 TIMES DAILY AS DIRECTED       OneTouch Ultra2 Meter misc use as directed       pen needle, diabetic (BD Ultra-Fine Claire Pen Needle) 32 gauge x 5/32\" needle Use with insulin pens 4 times daily. 100 each 0     pen needle, diabetic 32 gauge x 5/32\" needle Use as instructed 4 times daily      [4]   Social History  Tobacco Use    Smoking status: Never     Passive exposure: Never    Smokeless tobacco: Never   Vaping Use    Vaping status: Never Used   Substance Use Topics    Alcohol use: Never    Drug use: Never   [5]   Family History  Problem Relation Name Age of Onset    No Known Problems Mother      No Known Problems Father      Hypertension Other      Coronary artery " disease Other      Diabetes Other      Heart failure Other     [6] enoxaparin, 40 mg, subcutaneous, Daily  [7] dextrose 10 % in water (D10W), 150 mL/hr  dextrose 10 % in water (D10W), 150 mL/hr  dextrose 5%-0.45 % sodium chloride, 150 mL/hr, Last Rate: 150 mL/hr (05/23/25 1807)  insulin regular, 0-50 Units/hr, Last Rate: 10.6 Units/hr (05/23/25 1536)  sodium chloride 0.9%, 250 mL/hr, Last Rate: 250 mL/hr (05/23/25 1807)  [8] PRN medications: dextrose 10 % in water (D10W), dextrose 10 % in water (D10W), dextrose 5%-0.45 % sodium chloride, dextrose, dextrose, dextrose, glucagon, glucagon

## 2025-05-24 VITALS
DIASTOLIC BLOOD PRESSURE: 88 MMHG | BODY MASS INDEX: 23.67 KG/M2 | HEART RATE: 104 BPM | OXYGEN SATURATION: 99 % | RESPIRATION RATE: 17 BRPM | WEIGHT: 165.34 LBS | TEMPERATURE: 97.8 F | HEIGHT: 70 IN | SYSTOLIC BLOOD PRESSURE: 126 MMHG

## 2025-05-24 LAB
ALBUMIN SERPL BCP-MCNC: 3.5 G/DL (ref 3.4–5)
ALBUMIN SERPL BCP-MCNC: 3.8 G/DL (ref 3.4–5)
ANION GAP SERPL CALC-SCNC: 14 MMOL/L (ref 10–20)
ANION GAP SERPL CALC-SCNC: 9 MMOL/L (ref 10–20)
BUN SERPL-MCNC: 13 MG/DL (ref 6–23)
BUN SERPL-MCNC: 14 MG/DL (ref 6–23)
CALCIUM SERPL-MCNC: 7.7 MG/DL (ref 8.6–10.3)
CALCIUM SERPL-MCNC: 8.1 MG/DL (ref 8.6–10.3)
CHLORIDE SERPL-SCNC: 109 MMOL/L (ref 98–107)
CHLORIDE SERPL-SCNC: 111 MMOL/L (ref 98–107)
CO2 SERPL-SCNC: 18 MMOL/L (ref 21–32)
CO2 SERPL-SCNC: 19 MMOL/L (ref 21–32)
CREAT SERPL-MCNC: 0.9 MG/DL (ref 0.5–1.3)
CREAT SERPL-MCNC: 0.95 MG/DL (ref 0.5–1.3)
EGFRCR SERPLBLD CKD-EPI 2021: >90 ML/MIN/1.73M*2
EGFRCR SERPLBLD CKD-EPI 2021: >90 ML/MIN/1.73M*2
ERYTHROCYTE [DISTWIDTH] IN BLOOD BY AUTOMATED COUNT: 13.2 % (ref 11.5–14.5)
GLUCOSE BLD MANUAL STRIP-MCNC: 138 MG/DL (ref 74–99)
GLUCOSE BLD MANUAL STRIP-MCNC: 168 MG/DL (ref 74–99)
GLUCOSE BLD MANUAL STRIP-MCNC: 190 MG/DL (ref 74–99)
GLUCOSE BLD MANUAL STRIP-MCNC: 228 MG/DL (ref 74–99)
GLUCOSE BLD MANUAL STRIP-MCNC: 252 MG/DL (ref 74–99)
GLUCOSE SERPL-MCNC: 121 MG/DL (ref 74–99)
GLUCOSE SERPL-MCNC: 238 MG/DL (ref 74–99)
HCT VFR BLD AUTO: 43.1 % (ref 41–52)
HGB BLD-MCNC: 14.8 G/DL (ref 13.5–17.5)
HOLD SPECIMEN: NORMAL
MAGNESIUM SERPL-MCNC: 1.68 MG/DL (ref 1.6–2.4)
MAGNESIUM SERPL-MCNC: 1.75 MG/DL (ref 1.6–2.4)
MCH RBC QN AUTO: 31.6 PG (ref 26–34)
MCHC RBC AUTO-ENTMCNC: 34.3 G/DL (ref 32–36)
MCV RBC AUTO: 92 FL (ref 80–100)
NRBC BLD-RTO: 0 /100 WBCS (ref 0–0)
PHOSPHATE SERPL-MCNC: 2.2 MG/DL (ref 2.5–4.9)
PHOSPHATE SERPL-MCNC: 3.7 MG/DL (ref 2.5–4.9)
PLATELET # BLD AUTO: 166 X10*3/UL (ref 150–450)
POTASSIUM SERPL-SCNC: 3.4 MMOL/L (ref 3.5–5.3)
POTASSIUM SERPL-SCNC: 3.8 MMOL/L (ref 3.5–5.3)
RBC # BLD AUTO: 4.68 X10*6/UL (ref 4.5–5.9)
SODIUM SERPL-SCNC: 136 MMOL/L (ref 136–145)
SODIUM SERPL-SCNC: 137 MMOL/L (ref 136–145)
WBC # BLD AUTO: 4.8 X10*3/UL (ref 4.4–11.3)

## 2025-05-24 PROCEDURE — 36415 COLL VENOUS BLD VENIPUNCTURE: CPT

## 2025-05-24 PROCEDURE — 82947 ASSAY GLUCOSE BLOOD QUANT: CPT

## 2025-05-24 PROCEDURE — 99254 IP/OBS CNSLTJ NEW/EST MOD 60: CPT | Performed by: INTERNAL MEDICINE

## 2025-05-24 PROCEDURE — 2500000002 HC RX 250 W HCPCS SELF ADMINISTERED DRUGS (ALT 637 FOR MEDICARE OP, ALT 636 FOR OP/ED)

## 2025-05-24 PROCEDURE — 2500000002 HC RX 250 W HCPCS SELF ADMINISTERED DRUGS (ALT 637 FOR MEDICARE OP, ALT 636 FOR OP/ED): Performed by: NURSE PRACTITIONER

## 2025-05-24 PROCEDURE — 85027 COMPLETE CBC AUTOMATED: CPT | Performed by: NURSE PRACTITIONER

## 2025-05-24 PROCEDURE — 80069 RENAL FUNCTION PANEL: CPT

## 2025-05-24 PROCEDURE — 83735 ASSAY OF MAGNESIUM: CPT

## 2025-05-24 RX ORDER — POTASSIUM CHLORIDE 20 MEQ/1
20 TABLET, EXTENDED RELEASE ORAL ONCE
Status: COMPLETED | OUTPATIENT
Start: 2025-05-24 | End: 2025-05-24

## 2025-05-24 RX ORDER — INSULIN LISPRO 100 [IU]/ML
0-5 INJECTION, SOLUTION INTRAVENOUS; SUBCUTANEOUS
Status: DISCONTINUED | OUTPATIENT
Start: 2025-05-24 | End: 2025-05-24 | Stop reason: HOSPADM

## 2025-05-24 RX ORDER — DEXTROSE 50 % IN WATER (D50W) INTRAVENOUS SYRINGE
25
Status: DISCONTINUED | OUTPATIENT
Start: 2025-05-24 | End: 2025-05-24 | Stop reason: HOSPADM

## 2025-05-24 RX ORDER — DEXTROSE 50 % IN WATER (D50W) INTRAVENOUS SYRINGE
12.5
Status: DISCONTINUED | OUTPATIENT
Start: 2025-05-24 | End: 2025-05-24 | Stop reason: HOSPADM

## 2025-05-24 RX ORDER — INSULIN LISPRO 100 [IU]/ML
5 INJECTION, SOLUTION INTRAVENOUS; SUBCUTANEOUS
Status: DISCONTINUED | OUTPATIENT
Start: 2025-05-24 | End: 2025-05-24 | Stop reason: HOSPADM

## 2025-05-24 RX ADMIN — INSULIN LISPRO 3 UNITS: 100 INJECTION, SOLUTION INTRAVENOUS; SUBCUTANEOUS at 13:43

## 2025-05-24 RX ADMIN — INSULIN LISPRO 1 UNITS: 100 INJECTION, SOLUTION INTRAVENOUS; SUBCUTANEOUS at 08:35

## 2025-05-24 RX ADMIN — INSULIN LISPRO 5 UNITS: 100 INJECTION, SOLUTION INTRAVENOUS; SUBCUTANEOUS at 08:31

## 2025-05-24 RX ADMIN — INSULIN HUMAN 22 UNITS: 100 INJECTION, SUSPENSION SUBCUTANEOUS at 02:47

## 2025-05-24 RX ADMIN — POTASSIUM CHLORIDE 20 MEQ: 1500 TABLET, EXTENDED RELEASE ORAL at 02:17

## 2025-05-24 RX ADMIN — INSULIN LISPRO 5 UNITS: 100 INJECTION, SOLUTION INTRAVENOUS; SUBCUTANEOUS at 13:43

## 2025-05-24 SDOH — ECONOMIC STABILITY: FOOD INSECURITY: HOW HARD IS IT FOR YOU TO PAY FOR THE VERY BASICS LIKE FOOD, HOUSING, MEDICAL CARE, AND HEATING?: NOT HARD AT ALL

## 2025-05-24 SDOH — SOCIAL STABILITY: SOCIAL NETWORK: HOW OFTEN DO YOU GET TOGETHER WITH FRIENDS OR RELATIVES?: THREE TIMES A WEEK

## 2025-05-24 SDOH — ECONOMIC STABILITY: HOUSING INSECURITY: AT ANY TIME IN THE PAST 12 MONTHS, WERE YOU HOMELESS OR LIVING IN A SHELTER (INCLUDING NOW)?: NO

## 2025-05-24 SDOH — ECONOMIC STABILITY: INCOME INSECURITY: IN THE PAST 12 MONTHS HAS THE ELECTRIC, GAS, OIL, OR WATER COMPANY THREATENED TO SHUT OFF SERVICES IN YOUR HOME?: YES

## 2025-05-24 SDOH — HEALTH STABILITY: PHYSICAL HEALTH: ON AVERAGE, HOW MANY MINUTES DO YOU ENGAGE IN EXERCISE AT THIS LEVEL?: 20 MIN

## 2025-05-24 SDOH — SOCIAL STABILITY: SOCIAL INSECURITY: WITHIN THE LAST YEAR, HAVE YOU BEEN HUMILIATED OR EMOTIONALLY ABUSED IN OTHER WAYS BY YOUR PARTNER OR EX-PARTNER?: NO

## 2025-05-24 SDOH — SOCIAL STABILITY: SOCIAL NETWORK: HOW OFTEN DO YOU ATTEND CHURCH OR RELIGIOUS SERVICES?: NEVER

## 2025-05-24 SDOH — ECONOMIC STABILITY: FOOD INSECURITY: WITHIN THE PAST 12 MONTHS, THE FOOD YOU BOUGHT JUST DIDN'T LAST AND YOU DIDN'T HAVE MONEY TO GET MORE.: NEVER TRUE

## 2025-05-24 SDOH — HEALTH STABILITY: PHYSICAL HEALTH: ON AVERAGE, HOW MANY DAYS PER WEEK DO YOU ENGAGE IN MODERATE TO STRENUOUS EXERCISE (LIKE A BRISK WALK)?: 2 DAYS

## 2025-05-24 SDOH — SOCIAL STABILITY: SOCIAL NETWORK
DO YOU BELONG TO ANY CLUBS OR ORGANIZATIONS SUCH AS CHURCH GROUPS, UNIONS, FRATERNAL OR ATHLETIC GROUPS, OR SCHOOL GROUPS?: NO

## 2025-05-24 SDOH — ECONOMIC STABILITY: HOUSING INSECURITY: IN THE PAST 12 MONTHS, HOW MANY TIMES HAVE YOU MOVED WHERE YOU WERE LIVING?: 0

## 2025-05-24 SDOH — HEALTH STABILITY: MENTAL HEALTH
DO YOU FEEL STRESS - TENSE, RESTLESS, NERVOUS, OR ANXIOUS, OR UNABLE TO SLEEP AT NIGHT BECAUSE YOUR MIND IS TROUBLED ALL THE TIME - THESE DAYS?: NOT AT ALL

## 2025-05-24 SDOH — ECONOMIC STABILITY: HOUSING INSECURITY: IN THE LAST 12 MONTHS, WAS THERE A TIME WHEN YOU WERE NOT ABLE TO PAY THE MORTGAGE OR RENT ON TIME?: NO

## 2025-05-24 SDOH — ECONOMIC STABILITY: FOOD INSECURITY: WITHIN THE PAST 12 MONTHS, YOU WORRIED THAT YOUR FOOD WOULD RUN OUT BEFORE YOU GOT THE MONEY TO BUY MORE.: NEVER TRUE

## 2025-05-24 SDOH — SOCIAL STABILITY: SOCIAL INSECURITY: ARE YOU MARRIED, WIDOWED, DIVORCED, SEPARATED, NEVER MARRIED, OR LIVING WITH A PARTNER?: NEVER MARRIED

## 2025-05-24 SDOH — SOCIAL STABILITY: SOCIAL INSECURITY: WITHIN THE LAST YEAR, HAVE YOU BEEN AFRAID OF YOUR PARTNER OR EX-PARTNER?: NO

## 2025-05-24 SDOH — SOCIAL STABILITY: SOCIAL NETWORK: IN A TYPICAL WEEK, HOW MANY TIMES DO YOU TALK ON THE PHONE WITH FAMILY, FRIENDS, OR NEIGHBORS?: THREE TIMES A WEEK

## 2025-05-24 SDOH — SOCIAL STABILITY: SOCIAL NETWORK: HOW OFTEN DO YOU ATTEND MEETINGS OF THE CLUBS OR ORGANIZATIONS YOU BELONG TO?: NEVER

## 2025-05-24 SDOH — ECONOMIC STABILITY: TRANSPORTATION INSECURITY: IN THE PAST 12 MONTHS, HAS LACK OF TRANSPORTATION KEPT YOU FROM MEDICAL APPOINTMENTS OR FROM GETTING MEDICATIONS?: NO

## 2025-05-24 ASSESSMENT — COGNITIVE AND FUNCTIONAL STATUS - GENERAL
MOBILITY SCORE: 24
DAILY ACTIVITIY SCORE: 24

## 2025-05-24 ASSESSMENT — PAIN SCALES - GENERAL
PAINLEVEL_OUTOF10: 0 - NO PAIN

## 2025-05-24 ASSESSMENT — PAIN - FUNCTIONAL ASSESSMENT
PAIN_FUNCTIONAL_ASSESSMENT: 0-10

## 2025-05-24 ASSESSMENT — ENCOUNTER SYMPTOMS
ABDOMINAL PAIN: 0
NAUSEA: 0
VOMITING: 0

## 2025-05-24 ASSESSMENT — ACTIVITIES OF DAILY LIVING (ADL): LACK_OF_TRANSPORTATION: NO

## 2025-05-24 NOTE — PROGRESS NOTES
"[facility] MEDICINE PROGRESS NOTE    Subjective     Pt c/o mild abdominal pain, denies nausea.    Objective     I personally reviewed all pertinent labwork, imaging and vital signs, as well as nursing, therapy, discharge planning and consult notes.     Visit Vitals  /86   Pulse 94   Temp 36.9 °C (98.4 °F) (Temporal)   Resp 15   Ht 1.778 m (5' 10\")   Wt 75.8 kg (167 lb)   SpO2 98%   BMI 23.96 kg/m²   Smoking Status Never   BSA 1.93 m²       Vitals:    05/23/25 1313   Weight: 75.8 kg (167 lb)       Scheduled medications  Scheduled Medications[1]  Continuous medications  Continuous Medications[2]  PRN medications  PRN Medications[3]    The following labwork was reviewed:  Results for orders placed or performed during the hospital encounter of 05/23/25 (from the past 24 hours)   POCT GLUCOSE   Result Value Ref Range    POCT Glucose 305 (H) 74 - 99 mg/dL   CBC and Auto Differential   Result Value Ref Range    WBC 6.7 4.4 - 11.3 x10*3/uL    nRBC 0.0 0.0 - 0.0 /100 WBCs    RBC 5.40 4.50 - 5.90 x10*6/uL    Hemoglobin 17.0 13.5 - 17.5 g/dL    Hematocrit 53.4 (H) 41.0 - 52.0 %    MCV 99 80 - 100 fL    MCH 31.5 26.0 - 34.0 pg    MCHC 31.8 (L) 32.0 - 36.0 g/dL    RDW 13.7 11.5 - 14.5 %    Platelets 215 150 - 450 x10*3/uL    Neutrophils % 80.6 40.0 - 80.0 %    Immature Granulocytes %, Automated 0.7 0.0 - 0.9 %    Lymphocytes % 12.2 13.0 - 44.0 %    Monocytes % 6.1 2.0 - 10.0 %    Eosinophils % 0.0 0.0 - 6.0 %    Basophils % 0.4 0.0 - 2.0 %    Neutrophils Absolute 5.39 1.20 - 7.70 x10*3/uL    Immature Granulocytes Absolute, Automated 0.05 0.00 - 0.70 x10*3/uL    Lymphocytes Absolute 0.82 (L) 1.20 - 4.80 x10*3/uL    Monocytes Absolute 0.41 0.10 - 1.00 x10*3/uL    Eosinophils Absolute 0.00 0.00 - 0.70 x10*3/uL    Basophils Absolute 0.03 0.00 - 0.10 x10*3/uL   Comprehensive metabolic panel   Result Value Ref Range    Glucose 313 (H) 74 - 99 mg/dL    Sodium 134 (L) 136 - 145 mmol/L    Potassium 5.5 (H) 3.5 - 5.3 mmol/L "    Chloride 102 98 - 107 mmol/L    Bicarbonate 7 (LL) 21 - 32 mmol/L    Anion Gap 31 (H) 10 - 20 mmol/L    Urea Nitrogen 17 6 - 23 mg/dL    Creatinine 1.23 0.50 - 1.30 mg/dL    eGFR 78 >60 mL/min/1.73m*2    Calcium 8.7 8.6 - 10.3 mg/dL    Albumin 4.7 3.4 - 5.0 g/dL    Alkaline Phosphatase 95 33 - 120 U/L    Total Protein 7.9 6.4 - 8.2 g/dL    AST 9 9 - 39 U/L    Bilirubin, Total 0.5 0.0 - 1.2 mg/dL    ALT 11 10 - 52 U/L   Magnesium   Result Value Ref Range    Magnesium 1.91 1.60 - 2.40 mg/dL   Blood Gas Venous Full Panel   Result Value Ref Range    POCT pH, Venous 7.06 (LL) 7.33 - 7.43 pH    POCT pCO2, Venous 25 (L) 41 - 51 mm Hg    POCT pO2, Venous 39 35 - 45 mm Hg    POCT SO2, Venous 68 45 - 75 %    POCT Oxy Hemoglobin, Venous 67.2 45.0 - 75.0 %    POCT Hematocrit Calculated, Venous 54.0 (H) 41.0 - 52.0 %    POCT Sodium, Venous 130 (L) 136 - 145 mmol/L    POCT Potassium, Venous 5.8 (H) 3.5 - 5.3 mmol/L    POCT Chloride, Venous 102 98 - 107 mmol/L    POCT Ionized Calicum, Venous 1.23 1.10 - 1.33 mmol/L    POCT Glucose, Venous 353 (H) 74 - 99 mg/dL    POCT Lactate, Venous 1.7 0.4 - 2.0 mmol/L    POCT Base Excess, Venous -21.7 (L) -2.0 - 3.0 mmol/L    POCT HCO3 Calculated, Venous 7.1 (L) 22.0 - 26.0 mmol/L    POCT Hemoglobin, Venous 17.9 (H) 13.5 - 17.5 g/dL    POCT Anion Gap, Venous 27.0 (H) 10.0 - 25.0 mmol/L    Patient Temperature 37.0 degrees Celsius    FiO2 21 %   Beta Hydroxybutyrate   Result Value Ref Range    Beta-Hydroxybutyrate 10.25 (H) 0.02 - 0.27 mmol/L   Phosphorus   Result Value Ref Range    Phosphorus 3.5 2.5 - 4.9 mg/dL   Urinalysis with Reflex Culture and Microscopic   Result Value Ref Range    Color, Urine Colorless (N) Light-Yellow, Yellow, Dark-Yellow    Appearance, Urine Clear Clear    Specific Gravity, Urine >1.030 (N) 1.005 - 1.035    pH, Urine 5.5 5.0, 5.5, 6.0, 6.5, 7.0, 7.5, 8.0    Protein, Urine 20 (TRACE) NEGATIVE, 10 (TRACE), 20 (TRACE) mg/dL    Glucose, Urine OVER (4+) (A) Normal mg/dL     Blood, Urine NEGATIVE NEGATIVE mg/dL    Ketones, Urine OVER (4+) (A) NEGATIVE mg/dL    Bilirubin, Urine NEGATIVE NEGATIVE mg/dL    Urobilinogen, Urine Normal Normal mg/dL    Nitrite, Urine NEGATIVE NEGATIVE    Leukocyte Esterase, Urine NEGATIVE NEGATIVE   Extra Urine Gray Tube   Result Value Ref Range    Extra Tube Hold for add-ons.    Urinalysis Microscopic   Result Value Ref Range    WBC, Urine NONE 1-5, NONE /HPF    RBC, Urine NONE NONE, 1-2, 3-5 /HPF    Mucus, Urine FEW Reference range not established. /LPF   POCT GLUCOSE   Result Value Ref Range    POCT Glucose 276 (H) 74 - 99 mg/dL   POCT GLUCOSE   Result Value Ref Range    POCT Glucose 211 (H) 74 - 99 mg/dL   POCT GLUCOSE   Result Value Ref Range    POCT Glucose 195 (H) 74 - 99 mg/dL   POCT GLUCOSE   Result Value Ref Range    POCT Glucose 170 (H) 74 - 99 mg/dL   Renal function panel   Result Value Ref Range    Glucose 154 (H) 74 - 99 mg/dL    Sodium 136 136 - 145 mmol/L    Potassium 4.0 3.5 - 5.3 mmol/L    Chloride 111 (H) 98 - 107 mmol/L    Bicarbonate 14 (L) 21 - 32 mmol/L    Anion Gap 15 10 - 20 mmol/L    Urea Nitrogen 14 6 - 23 mg/dL    Creatinine 0.99 0.50 - 1.30 mg/dL    eGFR >90 >60 mL/min/1.73m*2    Calcium 7.6 (L) 8.6 - 10.3 mg/dL    Phosphorus 1.9 (L) 2.5 - 4.9 mg/dL    Albumin 3.8 3.4 - 5.0 g/dL   Magnesium   Result Value Ref Range    Magnesium 1.82 1.60 - 2.40 mg/dL   POCT GLUCOSE   Result Value Ref Range    POCT Glucose 173 (H) 74 - 99 mg/dL   POCT fingerstick glucose manually resulted   Result Value Ref Range    POC Fingerstick Blood Glucose     POCT GLUCOSE   Result Value Ref Range    POCT Glucose 130 (H) 74 - 99 mg/dL   POCT GLUCOSE   Result Value Ref Range    POCT Glucose 139 (H) 74 - 99 mg/dL   POCT GLUCOSE   Result Value Ref Range    POCT Glucose 136 (H) 74 - 99 mg/dL   Renal function panel   Result Value Ref Range    Glucose 121 (H) 74 - 99 mg/dL    Sodium 136 136 - 145 mmol/L    Potassium 3.4 (L) 3.5 - 5.3 mmol/L    Chloride 111 (H) 98  - 107 mmol/L    Bicarbonate 19 (L) 21 - 32 mmol/L    Anion Gap 9 (L) 10 - 20 mmol/L    Urea Nitrogen 14 6 - 23 mg/dL    Creatinine 0.95 0.50 - 1.30 mg/dL    eGFR >90 >60 mL/min/1.73m*2    Calcium 8.1 (L) 8.6 - 10.3 mg/dL    Phosphorus 2.2 (L) 2.5 - 4.9 mg/dL    Albumin 3.8 3.4 - 5.0 g/dL   Magnesium   Result Value Ref Range    Magnesium 1.75 1.60 - 2.40 mg/dL   POCT GLUCOSE   Result Value Ref Range    POCT Glucose 126 (H) 74 - 99 mg/dL   POCT GLUCOSE   Result Value Ref Range    POCT Glucose 138 (H) 74 - 99 mg/dL   POCT GLUCOSE   Result Value Ref Range    POCT Glucose 168 (H) 74 - 99 mg/dL   CBC   Result Value Ref Range    WBC 4.8 4.4 - 11.3 x10*3/uL    nRBC 0.0 0.0 - 0.0 /100 WBCs    RBC 4.68 4.50 - 5.90 x10*6/uL    Hemoglobin 14.8 13.5 - 17.5 g/dL    Hematocrit 43.1 41.0 - 52.0 %    MCV 92 80 - 100 fL    MCH 31.6 26.0 - 34.0 pg    MCHC 34.3 32.0 - 36.0 g/dL    RDW 13.2 11.5 - 14.5 %    Platelets 166 150 - 450 x10*3/uL   Renal function panel   Result Value Ref Range    Glucose 238 (H) 74 - 99 mg/dL    Sodium 137 136 - 145 mmol/L    Potassium 3.8 3.5 - 5.3 mmol/L    Chloride 109 (H) 98 - 107 mmol/L    Bicarbonate 18 (L) 21 - 32 mmol/L    Anion Gap 14 10 - 20 mmol/L    Urea Nitrogen 13 6 - 23 mg/dL    Creatinine 0.90 0.50 - 1.30 mg/dL    eGFR >90 >60 mL/min/1.73m*2    Calcium 7.7 (L) 8.6 - 10.3 mg/dL    Phosphorus 3.7 2.5 - 4.9 mg/dL    Albumin 3.5 3.4 - 5.0 g/dL   Magnesium   Result Value Ref Range    Magnesium 1.68 1.60 - 2.40 mg/dL   POCT GLUCOSE   Result Value Ref Range    POCT Glucose 228 (H) 74 - 99 mg/dL   POCT GLUCOSE   Result Value Ref Range    POCT Glucose 190 (H) 74 - 99 mg/dL       The following imaging was reviewed:  No results found.    Constitutional: Well developed, awake, alert, calm, oriented x4, no acute distress, cooperative   Eyes: EOMI, clear sclerae   ENMT: mucous membranes moist, no lesions seen   Head/Neck: Neck supple, no apparent injury, head atraumatic   Respiratory/Thorax: CTAB, good chest  expansion, respirations even and unlabored   Cardiovascular: Regular rate and rhythm, no murmurs/rubs/gallops, normal S1 and S 2   Gastrointestinal: Abdomen nondistended, soft, nontender, +BS, no bruits   Musculoskeletal: ROM intact, no joint swelling, normal  strength   Extremities: no cyanosis, contusions or clubbing, or edema   Neurological: no focal deficit, pt alert and oriented x4   Psychological: Flat affect and behavior   Skin: Warm and dry, no lesions, hands/legs/face are erythematous       He has a past medical history of DKA (diabetic ketoacidosis) (Multi), HTN (hypertension), Noncompliance, and Type I diabetes mellitus (Multi).     Assessment/Plan     Type I diabetes with recurrent DKA   Frequent ICU admissions for DKA due to chronic noncompliance with short-acting insulin, pt sts it drops his sugar too low  Another possibility is fear of death as his brother  of hypoglycemia and he was referred for a mental health eval by his PCP  Endocrinology consulted, pt is being moved out of ICU today   He is off insulin gtt and sugars have stabilized     HTN  BP is controlled, lisinopril has been stopped     DVT ppx: Lovenox     Discharge disposition  Home when stable      Elizabeth Nur, CNP  [facility]           [1] enoxaparin, 40 mg, subcutaneous, Daily  insulin lispro, 0-5 Units, subcutaneous, TID AC  insulin lispro, 5 Units, subcutaneous, TID AC  insulin NPH (Isophane), 22 Units, subcutaneous, BID  [2]    [3] PRN medications: dextrose, dextrose, dextrose, dextrose, glucagon, glucagon

## 2025-05-24 NOTE — CONSULTS
Consults    Reason For Consult  DKA    History Of Present Illness  Ed Sanon is a 34 y.o. male presenting with weakness.  Initial lab data revealed a glucose value of  313mg/dL, , pH 7.06, bicarb 7, anion gap of 31 and beta hydroxybutyrate of 10.25.  He was started on an insulin infusion with admittance to the ICU.  He has since be bridged and transferred to the regular nursing floor.     His home regimen consists of:  NPH 22 units subcutaneous twice daily    He does not use prandial insulin.       His A1C last A1C was found to be 11.7% as of April 2025.      He states that his received a text from his pharmacy - [facility] in [place], that his Tresiba is ready for .  This was in the process of needing a PA at the time of his last hospitalization earlier this month.  He has followed up with endocrinology at [facility].       He has eaten breakfast and lunch has arrived.    He desires to go home today.       Past Medical History  He has a past medical history of DKA (diabetic ketoacidosis) (Multi), HTN (hypertension), Noncompliance, and Type I diabetes mellitus (Multi).    Surgical History  He has no past surgical history on file.     Social History  He reports that he has never smoked. He has never been exposed to tobacco smoke. He has never used smokeless tobacco. He reports that he does not drink alcohol and does not use drugs.    Family History  Family History[1]     Allergies  Patient has no known allergies.    Review of Systems   Gastrointestinal:  Negative for abdominal pain, nausea and vomiting.   All other systems reviewed and are negative.       Physical Exam  Vitals and nursing note reviewed.   Constitutional:       General: He is not in acute distress.     Appearance: Normal appearance. He is normal weight.   HENT:      Head: Normocephalic and atraumatic.      Nose: Nose normal.      Mouth/Throat:      Mouth: Mucous membranes are moist.   Eyes:      Extraocular Movements: Extraocular movements  "intact.   Pulmonary:      Effort: Pulmonary effort is normal.   Musculoskeletal:         General: Normal range of motion.   Neurological:      Mental Status: He is alert and oriented to person, place, and time.   Psychiatric:         Mood and Affect: Mood normal.          Last Recorded Vitals  Blood pressure 133/80, pulse 106, temperature 36.9 °C (98.4 °F), temperature source Temporal, resp. rate 17, height 1.778 m (5' 10\"), weight 75 kg (165 lb 5.5 oz), SpO2 96%.    Relevant Results  Scheduled medications  Scheduled Medications[2]  Continuous medications  Continuous Medications[3]  PRN medications  PRN Medications[4]    Results for orders placed or performed during the hospital encounter of 05/23/25 (from the past 96 hours)   POCT GLUCOSE   Result Value Ref Range    POCT Glucose 305 (H) 74 - 99 mg/dL   CBC and Auto Differential   Result Value Ref Range    WBC 6.7 4.4 - 11.3 x10*3/uL    nRBC 0.0 0.0 - 0.0 /100 WBCs    RBC 5.40 4.50 - 5.90 x10*6/uL    Hemoglobin 17.0 13.5 - 17.5 g/dL    Hematocrit 53.4 (H) 41.0 - 52.0 %    MCV 99 80 - 100 fL    MCH 31.5 26.0 - 34.0 pg    MCHC 31.8 (L) 32.0 - 36.0 g/dL    RDW 13.7 11.5 - 14.5 %    Platelets 215 150 - 450 x10*3/uL    Neutrophils % 80.6 40.0 - 80.0 %    Immature Granulocytes %, Automated 0.7 0.0 - 0.9 %    Lymphocytes % 12.2 13.0 - 44.0 %    Monocytes % 6.1 2.0 - 10.0 %    Eosinophils % 0.0 0.0 - 6.0 %    Basophils % 0.4 0.0 - 2.0 %    Neutrophils Absolute 5.39 1.20 - 7.70 x10*3/uL    Immature Granulocytes Absolute, Automated 0.05 0.00 - 0.70 x10*3/uL    Lymphocytes Absolute 0.82 (L) 1.20 - 4.80 x10*3/uL    Monocytes Absolute 0.41 0.10 - 1.00 x10*3/uL    Eosinophils Absolute 0.00 0.00 - 0.70 x10*3/uL    Basophils Absolute 0.03 0.00 - 0.10 x10*3/uL   Comprehensive metabolic panel   Result Value Ref Range    Glucose 313 (H) 74 - 99 mg/dL    Sodium 134 (L) 136 - 145 mmol/L    Potassium 5.5 (H) 3.5 - 5.3 mmol/L    Chloride 102 98 - 107 mmol/L    Bicarbonate 7 (LL) 21 - 32 " mmol/L    Anion Gap 31 (H) 10 - 20 mmol/L    Urea Nitrogen 17 6 - 23 mg/dL    Creatinine 1.23 0.50 - 1.30 mg/dL    eGFR 78 >60 mL/min/1.73m*2    Calcium 8.7 8.6 - 10.3 mg/dL    Albumin 4.7 3.4 - 5.0 g/dL    Alkaline Phosphatase 95 33 - 120 U/L    Total Protein 7.9 6.4 - 8.2 g/dL    AST 9 9 - 39 U/L    Bilirubin, Total 0.5 0.0 - 1.2 mg/dL    ALT 11 10 - 52 U/L   Magnesium   Result Value Ref Range    Magnesium 1.91 1.60 - 2.40 mg/dL   Blood Gas Venous Full Panel   Result Value Ref Range    POCT pH, Venous 7.06 (LL) 7.33 - 7.43 pH    POCT pCO2, Venous 25 (L) 41 - 51 mm Hg    POCT pO2, Venous 39 35 - 45 mm Hg    POCT SO2, Venous 68 45 - 75 %    POCT Oxy Hemoglobin, Venous 67.2 45.0 - 75.0 %    POCT Hematocrit Calculated, Venous 54.0 (H) 41.0 - 52.0 %    POCT Sodium, Venous 130 (L) 136 - 145 mmol/L    POCT Potassium, Venous 5.8 (H) 3.5 - 5.3 mmol/L    POCT Chloride, Venous 102 98 - 107 mmol/L    POCT Ionized Calicum, Venous 1.23 1.10 - 1.33 mmol/L    POCT Glucose, Venous 353 (H) 74 - 99 mg/dL    POCT Lactate, Venous 1.7 0.4 - 2.0 mmol/L    POCT Base Excess, Venous -21.7 (L) -2.0 - 3.0 mmol/L    POCT HCO3 Calculated, Venous 7.1 (L) 22.0 - 26.0 mmol/L    POCT Hemoglobin, Venous 17.9 (H) 13.5 - 17.5 g/dL    POCT Anion Gap, Venous 27.0 (H) 10.0 - 25.0 mmol/L    Patient Temperature 37.0 degrees Celsius    FiO2 21 %   Beta Hydroxybutyrate   Result Value Ref Range    Beta-Hydroxybutyrate 10.25 (H) 0.02 - 0.27 mmol/L   Phosphorus   Result Value Ref Range    Phosphorus 3.5 2.5 - 4.9 mg/dL   Urinalysis with Reflex Culture and Microscopic   Result Value Ref Range    Color, Urine Colorless (N) Light-Yellow, Yellow, Dark-Yellow    Appearance, Urine Clear Clear    Specific Gravity, Urine >1.030 (N) 1.005 - 1.035    pH, Urine 5.5 5.0, 5.5, 6.0, 6.5, 7.0, 7.5, 8.0    Protein, Urine 20 (TRACE) NEGATIVE, 10 (TRACE), 20 (TRACE) mg/dL    Glucose, Urine OVER (4+) (A) Normal mg/dL    Blood, Urine NEGATIVE NEGATIVE mg/dL    Ketones, Urine OVER  (4+) (A) NEGATIVE mg/dL    Bilirubin, Urine NEGATIVE NEGATIVE mg/dL    Urobilinogen, Urine Normal Normal mg/dL    Nitrite, Urine NEGATIVE NEGATIVE    Leukocyte Esterase, Urine NEGATIVE NEGATIVE   Extra Urine Gray Tube   Result Value Ref Range    Extra Tube Hold for add-ons.    Urinalysis Microscopic   Result Value Ref Range    WBC, Urine NONE 1-5, NONE /HPF    RBC, Urine NONE NONE, 1-2, 3-5 /HPF    Mucus, Urine FEW Reference range not established. /LPF   POCT GLUCOSE   Result Value Ref Range    POCT Glucose 276 (H) 74 - 99 mg/dL   POCT GLUCOSE   Result Value Ref Range    POCT Glucose 211 (H) 74 - 99 mg/dL   POCT GLUCOSE   Result Value Ref Range    POCT Glucose 195 (H) 74 - 99 mg/dL   POCT GLUCOSE   Result Value Ref Range    POCT Glucose 170 (H) 74 - 99 mg/dL   Renal function panel   Result Value Ref Range    Glucose 154 (H) 74 - 99 mg/dL    Sodium 136 136 - 145 mmol/L    Potassium 4.0 3.5 - 5.3 mmol/L    Chloride 111 (H) 98 - 107 mmol/L    Bicarbonate 14 (L) 21 - 32 mmol/L    Anion Gap 15 10 - 20 mmol/L    Urea Nitrogen 14 6 - 23 mg/dL    Creatinine 0.99 0.50 - 1.30 mg/dL    eGFR >90 >60 mL/min/1.73m*2    Calcium 7.6 (L) 8.6 - 10.3 mg/dL    Phosphorus 1.9 (L) 2.5 - 4.9 mg/dL    Albumin 3.8 3.4 - 5.0 g/dL   Magnesium   Result Value Ref Range    Magnesium 1.82 1.60 - 2.40 mg/dL   POCT GLUCOSE   Result Value Ref Range    POCT Glucose 173 (H) 74 - 99 mg/dL   POCT fingerstick glucose manually resulted   Result Value Ref Range    POC Fingerstick Blood Glucose     POCT GLUCOSE   Result Value Ref Range    POCT Glucose 130 (H) 74 - 99 mg/dL   POCT GLUCOSE   Result Value Ref Range    POCT Glucose 139 (H) 74 - 99 mg/dL   POCT GLUCOSE   Result Value Ref Range    POCT Glucose 136 (H) 74 - 99 mg/dL   Renal function panel   Result Value Ref Range    Glucose 121 (H) 74 - 99 mg/dL    Sodium 136 136 - 145 mmol/L    Potassium 3.4 (L) 3.5 - 5.3 mmol/L    Chloride 111 (H) 98 - 107 mmol/L    Bicarbonate 19 (L) 21 - 32 mmol/L    Anion Gap  9 (L) 10 - 20 mmol/L    Urea Nitrogen 14 6 - 23 mg/dL    Creatinine 0.95 0.50 - 1.30 mg/dL    eGFR >90 >60 mL/min/1.73m*2    Calcium 8.1 (L) 8.6 - 10.3 mg/dL    Phosphorus 2.2 (L) 2.5 - 4.9 mg/dL    Albumin 3.8 3.4 - 5.0 g/dL   Magnesium   Result Value Ref Range    Magnesium 1.75 1.60 - 2.40 mg/dL   POCT GLUCOSE   Result Value Ref Range    POCT Glucose 126 (H) 74 - 99 mg/dL   POCT GLUCOSE   Result Value Ref Range    POCT Glucose 138 (H) 74 - 99 mg/dL   POCT GLUCOSE   Result Value Ref Range    POCT Glucose 168 (H) 74 - 99 mg/dL   CBC   Result Value Ref Range    WBC 4.8 4.4 - 11.3 x10*3/uL    nRBC 0.0 0.0 - 0.0 /100 WBCs    RBC 4.68 4.50 - 5.90 x10*6/uL    Hemoglobin 14.8 13.5 - 17.5 g/dL    Hematocrit 43.1 41.0 - 52.0 %    MCV 92 80 - 100 fL    MCH 31.6 26.0 - 34.0 pg    MCHC 34.3 32.0 - 36.0 g/dL    RDW 13.2 11.5 - 14.5 %    Platelets 166 150 - 450 x10*3/uL   Renal function panel   Result Value Ref Range    Glucose 238 (H) 74 - 99 mg/dL    Sodium 137 136 - 145 mmol/L    Potassium 3.8 3.5 - 5.3 mmol/L    Chloride 109 (H) 98 - 107 mmol/L    Bicarbonate 18 (L) 21 - 32 mmol/L    Anion Gap 14 10 - 20 mmol/L    Urea Nitrogen 13 6 - 23 mg/dL    Creatinine 0.90 0.50 - 1.30 mg/dL    eGFR >90 >60 mL/min/1.73m*2    Calcium 7.7 (L) 8.6 - 10.3 mg/dL    Phosphorus 3.7 2.5 - 4.9 mg/dL    Albumin 3.5 3.4 - 5.0 g/dL   Magnesium   Result Value Ref Range    Magnesium 1.68 1.60 - 2.40 mg/dL   POCT GLUCOSE   Result Value Ref Range    POCT Glucose 228 (H) 74 - 99 mg/dL   POCT GLUCOSE   Result Value Ref Range    POCT Glucose 190 (H) 74 - 99 mg/dL         CT chest abdomen pelvis wo IV contrast  Result Date: 5/7/2025  Interpreted By:  Gerardo Cameron, STUDY: CT CHEST ABDOMEN PELVIS WO CONTRAST; 5/7/2025 11:56 am   INDICATION: Signs/Symptoms:sepsis, N/V, cough.   COMPARISON: None.   ACCESSION NUMBER(S): [id number]   ORDERING CLINICIAN: NAIF CLIFFORD   TECHNIQUE: Axial CT of the chest, abdomen, and pelvis was performed. Coronal and sagittal  reconstructions were performed. No intravenous or oral contrast agents were administered.   FINDINGS: Please note that the study is limited without intravenous contrast.   CHEST:     LUNG/PLEURA/LARGE AIRWAYS: The trachea and central airways are patent.   No pleural effusion, consolidation or pneumothorax.   VESSELS: No thoracic aortic aneurysm.   HEART: No pericardial effusion.  The heart is not  enlarged.   MEDIASTINUM AND JERROD: The evaluation for adenopathy suboptimal in the absence of intravenous contrast.  No obvious pathologically enlarged lymph nodes at the mediastinum.   CHEST WALL AND LOWER NECK: The soft tissues of the chest wall are within normal limits. The visualized thyroid gland is unremarkable.     ABDOMEN: Motion artifact throughout the abdomen and pelvis obscures fine detail.   LIVER: Unremarkable unenhanced appearance.   BILE DUCTS: No obvious dilation.   GALLBLADDER: Present.   PANCREAS: No peripancreatic inflammatory changes.   SPLEEN: Not enlarged.   ADRENAL GLANDS: Unremarkable.   KIDNEYS AND URETERS: No hydronephrosis or hydroureter.  No renal or ureteral calculi are identified.   PELVIS:   BLADDER: Mild wall thickening seen diffusely. No associated inflammatory changes.   REPRODUCTIVE ORGANS: No pelvic mass.   BOWEL: No dilated bowel loops. No inflammatory changes.  The appendix is normal.   VESSELS: No abdominal aortic aneurysm.   PERITONEUM/RETROPERITONEUM/LYMPH NODES: No free fluid or free air.  No retroperitoneal hemorrhage.  No pathologically enlarged lymph nodes are identified.   ABDOMINAL WALL: Within normal limits.   BONES: No acute osseous findings.  Degenerative changes in the spine.       CHEST 1. No confluent airspace disease or acute process within the chest.   ABDOMEN-PELVIS 1. Mild bladder wall thickening seen diffusely without associated inflammatory changes. Cystitis not excluded radiographically. 2. No acute process otherwise identified within the abdomen or pelvis.      Signed by: Gerardo Cameron 5/7/2025 12:06 PM Dictation workstation:   [id number]     Assessment/Plan     IMPRESSION:  DKA - resolved   POORLY CONTROLLED TYPE I DIABETES MELLITUS  Long term insulin use with basal insulin only  A1C of 11.7%     RECOMMENDATIONS:  To continue NPH 22 units subcutaneous twice daily  To continue Humalog 5 units TID AC   To continue an insulin correction scale TID AC   For diabetic diet   Accu-Cheks ACHS   Hypoglycemic protocol   Patient can be discharged on Tresiba  Counseled that start Tresiba from tomorrow night given use of intermediate acting insulin today  Cousenled regarding the longer half life of Tresiba and that this can help guard against frequent DKA if taken   Will continue to follow     Thank you for the courtesy of this consult      Lori Packer MD               [1]   Family History  Problem Relation Name Age of Onset    No Known Problems Mother      No Known Problems Father      Hypertension Other      Coronary artery disease Other      Diabetes Other      Heart failure Other     [2] enoxaparin, 40 mg, subcutaneous, Daily  insulin lispro, 0-5 Units, subcutaneous, TID AC  insulin lispro, 5 Units, subcutaneous, TID AC  insulin NPH (Isophane), 22 Units, subcutaneous, BID  [3]    [4] PRN medications: dextrose, dextrose, dextrose, dextrose, glucagon, glucagon

## 2025-05-24 NOTE — CARE PLAN
The patient's goals for the shift include      The clinical goals for the shift include dc or trx      Problem: Pain - Adult  Goal: Verbalizes/displays adequate comfort level or baseline comfort level  Outcome: Progressing     Problem: Safety - Adult  Goal: Free from fall injury  Outcome: Progressing     Problem: Discharge Planning  Goal: Discharge to home or other facility with appropriate resources  Outcome: Progressing     Problem: Chronic Conditions and Co-morbidities  Goal: Patient's chronic conditions and co-morbidity symptoms are monitored and maintained or improved  Outcome: Progressing     Problem: Nutrition  Goal: Nutrient intake appropriate for maintaining nutritional needs  Outcome: Progressing

## 2025-05-24 NOTE — DISCHARGE SUMMARY
"[facility] MEDICINE DISCHARGE SUMMARY    Discharge diagnoses:   Recurrent DKA    Hospital Course  Ed Sanon is a 34 y.o. male with PMHx of diabetes with recurrent hospitalizations for DKA 2/2 noncompliance, anxiety, depression, who presented in DKA. He has similar symptoms as previously with a headache over the flank pain nausea but no vomiting currently. He says he is taking his insulin, takes 22 units of NPH every 12 hours but that is all he is taking. He has been admitted seven times to this hospital this year for DKA, most recently 5/7/25. (He was discharged on insulin lispro 5U QAC with sliding scale as well as the NPH 22U BID.) He was stabilized in the ICU on an insulin gtt and was transferred to the floor. He was seen by endocrinology and cleared for discharge as his Tresiba is now waiting at the pharmacy for him. He will stop NPH insulin. He will need close followup with endocrinology.    Subjective  Pt c/o mild abdominal pain, denies nausea.     Objective    Visit Vitals  /80 (BP Location: Right arm, Patient Position: Sitting)   Pulse 106   Temp 36.9 °C (98.4 °F) (Temporal)   Resp 17   Ht 1.778 m (5' 10\")   Wt 75 kg (165 lb 5.5 oz)   SpO2 96%   BMI 23.72 kg/m²   Smoking Status Never   BSA 1.92 m²       Vitals:    05/24/25 0928   Weight: 75 kg (165 lb 5.5 oz)       Results for orders placed or performed during the hospital encounter of 05/23/25 (from the past 24 hours)   CBC and Auto Differential   Result Value Ref Range    WBC 6.7 4.4 - 11.3 x10*3/uL    nRBC 0.0 0.0 - 0.0 /100 WBCs    RBC 5.40 4.50 - 5.90 x10*6/uL    Hemoglobin 17.0 13.5 - 17.5 g/dL    Hematocrit 53.4 (H) 41.0 - 52.0 %    MCV 99 80 - 100 fL    MCH 31.5 26.0 - 34.0 pg    MCHC 31.8 (L) 32.0 - 36.0 g/dL    RDW 13.7 11.5 - 14.5 %    Platelets 215 150 - 450 x10*3/uL    Neutrophils % 80.6 40.0 - 80.0 %    Immature Granulocytes %, Automated 0.7 0.0 - 0.9 %    Lymphocytes % 12.2 13.0 - 44.0 %    Monocytes % 6.1 2.0 - 10.0 %    " Eosinophils % 0.0 0.0 - 6.0 %    Basophils % 0.4 0.0 - 2.0 %    Neutrophils Absolute 5.39 1.20 - 7.70 x10*3/uL    Immature Granulocytes Absolute, Automated 0.05 0.00 - 0.70 x10*3/uL    Lymphocytes Absolute 0.82 (L) 1.20 - 4.80 x10*3/uL    Monocytes Absolute 0.41 0.10 - 1.00 x10*3/uL    Eosinophils Absolute 0.00 0.00 - 0.70 x10*3/uL    Basophils Absolute 0.03 0.00 - 0.10 x10*3/uL   Comprehensive metabolic panel   Result Value Ref Range    Glucose 313 (H) 74 - 99 mg/dL    Sodium 134 (L) 136 - 145 mmol/L    Potassium 5.5 (H) 3.5 - 5.3 mmol/L    Chloride 102 98 - 107 mmol/L    Bicarbonate 7 (LL) 21 - 32 mmol/L    Anion Gap 31 (H) 10 - 20 mmol/L    Urea Nitrogen 17 6 - 23 mg/dL    Creatinine 1.23 0.50 - 1.30 mg/dL    eGFR 78 >60 mL/min/1.73m*2    Calcium 8.7 8.6 - 10.3 mg/dL    Albumin 4.7 3.4 - 5.0 g/dL    Alkaline Phosphatase 95 33 - 120 U/L    Total Protein 7.9 6.4 - 8.2 g/dL    AST 9 9 - 39 U/L    Bilirubin, Total 0.5 0.0 - 1.2 mg/dL    ALT 11 10 - 52 U/L   Magnesium   Result Value Ref Range    Magnesium 1.91 1.60 - 2.40 mg/dL   Blood Gas Venous Full Panel   Result Value Ref Range    POCT pH, Venous 7.06 (LL) 7.33 - 7.43 pH    POCT pCO2, Venous 25 (L) 41 - 51 mm Hg    POCT pO2, Venous 39 35 - 45 mm Hg    POCT SO2, Venous 68 45 - 75 %    POCT Oxy Hemoglobin, Venous 67.2 45.0 - 75.0 %    POCT Hematocrit Calculated, Venous 54.0 (H) 41.0 - 52.0 %    POCT Sodium, Venous 130 (L) 136 - 145 mmol/L    POCT Potassium, Venous 5.8 (H) 3.5 - 5.3 mmol/L    POCT Chloride, Venous 102 98 - 107 mmol/L    POCT Ionized Calicum, Venous 1.23 1.10 - 1.33 mmol/L    POCT Glucose, Venous 353 (H) 74 - 99 mg/dL    POCT Lactate, Venous 1.7 0.4 - 2.0 mmol/L    POCT Base Excess, Venous -21.7 (L) -2.0 - 3.0 mmol/L    POCT HCO3 Calculated, Venous 7.1 (L) 22.0 - 26.0 mmol/L    POCT Hemoglobin, Venous 17.9 (H) 13.5 - 17.5 g/dL    POCT Anion Gap, Venous 27.0 (H) 10.0 - 25.0 mmol/L    Patient Temperature 37.0 degrees Celsius    FiO2 21 %   Beta  Hydroxybutyrate   Result Value Ref Range    Beta-Hydroxybutyrate 10.25 (H) 0.02 - 0.27 mmol/L   Phosphorus   Result Value Ref Range    Phosphorus 3.5 2.5 - 4.9 mg/dL   Urinalysis with Reflex Culture and Microscopic   Result Value Ref Range    Color, Urine Colorless (N) Light-Yellow, Yellow, Dark-Yellow    Appearance, Urine Clear Clear    Specific Gravity, Urine >1.030 (N) 1.005 - 1.035    pH, Urine 5.5 5.0, 5.5, 6.0, 6.5, 7.0, 7.5, 8.0    Protein, Urine 20 (TRACE) NEGATIVE, 10 (TRACE), 20 (TRACE) mg/dL    Glucose, Urine OVER (4+) (A) Normal mg/dL    Blood, Urine NEGATIVE NEGATIVE mg/dL    Ketones, Urine OVER (4+) (A) NEGATIVE mg/dL    Bilirubin, Urine NEGATIVE NEGATIVE mg/dL    Urobilinogen, Urine Normal Normal mg/dL    Nitrite, Urine NEGATIVE NEGATIVE    Leukocyte Esterase, Urine NEGATIVE NEGATIVE   Extra Urine Gray Tube   Result Value Ref Range    Extra Tube Hold for add-ons.    Urinalysis Microscopic   Result Value Ref Range    WBC, Urine NONE 1-5, NONE /HPF    RBC, Urine NONE NONE, 1-2, 3-5 /HPF    Mucus, Urine FEW Reference range not established. /LPF   POCT GLUCOSE   Result Value Ref Range    POCT Glucose 276 (H) 74 - 99 mg/dL   POCT GLUCOSE   Result Value Ref Range    POCT Glucose 211 (H) 74 - 99 mg/dL   POCT GLUCOSE   Result Value Ref Range    POCT Glucose 195 (H) 74 - 99 mg/dL   POCT GLUCOSE   Result Value Ref Range    POCT Glucose 170 (H) 74 - 99 mg/dL   Renal function panel   Result Value Ref Range    Glucose 154 (H) 74 - 99 mg/dL    Sodium 136 136 - 145 mmol/L    Potassium 4.0 3.5 - 5.3 mmol/L    Chloride 111 (H) 98 - 107 mmol/L    Bicarbonate 14 (L) 21 - 32 mmol/L    Anion Gap 15 10 - 20 mmol/L    Urea Nitrogen 14 6 - 23 mg/dL    Creatinine 0.99 0.50 - 1.30 mg/dL    eGFR >90 >60 mL/min/1.73m*2    Calcium 7.6 (L) 8.6 - 10.3 mg/dL    Phosphorus 1.9 (L) 2.5 - 4.9 mg/dL    Albumin 3.8 3.4 - 5.0 g/dL   Magnesium   Result Value Ref Range    Magnesium 1.82 1.60 - 2.40 mg/dL   POCT GLUCOSE   Result Value Ref  Range    POCT Glucose 173 (H) 74 - 99 mg/dL   POCT fingerstick glucose manually resulted   Result Value Ref Range    POC Fingerstick Blood Glucose     POCT GLUCOSE   Result Value Ref Range    POCT Glucose 130 (H) 74 - 99 mg/dL   POCT GLUCOSE   Result Value Ref Range    POCT Glucose 139 (H) 74 - 99 mg/dL   POCT GLUCOSE   Result Value Ref Range    POCT Glucose 136 (H) 74 - 99 mg/dL   Renal function panel   Result Value Ref Range    Glucose 121 (H) 74 - 99 mg/dL    Sodium 136 136 - 145 mmol/L    Potassium 3.4 (L) 3.5 - 5.3 mmol/L    Chloride 111 (H) 98 - 107 mmol/L    Bicarbonate 19 (L) 21 - 32 mmol/L    Anion Gap 9 (L) 10 - 20 mmol/L    Urea Nitrogen 14 6 - 23 mg/dL    Creatinine 0.95 0.50 - 1.30 mg/dL    eGFR >90 >60 mL/min/1.73m*2    Calcium 8.1 (L) 8.6 - 10.3 mg/dL    Phosphorus 2.2 (L) 2.5 - 4.9 mg/dL    Albumin 3.8 3.4 - 5.0 g/dL   Magnesium   Result Value Ref Range    Magnesium 1.75 1.60 - 2.40 mg/dL   POCT GLUCOSE   Result Value Ref Range    POCT Glucose 126 (H) 74 - 99 mg/dL   POCT GLUCOSE   Result Value Ref Range    POCT Glucose 138 (H) 74 - 99 mg/dL   POCT GLUCOSE   Result Value Ref Range    POCT Glucose 168 (H) 74 - 99 mg/dL   CBC   Result Value Ref Range    WBC 4.8 4.4 - 11.3 x10*3/uL    nRBC 0.0 0.0 - 0.0 /100 WBCs    RBC 4.68 4.50 - 5.90 x10*6/uL    Hemoglobin 14.8 13.5 - 17.5 g/dL    Hematocrit 43.1 41.0 - 52.0 %    MCV 92 80 - 100 fL    MCH 31.6 26.0 - 34.0 pg    MCHC 34.3 32.0 - 36.0 g/dL    RDW 13.2 11.5 - 14.5 %    Platelets 166 150 - 450 x10*3/uL   Renal function panel   Result Value Ref Range    Glucose 238 (H) 74 - 99 mg/dL    Sodium 137 136 - 145 mmol/L    Potassium 3.8 3.5 - 5.3 mmol/L    Chloride 109 (H) 98 - 107 mmol/L    Bicarbonate 18 (L) 21 - 32 mmol/L    Anion Gap 14 10 - 20 mmol/L    Urea Nitrogen 13 6 - 23 mg/dL    Creatinine 0.90 0.50 - 1.30 mg/dL    eGFR >90 >60 mL/min/1.73m*2    Calcium 7.7 (L) 8.6 - 10.3 mg/dL    Phosphorus 3.7 2.5 - 4.9 mg/dL    Albumin 3.5 3.4 - 5.0 g/dL  "  Magnesium   Result Value Ref Range    Magnesium 1.68 1.60 - 2.40 mg/dL   POCT GLUCOSE   Result Value Ref Range    POCT Glucose 228 (H) 74 - 99 mg/dL   POCT GLUCOSE   Result Value Ref Range    POCT Glucose 190 (H) 74 - 99 mg/dL   POCT GLUCOSE   Result Value Ref Range    POCT Glucose 252 (H) 74 - 99 mg/dL       Constitutional: Well developed, awake, alert, calm, oriented x4, no acute distress, cooperative   Eyes: EOMI, clear sclerae   ENMT: mucous membranes moist, no lesions seen   Head/Neck: Neck supple, no apparent injury, head atraumatic   Respiratory/Thorax: CTAB, good chest expansion, respirations even and unlabored   Cardiovascular: Regular rate and rhythm, no murmurs/rubs/gallops, normal S1 and S 2   Gastrointestinal: Abdomen nondistended, soft, nontender, +BS, no bruits   Musculoskeletal: ROM intact, no joint swelling, normal  strength   Extremities: no cyanosis, contusions or clubbing, or edema   Neurological: no focal deficit, pt alert and oriented x4   Psychological: Flat affect and behavior   Skin: Warm and dry, no lesions, hands/legs/face are erythematous       Medical History[1]     Home Medications After Discharge    Scheduled   Dexcom G7 Sensor device, 1 each every 10 (ten) days.   insulin degludec (Tresiba FlexTouch U-100) 100 unit/mL (3 mL) pen, Inject 15 Units under the skin once daily at bedtime. Take as directed per insulin instructions.   lancets misc, Inject 1 Units under the skin 2 times a day. Sliding scale    PRN   Dexcom G7  misc, Inject 1 Device under the skin if needed (poorly controlled type 1 diabetes). Use as instructed    No Frequency   alcohol swabs, Historical Med   BD Insulin Syringe Ultra-Fine 0.5 mL 31 gauge x 5/16\" syringe, Historical Med   Gvoke HypoPen 1-Pack 1 mg/0.2 mL auto-injector, Historical Med   OneTouch Ultra Test strip, USE 1 STRIP TO CHECK GLUCOSE 4 TIMES DAILY AS DIRECTED   OneTouch Ultra2 Meter misc, use as directed   pen needle, diabetic (BD Ultra-Fine " "Claire Pen Needle) 32 gauge x 5/32\" needle, Use with insulin pens 4 times daily.   pen needle, diabetic 32 gauge x 5/32\" needle, Use as instructed 4 times daily    Future Appointments   Date Time Provider Department Center   7/2/2025  9:45 AM Henri Brumfield MD [id number] Ruddy Nur, CNP  [facility]         [1]   Past Medical History:  Diagnosis Date    DKA (diabetic ketoacidosis) (Multi)     recurrent    HTN (hypertension)     Noncompliance     Type I diabetes mellitus (Multi)      "

## 2025-05-24 NOTE — NURSING NOTE
Patient discharged home. IV removed prior to DC and instructions reviewed with the patient prior to DC. No questions at this time. No new medications started this visit and belongings gathered and sent with the patinet.

## 2025-05-26 DIAGNOSIS — R80.9 MICROALBUMINURIA DUE TO TYPE 1 DIABETES MELLITUS (MULTI): ICD-10-CM

## 2025-05-26 DIAGNOSIS — Z91.199 NONCOMPLIANCE WITH DIABETES TREATMENT: ICD-10-CM

## 2025-05-26 DIAGNOSIS — E10.29 MICROALBUMINURIA DUE TO TYPE 1 DIABETES MELLITUS (MULTI): ICD-10-CM

## 2025-05-26 DIAGNOSIS — E10.65 TYPE 1 DIABETES MELLITUS WITH HYPERGLYCEMIA (MULTI): ICD-10-CM

## 2025-05-26 DIAGNOSIS — E11.65 POORLY CONTROLLED DIABETES MELLITUS (MULTI): ICD-10-CM

## 2025-05-26 DIAGNOSIS — E10.10 DIABETIC KETOACIDOSIS WITHOUT COMA ASSOCIATED WITH TYPE 1 DIABETES MELLITUS: ICD-10-CM

## 2025-05-27 ENCOUNTER — PATIENT OUTREACH (OUTPATIENT)
Dept: PRIMARY CARE | Facility: CLINIC | Age: 35
End: 2025-05-27
Payer: MEDICAID

## 2025-05-27 NOTE — PROGRESS NOTES
30 Day Readmission  Discharge Facility: [facility]  Discharge Diagnosis: Recurrent DKA   Admission Date: 5/23/25  Discharge Date: 5/24/25    PCP Appointment Date: no appointment, message sent to office  Specialist Appointment Date: 6/12/25 endocrinology  Hospital Encounter and Summary Linked: Yes  ED to Hosp-Admission (Discharged) with Edgard Ayala MD PhD; Americo Godinez MD (05/23/2025)   Two attempts were made to reach patient within two business days after discharge. Left voicemail with contact information for patient to call back with any non-emergent questions or concerns.

## 2025-05-29 ENCOUNTER — HOSPITAL ENCOUNTER (INPATIENT)
Facility: HOSPITAL | Age: 35
LOS: 2 days | Discharge: HOME | End: 2025-05-31
Attending: EMERGENCY MEDICINE | Admitting: INTERNAL MEDICINE
Payer: MEDICAID

## 2025-05-29 DIAGNOSIS — Z91.199 NONCOMPLIANCE WITH DIET AND MEDICATION REGIMEN: ICD-10-CM

## 2025-05-29 DIAGNOSIS — E10.10 DIABETIC KETOACIDOSIS WITHOUT COMA ASSOCIATED WITH TYPE 1 DIABETES MELLITUS: ICD-10-CM

## 2025-05-29 DIAGNOSIS — E10.65 TYPE 1 DIABETES MELLITUS WITH HYPERGLYCEMIA (MULTI): ICD-10-CM

## 2025-05-29 DIAGNOSIS — E10.10 TYPE 1 DIABETES MELLITUS WITH KETOACIDOSIS WITHOUT COMA: Primary | ICD-10-CM

## 2025-05-29 DIAGNOSIS — E10.10 DKA, TYPE 1, NOT AT GOAL: ICD-10-CM

## 2025-05-29 DIAGNOSIS — Z91.148 NONCOMPLIANCE WITH DIET AND MEDICATION REGIMEN: ICD-10-CM

## 2025-05-29 LAB
ALBUMIN SERPL BCP-MCNC: 4.5 G/DL (ref 3.4–5)
ALP SERPL-CCNC: 100 U/L (ref 33–120)
ALT SERPL W P-5'-P-CCNC: 9 U/L (ref 10–52)
ANION GAP BLDV CALCULATED.4IONS-SCNC: 20 MMOL/L (ref 10–25)
ANION GAP BLDV CALCULATED.4IONS-SCNC: 23 MMOL/L (ref 10–25)
ANION GAP SERPL CALC-SCNC: 10 MMOL/L (ref 10–20)
ANION GAP SERPL CALC-SCNC: 16 MMOL/L (ref 10–20)
ANION GAP SERPL CALC-SCNC: 20 MMOL/L (ref 10–20)
ANION GAP SERPL CALC-SCNC: 21 MMOL/L (ref 10–20)
ANION GAP SERPL CALC-SCNC: 29 MMOL/L (ref 10–20)
AST SERPL W P-5'-P-CCNC: 8 U/L (ref 9–39)
B-OH-BUTYR SERPL-SCNC: 9.66 MMOL/L (ref 0.02–0.27)
BASE EXCESS BLDV CALC-SCNC: -21.7 MMOL/L (ref -2–3)
BASE EXCESS BLDV CALC-SCNC: -25.2 MMOL/L (ref -2–3)
BASOPHILS # BLD AUTO: 0.06 X10*3/UL (ref 0–0.1)
BASOPHILS NFR BLD AUTO: 0.9 %
BILIRUB SERPL-MCNC: 0.5 MG/DL (ref 0–1.2)
BODY TEMPERATURE: 37 DEGREES CELSIUS
BODY TEMPERATURE: 37 DEGREES CELSIUS
BUN SERPL-MCNC: 13 MG/DL (ref 6–23)
BUN SERPL-MCNC: 14 MG/DL (ref 6–23)
CA-I BLDV-SCNC: 1.13 MMOL/L (ref 1.1–1.33)
CA-I BLDV-SCNC: 1.22 MMOL/L (ref 1.1–1.33)
CALCIUM SERPL-MCNC: 6.9 MG/DL (ref 8.6–10.3)
CALCIUM SERPL-MCNC: 7.4 MG/DL (ref 8.6–10.3)
CALCIUM SERPL-MCNC: 7.5 MG/DL (ref 8.6–10.3)
CALCIUM SERPL-MCNC: 7.7 MG/DL (ref 8.6–10.3)
CALCIUM SERPL-MCNC: 8.4 MG/DL (ref 8.6–10.3)
CHLORIDE BLDV-SCNC: 105 MMOL/L (ref 98–107)
CHLORIDE BLDV-SCNC: 111 MMOL/L (ref 98–107)
CHLORIDE SERPL-SCNC: 105 MMOL/L (ref 98–107)
CHLORIDE SERPL-SCNC: 112 MMOL/L (ref 98–107)
CHLORIDE SERPL-SCNC: 113 MMOL/L (ref 98–107)
CHLORIDE SERPL-SCNC: 114 MMOL/L (ref 98–107)
CHLORIDE SERPL-SCNC: 115 MMOL/L (ref 98–107)
CO2 SERPL-SCNC: 12 MMOL/L (ref 21–32)
CO2 SERPL-SCNC: 4 MMOL/L (ref 21–32)
CO2 SERPL-SCNC: 5 MMOL/L (ref 21–32)
CO2 SERPL-SCNC: 7 MMOL/L (ref 21–32)
CO2 SERPL-SCNC: 8 MMOL/L (ref 21–32)
CREAT SERPL-MCNC: 0.83 MG/DL (ref 0.5–1.3)
CREAT SERPL-MCNC: 0.9 MG/DL (ref 0.5–1.3)
CREAT SERPL-MCNC: 0.95 MG/DL (ref 0.5–1.3)
CREAT SERPL-MCNC: 0.95 MG/DL (ref 0.5–1.3)
CREAT SERPL-MCNC: 1.13 MG/DL (ref 0.5–1.3)
EGFRCR SERPLBLD CKD-EPI 2021: 86 ML/MIN/1.73M*2
EGFRCR SERPLBLD CKD-EPI 2021: >90 ML/MIN/1.73M*2
EOSINOPHIL # BLD AUTO: 0 X10*3/UL (ref 0–0.7)
EOSINOPHIL NFR BLD AUTO: 0 %
ERYTHROCYTE [DISTWIDTH] IN BLOOD BY AUTOMATED COUNT: 13.2 % (ref 11.5–14.5)
GLUCOSE BLD MANUAL STRIP-MCNC: 142 MG/DL (ref 74–99)
GLUCOSE BLD MANUAL STRIP-MCNC: 145 MG/DL (ref 74–99)
GLUCOSE BLD MANUAL STRIP-MCNC: 153 MG/DL (ref 74–99)
GLUCOSE BLD MANUAL STRIP-MCNC: 171 MG/DL (ref 74–99)
GLUCOSE BLD MANUAL STRIP-MCNC: 174 MG/DL (ref 74–99)
GLUCOSE BLD MANUAL STRIP-MCNC: 184 MG/DL (ref 74–99)
GLUCOSE BLD MANUAL STRIP-MCNC: 194 MG/DL (ref 74–99)
GLUCOSE BLD MANUAL STRIP-MCNC: 199 MG/DL (ref 74–99)
GLUCOSE BLD MANUAL STRIP-MCNC: 211 MG/DL (ref 74–99)
GLUCOSE BLD MANUAL STRIP-MCNC: 232 MG/DL (ref 74–99)
GLUCOSE BLD MANUAL STRIP-MCNC: 240 MG/DL (ref 74–99)
GLUCOSE BLD MANUAL STRIP-MCNC: 305 MG/DL (ref 74–99)
GLUCOSE BLD MANUAL STRIP-MCNC: 398 MG/DL (ref 74–99)
GLUCOSE BLDV-MCNC: 202 MG/DL (ref 74–99)
GLUCOSE BLDV-MCNC: 414 MG/DL (ref 74–99)
GLUCOSE SERPL-MCNC: 173 MG/DL (ref 74–99)
GLUCOSE SERPL-MCNC: 185 MG/DL (ref 74–99)
GLUCOSE SERPL-MCNC: 222 MG/DL (ref 74–99)
GLUCOSE SERPL-MCNC: 364 MG/DL (ref 74–99)
GLUCOSE SERPL-MCNC: 369 MG/DL (ref 74–99)
HCO3 BLDV-SCNC: 4.4 MMOL/L (ref 22–26)
HCO3 BLDV-SCNC: 5.9 MMOL/L (ref 22–26)
HCT VFR BLD AUTO: 54 % (ref 41–52)
HCT VFR BLD EST: 50 % (ref 41–52)
HCT VFR BLD EST: 57 % (ref 41–52)
HGB BLD-MCNC: 17.7 G/DL (ref 13.5–17.5)
HGB BLDV-MCNC: 16.6 G/DL (ref 13.5–17.5)
HGB BLDV-MCNC: 19 G/DL (ref 13.5–17.5)
IMM GRANULOCYTES # BLD AUTO: 0.02 X10*3/UL (ref 0–0.7)
IMM GRANULOCYTES NFR BLD AUTO: 0.3 % (ref 0–0.9)
INHALED O2 CONCENTRATION: 21 %
INHALED O2 CONCENTRATION: 21 %
LACTATE BLDV-SCNC: 1 MMOL/L (ref 0.4–2)
LACTATE BLDV-SCNC: 1.2 MMOL/L (ref 0.4–2)
LYMPHOCYTES # BLD AUTO: 0.94 X10*3/UL (ref 1.2–4.8)
LYMPHOCYTES NFR BLD AUTO: 14.6 %
MAGNESIUM SERPL-MCNC: 2.07 MG/DL (ref 1.6–2.4)
MCH RBC QN AUTO: 31.1 PG (ref 26–34)
MCHC RBC AUTO-ENTMCNC: 32.8 G/DL (ref 32–36)
MCV RBC AUTO: 95 FL (ref 80–100)
MONOCYTES # BLD AUTO: 0.52 X10*3/UL (ref 0.1–1)
MONOCYTES NFR BLD AUTO: 8.1 %
NEUTROPHILS # BLD AUTO: 4.89 X10*3/UL (ref 1.2–7.7)
NEUTROPHILS NFR BLD AUTO: 76.1 %
NRBC BLD-RTO: 0 /100 WBCS (ref 0–0)
OXYHGB MFR BLDV: 81.9 % (ref 45–75)
OXYHGB MFR BLDV: 97 % (ref 45–75)
PCO2 BLDV: 18 MM HG (ref 41–51)
PCO2 BLDV: 19 MM HG (ref 41–51)
PH BLDV: 7 PH (ref 7.33–7.43)
PH BLDV: 7.1 PH (ref 7.33–7.43)
PHOSPHATE SERPL-MCNC: 2.8 MG/DL (ref 2.5–4.9)
PLATELET # BLD AUTO: 198 X10*3/UL (ref 150–450)
PO2 BLDV: 112 MM HG (ref 35–45)
PO2 BLDV: 47 MM HG (ref 35–45)
POTASSIUM BLDV-SCNC: 3.7 MMOL/L (ref 3.5–5.3)
POTASSIUM BLDV-SCNC: 4.9 MMOL/L (ref 3.5–5.3)
POTASSIUM SERPL-SCNC: 3 MMOL/L (ref 3.5–5.3)
POTASSIUM SERPL-SCNC: 3.4 MMOL/L (ref 3.5–5.3)
POTASSIUM SERPL-SCNC: 3.6 MMOL/L (ref 3.5–5.3)
POTASSIUM SERPL-SCNC: 3.8 MMOL/L (ref 3.5–5.3)
POTASSIUM SERPL-SCNC: 4.7 MMOL/L (ref 3.5–5.3)
PROT SERPL-MCNC: 7.5 G/DL (ref 6.4–8.2)
RBC # BLD AUTO: 5.7 X10*6/UL (ref 4.5–5.9)
SAO2 % BLDV: 83 % (ref 45–75)
SAO2 % BLDV: 99 % (ref 45–75)
SODIUM BLDV-SCNC: 127 MMOL/L (ref 136–145)
SODIUM BLDV-SCNC: 133 MMOL/L (ref 136–145)
SODIUM SERPL-SCNC: 131 MMOL/L (ref 136–145)
SODIUM SERPL-SCNC: 133 MMOL/L (ref 136–145)
SODIUM SERPL-SCNC: 136 MMOL/L (ref 136–145)
WBC # BLD AUTO: 6.4 X10*3/UL (ref 4.4–11.3)

## 2025-05-29 PROCEDURE — 84100 ASSAY OF PHOSPHORUS: CPT | Performed by: EMERGENCY MEDICINE

## 2025-05-29 PROCEDURE — 85025 COMPLETE CBC W/AUTO DIFF WBC: CPT | Performed by: EMERGENCY MEDICINE

## 2025-05-29 PROCEDURE — 82947 ASSAY GLUCOSE BLOOD QUANT: CPT

## 2025-05-29 PROCEDURE — 96374 THER/PROPH/DIAG INJ IV PUSH: CPT

## 2025-05-29 PROCEDURE — 99222 1ST HOSP IP/OBS MODERATE 55: CPT | Performed by: NURSE PRACTITIONER

## 2025-05-29 PROCEDURE — 2500000002 HC RX 250 W HCPCS SELF ADMINISTERED DRUGS (ALT 637 FOR MEDICARE OP, ALT 636 FOR OP/ED): Performed by: NURSE PRACTITIONER

## 2025-05-29 PROCEDURE — 99291 CRITICAL CARE FIRST HOUR: CPT | Performed by: EMERGENCY MEDICINE

## 2025-05-29 PROCEDURE — 83735 ASSAY OF MAGNESIUM: CPT | Performed by: EMERGENCY MEDICINE

## 2025-05-29 PROCEDURE — 96361 HYDRATE IV INFUSION ADD-ON: CPT

## 2025-05-29 PROCEDURE — 82010 KETONE BODYS QUAN: CPT | Performed by: EMERGENCY MEDICINE

## 2025-05-29 PROCEDURE — 99255 IP/OBS CONSLTJ NEW/EST HI 80: CPT | Performed by: INTERNAL MEDICINE

## 2025-05-29 PROCEDURE — 84295 ASSAY OF SERUM SODIUM: CPT | Performed by: EMERGENCY MEDICINE

## 2025-05-29 PROCEDURE — 80048 BASIC METABOLIC PNL TOTAL CA: CPT | Mod: CCI | Performed by: EMERGENCY MEDICINE

## 2025-05-29 PROCEDURE — 36415 COLL VENOUS BLD VENIPUNCTURE: CPT | Performed by: EMERGENCY MEDICINE

## 2025-05-29 PROCEDURE — 2020000001 HC ICU ROOM DAILY

## 2025-05-29 PROCEDURE — 99291 CRITICAL CARE FIRST HOUR: CPT | Performed by: INTERNAL MEDICINE

## 2025-05-29 PROCEDURE — 84295 ASSAY OF SERUM SODIUM: CPT | Performed by: INTERNAL MEDICINE

## 2025-05-29 PROCEDURE — 2500000002 HC RX 250 W HCPCS SELF ADMINISTERED DRUGS (ALT 637 FOR MEDICARE OP, ALT 636 FOR OP/ED)

## 2025-05-29 PROCEDURE — 2500000004 HC RX 250 GENERAL PHARMACY W/ HCPCS (ALT 636 FOR OP/ED): Performed by: EMERGENCY MEDICINE

## 2025-05-29 PROCEDURE — 80053 COMPREHEN METABOLIC PANEL: CPT | Performed by: EMERGENCY MEDICINE

## 2025-05-29 RX ORDER — DEXTROSE 50 % IN WATER (D50W) INTRAVENOUS SYRINGE
50
Status: DISCONTINUED | OUTPATIENT
Start: 2025-05-29 | End: 2025-05-30

## 2025-05-29 RX ORDER — POTASSIUM CHLORIDE 20 MEQ/1
40 TABLET, EXTENDED RELEASE ORAL ONCE
Status: COMPLETED | OUTPATIENT
Start: 2025-05-29 | End: 2025-05-29

## 2025-05-29 RX ORDER — DEXTROSE MONOHYDRATE 100 MG/ML
150 INJECTION, SOLUTION INTRAVENOUS CONTINUOUS PRN
Status: DISCONTINUED | OUTPATIENT
Start: 2025-05-29 | End: 2025-05-30

## 2025-05-29 RX ORDER — PANTOPRAZOLE SODIUM 40 MG/10ML
40 INJECTION, POWDER, LYOPHILIZED, FOR SOLUTION INTRAVENOUS
Status: DISCONTINUED | OUTPATIENT
Start: 2025-05-30 | End: 2025-05-30

## 2025-05-29 RX ORDER — DEXTROSE MONOHYDRATE AND SODIUM CHLORIDE 5; .45 G/100ML; G/100ML
150 INJECTION, SOLUTION INTRAVENOUS CONTINUOUS PRN
Status: DISCONTINUED | OUTPATIENT
Start: 2025-05-29 | End: 2025-05-30

## 2025-05-29 RX ORDER — BISACODYL 10 MG/1
10 SUPPOSITORY RECTAL DAILY PRN
Status: DISCONTINUED | OUTPATIENT
Start: 2025-05-29 | End: 2025-05-31 | Stop reason: HOSPADM

## 2025-05-29 RX ORDER — ONDANSETRON HYDROCHLORIDE 2 MG/ML
4 INJECTION, SOLUTION INTRAVENOUS EVERY 8 HOURS PRN
Status: DISCONTINUED | OUTPATIENT
Start: 2025-05-29 | End: 2025-05-31 | Stop reason: HOSPADM

## 2025-05-29 RX ORDER — GUAIFENESIN 600 MG/1
600 TABLET, EXTENDED RELEASE ORAL EVERY 12 HOURS PRN
Status: DISCONTINUED | OUTPATIENT
Start: 2025-05-29 | End: 2025-05-31 | Stop reason: HOSPADM

## 2025-05-29 RX ORDER — TALC
3 POWDER (GRAM) TOPICAL NIGHTLY PRN
Status: DISCONTINUED | OUTPATIENT
Start: 2025-05-29 | End: 2025-05-31 | Stop reason: HOSPADM

## 2025-05-29 RX ORDER — ONDANSETRON 4 MG/1
4 TABLET, ORALLY DISINTEGRATING ORAL EVERY 8 HOURS PRN
Status: DISCONTINUED | OUTPATIENT
Start: 2025-05-29 | End: 2025-05-31 | Stop reason: HOSPADM

## 2025-05-29 RX ORDER — POLYETHYLENE GLYCOL 3350 17 G/17G
17 POWDER, FOR SOLUTION ORAL DAILY
Status: DISCONTINUED | OUTPATIENT
Start: 2025-05-29 | End: 2025-05-31 | Stop reason: HOSPADM

## 2025-05-29 RX ORDER — SODIUM CHLORIDE 9 MG/ML
250 INJECTION, SOLUTION INTRAVENOUS CONTINUOUS
Status: DISCONTINUED | OUTPATIENT
Start: 2025-05-29 | End: 2025-05-30

## 2025-05-29 RX ORDER — PANTOPRAZOLE SODIUM 40 MG/1
40 TABLET, DELAYED RELEASE ORAL
Status: DISCONTINUED | OUTPATIENT
Start: 2025-05-30 | End: 2025-05-30

## 2025-05-29 RX ORDER — BISACODYL 5 MG
10 TABLET, DELAYED RELEASE (ENTERIC COATED) ORAL DAILY PRN
Status: DISCONTINUED | OUTPATIENT
Start: 2025-05-29 | End: 2025-05-31 | Stop reason: HOSPADM

## 2025-05-29 RX ADMIN — SODIUM CHLORIDE 1000 ML: 0.9 INJECTION, SOLUTION INTRAVENOUS at 11:03

## 2025-05-29 RX ADMIN — POTASSIUM CHLORIDE 40 MEQ: 1500 TABLET, EXTENDED RELEASE ORAL at 21:04

## 2025-05-29 RX ADMIN — SODIUM CHLORIDE 250 ML/HR: 0.9 INJECTION, SOLUTION INTRAVENOUS at 14:34

## 2025-05-29 RX ADMIN — INSULIN HUMAN 10.6 UNITS/HR: 1 INJECTION, SOLUTION INTRAVENOUS at 12:48

## 2025-05-29 RX ADMIN — DEXTROSE AND SODIUM CHLORIDE 150 ML/HR: 5; 450 INJECTION, SOLUTION INTRAVENOUS at 19:05

## 2025-05-29 RX ADMIN — DEXTROSE MONOHYDRATE 150 ML/HR: 10 INJECTION, SOLUTION INTRAVENOUS at 18:05

## 2025-05-29 RX ADMIN — SODIUM CHLORIDE 1000 ML: 0.9 INJECTION, SOLUTION INTRAVENOUS at 12:48

## 2025-05-29 RX ADMIN — POTASSIUM CHLORIDE 40 MEQ: 1500 TABLET, EXTENDED RELEASE ORAL at 19:18

## 2025-05-29 RX ADMIN — DEXTROSE AND SODIUM CHLORIDE 150 ML/HR: 5; 450 INJECTION, SOLUTION INTRAVENOUS at 15:19

## 2025-05-29 SDOH — ECONOMIC STABILITY: FOOD INSECURITY: WITHIN THE PAST 12 MONTHS, YOU WORRIED THAT YOUR FOOD WOULD RUN OUT BEFORE YOU GOT THE MONEY TO BUY MORE.: NEVER TRUE

## 2025-05-29 SDOH — ECONOMIC STABILITY: INCOME INSECURITY: IN THE PAST 12 MONTHS HAS THE ELECTRIC, GAS, OIL, OR WATER COMPANY THREATENED TO SHUT OFF SERVICES IN YOUR HOME?: NO

## 2025-05-29 SDOH — SOCIAL STABILITY: SOCIAL INSECURITY: WITHIN THE LAST YEAR, HAVE YOU BEEN HUMILIATED OR EMOTIONALLY ABUSED IN OTHER WAYS BY YOUR PARTNER OR EX-PARTNER?: NO

## 2025-05-29 SDOH — SOCIAL STABILITY: SOCIAL INSECURITY: WITHIN THE LAST YEAR, HAVE YOU BEEN AFRAID OF YOUR PARTNER OR EX-PARTNER?: NO

## 2025-05-29 SDOH — ECONOMIC STABILITY: FOOD INSECURITY: WITHIN THE PAST 12 MONTHS, THE FOOD YOU BOUGHT JUST DIDN'T LAST AND YOU DIDN'T HAVE MONEY TO GET MORE.: NEVER TRUE

## 2025-05-29 SDOH — ECONOMIC STABILITY: HOUSING INSECURITY: IN THE PAST 12 MONTHS, HOW MANY TIMES HAVE YOU MOVED WHERE YOU WERE LIVING?: 0

## 2025-05-29 SDOH — ECONOMIC STABILITY: HOUSING INSECURITY: AT ANY TIME IN THE PAST 12 MONTHS, WERE YOU HOMELESS OR LIVING IN A SHELTER (INCLUDING NOW)?: NO

## 2025-05-29 SDOH — ECONOMIC STABILITY: FOOD INSECURITY: HOW HARD IS IT FOR YOU TO PAY FOR THE VERY BASICS LIKE FOOD, HOUSING, MEDICAL CARE, AND HEATING?: NOT HARD AT ALL

## 2025-05-29 SDOH — ECONOMIC STABILITY: HOUSING INSECURITY: IN THE LAST 12 MONTHS, WAS THERE A TIME WHEN YOU WERE NOT ABLE TO PAY THE MORTGAGE OR RENT ON TIME?: NO

## 2025-05-29 SDOH — ECONOMIC STABILITY: TRANSPORTATION INSECURITY: IN THE PAST 12 MONTHS, HAS LACK OF TRANSPORTATION KEPT YOU FROM MEDICAL APPOINTMENTS OR FROM GETTING MEDICATIONS?: NO

## 2025-05-29 SDOH — SOCIAL STABILITY: SOCIAL INSECURITY: HAVE YOU HAD THOUGHTS OF HARMING ANYONE ELSE?: NO

## 2025-05-29 SDOH — SOCIAL STABILITY: SOCIAL INSECURITY: WERE YOU ABLE TO COMPLETE ALL THE BEHAVIORAL HEALTH SCREENINGS?: YES

## 2025-05-29 SDOH — SOCIAL STABILITY: SOCIAL INSECURITY: ABUSE: ADULT

## 2025-05-29 ASSESSMENT — ENCOUNTER SYMPTOMS
EYES NEGATIVE: 1
CONFUSION: 0
PSYCHIATRIC NEGATIVE: 1
NEUROLOGICAL NEGATIVE: 1
CONSTITUTIONAL NEGATIVE: 1
NAUSEA: 1
CARDIOVASCULAR NEGATIVE: 1
POLYDIPSIA: 1
RESPIRATORY NEGATIVE: 1
VOMITING: 1

## 2025-05-29 ASSESSMENT — COGNITIVE AND FUNCTIONAL STATUS - GENERAL
CLIMB 3 TO 5 STEPS WITH RAILING: A LITTLE
WALKING IN HOSPITAL ROOM: A LITTLE
WALKING IN HOSPITAL ROOM: A LITTLE
CLIMB 3 TO 5 STEPS WITH RAILING: A LITTLE
MOBILITY SCORE: 22
CLIMB 3 TO 5 STEPS WITH RAILING: A LITTLE
CLIMB 3 TO 5 STEPS WITH RAILING: A LITTLE
WALKING IN HOSPITAL ROOM: A LITTLE
MOBILITY SCORE: 22
WALKING IN HOSPITAL ROOM: A LITTLE
DAILY ACTIVITIY SCORE: 24
WALKING IN HOSPITAL ROOM: A LITTLE
CLIMB 3 TO 5 STEPS WITH RAILING: A LITTLE
MOBILITY SCORE: 24
DAILY ACTIVITIY SCORE: 24
MOBILITY SCORE: 22
DAILY ACTIVITIY SCORE: 24
WALKING IN HOSPITAL ROOM: A LITTLE
MOBILITY SCORE: 22
PATIENT BASELINE BEDBOUND: NO
DAILY ACTIVITIY SCORE: 24
DAILY ACTIVITIY SCORE: 24
MOBILITY SCORE: 22
CLIMB 3 TO 5 STEPS WITH RAILING: A LITTLE
MOBILITY SCORE: 22

## 2025-05-29 ASSESSMENT — LIFESTYLE VARIABLES
AUDIT-C TOTAL SCORE: 0
AUDIT-C TOTAL SCORE: 0
SKIP TO QUESTIONS 9-10: 1
HOW OFTEN DO YOU HAVE A DRINK CONTAINING ALCOHOL: NEVER
HOW OFTEN DO YOU HAVE 6 OR MORE DRINKS ON ONE OCCASION: NEVER
HOW MANY STANDARD DRINKS CONTAINING ALCOHOL DO YOU HAVE ON A TYPICAL DAY: PATIENT DOES NOT DRINK

## 2025-05-29 ASSESSMENT — PAIN DESCRIPTION - LOCATION: LOCATION: ABDOMEN

## 2025-05-29 ASSESSMENT — PAIN SCALES - GENERAL
PAINLEVEL_OUTOF10: 0 - NO PAIN
PAINLEVEL_OUTOF10: 6
PAINLEVEL_OUTOF10: 0 - NO PAIN

## 2025-05-29 ASSESSMENT — ACTIVITIES OF DAILY LIVING (ADL)
FEEDING YOURSELF: INDEPENDENT
HEARING - RIGHT EAR: FUNCTIONAL
WALKS IN HOME: INDEPENDENT
GROOMING: INDEPENDENT
LACK_OF_TRANSPORTATION: NO
PATIENT'S MEMORY ADEQUATE TO SAFELY COMPLETE DAILY ACTIVITIES?: YES
DRESSING YOURSELF: INDEPENDENT
HEARING - LEFT EAR: FUNCTIONAL
LACK_OF_TRANSPORTATION: NO
TOILETING: INDEPENDENT
JUDGMENT_ADEQUATE_SAFELY_COMPLETE_DAILY_ACTIVITIES: YES
ADEQUATE_TO_COMPLETE_ADL: YES
BATHING: INDEPENDENT

## 2025-05-29 ASSESSMENT — PAIN - FUNCTIONAL ASSESSMENT
PAIN_FUNCTIONAL_ASSESSMENT: 0-10
PAIN_FUNCTIONAL_ASSESSMENT: 0-10

## 2025-05-29 ASSESSMENT — PAIN DESCRIPTION - PAIN TYPE: TYPE: ACUTE PAIN

## 2025-05-29 NOTE — ED PROVIDER NOTES
HPI   Chief Complaint   Patient presents with    concern for DKA       Patient presents emergency department for concerns of DKA.  Patient reports having increased respirations today which prompted him to come into the emergency department.  Patient denies any fever, chest pain, shortness of breath.                          Alyce Coma Scale Score: 15                  Patient History   Medical History[1]  Surgical History[2]  Family History[3]  Social History[4]    Physical Exam   ED Triage Vitals   Temperature Heart Rate Respirations BP   05/29/25 1009 05/29/25 1009 05/29/25 1009 05/29/25 1009   37 °C (98.6 °F) (!) 108 14 121/86      Pulse Ox Temp Source Heart Rate Source Patient Position   05/29/25 1009 05/29/25 1400 05/29/25 1103 05/29/25 1103   99 % Temporal Monitor Lying      BP Location FiO2 (%)     05/29/25 1103 --     Left arm          Physical Exam  Vitals and nursing note reviewed.   Constitutional:       General: He is not in acute distress.     Appearance: He is well-developed.   HENT:      Head: Normocephalic and atraumatic.      Right Ear: External ear normal.      Left Ear: External ear normal.      Mouth/Throat:      Mouth: Mucous membranes are moist.      Pharynx: Oropharynx is clear.   Eyes:      Extraocular Movements: Extraocular movements intact.      Conjunctiva/sclera: Conjunctivae normal.   Neck:      Trachea: Trachea normal.   Cardiovascular:      Rate and Rhythm: Tachycardia present.   Pulmonary:      Effort: Pulmonary effort is normal. No respiratory distress.      Breath sounds: Normal breath sounds.   Abdominal:      General: Bowel sounds are normal.      Palpations: Abdomen is soft.      Tenderness: There is no abdominal tenderness.   Musculoskeletal:         General: No swelling or tenderness.      Cervical back: No tenderness.   Skin:     General: Skin is warm and dry.      Capillary Refill: Capillary refill takes less than 2 seconds.   Neurological:      General: No focal deficit  present.      Mental Status: He is alert and oriented to person, place, and time.   Psychiatric:         Mood and Affect: Mood normal.         Thought Content: Thought content does not include homicidal or suicidal ideation.         ED Course & MDM   Diagnoses as of 05/29/25 1556   Type 1 diabetes mellitus with ketoacidosis without coma       Medical Decision Making  Patient presents with concerns for DKA. Available chart reviewed. On initial assessment the patient was found toxic, no acute distress, tachycardic and tachypneic and afebrile. Initial concern for DKA, electrolyte abnormalities.  Fingerstick glucose is elevated here at 400.  CBC shows no leukocytosis, no anemia, no thrombocytopenia.  Venous blood gas shows metabolic acidemia with a pH of 7, pCO2 is 18, sodiums 127, glucose 414.  Mag and Phos are normal.  Metabolic panel shows hyperglycemia of 369, hyponatremia of 133, bicarbonate is 4 with an anion gap of 29.  Patient send DKA.  Patient started on insulin drip.  Beta hydroxybutyrate is elevated at 3.66.  Glucose improved on insulin drip.  ICU was consulted.  We attempted to close the patient's gap in prove his acidemia here in the emergency department but were unsuccessful.  Patient will be admitted to the ICU for further management.        Procedure  Critical Care    Performed by: Santi Ortiz MD  Authorized by: Santi Ortiz MD    Critical care provider statement:     Critical care time (minutes):  30    Critical care time was exclusive of:  Separately billable procedures and treating other patients and teaching time    Critical care was necessary to treat or prevent imminent or life-threatening deterioration of the following conditions:  Metabolic crisis (DKA)    Critical care was time spent personally by me on the following activities:  Ordering and performing treatments and interventions, ordering and review of laboratory studies, re-evaluation of patient's condition, examination of patient and  evaluation of patient's response to treatment    Care discussed with: admitting provider           [1]   Past Medical History:  Diagnosis Date    DKA (diabetic ketoacidosis) (Multi)     recurrent    HTN (hypertension)     Noncompliance     Type I diabetes mellitus (Multi)    [2] No past surgical history on file.  [3]   Family History  Problem Relation Name Age of Onset    No Known Problems Mother      No Known Problems Father      Hypertension Other      Coronary artery disease Other      Diabetes Other      Heart failure Other     [4]   Social History  Tobacco Use    Smoking status: Never     Passive exposure: Never    Smokeless tobacco: Never   Vaping Use    Vaping status: Never Used   Substance Use Topics    Alcohol use: Never    Drug use: Never        Santi Ortiz MD  05/29/25 5930

## 2025-05-29 NOTE — CONSULTS
Inpatient consult to Endocrinology  Consult performed by: Lori Packer MD  Consult ordered by: Hayden Rice, APRN-CNP  Reason for consult: DKA        Reason For Consult  DKA    History Of Present Illness  Ed Sanon is a 34 y.o. male presenting with hyperglycemia, nausea and increased thirst.   He was found to be tachycardic.   Initial lab data revealed a glucose value of 369mg/dL, bicarbonate of 4, anion gap of 29 and beta hydroxybutyrate of 9.66.      This is the 10th hospitalization for DKA in this calendar year.    His home regimen consists of:  Tresiba 15 units subcutaneous bedtime    He states that he became hyperglycemia between the transition of NPH to Tresiba at the time of his last discharge 5 days ago.  He has not been using prandial insulin as he wanted to see how Tresiba will work first.    Review of the [employer] CGM data dares to suggest that glucose values were improved over the last five days while on Tresiba compared to historical data.  7 day average - 324mg/dL   14 day average - 346mg/dL   30 day average - 336mg/dL   90 day average - 351mg/dL      Time in [employer] rage  7 day - 12%   14 day - 6%   30 day - 7%   90 day - 3%    His A1C last A1C was found to be 11.7% as of April 2025.      He feels as though his glucose values trend low ten hours following the injection of Tresiba.      He is currently on an insulin infusion at 2.9 units/hr.   A second IV line is currently being attempted so that IVF can run.      Past Medical History  He has a past medical history of DKA (diabetic ketoacidosis) (Multi), HTN (hypertension), Noncompliance, and Type I diabetes mellitus (Multi).    Surgical History  He has no past surgical history on file.     Social History  He reports that he has never smoked. He has never been exposed to tobacco smoke. He has never used smokeless tobacco. He reports that he does not drink alcohol and does not use drugs.    Family History  Family  "History[1]     Allergies  Patient has no known allergies.    Review of Systems   Gastrointestinal:  Positive for nausea.   Endocrine: Positive for polydipsia.   Psychiatric/Behavioral:  Negative for confusion.    All other systems reviewed and are negative.       Physical Exam  Vitals and nursing note reviewed.   Constitutional:       General: He is not in acute distress.     Appearance: Normal appearance. He is normal weight.   HENT:      Head: Normocephalic and atraumatic.      Nose: Nose normal.      Mouth/Throat:      Mouth: Mucous membranes are moist.   Eyes:      Extraocular Movements: Extraocular movements intact.   Cardiovascular:      Rate and Rhythm: Regular rhythm. Tachycardia present.   Pulmonary:      Effort: Pulmonary effort is normal.   Musculoskeletal:         General: Normal range of motion.   Neurological:      Mental Status: He is alert and oriented to person, place, and time.   Psychiatric:         Mood and Affect: Mood normal.        Last Recorded Vitals  Blood pressure 119/87, pulse 109, temperature 37.3 °C (99.1 °F), temperature source Temporal, resp. rate 16, height 1.778 m (5' 10\"), weight 67.1 kg (147 lb 14.9 oz), SpO2 100%.    Relevant Results  No current facility-administered medications on file prior to encounter.     Current Outpatient Medications on File Prior to Encounter   Medication Sig Dispense Refill    alcohol swabs       BD Insulin Syringe Ultra-Fine 0.5 mL 31 gauge x 5/16\" syringe       Dexcom G7  misc Inject 1 Device under the skin if needed (poorly controlled type 1 diabetes). Use as instructed      Dexcom G7 Sensor device 1 each every 10 (ten) days.      Gvoke HypoPen 1-Pack 1 mg/0.2 mL auto-injector Inject 1 mg under the skin if needed.      insulin degludec (Tresiba FlexTouch U-100) 100 unit/mL (3 mL) pen Inject 15 Units under the skin once daily at bedtime. Take as directed per insulin instructions. 6 mL 11    lancets misc Inject 1 Units under the skin 2 times a " "day. Sliding scale      OneTouch Ultra Test strip USE 1 STRIP TO CHECK GLUCOSE 4 TIMES DAILY AS DIRECTED      OneTouch Ultra2 Meter misc use as directed      pen needle, diabetic (BD Ultra-Fine Claire Pen Needle) 32 gauge x 5/32\" needle Use with insulin pens 4 times daily. 100 each 0    pen needle, diabetic 32 gauge x 5/32\" needle Use as instructed 4 times daily      [DISCONTINUED] insulin aspart (NovoLOG) 100 unit/mL injection Inject 5 Units under the skin.      [DISCONTINUED] insulin lispro (HumaLOG) 100 unit/mL pen Inject 5 Units under the skin 3 times a day before meals with sliding scale as directed.  Hypoglycemia protocol Call LIP unit(s) if Blood Glucose is between 0 - 70 mg/dL     0 unit(s) if Blood glucose is between    1 unit(s) if Blood glucose is between 151-200   2 unit(s) if Blood glucose is between 201-250   3 unit(s) if Blood glucose is between 251-300   4 unit(s) if Blood glucose is between 301-350   5 unit(s) if Blood glucose is between 351-400    If blood glucose is greater than 400 mg/dL, give max insulin per sliding scale AND then contact provider. 15 mL 11    [DISCONTINUED] insulin lispro (HumaLOG) 100 unit/mL pen Inject 6 Units under the skin 3 times a day before meals. Inject 3 times per day as directed. 5.4 mL 0    [DISCONTINUED] insulin NPH, Isophane, (HumuLIN N,NovoLIN N) 100 unit/mL injection Inject 22 Units under the skin 2 times a day before meals. Take as directed per insulin instructions.         Results for orders placed or performed during the hospital encounter of 05/29/25 (from the past 96 hours)   POCT GLUCOSE   Result Value Ref Range    POCT Glucose 398 (H) 74 - 99 mg/dL   CBC and Auto Differential   Result Value Ref Range    WBC 6.4 4.4 - 11.3 x10*3/uL    nRBC 0.0 0.0 - 0.0 /100 WBCs    RBC 5.70 4.50 - 5.90 x10*6/uL    Hemoglobin 17.7 (H) 13.5 - 17.5 g/dL    Hematocrit 54.0 (H) 41.0 - 52.0 %    MCV 95 80 - 100 fL    MCH 31.1 26.0 - 34.0 pg    MCHC 32.8 32.0 - 36.0 g/dL    " RDW 13.2 11.5 - 14.5 %    Platelets 198 150 - 450 x10*3/uL    Neutrophils % 76.1 40.0 - 80.0 %    Immature Granulocytes %, Automated 0.3 0.0 - 0.9 %    Lymphocytes % 14.6 13.0 - 44.0 %    Monocytes % 8.1 2.0 - 10.0 %    Eosinophils % 0.0 0.0 - 6.0 %    Basophils % 0.9 0.0 - 2.0 %    Neutrophils Absolute 4.89 1.20 - 7.70 x10*3/uL    Immature Granulocytes Absolute, Automated 0.02 0.00 - 0.70 x10*3/uL    Lymphocytes Absolute 0.94 (L) 1.20 - 4.80 x10*3/uL    Monocytes Absolute 0.52 0.10 - 1.00 x10*3/uL    Eosinophils Absolute 0.00 0.00 - 0.70 x10*3/uL    Basophils Absolute 0.06 0.00 - 0.10 x10*3/uL   Magnesium   Result Value Ref Range    Magnesium 2.07 1.60 - 2.40 mg/dL   Comprehensive metabolic panel   Result Value Ref Range    Glucose 369 (H) 74 - 99 mg/dL    Sodium 133 (L) 136 - 145 mmol/L    Potassium 4.7 3.5 - 5.3 mmol/L    Chloride 105 98 - 107 mmol/L    Bicarbonate 4 (LL) 21 - 32 mmol/L    Anion Gap 29 (H) 10 - 20 mmol/L    Urea Nitrogen 14 6 - 23 mg/dL    Creatinine 1.13 0.50 - 1.30 mg/dL    eGFR 86 >60 mL/min/1.73m*2    Calcium 8.4 (L) 8.6 - 10.3 mg/dL    Albumin 4.5 3.4 - 5.0 g/dL    Alkaline Phosphatase 100 33 - 120 U/L    Total Protein 7.5 6.4 - 8.2 g/dL    AST 8 (L) 9 - 39 U/L    Bilirubin, Total 0.5 0.0 - 1.2 mg/dL    ALT 9 (L) 10 - 52 U/L   Phosphorus   Result Value Ref Range    Phosphorus 2.8 2.5 - 4.9 mg/dL   Blood Gas Venous Full Panel   Result Value Ref Range    POCT pH, Venous 7.00 (LL) 7.33 - 7.43 pH    POCT pCO2, Venous 18 (L) 41 - 51 mm Hg    POCT pO2, Venous 112 (H) 35 - 45 mm Hg    POCT SO2, Venous 99 (H) 45 - 75 %    POCT Oxy Hemoglobin, Venous 97.0 (H) 45.0 - 75.0 %    POCT Hematocrit Calculated, Venous 57.0 (H) 41.0 - 52.0 %    POCT Sodium, Venous 127 (L) 136 - 145 mmol/L    POCT Potassium, Venous 4.9 3.5 - 5.3 mmol/L    POCT Chloride, Venous 105 98 - 107 mmol/L    POCT Ionized Calicum, Venous 1.13 1.10 - 1.33 mmol/L    POCT Glucose, Venous 414 (H) 74 - 99 mg/dL    POCT Lactate, Venous 1.2  0.4 - 2.0 mmol/L    POCT Base Excess, Venous -25.2 (L) -2.0 - 3.0 mmol/L    POCT HCO3 Calculated, Venous 4.4 (L) 22.0 - 26.0 mmol/L    POCT Hemoglobin, Venous 19.0 (H) 13.5 - 17.5 g/dL    POCT Anion Gap, Venous 23.0 10.0 - 25.0 mmol/L    Patient Temperature 37.0 degrees Celsius    FiO2 21 %   Beta Hydroxybutyrate   Result Value Ref Range    Beta-Hydroxybutyrate 9.66 (H) 0.02 - 0.27 mmol/L   POCT GLUCOSE   Result Value Ref Range    POCT Glucose 305 (H) 74 - 99 mg/dL   POCT GLUCOSE   Result Value Ref Range    POCT Glucose 240 (H) 74 - 99 mg/dL   Basic metabolic panel   Result Value Ref Range    Glucose 222 (H) 74 - 99 mg/dL    Sodium 136 136 - 145 mmol/L    Potassium 3.8 3.5 - 5.3 mmol/L    Chloride 114 (H) 98 - 107 mmol/L    Bicarbonate 5 (LL) 21 - 32 mmol/L    Anion Gap 21 (H) 10 - 20 mmol/L    Urea Nitrogen 13 6 - 23 mg/dL    Creatinine 0.95 0.50 - 1.30 mg/dL    eGFR >90 >60 mL/min/1.73m*2    Calcium 7.4 (L) 8.6 - 10.3 mg/dL   Blood Gas Venous Full Panel   Result Value Ref Range    POCT pH, Venous 7.10 (LL) 7.33 - 7.43 pH    POCT pCO2, Venous 19 (L) 41 - 51 mm Hg    POCT pO2, Venous 47 (H) 35 - 45 mm Hg    POCT SO2, Venous 83 (H) 45 - 75 %    POCT Oxy Hemoglobin, Venous 81.9 (H) 45.0 - 75.0 %    POCT Hematocrit Calculated, Venous 50.0 41.0 - 52.0 %    POCT Sodium, Venous 133 (L) 136 - 145 mmol/L    POCT Potassium, Venous 3.7 3.5 - 5.3 mmol/L    POCT Chloride, Venous 111 (H) 98 - 107 mmol/L    POCT Ionized Calicum, Venous 1.22 1.10 - 1.33 mmol/L    POCT Glucose, Venous 202 (H) 74 - 99 mg/dL    POCT Lactate, Venous 1.0 0.4 - 2.0 mmol/L    POCT Base Excess, Venous -21.7 (L) -2.0 - 3.0 mmol/L    POCT HCO3 Calculated, Venous 5.9 (L) 22.0 - 26.0 mmol/L    POCT Hemoglobin, Venous 16.6 13.5 - 17.5 g/dL    POCT Anion Gap, Venous 20.0 10.0 - 25.0 mmol/L    Patient Temperature 37.0 degrees Celsius    FiO2 21 %   POCT GLUCOSE   Result Value Ref Range    POCT Glucose 171 (H) 74 - 99 mg/dL   Basic metabolic panel   Result Value  Ref Range    Glucose 173 (H) 74 - 99 mg/dL    Sodium 136 136 - 145 mmol/L    Potassium 3.6 3.5 - 5.3 mmol/L    Chloride 113 (H) 98 - 107 mmol/L    Bicarbonate 7 (LL) 21 - 32 mmol/L    Anion Gap 20 10 - 20 mmol/L    Urea Nitrogen 13 6 - 23 mg/dL    Creatinine 0.95 0.50 - 1.30 mg/dL    eGFR >90 >60 mL/min/1.73m*2    Calcium 7.5 (L) 8.6 - 10.3 mg/dL   Basic Metabolic Panel   Result Value Ref Range    Glucose 185 (H) 74 - 99 mg/dL    Sodium 136 136 - 145 mmol/L    Potassium 3.4 (L) 3.5 - 5.3 mmol/L    Chloride 115 (H) 98 - 107 mmol/L    Bicarbonate 8 (LL) 21 - 32 mmol/L    Anion Gap 16 10 - 20 mmol/L    Urea Nitrogen 13 6 - 23 mg/dL    Creatinine 0.90 0.50 - 1.30 mg/dL    eGFR >90 >60 mL/min/1.73m*2    Calcium 7.7 (L) 8.6 - 10.3 mg/dL   POCT GLUCOSE   Result Value Ref Range    POCT Glucose 194 (H) 74 - 99 mg/dL   POCT GLUCOSE   Result Value Ref Range    POCT Glucose 174 (H) 74 - 99 mg/dL      CT chest abdomen pelvis wo IV contrast  Result Date: 5/7/2025  Interpreted By:  Gerardo Cameron, STUDY: CT CHEST ABDOMEN PELVIS WO CONTRAST; 5/7/2025 11:56 am   INDICATION: Signs/Symptoms:sepsis, N/V, cough.   COMPARISON: None.   ACCESSION NUMBER(S): [id number]   ORDERING CLINICIAN: NAIF CLIFFORD   TECHNIQUE: Axial CT of the chest, abdomen, and pelvis was performed. Coronal and sagittal reconstructions were performed. No intravenous or oral contrast agents were administered.   FINDINGS: Please note that the study is limited without intravenous contrast.   CHEST:     LUNG/PLEURA/LARGE AIRWAYS: The trachea and central airways are patent.   No pleural effusion, consolidation or pneumothorax.   VESSELS: No thoracic aortic aneurysm.   HEART: No pericardial effusion.  The heart is not  enlarged.   MEDIASTINUM AND JERROD: The evaluation for adenopathy suboptimal in the absence of intravenous contrast.  No obvious pathologically enlarged lymph nodes at the mediastinum.   CHEST WALL AND LOWER NECK: The soft tissues of the chest wall are within  normal limits. The visualized thyroid gland is unremarkable.     ABDOMEN: Motion artifact throughout the abdomen and pelvis obscures fine detail.   LIVER: Unremarkable unenhanced appearance.   BILE DUCTS: No obvious dilation.   GALLBLADDER: Present.   PANCREAS: No peripancreatic inflammatory changes.   SPLEEN: Not enlarged.   ADRENAL GLANDS: Unremarkable.   KIDNEYS AND URETERS: No hydronephrosis or hydroureter.  No renal or ureteral calculi are identified.   PELVIS:   BLADDER: Mild wall thickening seen diffusely. No associated inflammatory changes.   REPRODUCTIVE ORGANS: No pelvic mass.   BOWEL: No dilated bowel loops. No inflammatory changes.  The appendix is normal.   VESSELS: No abdominal aortic aneurysm.   PERITONEUM/RETROPERITONEUM/LYMPH NODES: No free fluid or free air.  No retroperitoneal hemorrhage.  No pathologically enlarged lymph nodes are identified.   ABDOMINAL WALL: Within normal limits.   BONES: No acute osseous findings.  Degenerative changes in the spine.       CHEST 1. No confluent airspace disease or acute process within the chest.   ABDOMEN-PELVIS 1. Mild bladder wall thickening seen diffusely without associated inflammatory changes. Cystitis not excluded radiographically. 2. No acute process otherwise identified within the abdomen or pelvis.     Signed by: Gerardo Cameron 5/7/2025 12:06 PM Dictation workstation:   [id number]        Assessment/Plan     IMPRESSION:  DKA - tenth admission for DKA this calendar year  POORLY CONTROLLED TYPE I DIABETES MELLITUS  Long term insulin use with basal insulin only  A1C of 11.7%     RECOMMENDATIONS:  For an insulin infusion at 2.9 units per hour and titrate as per protocol  Accuchecks every 1 hour  Awaiting current [facility]   NPO  Hypoglycemic protocol   To commence IVF D10 as per protocol   Please hold insulin for serum potassium <3.3  Will plan to bridge with Tresiba 20 units subcutaneous once daily when the anion Gap is less than 15 and the Bicarbonate is more  than 15   Counseled regarding the mechanism of action of Tresiba   Counseled that unlike NPH, Tresiba does not have a peak and thus should not expectedly cause hypoglycemia following 10 hours  Counseled that because the duration of action persists for up to 42 hours with Tresiba, it is unlikely that his DKA now is as a result of transitioning between NPH and Tresiba 5 days ago  Counseled that upon review of his freestyle rayshawn CGM, his glycemic control is improved over the last 7 days but is historically suboptimal despite any type of insulin use or modality of administration - mixed insulin, basal insulin, basal + bolus insulin, intermediate insulin, insulin pump   Will continue to follow    Thank you for the courtesy of this consult      Lori Packer MD                 [1]   Family History  Problem Relation Name Age of Onset    No Known Problems Mother      No Known Problems Father      Hypertension Other      Coronary artery disease Other      Diabetes Other      Heart failure Other

## 2025-05-29 NOTE — PROGRESS NOTES
"Ed Sanon is a 34 y.o. male admitted for No Principal Problem: There is no principal problem currently on the Problem List. Please update the Problem List and refresh.. Pharmacy reviewed the patient's [id number] medications and allergies for accuracy.    The list below reflects the PTA list prior to pharmacy medication history. A summary a changes to the PTA medication list has been listed below. Please review each medication in order reconciliation for additional clarification and justification.    Source of information:   Spoke with pt in ER, [facility]     Medications added:    Medications modified:  Gvoke hypoPen 1mg/0.2ml auto-injector- no directions --> inject 1mg under the skin prn     Medications to be removed:      Medications of concern:  \"Patient not taking\" reported on 25 on the Tresiba FlexTouch U-100 pen. Pt did not  from  outpatient pharmacy but [facility] shows it was filled on 25 at [facility] for a 80 day supply... pt did say he was taking this medication prior to being admitted. He was injecting 15 units at bedtime.       Prior to Admission Medications   Prescriptions Last Dose Informant Patient Reported? Taking?   BD Insulin Syringe Ultra-Fine 0.5 mL 31 gauge x 5/16\" syringe   Yes No   Dexcom G7  misc   Yes No   Sig: Inject 1 Device under the skin if needed (poorly controlled type 1 diabetes). Use as instructed   Dexcom G7 Sensor device   Yes No   Si each every 10 (ten) days.   Gvoke HypoPen 1-Pack 1 mg/0.2 mL auto-injector   Yes No   Sig: Inject 1 mg under the skin if needed.   OneTouch Ultra Test strip   Yes No   Sig: USE 1 STRIP TO CHECK GLUCOSE 4 TIMES DAILY AS DIRECTED   OneTouch Ultra2 Meter misc   Yes No   Sig: use as directed   alcohol swabs   Yes No   insulin degludec (Tresiba FlexTouch U-100) 100 unit/mL (3 mL) pen   No No   Sig: Inject 15 Units under the skin once daily at bedtime. Take as directed per insulin instructions.   Patient not " "taking: Reported on 5/23/2025   lancets misc   Yes No   Sig: Inject 1 Units under the skin 2 times a day. Sliding scale   pen needle, diabetic (BD Ultra-Fine Claire Pen Needle) 32 gauge x 5/32\" needle   No No   Sig: Use with insulin pens 4 times daily.   pen needle, diabetic 32 gauge x 5/32\" needle   Yes No   Sig: Use as instructed 4 times daily      Facility-Administered Medications: None       DAYANA CROWE       "

## 2025-05-29 NOTE — H&P
[facility] - GENERAL MEDICINE HISTORY AND PHYSICAL    HISTORY OF PRESENT ILLNESS     History Obtained From (Primary Source): The patient  Collateral History (Secondary Sources): D/w ED    History Of Present Illness (HPI):  Ed Sanon is a 34 y.o. male with PMHx s/f insulin-dependent diabetes with recurrent hospitalizations for DKA 2,  prior hospitalizations just this month,  presenting with parents for DKA.  Patient states she had been discharged from hospital had a long transition between his NPH and starting the Tresiba as ordered.  Patient states glucose was out of control he was unable to get his blood sugar back down.  Patient felt he was slipping into DKA was having nausea and increased thirst.  Patient presented to emergency department workup was significant for DKA.  Patient was given IV fluids insulin drip subsequently referred for admission.  Case was discussed with the ED provider plan is to admit patient to intensive care to continue insulin drip IV fluids care per the intensivist and consultation to endocrinology length of stay might exceed 2 midnights.    ED Course:   Vitals on presentation: T 37 °C (98.6 °F)  HR (!) 108  /86  RR 14  O2 99 % None (Room air)  Labs:   CBC with WBC 6.4, Hgb 17.7, Plts 198.   CMP with glucose 222, Na 136, K 3.8, BUN 13, sCr 0.95, alk phos 100, ALT 9, AST 8, bilirubin 0.5. Magnesium 2.07.   BNP No results found for requested labs within last 365 days.. Trop 3.   Lactate 1.4  EKG: Available for review  Imaging - agree with radiology interpretation(s):   Interventions: IV hydration antiemetics and insulin drip    12-point ROS reviewed and found to be negative aside from aforementioned positives in HPI and/or noted in dedicated ROS section below.     Decision made to admit the patient to the hospitalist service after evaluation of the patient, review of the above, and discussion with ED provider.     LABS AND IMAGING     I have personally  reviewed the following labs from 05/29/25: CBC, CMP, and UA  I have personally reviewed any obtained EKGs on 05/29/25, with my interpretation as listed above in the ED summary course.   I have personally reviewed the patient's vitals on presentation to the ED and any/all changes through to time of admission (on 05/29/25).     ED Course (From ED Provider):  Diagnoses as of 05/29/25 1604   Type 1 diabetes mellitus with ketoacidosis without coma     Relevant Results  Results for orders placed or performed during the hospital encounter of 05/29/25 (from the past 24 hours)   POCT GLUCOSE   Result Value Ref Range    POCT Glucose 398 (H) 74 - 99 mg/dL   CBC and Auto Differential   Result Value Ref Range    WBC 6.4 4.4 - 11.3 x10*3/uL    nRBC 0.0 0.0 - 0.0 /100 WBCs    RBC 5.70 4.50 - 5.90 x10*6/uL    Hemoglobin 17.7 (H) 13.5 - 17.5 g/dL    Hematocrit 54.0 (H) 41.0 - 52.0 %    MCV 95 80 - 100 fL    MCH 31.1 26.0 - 34.0 pg    MCHC 32.8 32.0 - 36.0 g/dL    RDW 13.2 11.5 - 14.5 %    Platelets 198 150 - 450 x10*3/uL    Neutrophils % 76.1 40.0 - 80.0 %    Immature Granulocytes %, Automated 0.3 0.0 - 0.9 %    Lymphocytes % 14.6 13.0 - 44.0 %    Monocytes % 8.1 2.0 - 10.0 %    Eosinophils % 0.0 0.0 - 6.0 %    Basophils % 0.9 0.0 - 2.0 %    Neutrophils Absolute 4.89 1.20 - 7.70 x10*3/uL    Immature Granulocytes Absolute, Automated 0.02 0.00 - 0.70 x10*3/uL    Lymphocytes Absolute 0.94 (L) 1.20 - 4.80 x10*3/uL    Monocytes Absolute 0.52 0.10 - 1.00 x10*3/uL    Eosinophils Absolute 0.00 0.00 - 0.70 x10*3/uL    Basophils Absolute 0.06 0.00 - 0.10 x10*3/uL   Magnesium   Result Value Ref Range    Magnesium 2.07 1.60 - 2.40 mg/dL   Comprehensive metabolic panel   Result Value Ref Range    Glucose 369 (H) 74 - 99 mg/dL    Sodium 133 (L) 136 - 145 mmol/L    Potassium 4.7 3.5 - 5.3 mmol/L    Chloride 105 98 - 107 mmol/L    Bicarbonate 4 (LL) 21 - 32 mmol/L    Anion Gap 29 (H) 10 - 20 mmol/L    Urea Nitrogen 14 6 - 23 mg/dL    Creatinine  1.13 0.50 - 1.30 mg/dL    eGFR 86 >60 mL/min/1.73m*2    Calcium 8.4 (L) 8.6 - 10.3 mg/dL    Albumin 4.5 3.4 - 5.0 g/dL    Alkaline Phosphatase 100 33 - 120 U/L    Total Protein 7.5 6.4 - 8.2 g/dL    AST 8 (L) 9 - 39 U/L    Bilirubin, Total 0.5 0.0 - 1.2 mg/dL    ALT 9 (L) 10 - 52 U/L   Phosphorus   Result Value Ref Range    Phosphorus 2.8 2.5 - 4.9 mg/dL   Blood Gas Venous Full Panel   Result Value Ref Range    POCT pH, Venous 7.00 (LL) 7.33 - 7.43 pH    POCT pCO2, Venous 18 (L) 41 - 51 mm Hg    POCT pO2, Venous 112 (H) 35 - 45 mm Hg    POCT SO2, Venous 99 (H) 45 - 75 %    POCT Oxy Hemoglobin, Venous 97.0 (H) 45.0 - 75.0 %    POCT Hematocrit Calculated, Venous 57.0 (H) 41.0 - 52.0 %    POCT Sodium, Venous 127 (L) 136 - 145 mmol/L    POCT Potassium, Venous 4.9 3.5 - 5.3 mmol/L    POCT Chloride, Venous 105 98 - 107 mmol/L    POCT Ionized Calicum, Venous 1.13 1.10 - 1.33 mmol/L    POCT Glucose, Venous 414 (H) 74 - 99 mg/dL    POCT Lactate, Venous 1.2 0.4 - 2.0 mmol/L    POCT Base Excess, Venous -25.2 (L) -2.0 - 3.0 mmol/L    POCT HCO3 Calculated, Venous 4.4 (L) 22.0 - 26.0 mmol/L    POCT Hemoglobin, Venous 19.0 (H) 13.5 - 17.5 g/dL    POCT Anion Gap, Venous 23.0 10.0 - 25.0 mmol/L    Patient Temperature 37.0 degrees Celsius    FiO2 21 %   Beta Hydroxybutyrate   Result Value Ref Range    Beta-Hydroxybutyrate 9.66 (H) 0.02 - 0.27 mmol/L   POCT GLUCOSE   Result Value Ref Range    POCT Glucose 305 (H) 74 - 99 mg/dL   POCT GLUCOSE   Result Value Ref Range    POCT Glucose 240 (H) 74 - 99 mg/dL   Basic metabolic panel   Result Value Ref Range    Glucose 222 (H) 74 - 99 mg/dL    Sodium 136 136 - 145 mmol/L    Potassium 3.8 3.5 - 5.3 mmol/L    Chloride 114 (H) 98 - 107 mmol/L    Bicarbonate 5 (LL) 21 - 32 mmol/L    Anion Gap 21 (H) 10 - 20 mmol/L    Urea Nitrogen 13 6 - 23 mg/dL    Creatinine 0.95 0.50 - 1.30 mg/dL    eGFR >90 >60 mL/min/1.73m*2    Calcium 7.4 (L) 8.6 - 10.3 mg/dL   Blood Gas Venous Full Panel   Result Value  Ref Range    POCT pH, Venous 7.10 (LL) 7.33 - 7.43 pH    POCT pCO2, Venous 19 (L) 41 - 51 mm Hg    POCT pO2, Venous 47 (H) 35 - 45 mm Hg    POCT SO2, Venous 83 (H) 45 - 75 %    POCT Oxy Hemoglobin, Venous 81.9 (H) 45.0 - 75.0 %    POCT Hematocrit Calculated, Venous 50.0 41.0 - 52.0 %    POCT Sodium, Venous 133 (L) 136 - 145 mmol/L    POCT Potassium, Venous 3.7 3.5 - 5.3 mmol/L    POCT Chloride, Venous 111 (H) 98 - 107 mmol/L    POCT Ionized Calicum, Venous 1.22 1.10 - 1.33 mmol/L    POCT Glucose, Venous 202 (H) 74 - 99 mg/dL    POCT Lactate, Venous 1.0 0.4 - 2.0 mmol/L    POCT Base Excess, Venous -21.7 (L) -2.0 - 3.0 mmol/L    POCT HCO3 Calculated, Venous 5.9 (L) 22.0 - 26.0 mmol/L    POCT Hemoglobin, Venous 16.6 13.5 - 17.5 g/dL    POCT Anion Gap, Venous 20.0 10.0 - 25.0 mmol/L    Patient Temperature 37.0 degrees Celsius    FiO2 21 %   POCT GLUCOSE   Result Value Ref Range    POCT Glucose 171 (H) 74 - 99 mg/dL      Imaging  No results found.    Cardiology, Vascular, and Other Imaging  No other imaging results found for the past 2 days       PAST HISTORIES AND ALLERGIES     Past Medical History  He has a past medical history of DKA (diabetic ketoacidosis) (Multi), HTN (hypertension), Noncompliance, and Type I diabetes mellitus (Multi).    Surgical History  He has no past surgical history on file.     Social History  He reports that he has never smoked. He has never been exposed to tobacco smoke. He has never used smokeless tobacco. He reports that he does not drink alcohol and does not use drugs.    Family History  Family History[1]    Allergies  Patient has no known allergies.    MEDICATIONS     Scheduled Medications:  Scheduled Medications[2]  Continuous Medications:  Continuous Medications[3]  PRN Medications:  PRN Medications[4]     REVIEW OF SYSTEMS     Review of Systems   Gastrointestinal:  Positive for nausea.       OBJECTIVE     Last Recorded Vitals  /87   Pulse (!) 111   Temp 37.3 °C (99.1 °F)  (Temporal)   Resp 16   Wt 75.8 kg (167 lb)   SpO2 100%      Physical Exam:  Vital signs and nursing notes reviewed.   Constitutional: Pleasant and cooperative. Laying in bed in no acute distress. Conversant.   Skin: Warm and dry; no obvious lesions, rashes, pallor, or jaundice.   Eyes: EOMI. Anicteric sclera.   ENT: Mucous membranes moist; no obvious injury or deformity appreciated.   Head and Neck: Normocephalic, atraumatic. ROM preserved. Trachea midline. No appreciable JVD.   Respiratory: Nonlabored on room air. Lungs clear to auscultation bilaterally without obvious adventitious sounds. Chest rise is equal.  Cardiovascular: RRR. No gross murmur, gallop, or rub. Extremities are warm and well-perfused with good capillary refill (< 3 seconds). No chest wall tenderness.   GI: Abdomen soft, nontender, nondistended. No obvious organomegaly appreciated. Bowel sounds are present.  : No CVA tenderness.   MSK: No gross abnormalities appreciated. No limitations to AROM/PROM appreciated.   Extremities: No cyanosis, edema, or clubbing evident. Neurovascularly intact.   Neuro: A&Ox3. CN 2-12 grossly intact. Able to respond to questions appropriately and clearly. No acute focal neurologic deficits appreciated.  Psych: Appropriate mood and behavior.    ASSESSMENT AND PLAN   Assessment/Plan     34 y.o. male with PMHx s/f insulin-dependent diabetes with recurrent hospitalizations for DKA 2,  prior hospitalizations just this month,  presenting with parents for DKA    Type I insulin diabetes with DKA  Patient historically nonadherent to therapy  Continue patient on IV fluids and insulin per DKA protocol  Patient to be admitted to ICU care per intensivist with consultation to endocrinology      DVT prophylaxis  SCDs           Hayden Rice, APRN-CNP    Dragon dictation software was used to dictate this note and thus there may be minor errors in translation/transcription including garbled speech or misspellings. Please  contact for clarification if needed.         [1]   Family History  Problem Relation Name Age of Onset    No Known Problems Mother      No Known Problems Father      Hypertension Other      Coronary artery disease Other      Diabetes Other      Heart failure Other     [2] [START ON 5/30/2025] pantoprazole, 40 mg, oral, Daily before breakfast   Or  [START ON 5/30/2025] pantoprazole, 40 mg, intravenous, Daily before breakfast  polyethylene glycol, 17 g, oral, Daily    [3] dextrose 10 % in water (D10W), 150 mL/hr  dextrose 10 % in water (D10W), 150 mL/hr  dextrose 5%-0.45 % sodium chloride, 150 mL/hr, Last Rate: 150 mL/hr (05/29/25 1519)  insulin regular, 0-50 Units/hr, Last Rate: 7.9 Units/hr (05/29/25 1513)  sodium chloride 0.9%, 250 mL/hr, Last Rate: 250 mL/hr (05/29/25 1434)    [4] PRN medications: bisacodyl, bisacodyl, dextrose 10 % in water (D10W), dextrose 10 % in water (D10W), dextrose 5%-0.45 % sodium chloride, dextrose, guaiFENesin, insulin regular, melatonin, ondansetron ODT **OR** ondansetron

## 2025-05-29 NOTE — CONSULTS
Critical Care Medicine Consult      Reason For Consult  Diabetic ketoacidosis    History Of Present Illness  Ed Sanon is a 34 y.o. male  with PMHx s/f insulin-dependent diabetes with recurrent hospitalizations for DKA 2,  prior hospitalizations just this month,  presenting with parents for diabetic ketoacidosis.  Patient states she had been discharged from hospital had a long transition between his NPH and starting the Tresiba as ordered.  Patient states glucose was out of control he was unable to get his blood sugar back down.  Patient felt he was slipping into DKA was having nausea and increased thirst.  Patient presented to emergency department workup was significant for DKA. .    Medical History[1]  Surgical History[2]  Prescriptions Prior to Admission[3]  Patient has no known allergies.  Social History[4]  Family History[5]    Scheduled Medications:   Scheduled Medications[6]     Continuous Medications:   Continuous Medications[7]     PRN Medications:   PRN Medications[8]    Review of Systems:  Review of Systems   Constitutional: Negative.    HENT: Negative.     Eyes: Negative.    Respiratory: Negative.     Cardiovascular: Negative.    Gastrointestinal:  Positive for nausea and vomiting.   Endocrine: Positive for polydipsia and polyuria.   Genitourinary: Negative.    Skin: Negative.    Neurological: Negative.    Psychiatric/Behavioral: Negative.     All other systems reviewed and are negative.       Objective   Vitals:  Most Recent:  Vitals:    05/29/25 1600   BP: 128/87   Pulse: (!) 111   Resp:    Temp:    SpO2: 100%       24hr Min/Max:  Temp  Min: 37 °C (98.6 °F)  Max: 37.3 °C (99.1 °F)  Pulse  Min: 105  Max: 116  BP  Min: 111/76  Max: 147/87  Resp  Min: 14  Max: 16  SpO2  Min: 85 %  Max: 100 %    LDA:          Vent settings:       Hemodynamic parameters for last 24 hours:       No intake or output data in the 24 hours ending 05/29/25 1649        Physical exam:    Physical Exam  Vitals reviewed.    Constitutional:       Appearance: He is ill-appearing.   HENT:      Head: Normocephalic and atraumatic.   Eyes:      Conjunctiva/sclera: Conjunctivae normal.      Pupils: Pupils are equal, round, and reactive to light.   Cardiovascular:      Rate and Rhythm: Regular rhythm. Tachycardia present.   Pulmonary:      Effort: Pulmonary effort is normal.      Breath sounds: Normal breath sounds.   Abdominal:      General: Abdomen is flat.      Palpations: Abdomen is soft.   Musculoskeletal:         General: Normal range of motion.   Skin:     General: Skin is warm and dry.   Neurological:      General: No focal deficit present.      Mental Status: He is alert and oriented to person, place, and time. Mental status is at baseline.   Psychiatric:         Mood and Affect: Mood normal.         Behavior: Behavior normal.          Lab/Radiology/Diagnostic Review:  Results for orders placed or performed during the hospital encounter of 05/29/25 (from the past 24 hours)   POCT GLUCOSE   Result Value Ref Range    POCT Glucose 398 (H) 74 - 99 mg/dL   CBC and Auto Differential   Result Value Ref Range    WBC 6.4 4.4 - 11.3 x10*3/uL    nRBC 0.0 0.0 - 0.0 /100 WBCs    RBC 5.70 4.50 - 5.90 x10*6/uL    Hemoglobin 17.7 (H) 13.5 - 17.5 g/dL    Hematocrit 54.0 (H) 41.0 - 52.0 %    MCV 95 80 - 100 fL    MCH 31.1 26.0 - 34.0 pg    MCHC 32.8 32.0 - 36.0 g/dL    RDW 13.2 11.5 - 14.5 %    Platelets 198 150 - 450 x10*3/uL    Neutrophils % 76.1 40.0 - 80.0 %    Immature Granulocytes %, Automated 0.3 0.0 - 0.9 %    Lymphocytes % 14.6 13.0 - 44.0 %    Monocytes % 8.1 2.0 - 10.0 %    Eosinophils % 0.0 0.0 - 6.0 %    Basophils % 0.9 0.0 - 2.0 %    Neutrophils Absolute 4.89 1.20 - 7.70 x10*3/uL    Immature Granulocytes Absolute, Automated 0.02 0.00 - 0.70 x10*3/uL    Lymphocytes Absolute 0.94 (L) 1.20 - 4.80 x10*3/uL    Monocytes Absolute 0.52 0.10 - 1.00 x10*3/uL    Eosinophils Absolute 0.00 0.00 - 0.70 x10*3/uL    Basophils Absolute 0.06 0.00 - 0.10  x10*3/uL   Magnesium   Result Value Ref Range    Magnesium 2.07 1.60 - 2.40 mg/dL   Comprehensive metabolic panel   Result Value Ref Range    Glucose 369 (H) 74 - 99 mg/dL    Sodium 133 (L) 136 - 145 mmol/L    Potassium 4.7 3.5 - 5.3 mmol/L    Chloride 105 98 - 107 mmol/L    Bicarbonate 4 (LL) 21 - 32 mmol/L    Anion Gap 29 (H) 10 - 20 mmol/L    Urea Nitrogen 14 6 - 23 mg/dL    Creatinine 1.13 0.50 - 1.30 mg/dL    eGFR 86 >60 mL/min/1.73m*2    Calcium 8.4 (L) 8.6 - 10.3 mg/dL    Albumin 4.5 3.4 - 5.0 g/dL    Alkaline Phosphatase 100 33 - 120 U/L    Total Protein 7.5 6.4 - 8.2 g/dL    AST 8 (L) 9 - 39 U/L    Bilirubin, Total 0.5 0.0 - 1.2 mg/dL    ALT 9 (L) 10 - 52 U/L   Phosphorus   Result Value Ref Range    Phosphorus 2.8 2.5 - 4.9 mg/dL   Blood Gas Venous Full Panel   Result Value Ref Range    POCT pH, Venous 7.00 (LL) 7.33 - 7.43 pH    POCT pCO2, Venous 18 (L) 41 - 51 mm Hg    POCT pO2, Venous 112 (H) 35 - 45 mm Hg    POCT SO2, Venous 99 (H) 45 - 75 %    POCT Oxy Hemoglobin, Venous 97.0 (H) 45.0 - 75.0 %    POCT Hematocrit Calculated, Venous 57.0 (H) 41.0 - 52.0 %    POCT Sodium, Venous 127 (L) 136 - 145 mmol/L    POCT Potassium, Venous 4.9 3.5 - 5.3 mmol/L    POCT Chloride, Venous 105 98 - 107 mmol/L    POCT Ionized Calicum, Venous 1.13 1.10 - 1.33 mmol/L    POCT Glucose, Venous 414 (H) 74 - 99 mg/dL    POCT Lactate, Venous 1.2 0.4 - 2.0 mmol/L    POCT Base Excess, Venous -25.2 (L) -2.0 - 3.0 mmol/L    POCT HCO3 Calculated, Venous 4.4 (L) 22.0 - 26.0 mmol/L    POCT Hemoglobin, Venous 19.0 (H) 13.5 - 17.5 g/dL    POCT Anion Gap, Venous 23.0 10.0 - 25.0 mmol/L    Patient Temperature 37.0 degrees Celsius    FiO2 21 %   Beta Hydroxybutyrate   Result Value Ref Range    Beta-Hydroxybutyrate 9.66 (H) 0.02 - 0.27 mmol/L   POCT GLUCOSE   Result Value Ref Range    POCT Glucose 305 (H) 74 - 99 mg/dL   POCT GLUCOSE   Result Value Ref Range    POCT Glucose 240 (H) 74 - 99 mg/dL   Basic metabolic panel   Result Value Ref  Range    Glucose 222 (H) 74 - 99 mg/dL    Sodium 136 136 - 145 mmol/L    Potassium 3.8 3.5 - 5.3 mmol/L    Chloride 114 (H) 98 - 107 mmol/L    Bicarbonate 5 (LL) 21 - 32 mmol/L    Anion Gap 21 (H) 10 - 20 mmol/L    Urea Nitrogen 13 6 - 23 mg/dL    Creatinine 0.95 0.50 - 1.30 mg/dL    eGFR >90 >60 mL/min/1.73m*2    Calcium 7.4 (L) 8.6 - 10.3 mg/dL   Blood Gas Venous Full Panel   Result Value Ref Range    POCT pH, Venous 7.10 (LL) 7.33 - 7.43 pH    POCT pCO2, Venous 19 (L) 41 - 51 mm Hg    POCT pO2, Venous 47 (H) 35 - 45 mm Hg    POCT SO2, Venous 83 (H) 45 - 75 %    POCT Oxy Hemoglobin, Venous 81.9 (H) 45.0 - 75.0 %    POCT Hematocrit Calculated, Venous 50.0 41.0 - 52.0 %    POCT Sodium, Venous 133 (L) 136 - 145 mmol/L    POCT Potassium, Venous 3.7 3.5 - 5.3 mmol/L    POCT Chloride, Venous 111 (H) 98 - 107 mmol/L    POCT Ionized Calicum, Venous 1.22 1.10 - 1.33 mmol/L    POCT Glucose, Venous 202 (H) 74 - 99 mg/dL    POCT Lactate, Venous 1.0 0.4 - 2.0 mmol/L    POCT Base Excess, Venous -21.7 (L) -2.0 - 3.0 mmol/L    POCT HCO3 Calculated, Venous 5.9 (L) 22.0 - 26.0 mmol/L    POCT Hemoglobin, Venous 16.6 13.5 - 17.5 g/dL    POCT Anion Gap, Venous 20.0 10.0 - 25.0 mmol/L    Patient Temperature 37.0 degrees Celsius    FiO2 21 %   POCT GLUCOSE   Result Value Ref Range    POCT Glucose 171 (H) 74 - 99 mg/dL   Basic metabolic panel   Result Value Ref Range    Glucose 173 (H) 74 - 99 mg/dL    Sodium 136 136 - 145 mmol/L    Potassium 3.6 3.5 - 5.3 mmol/L    Chloride 113 (H) 98 - 107 mmol/L    Bicarbonate 7 (LL) 21 - 32 mmol/L    Anion Gap 20 10 - 20 mmol/L    Urea Nitrogen 13 6 - 23 mg/dL    Creatinine 0.95 0.50 - 1.30 mg/dL    eGFR >90 >60 mL/min/1.73m*2    Calcium 7.5 (L) 8.6 - 10.3 mg/dL     Imaging  No results found.    Cardiology, Vascular, and Other Imaging  No other imaging results found for the past 7 days      Assessment/Plan   Assessment & Plan  Type 1 diabetes mellitus with ketoacidosis without coma    Ed BRIZUELA  Talita is a 34 y.o. male  with PMHx s/f insulin-dependent diabetes with recurrent hospitalizations for DKA 2,  prior hospitalizations just this month,  presenting with parents for diabetic ketoacidosis.    Diabetic ketoacidosis secondary to noncompliance with short acting insulin  5/29/2025: On admission is a blood glucose was 369, bicarbonate 4 and anion gap of 29  Started on insulin drip as per DKA protocol  Point-of-care glucose every 1 hour and BMP every 4 hours while on insulin drip  Aggressive IV fluids as per DKA protocol  Patient latest pH was 7.10, CO2 19, bicarbonate 6.   Transition to subcu insulin once anion gap is less than 15 and bicarbonate more than 15    2.  Uncontrolled diabetes, noncompliant with short acting insulin  I had extensive discussion with the patient and the patient mother at the bedside  Patient has been having concerns about short acting insulin and hypoglycemia, making him to skip short acting insulin  I extensively counseled the patient that solution is not to skip short-acting insulin which is already at a low dose, but consider having food accessible when taking the short acting insulin  Explained about complications of uncontrolled diabetes for years and recurrent DKA    I spent 40 minutes of cumulative critical care time with the patient.  Greater than 50% of that time was spent in the direct collaboration and or coordination of care of the patient.     Dragon dictation software was used to dictate this note and thus there may be minor errors in translation/transcription including garbled speech or misspellings. Please contact for clarification if needed.       Lio Julian MD         [1]   Past Medical History:  Diagnosis Date    DKA (diabetic ketoacidosis) (Multi)     recurrent    HTN (hypertension)     Noncompliance     Type I diabetes mellitus (Multi)    [2] No past surgical history on file.  [3] (Not in a hospital admission)  [4]   Social History  Tobacco Use     Smoking status: Never     Passive exposure: Never    Smokeless tobacco: Never   Vaping Use    Vaping status: Never Used   Substance Use Topics    Alcohol use: Never    Drug use: Never   [5]   Family History  Problem Relation Name Age of Onset    No Known Problems Mother      No Known Problems Father      Hypertension Other      Coronary artery disease Other      Diabetes Other      Heart failure Other     [6] [START ON 5/30/2025] pantoprazole, 40 mg, oral, Daily before breakfast   Or  [START ON 5/30/2025] pantoprazole, 40 mg, intravenous, Daily before breakfast  polyethylene glycol, 17 g, oral, Daily  [7] dextrose 10 % in water (D10W), 150 mL/hr  dextrose 10 % in water (D10W), 150 mL/hr  dextrose 5%-0.45 % sodium chloride, 150 mL/hr, Last Rate: 150 mL/hr (05/29/25 1649)  insulin regular, 0-50 Units/hr, Last Rate: 5.9 Units/hr (05/29/25 1646)  sodium chloride 0.9%, 250 mL/hr, Last Rate: 250 mL/hr (05/29/25 1434)  [8] PRN medications: bisacodyl, bisacodyl, dextrose 10 % in water (D10W), dextrose 10 % in water (D10W), dextrose 5%-0.45 % sodium chloride, dextrose, guaiFENesin, insulin regular, melatonin, ondansetron ODT **OR** ondansetron

## 2025-05-30 LAB
ANION GAP SERPL CALC-SCNC: 11 MMOL/L (ref 10–20)
ANION GAP SERPL CALC-SCNC: 16 MMOL/L (ref 10–20)
ANION GAP SERPL CALC-SCNC: 8 MMOL/L (ref 10–20)
BUN SERPL-MCNC: 13 MG/DL (ref 6–23)
BUN SERPL-MCNC: 14 MG/DL (ref 6–23)
BUN SERPL-MCNC: 15 MG/DL (ref 6–23)
CALCIUM SERPL-MCNC: 7.3 MG/DL (ref 8.6–10.3)
CALCIUM SERPL-MCNC: 7.5 MG/DL (ref 8.6–10.3)
CALCIUM SERPL-MCNC: 7.8 MG/DL (ref 8.6–10.3)
CHLORIDE SERPL-SCNC: 112 MMOL/L (ref 98–107)
CHLORIDE SERPL-SCNC: 113 MMOL/L (ref 98–107)
CHLORIDE SERPL-SCNC: 113 MMOL/L (ref 98–107)
CO2 SERPL-SCNC: 10 MMOL/L (ref 21–32)
CO2 SERPL-SCNC: 14 MMOL/L (ref 21–32)
CO2 SERPL-SCNC: 17 MMOL/L (ref 21–32)
CREAT SERPL-MCNC: 0.74 MG/DL (ref 0.5–1.3)
CREAT SERPL-MCNC: 0.83 MG/DL (ref 0.5–1.3)
CREAT SERPL-MCNC: 0.88 MG/DL (ref 0.5–1.3)
EGFRCR SERPLBLD CKD-EPI 2021: >90 ML/MIN/1.73M*2
ERYTHROCYTE [DISTWIDTH] IN BLOOD BY AUTOMATED COUNT: 13 % (ref 11.5–14.5)
GLUCOSE BLD MANUAL STRIP-MCNC: 145 MG/DL (ref 74–99)
GLUCOSE BLD MANUAL STRIP-MCNC: 154 MG/DL (ref 74–99)
GLUCOSE BLD MANUAL STRIP-MCNC: 179 MG/DL (ref 74–99)
GLUCOSE BLD MANUAL STRIP-MCNC: 184 MG/DL (ref 74–99)
GLUCOSE BLD MANUAL STRIP-MCNC: 188 MG/DL (ref 74–99)
GLUCOSE BLD MANUAL STRIP-MCNC: 199 MG/DL (ref 74–99)
GLUCOSE BLD MANUAL STRIP-MCNC: 205 MG/DL (ref 74–99)
GLUCOSE BLD MANUAL STRIP-MCNC: 215 MG/DL (ref 74–99)
GLUCOSE BLD MANUAL STRIP-MCNC: 220 MG/DL (ref 74–99)
GLUCOSE BLD MANUAL STRIP-MCNC: 238 MG/DL (ref 74–99)
GLUCOSE BLD MANUAL STRIP-MCNC: 262 MG/DL (ref 74–99)
GLUCOSE BLD MANUAL STRIP-MCNC: 272 MG/DL (ref 74–99)
GLUCOSE BLD MANUAL STRIP-MCNC: 302 MG/DL (ref 74–99)
GLUCOSE BLD MANUAL STRIP-MCNC: 304 MG/DL (ref 74–99)
GLUCOSE BLD MANUAL STRIP-MCNC: 323 MG/DL (ref 74–99)
GLUCOSE SERPL-MCNC: 153 MG/DL (ref 74–99)
GLUCOSE SERPL-MCNC: 208 MG/DL (ref 74–99)
GLUCOSE SERPL-MCNC: 231 MG/DL (ref 74–99)
HCT VFR BLD AUTO: 43.4 % (ref 41–52)
HGB BLD-MCNC: 14.8 G/DL (ref 13.5–17.5)
MCH RBC QN AUTO: 31.6 PG (ref 26–34)
MCHC RBC AUTO-ENTMCNC: 34.1 G/DL (ref 32–36)
MCV RBC AUTO: 93 FL (ref 80–100)
NRBC BLD-RTO: 0 /100 WBCS (ref 0–0)
PLATELET # BLD AUTO: 169 X10*3/UL (ref 150–450)
POTASSIUM SERPL-SCNC: 3.3 MMOL/L (ref 3.5–5.3)
POTASSIUM SERPL-SCNC: 3.6 MMOL/L (ref 3.5–5.3)
POTASSIUM SERPL-SCNC: 3.9 MMOL/L (ref 3.5–5.3)
RBC # BLD AUTO: 4.69 X10*6/UL (ref 4.5–5.9)
SODIUM SERPL-SCNC: 134 MMOL/L (ref 136–145)
SODIUM SERPL-SCNC: 134 MMOL/L (ref 136–145)
SODIUM SERPL-SCNC: 135 MMOL/L (ref 136–145)
WBC # BLD AUTO: 4.5 X10*3/UL (ref 4.4–11.3)

## 2025-05-30 PROCEDURE — 99233 SBSQ HOSP IP/OBS HIGH 50: CPT | Performed by: INTERNAL MEDICINE

## 2025-05-30 PROCEDURE — 36415 COLL VENOUS BLD VENIPUNCTURE: CPT

## 2025-05-30 PROCEDURE — 36415 COLL VENOUS BLD VENIPUNCTURE: CPT | Performed by: NURSE PRACTITIONER

## 2025-05-30 PROCEDURE — 2500000001 HC RX 250 WO HCPCS SELF ADMINISTERED DRUGS (ALT 637 FOR MEDICARE OP): Performed by: NURSE PRACTITIONER

## 2025-05-30 PROCEDURE — 80048 BASIC METABOLIC PNL TOTAL CA: CPT | Performed by: NURSE PRACTITIONER

## 2025-05-30 PROCEDURE — 76937 US GUIDE VASCULAR ACCESS: CPT

## 2025-05-30 PROCEDURE — 2500000002 HC RX 250 W HCPCS SELF ADMINISTERED DRUGS (ALT 637 FOR MEDICARE OP, ALT 636 FOR OP/ED): Performed by: INTERNAL MEDICINE

## 2025-05-30 PROCEDURE — 2500000004 HC RX 250 GENERAL PHARMACY W/ HCPCS (ALT 636 FOR OP/ED): Performed by: NURSE PRACTITIONER

## 2025-05-30 PROCEDURE — 80048 BASIC METABOLIC PNL TOTAL CA: CPT

## 2025-05-30 PROCEDURE — 2500000004 HC RX 250 GENERAL PHARMACY W/ HCPCS (ALT 636 FOR OP/ED): Performed by: EMERGENCY MEDICINE

## 2025-05-30 PROCEDURE — 82947 ASSAY GLUCOSE BLOOD QUANT: CPT

## 2025-05-30 PROCEDURE — 1200000002 HC GENERAL ROOM WITH TELEMETRY DAILY

## 2025-05-30 PROCEDURE — 85027 COMPLETE CBC AUTOMATED: CPT | Performed by: NURSE PRACTITIONER

## 2025-05-30 PROCEDURE — 99291 CRITICAL CARE FIRST HOUR: CPT | Performed by: INTERNAL MEDICINE

## 2025-05-30 RX ORDER — INSULIN DEGLUDEC 100 U/ML
20 INJECTION, SOLUTION SUBCUTANEOUS EVERY 24 HOURS
Status: DISCONTINUED | OUTPATIENT
Start: 2025-05-30 | End: 2025-05-31 | Stop reason: HOSPADM

## 2025-05-30 RX ORDER — INSULIN LISPRO 100 [IU]/ML
0-10 INJECTION, SOLUTION INTRAVENOUS; SUBCUTANEOUS
Status: DISCONTINUED | OUTPATIENT
Start: 2025-05-30 | End: 2025-05-31 | Stop reason: HOSPADM

## 2025-05-30 RX ORDER — DEXTROSE 50 % IN WATER (D50W) INTRAVENOUS SYRINGE
25
Status: DISCONTINUED | OUTPATIENT
Start: 2025-05-30 | End: 2025-05-31 | Stop reason: HOSPADM

## 2025-05-30 RX ORDER — POTASSIUM CHLORIDE 20 MEQ/1
20 TABLET, EXTENDED RELEASE ORAL ONCE
Status: COMPLETED | OUTPATIENT
Start: 2025-05-30 | End: 2025-05-30

## 2025-05-30 RX ORDER — DEXTROSE 50 % IN WATER (D50W) INTRAVENOUS SYRINGE
12.5
Status: DISCONTINUED | OUTPATIENT
Start: 2025-05-30 | End: 2025-05-31 | Stop reason: HOSPADM

## 2025-05-30 RX ADMIN — INSULIN DEGLUDEC INJECTION 20 UNITS: 100 INJECTION, SOLUTION SUBCUTANEOUS at 09:41

## 2025-05-30 RX ADMIN — DEXTROSE MONOHYDRATE 150 ML/HR: 10 INJECTION, SOLUTION INTRAVENOUS at 08:14

## 2025-05-30 RX ADMIN — POTASSIUM CHLORIDE 20 MEQ: 1500 TABLET, EXTENDED RELEASE ORAL at 13:06

## 2025-05-30 RX ADMIN — ONDANSETRON 4 MG: 2 INJECTION, SOLUTION INTRAMUSCULAR; INTRAVENOUS at 07:43

## 2025-05-30 RX ADMIN — DEXTROSE AND SODIUM CHLORIDE 150 ML/HR: 5; 450 INJECTION, SOLUTION INTRAVENOUS at 03:34

## 2025-05-30 RX ADMIN — INSULIN LISPRO 6 UNITS: 100 INJECTION, SOLUTION INTRAVENOUS; SUBCUTANEOUS at 17:36

## 2025-05-30 RX ADMIN — PANTOPRAZOLE SODIUM 40 MG: 40 TABLET, DELAYED RELEASE ORAL at 08:14

## 2025-05-30 RX ADMIN — INSULIN LISPRO 8 UNITS: 100 INJECTION, SOLUTION INTRAVENOUS; SUBCUTANEOUS at 13:06

## 2025-05-30 ASSESSMENT — COGNITIVE AND FUNCTIONAL STATUS - GENERAL
MOBILITY SCORE: 22
MOBILITY SCORE: 24
MOBILITY SCORE: 22
MOBILITY SCORE: 24
DAILY ACTIVITIY SCORE: 24
DAILY ACTIVITIY SCORE: 24
CLIMB 3 TO 5 STEPS WITH RAILING: A LITTLE
WALKING IN HOSPITAL ROOM: A LITTLE
DAILY ACTIVITIY SCORE: 24
MOBILITY SCORE: 22
CLIMB 3 TO 5 STEPS WITH RAILING: A LITTLE
CLIMB 3 TO 5 STEPS WITH RAILING: A LITTLE
WALKING IN HOSPITAL ROOM: A LITTLE
DAILY ACTIVITIY SCORE: 24
DAILY ACTIVITIY SCORE: 24
MOBILITY SCORE: 22
DAILY ACTIVITIY SCORE: 24
MOBILITY SCORE: 22
MOBILITY SCORE: 22
WALKING IN HOSPITAL ROOM: A LITTLE
WALKING IN HOSPITAL ROOM: A LITTLE
MOBILITY SCORE: 22
CLIMB 3 TO 5 STEPS WITH RAILING: A LITTLE
CLIMB 3 TO 5 STEPS WITH RAILING: A LITTLE
WALKING IN HOSPITAL ROOM: A LITTLE
CLIMB 3 TO 5 STEPS WITH RAILING: A LITTLE
CLIMB 3 TO 5 STEPS WITH RAILING: A LITTLE
DAILY ACTIVITIY SCORE: 24

## 2025-05-30 ASSESSMENT — PAIN SCALES - GENERAL
PAINLEVEL_OUTOF10: 0 - NO PAIN

## 2025-05-30 ASSESSMENT — ENCOUNTER SYMPTOMS
VOMITING: 0
NAUSEA: 0
ABDOMINAL PAIN: 0

## 2025-05-30 ASSESSMENT — PAIN - FUNCTIONAL ASSESSMENT
PAIN_FUNCTIONAL_ASSESSMENT: 0-10
PAIN_FUNCTIONAL_ASSESSMENT: 0-10

## 2025-05-30 NOTE — PROGRESS NOTES
Ed Sanon is a 34 y.o. male on day 1 of admission presenting with Type 1 diabetes mellitus with ketoacidosis without coma.      Subjective   Ed Sanon is a 34 y.o. male with PMHx s/f insulin-dependent diabetes with recurrent hospitalizations for DKA due to noncompliance, last admission about a week ago, presented to ER with parents for elevated blood sugar and was admitted with a diagnosis of DKA-started on IV fluid as well as insulin drip as per protocol     5/30/2025 patient condition and DKA improved-insulin switched back to subcu basal and bolus.  Objective     Last Recorded Vitals  BP 97/70   Pulse 87   Temp 36.4 °C (97.6 °F) (Temporal)   Resp 16   Wt 67.1 kg (147 lb 14.9 oz)   SpO2 99%   Intake/Output last 3 Shifts:    Intake/Output Summary (Last 24 hours) at 5/30/2025 0807  Last data filed at 5/29/2025 2100  Gross per 24 hour   Intake --   Output 700 ml   Net -700 ml       Admission Weight  Weight: 75.8 kg (167 lb) (05/29/25 1009)    Daily Weight  05/29/25 : 67.1 kg (147 lb 14.9 oz)    Image Results  CT chest abdomen pelvis wo IV contrast  Narrative: Interpreted By:  Gerardo Cameron,   STUDY:  CT CHEST ABDOMEN PELVIS WO CONTRAST; 5/7/2025 11:56 am      INDICATION:  Signs/Symptoms:sepsis, N/V, cough.      COMPARISON:  None.      ACCESSION NUMBER(S):  [id number]      ORDERING CLINICIAN:  NAIF CLIFFORD      TECHNIQUE:  Axial CT of the chest, abdomen, and pelvis was performed. Coronal and  sagittal reconstructions were performed. No intravenous or oral  contrast agents were administered.      FINDINGS:  Please note that the study is limited without intravenous contrast.      CHEST:          LUNG/PLEURA/LARGE AIRWAYS:  The trachea and central airways are patent.      No pleural effusion, consolidation or pneumothorax.      VESSELS:  No thoracic aortic aneurysm.      HEART:  No pericardial effusion.  The heart is not  enlarged.      MEDIASTINUM AND JERROD:  The evaluation for adenopathy suboptimal in  the absence of  intravenous contrast.  No obvious pathologically enlarged lymph nodes  at the mediastinum.      CHEST WALL AND LOWER NECK:  The soft tissues of the chest wall are within normal limits.  The visualized thyroid gland is unremarkable.          ABDOMEN: Motion artifact throughout the abdomen and pelvis obscures  fine detail.      LIVER:  Unremarkable unenhanced appearance.      BILE DUCTS:  No obvious dilation.      GALLBLADDER:  Present.      PANCREAS:  No peripancreatic inflammatory changes.      SPLEEN:  Not enlarged.      ADRENAL GLANDS:  Unremarkable.      KIDNEYS AND URETERS:  No hydronephrosis or hydroureter.  No renal or ureteral calculi are  identified.      PELVIS:      BLADDER:  Mild wall thickening seen diffusely. No associated inflammatory  changes.      REPRODUCTIVE ORGANS:  No pelvic mass.      BOWEL:  No dilated bowel loops. No inflammatory changes.  The appendix is  normal.      VESSELS:  No abdominal aortic aneurysm.      PERITONEUM/RETROPERITONEUM/LYMPH NODES:  No free fluid or free air.  No retroperitoneal hemorrhage.  No  pathologically enlarged lymph nodes are identified.      ABDOMINAL WALL:  Within normal limits.      BONES:  No acute osseous findings.  Degenerative changes in the spine.      Impression: CHEST  1. No confluent airspace disease or acute process within the chest.      ABDOMEN-PELVIS  1. Mild bladder wall thickening seen diffusely without associated  inflammatory changes. Cystitis not excluded radiographically.  2. No acute process otherwise identified within the abdomen or pelvis.          Signed by: Gerardo Cameron 5/7/2025 12:06 PM  Dictation workstation:   [id number]      Physical Exam  Patient is awake and orient, not in apparent distress  Eyes: PERRLA, no conjunctival congestion  Chest: Bilateral Air entry, no crackles or wheezing  Heart: s1S2 regular, no murmur  Abdomen: Soft, non tender, BS present  Ext:    Relevant Results               This patient currently  has cardiac telemetry ordered; if you would like to modify or discontinue the telemetry order, click here to go to the orders activity to modify/discontinue the order.              Assessment & Plan      Type I insulin diabetes with DKA  Patient historically nonadherent to therapy  Started on IV fluid as well as insulin drip as per DKA protocol, condition improved-switch to subcu basal and bolus  Closely monitor electrolytes   Endocrinology follow-up  Counseling regarding medication compliance  Discharge plan in a.m. if patient condition continues to improve and blood sugar within acceptable range.     Hypokalemia  Replace and monitor    Pseudohyponatremia  Secondary to hyperglycemia  Improving with blood sugar control          Marissa Braun MD

## 2025-05-30 NOTE — PROGRESS NOTES
Social work consult placed for discharge planning. SW reviewed pt's chart and communicated with TCC. No SW needs foreseen at this time. SW signing off; available upon request.    Bandar Tavera, MSW, LSW ([id number])   Care Transitions

## 2025-05-30 NOTE — PROGRESS NOTES
Critical Care Daily Progress Notes        Subjective   Patient is a 34 y.o. male admitted on 5/29/2025 10:11 AM with the following indication(s) for ICU care: Diabetic ketoacidosis..     Interval History:   Ed Sanon is a 34 y.o. male  with PMHx s/f insulin-dependent diabetes with recurrent hospitalizations for DKA 2,  prior hospitalizations just this month,  presenting with parents for diabetic ketoacidosis.     5/30/2025: Patient anion gap is closed and bicarbonate more than 15, transitioned to Tresiba 20 units subcutaneous daily and lispro insulin sliding scale    Scheduled Medications:   Scheduled Medications[1]     Continuous Medications:   Continuous Medications[2]     PRN Medications:   PRN Medications[3]    Objective   Vitals:  Most Recent:  Vitals:    05/30/25 1100   BP: 136/87   Pulse: 95   Resp: 17   Temp: 36.9 °C (98.4 °F)   SpO2: 98%       24hr Min/Max:  Temp  Min: 36.4 °C (97.6 °F)  Max: 37.3 °C (99.1 °F)  Pulse  Min: 86  Max: 116  BP  Min: 97/70  Max: 142/95  Resp  Min: 10  Max: 22  SpO2  Min: 85 %  Max: 100 %    LDA:         Vent settings:       Hemodynamic parameters for last 24 hours:       I/O:    Intake/Output Summary (Last 24 hours) at 5/30/2025 1153  Last data filed at 5/29/2025 2100  Gross per 24 hour   Intake --   Output 700 ml   Net -700 ml       Physical Exam:   Physical Exam  Vitals reviewed.   Constitutional:       Appearance: Normal appearance.   HENT:      Head: Normocephalic and atraumatic.   Eyes:      Conjunctiva/sclera: Conjunctivae normal.      Pupils: Pupils are equal, round, and reactive to light.   Cardiovascular:      Rate and Rhythm: Regular rhythm. Tachycardia present.   Pulmonary:      Effort: Pulmonary effort is normal.      Breath sounds: Normal breath sounds.   Abdominal:      General: Abdomen is flat.      Palpations: Abdomen is soft.   Musculoskeletal:         General: Normal range of motion.   Skin:     General: Skin is warm and dry.   Neurological:       General: No focal deficit present.      Mental Status: He is alert and oriented to person, place, and time. Mental status is at baseline.   Psychiatric:         Mood and Affect: Mood normal.         Behavior: Behavior normal.          Lab/Radiology/Diagnostic Review:  Results for orders placed or performed during the hospital encounter of 05/29/25 (from the past 24 hours)   POCT GLUCOSE   Result Value Ref Range    POCT Glucose 305 (H) 74 - 99 mg/dL   POCT GLUCOSE   Result Value Ref Range    POCT Glucose 240 (H) 74 - 99 mg/dL   Basic metabolic panel   Result Value Ref Range    Glucose 222 (H) 74 - 99 mg/dL    Sodium 136 136 - 145 mmol/L    Potassium 3.8 3.5 - 5.3 mmol/L    Chloride 114 (H) 98 - 107 mmol/L    Bicarbonate 5 (LL) 21 - 32 mmol/L    Anion Gap 21 (H) 10 - 20 mmol/L    Urea Nitrogen 13 6 - 23 mg/dL    Creatinine 0.95 0.50 - 1.30 mg/dL    eGFR >90 >60 mL/min/1.73m*2    Calcium 7.4 (L) 8.6 - 10.3 mg/dL   Blood Gas Venous Full Panel   Result Value Ref Range    POCT pH, Venous 7.10 (LL) 7.33 - 7.43 pH    POCT pCO2, Venous 19 (L) 41 - 51 mm Hg    POCT pO2, Venous 47 (H) 35 - 45 mm Hg    POCT SO2, Venous 83 (H) 45 - 75 %    POCT Oxy Hemoglobin, Venous 81.9 (H) 45.0 - 75.0 %    POCT Hematocrit Calculated, Venous 50.0 41.0 - 52.0 %    POCT Sodium, Venous 133 (L) 136 - 145 mmol/L    POCT Potassium, Venous 3.7 3.5 - 5.3 mmol/L    POCT Chloride, Venous 111 (H) 98 - 107 mmol/L    POCT Ionized Calicum, Venous 1.22 1.10 - 1.33 mmol/L    POCT Glucose, Venous 202 (H) 74 - 99 mg/dL    POCT Lactate, Venous 1.0 0.4 - 2.0 mmol/L    POCT Base Excess, Venous -21.7 (L) -2.0 - 3.0 mmol/L    POCT HCO3 Calculated, Venous 5.9 (L) 22.0 - 26.0 mmol/L    POCT Hemoglobin, Venous 16.6 13.5 - 17.5 g/dL    POCT Anion Gap, Venous 20.0 10.0 - 25.0 mmol/L    Patient Temperature 37.0 degrees Celsius    FiO2 21 %   POCT GLUCOSE   Result Value Ref Range    POCT Glucose 171 (H) 74 - 99 mg/dL   Basic metabolic panel   Result Value Ref Range     Glucose 173 (H) 74 - 99 mg/dL    Sodium 136 136 - 145 mmol/L    Potassium 3.6 3.5 - 5.3 mmol/L    Chloride 113 (H) 98 - 107 mmol/L    Bicarbonate 7 (LL) 21 - 32 mmol/L    Anion Gap 20 10 - 20 mmol/L    Urea Nitrogen 13 6 - 23 mg/dL    Creatinine 0.95 0.50 - 1.30 mg/dL    eGFR >90 >60 mL/min/1.73m*2    Calcium 7.5 (L) 8.6 - 10.3 mg/dL   Basic Metabolic Panel   Result Value Ref Range    Glucose 185 (H) 74 - 99 mg/dL    Sodium 136 136 - 145 mmol/L    Potassium 3.4 (L) 3.5 - 5.3 mmol/L    Chloride 115 (H) 98 - 107 mmol/L    Bicarbonate 8 (LL) 21 - 32 mmol/L    Anion Gap 16 10 - 20 mmol/L    Urea Nitrogen 13 6 - 23 mg/dL    Creatinine 0.90 0.50 - 1.30 mg/dL    eGFR >90 >60 mL/min/1.73m*2    Calcium 7.7 (L) 8.6 - 10.3 mg/dL   POCT GLUCOSE   Result Value Ref Range    POCT Glucose 194 (H) 74 - 99 mg/dL   POCT GLUCOSE   Result Value Ref Range    POCT Glucose 174 (H) 74 - 99 mg/dL   POCT GLUCOSE   Result Value Ref Range    POCT Glucose 145 (H) 74 - 99 mg/dL   POCT GLUCOSE   Result Value Ref Range    POCT Glucose 153 (H) 74 - 99 mg/dL   POCT GLUCOSE   Result Value Ref Range    POCT Glucose 142 (H) 74 - 99 mg/dL   Basic metabolic panel   Result Value Ref Range    Glucose 364 (H) 74 - 99 mg/dL    Sodium 131 (L) 136 - 145 mmol/L    Potassium 3.0 (L) 3.5 - 5.3 mmol/L    Chloride 112 (H) 98 - 107 mmol/L    Bicarbonate 12 (L) 21 - 32 mmol/L    Anion Gap 10 10 - 20 mmol/L    Urea Nitrogen 13 6 - 23 mg/dL    Creatinine 0.83 0.50 - 1.30 mg/dL    eGFR >90 >60 mL/min/1.73m*2    Calcium 6.9 (L) 8.6 - 10.3 mg/dL   POCT GLUCOSE   Result Value Ref Range    POCT Glucose 211 (H) 74 - 99 mg/dL   POCT GLUCOSE   Result Value Ref Range    POCT Glucose 199 (H) 74 - 99 mg/dL   POCT GLUCOSE   Result Value Ref Range    POCT Glucose 184 (H) 74 - 99 mg/dL   Basic metabolic panel   Result Value Ref Range    Glucose 231 (H) 74 - 99 mg/dL    Sodium 135 (L) 136 - 145 mmol/L    Potassium 3.9 3.5 - 5.3 mmol/L    Chloride 113 (H) 98 - 107 mmol/L    Bicarbonate  10 (LL) 21 - 32 mmol/L    Anion Gap 16 10 - 20 mmol/L    Urea Nitrogen 13 6 - 23 mg/dL    Creatinine 0.88 0.50 - 1.30 mg/dL    eGFR >90 >60 mL/min/1.73m*2    Calcium 7.3 (L) 8.6 - 10.3 mg/dL   POCT GLUCOSE   Result Value Ref Range    POCT Glucose 232 (H) 74 - 99 mg/dL   POCT GLUCOSE   Result Value Ref Range    POCT Glucose 238 (H) 74 - 99 mg/dL   POCT GLUCOSE   Result Value Ref Range    POCT Glucose 220 (H) 74 - 99 mg/dL   POCT GLUCOSE   Result Value Ref Range    POCT Glucose 205 (H) 74 - 99 mg/dL   POCT GLUCOSE   Result Value Ref Range    POCT Glucose 199 (H) 74 - 99 mg/dL   POCT GLUCOSE   Result Value Ref Range    POCT Glucose 179 (H) 74 - 99 mg/dL   CBC   Result Value Ref Range    WBC 4.5 4.4 - 11.3 x10*3/uL    nRBC 0.0 0.0 - 0.0 /100 WBCs    RBC 4.69 4.50 - 5.90 x10*6/uL    Hemoglobin 14.8 13.5 - 17.5 g/dL    Hematocrit 43.4 41.0 - 52.0 %    MCV 93 80 - 100 fL    MCH 31.6 26.0 - 34.0 pg    MCHC 34.1 32.0 - 36.0 g/dL    RDW 13.0 11.5 - 14.5 %    Platelets 169 150 - 450 x10*3/uL   Basic metabolic panel   Result Value Ref Range    Glucose 208 (H) 74 - 99 mg/dL    Sodium 134 (L) 136 - 145 mmol/L    Potassium 3.6 3.5 - 5.3 mmol/L    Chloride 113 (H) 98 - 107 mmol/L    Bicarbonate 14 (L) 21 - 32 mmol/L    Anion Gap 11 10 - 20 mmol/L    Urea Nitrogen 15 6 - 23 mg/dL    Creatinine 0.83 0.50 - 1.30 mg/dL    eGFR >90 >60 mL/min/1.73m*2    Calcium 7.8 (L) 8.6 - 10.3 mg/dL   POCT GLUCOSE   Result Value Ref Range    POCT Glucose 184 (H) 74 - 99 mg/dL   POCT GLUCOSE   Result Value Ref Range    POCT Glucose 188 (H) 74 - 99 mg/dL   POCT GLUCOSE   Result Value Ref Range    POCT Glucose 154 (H) 74 - 99 mg/dL   POCT GLUCOSE   Result Value Ref Range    POCT Glucose 145 (H) 74 - 99 mg/dL   Basic metabolic panel   Result Value Ref Range    Glucose 153 (H) 74 - 99 mg/dL    Sodium 134 (L) 136 - 145 mmol/L    Potassium 3.3 (L) 3.5 - 5.3 mmol/L    Chloride 112 (H) 98 - 107 mmol/L    Bicarbonate 17 (L) 21 - 32 mmol/L    Anion Gap 8 (L) 10  - 20 mmol/L    Urea Nitrogen 14 6 - 23 mg/dL    Creatinine 0.74 0.50 - 1.30 mg/dL    eGFR >90 >60 mL/min/1.73m*2    Calcium 7.5 (L) 8.6 - 10.3 mg/dL   POCT GLUCOSE   Result Value Ref Range    POCT Glucose 215 (H) 74 - 99 mg/dL   POCT GLUCOSE   Result Value Ref Range    POCT Glucose 302 (H) 74 - 99 mg/dL   POCT GLUCOSE   Result Value Ref Range    POCT Glucose 304 (H) 74 - 99 mg/dL     Imaging  No results found.    Cardiology, Vascular, and Other Imaging  No other imaging results found for the past 7 days                     Assessment/Plan   Assessment & Plan  Type 1 diabetes mellitus with ketoacidosis without coma    Ed Sanon is a 34 y.o. male  with PMHx s/f insulin-dependent diabetes with recurrent hospitalizations for DKA 2,  prior hospitalizations just this month,  presenting with parents for diabetic ketoacidosis.     Diabetic ketoacidosis secondary to noncompliance with short acting insulin  5/29/2025: On admission is a blood glucose was 369, bicarbonate 4 and anion gap of 29  Started on insulin drip as per DKA protocol  Point-of-care glucose every 1 hour and BMP every 4 hours while on insulin drip  Aggressive IV fluids as per DKA protocol  Patient latest pH was 7.10, CO2 19, bicarbonate 6.   Transition to subcu insulin once anion gap is less than 15 and bicarbonate more than 15  5/30/2025: Patient anion gap is closed and bicarbonate more than 15, transitioned to Tresiba 20 units subcutaneous daily and lispro insulin sliding scale     2.  Uncontrolled diabetes, noncompliant with short acting insulin  I had extensive discussion with the patient and the patient mother at the bedside  Patient has been having concerns about short acting insulin and hypoglycemia, making him to skip short acting insulin  I extensively counseled the patient that solution is not to skip short-acting insulin which is already at a low dose, but consider having food accessible when taking the short acting insulin  Explained about  complications of uncontrolled diabetes for years and recurrent DKA  5/30/2025: Tresiba 20 units subcutaneous daily and lispro insulin sliding scale  Counseled the patient again about the importance of not skipping the short acting insulin    I spent 30 minutes of cumulative critical care time with the patient.  Greater than 50% of that time was spent in the direct collaboration and or coordination of care of the patient.   Will transfer the patient to [facility] with telemetry    Dragon dictation software was used to dictate this note and thus there may be minor errors in translation/transcription including garbled speech or misspellings. Please contact for clarification if needed.        [1] insulin degludec, 20 Units, subcutaneous, q24h  insulin lispro, 0-10 Units, subcutaneous, TID AC  pantoprazole, 40 mg, oral, Daily before breakfast   Or  pantoprazole, 40 mg, intravenous, Daily before breakfast  polyethylene glycol, 17 g, oral, Daily  potassium chloride CR, 20 mEq, oral, Once    [2]    [3] PRN medications: bisacodyl, bisacodyl, dextrose, dextrose, glucagon, glucagon, guaiFENesin, melatonin, ondansetron ODT **OR** ondansetron

## 2025-05-30 NOTE — PROGRESS NOTES
"Ed Sanon is a 34 y.o. male on day 1 of admission presenting with Type 1 diabetes mellitus with ketoacidosis without coma.    Subjective   Patient admits to feeling better.  He is eating breakfast.     I have reviewed histories, allergies and medications have been reviewed and there are no changes       Objective   Review of Systems   Gastrointestinal:  Negative for abdominal pain, nausea and vomiting.   All other systems reviewed and are negative.    Physical Exam  Vitals and nursing note reviewed.   Constitutional:       General: He is not in acute distress.     Appearance: Normal appearance. He is normal weight.   HENT:      Head: Normocephalic and atraumatic.      Nose: Nose normal.      Mouth/Throat:      Mouth: Mucous membranes are moist.   Eyes:      Extraocular Movements: Extraocular movements intact.   Cardiovascular:      Rate and Rhythm: Normal rate and regular rhythm.   Pulmonary:      Effort: Pulmonary effort is normal.   Musculoskeletal:         General: Normal range of motion.   Neurological:      Mental Status: He is alert and oriented to person, place, and time.   Psychiatric:         Mood and Affect: Mood normal.         Last Recorded Vitals  Blood pressure 133/88, pulse 86, temperature 36.4 °C (97.6 °F), temperature source Temporal, resp. rate 15, height 1.778 m (5' 10\"), weight 67.1 kg (147 lb 14.9 oz), SpO2 99%.  Intake/Output last 3 Shifts:  I/O last 3 completed shifts:  In: - (0 mL/kg)   Out: 700 (10.4 mL/kg) [Urine:700 (0.3 mL/kg/hr)]  Weight: 67.1 kg     Relevant Results  Results from last 7 days   Lab Units 05/30/25  0816 05/30/25  0805 05/30/25  0712 05/30/25  0601 05/30/25  0519 05/30/25  0424 05/30/25  0419 05/29/25  2324 05/29/25  2305 05/29/25  2041 05/29/25 2001 05/29/25  1649 05/29/25  1645   POCT GLUCOSE mg/dL  --  145* 154* 188* 184*  --  179*   < >  --    < >  --    < >  --    GLUCOSE mg/dL 153*  --   --   --   --  208*  --   --  231*  --  364*  --  185*    < > = values in " this interval not displayed.     Scheduled medications  Scheduled Medications[1]  Continuous medications  Continuous Medications[2]  PRN medications  PRN Medications[3]    Results for orders placed or performed during the hospital encounter of 05/29/25 (from the past 24 hours)   POCT GLUCOSE   Result Value Ref Range    POCT Glucose 398 (H) 74 - 99 mg/dL   CBC and Auto Differential   Result Value Ref Range    WBC 6.4 4.4 - 11.3 x10*3/uL    nRBC 0.0 0.0 - 0.0 /100 WBCs    RBC 5.70 4.50 - 5.90 x10*6/uL    Hemoglobin 17.7 (H) 13.5 - 17.5 g/dL    Hematocrit 54.0 (H) 41.0 - 52.0 %    MCV 95 80 - 100 fL    MCH 31.1 26.0 - 34.0 pg    MCHC 32.8 32.0 - 36.0 g/dL    RDW 13.2 11.5 - 14.5 %    Platelets 198 150 - 450 x10*3/uL    Neutrophils % 76.1 40.0 - 80.0 %    Immature Granulocytes %, Automated 0.3 0.0 - 0.9 %    Lymphocytes % 14.6 13.0 - 44.0 %    Monocytes % 8.1 2.0 - 10.0 %    Eosinophils % 0.0 0.0 - 6.0 %    Basophils % 0.9 0.0 - 2.0 %    Neutrophils Absolute 4.89 1.20 - 7.70 x10*3/uL    Immature Granulocytes Absolute, Automated 0.02 0.00 - 0.70 x10*3/uL    Lymphocytes Absolute 0.94 (L) 1.20 - 4.80 x10*3/uL    Monocytes Absolute 0.52 0.10 - 1.00 x10*3/uL    Eosinophils Absolute 0.00 0.00 - 0.70 x10*3/uL    Basophils Absolute 0.06 0.00 - 0.10 x10*3/uL   Magnesium   Result Value Ref Range    Magnesium 2.07 1.60 - 2.40 mg/dL   Comprehensive metabolic panel   Result Value Ref Range    Glucose 369 (H) 74 - 99 mg/dL    Sodium 133 (L) 136 - 145 mmol/L    Potassium 4.7 3.5 - 5.3 mmol/L    Chloride 105 98 - 107 mmol/L    Bicarbonate 4 (LL) 21 - 32 mmol/L    Anion Gap 29 (H) 10 - 20 mmol/L    Urea Nitrogen 14 6 - 23 mg/dL    Creatinine 1.13 0.50 - 1.30 mg/dL    eGFR 86 >60 mL/min/1.73m*2    Calcium 8.4 (L) 8.6 - 10.3 mg/dL    Albumin 4.5 3.4 - 5.0 g/dL    Alkaline Phosphatase 100 33 - 120 U/L    Total Protein 7.5 6.4 - 8.2 g/dL    AST 8 (L) 9 - 39 U/L    Bilirubin, Total 0.5 0.0 - 1.2 mg/dL    ALT 9 (L) 10 - 52 U/L   Phosphorus    Result Value Ref Range    Phosphorus 2.8 2.5 - 4.9 mg/dL   Blood Gas Venous Full Panel   Result Value Ref Range    POCT pH, Venous 7.00 (LL) 7.33 - 7.43 pH    POCT pCO2, Venous 18 (L) 41 - 51 mm Hg    POCT pO2, Venous 112 (H) 35 - 45 mm Hg    POCT SO2, Venous 99 (H) 45 - 75 %    POCT Oxy Hemoglobin, Venous 97.0 (H) 45.0 - 75.0 %    POCT Hematocrit Calculated, Venous 57.0 (H) 41.0 - 52.0 %    POCT Sodium, Venous 127 (L) 136 - 145 mmol/L    POCT Potassium, Venous 4.9 3.5 - 5.3 mmol/L    POCT Chloride, Venous 105 98 - 107 mmol/L    POCT Ionized Calicum, Venous 1.13 1.10 - 1.33 mmol/L    POCT Glucose, Venous 414 (H) 74 - 99 mg/dL    POCT Lactate, Venous 1.2 0.4 - 2.0 mmol/L    POCT Base Excess, Venous -25.2 (L) -2.0 - 3.0 mmol/L    POCT HCO3 Calculated, Venous 4.4 (L) 22.0 - 26.0 mmol/L    POCT Hemoglobin, Venous 19.0 (H) 13.5 - 17.5 g/dL    POCT Anion Gap, Venous 23.0 10.0 - 25.0 mmol/L    Patient Temperature 37.0 degrees Celsius    FiO2 21 %   Beta Hydroxybutyrate   Result Value Ref Range    Beta-Hydroxybutyrate 9.66 (H) 0.02 - 0.27 mmol/L   POCT GLUCOSE   Result Value Ref Range    POCT Glucose 305 (H) 74 - 99 mg/dL   POCT GLUCOSE   Result Value Ref Range    POCT Glucose 240 (H) 74 - 99 mg/dL   Basic metabolic panel   Result Value Ref Range    Glucose 222 (H) 74 - 99 mg/dL    Sodium 136 136 - 145 mmol/L    Potassium 3.8 3.5 - 5.3 mmol/L    Chloride 114 (H) 98 - 107 mmol/L    Bicarbonate 5 (LL) 21 - 32 mmol/L    Anion Gap 21 (H) 10 - 20 mmol/L    Urea Nitrogen 13 6 - 23 mg/dL    Creatinine 0.95 0.50 - 1.30 mg/dL    eGFR >90 >60 mL/min/1.73m*2    Calcium 7.4 (L) 8.6 - 10.3 mg/dL   Blood Gas Venous Full Panel   Result Value Ref Range    POCT pH, Venous 7.10 (LL) 7.33 - 7.43 pH    POCT pCO2, Venous 19 (L) 41 - 51 mm Hg    POCT pO2, Venous 47 (H) 35 - 45 mm Hg    POCT SO2, Venous 83 (H) 45 - 75 %    POCT Oxy Hemoglobin, Venous 81.9 (H) 45.0 - 75.0 %    POCT Hematocrit Calculated, Venous 50.0 41.0 - 52.0 %    POCT Sodium,  Venous 133 (L) 136 - 145 mmol/L    POCT Potassium, Venous 3.7 3.5 - 5.3 mmol/L    POCT Chloride, Venous 111 (H) 98 - 107 mmol/L    POCT Ionized Calicum, Venous 1.22 1.10 - 1.33 mmol/L    POCT Glucose, Venous 202 (H) 74 - 99 mg/dL    POCT Lactate, Venous 1.0 0.4 - 2.0 mmol/L    POCT Base Excess, Venous -21.7 (L) -2.0 - 3.0 mmol/L    POCT HCO3 Calculated, Venous 5.9 (L) 22.0 - 26.0 mmol/L    POCT Hemoglobin, Venous 16.6 13.5 - 17.5 g/dL    POCT Anion Gap, Venous 20.0 10.0 - 25.0 mmol/L    Patient Temperature 37.0 degrees Celsius    FiO2 21 %   POCT GLUCOSE   Result Value Ref Range    POCT Glucose 171 (H) 74 - 99 mg/dL   Basic metabolic panel   Result Value Ref Range    Glucose 173 (H) 74 - 99 mg/dL    Sodium 136 136 - 145 mmol/L    Potassium 3.6 3.5 - 5.3 mmol/L    Chloride 113 (H) 98 - 107 mmol/L    Bicarbonate 7 (LL) 21 - 32 mmol/L    Anion Gap 20 10 - 20 mmol/L    Urea Nitrogen 13 6 - 23 mg/dL    Creatinine 0.95 0.50 - 1.30 mg/dL    eGFR >90 >60 mL/min/1.73m*2    Calcium 7.5 (L) 8.6 - 10.3 mg/dL   Basic Metabolic Panel   Result Value Ref Range    Glucose 185 (H) 74 - 99 mg/dL    Sodium 136 136 - 145 mmol/L    Potassium 3.4 (L) 3.5 - 5.3 mmol/L    Chloride 115 (H) 98 - 107 mmol/L    Bicarbonate 8 (LL) 21 - 32 mmol/L    Anion Gap 16 10 - 20 mmol/L    Urea Nitrogen 13 6 - 23 mg/dL    Creatinine 0.90 0.50 - 1.30 mg/dL    eGFR >90 >60 mL/min/1.73m*2    Calcium 7.7 (L) 8.6 - 10.3 mg/dL   POCT GLUCOSE   Result Value Ref Range    POCT Glucose 194 (H) 74 - 99 mg/dL   POCT GLUCOSE   Result Value Ref Range    POCT Glucose 174 (H) 74 - 99 mg/dL   POCT GLUCOSE   Result Value Ref Range    POCT Glucose 145 (H) 74 - 99 mg/dL   POCT GLUCOSE   Result Value Ref Range    POCT Glucose 153 (H) 74 - 99 mg/dL   POCT GLUCOSE   Result Value Ref Range    POCT Glucose 142 (H) 74 - 99 mg/dL   Basic metabolic panel   Result Value Ref Range    Glucose 364 (H) 74 - 99 mg/dL    Sodium 131 (L) 136 - 145 mmol/L    Potassium 3.0 (L) 3.5 - 5.3 mmol/L     Chloride 112 (H) 98 - 107 mmol/L    Bicarbonate 12 (L) 21 - 32 mmol/L    Anion Gap 10 10 - 20 mmol/L    Urea Nitrogen 13 6 - 23 mg/dL    Creatinine 0.83 0.50 - 1.30 mg/dL    eGFR >90 >60 mL/min/1.73m*2    Calcium 6.9 (L) 8.6 - 10.3 mg/dL   POCT GLUCOSE   Result Value Ref Range    POCT Glucose 211 (H) 74 - 99 mg/dL   POCT GLUCOSE   Result Value Ref Range    POCT Glucose 199 (H) 74 - 99 mg/dL   POCT GLUCOSE   Result Value Ref Range    POCT Glucose 184 (H) 74 - 99 mg/dL   Basic metabolic panel   Result Value Ref Range    Glucose 231 (H) 74 - 99 mg/dL    Sodium 135 (L) 136 - 145 mmol/L    Potassium 3.9 3.5 - 5.3 mmol/L    Chloride 113 (H) 98 - 107 mmol/L    Bicarbonate 10 (LL) 21 - 32 mmol/L    Anion Gap 16 10 - 20 mmol/L    Urea Nitrogen 13 6 - 23 mg/dL    Creatinine 0.88 0.50 - 1.30 mg/dL    eGFR >90 >60 mL/min/1.73m*2    Calcium 7.3 (L) 8.6 - 10.3 mg/dL   POCT GLUCOSE   Result Value Ref Range    POCT Glucose 232 (H) 74 - 99 mg/dL   POCT GLUCOSE   Result Value Ref Range    POCT Glucose 238 (H) 74 - 99 mg/dL   POCT GLUCOSE   Result Value Ref Range    POCT Glucose 220 (H) 74 - 99 mg/dL   POCT GLUCOSE   Result Value Ref Range    POCT Glucose 205 (H) 74 - 99 mg/dL   POCT GLUCOSE   Result Value Ref Range    POCT Glucose 199 (H) 74 - 99 mg/dL   POCT GLUCOSE   Result Value Ref Range    POCT Glucose 179 (H) 74 - 99 mg/dL   CBC   Result Value Ref Range    WBC 4.5 4.4 - 11.3 x10*3/uL    nRBC 0.0 0.0 - 0.0 /100 WBCs    RBC 4.69 4.50 - 5.90 x10*6/uL    Hemoglobin 14.8 13.5 - 17.5 g/dL    Hematocrit 43.4 41.0 - 52.0 %    MCV 93 80 - 100 fL    MCH 31.6 26.0 - 34.0 pg    MCHC 34.1 32.0 - 36.0 g/dL    RDW 13.0 11.5 - 14.5 %    Platelets 169 150 - 450 x10*3/uL   Basic metabolic panel   Result Value Ref Range    Glucose 208 (H) 74 - 99 mg/dL    Sodium 134 (L) 136 - 145 mmol/L    Potassium 3.6 3.5 - 5.3 mmol/L    Chloride 113 (H) 98 - 107 mmol/L    Bicarbonate 14 (L) 21 - 32 mmol/L    Anion Gap 11 10 - 20 mmol/L    Urea Nitrogen 15 6  - 23 mg/dL    Creatinine 0.83 0.50 - 1.30 mg/dL    eGFR >90 >60 mL/min/1.73m*2    Calcium 7.8 (L) 8.6 - 10.3 mg/dL   POCT GLUCOSE   Result Value Ref Range    POCT Glucose 184 (H) 74 - 99 mg/dL   POCT GLUCOSE   Result Value Ref Range    POCT Glucose 188 (H) 74 - 99 mg/dL   POCT GLUCOSE   Result Value Ref Range    POCT Glucose 154 (H) 74 - 99 mg/dL   POCT GLUCOSE   Result Value Ref Range    POCT Glucose 145 (H) 74 - 99 mg/dL   Basic metabolic panel   Result Value Ref Range    Glucose 153 (H) 74 - 99 mg/dL    Sodium 134 (L) 136 - 145 mmol/L    Potassium 3.3 (L) 3.5 - 5.3 mmol/L    Chloride 112 (H) 98 - 107 mmol/L    Bicarbonate 17 (L) 21 - 32 mmol/L    Anion Gap 8 (L) 10 - 20 mmol/L    Urea Nitrogen 14 6 - 23 mg/dL    Creatinine 0.74 0.50 - 1.30 mg/dL    eGFR >90 >60 mL/min/1.73m*2    Calcium 7.5 (L) 8.6 - 10.3 mg/dL         CT chest abdomen pelvis wo IV contrast  Result Date: 5/7/2025  Interpreted By:  Gerardo Cameron, STUDY: CT CHEST ABDOMEN PELVIS WO CONTRAST; 5/7/2025 11:56 am   INDICATION: Signs/Symptoms:sepsis, N/V, cough.   COMPARISON: None.   ACCESSION NUMBER(S): [id number]   ORDERING CLINICIAN: NAIF CLIFFORD   TECHNIQUE: Axial CT of the chest, abdomen, and pelvis was performed. Coronal and sagittal reconstructions were performed. No intravenous or oral contrast agents were administered.   FINDINGS: Please note that the study is limited without intravenous contrast.   CHEST:     LUNG/PLEURA/LARGE AIRWAYS: The trachea and central airways are patent.   No pleural effusion, consolidation or pneumothorax.   VESSELS: No thoracic aortic aneurysm.   HEART: No pericardial effusion.  The heart is not  enlarged.   MEDIASTINUM AND JERROD: The evaluation for adenopathy suboptimal in the absence of intravenous contrast.  No obvious pathologically enlarged lymph nodes at the mediastinum.   CHEST WALL AND LOWER NECK: The soft tissues of the chest wall are within normal limits. The visualized thyroid gland is unremarkable.      ABDOMEN: Motion artifact throughout the abdomen and pelvis obscures fine detail.   LIVER: Unremarkable unenhanced appearance.   BILE DUCTS: No obvious dilation.   GALLBLADDER: Present.   PANCREAS: No peripancreatic inflammatory changes.   SPLEEN: Not enlarged.   ADRENAL GLANDS: Unremarkable.   KIDNEYS AND URETERS: No hydronephrosis or hydroureter.  No renal or ureteral calculi are identified.   PELVIS:   BLADDER: Mild wall thickening seen diffusely. No associated inflammatory changes.   REPRODUCTIVE ORGANS: No pelvic mass.   BOWEL: No dilated bowel loops. No inflammatory changes.  The appendix is normal.   VESSELS: No abdominal aortic aneurysm.   PERITONEUM/RETROPERITONEUM/LYMPH NODES: No free fluid or free air.  No retroperitoneal hemorrhage.  No pathologically enlarged lymph nodes are identified.   ABDOMINAL WALL: Within normal limits.   BONES: No acute osseous findings.  Degenerative changes in the spine.       CHEST 1. No confluent airspace disease or acute process within the chest.   ABDOMEN-PELVIS 1. Mild bladder wall thickening seen diffusely without associated inflammatory changes. Cystitis not excluded radiographically. 2. No acute process otherwise identified within the abdomen or pelvis.     Signed by: Gerardo Cameron 5/7/2025 12:06 PM Dictation workstation:   [id number]     Assessment & Plan  Type 1 diabetes mellitus with ketoacidosis without coma      IMPRESSION:  DKA - tenth admission for DKA this calendar year; resolved   POORLY CONTROLLED TYPE I DIABETES MELLITUS  Long term insulin use with basal insulin only  A1C of 11.7%    HYPOKALEMIA      RECOMMENDATIONS:  Tresiba 20 units subcutaneous daily  Insulin drip to be discontinued two hours following Tresiba administration this morning   IVF D10 to be discontinued at the time that the insulin infusion is discontinued   To continue insulin correction scale TID AC   Diabetic diet as tolerated   Accu-Cheks ACHS    To continue the use of your CGM for the  intensive glucose monitoring due to the dynamic nature of insulin requirements, the narrow therapeutic index of insulin and the potentially fatal consequences of treatment  Counseled that the degree of difference between a finger stick glucose and CGM value is 30mg/dL as the CGM value is measuring glucose filtered out of the blood and into the interstitial fluid   Hypoglycemic protocol   Potassium replacement as per primary team  Hypokalemia should improve with the cessation of the insulin infusion   Will continue to follow    oLri Packer MD         [1] insulin degludec, 20 Units, subcutaneous, q24h  insulin lispro, 0-10 Units, subcutaneous, TID AC  pantoprazole, 40 mg, oral, Daily before breakfast   Or  pantoprazole, 40 mg, intravenous, Daily before breakfast  polyethylene glycol, 17 g, oral, Daily  [2]    [3] PRN medications: bisacodyl, bisacodyl, dextrose, dextrose, glucagon, glucagon, guaiFENesin, melatonin, ondansetron ODT **OR** ondansetron

## 2025-05-30 NOTE — CARE PLAN
Problem: Pain - Adult  Goal: Verbalizes/displays adequate comfort level or baseline comfort level  Outcome: Progressing     Problem: Safety - Adult  Goal: Free from fall injury  Outcome: Progressing     Problem: Discharge Planning  Goal: Discharge to home or other facility with appropriate resources  Outcome: Progressing     Problem: Chronic Conditions and Co-morbidities  Goal: Patient's chronic conditions and co-morbidity symptoms are monitored and maintained or improved  Outcome: Progressing     Problem: Nutrition  Goal: Nutrient intake appropriate for maintaining nutritional needs  Outcome: Progressing     Problem: Nutrition  Goal: Nutrient intake appropriate for maintaining nutritional needs  Outcome: Progressing   The patient's goals for the shift include  Pt will remain hemodynamically stable by end of shift    The clinical goals for the shift include  Pt will remain hemodynamically stable by end of shift

## 2025-05-30 NOTE — CARE PLAN
The patient's goals for the shift include      The clinical goals for the shift include bgl < 400      Problem: Pain - Adult  Goal: Verbalizes/displays adequate comfort level or baseline comfort level  Outcome: Progressing     Problem: Safety - Adult  Goal: Free from fall injury  Outcome: Progressing     Problem: Discharge Planning  Goal: Discharge to home or other facility with appropriate resources  Outcome: Progressing     Problem: Chronic Conditions and Co-morbidities  Goal: Patient's chronic conditions and co-morbidity symptoms are monitored and maintained or improved  Outcome: Progressing     Problem: Nutrition  Goal: Nutrient intake appropriate for maintaining nutritional needs  Outcome: Progressing

## 2025-05-31 ENCOUNTER — PHARMACY VISIT (OUTPATIENT)
Dept: PHARMACY | Facility: CLINIC | Age: 35
End: 2025-05-31
Payer: MEDICAID

## 2025-05-31 VITALS
TEMPERATURE: 97.6 F | DIASTOLIC BLOOD PRESSURE: 90 MMHG | OXYGEN SATURATION: 99 % | RESPIRATION RATE: 18 BRPM | SYSTOLIC BLOOD PRESSURE: 131 MMHG | HEIGHT: 70 IN | HEART RATE: 99 BPM | WEIGHT: 156.53 LBS | BODY MASS INDEX: 22.41 KG/M2

## 2025-05-31 PROBLEM — E10.10 TYPE 1 DIABETES MELLITUS WITH KETOACIDOSIS WITHOUT COMA: Status: RESOLVED | Noted: 2025-04-09 | Resolved: 2025-05-31

## 2025-05-31 LAB
ANION GAP SERPL CALC-SCNC: 11 MMOL/L (ref 10–20)
BUN SERPL-MCNC: 17 MG/DL (ref 6–23)
CALCIUM SERPL-MCNC: 7.6 MG/DL (ref 8.6–10.3)
CHLORIDE SERPL-SCNC: 107 MMOL/L (ref 98–107)
CO2 SERPL-SCNC: 23 MMOL/L (ref 21–32)
CREAT SERPL-MCNC: 0.66 MG/DL (ref 0.5–1.3)
EGFRCR SERPLBLD CKD-EPI 2021: >90 ML/MIN/1.73M*2
ERYTHROCYTE [DISTWIDTH] IN BLOOD BY AUTOMATED COUNT: 13.2 % (ref 11.5–14.5)
GLUCOSE BLD MANUAL STRIP-MCNC: 365 MG/DL (ref 74–99)
GLUCOSE SERPL-MCNC: 283 MG/DL (ref 74–99)
HCT VFR BLD AUTO: 40 % (ref 41–52)
HGB BLD-MCNC: 14.1 G/DL (ref 13.5–17.5)
MAGNESIUM SERPL-MCNC: 1.8 MG/DL (ref 1.6–2.4)
MCH RBC QN AUTO: 32.3 PG (ref 26–34)
MCHC RBC AUTO-ENTMCNC: 35.3 G/DL (ref 32–36)
MCV RBC AUTO: 92 FL (ref 80–100)
NRBC BLD-RTO: 0 /100 WBCS (ref 0–0)
PLATELET # BLD AUTO: ABNORMAL 10*3/UL
POTASSIUM SERPL-SCNC: 3.3 MMOL/L (ref 3.5–5.3)
RBC # BLD AUTO: 4.37 X10*6/UL (ref 4.5–5.9)
SODIUM SERPL-SCNC: 138 MMOL/L (ref 136–145)
WBC # BLD AUTO: 4.6 X10*3/UL (ref 4.4–11.3)

## 2025-05-31 PROCEDURE — 99239 HOSP IP/OBS DSCHRG MGMT >30: CPT | Performed by: INTERNAL MEDICINE

## 2025-05-31 PROCEDURE — RXMED WILLOW AMBULATORY MEDICATION CHARGE

## 2025-05-31 PROCEDURE — 99231 SBSQ HOSP IP/OBS SF/LOW 25: CPT | Performed by: INTERNAL MEDICINE

## 2025-05-31 PROCEDURE — 82947 ASSAY GLUCOSE BLOOD QUANT: CPT

## 2025-05-31 PROCEDURE — 2500000002 HC RX 250 W HCPCS SELF ADMINISTERED DRUGS (ALT 637 FOR MEDICARE OP, ALT 636 FOR OP/ED): Performed by: INTERNAL MEDICINE

## 2025-05-31 PROCEDURE — 83735 ASSAY OF MAGNESIUM: CPT | Performed by: INTERNAL MEDICINE

## 2025-05-31 PROCEDURE — 36415 COLL VENOUS BLD VENIPUNCTURE: CPT | Performed by: INTERNAL MEDICINE

## 2025-05-31 PROCEDURE — 80048 BASIC METABOLIC PNL TOTAL CA: CPT | Performed by: INTERNAL MEDICINE

## 2025-05-31 PROCEDURE — 85027 COMPLETE CBC AUTOMATED: CPT | Performed by: INTERNAL MEDICINE

## 2025-05-31 RX ORDER — POTASSIUM CHLORIDE 20 MEQ/1
40 TABLET, EXTENDED RELEASE ORAL ONCE
Qty: 2 TABLET | Refills: 0 | Status: SHIPPED | OUTPATIENT
Start: 2025-05-31 | End: 2025-06-01

## 2025-05-31 RX ORDER — INSULIN DEGLUDEC 100 U/ML
20 INJECTION, SOLUTION SUBCUTANEOUS NIGHTLY
Start: 2025-05-31

## 2025-05-31 RX ORDER — INSULIN LISPRO 100 [IU]/ML
0-10 INJECTION, SOLUTION INTRAVENOUS; SUBCUTANEOUS
Qty: 12 ML | Refills: 1 | Status: SHIPPED | OUTPATIENT
Start: 2025-05-31

## 2025-05-31 RX ORDER — POTASSIUM CHLORIDE 20 MEQ/1
40 TABLET, EXTENDED RELEASE ORAL EVERY 6 HOURS
Status: DISCONTINUED | OUTPATIENT
Start: 2025-05-31 | End: 2025-05-31 | Stop reason: HOSPADM

## 2025-05-31 RX ADMIN — INSULIN LISPRO 10 UNITS: 100 INJECTION, SOLUTION INTRAVENOUS; SUBCUTANEOUS at 08:35

## 2025-05-31 RX ADMIN — POTASSIUM CHLORIDE 40 MEQ: 1500 TABLET, EXTENDED RELEASE ORAL at 10:28

## 2025-05-31 RX ADMIN — INSULIN DEGLUDEC INJECTION 20 UNITS: 100 INJECTION, SOLUTION SUBCUTANEOUS at 08:35

## 2025-05-31 ASSESSMENT — COGNITIVE AND FUNCTIONAL STATUS - GENERAL
DAILY ACTIVITIY SCORE: 24
MOBILITY SCORE: 24

## 2025-05-31 ASSESSMENT — PAIN SCALES - GENERAL: PAINLEVEL_OUTOF10: 0 - NO PAIN

## 2025-05-31 ASSESSMENT — PAIN - FUNCTIONAL ASSESSMENT: PAIN_FUNCTIONAL_ASSESSMENT: 0-10

## 2025-05-31 NOTE — NURSING NOTE
1035: discharge instructions reviewed with patient at the bedside, all questions and concerns addressed. Explained to patient when he will need to dose and administer his short acting and long acting insulins at home; he states he understands. IV x 3 removed. Tele removed. Patient awaiting his transportation.

## 2025-05-31 NOTE — PROGRESS NOTES
"Ed Sanon is a 34 y.o. male on day 2 of admission presenting with Type 1 diabetes mellitus with ketoacidosis without coma.    Subjective   No new complaint     Scheduled medications  Scheduled Medications[1]  Continuous medications  Continuous Medications[2]  PRN medications  PRN Medications[3]    Objective   Physical Exam  Constitutional:       General: He is not in acute distress.  Neurological:      Mental Status: He is alert.         Last Recorded Vitals  Blood pressure 125/84, pulse 96, temperature 36.6 °C (97.8 °F), temperature source Temporal, resp. rate 18, height 1.778 m (5' 10\"), weight 71 kg (156 lb 8.4 oz), SpO2 97%.  Intake/Output last 3 Shifts:  I/O last 3 completed shifts:  In: 480 (6.8 mL/kg) [P.O.:480]  Out: 700 (9.9 mL/kg) [Urine:700 (0.3 mL/kg/hr)]  Weight: 71 kg     Relevant Results  Results from last 7 days   Lab Units 05/31/25  0636 05/31/25  0523 05/30/25  2041 05/30/25  1607 05/30/25  1211 05/30/25  1111 05/30/25  0938 05/30/25  0816 05/30/25  0519 05/30/25  0424 05/29/25  2324 05/29/25  2305 05/29/25  2041 05/29/25 2001   POCT GLUCOSE mg/dL 365*  --  262* 272* 323* 304*   < >  --    < >  --    < >  --    < >  --    GLUCOSE mg/dL  --  283*  --   --   --   --   --  153*  --  208*  --  231*  --  364*    < > = values in this interval not displayed.      Latest Reference Range & Units 05/31/25 05:23   GLUCOSE 74 - 99 mg/dL 283 (H)   SODIUM 136 - 145 mmol/L 138   POTASSIUM 3.5 - 5.3 mmol/L 3.3 (L)   CHLORIDE 98 - 107 mmol/L 107   Bicarbonate 21 - 32 mmol/L 23   Anion Gap 10 - 20 mmol/L 11   Blood Urea Nitrogen 6 - 23 mg/dL 17   Creatinine 0.50 - 1.30 mg/dL 0.66   EGFR >60 mL/min/1.73m*2 >90   Calcium 8.6 - 10.3 mg/dL 7.6 (L)       Assessment & Plan  Type 1 diabetes mellitus with ketoacidosis without coma      DIABETIC KETOACIDOSIS, RESOLVED  TYPE 1 DIABETES MELLITUS  Hyperglycemic but DKA resolved  History of very frequent DKA and insulin omission      RECOMMENDATIONS  Tresiba 20 units " QAM  Lispro 5 units QAC plus scale.  Change to aspart at discharge  I stressed the importance of both insulins, but the Tresiba should prevent ketosis.     Shay Seals MD         [1] insulin degludec, 20 Units, subcutaneous, q24h  insulin lispro, 0-10 Units, subcutaneous, TID AC  polyethylene glycol, 17 g, oral, Daily  [2]    [3] PRN medications: bisacodyl, bisacodyl, dextrose, dextrose, glucagon, glucagon, guaiFENesin, melatonin, ondansetron ODT **OR** ondansetron

## 2025-05-31 NOTE — CARE PLAN
Problem: Pain - Adult  Goal: Verbalizes/displays adequate comfort level or baseline comfort level  Outcome: Progressing     Problem: Safety - Adult  Goal: Free from fall injury  Outcome: Progressing     Problem: Discharge Planning  Goal: Discharge to home or other facility with appropriate resources  Outcome: Progressing     Problem: Chronic Conditions and Co-morbidities  Goal: Patient's chronic conditions and co-morbidity symptoms are monitored and maintained or improved  Outcome: Progressing     Problem: Nutrition  Goal: Nutrient intake appropriate for maintaining nutritional needs  Outcome: Progressing   The patient's goals for the shift include      The clinical goals for the shift include Pt will remain hemodynamically stable by end of shift

## 2025-05-31 NOTE — DISCHARGE SUMMARY
"Discharge Diagnosis  Type 1 diabetes mellitus with ketoacidosis without coma  Hypokalemia  Pseudohyponatremia    This discharge took greater than 35 minutes.    Test Results Pending At Discharge  Pending Labs       No current pending labs.            Hospital Course   Ed Sanon is a 34 y.o. male with PMHx s/f insulin-dependent diabetes with recurrent hospitalizations for DKA due to noncompliance, last admission about a week ago, presented to ER with parents for elevated blood sugar and was admitted with a diagnosis of DKA-started on IV fluid as well as insulin drip as per protocol.  Patient condition gradually improved-IV fluid and insulin drip was discontinued and switched insulin to subcu basal and bolus.  Endocrinology consulted and recommendation appreciated.  Patient was educated on importance of continuing on both basal and bolus insulin to prevent organ damage and possible premature death.  Patient will be discharged home on Tresiba 20 units daily, lispro sliding scale.  Follow-up with endocrinology as an outpatient.    Pertinent Physical Exam At Time of Discharge  Physical Exam  Patient is awake and orient, not in apparent distress  Eyes: PERRLA, no conjunctival congestion  Chest: Bilateral Air entry, no crackles or wheezing  Heart: s1S2 regular, no murmur  Abdomen: Soft, non tender, BS present  Ext:    Home Medications     Medication List      START taking these medications     insulin lispro 100 unit/mL injection; Inject 0-10 Units under the skin 3   times a day before meals. Take as directed per insulin instructions.   potassium chloride CR 20 mEq ER tablet; Commonly known as: Klor-Con M20;   Take 2 tablets (40 mEq) by mouth 1 time for 1 dose. Do not crush or chew.    Take 2 tablets this evening     CONTINUE taking these medications     alcohol swabs   BD Insulin Syringe Ultra-Fine 0.5 mL 31 gauge x 5/16\" syringe; Generic   drug: insulin syringe-needle U-100   Dexcom G7  misc; Generic drug: " "blood-glucose,,cont   Dexcom G7 Sensor device; Generic drug: blood-glucose sensor   Gvoke HypoPen 1-Pack 1 mg/0.2 mL auto-injector; Generic drug: glucagon   insulin degludec 100 unit/mL (3 mL) pen; Commonly known as: Tresiba   FlexTouch U-100; Inject 15 Units under the skin once daily at bedtime.   Take as directed per insulin instructions.   lancets misc   OneTouch Ultra Test; Generic drug: blood sugar diagnostic   OneTouch Ultra2 Meter misc; Generic drug: blood-glucose meter   * pen needle, diabetic 32 gauge x 5/32\" needle   * BD Ultra-Fine Claire Pen Needle 32 gauge x 5/32\" needle; Generic drug:   pen needle, diabetic; Use with insulin pens 4 times daily.  * This list has 2 medication(s) that are the same as other medications   prescribed for you. Read the directions carefully, and ask your doctor or   other care provider to review them with you.       Outpatient Follow-Up  No follow-ups on file.     Marissa Braun MD  5/31/2025  10:08 AM    "

## 2025-06-02 ENCOUNTER — PATIENT OUTREACH (OUTPATIENT)
Dept: PRIMARY CARE | Facility: CLINIC | Age: 35
End: 2025-06-02
Payer: MEDICAID

## 2025-06-02 NOTE — PROGRESS NOTES
30 Day Readmission  Discharge Facility: [facility]   Discharge Diagnosis: Type 1 diabetes mellitus with ketoacidosis without coma  Hypokalemia  Pseudohyponatremia  Admission Date: 5/29/25  Discharge Date: 5/31/25    PCP Appointment Date: no appointment, message to office  Specialist Appointment Date: 6/12/25 endocrinology  Hospital Encounter and Summary Linked: Yes  ED to Hosp-Admission (Discharged) with Marissa Braun MD; Santi Ortiz MD (05/29/2025)   Two attempts were made to reach patient within two business days after discharge. Left voicemail with contact information for patient to call back with any non-emergent questions or concerns.

## 2025-06-19 ENCOUNTER — PATIENT OUTREACH (OUTPATIENT)
Dept: PRIMARY CARE | Facility: CLINIC | Age: 35
End: 2025-06-19
Payer: MEDICAID

## 2025-07-02 ENCOUNTER — APPOINTMENT (OUTPATIENT)
Dept: PRIMARY CARE | Facility: CLINIC | Age: 35
End: 2025-07-02
Payer: MEDICAID

## 2025-07-27 ENCOUNTER — OFFICE VISIT (OUTPATIENT)
Dept: URGENT CARE | Age: 35
End: 2025-07-27
Payer: MEDICAID

## 2025-07-27 VITALS
OXYGEN SATURATION: 96 % | SYSTOLIC BLOOD PRESSURE: 130 MMHG | DIASTOLIC BLOOD PRESSURE: 88 MMHG | TEMPERATURE: 98 F | RESPIRATION RATE: 16 BRPM | HEART RATE: 91 BPM

## 2025-07-27 DIAGNOSIS — K04.7 DENTAL INFECTION: Primary | ICD-10-CM

## 2025-07-27 PROCEDURE — 1036F TOBACCO NON-USER: CPT

## 2025-07-27 PROCEDURE — 3079F DIAST BP 80-89 MM HG: CPT

## 2025-07-27 PROCEDURE — 3075F SYST BP GE 130 - 139MM HG: CPT

## 2025-07-27 PROCEDURE — 99203 OFFICE O/P NEW LOW 30 MIN: CPT

## 2025-07-27 RX ORDER — LANCETS 33 GAUGE
EACH MISCELLANEOUS
COMMUNITY
Start: 2025-05-06

## 2025-07-27 RX ORDER — AMOXICILLIN AND CLAVULANATE POTASSIUM 875; 125 MG/1; MG/1
875 TABLET, FILM COATED ORAL 2 TIMES DAILY
Qty: 20 TABLET | Refills: 0 | Status: SHIPPED | OUTPATIENT
Start: 2025-07-27

## 2025-07-27 ASSESSMENT — ENCOUNTER SYMPTOMS: CONSTITUTIONAL NEGATIVE: 1

## 2025-07-27 NOTE — PATIENT INSTRUCTIONS
Take full course of antibiotics even though he may be feeling better.     Take medications as directed. Take with food, yogurt, or a probiotic.      I recommend taking a probiotic while taking this medication, and for 7 days after you finish it.      A probiotic is a supplement you can buy over-the-counter that helps support the good bacteria in your body while taking antibiotics. Probiotics help to avoid the side effects of antibiotics, such as diarrhea or yeast infections. It is best to take a probiotic about 2 hours after your dose of antibiotic.      Rotate tylenol and ibuprofen for pain

## 2025-07-27 NOTE — PROGRESS NOTES
Subjective   Patient ID: Ed Sanon is a 34 y.o. male. They present today with a chief complaint of Dental Pain (Right upper dental pain. Was supposed to see a dentist to have teeth removed but could not make the appointment.).    History of Present Illness  34-year-old male presents clinic today with complaints of dental pain.  Patient states he is mainly on the right upper molars.  Patient states he has multiple cracked and broken teeth.  He was supposed to have teeth removed however missed his dentist appointment.  He woke up with it this morning.  Denies any fevers.  Denies body aches or chills.      Dental Pain      Past Medical History  Allergies as of 07/27/2025    (No Known Allergies)       Prescriptions Prior to Admission[1]     Medical History[2]    Surgical History[3]     reports that he has never smoked. He has never been exposed to tobacco smoke. He has never used smokeless tobacco. He reports that he does not drink alcohol and does not use drugs.    Review of Systems  Review of Systems   Constitutional: Negative.    HENT:  Positive for dental problem.                                   Objective    Vitals:    07/27/25 0812   BP: 130/88   Pulse: 91   Resp: 16   Temp: 36.7 °C (98 °F)   SpO2: 96%     No LMP for male patient.    Physical Exam  Constitutional:       Appearance: Normal appearance.   HENT:      Mouth/Throat:      Dentition: Abnormal dentition. Dental tenderness and dental caries present. No dental abscesses.     Neurological:      Mental Status: He is alert.         Procedures    Point of Care Test & Imaging Results from this visit  No results found for this visit on 07/27/25.   Imaging  No results found.    Cardiology, Vascular, and Other Imaging  No other imaging results found for the past 2 days      Diagnostic study results (if any) were reviewed by Prasanna Gaona PA-C.    Assessment/Plan   Allergies, medications, history, and pertinent labs/EKGs/Imaging reviewed by Prasanna Gaona  SARAHY.     Medical Decision Making  Patient pleasant cooperative on examination.    There is noted swelling to the maxillary area.    Multiple dental caries, cracked teeth.  No dental abscess.    Patient started on Augmentin for suspected dental infection.    Advised rotate Tylenol and Motrin.    Follow-up with dentistry.    If anything were to worsen please go to the ER for further evaluation.    Patient agreement with plan.  Patient alert and oriented, no acute distress upon discharge.    Orders and Diagnoses  There are no diagnoses linked to this encounter.    Medical Admin Record      Patient disposition: Home    Electronically signed by Prasanna Gaona PA-C  8:16 AM           [1] (Not in a hospital admission)  [2]   Past Medical History:  Diagnosis Date    DKA (diabetic ketoacidosis) (Multi)     recurrent    HTN (hypertension)     Noncompliance     Type I diabetes mellitus (Multi)    [3] History reviewed. No pertinent surgical history.

## 2025-08-04 ENCOUNTER — PHARMACY VISIT (OUTPATIENT)
Dept: PHARMACY | Facility: CLINIC | Age: 35
End: 2025-08-04
Payer: MEDICAID

## 2025-08-04 PROCEDURE — RXMED WILLOW AMBULATORY MEDICATION CHARGE

## 2025-09-18 ENCOUNTER — APPOINTMENT (OUTPATIENT)
Dept: PRIMARY CARE | Facility: CLINIC | Age: 35
End: 2025-09-18
Payer: MEDICAID